# Patient Record
Sex: MALE | Race: WHITE | NOT HISPANIC OR LATINO | Employment: OTHER | URBAN - METROPOLITAN AREA
[De-identification: names, ages, dates, MRNs, and addresses within clinical notes are randomized per-mention and may not be internally consistent; named-entity substitution may affect disease eponyms.]

---

## 2017-01-13 ENCOUNTER — OFFICE VISIT (OUTPATIENT)
Dept: OPHTHALMOLOGY | Facility: CLINIC | Age: 77
End: 2017-01-13
Payer: MEDICARE

## 2017-01-13 DIAGNOSIS — H52.4 BILATERAL PRESBYOPIA: ICD-10-CM

## 2017-01-13 DIAGNOSIS — E11.9 DIABETES MELLITUS TYPE 2 WITHOUT RETINOPATHY: Primary | ICD-10-CM

## 2017-01-13 DIAGNOSIS — H52.13 MYOPIA, BILATERAL: ICD-10-CM

## 2017-01-13 PROCEDURE — 92015 DETERMINE REFRACTIVE STATE: CPT | Mod: S$GLB,,, | Performed by: OPTOMETRIST

## 2017-01-13 PROCEDURE — 99499 UNLISTED E&M SERVICE: CPT | Mod: S$GLB,,, | Performed by: OPTOMETRIST

## 2017-01-13 PROCEDURE — 92014 COMPRE OPH EXAM EST PT 1/>: CPT | Mod: S$GLB,,, | Performed by: OPTOMETRIST

## 2017-01-13 PROCEDURE — 99999 PR PBB SHADOW E&M-EST. PATIENT-LVL I: CPT | Mod: PBBFAC,,, | Performed by: OPTOMETRIST
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
## 2017-01-19 ENCOUNTER — OFFICE VISIT (OUTPATIENT)
Dept: OPHTHALMOLOGY | Facility: CLINIC | Age: 77
End: 2017-01-19
Payer: MEDICARE

## 2017-01-19 DIAGNOSIS — E11.9 DIABETES MELLITUS TYPE 2 WITHOUT RETINOPATHY: ICD-10-CM

## 2017-01-19 DIAGNOSIS — I20.9 AP (ANGINA PECTORIS): ICD-10-CM

## 2017-01-19 PROCEDURE — 92014 COMPRE OPH EXAM EST PT 1/>: CPT | Mod: S$GLB,,, | Performed by: OPHTHALMOLOGY

## 2017-01-19 PROCEDURE — 99999 PR PBB SHADOW E&M-EST. PATIENT-LVL II: CPT | Mod: PBBFAC,,, | Performed by: OPHTHALMOLOGY

## 2017-01-19 PROCEDURE — 99499 UNLISTED E&M SERVICE: CPT | Mod: S$GLB,,, | Performed by: OPHTHALMOLOGY

## 2017-01-19 RX ORDER — SIMVASTATIN 40 MG/1
TABLET, FILM COATED ORAL
Qty: 30 TABLET | Refills: 0 | Status: SHIPPED | OUTPATIENT
Start: 2017-01-19 | End: 2017-04-07 | Stop reason: SDUPTHER

## 2017-01-19 RX ORDER — ISOSORBIDE MONONITRATE 60 MG/1
TABLET, EXTENDED RELEASE ORAL
Qty: 30 TABLET | Refills: 0 | Status: SHIPPED | OUTPATIENT
Start: 2017-01-19 | End: 2017-08-21 | Stop reason: SDUPTHER

## 2017-01-19 RX ORDER — TAMSULOSIN HYDROCHLORIDE 0.4 MG/1
CAPSULE ORAL
Qty: 30 CAPSULE | Refills: 0 | Status: SHIPPED | OUTPATIENT
Start: 2017-01-19 | End: 2018-01-22

## 2017-01-19 RX ORDER — KETOROLAC TROMETHAMINE 5 MG/ML
1 SOLUTION OPHTHALMIC 4 TIMES DAILY
Qty: 1 BOTTLE | Refills: 2 | Status: SHIPPED | OUTPATIENT
Start: 2017-01-19 | End: 2017-03-22 | Stop reason: ALTCHOICE

## 2017-01-19 RX ORDER — PREDNISOLONE ACETATE 10 MG/ML
1 SUSPENSION/ DROPS OPHTHALMIC 4 TIMES DAILY
Qty: 1 BOTTLE | Refills: 2 | Status: SHIPPED | OUTPATIENT
Start: 2017-01-19 | End: 2017-03-22 | Stop reason: ALTCHOICE

## 2017-01-19 RX ORDER — METFORMIN HYDROCHLORIDE 850 MG/1
TABLET ORAL
Qty: 60 TABLET | Refills: 0 | Status: SHIPPED | OUTPATIENT
Start: 2017-01-19 | End: 2017-04-07 | Stop reason: SDUPTHER

## 2017-01-19 RX ORDER — LISINOPRIL 40 MG/1
TABLET ORAL
Qty: 30 TABLET | Refills: 0 | Status: SHIPPED | OUTPATIENT
Start: 2017-01-19 | End: 2017-04-07 | Stop reason: SDUPTHER

## 2017-01-19 RX ORDER — FERROUS SULFATE 325(65) MG
TABLET ORAL
Qty: 60 TABLET | Refills: 0 | Status: SHIPPED | OUTPATIENT
Start: 2017-01-19 | End: 2017-04-07 | Stop reason: SDUPTHER

## 2017-01-19 RX ORDER — GATIFLOXACIN 5 MG/ML
1 SOLUTION/ DROPS OPHTHALMIC 2 TIMES DAILY
Qty: 1 BOTTLE | Refills: 2 | Status: SHIPPED | OUTPATIENT
Start: 2017-01-19 | End: 2017-03-22 | Stop reason: ALTCHOICE

## 2017-01-19 RX ORDER — FINASTERIDE 5 MG/1
TABLET, FILM COATED ORAL
Qty: 30 TABLET | Refills: 0 | Status: SHIPPED | OUTPATIENT
Start: 2017-01-19 | End: 2017-04-07 | Stop reason: SDUPTHER
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
## 2017-01-19 NOTE — MR AVS SNAPSHOT
[facility] - Ophthalmology  [address] [facility] [address] [place] [address]  Phone: [phone]  Fax: [phone]                  Nithin Pino   2017 8:15 AM   Office Visit    Description:  Male : 1940   Provider:  José Coulter MD   Department:  [facility] - Ophthalmology           Reason for Visit     Blurred Vision     Diabetic Eye Exam           Diagnoses this Visit        Comments    Nuclear cataract of both eyes    -  Primary     PSC (posterior subcapsular cataract), bilateral         Diabetes mellitus type 2 without retinopathy                To Do List           Future Appointments        Provider Department Dept Phone    2017 8:10 AM LABORATORY, [facility] - Laboratory [phone]    2017 8:20 AM SPECIMEN, [facility] - Specimen Laboratory [phone]    2017 8:20 AM Celestine Petersen MD [facility] Internal Medicine [phone]      Goals (5 Years of Data)     None       These Medications        Disp Refills Start End    ketorolac 0.5% (ACULAR) 0.5 % Drop 1 Bottle 2 2017     Place 1 drop into the right eye 4 (four) times daily. Eyedrops to start one day before surgery - Right Eye    Pharmacy: [place] - [id number] Ph #: [phone]       gatifloxacin (ZYMAR) 0.5 % Drop drops 1 Bottle 2 2017     Apply 1 drop to eye 2 (two) times daily. Start one day before surgery - Ophthalmic    Pharmacy: [place] - [id number] Ph #: [phone]       prednisoLONE acetate (PRED FORTE) 1 % DrpS 1 Bottle 2 2017     Place 1 drop into the right eye 4 (four) times daily. Start one day before surgery - Right Eye    Pharmacy: [place], [place] - [id number] Ph #: [phone]         [facility] On Call     [facility] On Call Nurse Care Line -  Assistance  Registered nurses in the [facility] On Call Center provide clinical advisement, health education, appointment booking, and other advisory  services.  Call for this free service at [phone].             Medications           Message regarding Medications     Verify the changes and/or additions to your medication regime listed below are the same as discussed with your clinician today.  If any of these changes or additions are incorrect, please notify your healthcare provider.        START taking these NEW medications        Refills    ketorolac 0.5% (ACULAR) 0.5 % Drop 2    Sig: Place 1 drop into the right eye 4 (four) times daily. Eyedrops to start one day before surgery    Class: Normal    Route: Right Eye    gatifloxacin (ZYMAR) 0.5 % Drop drops 2    Sig: Apply 1 drop to eye 2 (two) times daily. Start one day before surgery    Class: Normal    Route: Ophthalmic    prednisoLONE acetate (PRED FORTE) 1 % DrpS 2    Sig: Place 1 drop into the right eye 4 (four) times daily. Start one day before surgery    Class: Normal    Route: Right Eye           Verify that the below list of medications is an accurate representation of the medications you are currently taking.  If none reported, the list may be blank. If incorrect, please contact your healthcare provider. Carry this list with you in case of emergency.           Current Medications     albuterol (VENTOLIN HFA) 90 mcg/actuation inhaler Inhale 2 puffs into the lungs every 6 (six) hours as needed for Wheezing.    aspirin 81 MG Chew 1 Tablet, Chewable Oral Every day    blood sugar diagnostic (ACCU-CHEK ACTIVE TEST) Strp Test 2 times daily as needed    BLOOD-GLUCOSE METER (TRUERESULT BLOOD GLUCOSE SYSTM MISC) by Misc.(Non-Drug; Combo Route) route.    clopidogrel (PLAVIX) 75 mg tablet Take 1 tablet (75 mg total) by mouth once daily.    ferrous sulfate 325 mg (65 mg iron) Tab tablet TAKE 1 TABLET BY MOUTH 2 TIMES DAILY    finasteride (PROSCAR) 5 mg tablet TAKE 1 TABLET BY MOUTH DAILY    glipiZIDE (GLUCOTROL) 5 MG tablet Take 1 tablet (5 mg total) by mouth 2 (two) times daily with meals.     hydrochlorothiazide (MICROZIDE) 12.5 mg capsule Take 1 capsule (12.5 mg total) by mouth once daily.    isosorbide mononitrate (IMDUR) 60 MG 24 hr tablet Take 1 tablet (60 mg total) by mouth once daily.    lisinopril (PRINIVIL,ZESTRIL) 40 MG tablet Take 1 tablet (40 mg total) by mouth once daily.    metformin (GLUCOPHAGE) 850 MG tablet TAKE 1 TABLET BY MOUTH 2 TIMES DAILY    metoprolol tartrate (LOPRESSOR) 50 MG tablet Take 1 tablet (50 mg total) by mouth 2 (two) times daily.    oxybutynin (DITROPAN XL) 15 MG TR24 Take 1 tablet (15 mg total) by mouth once daily.    simvastatin (ZOCOR) 40 MG tablet TAKE 1 TABLET NIGHTLY AT BEDTIME    sitagliptin (JANUVIA) 50 MG Tab 1 tablet Oral Every day    tamsulosin (FLOMAX) 0.4 mg Cp24 TAKE (1) CAPSULE BY MOUTH DAILY    TRUERESULT BLOOD GLUCOSE SYSTM kit CHECK BLOOD SUGAR TWICE DAILY.    VENTOLIN HFA 90 mcg/actuation inhaler 1 PUFF EVERY 6 HOURS AS NEEDED    gatifloxacin (ZYMAR) 0.5 % Drop drops Apply 1 drop to eye 2 (two) times daily. Start one day before surgery    ketorolac 0.5% (ACULAR) 0.5 % Drop Place 1 drop into the right eye 4 (four) times daily. Eyedrops to start one day before surgery    nitroGLYCERIN (NITROSTAT) 0.4 MG SL tablet Place 1 tablet (0.4 mg total) under the tongue every 5 (five) minutes as needed.    prednisoLONE acetate (PRED FORTE) 1 % DrpS Place 1 drop into the right eye 4 (four) times daily. Start one day before surgery           Clinical Reference Information           Allergies as of 1/19/2017     Panmist Dm  [Pseudoephedrine-dm-guaifenesin]      Immunizations Administered on Date of Encounter - 1/19/2017     None      Orders Placed During Today's Visit      Normal Orders This Visit    Ambulatory Referral to External Surgery       [facility] Sign-Up     Activating your [facility] account is as easy as 1-2-3!     1) Visit [email or web address], select Sign Up Now, enter this activation code and your date of birth, then select Next.  [email or web address]  Expires:  3/5/2017  8:57 AM      2) Create a username and password to use when you visit [facility] in the future and select a security question in case you lose your password and select Next.    3) Enter your e-mail address and click Sign Up!    Additional Information  If you have questions, please e-mail [email or web address] or call [phone] to talk to our [facility] staff. Remember, [facility] is NOT to be used for urgent needs. For medical emergencies, dial 911.

## 2017-02-02 ENCOUNTER — OFFICE VISIT (OUTPATIENT)
Dept: OPHTHALMOLOGY | Facility: CLINIC | Age: 77
End: 2017-02-02
Payer: MEDICARE

## 2017-02-02 DIAGNOSIS — Z98.890 POST-OPERATIVE STATE: Primary | ICD-10-CM

## 2017-02-02 PROCEDURE — 99999 PR PBB SHADOW E&M-EST. PATIENT-LVL II: CPT | Mod: PBBFAC,,, | Performed by: OPHTHALMOLOGY

## 2017-02-02 PROCEDURE — 99024 POSTOP FOLLOW-UP VISIT: CPT | Mod: S$GLB,,, | Performed by: OPHTHALMOLOGY

## 2017-02-02 NOTE — MR AVS SNAPSHOT
[facility] - Ophthalmology  [address] [place] [address]  Phone: [phone]  Fax: [phone]                  Nithin Pino   2017 7:45 AM   Office Visit    Description:  Male : 1940   Provider:  José Coulter MD   Department:  [facility] - Ophthalmology           Reason for Visit     Post-op Evaluation           Diagnoses this Visit        Comments    Post-operative state    -  Primary            To Do List           Future Appointments        Provider Department Dept Phone    2017 8:10 AM LABORATORY, [facility] - Laboratory [phone]    2017 8:20 AM SPECIMEN, [facility] Specimen Laboratory [phone]    2017 8:20 AM Celestine Petersen MD [facility] Internal Medicine [phone]      Goals (5 Years of Data)     None      [facility]     [facility] On Call Nurse [facility] Line -  Assistance  Registered nurses in the [facility] On Call Center provide clinical advisement, health education, appointment booking, and other advisory services.  Call for this free service at [phone].             Medications           Message regarding Medications     Verify the changes and/or additions to your medication regime listed below are the same as discussed with your clinician today.  If any of these changes or additions are incorrect, please notify your healthcare provider.             Verify that the below list of medications is an accurate representation of the medications you are currently taking.  If none reported, the list may be blank. If incorrect, please contact your healthcare provider. Carry this list with you in case of emergency.           Current Medications     aspirin 81 MG Chew 1 Tablet, Chewable Oral Every day    clopidogrel (PLAVIX) 75 mg tablet Take 1 tablet (75 mg total) by mouth once daily.    ferrous sulfate 325 mg (65 mg iron) Tab tablet TAKE 1 TABLET BY MOUTH 2 TIMES DAILY    finasteride (PROSCAR) 5 mg tablet TAKE 1 TABLET BY MOUTH DAILY     gatifloxacin (ZYMAR) 0.5 % Drop drops Apply 1 drop to eye 2 (two) times daily. Start one day before surgery    glipiZIDE (GLUCOTROL) 5 MG tablet Take 1 tablet (5 mg total) by mouth 2 (two) times daily with meals.    hydrochlorothiazide (MICROZIDE) 12.5 mg capsule Take 1 capsule (12.5 mg total) by mouth once daily.    isosorbide mononitrate (IMDUR) 60 MG 24 hr tablet TAKE 1 TABLET BY MOUTH DAILY    ketorolac 0.5% (ACULAR) 0.5 % Drop Place 1 drop into the right eye 4 (four) times daily. Eyedrops to start one day before surgery    lisinopril (PRINIVIL,ZESTRIL) 40 MG tablet TAKE 1 TABLET BY MOUTH DAILY    metformin (GLUCOPHAGE) 850 MG tablet TAKE 1 TABLET BY MOUTH 2 TIMES DAILY    metoprolol tartrate (LOPRESSOR) 50 MG tablet Take 1 tablet (50 mg total) by mouth 2 (two) times daily.    oxybutynin (DITROPAN XL) 15 MG TR24 Take 1 tablet (15 mg total) by mouth once daily.    prednisoLONE acetate (PRED FORTE) 1 % DrpS Place 1 drop into the right eye 4 (four) times daily. Start one day before surgery    simvastatin (ZOCOR) 40 MG tablet TAKE 1 TABLET NIGHTLY AT BEDTIME    sitagliptin (JANUVIA) 50 MG Tab 1 tablet Oral Every day    tamsulosin (FLOMAX) 0.4 mg Cp24 TAKE (1) CAPSULE BY MOUTH DAILY    albuterol (VENTOLIN HFA) 90 mcg/actuation inhaler Inhale 2 puffs into the lungs every 6 (six) hours as needed for Wheezing.    blood sugar diagnostic (ACCU-CHEK ACTIVE TEST) Strp Test 2 times daily as needed    BLOOD-GLUCOSE METER (TRUERESULT BLOOD GLUCOSE SYSTM MISC) by Misc.(Non-Drug; Combo Route) route.    nitroGLYCERIN (NITROSTAT) 0.4 MG SL tablet Place 1 tablet (0.4 mg total) under the tongue every 5 (five) minutes as needed.    TRUERESULT BLOOD GLUCOSE SYSTM kit CHECK BLOOD SUGAR TWICE DAILY.    VENTOLIN HFA 90 mcg/actuation inhaler 1 PUFF EVERY 6 HOURS AS NEEDED           Clinical Reference Information           Allergies as of 2/2/2017     Panmist Dm  [Pseudoephedrine-dm-guaifenesin]      Immunizations Administered on Date of  Encounter - 2/2/2017     None      [facility] Sign-Up     Activating your [facility] account is as easy as 1-2-3!     1) Visit [email or web address], select Sign Up Now, enter this activation code and your date of birth, then select Next.  [email or web address]  Expires: 3/5/2017  8:57 AM      2) Create a username and password to use when you visit [facility] in the future and select a security question in case you lose your password and select Next.    3) Enter your e-mail address and click Sign Up!    Additional Information  If you have questions, please e-mail [email or web address] or call [phone] to talk to our [facility] staff. Remember, [facility] is NOT to be used for urgent needs. For medical emergencies, dial 911.

## 2017-02-02 NOTE — PROGRESS NOTES
SUBJECTIVE:   Nithin Pino is a 76 y.o. male   Uncorrected distance visual acuity was 20/25 in the right eye and not recorded in the left eye.   Chief Complaint   Patient presents with    Post-op Evaluation     1 day PO PCIOL OD        HPI:  HPI     Post-op Evaluation    Additional comments: 1 day PO PCIOL OD           Comments    1 day PO PCIOL OD  No pain or discomfort    REFERRED BY DR. MORATAYA  CATS OS  PCIOL OD 2-1-17  +DM x 3 YEARS    OD: Gatifloxacin BID, Pred, Ketorolac QID       Last edited by Jenna Burkett on 2/2/2017  7:40 AM. (History)        Assessment /Plan :  1. Post-operative state Exam:  SLE:  S/C: normal  K : trace k fold  AC: trace cell and flare  Iris: normal  Lens: PCIOL    IMP:  PO Day 1 S/P Phaco/IOL OD : Doing well.    Plan:  Continue gtts to operative eye:  Pred 1% QID  Ketorolac QID  Zymaxid BID  Reinstructed in importance of absolute compliance with Post-OP instructions including medications, shield at bedtime, and limitation of activities. Follow up appointments in approximately one and six weeks or call immediately for increased pain, redness or vision loss.

## 2017-02-06 ENCOUNTER — LAB VISIT (OUTPATIENT)
Dept: LAB | Facility: HOSPITAL | Age: 77
End: 2017-02-06
Attending: FAMILY MEDICINE
Payer: MEDICARE

## 2017-02-06 DIAGNOSIS — I10 ESSENTIAL HYPERTENSION: Chronic | ICD-10-CM

## 2017-02-06 DIAGNOSIS — I25.83 CORONARY ARTERY DISEASE DUE TO LIPID RICH PLAQUE: Chronic | ICD-10-CM

## 2017-02-06 DIAGNOSIS — I25.10 CORONARY ARTERY DISEASE DUE TO LIPID RICH PLAQUE: Chronic | ICD-10-CM

## 2017-02-06 DIAGNOSIS — N18.30 CKD (CHRONIC KIDNEY DISEASE) STAGE 3, GFR 30-59 ML/MIN: ICD-10-CM

## 2017-02-06 LAB
ALBUMIN SERPL BCP-MCNC: 3.8 G/DL
ALP SERPL-CCNC: 53 U/L
ALT SERPL W/O P-5'-P-CCNC: 13 U/L
ANION GAP SERPL CALC-SCNC: 8 MMOL/L
AST SERPL-CCNC: 17 U/L
BILIRUB SERPL-MCNC: 0.7 MG/DL
BUN SERPL-MCNC: 25 MG/DL
CALCIUM SERPL-MCNC: 10.6 MG/DL
CHLORIDE SERPL-SCNC: 103 MMOL/L
CHOLEST/HDLC SERPL: 3.4 {RATIO}
CO2 SERPL-SCNC: 27 MMOL/L
CREAT SERPL-MCNC: 1.3 MG/DL
EST. GFR  (AFRICAN AMERICAN): >60 ML/MIN/1.73 M^2
EST. GFR  (NON AFRICAN AMERICAN): 52.6 ML/MIN/1.73 M^2
GLUCOSE SERPL-MCNC: 246 MG/DL
HDL/CHOLESTEROL RATIO: 29.7 %
HDLC SERPL-MCNC: 118 MG/DL
HDLC SERPL-MCNC: 35 MG/DL
LDLC SERPL CALC-MCNC: 50.4 MG/DL
NONHDLC SERPL-MCNC: 83 MG/DL
POTASSIUM SERPL-SCNC: 4.7 MMOL/L
PROT SERPL-MCNC: 6.8 G/DL
SODIUM SERPL-SCNC: 138 MMOL/L
TRIGL SERPL-MCNC: 163 MG/DL

## 2017-02-06 PROCEDURE — 83036 HEMOGLOBIN GLYCOSYLATED A1C: CPT

## 2017-02-06 PROCEDURE — 80061 LIPID PANEL: CPT

## 2017-02-06 PROCEDURE — 36415 COLL VENOUS BLD VENIPUNCTURE: CPT | Mod: PO

## 2017-02-06 PROCEDURE — 80053 COMPREHEN METABOLIC PANEL: CPT

## 2017-02-07 ENCOUNTER — OFFICE VISIT (OUTPATIENT)
Dept: OPHTHALMOLOGY | Facility: CLINIC | Age: 77
End: 2017-02-07
Payer: MEDICARE

## 2017-02-07 DIAGNOSIS — Z98.890 POST-OPERATIVE STATE: Primary | ICD-10-CM

## 2017-02-07 DIAGNOSIS — H25.12 NUCLEAR SCLEROSIS, LEFT: ICD-10-CM

## 2017-02-07 LAB
ESTIMATED AVG GLUCOSE: 194 MG/DL
HBA1C MFR BLD HPLC: 8.4 %

## 2017-02-07 PROCEDURE — 99999 PR PBB SHADOW E&M-EST. PATIENT-LVL II: CPT | Mod: PBBFAC,,, | Performed by: OPHTHALMOLOGY

## 2017-02-07 PROCEDURE — 99024 POSTOP FOLLOW-UP VISIT: CPT | Mod: S$GLB,,, | Performed by: OPHTHALMOLOGY

## 2017-02-07 PROCEDURE — 92136 OPHTHALMIC BIOMETRY: CPT | Mod: LT,S$GLB,, | Performed by: OPHTHALMOLOGY

## 2017-02-07 RX ORDER — KETOROLAC TROMETHAMINE 5 MG/ML
1 SOLUTION OPHTHALMIC 4 TIMES DAILY
Qty: 1 BOTTLE | Refills: 2 | Status: SHIPPED | OUTPATIENT
Start: 2017-02-07 | End: 2017-03-22 | Stop reason: ALTCHOICE

## 2017-02-07 RX ORDER — PREDNISOLONE ACETATE 10 MG/ML
1 SUSPENSION/ DROPS OPHTHALMIC 4 TIMES DAILY
Qty: 1 BOTTLE | Refills: 2 | Status: SHIPPED | OUTPATIENT
Start: 2017-02-07 | End: 2017-03-22 | Stop reason: ALTCHOICE

## 2017-02-07 RX ORDER — GATIFLOXACIN 5 MG/ML
1 SOLUTION/ DROPS OPHTHALMIC 2 TIMES DAILY
Qty: 1 BOTTLE | Refills: 2 | Status: SHIPPED | OUTPATIENT
Start: 2017-02-07 | End: 2017-03-22 | Stop reason: ALTCHOICE
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
## 2017-02-07 NOTE — MR AVS SNAPSHOT
[facility]  [address] [facility] [address]  Phone: [phone]  Fax: [phone]                  Nithin Pino   2017 10:00 AM   Office Visit    Description:  Male : 1940   Provider:  José Coulter MD   Department:  [facility]           Reason for Visit     Post-op Evaluation           Diagnoses this Visit        Comments    Post-operative state    -  Primary     Nuclear sclerosis, left                To Do List           Future Appointments        Provider Department Dept Phone    2017 8:20 AM Celestine Petersen MD [facility] Internal Medicine [phone]      Goals (5 Years of Data)     None       These Medications        Disp Refills Start End    prednisoLONE acetate (PRED FORTE) 1 % DrpS 1 Bottle 2 2017     Place 1 drop into the left eye 4 (four) times daily. Eyedrops to start one day before surgery - Left Eye    Pharmacy: [facility] [id number] Ph #: [phone]       ketorolac 0.5% (ACULAR) 0.5 % Drop 1 Bottle 2 2017     Place 1 drop into the left eye 4 (four) times daily. Eyedrops to start one day before surgery - Left Eye    Pharmacy: [place], [place] - [address] Ph #: [phone]       gatifloxacin (ZYMAR) 0.5 % Drop drops 1 Bottle 2 2017     Apply 1 drop to eye 2 (two) times daily. Eyedrops to start one day before surgery (left eye) - Ophthalmic    Pharmacy: [place], [place] - [address] Ph #: [phone]         [facility]     [facility] On Call Nurse Care Line -  Assistance  Registered nurses in the [facility] provide clinical advisement, health education, appointment booking, and other advisory services.  Call for this free service at [phone].             Medications           Message regarding Medications     Verify the changes and/or additions to your medication regime listed below are the same as  discussed with your clinician today.  If any of these changes or additions are incorrect, please notify your healthcare provider.        START taking these NEW medications        Refills    prednisoLONE acetate (PRED FORTE) 1 % DrpS 2    Sig: Place 1 drop into the left eye 4 (four) times daily. Eyedrops to start one day before surgery    Class: Normal    Route: Left Eye    ketorolac 0.5% (ACULAR) 0.5 % Drop 2    Sig: Place 1 drop into the left eye 4 (four) times daily. Eyedrops to start one day before surgery    Class: Normal    Route: Left Eye    gatifloxacin (ZYMAR) 0.5 % Drop drops 2    Sig: Apply 1 drop to eye 2 (two) times daily. Eyedrops to start one day before surgery (left eye)    Class: Normal    Route: Ophthalmic           Verify that the below list of medications is an accurate representation of the medications you are currently taking.  If none reported, the list may be blank. If incorrect, please contact your healthcare provider. Carry this list with you in case of emergency.           Current Medications     albuterol (VENTOLIN HFA) 90 mcg/actuation inhaler Inhale 2 puffs into the lungs every 6 (six) hours as needed for Wheezing.    aspirin 81 MG Chew 1 Tablet, Chewable Oral Every day    blood sugar diagnostic (ACCU-CHEK ACTIVE TEST) Strp Test 2 times daily as needed    BLOOD-GLUCOSE METER (TRUERESULT BLOOD GLUCOSE SYSTM MISC) by Misc.(Non-Drug; Combo Route) route.    clopidogrel (PLAVIX) 75 mg tablet Take 1 tablet (75 mg total) by mouth once daily.    ferrous sulfate 325 mg (65 mg iron) Tab tablet TAKE 1 TABLET BY MOUTH 2 TIMES DAILY    finasteride (PROSCAR) 5 mg tablet TAKE 1 TABLET BY MOUTH DAILY    gatifloxacin (ZYMAR) 0.5 % Drop drops Apply 1 drop to eye 2 (two) times daily. Start one day before surgery    glipiZIDE (GLUCOTROL) 5 MG tablet Take 1 tablet (5 mg total) by mouth 2 (two) times daily with meals.    hydrochlorothiazide (MICROZIDE) 12.5 mg capsule Take 1 capsule (12.5 mg total) by mouth  once daily.    isosorbide mononitrate (IMDUR) 60 MG 24 hr tablet TAKE 1 TABLET BY MOUTH DAILY    ketorolac 0.5% (ACULAR) 0.5 % Drop Place 1 drop into the right eye 4 (four) times daily. Eyedrops to start one day before surgery    lisinopril (PRINIVIL,ZESTRIL) 40 MG tablet TAKE 1 TABLET BY MOUTH DAILY    metformin (GLUCOPHAGE) 850 MG tablet TAKE 1 TABLET BY MOUTH 2 TIMES DAILY    metoprolol tartrate (LOPRESSOR) 50 MG tablet Take 1 tablet (50 mg total) by mouth 2 (two) times daily.    nitroGLYCERIN (NITROSTAT) 0.4 MG SL tablet Place 1 tablet (0.4 mg total) under the tongue every 5 (five) minutes as needed.    oxybutynin (DITROPAN XL) 15 MG TR24 Take 1 tablet (15 mg total) by mouth once daily.    prednisoLONE acetate (PRED FORTE) 1 % DrpS Place 1 drop into the right eye 4 (four) times daily. Start one day before surgery    simvastatin (ZOCOR) 40 MG tablet TAKE 1 TABLET NIGHTLY AT BEDTIME    sitagliptin (JANUVIA) 50 MG Tab 1 tablet Oral Every day    tamsulosin (FLOMAX) 0.4 mg Cp24 TAKE (1) CAPSULE BY MOUTH DAILY    TRUERESULT BLOOD GLUCOSE SYSTM kit CHECK BLOOD SUGAR TWICE DAILY.    VENTOLIN HFA 90 mcg/actuation inhaler 1 PUFF EVERY 6 HOURS AS NEEDED    gatifloxacin (ZYMAR) 0.5 % Drop drops Apply 1 drop to eye 2 (two) times daily. Eyedrops to start one day before surgery (left eye)    ketorolac 0.5% (ACULAR) 0.5 % Drop Place 1 drop into the left eye 4 (four) times daily. Eyedrops to start one day before surgery    prednisoLONE acetate (PRED FORTE) 1 % DrpS Place 1 drop into the left eye 4 (four) times daily. Eyedrops to start one day before surgery           Clinical Reference Information           Allergies as of 2/7/2017     Panmist Dm  [Pseudoephedrine-dm-guaifenesin]      Immunizations Administered on Date of Encounter - 2/7/2017     None      Orders Placed During Today's Visit      Normal Orders This Visit    Ambulatory Referral to External Surgery     [facility]- Second Eye       [facility] Sign-Up      Activating your [facility] account is as easy as 1-2-3!     1) Visit [email or web address], select Sign Up Now, enter this activation code and your date of birth, then select Next.  [email or web address]  Expires: 3/5/2017  8:57 AM      2) Create a username and password to use when you visit [facility] in the future and select a security question in case you lose your password and select Next.    3) Enter your e-mail address and click Sign Up!    Additional Information  If you have questions, please e-mail [email or web address] or call [phone] to talk to our [email or web address] staff. Remember, [facility] is NOT to be used for urgent needs. For medical emergencies, dial 911.         Language Assistance Services     ATTENTION: Language assistance services are available, free of charge. Please call [phone].      ATENCIÓN: Si habla ton, tiene a schumacher disposición servicios gratuitos de asistencia lingüística. Llame al [phone].     CHÚ Ý: N?u b?n nói Ti?ng Vi?t, có các d?ch v? h? tr? ngôn ng? mi?n phí dành cho b?n. G?i s? [phone].         [facility] complies with applicable Federal civil rights laws and does not discriminate on the basis of race, color, national origin, age, disability, or sex.

## 2017-02-13 ENCOUNTER — OFFICE VISIT (OUTPATIENT)
Dept: INTERNAL MEDICINE | Facility: CLINIC | Age: 77
End: 2017-02-13
Payer: MEDICARE

## 2017-02-13 VITALS
HEART RATE: 66 BPM | SYSTOLIC BLOOD PRESSURE: 136 MMHG | HEIGHT: 69 IN | TEMPERATURE: 98 F | DIASTOLIC BLOOD PRESSURE: 64 MMHG | OXYGEN SATURATION: 97 % | BODY MASS INDEX: 26.81 KG/M2 | WEIGHT: 181 LBS

## 2017-02-13 DIAGNOSIS — J44.9 CHRONIC OBSTRUCTIVE PULMONARY DISEASE, UNSPECIFIED COPD TYPE: ICD-10-CM

## 2017-02-13 DIAGNOSIS — N40.0 BENIGN NON-NODULAR PROSTATIC HYPERPLASIA WITHOUT LOWER URINARY TRACT SYMPTOMS: ICD-10-CM

## 2017-02-13 DIAGNOSIS — R10.30 LOWER ABDOMINAL PAIN: ICD-10-CM

## 2017-02-13 DIAGNOSIS — E78.5 HYPERLIPIDEMIA ASSOCIATED WITH TYPE 2 DIABETES MELLITUS: Primary | ICD-10-CM

## 2017-02-13 DIAGNOSIS — I10 ESSENTIAL HYPERTENSION: Chronic | ICD-10-CM

## 2017-02-13 DIAGNOSIS — K59.03 DRUG-INDUCED CONSTIPATION: ICD-10-CM

## 2017-02-13 DIAGNOSIS — I25.10 CORONARY ARTERY DISEASE DUE TO LIPID RICH PLAQUE: Chronic | ICD-10-CM

## 2017-02-13 DIAGNOSIS — R39.11 URINARY HESITANCY: ICD-10-CM

## 2017-02-13 DIAGNOSIS — I25.83 CORONARY ARTERY DISEASE DUE TO LIPID RICH PLAQUE: Chronic | ICD-10-CM

## 2017-02-13 DIAGNOSIS — N18.30 CKD (CHRONIC KIDNEY DISEASE) STAGE 3, GFR 30-59 ML/MIN: ICD-10-CM

## 2017-02-13 DIAGNOSIS — E11.69 HYPERLIPIDEMIA ASSOCIATED WITH TYPE 2 DIABETES MELLITUS: Primary | ICD-10-CM

## 2017-02-13 LAB
BILIRUB UR QL STRIP: NEGATIVE
CLARITY UR REFRACT.AUTO: CLEAR
COLOR UR AUTO: YELLOW
GLUCOSE UR QL STRIP: ABNORMAL
HGB UR QL STRIP: NEGATIVE
KETONES UR QL STRIP: NEGATIVE
LEUKOCYTE ESTERASE UR QL STRIP: NEGATIVE
NITRITE UR QL STRIP: NEGATIVE
PH UR STRIP: 6 [PH] (ref 5–8)
PROT UR QL STRIP: NEGATIVE
SP GR UR STRIP: 1.01 (ref 1–1.03)
URN SPEC COLLECT METH UR: ABNORMAL
UROBILINOGEN UR STRIP-ACNC: NEGATIVE EU/DL

## 2017-02-13 PROCEDURE — 3075F SYST BP GE 130 - 139MM HG: CPT | Mod: S$GLB,,, | Performed by: FAMILY MEDICINE

## 2017-02-13 PROCEDURE — 87086 URINE CULTURE/COLONY COUNT: CPT

## 2017-02-13 PROCEDURE — 1157F ADVNC CARE PLAN IN RCRD: CPT | Mod: S$GLB,,, | Performed by: FAMILY MEDICINE

## 2017-02-13 PROCEDURE — 81003 URINALYSIS AUTO W/O SCOPE: CPT

## 2017-02-13 PROCEDURE — 99499 UNLISTED E&M SERVICE: CPT | Mod: S$GLB,,, | Performed by: FAMILY MEDICINE

## 2017-02-13 PROCEDURE — 1125F AMNT PAIN NOTED PAIN PRSNT: CPT | Mod: S$GLB,,, | Performed by: FAMILY MEDICINE

## 2017-02-13 PROCEDURE — 1159F MED LIST DOCD IN RCRD: CPT | Mod: S$GLB,,, | Performed by: FAMILY MEDICINE

## 2017-02-13 PROCEDURE — 99214 OFFICE O/P EST MOD 30 MIN: CPT | Mod: S$GLB,,, | Performed by: FAMILY MEDICINE

## 2017-02-13 PROCEDURE — 1160F RVW MEDS BY RX/DR IN RCRD: CPT | Mod: S$GLB,,, | Performed by: FAMILY MEDICINE

## 2017-02-13 PROCEDURE — 3078F DIAST BP <80 MM HG: CPT | Mod: S$GLB,,, | Performed by: FAMILY MEDICINE

## 2017-02-13 PROCEDURE — 99999 PR PBB SHADOW E&M-EST. PATIENT-LVL III: CPT | Mod: PBBFAC,,, | Performed by: FAMILY MEDICINE

## 2017-02-13 RX ORDER — POLYETHYLENE GLYCOL 3350 17 G/17G
17 POWDER, FOR SOLUTION ORAL DAILY
Qty: 100 EACH | Refills: 6 | Status: SHIPPED | OUTPATIENT
Start: 2017-02-13 | End: 2017-08-28

## 2017-02-13 RX ORDER — METOPROLOL TARTRATE 25 MG/1
TABLET, FILM COATED ORAL
COMMUNITY
Start: 2016-12-27 | End: 2017-05-22 | Stop reason: SDUPTHER

## 2017-02-13 RX ORDER — SYRING-NEEDL,DISP,INSUL,0.3 ML 29 G X1/2"
148 SYRINGE, EMPTY DISPOSABLE MISCELLANEOUS ONCE
Qty: 1 BOTTLE | Refills: 0 | Status: SHIPPED | OUTPATIENT
Start: 2017-02-13 | End: 2017-02-13

## 2017-02-13 NOTE — MR AVS SNAPSHOT
[facility]  [address] [place] [address]  Phone: [phone]                  Nithin Pino   2017 8:20 AM   Office Visit    Description:  Male : 1940   Provider:  Celestine Petersen MD   Department:  [facility]           Reason for Visit     3 month follow up           Diagnoses this Visit        Comments    Hyperlipidemia associated with type 2 diabetes mellitus    -  Primary     Essential hypertension         Coronary artery disease due to lipid rich plaque         Benign non-nodular prostatic hyperplasia without lower urinary tract symptoms         CKD (chronic kidney disease) stage 3, GFR 30-59 ml/min         Chronic obstructive pulmonary disease, unspecified COPD type         Drug-induced constipation         Lower abdominal pain         Urinary hesitancy                To Do List           Future Appointments        Provider Department Dept Phone    2017 8:00 AM José Coulter MD [facility] [phone]    2017 9:30 AM [facility] CT1 LIMIT [address] [facility] - [phone]    5/15/2017 8:10 AM LABORATORY, [place] [facility] [phone]    5/15/2017 8:20 AM SPECIMEN, [place] [facility] [phone]    2017 8:00 AM Celestine Petersen MD [facility] [phone]      Goals (5 Years of Data)     None      Follow-Up and Disposition     Return in about 3 months (around 2017).       These Medications        Disp Refills Start End    dulaglutide 1.5 mg/0.5 mL PnIj 1 Syringe 12 2017     Inject 1.5 mg into the skin every 7 days. - Subcutaneous    Pharmacy: [place], [place] - [address] Ph #: [phone]       polyethylene glycol (GLYCOLAX) 17 gram PwPk 100 each 6 2017     Take 17 g by mouth once daily. - Oral    Pharmacy: [place], [place] - [address] Ph #: [phone]        magnesium citrate solution 1 Bottle 0 2/13/2017 2/13/2017    Take 148 mLs by mouth once. - Oral    Pharmacy: [address] #: [phone]         [facility]     [facility] On Call Nurse Care Line - 24/7 Assistance  Registered nurses in the [facility] provide clinical advisement, health education, appointment booking, and other advisory services.  Call for this free service at [phone].             Medications           Message regarding Medications     Verify the changes and/or additions to your medication regime listed below are the same as discussed with your clinician today.  If any of these changes or additions are incorrect, please notify your healthcare provider.        START taking these NEW medications        Refills    dulaglutide 1.5 mg/0.5 mL PnIj 12    Sig: Inject 1.5 mg into the skin every 7 days.    Class: Normal    Route: Subcutaneous    polyethylene glycol (GLYCOLAX) 17 gram PwPk 6    Sig: Take 17 g by mouth once daily.    Class: Normal    Route: Oral    magnesium citrate solution 0    Sig: Take 148 mLs by mouth once.    Class: Normal    Route: Oral           Verify that the below list of medications is an accurate representation of the medications you are currently taking.  If none reported, the list may be blank. If incorrect, please contact your healthcare provider. Carry this list with you in case of emergency.           Current Medications     albuterol (VENTOLIN HFA) 90 mcg/actuation inhaler Inhale 2 puffs into the lungs every 6 (six) hours as needed for Wheezing.    aspirin 81 MG Chew 1 Tablet, Chewable Oral Every day    blood sugar diagnostic (ACCU-CHEK ACTIVE TEST) Strp Test 2 times daily as needed    BLOOD-GLUCOSE METER (TRUERESULT BLOOD GLUCOSE SYSTM MISC) by Misc.(Non-Drug; Combo Route) route.    clopidogrel (PLAVIX) 75 mg tablet Take 1 tablet (75 mg total) by mouth once daily.    ferrous sulfate 325 mg (65 mg iron) Tab  tablet TAKE 1 TABLET BY MOUTH 2 TIMES DAILY    finasteride (PROSCAR) 5 mg tablet TAKE 1 TABLET BY MOUTH DAILY    gatifloxacin (ZYMAR) 0.5 % Drop drops Apply 1 drop to eye 2 (two) times daily. Start one day before surgery    gatifloxacin (ZYMAR) 0.5 % Drop drops Apply 1 drop to eye 2 (two) times daily. Eyedrops to start one day before surgery (left eye)    glipiZIDE (GLUCOTROL) 5 MG tablet Take 1 tablet (5 mg total) by mouth 2 (two) times daily with meals.    hydrochlorothiazide (MICROZIDE) 12.5 mg capsule Take 1 capsule (12.5 mg total) by mouth once daily.    isosorbide mononitrate (IMDUR) 60 MG 24 hr tablet TAKE 1 TABLET BY MOUTH DAILY    ketorolac 0.5% (ACULAR) 0.5 % Drop Place 1 drop into the right eye 4 (four) times daily. Eyedrops to start one day before surgery    ketorolac 0.5% (ACULAR) 0.5 % Drop Place 1 drop into the left eye 4 (four) times daily. Eyedrops to start one day before surgery    lisinopril (PRINIVIL,ZESTRIL) 40 MG tablet TAKE 1 TABLET BY MOUTH DAILY    metformin (GLUCOPHAGE) 850 MG tablet TAKE 1 TABLET BY MOUTH 2 TIMES DAILY    metoprolol tartrate (LOPRESSOR) 25 MG tablet     metoprolol tartrate (LOPRESSOR) 50 MG tablet Take 1 tablet (50 mg total) by mouth 2 (two) times daily.    nitroGLYCERIN (NITROSTAT) 0.4 MG SL tablet Place 1 tablet (0.4 mg total) under the tongue every 5 (five) minutes as needed.    oxybutynin (DITROPAN XL) 15 MG TR24 Take 1 tablet (15 mg total) by mouth once daily.    prednisoLONE acetate (PRED FORTE) 1 % DrpS Place 1 drop into the right eye 4 (four) times daily. Start one day before surgery    prednisoLONE acetate (PRED FORTE) 1 % DrpS Place 1 drop into the left eye 4 (four) times daily. Eyedrops to start one day before surgery    simvastatin (ZOCOR) 40 MG tablet TAKE 1 TABLET NIGHTLY AT BEDTIME    sitagliptin (JANUVIA) 50 MG Tab 1 tablet Oral Every day    tamsulosin (FLOMAX) 0.4 mg Cp24 TAKE (1) CAPSULE BY MOUTH DAILY    TRUERESULT BLOOD GLUCOSE SYSTM kit CHECK BLOOD  "SUGAR TWICE DAILY.    VENTOLIN HFA 90 mcg/actuation inhaler 1 PUFF EVERY 6 HOURS AS NEEDED    dulaglutide 1.5 mg/0.5 mL PnIj Inject 1.5 mg into the skin every 7 days.    magnesium citrate solution Take 148 mLs by mouth once.    polyethylene glycol (GLYCOLAX) 17 gram PwPk Take 17 g by mouth once daily.           Clinical Reference Information           Your Vitals Were     BP Pulse Temp Height Weight SpO2    136/64 66 97.9 °F (36.6 °C) (Tympanic) 5' 9" (1.753 m) 82.1 kg (181 lb) 97%    BMI                26.73 kg/m2          Blood Pressure          Most Recent Value    BP  136/64      Allergies as of 2/13/2017     Panmist Dm  [Pseudoephedrine-dm-guaifenesin]      Immunizations Administered on Date of Encounter - 2/13/2017     None      Orders Placed During Today's Visit      Normal Orders This Visit    URINALYSIS     Urine culture     Future Labs/Procedures Expected by Expires    CT Abdomen Pelvis W Wo Contrast  2/13/2017 2/13/2018    Microalbumin/creatinine urine ratio  5/14/2017 (Approximate) 4/14/2018    Comprehensive metabolic panel  5/16/2017 (Approximate) 5/14/2018    Hemoglobin A1c  5/16/2017 (Approximate) 4/14/2018    Lipid panel  5/16/2017 (Approximate) 4/14/2018      [facility] Sign-Up     Activating your [facility] account is as easy as 1-2-3!     1) Visit [email or web address], select Sign Up Now, enter this activation code and your date of birth, then select Next.  [email or web address]  Expires: 3/5/2017  8:57 AM      2) Create a username and password to use when you visit [facility] in the future and select a security question in case you lose your password and select Next.    3) Enter your e-mail address and click Sign Up!    Additional Information  If you have questions, please e-mail [email or web address] or call [phone] to talk to our [facility] staff. Remember, [facility] is NOT to be used for urgent needs. For medical emergencies, dial 911.         Language Assistance Services     ATTENTION: Language " assistance services are available, free of charge. Please call [phone].      ATENCIÓN: Si habla ton, tiene a schumacher disposición servicios gratuitos de asistencia lingüística. Llame al [phone].     CHÚ Ý: N?u b?n nói Ti?ng Vi?t, có các d?ch v? h? tr? ngôn ng? mi?n phí dành cho b?n. G?i s? [phone].         [facility] complies with applicable Federal civil rights laws and does not discriminate on the basis of race, color, national origin, age, disability, or sex.

## 2017-02-13 NOTE — PROGRESS NOTES
Chief Complaint:    Chief Complaint   Patient presents with    3 month follow up       History of Present Illness:  He presents today for three-month follow-up of chronic medical problems,  He has diabetes which is gone up this time A1c is 8.4 is taking 3 medications no hypoglycemic episode.  He is unable to watch his diet very much.  His blood pressure is stable lipids chemistries are at goal.  He has coronary artery disease and peripheral vascular disease which is stable following with cardiology.  He does complain of some lower abdominal pain for the last few days his stools are dark ever since he's been on iron tablets.  He says the iron tablets make him very constipated he has bowel movement every other day and that extremely hard and painful sometimes.  He denies any fever or nausea vomiting he does have some increased urinary symptoms he does suffer from BPH and has been on finasteride denies any burning sensation.      ROS:  Review of Systems   Constitutional: Negative for activity change, chills, fatigue, fever and unexpected weight change.   HENT: Negative for congestion, ear discharge, ear pain, hearing loss, postnasal drip and rhinorrhea.    Eyes: Negative for pain and visual disturbance.   Respiratory: Negative for cough, chest tightness and shortness of breath.    Cardiovascular: Negative for chest pain and palpitations.   Gastrointestinal: Positive for abdominal pain and constipation. Negative for diarrhea and vomiting.   Endocrine: Negative for heat intolerance.   Genitourinary: Positive for difficulty urinating. Negative for dysuria, flank pain, frequency and hematuria.   Musculoskeletal: Negative for back pain, gait problem and neck pain.   Skin: Negative for color change and rash.   Neurological: Negative for dizziness, tremors, seizures, numbness and headaches.   Psychiatric/Behavioral: Negative for agitation, hallucinations, self-injury, sleep disturbance and suicidal ideas. The patient is  not nervous/anxious.        Past Medical History   Diagnosis Date    Abnormal ECG 10/31/2013    AP (angina pectoris) 1/28/2016    Asthma     Cataract     Chronic ischemic heart disease 10/31/2013    CKD (chronic kidney disease) stage 3, GFR 30-59 ml/min 11/4/2015    History of PTCA 10/31/2013    Hyperlipidemia     Hypertension     Insomnia 6/17/2013    Ischemic cardiomyopathy 10/31/2013    Myocardial infarction     Old MI (myocardial infarction) 1994    RBBB 10/31/2013    S/P CABG (coronary artery bypass graft) 10/31/2013    Shortness of breath 10/31/2013    Tobacco dependence      resolved    Trouble in sleeping     Type 2 diabetes mellitus 2010    Wears glasses        Social History:  Social History     Social History    Marital status:      Spouse name: N/A    Number of children: 5    Years of education: N/A     Social History Main Topics    Smoking status: Former Smoker     Packs/day: 1.50     Years: 40.00     Quit date: 1/1/1994    Smokeless tobacco: Former User     Quit date: 1/1/2011      Comment: approx date    Alcohol use 0.0 oz/week     1 Standard drinks or equivalent per week      Comment: occasionally; 1-2 cans of beer a month    Drug use: No    Sexual activity: Not Currently     Partners: Female     Birth control/ protection: None     Other Topics Concern    Not on file     Social History Narrative       Family History:   family history includes Diabetes in his brother and sister. There is no history of Cancer, Heart disease, or Kidney disease. He was adopted.    Health Maintenance   Topic Date Due    Zoster Vaccine  05/11/1999    Influenza Vaccine  08/01/2016    Colonoscopy  11/20/2016    Hemoglobin A1c  08/06/2017    Foot Exam  11/07/2017    Eye Exam  01/19/2018    Lipid Panel  02/06/2018    TETANUS VACCINE  04/03/2024    Pneumococcal (65+)  Completed       Physical Exam:    Vital Signs  Temp: 97.9 °F (36.6 °C)  Temp src: Tympanic  Pulse: 66  SpO2: 97  "%  BP: 136/64  Pain Score:   4  Height and Weight  Height: 5' 9" (175.3 cm)  Weight: 82.1 kg (181 lb)  BSA (Calculated - sq m): 2 sq meters  BMI (Calculated): 26.8  Weight in (lb) to have BMI = 25: 168.9]    Body mass index is 26.73 kg/(m^2).    Physical Exam   Constitutional: He is oriented to person, place, and time. He appears well-developed.   HENT:   Mouth/Throat: Oropharynx is clear and moist.   Eyes: Conjunctivae are normal. Pupils are equal, round, and reactive to light.   Neck: Normal range of motion. Neck supple.   Cardiovascular: Normal rate, regular rhythm and normal heart sounds.    No murmur heard.  Pulmonary/Chest: Effort normal and breath sounds normal. No respiratory distress. He has no wheezes. He has no rales. He exhibits no tenderness.   Abdominal: Soft. He exhibits no distension and no mass. There is tenderness. There is no guarding.       Musculoskeletal: He exhibits no edema or tenderness.   Lymphadenopathy:     He has no cervical adenopathy.   Neurological: He is alert and oriented to person, place, and time. He has normal reflexes.   Skin: Skin is warm and dry.   Psychiatric: He has a normal mood and affect. His behavior is normal. Judgment and thought content normal.       Lab Results   Component Value Date    CHOL 118 (L) 02/06/2017    CHOL 118 (L) 11/07/2016    CHOL 112 (L) 07/08/2016    TRIG 163 (H) 02/06/2017    TRIG 70 11/07/2016    TRIG 110 07/08/2016    HDL 35 (L) 02/06/2017    HDL 44 11/07/2016    HDL 38 (L) 07/08/2016    TOTALCHOLEST 3.4 02/06/2017    TOTALCHOLEST 2.7 11/07/2016    TOTALCHOLEST 2.9 07/08/2016    NONHDLCHOL 83 02/06/2017    NONHDLCHOL 74 11/07/2016    NONHDLCHOL 74 07/08/2016       Lab Results   Component Value Date    HGBA1C 8.4 (H) 02/06/2017       Assessment:      ICD-10-CM ICD-9-CM   1. Hyperlipidemia associated with type 2 diabetes mellitus E11.69 250.80    E78.5 272.4   2. Essential hypertension I10 401.9   3. Coronary artery disease due to lipid rich plaque " I25.10 414.00    I25.83 414.3   4. Benign non-nodular prostatic hyperplasia without lower urinary tract symptoms N40.0 600.90   5. CKD (chronic kidney disease) stage 3, GFR 30-59 ml/min N18.3 585.3   6. Chronic obstructive pulmonary disease, unspecified COPD type J44.9 496   7. Drug-induced constipation K59.03 564.09     E980.5   8. Lower abdominal pain R10.30 789.09   9. Urinary hesitancy R39.11 788.64         Plan:  1.  Lower abdominal pain differential includes constipation versus diverticulitis, ask him to stop the iron tablets immediately will get a CT of this abdomen with and without contrast.  Recommend magnesiums citrate ×1 followed by MiraLAX on a regular basis to keep the bowels soft.  Check a urine analysis and urine culture.  If symptoms get worse please go to the ED.  2.  Diabetes type 2 add trulicity is very reluctant to taking any new medication because of finances.  Discussed portion control starch regulation.  3.  Chronic kidney disease stable  4.  BPH.  Control  5.  Hypertension stable  6.  Coronary artery disease and peripheral vascular disease is stable.  Orders Placed This Encounter   Procedures    Urine culture    CT Abdomen Pelvis W Wo Contrast    Hemoglobin A1c    Microalbumin/creatinine urine ratio    Comprehensive metabolic panel    Lipid panel    URINALYSIS       Current Outpatient Prescriptions   Medication Sig Dispense Refill    albuterol (VENTOLIN HFA) 90 mcg/actuation inhaler Inhale 2 puffs into the lungs every 6 (six) hours as needed for Wheezing.      aspirin 81 MG Chew 1 Tablet, Chewable Oral Every day      blood sugar diagnostic (ACCU-CHEK ACTIVE TEST) Strp Test 2 times daily as needed 300 each 4    BLOOD-GLUCOSE METER (TRUERESULT BLOOD GLUCOSE SYSTM MISC) by Misc.(Non-Drug; Combo Route) route.      clopidogrel (PLAVIX) 75 mg tablet Take 1 tablet (75 mg total) by mouth once daily. 30 tablet 6    ferrous sulfate 325 mg (65 mg iron) Tab tablet TAKE 1 TABLET BY MOUTH 2  TIMES DAILY 60 tablet 0    finasteride (PROSCAR) 5 mg tablet TAKE 1 TABLET BY MOUTH DAILY 30 tablet 0    gatifloxacin (ZYMAR) 0.5 % Drop drops Apply 1 drop to eye 2 (two) times daily. Start one day before surgery 1 Bottle 2    gatifloxacin (ZYMAR) 0.5 % Drop drops Apply 1 drop to eye 2 (two) times daily. Eyedrops to start one day before surgery (left eye) 1 Bottle 2    glipiZIDE (GLUCOTROL) 5 MG tablet Take 1 tablet (5 mg total) by mouth 2 (two) times daily with meals. 60 tablet 6    hydrochlorothiazide (MICROZIDE) 12.5 mg capsule Take 1 capsule (12.5 mg total) by mouth once daily. 30 capsule 11    isosorbide mononitrate (IMDUR) 60 MG 24 hr tablet TAKE 1 TABLET BY MOUTH DAILY 30 tablet 0    ketorolac 0.5% (ACULAR) 0.5 % Drop Place 1 drop into the right eye 4 (four) times daily. Eyedrops to start one day before surgery 1 Bottle 2    ketorolac 0.5% (ACULAR) 0.5 % Drop Place 1 drop into the left eye 4 (four) times daily. Eyedrops to start one day before surgery 1 Bottle 2    lisinopril (PRINIVIL,ZESTRIL) 40 MG tablet TAKE 1 TABLET BY MOUTH DAILY 30 tablet 0    metformin (GLUCOPHAGE) 850 MG tablet TAKE 1 TABLET BY MOUTH 2 TIMES DAILY 60 tablet 0    metoprolol tartrate (LOPRESSOR) 25 MG tablet       metoprolol tartrate (LOPRESSOR) 50 MG tablet Take 1 tablet (50 mg total) by mouth 2 (two) times daily. 60 tablet 6    nitroGLYCERIN (NITROSTAT) 0.4 MG SL tablet Place 1 tablet (0.4 mg total) under the tongue every 5 (five) minutes as needed. 25 tablet 6    oxybutynin (DITROPAN XL) 15 MG TR24 Take 1 tablet (15 mg total) by mouth once daily. 30 tablet 11    prednisoLONE acetate (PRED FORTE) 1 % DrpS Place 1 drop into the right eye 4 (four) times daily. Start one day before surgery 1 Bottle 2    prednisoLONE acetate (PRED FORTE) 1 % DrpS Place 1 drop into the left eye 4 (four) times daily. Eyedrops to start one day before surgery 1 Bottle 2    simvastatin (ZOCOR) 40 MG tablet TAKE 1 TABLET NIGHTLY AT BEDTIME 30  tablet 0    sitagliptin (JANUVIA) 50 MG Tab 1 tablet Oral Every day 30 tablet 6    tamsulosin (FLOMAX) 0.4 mg Cp24 TAKE (1) CAPSULE BY MOUTH DAILY 30 capsule 0    TRUERESULT BLOOD GLUCOSE SYSTM kit CHECK BLOOD SUGAR TWICE DAILY. 100 each 12    VENTOLIN HFA 90 mcg/actuation inhaler 1 PUFF EVERY 6 HOURS AS NEEDED 18 g 0    dulaglutide 1.5 mg/0.5 mL PnIj Inject 1.5 mg into the skin every 7 days. 1 Syringe 12    magnesium citrate solution Take 148 mLs by mouth once. 1 Bottle 0    polyethylene glycol (GLYCOLAX) 17 gram PwPk Take 17 g by mouth once daily. 100 each 6     No current facility-administered medications for this visit.        There are no discontinued medications.    Return in about 3 months (around 5/13/2017).      Dr Celestine Petersen MD    Disclaimer: This note is prepared using voice recognition system and as such is likely to have errors and is not proof read.

## 2017-02-14 LAB — BACTERIA UR CULT: NO GROWTH

## 2017-02-16 ENCOUNTER — OFFICE VISIT (OUTPATIENT)
Dept: OPHTHALMOLOGY | Facility: CLINIC | Age: 77
End: 2017-02-16
Payer: MEDICARE

## 2017-02-16 DIAGNOSIS — Z98.890 POST-OPERATIVE STATE: Primary | ICD-10-CM

## 2017-02-16 PROCEDURE — 99024 POSTOP FOLLOW-UP VISIT: CPT | Mod: S$GLB,,, | Performed by: OPHTHALMOLOGY

## 2017-02-16 PROCEDURE — 99999 PR PBB SHADOW E&M-EST. PATIENT-LVL II: CPT | Mod: PBBFAC,,, | Performed by: OPHTHALMOLOGY

## 2017-02-16 NOTE — MR AVS SNAPSHOT
[facility]  [address] [place] [address]  Phone: [phone]  Fax: [phone]                  Nithin Pino   2017 8:00 AM   Office Visit    Description:  Male : 1940   Provider:  José Coulter MD   Department:  [facility]           Reason for Visit     Post-op Evaluation           Diagnoses this Visit        Comments    Post-operative state    -  Primary            To Do List           Future Appointments        Provider Department Dept Phone    2017 8:00 AM José Coulter MD [facility] [phone]    2017 9:30 AM [facility] CT1 LIMIT [address] LBS [facility] -  [phone]    5/15/2017 8:10 AM LABORATORY, [place] [facility] [phone]    5/15/2017 8:20 AM SPECIMEN, [place] [facility] [phone] 8:00 AM Celestine Petersen MD [facility] [phone]      Goals (5 Years of Data)     None      Follow-Up and Disposition     Return in about 1 week (around 2017).      [facility]     [facility] Care [facility] - 24/7 Assistance  Registered nurses in the [facility] provide clinical advisement, health education, appointment booking, and other advisory services.  Call for this free service at [phone].             Medications           Message regarding Medications     Verify the changes and/or additions to your medication regime listed below are the same as discussed with your clinician today.  If any of these changes or additions are incorrect, please notify your healthcare provider.             Verify that the below list of medications is an accurate representation of the medications you are currently taking.  If none reported, the list may be blank. If incorrect, please contact your healthcare provider. Carry this list with you in case of emergency.           Current Medications     albuterol (VENTOLIN HFA)  90 mcg/actuation inhaler Inhale 2 puffs into the lungs every 6 (six) hours as needed for Wheezing.    aspirin 81 MG Chew 1 Tablet, Chewable Oral Every day    blood sugar diagnostic (ACCU-CHEK ACTIVE TEST) Strp Test 2 times daily as needed    BLOOD-GLUCOSE METER (TRUERESULT BLOOD GLUCOSE SYSTM MISC) by Misc.(Non-Drug; Combo Route) route.    clopidogrel (PLAVIX) 75 mg tablet Take 1 tablet (75 mg total) by mouth once daily.    finasteride (PROSCAR) 5 mg tablet TAKE 1 TABLET BY MOUTH DAILY    gatifloxacin (ZYMAR) 0.5 % Drop drops Apply 1 drop to eye 2 (two) times daily. Eyedrops to start one day before surgery (left eye)    glipiZIDE (GLUCOTROL) 5 MG tablet Take 1 tablet (5 mg total) by mouth 2 (two) times daily with meals.    hydrochlorothiazide (MICROZIDE) 12.5 mg capsule Take 1 capsule (12.5 mg total) by mouth once daily.    ketorolac 0.5% (ACULAR) 0.5 % Drop Place 1 drop into the left eye 4 (four) times daily. Eyedrops to start one day before surgery    lisinopril (PRINIVIL,ZESTRIL) 40 MG tablet TAKE 1 TABLET BY MOUTH DAILY    metformin (GLUCOPHAGE) 850 MG tablet TAKE 1 TABLET BY MOUTH 2 TIMES DAILY    metoprolol tartrate (LOPRESSOR) 25 MG tablet     nitroGLYCERIN (NITROSTAT) 0.4 MG SL tablet Place 1 tablet (0.4 mg total) under the tongue every 5 (five) minutes as needed.    oxybutynin (DITROPAN XL) 15 MG TR24 Take 1 tablet (15 mg total) by mouth once daily.    prednisoLONE acetate (PRED FORTE) 1 % DrpS Place 1 drop into the left eye 4 (four) times daily. Eyedrops to start one day before surgery    simvastatin (ZOCOR) 40 MG tablet TAKE 1 TABLET NIGHTLY AT BEDTIME    sitagliptin (JANUVIA) 50 MG Tab 1 tablet Oral Every day    tamsulosin (FLOMAX) 0.4 mg Cp24 TAKE (1) CAPSULE BY MOUTH DAILY    TRUERESULT BLOOD GLUCOSE SYSTM kit CHECK BLOOD SUGAR TWICE DAILY.    VENTOLIN HFA 90 mcg/actuation inhaler 1 PUFF EVERY 6 HOURS AS NEEDED    dulaglutide 1.5 mg/0.5 mL PnIj Inject 1.5 mg into the skin every 7 days.    ferrous  sulfate 325 mg (65 mg iron) Tab tablet TAKE 1 TABLET BY MOUTH 2 TIMES DAILY    gatifloxacin (ZYMAR) 0.5 % Drop drops Apply 1 drop to eye 2 (two) times daily. Start one day before surgery    isosorbide mononitrate (IMDUR) 60 MG 24 hr tablet TAKE 1 TABLET BY MOUTH DAILY    ketorolac 0.5% (ACULAR) 0.5 % Drop Place 1 drop into the right eye 4 (four) times daily. Eyedrops to start one day before surgery    metoprolol tartrate (LOPRESSOR) 50 MG tablet Take 1 tablet (50 mg total) by mouth 2 (two) times daily.    polyethylene glycol (GLYCOLAX) 17 gram PwPk Take 17 g by mouth once daily.    prednisoLONE acetate (PRED FORTE) 1 % DrpS Place 1 drop into the right eye 4 (four) times daily. Start one day before surgery           Clinical Reference Information           Allergies as of 2/16/2017     Panmist Dm  [[id number]]      Immunizations Administered on Date of Encounter - 2/16/2017     None      [facility] Sign-Up     Activating your [facility] account is as easy as 1-2-3!     1) Visit [email or web address], select Sign Up Now, enter this activation code and your date of birth, then select Next.  [email or web address]  Expires: 3/5/2017  8:57 AM      2) Create a username and password to use when you visit [facility] in the future and select a security question in case you lose your password and select Next.    3) Enter your e-mail address and click Sign Up!    Additional Information  If you have questions, please e-mail [email or web address] or call [phone] to talk to our [facility] staff. Remember, [facility] is NOT to be used for urgent needs. For medical emergencies, dial 911.         Language Assistance Services     ATTENTION: Language assistance services are available, free of charge. Please call [phone].      ATENCIÓN: Si habla español, tiene a schumacher disposición servicios gratuitos de asistencia lingüística. Llame al [phone].     [facility] Ý: N?u b?n nói Ti?ng Vi?t, có các d?ch v? h? tr? ngôn ng? mi?n phí  dành cho b?n. G?i s? [phone].         [facility] complies with applicable Federal civil rights laws and does not discriminate on the basis of race, color, national origin, age, disability, or sex.

## 2017-02-16 NOTE — PROGRESS NOTES
SUBJECTIVE:   Nithin Pino is a 76 y.o. male   Uncorrected distance visual acuity was 20/50 +2 in the right eye and 20/50 +2 in the left eye.   Chief Complaint   Patient presents with    Post-op Evaluation        HPI:  HPI     Patient states o pain on pain scale., patient states he was told to stop   all drops in OD  on his last visit.        REFERRED BY DR. MORATAYA  PCIOL OS 02/15/17  PCIOL OD +20.0 SN60WF 2-1-17  +DM x 3 YEARS    OD: Gatifloxacin BID, Pred, Ketorolac QID(has not used any since last   visit)  OS PRED QID, KET QID, GAT BID        REFERRED BY DR. MORATAYA  PCIOL OS 02/15/17  PCIOL OD +20.0 SN60WF 2-1-17  +DM x 3 YEARS    OD: Gatifloxacin BID, Pred, Ketorolac QID(has not used any since last   visit)  OS PRED QID, KET QID, GAT BID       Last edited by NAMITA Morgan on 2/16/2017  7:44 AM.     Assessment /Plan :  1. Post-operative state Exam:  SLE:  S/C: normal  K : trace k fold  AC: trace cell and flare  Iris: normal  Lens: PCIOL    IMP:  PO Day 1 S/P Phaco/IOL OS : Doing well.    Plan:  Continue gtts to operative eye:  Pred 1% QID  Ketorolac QID  Zymaxid BID  Reinstructed in importance of absolute compliance with Post-OP instructions including medications, shield at bedtime, and limitation of activities. Follow up appointments in approximately one and six weeks or call immediately for increased pain, redness or vision loss.       Post op OD restart the Pred Forte and Ketorolac BID OD for one week then start to taper the eyedrops as directed

## 2017-02-21 ENCOUNTER — OFFICE VISIT (OUTPATIENT)
Dept: OPHTHALMOLOGY | Facility: CLINIC | Age: 77
End: 2017-02-21
Payer: MEDICARE

## 2017-02-21 DIAGNOSIS — Z98.890 POST-OPERATIVE STATE: Primary | ICD-10-CM

## 2017-02-21 PROCEDURE — 99024 POSTOP FOLLOW-UP VISIT: CPT | Mod: S$GLB,,, | Performed by: OPHTHALMOLOGY

## 2017-02-21 PROCEDURE — 99999 PR PBB SHADOW E&M-EST. PATIENT-LVL II: CPT | Mod: PBBFAC,,, | Performed by: OPHTHALMOLOGY

## 2017-02-21 NOTE — MR AVS SNAPSHOT
[facility]  [address] [facility] [address]  Phone: [phone]  Fax: [phone]                  Nithin Pino   2017 9:00 AM   Office Visit    Description:  Male : 1940   Provider:  José Coulter MD   Department:  [facility]           Reason for Visit     Post-op Evaluation           Diagnoses this Visit        Comments    Post-operative state    -  Primary            To Do List           Future Appointments        Provider Department Dept Phone    2017 9:00 AM José Coulter MD [facility] [phone]    2017 9:30 AM [facility] CT1 LIMIT 500 LBS [facility] - [phone]    3/22/2017 9:00 AM Jensen Holloway OD [facility] [phone]    5/15/2017 8:10 AM LABORATORY, [place] [facility] [phone]    5/15/2017 8:20 AM SPECIMEN, [place] [facility] [phone]      Goals (5 Years of Data)     None      [facility]     [facility] -  Assistance  Registered nurses in the [facility] provide clinical advisement, health education, appointment booking, and other advisory services.  Call for this free service at [phone].             Medications           Message regarding Medications     Verify the changes and/or additions to your medication regime listed below are the same as discussed with your clinician today.  If any of these changes or additions are incorrect, please notify your healthcare provider.             Verify that the below list of medications is an accurate representation of the medications you are currently taking.  If none reported, the list may be blank. If incorrect, please contact your healthcare provider. Carry this list with you in case of emergency.           Current Medications     albuterol (VENTOLIN HFA) 90 mcg/actuation inhaler Inhale 2 puffs into the lungs every 6 (six)  hours as needed for Wheezing.    aspirin 81 MG Chew 1 Tablet, Chewable Oral Every day    blood sugar diagnostic (ACCU-CHEK ACTIVE TEST) Strp Test 2 times daily as needed    BLOOD-GLUCOSE METER (TRUERESULT BLOOD GLUCOSE SYSTM MISC) by Misc.(Non-Drug; Combo Route) route.    clopidogrel (PLAVIX) 75 mg tablet Take 1 tablet (75 mg total) by mouth once daily.    dulaglutide 1.5 mg/0.5 mL PnIj Inject 1.5 mg into the skin every 7 days.    ferrous sulfate 325 mg (65 mg iron) Tab tablet TAKE 1 TABLET BY MOUTH 2 TIMES DAILY    finasteride (PROSCAR) 5 mg tablet TAKE 1 TABLET BY MOUTH DAILY    gatifloxacin (ZYMAR) 0.5 % Drop drops Apply 1 drop to eye 2 (two) times daily. Start one day before surgery    gatifloxacin (ZYMAR) 0.5 % Drop drops Apply 1 drop to eye 2 (two) times daily. Eyedrops to start one day before surgery (left eye)    glipiZIDE (GLUCOTROL) 5 MG tablet Take 1 tablet (5 mg total) by mouth 2 (two) times daily with meals.    hydrochlorothiazide (MICROZIDE) 12.5 mg capsule Take 1 capsule (12.5 mg total) by mouth once daily.    isosorbide mononitrate (IMDUR) 60 MG 24 hr tablet TAKE 1 TABLET BY MOUTH DAILY    ketorolac 0.5% (ACULAR) 0.5 % Drop Place 1 drop into the right eye 4 (four) times daily. Eyedrops to start one day before surgery    ketorolac 0.5% (ACULAR) 0.5 % Drop Place 1 drop into the left eye 4 (four) times daily. Eyedrops to start one day before surgery    lisinopril (PRINIVIL,ZESTRIL) 40 MG tablet TAKE 1 TABLET BY MOUTH DAILY    metformin (GLUCOPHAGE) 850 MG tablet TAKE 1 TABLET BY MOUTH 2 TIMES DAILY    metoprolol tartrate (LOPRESSOR) 25 MG tablet     metoprolol tartrate (LOPRESSOR) 50 MG tablet Take 1 tablet (50 mg total) by mouth 2 (two) times daily.    nitroGLYCERIN (NITROSTAT) 0.4 MG SL tablet Place 1 tablet (0.4 mg total) under the tongue every 5 (five) minutes as needed.    oxybutynin (DITROPAN XL) 15 MG TR24 Take 1 tablet (15 mg total) by mouth once daily.    polyethylene glycol (GLYCOLAX) 17 gram  PwPk Take 17 g by mouth once daily.    prednisoLONE acetate (PRED FORTE) 1 % DrpS Place 1 drop into the right eye 4 (four) times daily. Start one day before surgery    prednisoLONE acetate (PRED FORTE) 1 % DrpS Place 1 drop into the left eye 4 (four) times daily. Eyedrops to start one day before surgery    simvastatin (ZOCOR) 40 MG tablet TAKE 1 TABLET NIGHTLY AT BEDTIME    sitagliptin (JANUVIA) 50 MG Tab 1 tablet Oral Every day    tamsulosin (FLOMAX) 0.4 mg Cp24 TAKE (1) CAPSULE BY MOUTH DAILY    TRUERESULT BLOOD GLUCOSE SYSTM kit CHECK BLOOD SUGAR TWICE DAILY.    VENTOLIN HFA 90 mcg/actuation inhaler 1 PUFF EVERY 6 HOURS AS NEEDED           Clinical Reference Information           Allergies as of 2/21/2017     Panmist Dm  [Pseudoephedrine-[id number]]      Immunizations Administered on Date of Encounter - 2/21/2017     None      [facility] Sign-Up     Activating your [facility] account is as easy as 1-2-3!     1) Visit [email or web address], select Sign Up Now, enter this activation code and your date of birth, then select Next.  [email or web address]  Expires: 3/5/2017  8:57 AM      2) Create a username and password to use when you visit [facility] in the future and select a security question in case you lose your password and select Next.    3) Enter your e-mail address and click Sign Up!    Additional Information  If you have questions, please e-mail [email or web address] or call [phone] to talk to our [facility] staff. Remember, [facility] is NOT to be used for urgent needs. For medical emergencies, dial 911.         Language Assistance Services     ATTENTION: Language assistance services are available, free of charge. Please call [phone].      ATENCIÓN: Si habla ton, tiene a schumacher disposición servicios gratuitos de asistencia lingüística. Llame al [phone].     CHÚ Ý: N?u b?n nói Ti?ng Vi?t, có các d?ch v? h? tr? ngôn ng? mi?n phí dành cho b?n. G?i s? [phone].         [facility]  complies with applicable Federal civil rights laws and does not discriminate on the basis of race, color, national origin, age, disability, or sex.

## 2017-02-21 NOTE — PROGRESS NOTES
SUBJECTIVE:   Nithin Pino is a 76 y.o. male   Uncorrected distance visual acuity was 20/20 -1 in the right eye and 20/25 in the left eye.   Chief Complaint   Patient presents with    Post-op Evaluation     1 week phaco OS doing well no complaints        HPI:  HPI     Post-op Evaluation    Additional comments: 1 week phaco OS doing well no complaints           Comments   REFERRED BY DR. HOLLOWAY  PCIOL OS +[id number] (distance) 02/15/17  PCIOL OD +20.0 SN60WF 2-1-17  +DM x 3 YEARS    OD: Pred daily  OS PRED QID, KET QID, GAT BID       Last edited by Ciera Castro MA on 2/21/2017  7:25 AM. (History)        Assessment /Plan :  1. Post-operative state Slit lamp exam:  L/L: nl  K: clear, wound sealed  AC: 0 cell and flare  Iris/Lens: IOL centered and stable      IMP:  PO Week 1 S/P Phaco/ IOL OS : Doing well with no evidence of infection or abnormal inflammation.     Plan:  Pt given and instructed in one week PO instructions. D/C zymaxid and start to taper off Ketorlac and Pred Forte 1% weekly. Can resume normal activitites and d/c eye shield. OTC reading glasses can be used until evaluated for final MR. Follow up in one month with Dr. Holloway or PRN pain, redness, vision loss, or other concerns.

## 2017-03-07 RX ORDER — SITAGLIPTIN 50 MG/1
TABLET, FILM COATED ORAL
Qty: 30 TABLET | Refills: 0 | Status: SHIPPED | OUTPATIENT
Start: 2017-03-07 | End: 2017-05-01 | Stop reason: SDUPTHER

## 2017-03-07 RX ORDER — GLIPIZIDE 5 MG/1
TABLET ORAL
Qty: 60 TABLET | Refills: 0 | Status: SHIPPED | OUTPATIENT
Start: 2017-03-07 | End: 2017-05-01 | Stop reason: SDUPTHER

## 2017-03-07 RX ORDER — CLOPIDOGREL BISULFATE 75 MG/1
TABLET ORAL
Qty: 30 TABLET | Refills: 0 | Status: SHIPPED | OUTPATIENT
Start: 2017-03-07 | End: 2017-05-01 | Stop reason: SDUPTHER

## 2017-03-09 RX ORDER — BLOOD-GLUCOSE METER
EACH MISCELLANEOUS
Qty: 50 STRIP | Refills: 0 | Status: SHIPPED | OUTPATIENT
Start: 2017-03-09

## 2017-03-09 RX ORDER — BLOOD-GLUCOSE METER
KIT MISCELLANEOUS
Qty: 1 EACH | Refills: 0 | Status: SHIPPED | OUTPATIENT
Start: 2017-03-09

## 2017-03-22 ENCOUNTER — OFFICE VISIT (OUTPATIENT)
Dept: OPHTHALMOLOGY | Facility: CLINIC | Age: 77
End: 2017-03-22
Payer: MEDICARE

## 2017-03-22 DIAGNOSIS — Z98.890 POST-OPERATIVE STATE: Primary | ICD-10-CM

## 2017-03-22 DIAGNOSIS — Z96.1 PSEUDOPHAKIA OF BOTH EYES: ICD-10-CM

## 2017-03-22 PROCEDURE — 99999 PR PBB SHADOW E&M-EST. PATIENT-LVL III: CPT | Mod: PBBFAC,,, | Performed by: OPTOMETRIST

## 2017-03-22 PROCEDURE — 92014 COMPRE OPH EXAM EST PT 1/>: CPT | Mod: S$GLB,,, | Performed by: OPTOMETRIST

## 2017-03-22 NOTE — PROGRESS NOTES
HPI     5 weeks S/P cataract SX right eye per [facility]. Decrease near visual acuity.       Last edited by Sanjana Muñiz on 3/22/2017  8:11 AM.         Assessment /Plan     For exam results, see Encounter Report.    Post-operative state    Pseudophakia of both eyes      Pt has stopped all drops.  No cell or flare present OU    Stable IOL ou    Dispense Final Rx for glasses.  RTC 6 months DFE

## 2017-04-07 RX ORDER — METFORMIN HYDROCHLORIDE 850 MG/1
TABLET ORAL
Qty: 60 TABLET | Refills: 0 | Status: SHIPPED | OUTPATIENT
Start: 2017-04-07 | End: 2017-06-05 | Stop reason: SDUPTHER

## 2017-04-07 RX ORDER — FINASTERIDE 5 MG/1
TABLET, FILM COATED ORAL
Qty: 30 TABLET | Refills: 0 | Status: SHIPPED | OUTPATIENT
Start: 2017-04-07 | End: 2017-06-05 | Stop reason: SDUPTHER

## 2017-04-07 RX ORDER — FERROUS SULFATE 325(65) MG
TABLET ORAL
Qty: 60 TABLET | Refills: 0 | Status: SHIPPED | OUTPATIENT
Start: 2017-04-07 | End: 2017-08-28

## 2017-04-07 RX ORDER — LISINOPRIL 40 MG/1
TABLET ORAL
Qty: 30 TABLET | Refills: 0 | Status: SHIPPED | OUTPATIENT
Start: 2017-04-07 | End: 2017-06-05 | Stop reason: SDUPTHER

## 2017-04-07 RX ORDER — SIMVASTATIN 40 MG/1
TABLET, FILM COATED ORAL
Qty: 30 TABLET | Refills: 0 | Status: SHIPPED | OUTPATIENT
Start: 2017-04-07 | End: 2017-06-05 | Stop reason: SDUPTHER

## 2017-05-02 RX ORDER — CLOPIDOGREL BISULFATE 75 MG/1
TABLET ORAL
Qty: 30 TABLET | Refills: 6 | Status: SHIPPED | OUTPATIENT
Start: 2017-05-02 | End: 2018-01-10 | Stop reason: SDUPTHER

## 2017-05-02 RX ORDER — GLIPIZIDE 5 MG/1
TABLET ORAL
Qty: 60 TABLET | Refills: 6 | Status: SHIPPED | OUTPATIENT
Start: 2017-05-02 | End: 2018-01-10 | Stop reason: SDUPTHER

## 2017-05-02 RX ORDER — SITAGLIPTIN 50 MG/1
TABLET, FILM COATED ORAL
Qty: 30 TABLET | Refills: 6 | Status: SHIPPED | OUTPATIENT
Start: 2017-05-02 | End: 2017-08-28

## 2017-05-11 ENCOUNTER — LAB VISIT (OUTPATIENT)
Dept: LAB | Facility: HOSPITAL | Age: 77
End: 2017-05-11
Attending: FAMILY MEDICINE
Payer: MEDICARE

## 2017-05-11 DIAGNOSIS — I10 ESSENTIAL HYPERTENSION: Chronic | ICD-10-CM

## 2017-05-11 DIAGNOSIS — E78.5 HYPERLIPIDEMIA ASSOCIATED WITH TYPE 2 DIABETES MELLITUS: ICD-10-CM

## 2017-05-11 DIAGNOSIS — E11.69 HYPERLIPIDEMIA ASSOCIATED WITH TYPE 2 DIABETES MELLITUS: ICD-10-CM

## 2017-05-11 LAB
ALBUMIN SERPL BCP-MCNC: 3.6 G/DL
ALP SERPL-CCNC: 41 U/L
ALT SERPL W/O P-5'-P-CCNC: 9 U/L
ANION GAP SERPL CALC-SCNC: 10 MMOL/L
AST SERPL-CCNC: 13 U/L
BILIRUB SERPL-MCNC: 0.6 MG/DL
BUN SERPL-MCNC: 31 MG/DL
CALCIUM SERPL-MCNC: 10.1 MG/DL
CHLORIDE SERPL-SCNC: 105 MMOL/L
CHOLEST/HDLC SERPL: 2.6 {RATIO}
CO2 SERPL-SCNC: 26 MMOL/L
CREAT SERPL-MCNC: 1.1 MG/DL
EST. GFR  (AFRICAN AMERICAN): >60 ML/MIN/1.73 M^2
EST. GFR  (NON AFRICAN AMERICAN): >60 ML/MIN/1.73 M^2
GLUCOSE SERPL-MCNC: 103 MG/DL
HDL/CHOLESTEROL RATIO: 38.1 %
HDLC SERPL-MCNC: 105 MG/DL
HDLC SERPL-MCNC: 40 MG/DL
LDLC SERPL CALC-MCNC: 42.4 MG/DL
NONHDLC SERPL-MCNC: 65 MG/DL
POTASSIUM SERPL-SCNC: 5 MMOL/L
PROT SERPL-MCNC: 6.2 G/DL
SODIUM SERPL-SCNC: 141 MMOL/L
TRIGL SERPL-MCNC: 113 MG/DL

## 2017-05-11 PROCEDURE — 80053 COMPREHEN METABOLIC PANEL: CPT

## 2017-05-11 PROCEDURE — 80061 LIPID PANEL: CPT

## 2017-05-11 PROCEDURE — 36415 COLL VENOUS BLD VENIPUNCTURE: CPT | Mod: PO

## 2017-05-11 PROCEDURE — 83036 HEMOGLOBIN GLYCOSYLATED A1C: CPT

## 2017-05-12 LAB
ESTIMATED AVG GLUCOSE: 154 MG/DL
HBA1C MFR BLD HPLC: 7 %

## 2017-05-19 ENCOUNTER — TELEPHONE (OUTPATIENT)
Dept: CARDIOLOGY | Facility: CLINIC | Age: 77
End: 2017-05-19

## 2017-05-19 NOTE — TELEPHONE ENCOUNTER
Patient needs dental extractions and needs clearance to hold Plavix and ASA. If so, how long? Please advise?

## 2017-05-22 ENCOUNTER — OFFICE VISIT (OUTPATIENT)
Dept: INTERNAL MEDICINE | Facility: CLINIC | Age: 77
End: 2017-05-22
Payer: MEDICARE

## 2017-05-22 VITALS
HEART RATE: 58 BPM | HEIGHT: 69 IN | WEIGHT: 177.5 LBS | SYSTOLIC BLOOD PRESSURE: 138 MMHG | TEMPERATURE: 97 F | DIASTOLIC BLOOD PRESSURE: 60 MMHG | OXYGEN SATURATION: 98 % | BODY MASS INDEX: 26.29 KG/M2

## 2017-05-22 DIAGNOSIS — Z00.00 WELL ADULT EXAM: Primary | ICD-10-CM

## 2017-05-22 DIAGNOSIS — E78.5 HYPERLIPIDEMIA ASSOCIATED WITH TYPE 2 DIABETES MELLITUS: ICD-10-CM

## 2017-05-22 DIAGNOSIS — N18.30 CKD (CHRONIC KIDNEY DISEASE) STAGE 3, GFR 30-59 ML/MIN: ICD-10-CM

## 2017-05-22 DIAGNOSIS — Z53.20 COLONOSCOPY REFUSED: ICD-10-CM

## 2017-05-22 DIAGNOSIS — E83.52 FFH (FAMILIAL HYPOCALCIURIC HYPERCALCEMIA): ICD-10-CM

## 2017-05-22 DIAGNOSIS — Z12.11 COLON CANCER SCREENING: ICD-10-CM

## 2017-05-22 DIAGNOSIS — E11.69 HYPERLIPIDEMIA ASSOCIATED WITH TYPE 2 DIABETES MELLITUS: ICD-10-CM

## 2017-05-22 PROCEDURE — 99499 UNLISTED E&M SERVICE: CPT | Mod: S$GLB,,, | Performed by: FAMILY MEDICINE

## 2017-05-22 PROCEDURE — 3075F SYST BP GE 130 - 139MM HG: CPT | Mod: S$GLB,,, | Performed by: FAMILY MEDICINE

## 2017-05-22 PROCEDURE — 99397 PER PM REEVAL EST PAT 65+ YR: CPT | Mod: S$GLB,,, | Performed by: FAMILY MEDICINE

## 2017-05-22 PROCEDURE — 99999 PR PBB SHADOW E&M-EST. PATIENT-LVL III: CPT | Mod: PBBFAC,,, | Performed by: FAMILY MEDICINE

## 2017-05-22 PROCEDURE — 3078F DIAST BP <80 MM HG: CPT | Mod: S$GLB,,, | Performed by: FAMILY MEDICINE

## 2017-05-22 NOTE — PROGRESS NOTES
Chief Complaint:    Chief Complaint   Patient presents with    Follow-up       History of Present Illness:  Visit for three-month follow-up of chronic medical problems,  Patient's A1c is down to 7 after he was started on trulicity he has lost some weight and he feels just fine.  His blood pressure is normal his lipids are at goal.  He is urinating fine he has coronary artery disease denies any chest pain or shortness of breath.  Chronic kidney disease is stable as well.  Patient's stomach pain and constipation from last time has resolved.      ROS:  Review of Systems   Constitutional: Positive for unexpected weight change (weigth loss). Negative for activity change, chills, fatigue and fever.   HENT: Negative for congestion, ear discharge, ear pain, hearing loss, postnasal drip and rhinorrhea.    Eyes: Negative for pain and visual disturbance.   Respiratory: Negative for cough, chest tightness and shortness of breath.    Cardiovascular: Negative for chest pain and palpitations.   Gastrointestinal: Negative for abdominal pain, diarrhea and vomiting.   Endocrine: Negative for heat intolerance.   Genitourinary: Negative for dysuria, flank pain, frequency and hematuria.   Musculoskeletal: Negative for back pain, gait problem and neck pain.   Skin: Negative for color change and rash.   Neurological: Negative for dizziness, tremors, seizures, numbness and headaches.   Psychiatric/Behavioral: Negative for agitation, hallucinations, self-injury, sleep disturbance and suicidal ideas. The patient is not nervous/anxious.        Past Medical History:   Diagnosis Date    Abnormal ECG 10/31/2013    AP (angina pectoris) 1/28/2016    Asthma     Cataract     Chronic ischemic heart disease 10/31/2013    CKD (chronic kidney disease) stage 3, GFR 30-59 ml/min 11/4/2015    History of PTCA 10/31/2013    Hyperlipidemia     Hypertension     Insomnia 6/17/2013    Ischemic cardiomyopathy 10/31/2013    Myocardial infarction   "   Old MI (myocardial infarction) 1994    RBBB 10/31/2013    S/P CABG (coronary artery bypass graft) 10/31/2013    Shortness of breath 10/31/2013    Tobacco dependence     resolved    Trouble in sleeping     Type 2 diabetes mellitus 2010    Wears glasses        Social History:  Social History     Social History    Marital status:      Spouse name: N/A    Number of children: 5    Years of education: N/A     Social History Main Topics    Smoking status: Former Smoker     Packs/day: 1.50     Years: 40.00     Quit date: 1/1/1994    Smokeless tobacco: Former User     Quit date: 1/1/2011      Comment: approx date    Alcohol use 0.0 oz/week     1 Standard drinks or equivalent per week      Comment: occasionally; 1-2 cans of beer a month    Drug use: No    Sexual activity: Not Currently     Partners: Female     Birth control/ protection: None     Other Topics Concern    None     Social History Narrative    None       Family History:   family history includes Diabetes in his brother and sister. He was adopted.    Health Maintenance   Topic Date Due    Zoster Vaccine  05/11/1999    Influenza Vaccine  08/01/2017    Foot Exam  11/07/2017    Hemoglobin A1c  11/11/2017    Eye Exam  03/22/2018    Lipid Panel  05/11/2018    TETANUS VACCINE  04/03/2024    Colonoscopy  05/22/2027    Pneumococcal (65+)  Completed       Physical Exam:    Vital Signs  Temp: 96.6 °F (35.9 °C)  Temp src: Tympanic  Pulse: (!) 58  SpO2: 98 %  BP: 138/60  BP Location: Left arm  BP Method: Manual  Patient Position: Sitting  Pain Score: 0-No pain  Height and Weight  Height: 5' 9" (175.3 cm)  Weight: 80.5 kg (177 lb 7.5 oz)  BSA (Calculated - sq m): 1.98 sq meters  BMI (Calculated): 26.3  Weight in (lb) to have BMI = 25: 168.9]    Body mass index is 26.21 kg/m².    Physical Exam   Constitutional: He is oriented to person, place, and time. He appears well-developed.   HENT:   Mouth/Throat: Oropharynx is clear and moist.   Eyes: " Conjunctivae are normal. Pupils are equal, round, and reactive to light.   Neck: Normal range of motion. Neck supple.   Cardiovascular: Normal rate, regular rhythm and normal heart sounds.    No murmur heard.  Pulmonary/Chest: Effort normal and breath sounds normal. No respiratory distress. He has no wheezes. He has no rales. He exhibits no tenderness.   Abdominal: Soft. He exhibits no distension and no mass. There is no tenderness. There is no guarding.   Musculoskeletal: He exhibits no edema or tenderness.   Lymphadenopathy:     He has no cervical adenopathy.   Neurological: He is alert and oriented to person, place, and time. He has normal reflexes.   Skin: Skin is warm and dry.   Psychiatric: He has a normal mood and affect. His behavior is normal. Judgment and thought content normal.       Lab Results   Component Value Date    CHOL 105 (L) 05/11/2017    CHOL 118 (L) 02/06/2017    CHOL 118 (L) 11/07/2016    TRIG 113 05/11/2017    TRIG 163 (H) 02/06/2017    TRIG 70 11/07/2016    HDL 40 05/11/2017    HDL 35 (L) 02/06/2017    HDL 44 11/07/2016    TOTALCHOLEST 2.6 05/11/2017    TOTALCHOLEST 3.4 02/06/2017    TOTALCHOLEST 2.7 11/07/2016    NONHDLCHOL 65 05/11/2017    NONHDLCHOL 83 02/06/2017    NONHDLCHOL 74 11/07/2016       Lab Results   Component Value Date    HGBA1C 7.0 (H) 05/11/2017       Assessment:      ICD-10-CM ICD-9-CM   1. Well adult exam Z00.00 V70.0   2. CKD (chronic kidney disease) stage 3, GFR 30-59 ml/min N18.3 585.3   3. Hyperlipidemia associated with type 2 diabetes mellitus E11.69 250.80    E78.5 272.4   4. 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 Colonoscopy refused Z53.20 V64.2   6. Colon cancer screening Z12.11 V76.51         Plan:  Doing well continue current plan continue trulicity  Do recommend screening colonoscopy he was extremely reluctant but he says he may consider.  It may not be surprised if he refuses to do a colonoscopy.  Orders Placed This Encounter   Procedures     Hemoglobin A1c    Lipid panel    Comprehensive metabolic panel    Microalbumin/creatinine urine ratio    CBC auto differential       Current Outpatient Prescriptions   Medication Sig Dispense Refill    albuterol (VENTOLIN HFA) 90 mcg/actuation inhaler Inhale 2 puffs into the lungs every 6 (six) hours as needed for Wheezing.      aspirin 81 MG Chew 1 Tablet, Chewable Oral Every day      BLOOD-GLUCOSE METER (TRUERESULT BLOOD GLUCOSE SYSTM MISC) by Misc.(Non-Drug; Combo Route) route.      clopidogrel (PLAVIX) 75 mg tablet TAKE 1 TABLET BY MOUTH DAILY 30 tablet 6    dulaglutide 1.5 mg/0.5 mL PnIj Inject 1.5 mg into the skin every 7 days. 1 Syringe 12    EMBRACE PRO TEST STRIPS Strp CHECK BLOOD SUGAR TWICE DAILY. 50 strip 0    ferrous sulfate 325 mg (65 mg iron) Tab tablet TAKE 1 TABLET 2 TIMES DAILY 60 tablet 0    finasteride (PROSCAR) 5 mg tablet TAKE 1 TABLET BY MOUTH DAILY 30 tablet 0    glipiZIDE (GLUCOTROL) 5 MG tablet TAKE 1 TABLET 2 TIMES DAILY 60 tablet 6    hydrochlorothiazide (MICROZIDE) 12.5 mg capsule Take 1 capsule (12.5 mg total) by mouth once daily. 30 capsule 11    isosorbide mononitrate (IMDUR) 60 MG 24 hr tablet TAKE 1 TABLET BY MOUTH DAILY 30 tablet 0    JANUVIA 50 mg Tab TAKE 1 TABLET BY MOUTH DAILY 30 tablet 6    lisinopril (PRINIVIL,ZESTRIL) 40 MG tablet TAKE 1 TABLET BY MOUTH DAILY 30 tablet 0    metformin (GLUCOPHAGE) 850 MG tablet TAKE 1 TABLET 2 TIMES DAILY 60 tablet 0    metoprolol tartrate (LOPRESSOR) 50 MG tablet Take 1 tablet (50 mg total) by mouth 2 (two) times daily. 60 tablet 6    nitroGLYCERIN (NITROSTAT) 0.4 MG SL tablet Place 1 tablet (0.4 mg total) under the tongue every 5 (five) minutes as needed. 25 tablet 6    oxybutynin (DITROPAN XL) 15 MG TR24 Take 1 tablet (15 mg total) by mouth once daily. 30 tablet 11    polyethylene glycol (GLYCOLAX) 17 gram PwPk Take 17 g by mouth once daily. 100 each 6    simvastatin (ZOCOR) 40 MG tablet TAKE 1 TABLET NIGHTLY AT  BEDTIME 30 tablet 0    tamsulosin (FLOMAX) 0.4 mg Cp24 TAKE (1) CAPSULE BY MOUTH DAILY 30 capsule 0    TRUEPLUS LANCETS 26 gauge Misc CHECK BLOOD SUGAR TWICE DAILY. 100 each 0    TRUERESULT BLOOD GLUCOSE SYSTM kit CHECK BLOOD SUGAR TWICE DAILY. 1 each 0    VENTOLIN HFA 90 mcg/actuation inhaler 1 PUFF EVERY 6 HOURS AS NEEDED 18 g 0     No current facility-administered medications for this visit.        Medications Discontinued During This Encounter   Medication Reason    metoprolol tartrate (LOPRESSOR) 25 MG tablet Duplicate Order       Return in about 3 months (around 8/22/2017).      Dr Celestine Petersen MD    Disclaimer: This note is prepared using voice recognition system and as such is likely to have errors and is not proof read.

## 2017-05-23 ENCOUNTER — OFFICE VISIT (OUTPATIENT)
Dept: CARDIOLOGY | Facility: CLINIC | Age: 77
End: 2017-05-23
Payer: MEDICARE

## 2017-05-23 VITALS
DIASTOLIC BLOOD PRESSURE: 82 MMHG | WEIGHT: 175.94 LBS | HEART RATE: 72 BPM | BODY MASS INDEX: 26.06 KG/M2 | SYSTOLIC BLOOD PRESSURE: 154 MMHG | HEIGHT: 69 IN

## 2017-05-23 DIAGNOSIS — I20.9 AP (ANGINA PECTORIS): Primary | ICD-10-CM

## 2017-05-23 DIAGNOSIS — I25.2 OLD MI (MYOCARDIAL INFARCTION): Chronic | ICD-10-CM

## 2017-05-23 DIAGNOSIS — R94.31 ABNORMAL ECG: ICD-10-CM

## 2017-05-23 DIAGNOSIS — I25.5 ISCHEMIC CARDIOMYOPATHY: Chronic | ICD-10-CM

## 2017-05-23 DIAGNOSIS — I25.83 CORONARY ARTERY DISEASE DUE TO LIPID RICH PLAQUE: Chronic | ICD-10-CM

## 2017-05-23 DIAGNOSIS — I38 HEART VALVE REGURGITATION: ICD-10-CM

## 2017-05-23 DIAGNOSIS — I10 ESSENTIAL HYPERTENSION: Chronic | ICD-10-CM

## 2017-05-23 DIAGNOSIS — Z98.61 HISTORY OF PTCA: ICD-10-CM

## 2017-05-23 DIAGNOSIS — I25.10 CORONARY ARTERY DISEASE DUE TO LIPID RICH PLAQUE: Chronic | ICD-10-CM

## 2017-05-23 DIAGNOSIS — I25.9 CHRONIC ISCHEMIC HEART DISEASE: Chronic | ICD-10-CM

## 2017-05-23 DIAGNOSIS — I45.10 RBBB: Chronic | ICD-10-CM

## 2017-05-23 DIAGNOSIS — E78.5 HYPERLIPIDEMIA ASSOCIATED WITH TYPE 2 DIABETES MELLITUS: ICD-10-CM

## 2017-05-23 DIAGNOSIS — E11.69 HYPERLIPIDEMIA ASSOCIATED WITH TYPE 2 DIABETES MELLITUS: ICD-10-CM

## 2017-05-23 PROCEDURE — 1160F RVW MEDS BY RX/DR IN RCRD: CPT | Mod: S$GLB,,, | Performed by: INTERNAL MEDICINE

## 2017-05-23 PROCEDURE — 99499 UNLISTED E&M SERVICE: CPT | Mod: S$GLB,,, | Performed by: INTERNAL MEDICINE

## 2017-05-23 PROCEDURE — 1159F MED LIST DOCD IN RCRD: CPT | Mod: S$GLB,,, | Performed by: INTERNAL MEDICINE

## 2017-05-23 PROCEDURE — 99999 PR PBB SHADOW E&M-EST. PATIENT-LVL III: CPT | Mod: PBBFAC,,, | Performed by: INTERNAL MEDICINE

## 2017-05-23 PROCEDURE — 99214 OFFICE O/P EST MOD 30 MIN: CPT | Mod: S$GLB,,, | Performed by: INTERNAL MEDICINE

## 2017-05-23 PROCEDURE — 3077F SYST BP >= 140 MM HG: CPT | Mod: S$GLB,,, | Performed by: INTERNAL MEDICINE

## 2017-05-23 PROCEDURE — 3079F DIAST BP 80-89 MM HG: CPT | Mod: S$GLB,,, | Performed by: INTERNAL MEDICINE

## 2017-05-23 NOTE — PROGRESS NOTES
Patient, Nithin Pino (MRN #[id number]), presented with a recent Vascular Ankle Brachial Index (RENZO) less than 0.9 consistent with the definition of peripheral vascular disease (ICD10 - I73.9).    Vascular RENZO Right   Date Value Ref Range Status   07/06/2015 0.69 (A) 0.9 - 1.2      The patient's peripheral vascular disease was monitored, evaluated, addressed and/or treated. This addendum to the medical record is made on 05/23/2017.

## 2017-05-23 NOTE — PROGRESS NOTES
Subjective:    Patient ID:  Nithin Pino is a 77 y.o. male who presents for evaluation of Coronary Artery Disease; Hyperlipidemia; Hypertension; Cardiomyopathy; Peripheral Arterial Disease; and Chest Pain      HPI Mr. Pino returns for f/u.  His current medical conditions include CAD, RBBB, ICM, COPD, HTN, PAD, DM. Nonsmoker.   His past history is pertinent for following. Had PTCA 1994 for AMI. Stress MPI 5/11 showed evidence of multivessel CAD (had 2 years of angina) which was confirmed on LHC (significant left main/LAD/ramus, diag disease and 100%  RCA). EF 35% on LV gram. He underwent successful CABG 5/11 (LIMA-LAD, SVG-DIAG, SVG-RAMUS). He had post-op a flutter which resolved. He was having increased anginal sxs and therefore underwent repeat LHC 2/16 for abnl stress mpi. He underwent atherectomy and PCI of left main and ramus with TUCKER x 2 overall. His svg-ramus had occluded. LIMA-LAD was patent, patent SVG-D1,  RCA with left - right collaterals, EF 45%.  Pt has declined runoff numerous times for further evaluation of his PAD in past.   Pt here for f/u.  He needs dental work, extractions in near future.  He states his angina is stable, 2 episodes since I last saw him, with real strenuous activity.  He didn't think he needed to take sl ntg even though he had it available.  No associated sxs.   Denies dyspnea.  No pnd/orthopnea.    DM has improved, HGAIC almost at goal now.  Lipids are excellent.  He is not bothered by any lifestyle claudication sxs at present time.  HTN variable, overall controlled, no associated sxs.     Patient Active Problem List   Diagnosis    Hypertension    CAD (coronary artery disease), s/p CABG x4    BPH (benign prostatic hyperplasia)    COPD (chronic obstructive pulmonary disease)    Chronic ischemic heart disease    RBBB    Ischemic cardiomyopathy    Old MI (myocardial infarction)    Abnormal ECG    FFH (familial hypocalciuric hypercalcemia)    Heart valve  regurgitation    CKD (chronic kidney disease) stage 3, GFR 30-59 ml/min    Hyperlipidemia associated with type 2 diabetes mellitus    Abnormal stress test    History of PTCA    Colonoscopy refused    AP (angina pectoris)     Past Medical History:   Diagnosis Date    Abnormal ECG 10/31/2013    AP (angina pectoris) 1/28/2016    Asthma     Cataract     Chronic ischemic heart disease 10/31/2013    CKD (chronic kidney disease) stage 3, GFR 30-59 ml/min 11/4/2015    History of PTCA 10/31/2013    Hyperlipidemia     Hypertension     Insomnia 6/17/2013    Ischemic cardiomyopathy 10/31/2013    Myocardial infarction     Old MI (myocardial infarction) 1994    RBBB 10/31/2013    S/P CABG (coronary artery bypass graft) 10/31/2013    Shortness of breath 10/31/2013    Tobacco dependence     resolved    Trouble in sleeping     Type 2 diabetes mellitus 2010    Wears glasses        Current Outpatient Prescriptions:     albuterol (VENTOLIN HFA) 90 mcg/actuation inhaler, Inhale 2 puffs into the lungs every 6 (six) hours as needed for Wheezing., Disp: , Rfl:     aspirin 81 MG Chew, 1 Tablet, Chewable Oral Every day, Disp: , Rfl:     BLOOD-GLUCOSE METER (TRUERESULT BLOOD GLUCOSE SYSTM MISC), by Misc.(Non-Drug; Combo Route) route., Disp: , Rfl:     clopidogrel (PLAVIX) 75 mg tablet, TAKE 1 TABLET BY MOUTH DAILY, Disp: 30 tablet, Rfl: 6    dulaglutide 1.5 mg/0.5 mL PnIj, Inject 1.5 mg into the skin every 7 days., Disp: 1 Syringe, Rfl: 12    EMBRACE PRO TEST STRIPS Strp, CHECK BLOOD SUGAR TWICE DAILY., Disp: 50 strip, Rfl: 0    ferrous sulfate 325 mg (65 mg iron) Tab tablet, TAKE 1 TABLET 2 TIMES DAILY, Disp: 60 tablet, Rfl: 0    finasteride (PROSCAR) 5 mg tablet, TAKE 1 TABLET BY MOUTH DAILY, Disp: 30 tablet, Rfl: 0    glipiZIDE (GLUCOTROL) 5 MG tablet, TAKE 1 TABLET 2 TIMES DAILY, Disp: 60 tablet, Rfl: 6    hydrochlorothiazide (MICROZIDE) 12.5 mg capsule, Take 1 capsule (12.5 mg total) by mouth once  "daily., Disp: 30 capsule, Rfl: 11    isosorbide mononitrate (IMDUR) 60 MG 24 hr tablet, TAKE 1 TABLET BY MOUTH DAILY, Disp: 30 tablet, Rfl: 0    JANUVIA 50 mg Tab, TAKE 1 TABLET BY MOUTH DAILY, Disp: 30 tablet, Rfl: 6    lisinopril (PRINIVIL,ZESTRIL) 40 MG tablet, TAKE 1 TABLET BY MOUTH DAILY, Disp: 30 tablet, Rfl: 0    metformin (GLUCOPHAGE) 850 MG tablet, TAKE 1 TABLET 2 TIMES DAILY, Disp: 60 tablet, Rfl: 0    metoprolol tartrate (LOPRESSOR) 50 MG tablet, Take 1 tablet (50 mg total) by mouth 2 (two) times daily., Disp: 60 tablet, Rfl: 6    nitroGLYCERIN (NITROSTAT) 0.4 MG SL tablet, Place 1 tablet (0.4 mg total) under the tongue every 5 (five) minutes as needed., Disp: 25 tablet, Rfl: 6    oxybutynin (DITROPAN XL) 15 MG TR24, Take 1 tablet (15 mg total) by mouth once daily., Disp: 30 tablet, Rfl: 11    polyethylene glycol (GLYCOLAX) 17 gram PwPk, Take 17 g by mouth once daily., Disp: 100 each, Rfl: 6    simvastatin (ZOCOR) 40 MG tablet, TAKE 1 TABLET NIGHTLY AT BEDTIME, Disp: 30 tablet, Rfl: 0    tamsulosin (FLOMAX) 0.4 mg Cp24, TAKE (1) CAPSULE BY MOUTH DAILY, Disp: 30 capsule, Rfl: 0    TRUEPLUS LANCETS 26 gauge Misc, CHECK BLOOD SUGAR TWICE DAILY., Disp: 100 each, Rfl: 0    TRUERESULT BLOOD GLUCOSE SYSTM kit, CHECK BLOOD SUGAR TWICE DAILY., Disp: 1 each, Rfl: 0    VENTOLIN HFA 90 mcg/actuation inhaler, 1 PUFF EVERY 6 HOURS AS NEEDED, Disp: 18 g, Rfl: 0      Review of Systems   Constitution: Negative.   HENT: Negative.    Eyes: Negative.    Cardiovascular: Positive for chest pain and claudication.   Respiratory: Negative.    Endocrine: Negative.    Hematologic/Lymphatic: Negative.    Skin: Negative.    Musculoskeletal: Positive for arthritis.   Gastrointestinal: Negative.    Genitourinary: Negative.    Neurological: Negative.    Psychiatric/Behavioral: Negative.    Allergic/Immunologic: Negative.        BP (!) 154/82 (BP Location: Left arm, Patient Position: Sitting)   Pulse 72   Ht 5' 9" (1.753 " m)   Wt 79.8 kg (175 lb 14.8 oz)   BMI 25.98 kg/m²      Wt Readings from Last 3 Encounters:   05/23/17 79.8 kg (175 lb 14.8 oz)   05/22/17 80.5 kg (177 lb 7.5 oz)   02/13/17 82.1 kg (181 lb)     Temp Readings from Last 3 Encounters:   05/22/17 96.6 °F (35.9 °C) (Tympanic)   02/13/17 97.9 °F (36.6 °C) (Tympanic)   11/11/16 98.6 °F (37 °C)     BP Readings from Last 3 Encounters:   05/23/17 (!) 154/82   05/22/17 138/60   02/13/17 136/64     Pulse Readings from Last 3 Encounters:   05/23/17 72   05/22/17 (!) 58   02/13/17 66            Objective:    Physical Exam   Constitutional: He is oriented to person, place, and time. He appears well-developed and well-nourished.   HENT:   Head: Normocephalic.   Neck: Normal range of motion. Neck supple. Normal carotid pulses, no hepatojugular reflux and no JVD present. Carotid bruit is not present. No thyromegaly present.   Cardiovascular: Normal rate, regular rhythm, S1 normal and S2 normal.  PMI is not displaced.  Exam reveals no S3, no S4, no distant heart sounds, no friction rub, no midsystolic click and no opening snap.    No murmur heard.  Pulses:       Radial pulses are 2+ on the right side, and 2+ on the left side.   Pulmonary/Chest: Effort normal and breath sounds normal. He has no wheezes. He has no rales.   Abdominal: Soft. Bowel sounds are normal. He exhibits no distension, no abdominal bruit, no ascites and no mass. There is no tenderness.   Musculoskeletal: He exhibits no edema.   Neurological: He is alert and oriented to person, place, and time.   Skin: Skin is warm.   Psychiatric: He has a normal mood and affect. His behavior is normal.   Nursing note and vitals reviewed.      I have reviewed all pertinent labs and cardiac studies.      Chemistry        Component Value Date/Time     05/11/2017 0801    K 5.0 05/11/2017 0801     05/11/2017 0801    CO2 26 05/11/2017 0801    BUN 31 (H) 05/11/2017 0801    CREATININE 1.1 05/11/2017 0801      05/11/2017 0801        Component Value Date/Time    CALCIUM 10.1 05/11/2017 0801    ALKPHOS 41 (L) 05/11/2017 0801    AST 13 05/11/2017 0801    ALT 9 (L) 05/11/2017 0801    BILITOT 0.6 05/11/2017 0801        Lab Results   Component Value Date    WBC 8.63 11/07/2016    HGB 14.0 11/07/2016    HCT 42.3 11/07/2016    MCV 93 11/07/2016     11/07/2016     Lab Results   Component Value Date    HGBA1C 7.0 (H) 05/11/2017     Lab Results   Component Value Date    CHOL 105 (L) 05/11/2017    CHOL 118 (L) 02/06/2017    CHOL 118 (L) 11/07/2016     Lab Results   Component Value Date    HDL 40 05/11/2017    HDL 35 (L) 02/06/2017    HDL 44 11/07/2016     Lab Results   Component Value Date    LDLCALC 42.4 (L) 05/11/2017    LDLCALC 50.4 (L) 02/06/2017    LDLCALC 60.0 (L) 11/07/2016     Lab Results   Component Value Date    TRIG 113 05/11/2017    TRIG 163 (H) 02/06/2017    TRIG 70 11/07/2016     Lab Results   Component Value Date    CHOLHDL 38.1 05/11/2017    CHOLHDL 29.7 02/06/2017    CHOLHDL 37.3 11/07/2016           Assessment:       1. AP (angina pectoris)    2. RBBB    3. Old MI (myocardial infarction)    4. Ischemic cardiomyopathy    5. Essential hypertension    6. Hyperlipidemia associated with type 2 diabetes mellitus    7. History of PTCA    8. Heart valve regurgitation    9. Chronic ischemic heart disease    10. Coronary artery disease due to lipid rich plaque    11. Abnormal ECG         Plan:             CHRONIC STABLE ANGINA/CAD.  CHRONIC STABLE PAD/CLAUDICATION SXS.   CONTINUE OPTIMAL MEDICAL TX FOR CAD.  SL NTG PRN FOR ANGINA, AS PRESCRIBED AND DISCUSSED ON PAST VISITS.  HE WILL CALL ME OR SEE ME SHOULD ANGINAL SXS WORSEN.  MAY PROCEED WITH DENTAL EXTRACTION (IN FACT HAS ALREADY HAD ONE EXTRACTION RECENTLY) AT LOW CV RISK AND MAY HOLD HIS ASA, PLAVIX FOR A COUPLE OF DAYS IF NEEDED.  NO CHANGES TO MEDS TODAY.  CARDIAC DIET  EXERCISE  F/U 6 MONTHS.

## 2017-06-06 RX ORDER — SIMVASTATIN 40 MG/1
TABLET, FILM COATED ORAL
Qty: 30 TABLET | Refills: 2 | Status: SHIPPED | OUTPATIENT
Start: 2017-06-06 | End: 2017-10-18 | Stop reason: SDUPTHER

## 2017-06-06 RX ORDER — FINASTERIDE 5 MG/1
TABLET, FILM COATED ORAL
Qty: 30 TABLET | Refills: 2 | Status: SHIPPED | OUTPATIENT
Start: 2017-06-06 | End: 2017-10-18 | Stop reason: SDUPTHER

## 2017-06-06 RX ORDER — METFORMIN HYDROCHLORIDE 850 MG/1
TABLET ORAL
Qty: 60 TABLET | Refills: 2 | Status: SHIPPED | OUTPATIENT
Start: 2017-06-06 | End: 2017-10-18 | Stop reason: SDUPTHER

## 2017-06-06 RX ORDER — LISINOPRIL 40 MG/1
TABLET ORAL
Qty: 30 TABLET | Refills: 2 | Status: SHIPPED | OUTPATIENT
Start: 2017-06-06 | End: 2017-09-21 | Stop reason: SDUPTHER

## 2017-06-23 ENCOUNTER — TELEPHONE (OUTPATIENT)
Dept: PHARMACY | Facility: CLINIC | Age: 77
End: 2017-06-23

## 2017-06-23 NOTE — TELEPHONE ENCOUNTER
Jayden ([employer], Antelmo ([employer]) - Assisting patient in program. Spoke to patient, unable to afford insulin. Has [employer] Medicare Part D prescription coverage. Mailed application packet to pt.s home address.

## 2017-07-21 RX ORDER — METOPROLOL TARTRATE 50 MG/1
TABLET ORAL
Qty: 60 TABLET | Refills: 0 | Status: SHIPPED | OUTPATIENT
Start: 2017-07-21 | End: 2017-08-21 | Stop reason: SDUPTHER

## 2017-08-02 ENCOUNTER — TELEPHONE (OUTPATIENT)
Dept: PHARMACY | Facility: HOSPITAL | Age: 77
End: 2017-08-02

## 2017-08-02 ENCOUNTER — TELEPHONE (OUTPATIENT)
Dept: PHARMACY | Facility: CLINIC | Age: 77
End: 2017-08-02

## 2017-08-02 NOTE — TELEPHONE ENCOUNTER
Spoke with patient about referral for medications. Patient stated he never received the applications that was sent out to him in June 2017.  I mailed out applications again to patient.

## 2017-08-02 NOTE — TELEPHONE ENCOUNTER
Called patient to monitor for medication adherence of chronic conditions and discuss converting from 30-day to 90-day supply on maintenance medications. Patient expressed concerns with affording medications. Believes he is in the donut hole. Offered to refer patient to Pharmacy Patient Assistance Program for help again. Patient was referred in June, he tatum not believe application was ever completed. He travels al lot and has not been able to provide the information to us. Explained to patient the process and that he would have to meet financial qualifications for assistance via the program. Will refer him to patient assistance again requesting call between 1130a to 12 noon tomorrow due to patient traveling. He drives trucks daily.

## 2017-08-14 ENCOUNTER — TELEPHONE (OUTPATIENT)
Dept: CARDIOLOGY | Facility: CLINIC | Age: 77
End: 2017-08-14

## 2017-08-14 NOTE — TELEPHONE ENCOUNTER
Returned phone call to Dr. Sandifer and discussed previous approval to hold Aspirin and Plavix for 4 days prior to extraction, stated patient was a little confused but will advise him accordingly and schedule dental extractions

## 2017-08-14 NOTE — TELEPHONE ENCOUNTER
----- Message from Sara Haro sent at 8/14/2017 10:12 AM CDT -----  Contact: leola parker  needs callback to confirm that pt is to stop taking plavix, also aspirin?...[phone]

## 2017-08-21 ENCOUNTER — LAB VISIT (OUTPATIENT)
Dept: LAB | Facility: HOSPITAL | Age: 77
End: 2017-08-21
Attending: FAMILY MEDICINE
Payer: MEDICARE

## 2017-08-21 DIAGNOSIS — E11.69 HYPERLIPIDEMIA ASSOCIATED WITH TYPE 2 DIABETES MELLITUS: ICD-10-CM

## 2017-08-21 DIAGNOSIS — E78.5 HYPERLIPIDEMIA ASSOCIATED WITH TYPE 2 DIABETES MELLITUS: ICD-10-CM

## 2017-08-21 DIAGNOSIS — N18.30 CKD (CHRONIC KIDNEY DISEASE) STAGE 3, GFR 30-59 ML/MIN: ICD-10-CM

## 2017-08-21 DIAGNOSIS — I20.9 AP (ANGINA PECTORIS): ICD-10-CM

## 2017-08-21 LAB
ALBUMIN SERPL BCP-MCNC: 3.7 G/DL
ALP SERPL-CCNC: 41 U/L
ALT SERPL W/O P-5'-P-CCNC: 12 U/L
ANION GAP SERPL CALC-SCNC: 5 MMOL/L
AST SERPL-CCNC: 17 U/L
BASOPHILS # BLD AUTO: 0.01 K/UL
BASOPHILS NFR BLD: 0.1 %
BILIRUB SERPL-MCNC: 0.7 MG/DL
BUN SERPL-MCNC: 20 MG/DL
CALCIUM SERPL-MCNC: 9.9 MG/DL
CHLORIDE SERPL-SCNC: 109 MMOL/L
CHOLEST/HDLC SERPL: 2.6 {RATIO}
CO2 SERPL-SCNC: 28 MMOL/L
CREAT SERPL-MCNC: 1.4 MG/DL
DIFFERENTIAL METHOD: ABNORMAL
EOSINOPHIL # BLD AUTO: 0.4 K/UL
EOSINOPHIL NFR BLD: 4.6 %
ERYTHROCYTE [DISTWIDTH] IN BLOOD BY AUTOMATED COUNT: 12.8 %
EST. GFR  (AFRICAN AMERICAN): 55.2 ML/MIN/1.73 M^2
EST. GFR  (NON AFRICAN AMERICAN): 47.8 ML/MIN/1.73 M^2
GLUCOSE SERPL-MCNC: 130 MG/DL
HCT VFR BLD AUTO: 36.9 %
HDL/CHOLESTEROL RATIO: 38.3 %
HDLC SERPL-MCNC: 36 MG/DL
HDLC SERPL-MCNC: 94 MG/DL
HGB BLD-MCNC: 12.7 G/DL
LDLC SERPL CALC-MCNC: 39.2 MG/DL
LYMPHOCYTES # BLD AUTO: 2.4 K/UL
LYMPHOCYTES NFR BLD: 30.9 %
MCH RBC QN AUTO: 30.7 PG
MCHC RBC AUTO-ENTMCNC: 34.4 G/DL
MCV RBC AUTO: 89 FL
MONOCYTES # BLD AUTO: 0.6 K/UL
MONOCYTES NFR BLD: 8.3 %
NEUTROPHILS # BLD AUTO: 4.3 K/UL
NEUTROPHILS NFR BLD: 56 %
NONHDLC SERPL-MCNC: 58 MG/DL
PLATELET # BLD AUTO: 246 K/UL
PMV BLD AUTO: 10.6 FL
POTASSIUM SERPL-SCNC: 5.7 MMOL/L
PROT SERPL-MCNC: 6.4 G/DL
RBC # BLD AUTO: 4.14 M/UL
SODIUM SERPL-SCNC: 142 MMOL/L
TRIGL SERPL-MCNC: 94 MG/DL
WBC # BLD AUTO: 7.6 K/UL

## 2017-08-21 PROCEDURE — 36415 COLL VENOUS BLD VENIPUNCTURE: CPT | Mod: PO

## 2017-08-21 PROCEDURE — 80061 LIPID PANEL: CPT

## 2017-08-21 PROCEDURE — 83036 HEMOGLOBIN GLYCOSYLATED A1C: CPT

## 2017-08-21 PROCEDURE — 85025 COMPLETE CBC W/AUTO DIFF WBC: CPT

## 2017-08-21 PROCEDURE — 80053 COMPREHEN METABOLIC PANEL: CPT

## 2017-08-21 RX ORDER — ISOSORBIDE MONONITRATE 60 MG/1
TABLET, EXTENDED RELEASE ORAL
Qty: 30 TABLET | Refills: 12 | Status: SHIPPED | OUTPATIENT
Start: 2017-08-21 | End: 2020-06-15 | Stop reason: SDUPTHER

## 2017-08-22 LAB
ESTIMATED AVG GLUCOSE: 140 MG/DL
HBA1C MFR BLD HPLC: 6.5 %

## 2017-08-22 RX ORDER — METOPROLOL TARTRATE 50 MG/1
TABLET ORAL
Qty: 60 TABLET | Refills: 2 | Status: SHIPPED | OUTPATIENT
Start: 2017-08-22 | End: 2017-12-13 | Stop reason: SDUPTHER

## 2017-08-28 ENCOUNTER — LAB VISIT (OUTPATIENT)
Dept: LAB | Facility: HOSPITAL | Age: 77
End: 2017-08-28
Attending: FAMILY MEDICINE
Payer: MEDICARE

## 2017-08-28 ENCOUNTER — OFFICE VISIT (OUTPATIENT)
Dept: FAMILY MEDICINE | Facility: CLINIC | Age: 77
End: 2017-08-28
Payer: MEDICARE

## 2017-08-28 VITALS
DIASTOLIC BLOOD PRESSURE: 60 MMHG | WEIGHT: 171.5 LBS | SYSTOLIC BLOOD PRESSURE: 120 MMHG | OXYGEN SATURATION: 98 % | HEART RATE: 78 BPM | HEIGHT: 69 IN | TEMPERATURE: 97 F | BODY MASS INDEX: 25.4 KG/M2

## 2017-08-28 DIAGNOSIS — E83.52 FFH (FAMILIAL HYPOCALCIURIC HYPERCALCEMIA): ICD-10-CM

## 2017-08-28 DIAGNOSIS — R39.12 BENIGN PROSTATIC HYPERPLASIA WITH WEAK URINARY STREAM: ICD-10-CM

## 2017-08-28 DIAGNOSIS — E87.5 HYPERKALEMIA: ICD-10-CM

## 2017-08-28 DIAGNOSIS — N40.1 BENIGN PROSTATIC HYPERPLASIA WITH WEAK URINARY STREAM: ICD-10-CM

## 2017-08-28 DIAGNOSIS — E78.5 HYPERLIPIDEMIA ASSOCIATED WITH TYPE 2 DIABETES MELLITUS: Primary | ICD-10-CM

## 2017-08-28 DIAGNOSIS — I25.5 ISCHEMIC CARDIOMYOPATHY: Chronic | ICD-10-CM

## 2017-08-28 DIAGNOSIS — E11.69 HYPERLIPIDEMIA ASSOCIATED WITH TYPE 2 DIABETES MELLITUS: Primary | ICD-10-CM

## 2017-08-28 DIAGNOSIS — I10 ESSENTIAL HYPERTENSION: Chronic | ICD-10-CM

## 2017-08-28 DIAGNOSIS — N18.30 CKD (CHRONIC KIDNEY DISEASE) STAGE 3, GFR 30-59 ML/MIN: ICD-10-CM

## 2017-08-28 PROBLEM — I20.9 AP (ANGINA PECTORIS): Status: RESOLVED | Noted: 2017-05-23 | Resolved: 2017-08-28

## 2017-08-28 LAB — POTASSIUM SERPL-SCNC: 5.2 MMOL/L

## 2017-08-28 PROCEDURE — 99214 OFFICE O/P EST MOD 30 MIN: CPT | Mod: S$GLB,,, | Performed by: FAMILY MEDICINE

## 2017-08-28 PROCEDURE — 84132 ASSAY OF SERUM POTASSIUM: CPT

## 2017-08-28 PROCEDURE — 3008F BODY MASS INDEX DOCD: CPT | Mod: S$GLB,,, | Performed by: FAMILY MEDICINE

## 2017-08-28 PROCEDURE — 3078F DIAST BP <80 MM HG: CPT | Mod: S$GLB,,, | Performed by: FAMILY MEDICINE

## 2017-08-28 PROCEDURE — 1126F AMNT PAIN NOTED NONE PRSNT: CPT | Mod: S$GLB,,, | Performed by: FAMILY MEDICINE

## 2017-08-28 PROCEDURE — 36415 COLL VENOUS BLD VENIPUNCTURE: CPT | Mod: PO

## 2017-08-28 PROCEDURE — 1159F MED LIST DOCD IN RCRD: CPT | Mod: S$GLB,,, | Performed by: FAMILY MEDICINE

## 2017-08-28 PROCEDURE — 99999 PR PBB SHADOW E&M-EST. PATIENT-LVL III: CPT | Mod: PBBFAC,,, | Performed by: FAMILY MEDICINE

## 2017-08-28 PROCEDURE — 3074F SYST BP LT 130 MM HG: CPT | Mod: S$GLB,,, | Performed by: FAMILY MEDICINE

## 2017-08-28 PROCEDURE — 99499 UNLISTED E&M SERVICE: CPT | Mod: S$GLB,,, | Performed by: FAMILY MEDICINE

## 2017-08-28 NOTE — PROGRESS NOTES
"  Chief Complaint:    Chief Complaint   Patient presents with    Follow-up       History of Present Illness:    He presents today for three-month follow-up of chronic medical problems,  Patient doing well A1c come down to 6.5.  He is using trulicity and Januvia a surgeon review as to expensive.  He has hypertension which is controlled, chronic kidney disease which is stable but his potassium is elevated.  He says he is not taking potassium supplements but he is on ACE inhibitor.  He has BPH and he complains that his stream of urine is a lot weaker" a lot longer to empty his bladder.  He has to wake up at least 3 times.  He is on Flomax and finasteride.    ROS:  Review of Systems   Constitutional: Negative for activity change, chills, fatigue, fever and unexpected weight change.   HENT: Negative for congestion, ear discharge, ear pain, hearing loss, postnasal drip and rhinorrhea.    Eyes: Negative for pain and visual disturbance.   Respiratory: Negative for cough, chest tightness and shortness of breath.    Cardiovascular: Negative for chest pain and palpitations.   Gastrointestinal: Negative for abdominal pain, diarrhea and vomiting.   Endocrine: Negative for heat intolerance.   Genitourinary: Positive for difficulty urinating. Negative for dysuria, flank pain, frequency and hematuria.   Musculoskeletal: Negative for back pain, gait problem and neck pain.   Skin: Negative for color change and rash.   Neurological: Negative for dizziness, tremors, seizures, numbness and headaches.   Psychiatric/Behavioral: Negative for agitation, hallucinations, self-injury, sleep disturbance and suicidal ideas. The patient is not nervous/anxious.        Past Medical History:   Diagnosis Date    Abnormal ECG 10/31/2013    AP (angina pectoris) 1/28/2016    Asthma     Cataract     Chronic ischemic heart disease 10/31/2013    CKD (chronic kidney disease) stage 3, GFR 30-59 ml/min 11/4/2015    History of PTCA 10/31/2013    " "Hyperlipidemia     Hypertension     Insomnia 6/17/2013    Ischemic cardiomyopathy 10/31/2013    Myocardial infarction     Old MI (myocardial infarction) 1994    RBBB 10/31/2013    S/P CABG (coronary artery bypass graft) 10/31/2013    Shortness of breath 10/31/2013    Tobacco dependence     resolved    Trouble in sleeping     Type 2 diabetes mellitus 2010    Wears glasses        Social History:  Social History     Social History    Marital status:      Spouse name: N/A    Number of children: 5    Years of education: N/A     Social History Main Topics    Smoking status: Former Smoker     Packs/day: 1.50     Years: 40.00     Quit date: 1/1/1994    Smokeless tobacco: Former User     Quit date: 1/1/2011      Comment: approx date    Alcohol use 0.0 oz/week     1 Standard drinks or equivalent per week      Comment: occasionally; 1-2 cans of beer a month    Drug use: No    Sexual activity: Not Currently     Partners: Female     Birth control/ protection: None     Other Topics Concern    None     Social History Narrative    None       Family History:   family history includes Diabetes in his brother and sister. He was adopted.    Health Maintenance   Topic Date Due    Zoster Vaccine  05/11/1999    Influenza Vaccine  08/01/2017    Foot Exam  11/07/2017    Hemoglobin A1c  02/21/2018    Eye Exam  03/22/2018    Lipid Panel  08/21/2018    TETANUS VACCINE  04/03/2024    Colonoscopy  05/22/2027    Pneumococcal (65+)  Completed       Physical Exam:    Vital Signs  Temp: 97.1 °F (36.2 °C)  Temp src: Tympanic  Pulse: 78  SpO2: 98 %  BP: 120/60  BP Location: Left arm  Patient Position: Sitting  Pain Score: 0-No pain  Height and Weight  Height: 5' 9" (175.3 cm)  Weight: 77.8 kg (171 lb 8.3 oz)  BSA (Calculated - sq m): 1.95 sq meters  BMI (Calculated): 25.4  Weight in (lb) to have BMI = 25: 168.9]    Body mass index is 25.33 kg/m².    Physical Exam   Constitutional: He is oriented to person, place, " and time. He appears well-developed.   HENT:   Mouth/Throat: Oropharynx is clear and moist.   Eyes: Conjunctivae are normal. Pupils are equal, round, and reactive to light.   Neck: Normal range of motion. Neck supple.   Cardiovascular: Normal rate, regular rhythm and normal heart sounds.    No murmur heard.  Pulmonary/Chest: Effort normal and breath sounds normal. No respiratory distress. He has no wheezes. He has no rales. He exhibits no tenderness.   Abdominal: Soft. He exhibits no distension and no mass. There is no tenderness. There is no guarding.   Musculoskeletal: He exhibits no edema or tenderness.   Lymphadenopathy:     He has no cervical adenopathy.   Neurological: He is alert and oriented to person, place, and time. He has normal reflexes.   Skin: Skin is warm and dry.   Psychiatric: He has a normal mood and affect. His behavior is normal. Judgment and thought content normal.       Lab Results   Component Value Date    CHOL 94 (L) 08/21/2017    CHOL 105 (L) 05/11/2017    CHOL 118 (L) 02/06/2017    TRIG 94 08/21/2017    TRIG 113 05/11/2017    TRIG 163 (H) 02/06/2017    HDL 36 (L) 08/21/2017    HDL 40 05/11/2017    HDL 35 (L) 02/06/2017    TOTALCHOLEST 2.6 08/21/2017    TOTALCHOLEST 2.6 05/11/2017    TOTALCHOLEST 3.4 02/06/2017    NONHDLCHOL 58 08/21/2017    NONHDLCHOL 65 05/11/2017    NONHDLCHOL 83 02/06/2017       Lab Results   Component Value Date    HGBA1C 6.5 (H) 08/21/2017       Assessment:      ICD-10-CM ICD-9-CM   1. Hyperlipidemia associated with type 2 diabetes mellitus E11.69 250.80    E78.5 272.4   2. Essential hypertension I10 401.9   3. Ischemic cardiomyopathy I25.5 414.8   4. CKD (chronic kidney disease) stage 3, GFR 30-59 ml/min N18.3 585.3   5. FFH (familial hypocalciuric hypercalcemia) E83.52 275.42   6. Benign prostatic hyperplasia with weak urinary stream N40.1 600.01    R39.12 788.62   7. Hyperkalemia E87.5 276.7         Plan:  BPH with obstructive symptoms, medical management maximized  on the finasteride and Flomax, he will be referred to urology for possible TURP procedure.  Please discontinue Januvia, only continue trulicity.  Recheck potassium today.  Other medical problems as above are stable continue current plan.  Follow-up 3 months  Orders Placed This Encounter   Procedures    Hemoglobin A1c    Lipid panel    Comprehensive metabolic panel    Microalbumin/creatinine urine ratio    Potassium    Ambulatory referral to Urology       Current Outpatient Prescriptions   Medication Sig Dispense Refill    albuterol (VENTOLIN HFA) 90 mcg/actuation inhaler Inhale 2 puffs into the lungs every 6 (six) hours as needed for Wheezing.      aspirin 81 MG Chew 1 Tablet, Chewable Oral Every day      BLOOD-GLUCOSE METER (TRUERESULT BLOOD GLUCOSE SYSTM MISC) by Misc.(Non-Drug; Combo Route) route.      clopidogrel (PLAVIX) 75 mg tablet TAKE 1 TABLET BY MOUTH DAILY 30 tablet 6    dulaglutide 1.5 mg/0.5 mL PnIj Inject 1.5 mg into the skin every 7 days. 1 Syringe 12    EMBRACE PRO TEST STRIPS Strp CHECK BLOOD SUGAR TWICE DAILY. 50 strip 0    finasteride (PROSCAR) 5 mg tablet TAKE 1 TABLET BY MOUTH DAILY 30 tablet 2    glipiZIDE (GLUCOTROL) 5 MG tablet TAKE 1 TABLET 2 TIMES DAILY 60 tablet 6    hydrochlorothiazide (MICROZIDE) 12.5 mg capsule Take 1 capsule (12.5 mg total) by mouth once daily. 30 capsule 11    isosorbide mononitrate (IMDUR) 60 MG 24 hr tablet TAKE 1 TABLET BY MOUTH DAILY 30 tablet 12    lisinopril (PRINIVIL,ZESTRIL) 40 MG tablet TAKE 1 TABLET BY MOUTH DAILY 30 tablet 2    metformin (GLUCOPHAGE) 850 MG tablet TAKE 1 TABLET 2 TIMES DAILY 60 tablet 2    metoprolol tartrate (LOPRESSOR) 50 MG tablet TAKE 1 TABLET 2 TIMES DAILY 60 tablet 2    nitroGLYCERIN (NITROSTAT) 0.4 MG SL tablet Place 1 tablet (0.4 mg total) under the tongue every 5 (five) minutes as needed. 25 tablet 6    simvastatin (ZOCOR) 40 MG tablet TAKE 1 TABLET NIGHTLY AT BEDTIME 30 tablet 2    tamsulosin (FLOMAX) 0.4 mg  Cp24 TAKE (1) CAPSULE BY MOUTH DAILY 30 capsule 0    TRUEPLUS LANCETS 26 gauge Misc CHECK BLOOD SUGAR TWICE DAILY. 100 each 0    TRUERESULT BLOOD GLUCOSE SYSTM kit CHECK BLOOD SUGAR TWICE DAILY. 1 each 0    oxybutynin (DITROPAN XL) 15 MG TR24 Take 1 tablet (15 mg total) by mouth once daily. 30 tablet 11     No current facility-administered medications for this visit.        Medications Discontinued During This Encounter   Medication Reason    ferrous sulfate 325 mg (65 mg iron) Tab tablet Patient no longer taking    polyethylene glycol (GLYCOLAX) 17 gram PwPk Patient no longer taking    VENTOLIN HFA 90 mcg/actuation inhaler Duplicate Order    JANUVIA 50 mg Tab        Return in about 3 months (around 11/28/2017).      Dr Celestine Petersen MD    Disclaimer: This note is prepared using voice recognition system and as such is likely to have errors and is not proof read.

## 2017-09-06 ENCOUNTER — TELEPHONE (OUTPATIENT)
Dept: FAMILY MEDICINE | Facility: CLINIC | Age: 77
End: 2017-09-06

## 2017-09-06 DIAGNOSIS — N28.9 RENAL INSUFFICIENCY: Primary | ICD-10-CM

## 2017-09-06 DIAGNOSIS — N18.30 CKD (CHRONIC KIDNEY DISEASE), STAGE III: ICD-10-CM

## 2017-09-21 ENCOUNTER — OFFICE VISIT (OUTPATIENT)
Dept: NEPHROLOGY | Facility: CLINIC | Age: 77
End: 2017-09-21
Payer: MEDICARE

## 2017-09-21 ENCOUNTER — LAB VISIT (OUTPATIENT)
Dept: LAB | Facility: HOSPITAL | Age: 77
End: 2017-09-21
Attending: INTERNAL MEDICINE
Payer: MEDICARE

## 2017-09-21 VITALS
SYSTOLIC BLOOD PRESSURE: 139 MMHG | WEIGHT: 171.31 LBS | BODY MASS INDEX: 24.52 KG/M2 | HEART RATE: 73 BPM | DIASTOLIC BLOOD PRESSURE: 82 MMHG | HEIGHT: 70 IN

## 2017-09-21 DIAGNOSIS — E83.52 HYPERCALCEMIA: Primary | ICD-10-CM

## 2017-09-21 DIAGNOSIS — E83.52 HYPERCALCEMIA: ICD-10-CM

## 2017-09-21 DIAGNOSIS — N18.30 CHRONIC KIDNEY DISEASE, STAGE III (MODERATE): ICD-10-CM

## 2017-09-21 DIAGNOSIS — I10 ESSENTIAL HYPERTENSION: ICD-10-CM

## 2017-09-21 DIAGNOSIS — E87.5 HYPERKALEMIA: ICD-10-CM

## 2017-09-21 LAB
CALCIUM UR-MCNC: 2.6 MG/DL
CREAT UR-MCNC: 48 MG/DL
PROT UR-MCNC: <7 MG/DL
PROT/CREAT RATIO, UR: NORMAL

## 2017-09-21 PROCEDURE — 1126F AMNT PAIN NOTED NONE PRSNT: CPT | Mod: S$GLB,,, | Performed by: INTERNAL MEDICINE

## 2017-09-21 PROCEDURE — 99999 PR PBB SHADOW E&M-EST. PATIENT-LVL III: CPT | Mod: PBBFAC,,, | Performed by: INTERNAL MEDICINE

## 2017-09-21 PROCEDURE — 1159F MED LIST DOCD IN RCRD: CPT | Mod: S$GLB,,, | Performed by: INTERNAL MEDICINE

## 2017-09-21 PROCEDURE — 3079F DIAST BP 80-89 MM HG: CPT | Mod: S$GLB,,, | Performed by: INTERNAL MEDICINE

## 2017-09-21 PROCEDURE — 99205 OFFICE O/P NEW HI 60 MIN: CPT | Mod: S$GLB,,, | Performed by: INTERNAL MEDICINE

## 2017-09-21 PROCEDURE — 3008F BODY MASS INDEX DOCD: CPT | Mod: S$GLB,,, | Performed by: INTERNAL MEDICINE

## 2017-09-21 PROCEDURE — 3075F SYST BP GE 130 - 139MM HG: CPT | Mod: S$GLB,,, | Performed by: INTERNAL MEDICINE

## 2017-09-21 PROCEDURE — 99499 UNLISTED E&M SERVICE: CPT | Mod: S$GLB,,, | Performed by: INTERNAL MEDICINE

## 2017-09-21 PROCEDURE — 84156 ASSAY OF PROTEIN URINE: CPT

## 2017-09-21 PROCEDURE — 82340 ASSAY OF CALCIUM IN URINE: CPT

## 2017-09-21 RX ORDER — LISINOPRIL 20 MG/1
20 TABLET ORAL DAILY
Qty: 30 TABLET | Refills: 10 | Status: SHIPPED | OUTPATIENT
Start: 2017-09-21 | End: 2018-01-22 | Stop reason: SDUPTHER

## 2017-09-21 NOTE — PROGRESS NOTES
NEPHROLOGY CONSULTATION NOTE    REFERRING PHYSICIAN:  Dr. Celestine Petersen.    REASON FOR CONSULTATION:  Chronic kidney disease and hyperkalemia.    HISTORY OF PRESENT ILLNESS:  Mr. Pino is a 77-year-old  male who was   referred to us by Dr. Petersen, and thank you very much for referring the patient   to us.  As you know, he has a pertinent history of diabetes mellitus for about   10 years and hypertension.  Serum creatinine is currently at baseline close to   1.4.  The patient has had slightly elevated serum potassium of 5.7.  He is   taking an ACE inhibitor, lisinopril at 40 mg p.o. daily.  He is not on any other   medications that would cause hyperkalemia.  He has no active complaints, no   palpitation, no chest discomfort, no other pertinent issues.    On further review of the history, I have noted that the patient has had mild   hypercalcemia and familial hypocalciuric hypercalcemia were previously   suspected.  The patient has not had any pertinent complications of   hypercalcemia.  No history of kidney stones.  He has no nausea, no constipation,   no symptoms of hypercalcemia.    PAST MEDICAL HISTORY:  1.  CKD stage III.  2.  Hypertension.  3.  Diabetes mellitus for about 10 years.  4.  Ischemic cardiomyopathy and diastolic CHF.  5.  Hyperlipidemia.  6.  History of coronary artery disease with past history of CABG in 2011.  7.  COPD.  8.  Right bundle-branch block.  9.  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    PAST SURGICAL HISTORY:  Reviewed.  CABG, appendectomy and left hand surgery.    FAMILY HISTORY:  Reviewed.  Diabetes mellitus.    ALLERGIES:  Reviewed.  No known drug allergies.    SOCIAL HISTORY:  He quit smoking in 1994.  No alcohol use.    MEDICATIONS:  Reviewed.  Lisinopril 40 mg p.o. daily, hydrochlorothiazide 12.5   mg p.o. daily, albuterol as needed, Plavix, Proscar, glipizide, Imdur 60 mg   daily, metformin, metoprolol 50 mg b.i.d., Zocor,  Flomax.    REVIEW OF SYSTEMS:  No recent hospitalizations.  GENERAL:  Negative.  HEAD, EYES, EARS, NOSE, AND THROAT:  Negative.  CARDIAC:  Negative.  PULMONARY:  Negative.  GASTROINTESTINAL:  Negative.  GENITOURINARY:  Negative.  PSYCHOLOGICAL:  Negative.  NEUROLOGICAL:  Negative.  ENDOCRINE:  Negative.  HEMATOLOGIC AND ONCOLOGIC:  Negative.  INFECTIOUS DISEASE:  Negative.  The rest of the review of systems negative.    PHYSICAL EXAMINATION:  VITAL SIGNS:  Blood pressure is 139/82, pulse 73, weight is 77.7 kilograms.  GENERAL:  He is cooperative, pleasant, in no acute distress.  HEENT:  Mucous membranes moist.  NECK:  No JVD.  HEART:  Regular rate and rhythm.  CHEST:  Clear to auscultation.  No pericardial friction rubs.  No rales.  No   wheezes.  Breathing symmetric, unlabored.  ABDOMEN:  Soft, nontender.  EXTREMITIES:  Showed no edema.    LABORATORY:  Reviewed.  Creatinine is 1.4, sodium 142, potassium 5.7, chloride   109, bicarbonate 28, calcium 9.9, albumin is 3.7.  White count is 7.6,   hemoglobin is 12.7, platelets 246.  Repeat potassium was 5.2.  Urine protein to   creatinine ratio corresponds to 0 proteinuria, urine calcium is 2.6 and urine   creatinine is 48 and therefore urine calcium to creatinine ratio is 0.05.    ASSESSMENT AND PLAN:  This is a 77-year-old man who was referred to us for CKD   and hyperkalemia.  Incidentally, the patient has had mild borderline   hypercalcemia in the past.  The impression is as follows:  1.  Renal.  The patient has CKD stage III.  Creatinine currently at baseline.    The patient is doing well, stable.  Likely cause of CKD is hypertension.  He had   no proteinuria to suspect diabetic nephropathy.  2.  Hyperkalemia.  This is mild related to the ACE inhibitor the patient is   taking on the background of CKD.  Lowering the large dose of ACE inhibitor to 20   mg p.o. daily would likely normalize serum potassium.  The patient was advised   both in writing and verbally.  3.   Hypercalcemia was noted.  The diagnosis of familial hypocalciuric   hypercalcemia was previously mentioned.  Strictly speaking, this diagnosis   requires a urine calcium to creatinine ratio of less than 0.01, in the case of   this patient this ratio is 0.05.  I suggest that patient's mild hypercalcemia   and reduced urinary calcium excretion may in fact be due to the patient taking   hydrochlorothiazide rather than having FHH.  At any rate, serum calcium is very   barely borderline high.    PLANS AND RECOMMENDATIONS:  As reviewed above for each individual problem.    Lisinopril dose was reduced to 20 mg p.o. daily.  Opportunity for questions and   discussion provided.    Total time spent 60 minutes time needed to review the records, the patient   evaluation, documentation, face-to-face discussion with the patient.  Multiple   issues were addressed.  More than 50% of the time was spent on counseling and   coordination of care.  Level V visit.      SERGIO  dd: 09/21/2017 17:40:54 (CDT)  td: 09/22/2017 09:47:27 (CDT)  Doc ID   #[id number]  Job ID #[id number]    CC:     [id number]

## 2017-09-21 NOTE — LETTER
September 21, 2017      Celestine Petersen MD  [id number] [address] [address]           [facility]  [id number] [place] [id number]  Phone: [phone]  Fax: [phone]          Patient: Nithin Pino   MR Number: [id number]   YOB: 1940   Date of Visit: 9/21/2017       Dear Dr. Celestine Petersen:    Thank you for referring Nithin Pino to me for evaluation. Attached you will find relevant portions of my assessment and plan of care.    If you have questions, please do not hesitate to call me. I look forward to following Nithin Pino along with you.    Sincerely,    Marsha Grant MD    Enclosure  CC:  No Recipients    If you would like to receive this communication electronically, please contact [email or web address] or [phone] to request more information on [employer] access.    For providers and/or their staff who would like to refer a patient to [facility], please contact us through our one-stop-shop provider referral line, [facility] , at [phone].    If you feel you have received this communication in error or would no longer like to receive these types of communications, please e-mail [email or web address]

## 2017-09-22 RX ORDER — OXYBUTYNIN CHLORIDE 15 MG/1
TABLET, EXTENDED RELEASE ORAL
Qty: 30 TABLET | Refills: 2 | Status: SHIPPED | OUTPATIENT
Start: 2017-09-22 | End: 2017-12-13 | Stop reason: SDUPTHER

## 2017-10-19 RX ORDER — FINASTERIDE 5 MG/1
TABLET, FILM COATED ORAL
Qty: 30 TABLET | Refills: 0 | Status: SHIPPED | OUTPATIENT
Start: 2017-10-19 | End: 2017-12-13 | Stop reason: SDUPTHER

## 2017-10-19 RX ORDER — LISINOPRIL 40 MG/1
TABLET ORAL
Qty: 30 TABLET | Refills: 0 | Status: SHIPPED | OUTPATIENT
Start: 2017-10-19 | End: 2017-11-02 | Stop reason: DRUGHIGH

## 2017-10-19 RX ORDER — METFORMIN HYDROCHLORIDE 850 MG/1
TABLET ORAL
Qty: 60 TABLET | Refills: 0 | Status: SHIPPED | OUTPATIENT
Start: 2017-10-19 | End: 2017-12-13 | Stop reason: SDUPTHER

## 2017-10-19 RX ORDER — SIMVASTATIN 40 MG/1
TABLET, FILM COATED ORAL
Qty: 30 TABLET | Refills: 0 | Status: SHIPPED | OUTPATIENT
Start: 2017-10-19 | End: 2017-12-13 | Stop reason: SDUPTHER

## 2017-10-30 ENCOUNTER — LAB VISIT (OUTPATIENT)
Dept: LAB | Facility: HOSPITAL | Age: 77
End: 2017-10-30
Attending: INTERNAL MEDICINE
Payer: MEDICARE

## 2017-10-30 DIAGNOSIS — E83.52 HYPERCALCEMIA: ICD-10-CM

## 2017-10-30 LAB
ALBUMIN SERPL BCP-MCNC: 3.8 G/DL
ANION GAP SERPL CALC-SCNC: 12 MMOL/L
BUN SERPL-MCNC: 21 MG/DL
CALCIUM SERPL-MCNC: 10.5 MG/DL
CHLORIDE SERPL-SCNC: 103 MMOL/L
CO2 SERPL-SCNC: 24 MMOL/L
CREAT SERPL-MCNC: 1.2 MG/DL
EST. GFR  (AFRICAN AMERICAN): >60 ML/MIN/1.73 M^2
EST. GFR  (NON AFRICAN AMERICAN): 57.6 ML/MIN/1.73 M^2
GLUCOSE SERPL-MCNC: 113 MG/DL
PHOSPHATE SERPL-MCNC: 2.3 MG/DL
POTASSIUM SERPL-SCNC: 4.6 MMOL/L
SODIUM SERPL-SCNC: 139 MMOL/L

## 2017-10-30 PROCEDURE — 80069 RENAL FUNCTION PANEL: CPT

## 2017-10-30 PROCEDURE — 83970 ASSAY OF PARATHORMONE: CPT

## 2017-10-30 PROCEDURE — 36415 COLL VENOUS BLD VENIPUNCTURE: CPT | Mod: PO

## 2017-10-31 LAB — PTH-INTACT SERPL-MCNC: 101 PG/ML

## 2017-11-01 ENCOUNTER — TELEPHONE (OUTPATIENT)
Dept: NEPHROLOGY | Facility: CLINIC | Age: 77
End: 2017-11-01

## 2017-11-01 NOTE — TELEPHONE ENCOUNTER
----- Message from Sydnee Muñiz sent at 11/1/2017  8:27 AM CDT -----  Contact: pt  The pt states he is returning a missed call, pt can be reached at [phone]///thxMW

## 2017-11-02 ENCOUNTER — OFFICE VISIT (OUTPATIENT)
Dept: NEPHROLOGY | Facility: CLINIC | Age: 77
End: 2017-11-02
Payer: MEDICARE

## 2017-11-02 VITALS
HEIGHT: 70 IN | SYSTOLIC BLOOD PRESSURE: 110 MMHG | WEIGHT: 172.19 LBS | BODY MASS INDEX: 24.65 KG/M2 | DIASTOLIC BLOOD PRESSURE: 56 MMHG | HEART RATE: 80 BPM

## 2017-11-02 DIAGNOSIS — E21.3 HYPERPARATHYROIDISM: ICD-10-CM

## 2017-11-02 DIAGNOSIS — E87.5 HYPERKALEMIA: ICD-10-CM

## 2017-11-02 DIAGNOSIS — E83.52 FHH (FAMILIAL HYPOCALCIURIC HYPERCALCEMIA): ICD-10-CM

## 2017-11-02 DIAGNOSIS — N18.30 CHRONIC KIDNEY DISEASE, STAGE III (MODERATE): ICD-10-CM

## 2017-11-02 DIAGNOSIS — E83.39 HYPOPHOSPHATEMIA: ICD-10-CM

## 2017-11-02 DIAGNOSIS — E83.52 HYPERCALCEMIA: Primary | ICD-10-CM

## 2017-11-02 DIAGNOSIS — I10 ESSENTIAL HYPERTENSION: ICD-10-CM

## 2017-11-02 PROCEDURE — 99999 PR PBB SHADOW E&M-EST. PATIENT-LVL III: CPT | Mod: PBBFAC,,, | Performed by: INTERNAL MEDICINE

## 2017-11-02 PROCEDURE — 99499 UNLISTED E&M SERVICE: CPT | Mod: S$GLB,,, | Performed by: INTERNAL MEDICINE

## 2017-11-02 PROCEDURE — 99215 OFFICE O/P EST HI 40 MIN: CPT | Mod: S$GLB,,, | Performed by: INTERNAL MEDICINE

## 2017-11-02 NOTE — PROGRESS NOTES
NEPHROLOGY CLINIC FOLLOWUP NOTE    REASON FOR FOLLOWUP AND CHIEF COMPLAINT:  Follow up for chronic kidney disease,   hypertension, hyperkalemia, and hypercalcemia.    HISTORY OF PRESENT ILLNESS:  Mr. Pino is a 77-year-old  male who was   initially sent to us by Dr. Petersen for CKD stage III.  The patient has stable   renal function.  During his last visit with me about a month ago, he had mild   hyperkalemia.  ACE inhibitor dose was reduced from 40 to 20 mg per day.  It was   also noted that he had mild hypercalcemia.  He previously had on his chart, a   diagnosis of familial hypocalciuric hypercalcemia.  However, his urinary calcium   to creatinine ratio is 0.05.  For [facility], this ratio is suggested to be less than   0.01.  In addition, the patient is taking hydrochlorothiazide, which raises   serum calcium.  The patient does not have any symptoms of hypercalcemia.  No   palpitation.  No nausea, no constipation.  The hypercalcemia is mild.  No   history of kidney stones.    He is here for followup.  He has no acute issues, no new problems, no   discomfort.    PAST MEDICAL HISTORY:  1.  CKD stage III.  Baseline creatinine 1.5 or lower.  2.  Hypertension.  3.  Diabetes mellitus for 10 years.  4.  Ischemic cardiomyopathy and diastolic CHF.  5.  Hyperlipidemia.  6.  Coronary artery disease with past history of CABG in 2011.  7.  COPD.  8.  Right bundle-branch block.  9.  Familial hypocalciuric hypercalcemia, though the diagnosis was questionable.    PAST SURGICAL HISTORY:  Reviewed.  CABG, appendectomy, and left hand surgery.    FAMILY HISTORY:  Reviewed.  Diabetes mellitus.    ALLERGIES:  Reviewed.  No known drug allergies.    SOCIAL HISTORY:  Quit smoking in 1994.  No alcohol use.    MEDICATIONS:  Reviewed.  Lisinopril 20 mg p.o. daily, HCTZ 12.5 mg daily, and   metoprolol 50 mg b.i.d.  Other meds reviewed and noted including Imdur 60 mg   daily.    REVIEW OF SYSTEMS:  No recent hospitalizations.  GENERAL:   Negative.  HEAD, EYES, EARS, NOSE, AND THROAT:  Negative.  CARDIAC:  Negative.  PULMONARY:  Negative.  GASTROINTESTINAL:  Negative.  GENITOURINARY:  Negative.  PSYCHOLOGICAL:  Negative.  NEUROLOGICAL:  Negative.  ENDOCRINE:  Negative.  HEMATOLOGIC AND ONCOLOGIC:  Negative.  INFECTIOUS DISEASE:  Negative.  The rest of the review of systems negative.    PHYSICAL EXAMINATION:  VITAL SIGNS:  Blood pressure is 110/56, pulse 80, and weight is 172 pounds,   which is unchanged from last visit.  GENERAL:  He is cooperative, pleasant.  Ambulating by himself.  Speech and   thought process appropriate, normal.  HEENT:  Mucous membranes are moist.  NECK:  No JVD.  HEART:  Regular rate and rhythm, S1 and S2 audible.  No rubs.  CHEST:  Clear to auscultation.  No rales or wheezes.  Breathing symmetric,   unlabored.  ABDOMEN:  Soft, nontender.  EXTREMITIES:  No edema.    LABORATORY:  Reviewed.  Creatinine is 1.2, improved from 1.4.  Sodium is 139,   potassium 4.6, improved from 5.7, chloride 103, bicarbonate 24, BUN is 21, and   calcium is 10.5.  Albumin is 3.8, corrected calcium is the same.  Intact PTH is   noted to be 101.  Urine calcium is 2.6 and urine creatinine is 48.  Therefore,   the ratio of the urine calcium to urine creatinine is 0.05.    ASSESSMENT AND PLAN:  This is a 77-year-old man who presents for followup of   multiple issues as described above.  The impression is as follows:  1.  Renal.  The patient has chronic kidney disease stage III.  Creatinine is   stable and at baseline or lower than from the previous baseline.  He is doing   well.  He had no proteinuria, therefore diabetic nephropathy is not suspected.    The very mild chronic kidney disease is probably related to history of   hypertension.  2.  Hypertension.  Blood pressure is controlled, actually slightly low.    Medications will be adjusted.  Discussed with the patient.  Medications reviewed   with him.  The goal for systolic blood pressure in this  elderly man is 135 to   close to 145.  3.  Hyperkalemia.  This is resolved.  Potassium is normal after adjustment in   lisinopril dose.  The patient was informed.  4.  Hypercalcemia.  He has a previous diagnosis of familial hypocalciuric   hypercalcemia.  However, his urine calcium to creatinine ratio is 0.05 and is   the same when repeated.  For familial hypocalciuric hypercalcemia, this ratio is   required to be 0.01 or lower.  In addition, he is taking hydrochlorothiazide,   which raises serum calcium and therefore, familial hypocalciuric hypercalcemia   diagnosis is in doubt, I will stop HCTZ and follow serum calcium.  He   practically does not need HCTZ as his blood pressure as mentioned above is   normal to actually low.  The differential diagnosis for hypercalcemia is primary   hyperparathyroidism because he also has elevation in intact PTH and mild   decrease in serum phosphorus of 2.3/hypophosphatemia.    PLANS AND RECOMMENDATIONS:  As discussed above.  He will return to see us in two   months.  I advised him to stop HCTZ.  We will repeat BMP, intact PTH, urine   calcium, and urine creatinine.  Multiple issues addressed.  Complex visit.    Total time spent 40 minutes.  No more than 50 of this time was spent in   counseling and coordination of care.  Level V visit.      AK/RUKHSANA  dd: 11/02/2017 09:24:04 (CDT)  td: 11/02/2017 14:09:20 (CDT)  Doc ID   #[id number]  Job ID #[id number]    CC:     [id number]

## 2017-11-13 RX ORDER — HYDROCHLOROTHIAZIDE 12.5 MG/1
CAPSULE ORAL
Qty: 30 CAPSULE | Refills: 0 | Status: SHIPPED | OUTPATIENT
Start: 2017-11-13 | End: 2017-12-18

## 2017-11-28 ENCOUNTER — LAB VISIT (OUTPATIENT)
Dept: LAB | Facility: HOSPITAL | Age: 77
End: 2017-11-28
Attending: FAMILY MEDICINE
Payer: MEDICARE

## 2017-11-28 DIAGNOSIS — E78.5 HYPERLIPIDEMIA ASSOCIATED WITH TYPE 2 DIABETES MELLITUS: ICD-10-CM

## 2017-11-28 DIAGNOSIS — E11.69 HYPERLIPIDEMIA ASSOCIATED WITH TYPE 2 DIABETES MELLITUS: ICD-10-CM

## 2017-11-28 LAB
ALBUMIN SERPL BCP-MCNC: 3.7 G/DL
ALP SERPL-CCNC: 49 U/L
ALT SERPL W/O P-5'-P-CCNC: 10 U/L
ANION GAP SERPL CALC-SCNC: 4 MMOL/L
AST SERPL-CCNC: 14 U/L
BILIRUB SERPL-MCNC: 0.6 MG/DL
BUN SERPL-MCNC: 20 MG/DL
CALCIUM SERPL-MCNC: 10 MG/DL
CHLORIDE SERPL-SCNC: 107 MMOL/L
CHOLEST SERPL-MCNC: 112 MG/DL
CHOLEST/HDLC SERPL: 2.7 {RATIO}
CO2 SERPL-SCNC: 28 MMOL/L
CREAT SERPL-MCNC: 1.1 MG/DL
EST. GFR  (AFRICAN AMERICAN): >60 ML/MIN/1.73 M^2
EST. GFR  (NON AFRICAN AMERICAN): >60 ML/MIN/1.73 M^2
ESTIMATED AVG GLUCOSE: 128 MG/DL
GLUCOSE SERPL-MCNC: 130 MG/DL
HBA1C MFR BLD HPLC: 6.1 %
HDLC SERPL-MCNC: 41 MG/DL
HDLC SERPL: 36.6 %
LDLC SERPL CALC-MCNC: 52.6 MG/DL
NONHDLC SERPL-MCNC: 71 MG/DL
POTASSIUM SERPL-SCNC: 4.6 MMOL/L
PROT SERPL-MCNC: 6.5 G/DL
SODIUM SERPL-SCNC: 139 MMOL/L
TRIGL SERPL-MCNC: 92 MG/DL

## 2017-11-28 PROCEDURE — 80061 LIPID PANEL: CPT

## 2017-11-28 PROCEDURE — 83036 HEMOGLOBIN GLYCOSYLATED A1C: CPT

## 2017-11-28 PROCEDURE — 80053 COMPREHEN METABOLIC PANEL: CPT

## 2017-11-28 PROCEDURE — 36415 COLL VENOUS BLD VENIPUNCTURE: CPT | Mod: PO

## 2017-12-08 PROBLEM — E21.3 HYPERPARATHYROIDISM: Status: ACTIVE | Noted: 2017-12-08

## 2017-12-11 ENCOUNTER — TELEPHONE (OUTPATIENT)
Dept: UROLOGY | Facility: CLINIC | Age: 77
End: 2017-12-11

## 2017-12-11 NOTE — TELEPHONE ENCOUNTER
----- Message from Pamela Gonzalez MA sent at 12/11/2017 10:56 AM CST -----  Pt. Called would like nurse to contact him to reschedule appt for tomorrow 12/12/17 states he has no money possibly something at beginning of month is better. Thank you

## 2017-12-11 NOTE — TELEPHONE ENCOUNTER
----- Message from Pamela Gonzalez MA sent at 12/11/2017 11:00 AM CST -----  Pt. Called to speak to nurse to reschedule tomorrow 12/12/17, pt states he has no money best to schedule something at beginning of month with another appt. Thank you

## 2017-12-13 RX ORDER — OXYBUTYNIN CHLORIDE 15 MG/1
TABLET, EXTENDED RELEASE ORAL
Qty: 30 TABLET | Refills: 0 | Status: SHIPPED | OUTPATIENT
Start: 2017-12-13 | End: 2018-02-07 | Stop reason: SDUPTHER

## 2017-12-13 RX ORDER — METOPROLOL TARTRATE 50 MG/1
TABLET ORAL
Qty: 60 TABLET | Refills: 0 | Status: SHIPPED | OUTPATIENT
Start: 2017-12-13 | End: 2018-02-07 | Stop reason: SDUPTHER

## 2017-12-13 RX ORDER — METFORMIN HYDROCHLORIDE 850 MG/1
TABLET ORAL
Qty: 60 TABLET | Refills: 0 | Status: SHIPPED | OUTPATIENT
Start: 2017-12-13 | End: 2018-02-07 | Stop reason: SDUPTHER

## 2017-12-13 RX ORDER — FINASTERIDE 5 MG/1
TABLET, FILM COATED ORAL
Qty: 30 TABLET | Refills: 0 | Status: SHIPPED | OUTPATIENT
Start: 2017-12-13 | End: 2018-02-07 | Stop reason: SDUPTHER

## 2017-12-13 RX ORDER — SIMVASTATIN 40 MG/1
TABLET, FILM COATED ORAL
Qty: 30 TABLET | Refills: 0 | Status: SHIPPED | OUTPATIENT
Start: 2017-12-13 | End: 2018-02-07 | Stop reason: SDUPTHER

## 2017-12-18 ENCOUNTER — OFFICE VISIT (OUTPATIENT)
Dept: CARDIOLOGY | Facility: CLINIC | Age: 77
End: 2017-12-18
Payer: MEDICARE

## 2017-12-18 VITALS
HEIGHT: 70 IN | OXYGEN SATURATION: 97 % | BODY MASS INDEX: 25.5 KG/M2 | SYSTOLIC BLOOD PRESSURE: 150 MMHG | DIASTOLIC BLOOD PRESSURE: 80 MMHG | WEIGHT: 178.13 LBS | HEART RATE: 67 BPM

## 2017-12-18 DIAGNOSIS — Z98.61 HISTORY OF PTCA: Primary | ICD-10-CM

## 2017-12-18 DIAGNOSIS — I25.5 ISCHEMIC CARDIOMYOPATHY: Chronic | ICD-10-CM

## 2017-12-18 DIAGNOSIS — I38 HEART VALVE REGURGITATION: ICD-10-CM

## 2017-12-18 DIAGNOSIS — I25.2 OLD MI (MYOCARDIAL INFARCTION): Chronic | ICD-10-CM

## 2017-12-18 DIAGNOSIS — I73.9 PERIPHERAL VASCULAR DISEASE: ICD-10-CM

## 2017-12-18 DIAGNOSIS — E78.5 HYPERLIPIDEMIA ASSOCIATED WITH TYPE 2 DIABETES MELLITUS: ICD-10-CM

## 2017-12-18 DIAGNOSIS — E11.69 HYPERLIPIDEMIA ASSOCIATED WITH TYPE 2 DIABETES MELLITUS: ICD-10-CM

## 2017-12-18 DIAGNOSIS — I25.10 CORONARY ARTERY DISEASE DUE TO LIPID RICH PLAQUE: Chronic | ICD-10-CM

## 2017-12-18 DIAGNOSIS — R94.31 ABNORMAL ECG: ICD-10-CM

## 2017-12-18 DIAGNOSIS — I45.10 RBBB: Chronic | ICD-10-CM

## 2017-12-18 DIAGNOSIS — R94.39 ABNORMAL STRESS TEST: ICD-10-CM

## 2017-12-18 DIAGNOSIS — N18.30 CKD (CHRONIC KIDNEY DISEASE) STAGE 3, GFR 30-59 ML/MIN: ICD-10-CM

## 2017-12-18 DIAGNOSIS — I10 ESSENTIAL HYPERTENSION: Chronic | ICD-10-CM

## 2017-12-18 DIAGNOSIS — I25.83 CORONARY ARTERY DISEASE DUE TO LIPID RICH PLAQUE: Chronic | ICD-10-CM

## 2017-12-18 DIAGNOSIS — I25.9 CHRONIC ISCHEMIC HEART DISEASE: Chronic | ICD-10-CM

## 2017-12-18 PROCEDURE — 99499 UNLISTED E&M SERVICE: CPT | Mod: S$GLB,,, | Performed by: INTERNAL MEDICINE

## 2017-12-18 PROCEDURE — 99214 OFFICE O/P EST MOD 30 MIN: CPT | Mod: S$GLB,,, | Performed by: INTERNAL MEDICINE

## 2017-12-18 PROCEDURE — 99999 PR PBB SHADOW E&M-EST. PATIENT-LVL III: CPT | Mod: PBBFAC,,, | Performed by: INTERNAL MEDICINE

## 2017-12-18 NOTE — PROGRESS NOTES
Subjective:    Patient ID:  Nithin Pino is a 77 y.o. male who presents for evaluation of Chest Pain; Carotid Artery Disease; Coronary Artery Disease; Peripheral Arterial Disease; Hyperlipidemia; and Hypertension      HPI Mr. Pino returns for f/u.  His current medical conditions include CAD, RBBB, ICM, COPD, HTN, PAD, DM. Nonsmoker.   His past history is pertinent for following. Had PTCA 1994 for AMI. Stress MPI 5/11 showed evidence of multivessel CAD (had 2 years of angina) which was confirmed on LHC (significant left main/LAD/ramus, diag disease and 100%  RCA). EF 35% on LV gram. He underwent successful CABG 5/11 (LIMA-LAD, SVG-DIAG, SVG-RAMUS). He had post-op a flutter which resolved. He was having increased anginal sxs and therefore underwent repeat C 2/16 for abnl stress mpi. He underwent atherectomy and PCI of left main and ramus with TUCKER x 2 overall. His svg-ramus had occluded. LIMA-LAD was patent, patent SVG-D1,  RCA with left - right collaterals, EF 45%.  Pt has declined runoff numerous times for further evaluation of his PAD in past.   H/o abnl ECG, RBBB.   Pt here for f/u.  CAD is stable.  He has chronic angina, exertional.  No rest pain.  Angina has not changed in severity and/or frequency since last visit.  Has not had to take sl ntg since last visit.  No CHF sxs. No dyspnea/pnd/orthopnea.  HTN variable, overall controlled on current meds.  He was taken off HCTZ by Nephrology last month, partially due to BP being on low side.  DM is well controlled with HGAIC 6.1%  Lipids well controlled, on statin tx.  PAD is stable.  He reports no bothersome claudication sxs.   Pt is compliant with meds.  Not mindful of diet.  No dizziness/syncope.       Patient Active Problem List   Diagnosis    Hypertension    CAD (coronary artery disease), s/p CABG x4    BPH (benign prostatic hyperplasia)    COPD (chronic obstructive pulmonary disease)    Chronic ischemic heart disease    RBBB    Ischemic  cardiomyopathy    Old MI (myocardial infarction)    Abnormal ECG    FFH (familial hypocalciuric hypercalcemia)    Heart valve regurgitation    CKD (chronic kidney disease) stage 3, GFR 30-59 ml/min    Hyperlipidemia associated with type 2 diabetes mellitus    Abnormal stress test    History of PTCA    Colonoscopy refused    Hyperparathyroidism     Past Medical History:   Diagnosis Date    Abnormal ECG 10/31/2013    AP (angina pectoris) 1/28/2016    Asthma     Cataract     Chronic ischemic heart disease 10/31/2013    CKD (chronic kidney disease) stage 3, GFR 30-59 ml/min 11/4/2015    History of PTCA 10/31/2013    Hyperlipidemia     Hypertension     Insomnia 6/17/2013    Ischemic cardiomyopathy 10/31/2013    Myocardial infarction     Old MI (myocardial infarction) 1994    RBBB 10/31/2013    S/P CABG (coronary artery bypass graft) 10/31/2013    Shortness of breath 10/31/2013    Tobacco dependence     resolved    Trouble in sleeping     Type 2 diabetes mellitus 2010    Wears glasses        Current Outpatient Prescriptions:     albuterol (VENTOLIN HFA) 90 mcg/actuation inhaler, Inhale 2 puffs into the lungs every 6 (six) hours as needed for Wheezing., Disp: , Rfl:     aspirin 81 MG Chew, 1 Tablet, Chewable Oral Every day, Disp: , Rfl:     BLOOD-GLUCOSE METER (TRUERESULT BLOOD GLUCOSE SYSTM MISC), by Misc.(Non-Drug; Combo Route) route., Disp: , Rfl:     clopidogrel (PLAVIX) 75 mg tablet, TAKE 1 TABLET BY MOUTH DAILY, Disp: 30 tablet, Rfl: 6    dulaglutide 1.5 mg/0.5 mL PnIj, Inject 1.5 mg into the skin every 7 days., Disp: 1 Syringe, Rfl: 12    EMBRACE PRO TEST STRIPS Strp, CHECK BLOOD SUGAR TWICE DAILY., Disp: 50 strip, Rfl: 0    finasteride (PROSCAR) 5 mg tablet, TAKE 1 TABLET BY MOUTH DAILY, Disp: 30 tablet, Rfl: 0    glipiZIDE (GLUCOTROL) 5 MG tablet, TAKE 1 TABLET 2 TIMES DAILY, Disp: 60 tablet, Rfl: 6    hydroCHLOROthiazide (MICROZIDE) 12.5 mg capsule, TAKE (1) CAPSULE BY MOUTH  "DAILY, Disp: 30 capsule, Rfl: 0    isosorbide mononitrate (IMDUR) 60 MG 24 hr tablet, TAKE 1 TABLET BY MOUTH DAILY, Disp: 30 tablet, Rfl: 12    lisinopril (PRINIVIL,ZESTRIL) 20 MG tablet, Take 1 tablet (20 mg total) by mouth once daily., Disp: 30 tablet, Rfl: 10    metFORMIN (GLUCOPHAGE) 850 MG tablet, TAKE 1 TABLET 2 TIMES DAILY, Disp: 60 tablet, Rfl: 0    metoprolol tartrate (LOPRESSOR) 50 MG tablet, TAKE 1 TABLET 2 TIMES DAILY, Disp: 60 tablet, Rfl: 0    nitroGLYCERIN (NITROSTAT) 0.4 MG SL tablet, Place 1 tablet (0.4 mg total) under the tongue every 5 (five) minutes as needed., Disp: 25 tablet, Rfl: 6    oxybutynin (DITROPAN XL) 15 MG TR24, TAKE 1 TABLET BY MOUTH DAILY, Disp: 30 tablet, Rfl: 0    simvastatin (ZOCOR) 40 MG tablet, TAKE 1 TABLET NIGHTLY AT BEDTIME, Disp: 30 tablet, Rfl: 0    tamsulosin (FLOMAX) 0.4 mg Cp24, TAKE (1) CAPSULE BY MOUTH DAILY, Disp: 30 capsule, Rfl: 0    TRUEPLUS LANCETS 26 gauge Misc, CHECK BLOOD SUGAR TWICE DAILY., Disp: 100 each, Rfl: 0    TRUERESULT BLOOD GLUCOSE SYSTM kit, CHECK BLOOD SUGAR TWICE DAILY., Disp: 1 each, Rfl: 0      Review of Systems   Constitution: Negative.   HENT: Negative.    Eyes: Negative.    Cardiovascular: Positive for chest pain.   Respiratory: Negative.    Endocrine: Negative.    Hematologic/Lymphatic: Negative.    Skin: Negative.    Musculoskeletal: Positive for arthritis.   Gastrointestinal: Negative.    Genitourinary: Negative.    Neurological: Negative.    Psychiatric/Behavioral: Negative.    Allergic/Immunologic: Negative.        BP (!) 150/80 (BP Location: Left arm, Patient Position: Sitting, BP Method: Large (Manual))   Pulse 67   Ht 5' 10" (1.778 m)   Wt 80.8 kg (178 lb 2.1 oz)   SpO2 97%   BMI 25.56 kg/m²     Wt Readings from Last 3 Encounters:   12/18/17 80.8 kg (178 lb 2.1 oz)   11/02/17 78.1 kg (172 lb 2.9 oz)   09/21/17 77.7 kg (171 lb 4.8 oz)     Temp Readings from Last 3 Encounters:   08/28/17 97.1 °F (36.2 °C) (Tympanic) "   05/22/17 96.6 °F (35.9 °C) (Tympanic)   02/13/17 97.9 °F (36.6 °C) (Tympanic)     BP Readings from Last 3 Encounters:   12/18/17 (!) 150/80   11/02/17 (!) 110/56   09/21/17 139/82     Pulse Readings from Last 3 Encounters:   12/18/17 67   11/02/17 80   09/21/17 73          Objective:    Physical Exam   Constitutional: He is oriented to person, place, and time. He appears well-developed and well-nourished.   HENT:   Head: Normocephalic.   Neck: Normal range of motion. Neck supple. Normal carotid pulses, no hepatojugular reflux and no JVD present. Carotid bruit is not present. No thyromegaly present.   Cardiovascular: Normal rate, regular rhythm, S1 normal and S2 normal.  PMI is not displaced.  Exam reveals no S3, no S4, no distant heart sounds, no friction rub, no midsystolic click and no opening snap.    No murmur heard.  Pulses:       Radial pulses are 2+ on the right side, and 2+ on the left side.   Pulmonary/Chest: Effort normal and breath sounds normal. He has no wheezes. He has no rales.   Abdominal: Soft. Bowel sounds are normal. He exhibits no distension, no abdominal bruit, no ascites and no mass. There is no tenderness.   Musculoskeletal: He exhibits no edema.   Neurological: He is alert and oriented to person, place, and time.   Skin: Skin is warm.   Psychiatric: He has a normal mood and affect. His behavior is normal.   Nursing note and vitals reviewed.      I have reviewed all pertinent labs and cardiac studies.    Lab Results   Component Value Date    HGBA1C 6.1 (H) 11/28/2017     Lab Results   Component Value Date    CHOL 112 (L) 11/28/2017    CHOL 94 (L) 08/21/2017    CHOL 105 (L) 05/11/2017     Lab Results   Component Value Date    HDL 41 11/28/2017    HDL 36 (L) 08/21/2017    HDL 40 05/11/2017     Lab Results   Component Value Date    LDLCALC 52.6 (L) 11/28/2017    LDLCALC 39.2 (L) 08/21/2017    LDLCALC 42.4 (L) 05/11/2017     Lab Results   Component Value Date    TRIG 92 11/28/2017    TRIG 94  08/21/2017    TRIG 113 05/11/2017     Lab Results   Component Value Date    CHOLHDL 36.6 11/28/2017    CHOLHDL 38.3 08/21/2017    CHOLHDL 38.1 05/11/2017     Lab Results   Component Value Date    WBC 7.60 08/21/2017    HGB 12.7 (L) 08/21/2017    HCT 36.9 (L) 08/21/2017    MCV 89 08/21/2017     08/21/2017       Chemistry        Component Value Date/Time     11/28/2017 0801    K 4.6 11/28/2017 0801     11/28/2017 0801    CO2 28 11/28/2017 0801    BUN 20 11/28/2017 0801    CREATININE 1.1 11/28/2017 0801     (H) 11/28/2017 0801        Component Value Date/Time    CALCIUM 10.0 11/28/2017 0801    ALKPHOS 49 (L) 11/28/2017 0801    AST 14 11/28/2017 0801    ALT 10 11/28/2017 0801    BILITOT 0.6 11/28/2017 0801    ESTGFRAFRICA >60.0 11/28/2017 0801    EGFRNONAA >60.0 11/28/2017 0801              Assessment:       1. History of PTCA    2. Abnormal stress test    3. Hyperlipidemia associated with type 2 diabetes mellitus    4. CKD (chronic kidney disease) stage 3, GFR 30-59 ml/min    5. Heart valve regurgitation    6. Abnormal ECG    7. Essential hypertension    8. Coronary artery disease due to lipid rich plaque    9. RBBB    10. Chronic ischemic heart disease    11. Ischemic cardiomyopathy    12. Old MI (myocardial infarction)         Plan:             - Stable CAD/chronic angina.  - Stable other CV conditions.  - Will stay off HCTZ until f/u appt with Nephrology next month.  - Continue optimal medical tx for CAD/PAD.  - Test results reviewed in detail with pt.  - Cardiac diet counseling done in clinic today.  - Daily exercise.    F/u 6 months with echo/rest RENZO.

## 2018-01-10 RX ORDER — GLIPIZIDE 5 MG/1
TABLET ORAL
Qty: 60 TABLET | Refills: 0 | Status: SHIPPED | OUTPATIENT
Start: 2018-01-10 | End: 2018-03-12 | Stop reason: SDUPTHER

## 2018-01-10 RX ORDER — CLOPIDOGREL BISULFATE 75 MG/1
TABLET ORAL
Qty: 30 TABLET | Refills: 0 | Status: SHIPPED | OUTPATIENT
Start: 2018-01-10 | End: 2018-03-12 | Stop reason: SDUPTHER

## 2018-01-16 ENCOUNTER — LAB VISIT (OUTPATIENT)
Dept: LAB | Facility: HOSPITAL | Age: 78
End: 2018-01-16
Attending: INTERNAL MEDICINE
Payer: MEDICARE

## 2018-01-16 DIAGNOSIS — E83.52 HYPERCALCEMIA: ICD-10-CM

## 2018-01-16 LAB
ANION GAP SERPL CALC-SCNC: 8 MMOL/L
BUN SERPL-MCNC: 22 MG/DL
CALCIUM SERPL-MCNC: 10.9 MG/DL
CHLORIDE SERPL-SCNC: 106 MMOL/L
CO2 SERPL-SCNC: 28 MMOL/L
CREAT SERPL-MCNC: 1.1 MG/DL
EST. GFR  (AFRICAN AMERICAN): >60 ML/MIN/1.73 M^2
EST. GFR  (NON AFRICAN AMERICAN): >60 ML/MIN/1.73 M^2
GLUCOSE SERPL-MCNC: 181 MG/DL
PHOSPHATE SERPL-MCNC: 2.7 MG/DL
POTASSIUM SERPL-SCNC: 5.4 MMOL/L
PTH-INTACT SERPL-MCNC: 93 PG/ML
SODIUM SERPL-SCNC: 142 MMOL/L

## 2018-01-16 PROCEDURE — 80048 BASIC METABOLIC PNL TOTAL CA: CPT

## 2018-01-16 PROCEDURE — 84100 ASSAY OF PHOSPHORUS: CPT

## 2018-01-16 PROCEDURE — 83970 ASSAY OF PARATHORMONE: CPT

## 2018-01-16 PROCEDURE — 36415 COLL VENOUS BLD VENIPUNCTURE: CPT | Mod: PO

## 2018-01-21 DIAGNOSIS — R05.3 CHRONIC COUGH: Primary | ICD-10-CM

## 2018-01-21 PROBLEM — E11.51 DIABETES MELLITUS TYPE 2 WITH PERIPHERAL ARTERY DISEASE: Status: ACTIVE | Noted: 2017-12-18

## 2018-01-21 PROBLEM — I77.9 BILATERAL CAROTID ARTERY DISEASE: Status: ACTIVE | Noted: 2018-01-21

## 2018-01-21 PROBLEM — Z79.02 LONG TERM (CURRENT) USE OF ANTITHROMBOTICS/ANTIPLATELETS: Status: ACTIVE | Noted: 2018-01-21

## 2018-01-21 PROBLEM — R90.89 ABNORMAL BRAIN MRI: Status: ACTIVE | Noted: 2018-01-21

## 2018-01-21 PROBLEM — Z98.890 HISTORY OF ATHERECTOMY: Status: ACTIVE | Noted: 2018-01-21

## 2018-01-21 PROBLEM — R82.994 HYPERCALCIURIA: Status: ACTIVE | Noted: 2018-01-21

## 2018-01-21 RX ORDER — HYDROCHLOROTHIAZIDE 12.5 MG/1
CAPSULE ORAL
COMMUNITY
Start: 2017-10-18 | End: 2018-01-22

## 2018-01-22 ENCOUNTER — HOSPITAL ENCOUNTER (EMERGENCY)
Facility: HOSPITAL | Age: 78
Discharge: HOME | End: 2018-01-22
Attending: SPECIALIST
Payer: MEDICARE

## 2018-01-22 ENCOUNTER — OFFICE VISIT (OUTPATIENT)
Dept: INTERNAL MEDICINE | Facility: CLINIC | Age: 78
End: 2018-01-22
Payer: MEDICARE

## 2018-01-22 ENCOUNTER — OFFICE VISIT (OUTPATIENT)
Dept: NEPHROLOGY | Facility: CLINIC | Age: 78
End: 2018-01-22
Payer: MEDICARE

## 2018-01-22 VITALS
BODY MASS INDEX: 25.85 KG/M2 | RESPIRATION RATE: 16 BRPM | TEMPERATURE: 98 F | OXYGEN SATURATION: 98 % | HEART RATE: 70 BPM | HEIGHT: 70 IN | DIASTOLIC BLOOD PRESSURE: 92 MMHG | SYSTOLIC BLOOD PRESSURE: 180 MMHG | WEIGHT: 180.56 LBS

## 2018-01-22 VITALS
RESPIRATION RATE: 18 BRPM | DIASTOLIC BLOOD PRESSURE: 66 MMHG | WEIGHT: 180.56 LBS | OXYGEN SATURATION: 98 % | SYSTOLIC BLOOD PRESSURE: 138 MMHG | HEIGHT: 70 IN | BODY MASS INDEX: 25.85 KG/M2 | HEART RATE: 73 BPM

## 2018-01-22 VITALS
SYSTOLIC BLOOD PRESSURE: 138 MMHG | HEART RATE: 80 BPM | HEIGHT: 72 IN | BODY MASS INDEX: 24.52 KG/M2 | WEIGHT: 181 LBS | DIASTOLIC BLOOD PRESSURE: 66 MMHG

## 2018-01-22 DIAGNOSIS — Z71.89 ENCOUNTER FOR MEDICATION REVIEW AND COUNSELING: ICD-10-CM

## 2018-01-22 DIAGNOSIS — E87.5 HYPERKALEMIA: ICD-10-CM

## 2018-01-22 DIAGNOSIS — I10 ESSENTIAL HYPERTENSION: ICD-10-CM

## 2018-01-22 DIAGNOSIS — M79.89 PAIN AND SWELLING OF RIGHT LOWER LEG: Primary | ICD-10-CM

## 2018-01-22 DIAGNOSIS — I77.9 BILATERAL CAROTID ARTERY DISEASE: ICD-10-CM

## 2018-01-22 DIAGNOSIS — Z91.89 POTENTIAL FOR COGNITIVE IMPAIRMENT: ICD-10-CM

## 2018-01-22 DIAGNOSIS — I10 ESSENTIAL HYPERTENSION: Chronic | ICD-10-CM

## 2018-01-22 DIAGNOSIS — E78.5 HYPERLIPIDEMIA ASSOCIATED WITH TYPE 2 DIABETES MELLITUS: ICD-10-CM

## 2018-01-22 DIAGNOSIS — E21.3 HYPERPARATHYROIDISM: ICD-10-CM

## 2018-01-22 DIAGNOSIS — F10.11 HISTORY OF ALCOHOL ABUSE: ICD-10-CM

## 2018-01-22 DIAGNOSIS — E11.51 DIABETES MELLITUS TYPE 2 WITH PERIPHERAL ARTERY DISEASE: ICD-10-CM

## 2018-01-22 DIAGNOSIS — N40.1 BENIGN PROSTATIC HYPERPLASIA WITH WEAK URINARY STREAM: ICD-10-CM

## 2018-01-22 DIAGNOSIS — E83.52 HYPERCALCEMIA: ICD-10-CM

## 2018-01-22 DIAGNOSIS — Z87.891 HISTORY OF TOBACCO ABUSE: ICD-10-CM

## 2018-01-22 DIAGNOSIS — Z00.00 ENCOUNTER FOR PREVENTIVE HEALTH EXAMINATION: Primary | ICD-10-CM

## 2018-01-22 DIAGNOSIS — R60.0 EDEMA OF BOTH LEGS: ICD-10-CM

## 2018-01-22 DIAGNOSIS — E11.69 HYPERLIPIDEMIA ASSOCIATED WITH TYPE 2 DIABETES MELLITUS: ICD-10-CM

## 2018-01-22 DIAGNOSIS — R39.12 BENIGN PROSTATIC HYPERPLASIA WITH WEAK URINARY STREAM: ICD-10-CM

## 2018-01-22 DIAGNOSIS — I73.9 CLAUDICATION OF RIGHT LOWER EXTREMITY: ICD-10-CM

## 2018-01-22 DIAGNOSIS — Z13.31 POSITIVE DEPRESSION SCREENING: ICD-10-CM

## 2018-01-22 DIAGNOSIS — E21.0 PRIMARY HYPERPARATHYROIDISM: ICD-10-CM

## 2018-01-22 DIAGNOSIS — I25.9 CHRONIC ISCHEMIC HEART DISEASE: Chronic | ICD-10-CM

## 2018-01-22 DIAGNOSIS — H91.93 DIFFICULTY HEARING, BILATERAL: ICD-10-CM

## 2018-01-22 DIAGNOSIS — E21.3 HYPERPARATHYROIDISM: Primary | ICD-10-CM

## 2018-01-22 DIAGNOSIS — N18.30 CHRONIC KIDNEY DISEASE, STAGE III (MODERATE): ICD-10-CM

## 2018-01-22 DIAGNOSIS — Z12.11 SPECIAL SCREENING FOR MALIGNANT NEOPLASMS, COLON: ICD-10-CM

## 2018-01-22 DIAGNOSIS — I25.708 CORONARY ARTERY DISEASE OF BYPASS GRAFT OF NATIVE HEART WITH STABLE ANGINA PECTORIS: ICD-10-CM

## 2018-01-22 DIAGNOSIS — I70.0 THORACIC AORTA ATHEROSCLEROSIS: ICD-10-CM

## 2018-01-22 DIAGNOSIS — R20.0 LOSS OF SENSATION OF SKIN: ICD-10-CM

## 2018-01-22 DIAGNOSIS — M79.661 PAIN AND SWELLING OF RIGHT LOWER LEG: Primary | ICD-10-CM

## 2018-01-22 PROBLEM — Z98.890 HISTORY OF ATHERECTOMY: Status: RESOLVED | Noted: 2018-01-21 | Resolved: 2018-01-22

## 2018-01-22 PROBLEM — Z87.448 HISTORY OF CHRONIC KIDNEY DISEASE: Status: ACTIVE | Noted: 2018-01-22

## 2018-01-22 PROBLEM — Z53.20 COLONOSCOPY REFUSED: Status: RESOLVED | Noted: 2017-05-22 | Resolved: 2018-01-22

## 2018-01-22 PROCEDURE — 99283 EMERGENCY DEPT VISIT LOW MDM: CPT

## 2018-01-22 PROCEDURE — 99999 PR PBB SHADOW E&M-EST. PATIENT-LVL III: CPT | Mod: PBBFAC,,, | Performed by: NURSE PRACTITIONER

## 2018-01-22 PROCEDURE — 99499 UNLISTED E&M SERVICE: CPT | Mod: S$GLB,,, | Performed by: INTERNAL MEDICINE

## 2018-01-22 PROCEDURE — G0439 PPPS, SUBSEQ VISIT: HCPCS | Mod: S$GLB,,, | Performed by: NURSE PRACTITIONER

## 2018-01-22 PROCEDURE — 99215 OFFICE O/P EST HI 40 MIN: CPT | Mod: S$GLB,,, | Performed by: INTERNAL MEDICINE

## 2018-01-22 PROCEDURE — 99999 PR PBB SHADOW E&M-EST. PATIENT-LVL IV: CPT | Mod: PBBFAC,,, | Performed by: INTERNAL MEDICINE

## 2018-01-22 PROCEDURE — 99499 UNLISTED E&M SERVICE: CPT | Mod: S$GLB,,, | Performed by: NURSE PRACTITIONER

## 2018-01-22 NOTE — ED PROVIDER NOTES
"SCRIBE #1 NOTE: I, Ilene Leo, am scribing for, and in the presence of, ALFONSO Davila. I have scribed the entire note.      History      Chief Complaint   Patient presents with    Leg Pain     Pt statesm, "Aarti Mcqueen NP sent me here because my right leg is swollen and she thinks I have a blood clot in it and I need an ultrasound."       Review of patient's allergies indicates:   Allergen Reactions    Panmist dm  [[id number]]      Other reaction(s): Unknown        HPI   HPI    1/22/2018, 4:18 PM   History obtained from the patient      History of Present Illness: Nithin Pino is a 77 y.o. male patient with PMHx of HTN, chronic ischemic heart disease, and CKD presents to the Emergency Department for right lower leg swelling. Symptoms are constant and moderate in severity. Increased swelling is located to RLE. Pt was referred by PCP to rule out DVT. Pt states that swelling has improved since yesterday. He notes that he has chronic leg swelling and pain, however, the swelling has worsened over the past two days. Pt denies hx of DVT or PE. He states that he is currently taking a baby aspirin and plavix daily. 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 No mitigating or exacerbating factors reported. No associated sxs reported. Patient denies any dyspnea, CP, palpitations, cough, wheezing, n/v, calf pain, HA, gait problem, knee pain, and all other sxs at this time. No further complaints or concerns at this time.     Arrival mode: Personal vehicle    PCP: Celestine Petersen MD       Past Medical History:  Past Medical History:   Diagnosis Date    Abnormal brain MRI 1/21/2018    Chronic microvascular ischemia changes per MRI July 9, 2007 in [facility]    Abnormal ECG 10/31/2013    Abnormal stress test 1/28/2016    Alcohol dependence     States alcohol abuse ended in 1994    AP (angina pectoris) 1/28/2016    Asthma     Cataract     Chronic ischemic heart disease 10/31/2013    " CKD (chronic kidney disease) stage 3, GFR 30-59 ml/min 11/4/2015    Coronary artery disease     Heart valve regurgitation 11/4/2015    Echocardiogram 2/15/16   1 - Concentric hypertrophy.    2 - Normal left ventricular systolic function (EF 60-65%).    3 - Normal left ventricular diastolic function.    4 - Mild left atrial enlargement.    5 - Normal right ventricular systolic function .    6 - Trivial to mild aortic regurgitation.    7 - Trivial to mild mitral regurgitation.  10 - Trivial to mild pulmonic regurgitation.     History of alcohol abuse 1/22/2018    Patient denies drinking to excess since 1994.    History of atherectomy 1/21/2018 2/2016 [facility]    History of chronic kidney disease 1/22/2018    History of CKD 3    History of PTCA 10/31/2013    History of PTCA 3/9/2016    Hyperlipidemia     Hypertension     Insomnia 6/17/2013    Ischemic cardiomyopathy 10/31/2013    Long term (current) use of antithrombotics/antiplatelets 1/21/2018    Myocardial infarction     Old MI (myocardial infarction) 1994    Peripheral vascular disease     Polyneuropathy     RBBB 10/31/2013    S/P CABG (coronary artery bypass graft) 10/31/2013    Shortness of breath 10/31/2013    Tobacco dependence     resolved    Trouble in sleeping     Type 2 diabetes with peripheral circulatory disorder, controlled     Wears glasses        Past Surgical History:  Past Surgical History:   Procedure Laterality Date    APPENDECTOMY      CARDIAC CATHETERIZATION      2016    CARDIAC SURGERY  1994    balloon angioplasty    CATARACT EXTRACTION W/  INTRAOCULAR LENS IMPLANT Right 02/01/2017    CORONARY ARTERY BYPASS GRAFT  5/2011    per patient x4    HERNIA REPAIR      PCIOL Bilateral OD 02/01/17/OS 02/15/17    DR. ALONZO         Family History:  Family History   Problem Relation Age of Onset    Adopted: Yes    Diabetes Brother     Diabetes Sister     Cancer Neg Hx     Heart disease Neg Hx     Kidney disease Neg Hx         Social History:  Social History     Social History Main Topics    Smoking status: Former Smoker     Packs/day: 1.50     Years: 40.00     Types: Cigarettes     Quit date: 1/1/1994    Smokeless tobacco: Former User     Quit date: 1/1/2011      Comment: approx date    Alcohol use Yes      Comment: occasionally; 1-2 cans of beer a month    Drug use: No    Sexual activity: No       ROS   Review of Systems   Constitutional: Negative for fever.   HENT: Negative for sore throat.    Respiratory: Negative for cough, shortness of breath and wheezing.    Cardiovascular: Positive for leg swelling. Negative for chest pain and palpitations.   Gastrointestinal: Negative for nausea and vomiting.   Genitourinary: Negative for dysuria.   Musculoskeletal: Negative for back pain and gait problem.   Skin: Negative for rash.   Neurological: Negative for dizziness, weakness and headaches.   Hematological: Does not bruise/bleed easily.       Physical Exam      Initial Vitals [01/22/18 1258]   BP Pulse Resp Temp SpO2   (!) 168/73 71 20 98.1 °F (36.7 °C) 98 %      MAP       104.67          Physical Exam  Nursing Notes and Vital Signs Reviewed.  Constitutional: Patient is in no apparent distress. Well-developed and well-nourished. Ambulatory.   Head: Normocephalic.  Eyes: PERRL. EOM intact. Conjunctivae are not pale. No scleral icterus.  ENT: Mucous membranes are moist. Oropharynx is clear and symmetric.    Cardiovascular: Regular rate. Regular rhythm. Distal pulses are 2+ and symmetric.  Pulmonary/Chest: No respiratory distress. Clear to auscultation bilaterally. No conversational dyspnea. No cough.   Abdominal: Soft and non-distended. There is no tenderness. No rebound, guarding, or rigidity.   Musculoskeletal: Moves all extremities. No obvious deformities. 1-2+ pretibial and pedal edema to RLL. Posterior Tibial and Pedis pulses are intact. No calf tenderness. Negative Stan's sign. No calf swelling, erythema, or tenderness.  "  Skin: Warm and dry.  Neurological:  Alert, awake, and appropriate. Normal gait. No facial droop. AAO x 3. Normal speech.  No acute focal neurological deficits are appreciated.  Psychiatric: Normal affect. Good eye contact. Appropriate in content.    ED Course    Procedures  ED Vital Signs:  Vitals:    01/22/18 1258 01/22/18 1620   BP: (!) 168/73 (!) 170/83   Pulse: 71 68   Resp: 20 18   Temp: 98.1 °F (36.7 °C)    TempSrc: Oral    SpO2: 98% 98%   Weight: 81.9 kg (180 lb 8.9 oz)    Height: 5' 10" (1.778 m)        Abnormal Lab Results:  Labs Reviewed - No data to display     All Lab Results:  None     Imaging Results:  Imaging Results          US Lower Extremity Veins Right (In process)  Result time 01/22/18 14:40:56   Procedure changed from US Lower Extremity Veins Bilateral              US NEGATIVE FOR DVT PER US TECH           The Emergency Provider reviewed the vital signs and test results, which are outlined above.    ED Discussion       18:15: Reassessed pt at this time. Pt is in no distress. Discussed with pt all pertinent ED information and results. Discussed pt diagnosis and plan of treatment. Gave pt all f/u and return to the ED instructions. All questions and concerns were addressed at this time. Pt expresses understanding of information and instructions, and is comfortable with plan to discharge. Pt is stable for discharge.          ED Medication(s):  Medications - No data to display    New Prescriptions    No medications on file       Follow-up Information     Celestine Petersen MD. Schedule an appointment as soon as possible for a visit in 2 days.    Specialty:  Family Medicine  Why:  Follow up with your primary care physician in the next 1-2 days for re-evaluation and further management.  Contact information:  [address] [address] [address] [phone]             [facility] - .    Specialty:  Emergency Medicine  Why:  If symptoms worsen in any way.  Contact information:  [id number] " "[facility] [id number] [phone]                   Medical Decision Making    Medical Decision Making:   History:   Old Medical Records: I decided to obtain old medical records.  Initial Assessment:   Pt reports having mild pain and swelling to right lower leg "for a long time". Patient sent from primary care today for an US to r/o DVT.   Clinical Tests:   Radiological Study: Ordered and Reviewed  ED Management:  Reviewed records   US negative   I advised the patient to follow up with his PCP in the next 1-2 days for re-evaluation and further management.            Scribe Attestation:   Scribe #1: I performed the above scribed service and the documentation accurately describes the services I performed. I attest to the accuracy of the note.    Attending:   Physician Attestation Statement for Scribe #1: I, ALFONSO Davila, personally performed the services described in this documentation, as scribed by Ilene Leo, in my presence, and it is both accurate and complete.          Clinical Impression       ICD-10-CM ICD-9-CM   1. Pain and swelling of right lower leg M79.661 729.5    M79.89 729.81       Disposition:   Disposition: Discharged  Condition: Stable           Austen Churchill PA-C  01/22/18 1833    "

## 2018-01-22 NOTE — PROGRESS NOTES
NEPHROLOGY CLINIC FOLLOWUP NOTE    REASON FOR FOLLOWUP AND CHIEF COMPLAINT:  History of mild renal insufficiency,   CKD stage III, hypertension, hyperkalemia, and hypercalcemia.    HISTORY OF PRESENT ILLNESS:  This patient was seen by me this morning.  He was   following for minor issues as stated above.  Apparently, after he left the   clinic, he had leg pain and is now currently being seen actively in the ER for   leg pain.  He had not complained to me this morning of any leg pain.  He was   initially sent to us by Dr. Petersen for CKD stage III.  He had mild   hyperkalemia, which improved after ACE inhibitor dose was reduced.  He also was   noted to have mild hypercalcemia.  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  However, this diagnosis was in doubt as   his urine calcium to creatinine ratio was not less than 0.01.  Rather patient   was thought to have mild hypercalcemia due to taking hydrochlorothiazide.    Indeed when HCTZ was stopped, serum calcium improved.  He had no acute   complaints this morning when he saw me in the clinic.  No chest pain, no   shortness of breath again, no leg swelling.    PAST MEDICAL HISTORY:  1.  CKD stage III.  Baseline creatinine 1.5 or lower.  2.  Hypertension.  3.  Diabetes mellitus for 10 years.  4.  Ischemic cardiomyopathy and diastolic CHF.  5.  Hyperlipidemia.  6.  Coronary artery disease with past history of CABG in 2011.  7.  COPD.  8.  Right bundle branch block.  9.  Questionable diagnosis of familial hypocalciuric hypercalcemia, likely not   present because urine calcium to creatinine ratio is greater than 0.01 and   measured twice.    PAST SURGICAL HISTORY:  Reviewed and unchanged.  CABG, appendectomy, and left   hand surgery.    FAMILY HISTORY:  Reviewed.  Diabetes mellitus.    ALLERGIES:  Reviewed.  No known drug allergies.    SOCIAL HISTORY:  Negative for smoking.  No alcohol use.    MEDICATIONS:  Reviewed.  Lisinopril 20  mg daily and isosorbide 60 mg daily.    Other meds include albuterol, aspirin, Plavix, Proscar, glipizide, metformin,   metoprolol 50 mg p.o. b.i.d., and simvastatin.  Other medications reviewed and   noted.    REVIEW OF SYSTEMS:  No recent hospitalizations.  GENERAL:  Negative.  HEAD, EYES, EARS, NOSE, AND THROAT:  Negative.  CARDIAC:  Negative.  PULMONARY:  Negative.  GASTROINTESTINAL:  Negative.  GENITOURINARY:  Negative.  PSYCHOLOGICAL:  Negative.  NEUROLOGICAL:  Negative.  ENDOCRINE:  Negative.  HEMATOLOGIC AND ONCOLOGIC:  Negative.  INFECTIOUS DISEASE:  Negative.  The rest of the review of systems negative.    PHYSICAL EXAMINATION:  VITAL SIGNS:  Blood pressure is 138/66, pulse 80, and weight is 181 pounds.  He   was cooperative, pleasant, in no acute distress.  He was ambulating to the   clinic by himself, in no acute distress.  Speech and thought process   appropriate, normal.  HEENT:  Mucous membranes moist.  NECK:  No JVD.  HEART:  Regular rate and rhythm.  CHEST:  Clear to auscultation.  ABDOMEN:  Soft, nontender.  EXTREMITIES:  Trace edema bilaterally.    LABORATORY:  Reviewed.  Labs show that his intact PTH is 93, phosphorus is 2.7,   previously 2.3.  Creatinine is 1.1, sodium 142, potassium 5.4, chloride 106,   bicarbonate 28, calcium is 10.9, urine calcium is 19, urine creatinine is 140,   therefore, ratio of urine calcium to creatinine 0.1, which is greater than 0.01.    ASSESSMENT AND PLAN:  A 77-year-old male with history of mild renal   insufficiency, presents for followup.  The impression is as follows:  1.  Renal.  Renal function is stable.  Creatinine actually looks better than   before from 1.5 to 1.1.  The patient was reassured.  He has no proteinuria,   therefore, diabetic nephropathy is not suspected.  2.  Hypertension.  Blood pressure is controlled, doing well, stable.  3.  Hyperkalemia.  This had improved after ACE inhibitor dose was reduced from   40 to 20, hyperkalemia is still slightly  present, is mild.  Lisinopril dose will   further be reduced to 10 mg p.o. daily.  4.  Hypercalcemia is present.  Previously, it was thought the patient has   familial hypocalciuric hypercalcemia or  FHH.  This is most likely not present   because the ratio of urine calcium to creatinine is greater than 0.01.  Rather   he probably has primary hyperparathyroidism as he also has elevated intact PTH   and normal to low serum phosphate level.  A sestamibi nuclear scan of the neck   was ordered.  5.  Noted that the patient is seen in the Emergency Room at this point for leg   swelling, which has occurred since his visit with me this morning.  We will   defer management to Emergency Room at this point.    PLANS AND RECOMMENDATIONS:  As reviewed above.  He will return to see me in   about a month for followup.    Total time spent 45 minutes, more than 50% of the time was spent on counseling   and coordination of care.  Level V visit.      SERGIO  dd: 01/22/2018 17:35:09 (CST)  td: 01/23/2018 11:26:56 (CST)  Doc ID   #[id number]  Job ID #[id number]    CC:     [id number]

## 2018-01-22 NOTE — Clinical Note
Primary Care Providers: Celestine Petersen MD,  Your patient was seen today for a HRA visit. Abnormalities and gap(s) in care (HEDIS gaps) have been identified during this visit that required additional testing and possible follow up. Please see descriptions after diagnosis list in bold. 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 Symptoms have persisted for approximately 2 weeks.   No orders of the defined types were placed in this encounter.   These orders were placed using [facility] approved protocol and any results will be forwarded to your office for appropriate follow up. I have included a copy my visit note; please review the note and feel free to contact me with any questions.   Thank you for allowing me to participate in the care of your patients.  Aarti Mcqueen NP

## 2018-01-22 NOTE — PROGRESS NOTES
I offered to discuss end of life issues, including information on how to make advance directives that the patient could use to name someone who would make medical decisions on their behalf if they became too ill to make themselves.    ___Patient declined  _X_Patient is interested, I provided paper work and offered to discuss.

## 2018-01-22 NOTE — PROGRESS NOTES
"Nithin Pino presented for a  Medicare AWV and comprehensive Health Risk Assessment today. The following components were reviewed and updated:    · Medical history  · Family History  · Social history  · Allergies and Current Medications  · Health Risk Assessment  · Health Maintenance  · Care Team     ** See Completed Assessments for Annual Wellness Visit within the encounter summary.**       The following assessments were completed:  · Living Situation  · CAGE  · Depression Screening  · Timed Get Up and Go  · Whisper Test  · Cognitive Function Screening  · Nutrition Screening  · ADL Screening  · PAQ Screening    Vitals:    01/22/18 1029   BP: 138/66   BP Location: Left arm   Patient Position: Sitting   Pulse: 73   Resp: 18   SpO2: 98%   Weight: 81.9 kg (180 lb 8.9 oz)   Height: 5' 10" (1.778 m)     Body mass index is 25.91 kg/m².  Physical Exam   Constitutional: He appears well-developed and well-nourished. No distress.   HENT:   Head: Normocephalic and atraumatic.   Right Ear: Decreased hearing is noted.   Left Ear: Decreased hearing is noted.   Eyes: Lids are normal. Pupils are equal, round, and reactive to light.   Neck: Normal range of motion. Decreased carotid pulses and JVD present. Carotid bruit is present.   Left carotid bruit  Right carotid decreased pulse   Cardiovascular: Normal rate, regular rhythm and intact distal pulses.  Exam reveals distant heart sounds.    No murmur heard.  Pulses:       Carotid pulses are 1+ on the right side, and 2+ on the left side.       Dorsalis pedis pulses are 0 on the right side, and 0 on the left side.        Posterior tibial pulses are 0 on the right side, and 0 on the left side.   Pulmonary/Chest: Effort normal. He has rhonchi in the left lower field.   Abdominal: Soft. Normal appearance and bowel sounds are normal. He exhibits no distension. There is no tenderness.   Musculoskeletal: Normal range of motion.        Right lower leg: He exhibits tenderness, swelling and " edema.        Left lower leg: He exhibits edema.   4+ RLE edema, tender on palpation, non-palpable pulse  1-2+ LLE edema, non-palpable pulse   Feet:   Right Foot:   Protective Sensation: 6 sites tested. 3 sites sensed.   Skin Integrity: Negative for warmth.   Left Foot:   Protective Sensation: 6 sites tested. 4 sites sensed.   Skin Integrity: Negative for warmth.   Neurological: He is alert. He displays atrophy. He exhibits normal muscle tone. Gait normal.   Skin: Skin is warm, dry and intact. Capillary refill takes 2 to 3 seconds. There is cyanosis.   Toes cool, pale, mildly cyanotic   Psychiatric: His speech is normal and behavior is normal. Judgment and thought content normal. His affect is blunt. Cognition and memory are impaired.   Nursing note and vitals reviewed.        Diagnoses and health risks identified today and associated recommendations/orders:    1. Encounter for preventive health examination  Patient states lives alone without caregiver assistance. Currently works driving long distances.     2. Thoracic aorta atherosclerosis  CXR 2///11- TOP NORMAL HEART SIZE WITH MILD TORTUOSITY AND   ATHEROSCLEROTIC CALCIFICATION OF THE THORACIC AORTA.  This is a new problem that has been identified during today's visit. Please follow up with your PCP/cardiology to discuss the next steps.      3. Hyperparathyroidism- with hypercalcemia, and hypercalcuria  Component      Latest Ref Rng & Units 1/16/2018   PTH      9.0 - 77.0 pg/mL 93.0 (H)     Component      Latest Ref Rng & Units 1/16/2018   Calcium      8.7 - 10.5 mg/dL 10.9 (H)     Component      Latest Ref Rng & Units 1/16/2018 9/21/2017   Calcium, Ur      0.0 - 15.0 mg/dL 18.9 (H) 2.6   This problem is currently not controlled. Patient was seen by nephrology today.Parathyroid scan, PTH, phosphorus and renal function panel ordered. Continue current treatment plan as previously prescribed with your nephrologist, Dr. Grant.        4. Claudication of right lower  extremity  This is a new problem that has been identified during today's visit. Patient states has been having swelling in the right leg (4+ pitting edema), claudication while ambulating, relieved by rest, non-palpable pedal pulse, and tenderness on palpation. 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 Patient instructed to go to ED for workup to rule out DVT. Report called to Dr. Ross at [facility] emergency department. Patient refused to be taken by ambulance. He stated he would drive himself there. Follow up with cardiology and PCP as instructed post hospital discharge.     5. Bilateral carotid artery disease  Carotid US 5/13/2011  There is mild calcified and noncalcified plaque involving the right common carotid bifurcation. Right common carotid artery velocity is 60 cm/sec. And the internal carotid artery velocity is 67 cm/sec. The internal to common ratio is 1.1.On the left there is similar mild noncalcified plaque. The left common carotid artery velocity is 74 and the internal carotid artery velocity is 100 cm/sec. The ratio is 1.4. Normal antegrade vertebral artery flow is seen.Stable. Right carotid sounds decreased. Left carotid bruit.  No recent carotid ultrasound. Advised to follow up with Dr. Sadler, cardiology, for further evaluation of this problem.     6. Hyperlipidemia associated with type 2 diabetes mellitus  Stable and controlled. Currently on statin therapy. LFTs WNL. Continue current treatment plan as previously prescribed with your cardiologist.     7. Diabetes mellitus type 2 with peripheral artery disease  Component      Latest Ref Rng & Units 11/28/2017 8/21/2017   Hemoglobin A1C      4.0 - 5.6 % 6.1 (H) 6.5 (H)   Estimated Avg Glucose      68 - 131 mg/dL 128 140 (H)     Diabetes- Stable and controlled. On Trulicity, metformin, and glipizide,  and Continue current treatment plan as previously prescribed with your PCP.     Bilateral lower extremity arterial  "ultrasound and RENZO 7/6/15 R LE:  SFA is occluded in proximal segment with reconstitution in distal segment.  Monophasic waveforms distal to proximal SFA.  PTA appears occluded. L LE:  Mixture of triphasic/biphasic waveforms.  Hemodynamically significant (50 - 99%) stenosis distal SFA. RENZO: Moderately abnl R LE, normal L Le. Patient refused runoffs per Dr. Sadler note on 12-18-17  This problem is currently not controlled. Encouraged patient to follow cardiology orders. Discussed risks of untreated occlusion and peripheral lower extremity. Patient verbalized understanding.  Please follow up with your cardiologist as planned to discuss adjustments to your treatment plan.    8. Edema of both legs  See #4, edema to RLE 4+, 1-2+ to LLE    9. Potential for cognitive impairment  This is a new problem that has been identified during today's visit. Patient states concerned with increasing forgetfulness. Cognitive function screening score: 4.  Please follow up with your PCP to discuss the next steps.    10. Positive depression screening  This is a new problem that has been identified during today's visit. Patient not currently being treated for depression. PHQ9 score: 12. Denies SI. Please follow up with your PCP to discuss the next steps.    11. Loss of sensation of skin  This problem is currently not controlled. Feet cool and pale to touch, loss of sensation with monofilament testing to bilateral toes.  Please follow up with your cardiologist/PCP as planned to discuss adjustments to your treatment plan. See #4    12. Difficulty hearing, bilateral  This is a new problem that has been identified during today's visit. Patient states hearing loss is worse on right than left, but states significant bilaterally. States he hears "a tune" when laying in bed at night in his right ear. Denies having seen ENT. Offered referral for ENT eval of hearing. Declined at this time. Encouraged to please follow up with your PCP to discuss the " next steps.      13. Special screening for malignant neoplasms, colon  Patient had previously declined test this year. He agrees to have colonoscopy performed for colorectal cancer screening. Case request sent to Gi.   - Case request GI: COLONOSCOPY    14. Essential hypertension  This problem is currently not controlled. Blood pressure elevated. Currently taking medication lisinopril decreased today.  Please follow up with your PCP as planned to discuss adjustments to your treatment plan.    15. Chronic ischemic heart disease  ECHO 2/15/16 -CONCLUSIONS     1 - Concentric hypertrophy.     2 - Normal left ventricular systolic function (EF 60-65%).     3 - Normal left ventricular diastolic function.     4 - Mild left atrial enlargement.     5 - Normal right ventricular systolic function .     6 - Trivial to mild aortic regurgitation.     7 - Trivial to mild mitral regurgitation.   Ongoing. On ASA, Plavix, statin, BB therapy. Continue current treatment plan as previously prescribed with your cardiologist.         16. Benign prostatic hyperplasia with weak urinary stream  This problem is currently not controlled. On ditropan and proscar. Denies problems, but admits urinary urge incontinence. Patient has appt to see Dr. Tompkins on 2/5/18 for further evaluation and management.      17. History of alcohol abuse  Stable. Patient states drank whiskey for many years and quit in 1994. He states he approximately drinks a beer or two monthly. LFT WNL Encouraged ETOH cessation.     18. Coronary artery disease involving coronary bypass graft of native heart with stable angina pectoris  Hx of CABG x4 5/2011, C 2/2016 with atherectomy and PCI with TUCKER x 2  This problem is currently not controlled. Denies chest pain at this time. Patient denied knowing what medications were for. Medication education given. Chest pain worse with exertion as also noted at most recent cardiology visit.. Please follow up and continue current treatment  plan per Dr. Sadler.   19. History of tobacco abuse  Patient has 60 ppd year history of smoking. History of of multiple bouts of acute bronchitis. COPD diagnosis shown in provider notes. Unable to find diagnostic PFT. Follow up with PCP for further evaluation of COPD diagnosis.     Provided Nithin with a 5-10 year written screening schedule and personal prevention plan. Recommendations were developed using the USPSTF age appropriate recommendations. Education, counseling, and referrals were provided as needed. After Visit Summary printed and given to patient which includes a list of additional screenings\tests needed.    Follow-up in about 1 year (around 1/22/2019).    Aarti Mcqueen NP

## 2018-01-22 NOTE — PATIENT INSTRUCTIONS
Counseling and Referral of Other Preventative  (Italic type indicates deductible and co-insurance are waived)    Patient Name: Nithin Pino  Today's Date: 1/22/2018    Health Maintenance       Date Due Completion Date    Zoster Vaccine 05/11/1999 ---    Influenza Vaccine 08/01/2017 11/4/2015    Foot Exam 11/07/2017 11/7/2016 (Done)    Override on 11/7/2016: Done    Override on 11/4/2015: Done    Eye Exam 03/22/2018 3/22/2017    Override on 1/19/2017: Done    Override on 1/13/2017: Done    Override on 6/24/2015: Done (Negative Retinopathy)    Hemoglobin A1c 05/28/2018 11/28/2017    Lipid Panel 11/28/2018 11/28/2017    TETANUS VACCINE 04/03/2024 4/3/2014    Colonoscopy 05/22/2027 5/22/2017 (Declined)    Override on 5/22/2017: Declined    Override on 11/20/2006: Done (per [facility] records recommendations every 10yrs)        No orders of the defined types were placed in this encounter.    The following information is provided to all patients.  This information is to help you find resources for any of the problems found today that may be affecting your health:                Living healthy guide: www.[facility].[place].gov      Understanding Diabetes: www.diabetes.org      Eating healthy: www.cdc.gov/healthyweight      CDC home safety checklist: www.cdc.gov/steadi/patient.html      Agency on Aging: www.goea.[place].[facility]      Alcoholics anonymous (AA): www.aa.org      Physical Activity: www.luba.nih.gov/pa7bvxx      Tobacco use: www.quitwithusla.org

## 2018-01-31 ENCOUNTER — HOSPITAL ENCOUNTER (OUTPATIENT)
Dept: RADIOLOGY | Facility: HOSPITAL | Age: 78
Discharge: HOME | End: 2018-01-31
Attending: INTERNAL MEDICINE
Payer: MEDICARE

## 2018-01-31 DIAGNOSIS — E21.3 HYPERPARATHYROIDISM: ICD-10-CM

## 2018-01-31 PROCEDURE — A9500 TC99M SESTAMIBI: HCPCS

## 2018-01-31 PROCEDURE — 78070 PARATHYROID PLANAR IMAGING: CPT | Mod: TC

## 2018-02-07 RX ORDER — OXYBUTYNIN CHLORIDE 15 MG/1
TABLET, EXTENDED RELEASE ORAL
Qty: 30 TABLET | Refills: 0 | Status: SHIPPED | OUTPATIENT
Start: 2018-02-07 | End: 2018-03-28 | Stop reason: SDUPTHER

## 2018-02-07 RX ORDER — SIMVASTATIN 40 MG/1
TABLET, FILM COATED ORAL
Qty: 30 TABLET | Refills: 0 | Status: SHIPPED | OUTPATIENT
Start: 2018-02-07 | End: 2018-04-10 | Stop reason: SDUPTHER

## 2018-02-07 RX ORDER — FINASTERIDE 5 MG/1
TABLET, FILM COATED ORAL
Qty: 30 TABLET | Refills: 0 | Status: SHIPPED | OUTPATIENT
Start: 2018-02-07 | End: 2018-03-28 | Stop reason: SDUPTHER

## 2018-02-07 RX ORDER — METFORMIN HYDROCHLORIDE 850 MG/1
TABLET ORAL
Qty: 60 TABLET | Refills: 0 | Status: SHIPPED | OUTPATIENT
Start: 2018-02-07 | End: 2018-04-10 | Stop reason: SDUPTHER

## 2018-02-07 RX ORDER — METOPROLOL TARTRATE 50 MG/1
TABLET ORAL
Qty: 60 TABLET | Refills: 0 | Status: SHIPPED | OUTPATIENT
Start: 2018-02-07 | End: 2018-04-10 | Stop reason: SDUPTHER

## 2018-03-12 RX ORDER — CLOPIDOGREL BISULFATE 75 MG/1
TABLET ORAL
Qty: 30 TABLET | Refills: 0 | Status: SHIPPED | OUTPATIENT
Start: 2018-03-12 | End: 2018-05-07 | Stop reason: SDUPTHER

## 2018-03-12 RX ORDER — GLIPIZIDE 5 MG/1
TABLET ORAL
Qty: 60 TABLET | Refills: 0 | Status: SHIPPED | OUTPATIENT
Start: 2018-03-12 | End: 2018-06-01 | Stop reason: SDUPTHER

## 2018-03-12 RX ORDER — DULAGLUTIDE 1.5 MG/.5ML
INJECTION, SOLUTION SUBCUTANEOUS
Qty: 2 ML | Refills: 0 | Status: SHIPPED | OUTPATIENT
Start: 2018-03-12 | End: 2018-05-07 | Stop reason: SDUPTHER

## 2018-03-15 ENCOUNTER — LAB VISIT (OUTPATIENT)
Dept: LAB | Facility: HOSPITAL | Age: 78
End: 2018-03-15
Attending: INTERNAL MEDICINE
Payer: MEDICARE

## 2018-03-15 DIAGNOSIS — E21.3 HYPERPARATHYROIDISM: ICD-10-CM

## 2018-03-15 LAB
ALBUMIN SERPL BCP-MCNC: 3.7 G/DL
ANION GAP SERPL CALC-SCNC: 9 MMOL/L
BUN SERPL-MCNC: 23 MG/DL
CALCIUM SERPL-MCNC: 10.1 MG/DL
CHLORIDE SERPL-SCNC: 105 MMOL/L
CO2 SERPL-SCNC: 26 MMOL/L
CREAT SERPL-MCNC: 1 MG/DL
EST. GFR  (AFRICAN AMERICAN): >60 ML/MIN/1.73 M^2
EST. GFR  (NON AFRICAN AMERICAN): >60 ML/MIN/1.73 M^2
GLUCOSE SERPL-MCNC: 135 MG/DL
PHOSPHATE SERPL-MCNC: 2.7 MG/DL
PHOSPHATE SERPL-MCNC: 2.7 MG/DL
POTASSIUM SERPL-SCNC: 4.8 MMOL/L
PTH-INTACT SERPL-MCNC: 105 PG/ML
SODIUM SERPL-SCNC: 140 MMOL/L

## 2018-03-15 PROCEDURE — 36415 COLL VENOUS BLD VENIPUNCTURE: CPT

## 2018-03-15 PROCEDURE — 83970 ASSAY OF PARATHORMONE: CPT

## 2018-03-15 PROCEDURE — 80069 RENAL FUNCTION PANEL: CPT

## 2018-03-20 ENCOUNTER — OFFICE VISIT (OUTPATIENT)
Dept: NEPHROLOGY | Facility: CLINIC | Age: 78
End: 2018-03-20
Payer: MEDICARE

## 2018-03-20 VITALS
BODY MASS INDEX: 25.66 KG/M2 | HEIGHT: 70 IN | DIASTOLIC BLOOD PRESSURE: 80 MMHG | SYSTOLIC BLOOD PRESSURE: 146 MMHG | WEIGHT: 179.25 LBS | HEART RATE: 68 BPM

## 2018-03-20 DIAGNOSIS — E21.0 PRIMARY HYPERPARATHYROIDISM: Primary | ICD-10-CM

## 2018-03-20 DIAGNOSIS — E83.52 HYPERCALCEMIA: ICD-10-CM

## 2018-03-20 DIAGNOSIS — E87.5 HYPERKALEMIA: ICD-10-CM

## 2018-03-20 DIAGNOSIS — Z71.89 ENCOUNTER FOR MEDICATION REVIEW AND COUNSELING: ICD-10-CM

## 2018-03-20 DIAGNOSIS — I10 ESSENTIAL HYPERTENSION: ICD-10-CM

## 2018-03-20 DIAGNOSIS — N18.2 CHRONIC KIDNEY DISEASE, STAGE II (MILD): ICD-10-CM

## 2018-03-20 PROCEDURE — 3079F DIAST BP 80-89 MM HG: CPT | Mod: CPTII,S$GLB,, | Performed by: INTERNAL MEDICINE

## 2018-03-20 PROCEDURE — 99215 OFFICE O/P EST HI 40 MIN: CPT | Mod: S$GLB,,, | Performed by: INTERNAL MEDICINE

## 2018-03-20 PROCEDURE — 3077F SYST BP >= 140 MM HG: CPT | Mod: CPTII,S$GLB,, | Performed by: INTERNAL MEDICINE

## 2018-03-20 PROCEDURE — 99999 PR PBB SHADOW E&M-EST. PATIENT-LVL IV: CPT | Mod: PBBFAC,,, | Performed by: INTERNAL MEDICINE

## 2018-03-20 PROCEDURE — 99499 UNLISTED E&M SERVICE: CPT | Mod: S$GLB,,, | Performed by: INTERNAL MEDICINE

## 2018-03-20 NOTE — PROGRESS NOTES
NEPHROLOGY CLINIC FOLLOWUP NOTE:  Date of clinic visit: 3/20/18     REASON FOR FOLLOWUP AND CHIEF COMPLAINT:  History of mild renal insufficiency, CKD stage III, hypertension, hyperkalemia, and hypercalcemia.  Pt returns to discuss w/u of hypercalcemia     HISTORY OF PRESENT ILLNESS:  Pt is a 76 y/o male who returns for above reasons. Pt has no new c/o's, no active sx's, no palpitation, no weakness.     He was initially sent to us by Dr. Petersen for CKD stage III.  He had mild hyperkalemia, which improved after ACE inhibitor dose was reduced. He also was noted to have mild hypercalcemia.  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 However, this diagnosis was in doubt because urine calcium to creatinine ratio was not less than 0.01. Hypercalcemia was initially thought to be due to taking hydrochlorothiazide. s Ca improved when this medicine was stopped, but still remained borderline. Also, iPTH was found to be slightly elevated, and pt has normal to low PO4. A sestamibi nuclear scan of the neck was done. Pt presents for f/u.     PAST MEDICAL HISTORY:  1.  CKD stage III.  Baseline creatinine 1.5 or lower.  2.  Hypertension.  3.  Diabetes mellitus for 10 years.  4.  Ischemic cardiomyopathy and diastolic CHF.  5.  Hyperlipidemia.  6.  Coronary artery disease with past history of CABG in 2011.  7.  COPD.  8.  Right bundle branch block.  9.  Questionable diagnosis of familial hypocalciuric hypercalcemia, likely not   present because urine calcium to creatinine ratio is greater than 0.01 and   measured twice.     PAST SURGICAL HISTORY:  Reviewed and unchanged.  CABG, appendectomy, and left hand surgery.     FAMILY HISTORY:  Reviewed.  Diabetes mellitus.     ALLERGIES:  Reviewed.  No known drug allergies.     SOCIAL HISTORY:  Negative for smoking.  No alcohol use.     MEDICATIONS:  Reviewed.  Lisinopril 20 mg daily and isosorbide 60 mg daily. Other meds include albuterol, aspirin, Plavix,  Proscar, glipizide, metformin, metoprolol 50 mg p.o. b.i.d., and simvastatin.  Other medications reviewed and noted.     REVIEW OF SYSTEMS:  No recent hospitalizations.  GENERAL:  Negative.  HEAD, EYES, EARS, NOSE, AND THROAT:  Negative.  CARDIAC:  Negative.  PULMONARY:  Negative.  GASTROINTESTINAL:  Negative.  GENITOURINARY:  Negative.  PSYCHOLOGICAL:  Negative.  NEUROLOGICAL:  Negative.  ENDOCRINE:  Negative.  HEMATOLOGIC AND ONCOLOGIC:  Negative.  INFECTIOUS DISEASE:  Negative.  The rest of the review of systems negative.     PHYSICAL EXAMINATION:  VITAL SIGNS:  Blood pressure is 138/66, pulse 80, and weight is 181 pounds.  He   was cooperative, pleasant, in no acute distress.  He was ambulating to the   clinic by himself, in no acute distress.  Speech and thought process   appropriate, normal.  HEENT:  Mucous membranes moist.  NECK:  No JVD.  HEART:  Regular rate and rhythm.  CHEST:  Clear to auscultation.  ABDOMEN:  Soft, nontender.  EXTREMITIES:  Trace edema bilaterally.     LABORATORY:  Reviewed.    BMP  Lab Results   Component Value Date     03/15/2018    K 4.8 03/15/2018     03/15/2018    CO2 26 03/15/2018    BUN 23 03/15/2018    CREATININE 1.0 03/15/2018    CALCIUM 10.1 03/15/2018    ANIONGAP 9 03/15/2018    ESTGFRAFRICA >60.0 03/15/2018    EGFRNONAA >60.0 03/15/2018     Lab Results   Component Value Date    WBC 7.60 08/21/2017    HGB 12.7 (L) 08/21/2017    HCT 36.9 (L) 08/21/2017    MCV 89 08/21/2017     08/21/2017     Lab Results   Component Value Date    .0 (H) 03/15/2018    CALCIUM 10.1 03/15/2018    PHOS 2.7 03/15/2018    PHOS 2.7 03/15/2018     Urine calcium is 19, urine creatinine is 140,   therefore, ratio of urine calcium to creatinine 0.1, which is greater than 0.01.     Sestamibi nuclear scan of the neck:  No abnormal activity on delayed images to suggest parathyroid adenoma. Normal salivary activity.      ASSESSMENT AND PLAN:  A 77-year-old male with history of mild  renal insufficiency presents for followup of mild hypercalcemia  The impression is as follows:    1.  Renal.  Mild renal insufficiency. CKD stage 2-3  Renal function is stable.  s Cr improved after hypercalcemia got better.  No proteinuria,   therefore, diabetic nephropathy is not suspected.    2.  Hypertension.  Blood pressure is controlled, doing well, stable.    3.  Hyperkalemia.  Improved after ACE inhibitor dose was reduced from 40 to 20, then to 10 mg/day  s K normal    4. Hypercalcemia: improved after d/c HCTZ  Still mild borderline highs Ca  Familial hypocalciuric hypercalcemia or  FHH not likely, because the ratio of U Ca to U Cr is greater than 0.01.    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    Discussed with pt  Opportunity for questions and discussion provided  RTc 6 months   Total time spent 40 minutes, more than 50% of the time was spent on counseling   and coordination of care.  Level V visit.    Marsha Grant MD

## 2018-03-28 ENCOUNTER — OFFICE VISIT (OUTPATIENT)
Dept: OPHTHALMOLOGY | Facility: CLINIC | Age: 78
End: 2018-03-28
Payer: MEDICARE

## 2018-03-28 ENCOUNTER — OFFICE VISIT (OUTPATIENT)
Dept: UROLOGY | Facility: CLINIC | Age: 78
End: 2018-03-28
Payer: MEDICARE

## 2018-03-28 VITALS
BODY MASS INDEX: 25.66 KG/M2 | HEART RATE: 66 BPM | WEIGHT: 179.25 LBS | DIASTOLIC BLOOD PRESSURE: 82 MMHG | HEIGHT: 70 IN | SYSTOLIC BLOOD PRESSURE: 130 MMHG

## 2018-03-28 DIAGNOSIS — H52.4 BILATERAL PRESBYOPIA: ICD-10-CM

## 2018-03-28 DIAGNOSIS — E11.9 DIABETES MELLITUS TYPE 2 WITHOUT RETINOPATHY: Primary | ICD-10-CM

## 2018-03-28 DIAGNOSIS — N32.81 OAB (OVERACTIVE BLADDER): Primary | ICD-10-CM

## 2018-03-28 DIAGNOSIS — H43.391 VITREOUS FLOATERS OF RIGHT EYE: ICD-10-CM

## 2018-03-28 DIAGNOSIS — Z96.1 PSEUDOPHAKIA OF BOTH EYES: ICD-10-CM

## 2018-03-28 LAB
BILIRUB SERPL-MCNC: NORMAL MG/DL
BLOOD URINE, POC: NORMAL
COLOR, POC UA: YELLOW
GLUCOSE UR QL STRIP: NORMAL
KETONES UR QL STRIP: NORMAL
LEUKOCYTE ESTERASE URINE, POC: NORMAL
NITRITE, POC UA: NORMAL
PH, POC UA: 6
POC RESIDUAL URINE VOLUME: 0 ML (ref 0–100)
PROTEIN, POC: NORMAL
SPECIFIC GRAVITY, POC UA: 1.02
UROBILINOGEN, POC UA: NORMAL

## 2018-03-28 PROCEDURE — 99999 PR PBB SHADOW E&M-EST. PATIENT-LVL II: CPT | Mod: PBBFAC,,, | Performed by: OPTOMETRIST

## 2018-03-28 PROCEDURE — 3079F DIAST BP 80-89 MM HG: CPT | Mod: CPTII,S$GLB,, | Performed by: UROLOGY

## 2018-03-28 PROCEDURE — 99999 PR PBB SHADOW E&M-EST. PATIENT-LVL II: CPT | Mod: PBBFAC,,, | Performed by: UROLOGY

## 2018-03-28 PROCEDURE — 92015 DETERMINE REFRACTIVE STATE: CPT | Mod: S$GLB,,, | Performed by: OPTOMETRIST

## 2018-03-28 PROCEDURE — 99499 UNLISTED E&M SERVICE: CPT | Mod: S$GLB,,, | Performed by: OPTOMETRIST

## 2018-03-28 PROCEDURE — 51798 US URINE CAPACITY MEASURE: CPT | Mod: S$GLB,,, | Performed by: UROLOGY

## 2018-03-28 PROCEDURE — 3075F SYST BP GE 130 - 139MM HG: CPT | Mod: CPTII,S$GLB,, | Performed by: UROLOGY

## 2018-03-28 PROCEDURE — 99499 UNLISTED E&M SERVICE: CPT | Mod: S$GLB,,, | Performed by: UROLOGY

## 2018-03-28 PROCEDURE — 92014 COMPRE OPH EXAM EST PT 1/>: CPT | Mod: S$GLB,,, | Performed by: OPTOMETRIST

## 2018-03-28 PROCEDURE — 81002 URINALYSIS NONAUTO W/O SCOPE: CPT | Mod: S$GLB,,, | Performed by: UROLOGY

## 2018-03-28 PROCEDURE — 99204 OFFICE O/P NEW MOD 45 MIN: CPT | Mod: 25,S$GLB,, | Performed by: UROLOGY

## 2018-03-28 RX ORDER — OXYBUTYNIN CHLORIDE 15 MG/1
15 TABLET, EXTENDED RELEASE ORAL NIGHTLY
Qty: 30 TABLET | Refills: 11 | Status: SHIPPED | OUTPATIENT
Start: 2018-03-28 | End: 2019-04-12 | Stop reason: SDUPTHER

## 2018-03-28 RX ORDER — FINASTERIDE 5 MG/1
5 TABLET, FILM COATED ORAL DAILY
Qty: 30 TABLET | Refills: 11 | Status: SHIPPED | OUTPATIENT
Start: 2018-03-28 | End: 2019-04-12 | Stop reason: SDUPTHER

## 2018-03-28 NOTE — PATIENT INSTRUCTIONS
Diabetes mellitus type 2 without retinopathy     Vitreous floaters of right eye     Pseudophakia of both eyes     Bilateral presbyopia        No Background Diabetic Retinopathy     Discussed signs and symptoms of retinal detachment.  Informed patient to return to clinic if any worsening of symptoms or decreased vision.     Stable IOL OU.     Dispense Final Rx for glasses.  RTC 1 year  Discussed above and answered questions.

## 2018-03-28 NOTE — PROGRESS NOTES
HPI     NIDDM exam. Patient states something is in right eye x 2-3 weeks. Sees   something moving in right eye.  No pain right eye, bothering patient. Last   eye exam 01/13/2017 TRF. Last eye visit 03/22/2017 TRF. Update glasses RX.       Last edited by Jensen Holloway, OD on 3/28/2018  8:59 AM. (History)            Assessment /Plan     For exam results, see Encounter Report.    Diabetes mellitus type 2 without retinopathy    Vitreous floaters of right eye    Pseudophakia of both eyes    Bilateral presbyopia      No Background Diabetic Retinopathy    Discussed signs and symptoms of retinal detachment.  Informed patient to return to clinic if any worsening of symptoms or decreased vision.    Stable IOL OU.    Dispense Final Rx for glasses.  RTC 1 year  Discussed above and answered questions.

## 2018-03-28 NOTE — LETTER
March 28, 2018      Celestine Petersen MD  [id number] [address] [address]           [facility]  [id number] [place] [id number]  Phone: [phone]  Fax: [phone]          Patient: Nithin Pino   MR Number: [id number]   YOB: 1940   Date of Visit: 3/28/2018       Dear Dr. Celestine Petersen:    Thank you for referring Nithin Pino to me for evaluation. Attached you will find relevant portions of my assessment and plan of care.    If you have questions, please do not hesitate to call me. I look forward to following Nithin Pino along with you.    Sincerely,    Tavo Tompkins IV, MD    Enclosure  CC:  No Recipients    If you would like to receive this communication electronically, please contact [email or web address] or [phone] to request more information on [employer] Link access.    For providers and/or their staff who would like to refer a patient to [facility], please contact us through our one-stop-shop provider referral line, [facility], at [phone].    If you feel you have received this communication in error or would no longer like to receive these types of communications, please e-mail [email or web address]

## 2018-03-28 NOTE — PROGRESS NOTES
Chief Complaint: Frequency    HPI:   3/28/18: 78 yo man referred for LUTS of frequency, says stream is good.  Nocturia x4.  Some mild abd pain lately that comes/goes but no abd/pelvic pain today and no exac/rel factors.  No hematuria.  No urolithiasis.   Normal sexual function.  Has been taking finasteride/oxybutinin x5yrs after his first heart attack. PVR 0.    Allergies:  Panmist dm  [pseudoephedrine-dm-guaifenesin]    Medications:  has a current medication list which includes the following prescription(s): albuterol, aspirin, blood-glucose meter, clopidogrel, embrace pro test strips, finasteride, glipizide, isosorbide mononitrate, inv lisinopril, metformin, metoprolol tartrate, nitroglycerin, oxybutynin, simvastatin, trueplus lancets, trueresult blood glucose systm, and trulicity.    Review of Systems:  General: No fever, chills, fatigability, or weight loss.  Skin: No rashes, itching, or changes in color or texture of skin.  Chest: Denies EWING, cyanosis, wheezing, cough, and sputum production.  Abdomen: Appetite fine. No weight loss. Denies diarrhea, abdominal pain, hematemesis, or blood in stool.  Musculoskeletal: No joint stiffness or swelling. Denies back pain.  : As above.  All other review of systems negative.    PMH:   has a past medical history of Abnormal brain MRI (1/21/2018); Abnormal ECG (10/31/2013); Abnormal stress test (1/28/2016); Alcohol dependence; AP (angina pectoris) (1/28/2016); Asthma; Cataract; Chronic ischemic heart disease (10/31/2013); CKD (chronic kidney disease) stage 3, GFR 30-59 ml/min (11/4/2015); Coronary artery disease; Heart valve regurgitation (11/4/2015); History of alcohol abuse (1/22/2018); History of atherectomy (1/21/2018); History of chronic kidney disease (1/22/2018); History of PTCA (10/31/2013); History of PTCA (3/9/2016); Hyperlipidemia; Hypertension; Insomnia (6/17/2013); Ischemic cardiomyopathy (10/31/2013); Long term (current) use of antithrombotics/antiplatelets  (1/21/2018); Myocardial infarction; Old MI (myocardial infarction) (1994); Peripheral vascular disease; Polyneuropathy; RBBB (10/31/2013); S/P CABG (coronary artery bypass graft) (10/31/2013); Shortness of breath (10/31/2013); Tobacco dependence; Trouble in sleeping; Type 2 diabetes with peripheral circulatory disorder, controlled; and Wears glasses.    PSH:   has a past surgical history that includes Cardiac surgery (1994); Appendectomy; Coronary artery bypass graft (5/2011); Hernia repair; Cardiac catheterization; PCIOL (Bilateral, OD 02/01/17/OS 02/15/17); and Cataract extraction w/  intraocular lens implant (Right, 02/01/2017).    FamHx: family history includes Diabetes in his brother and sister. He was adopted.    SocHx:  reports that he quit smoking about 24 years ago. His smoking use included Cigarettes. He has a 60.00 pack-year smoking history. He quit smokeless tobacco use about 7 years ago. He reports that he drinks alcohol. He reports that he does not use drugs.      Physical Exam:  Vitals:    03/28/18 0710   BP: 130/82   Pulse: 66     General: A&Ox3, no apparent distress, no deformities  Neck: No masses, normal thyroid  Lungs: normal inspiration, no use of accessory muscles  Heart: normal pulse, no arrhythmias  Abdomen: Soft, NT, ND, no masses, no hernias, no hepatosplenomegaly  Lymphatic: Neck and groin nodes negative  Skin: The skin is warm and dry. No jaundice.  Ext: No c/c/e.  : Test desc betito, no abnormalities of epididymus, left small sec to mumps orchitis age 13. Penis normal, with normal penile and scrotal skin. Meatus normal. Normal rectal tone, no hemorrhoids. Prost 30 gm no nodules or masses appreciated. SV not palpable. Perineum and anus normal.    Labs/Studies:   Urinalysis performed in clinic, summary: UA normal  PSA    6/15: 0.82    Impression/Plan:   1. OAB.  Caffeine cessation, already doing fluid restriction in evening.  Take oxybutinin in PM to help nocturia.  2. BPH controlled with  finasteride, refilled.

## 2018-04-10 RX ORDER — METFORMIN HYDROCHLORIDE 850 MG/1
TABLET ORAL
Qty: 60 TABLET | Refills: 0 | Status: SHIPPED | OUTPATIENT
Start: 2018-04-10 | End: 2018-06-29 | Stop reason: SDUPTHER

## 2018-04-10 RX ORDER — SIMVASTATIN 40 MG/1
TABLET, FILM COATED ORAL
Qty: 30 TABLET | Refills: 0 | Status: SHIPPED | OUTPATIENT
Start: 2018-04-10 | End: 2018-06-01 | Stop reason: SDUPTHER

## 2018-04-10 RX ORDER — METOPROLOL TARTRATE 50 MG/1
TABLET ORAL
Qty: 60 TABLET | Refills: 0 | Status: SHIPPED | OUTPATIENT
Start: 2018-04-10 | End: 2018-06-01 | Stop reason: SDUPTHER

## 2018-05-07 RX ORDER — DULAGLUTIDE 1.5 MG/.5ML
INJECTION, SOLUTION SUBCUTANEOUS
Qty: 2 ML | Refills: 0 | Status: SHIPPED | OUTPATIENT
Start: 2018-05-07 | End: 2018-06-29 | Stop reason: SDUPTHER

## 2018-05-07 RX ORDER — CLOPIDOGREL BISULFATE 75 MG/1
TABLET ORAL
Qty: 30 TABLET | Refills: 0 | Status: SHIPPED | OUTPATIENT
Start: 2018-05-07 | End: 2018-06-29 | Stop reason: SDUPTHER

## 2018-05-21 ENCOUNTER — TELEPHONE (OUTPATIENT)
Dept: CARDIOLOGY | Facility: CLINIC | Age: 78
End: 2018-05-21

## 2018-05-21 NOTE — TELEPHONE ENCOUNTER
Colleen with Dr Reyes's dentist office called([phone])  States needs to know if patient can be cleared to stop taking aspirin and plavix to have tooth extractions--if so how many days prior----please advise- clearance can be faxed to [phone]

## 2018-06-02 RX ORDER — GLIPIZIDE 5 MG/1
TABLET ORAL
Qty: 60 TABLET | Refills: 0 | Status: SHIPPED | OUTPATIENT
Start: 2018-06-02 | End: 2018-07-26 | Stop reason: SDUPTHER

## 2018-06-02 RX ORDER — SIMVASTATIN 40 MG/1
TABLET, FILM COATED ORAL
Qty: 30 TABLET | Refills: 0 | Status: SHIPPED | OUTPATIENT
Start: 2018-06-02 | End: 2018-07-26 | Stop reason: SDUPTHER

## 2018-06-02 RX ORDER — METOPROLOL TARTRATE 50 MG/1
TABLET ORAL
Qty: 60 TABLET | Refills: 0 | Status: SHIPPED | OUTPATIENT
Start: 2018-06-02 | End: 2018-07-26 | Stop reason: SDUPTHER

## 2018-06-18 ENCOUNTER — CLINICAL SUPPORT (OUTPATIENT)
Dept: CARDIOLOGY | Facility: CLINIC | Age: 78
End: 2018-06-18
Attending: INTERNAL MEDICINE
Payer: MEDICARE

## 2018-06-18 DIAGNOSIS — I25.5 ISCHEMIC CARDIOMYOPATHY: Chronic | ICD-10-CM

## 2018-06-18 DIAGNOSIS — R94.31 ABNORMAL ECG: ICD-10-CM

## 2018-06-18 DIAGNOSIS — I38 HEART VALVE REGURGITATION: ICD-10-CM

## 2018-06-18 DIAGNOSIS — Z98.61 HISTORY OF PTCA: ICD-10-CM

## 2018-06-18 DIAGNOSIS — I25.2 OLD MI (MYOCARDIAL INFARCTION): Chronic | ICD-10-CM

## 2018-06-18 DIAGNOSIS — I73.9 PERIPHERAL VASCULAR DISEASE: ICD-10-CM

## 2018-06-18 DIAGNOSIS — I45.10 RBBB: Chronic | ICD-10-CM

## 2018-06-18 DIAGNOSIS — I25.83 CORONARY ARTERY DISEASE DUE TO LIPID RICH PLAQUE: Chronic | ICD-10-CM

## 2018-06-18 DIAGNOSIS — I25.10 CORONARY ARTERY DISEASE DUE TO LIPID RICH PLAQUE: Chronic | ICD-10-CM

## 2018-06-18 DIAGNOSIS — I25.9 CHRONIC ISCHEMIC HEART DISEASE: Chronic | ICD-10-CM

## 2018-06-18 DIAGNOSIS — I10 ESSENTIAL HYPERTENSION: Chronic | ICD-10-CM

## 2018-06-18 LAB
ESTIMATED PA SYSTOLIC PRESSURE: 15.37
MITRAL VALVE MOBILITY: NORMAL
RETIRED EF AND QEF - SEE NOTES: 55 (ref 55–65)
VASCULAR ANKLE BRACHIAL INDEX (ABI) RIGHT: 0.86 (ref 0.9–1.2)

## 2018-06-18 PROCEDURE — 93306 TTE W/DOPPLER COMPLETE: CPT | Mod: S$GLB,,, | Performed by: NUCLEAR MEDICINE

## 2018-06-18 PROCEDURE — 93922 UPR/L XTREMITY ART 2 LEVELS: CPT | Mod: S$GLB,,, | Performed by: INTERNAL MEDICINE

## 2018-07-01 RX ORDER — CLOPIDOGREL BISULFATE 75 MG/1
TABLET ORAL
Qty: 30 TABLET | Refills: 0 | Status: SHIPPED | OUTPATIENT
Start: 2018-07-01 | End: 2018-08-21 | Stop reason: SDUPTHER

## 2018-07-01 RX ORDER — DULAGLUTIDE 1.5 MG/.5ML
INJECTION, SOLUTION SUBCUTANEOUS
Qty: 2 ML | Refills: 0 | Status: SHIPPED | OUTPATIENT
Start: 2018-07-01 | End: 2018-08-21 | Stop reason: SDUPTHER

## 2018-07-01 RX ORDER — METFORMIN HYDROCHLORIDE 850 MG/1
TABLET ORAL
Qty: 60 TABLET | Refills: 0 | Status: SHIPPED | OUTPATIENT
Start: 2018-07-01 | End: 2018-08-21 | Stop reason: SDUPTHER

## 2018-07-10 ENCOUNTER — OFFICE VISIT (OUTPATIENT)
Dept: CARDIOLOGY | Facility: CLINIC | Age: 78
End: 2018-07-10
Payer: MEDICARE

## 2018-07-10 VITALS
HEIGHT: 70 IN | SYSTOLIC BLOOD PRESSURE: 136 MMHG | WEIGHT: 167 LBS | HEART RATE: 58 BPM | DIASTOLIC BLOOD PRESSURE: 76 MMHG | BODY MASS INDEX: 23.91 KG/M2

## 2018-07-10 DIAGNOSIS — I73.9 PERIPHERAL VASCULAR DISEASE: ICD-10-CM

## 2018-07-10 DIAGNOSIS — E11.51 DIABETES MELLITUS TYPE 2 WITH PERIPHERAL ARTERY DISEASE: ICD-10-CM

## 2018-07-10 DIAGNOSIS — E11.69 HYPERLIPIDEMIA ASSOCIATED WITH TYPE 2 DIABETES MELLITUS: ICD-10-CM

## 2018-07-10 DIAGNOSIS — I20.9 AP (ANGINA PECTORIS): ICD-10-CM

## 2018-07-10 DIAGNOSIS — I25.9 CHRONIC ISCHEMIC HEART DISEASE: Primary | Chronic | ICD-10-CM

## 2018-07-10 DIAGNOSIS — I10 ESSENTIAL HYPERTENSION: Chronic | ICD-10-CM

## 2018-07-10 DIAGNOSIS — I45.10 RBBB: Chronic | ICD-10-CM

## 2018-07-10 DIAGNOSIS — I73.9 CLAUDICATION IN PERIPHERAL VASCULAR DISEASE: ICD-10-CM

## 2018-07-10 DIAGNOSIS — I25.708 CORONARY ARTERY DISEASE OF BYPASS GRAFT OF NATIVE HEART WITH STABLE ANGINA PECTORIS: Chronic | ICD-10-CM

## 2018-07-10 DIAGNOSIS — I65.23 ASYMPTOMATIC STENOSIS OF BOTH CAROTID ARTERIES WITHOUT INFARCTION: ICD-10-CM

## 2018-07-10 DIAGNOSIS — I70.0 THORACIC AORTA ATHEROSCLEROSIS: ICD-10-CM

## 2018-07-10 DIAGNOSIS — I77.9 BILATERAL CAROTID ARTERY DISEASE: ICD-10-CM

## 2018-07-10 DIAGNOSIS — E78.5 HYPERLIPIDEMIA ASSOCIATED WITH TYPE 2 DIABETES MELLITUS: ICD-10-CM

## 2018-07-10 PROCEDURE — 3078F DIAST BP <80 MM HG: CPT | Mod: CPTII,S$GLB,, | Performed by: INTERNAL MEDICINE

## 2018-07-10 PROCEDURE — 99999 PR PBB SHADOW E&M-EST. PATIENT-LVL III: CPT | Mod: PBBFAC,,, | Performed by: INTERNAL MEDICINE

## 2018-07-10 PROCEDURE — 99499 UNLISTED E&M SERVICE: CPT | Mod: HCNC,S$GLB,, | Performed by: INTERNAL MEDICINE

## 2018-07-10 PROCEDURE — 3075F SYST BP GE 130 - 139MM HG: CPT | Mod: CPTII,S$GLB,, | Performed by: INTERNAL MEDICINE

## 2018-07-10 PROCEDURE — 93000 ELECTROCARDIOGRAM COMPLETE: CPT | Mod: S$GLB,,, | Performed by: INTERNAL MEDICINE

## 2018-07-10 PROCEDURE — 99214 OFFICE O/P EST MOD 30 MIN: CPT | Mod: S$GLB,,, | Performed by: INTERNAL MEDICINE

## 2018-07-10 RX ORDER — NITROGLYCERIN 0.4 MG/1
0.4 TABLET SUBLINGUAL EVERY 5 MIN PRN
Qty: 25 TABLET | Refills: 6 | Status: SHIPPED | OUTPATIENT
Start: 2018-07-10 | End: 2021-07-27

## 2018-07-10 NOTE — PROGRESS NOTES
"Subjective:    Patient ID:  Nithin Pino is a 78 y.o. male who presents for evaluation of Hyperlipidemia; Hypertension; Coronary Artery Disease; Risk Factor Management For Atherosclerosis; and Peripheral Arterial Disease      HPI Mr. Pino returns for f/u.  His current medical conditions include CAD, RBBB, ICM, carotid artery disease, COPD, HTN, PAD, DM. Nonsmoker.   His past history is pertinent for following:  S/p PTCA 1994 for AMI.   Stress MPI 5/11 showed evidence of multivessel CAD (had 2 years of angina) which was confirmed on LHC (significant left main/LAD/ramus, diag disease and 100%  RCA). EF 35% on LV gram.   S/p successful CABG 5/11 (LIMA-LAD, SVG-DIAG, SVG-RAMUS). He had post-op a flutter which resolved.   S/p repeat C 2/16 for abnl stress mpi. He underwent atherectomy and PCI of left main and ramus with TUCKER x 2 overall. His svg-ramus had occluded. LIMA-LAD was patent, patent SVG-D1,  RCA with left - right collaterals, EF 45%.    Pt has declined runoff numerous times for further evaluation of his PAD in past.   H/o abnl ECG, RBBB.   Pt here for f/u.  "every once great in a while" if he exerts himself he will get angina, like walking 1/2 mile.  This is stable.  Has not had to take sl ntg.  No chf sxs.  Denies dyspnea.  No tia/cva sxs.  Legs get tired walking, but does not have to stop walking.  sxs unchanged.  DM well controlled, HGAIC at goal.  Lipids well controlled, on statin.  Renal function good on 3/18 labs.  Compliant with meds.   Echo 6/18 read as normal EF.  RENZO 6/18 R LE 0.86, L LE 0.96    Past Medical History:   Diagnosis Date    Abnormal brain MRI 1/21/2018    Chronic microvascular ischemia changes per MRI July 9, 2007 in [facility]    Abnormal ECG 10/31/2013    Abnormal stress test 1/28/2016    Alcohol dependence     States alcohol abuse ended in 1994    AP (angina pectoris) 1/28/2016    Asthma     Cataract     Chronic ischemic heart disease 10/31/2013    CKD (chronic " kidney disease) stage 3, GFR 30-59 ml/min 11/4/2015    Coronary artery disease     DM (diabetes mellitus) 2013    BS doesn't check 03/28/2017     Heart valve regurgitation 11/4/2015    Echocardiogram 2/15/16   1 - Concentric hypertrophy.    2 - Normal left ventricular systolic function (EF 60-65%).    3 - Normal left ventricular diastolic function.    4 - Mild left atrial enlargement.    5 - Normal right ventricular systolic function .    6 - Trivial to mild aortic regurgitation.    7 - Trivial to mild mitral regurgitation.  10 - Trivial to mild pulmonic regurgitation.     History of alcohol abuse 1/22/2018    Patient denies drinking to excess since 1994.    History of atherectomy 1/21/2018 2/2016 [facility]    History of chronic kidney disease 1/22/2018    History of CKD 3    History of PTCA 10/31/2013    History of PTCA 3/9/2016    Hyperlipidemia     Hypertension     Insomnia 6/17/2013    Ischemic cardiomyopathy 10/31/2013    Long term (current) use of antithrombotics/antiplatelets 1/21/2018    Myocardial infarction     Old MI (myocardial infarction) 1994    Peripheral vascular disease     Polyneuropathy     RBBB 10/31/2013    S/P CABG (coronary artery bypass graft) 10/31/2013    Shortness of breath 10/31/2013    Tobacco dependence     resolved    Trouble in sleeping     Type 2 diabetes with peripheral circulatory disorder, controlled     Wears glasses        Current Outpatient Prescriptions:     aspirin 81 MG Chew, 1 Tablet, Chewable Oral Every day, Disp: , Rfl:     BLOOD-GLUCOSE METER (TRUERESULT BLOOD GLUCOSE SYSTM MISC), by Misc.(Non-Drug; Combo Route) route., Disp: , Rfl:     clopidogrel (PLAVIX) 75 mg tablet, TAKE 1 TABLET BY MOUTH DAILY, Disp: 30 tablet, Rfl: 0    EMBRACE PRO TEST STRIPS Strp, CHECK BLOOD SUGAR TWICE DAILY., Disp: 50 strip, Rfl: 0    finasteride (PROSCAR) 5 mg tablet, Take 1 tablet (5 mg total) by mouth once daily., Disp: 30 tablet, Rfl: 11    glipiZIDE (GLUCOTROL)  "5 MG tablet, TAKE 1 TABLET 2 TIMES DAILY, Disp: 60 tablet, Rfl: 0    isosorbide mononitrate (IMDUR) 60 MG 24 hr tablet, TAKE 1 TABLET BY MOUTH DAILY, Disp: 30 tablet, Rfl: 12    lisinopril 10 MG Tab, Take 1 tablet (10 mg total) by mouth once daily., Disp: 30 tablet, Rfl: 10    metFORMIN (GLUCOPHAGE) 850 MG tablet, TAKE 1 TABLET BY MOUTH TWICE A DAY, Disp: 60 tablet, Rfl: 0    metoprolol tartrate (LOPRESSOR) 50 MG tablet, TAKE 1 TABLET 2 TIMES DAILY, Disp: 60 tablet, Rfl: 0    nitroGLYCERIN (NITROSTAT) 0.4 MG SL tablet, Place 1 tablet (0.4 mg total) under the tongue every 5 (five) minutes as needed., Disp: 25 tablet, Rfl: 6    oxybutynin (DITROPAN XL) 15 MG TR24, Take 1 tablet (15 mg total) by mouth every evening., Disp: 30 tablet, Rfl: 11    simvastatin (ZOCOR) 40 MG tablet, TAKE 1 TABLET NIGHTLY AT BEDTIME, Disp: 30 tablet, Rfl: 0    TRUEPLUS LANCETS 26 gauge Misc, CHECK BLOOD SUGAR TWICE DAILY., Disp: 100 each, Rfl: 0    TRUERESULT BLOOD GLUCOSE SYSTM kit, CHECK BLOOD SUGAR TWICE DAILY., Disp: 1 each, Rfl: 0    TRULICITY 1.5 mg/0.5 mL PnIj, INJECT 1.5MG INTO THE SKIN EVERY 7 DAYS, Disp: 2 mL, Rfl: 0    albuterol (VENTOLIN HFA) 90 mcg/actuation inhaler, Inhale 2 puffs into the lungs every 6 (six) hours as needed for Wheezing., Disp: , Rfl:         Review of Systems   Constitution: Negative.   HENT: Negative.    Eyes: Negative.    Cardiovascular: Positive for chest pain and claudication.   Respiratory: Negative.    Endocrine: Negative.    Hematologic/Lymphatic: Negative.    Skin: Negative.    Musculoskeletal: Positive for arthritis.   Gastrointestinal: Negative.    Genitourinary: Negative.    Neurological: Negative.    Psychiatric/Behavioral: Negative.    Allergic/Immunologic: Negative.      /76 (BP Location: Right arm, Patient Position: Sitting)   Pulse (!) 58   Ht 5' 10" (1.778 m)   Wt 75.8 kg (167 lb)   BMI 23.96 kg/m²     Wt Readings from Last 3 Encounters:   07/10/18 75.8 kg (167 lb) "   03/28/18 81.3 kg (179 lb 3.7 oz)   03/20/18 81.3 kg (179 lb 3.7 oz)     Temp Readings from Last 3 Encounters:   01/22/18 97.9 °F (36.6 °C) (Oral)   08/28/17 97.1 °F (36.2 °C) (Tympanic)   05/22/17 96.6 °F (35.9 °C) (Tympanic)     BP Readings from Last 3 Encounters:   07/10/18 136/76   03/28/18 130/82   03/20/18 (!) 146/80     Pulse Readings from Last 3 Encounters:   07/10/18 (!) 58   03/28/18 66   03/20/18 68          Objective:    Physical Exam   Constitutional: He is oriented to person, place, and time. He appears well-developed and well-nourished.   HENT:   Head: Normocephalic.   Neck: Normal range of motion. Neck supple. Normal carotid pulses, no hepatojugular reflux and no JVD present. Carotid bruit is not present. No thyromegaly present.   Cardiovascular: Normal rate, regular rhythm, S1 normal and S2 normal.  PMI is not displaced.  Exam reveals no S3, no S4, no distant heart sounds, no friction rub, no midsystolic click and no opening snap.    No murmur heard.  Pulses:       Radial pulses are 2+ on the right side, and 2+ on the left side.   Pulmonary/Chest: Effort normal and breath sounds normal. He has no wheezes. He has no rales.   Abdominal: Soft. Bowel sounds are normal. He exhibits no distension, no abdominal bruit, no ascites and no mass. There is no tenderness.   Musculoskeletal: He exhibits no edema.   Neurological: He is alert and oriented to person, place, and time.   Skin: Skin is warm.   Psychiatric: He has a normal mood and affect. His behavior is normal.   Nursing note and vitals reviewed.      I have reviewed all pertinent labs and cardiac studies.      Chemistry        Component Value Date/Time     03/15/2018 0738    K 4.8 03/15/2018 0738     03/15/2018 0738    CO2 26 03/15/2018 0738    BUN 23 03/15/2018 0738    CREATININE 1.0 03/15/2018 0738     (H) 03/15/2018 0738        Component Value Date/Time    CALCIUM 10.1 03/15/2018 0738    ALKPHOS 49 (L) 11/28/2017 0801    AST 14  11/28/2017 0801    ALT 10 11/28/2017 0801    BILITOT 0.6 11/28/2017 0801    ESTGFRAFRICA >60.0 03/15/2018 0738    EGFRNONAA >60.0 03/15/2018 0738        Lab Results   Component Value Date    WBC 7.60 08/21/2017    HGB 12.7 (L) 08/21/2017    HCT 36.9 (L) 08/21/2017    MCV 89 08/21/2017     08/21/2017     Lab Results   Component Value Date    HGBA1C 6.1 (H) 11/28/2017     Lab Results   Component Value Date    CHOL 112 (L) 11/28/2017    CHOL 94 (L) 08/21/2017    CHOL 105 (L) 05/11/2017     Lab Results   Component Value Date    HDL 41 11/28/2017    HDL 36 (L) 08/21/2017    HDL 40 05/11/2017     Lab Results   Component Value Date    LDLCALC 52.6 (L) 11/28/2017    LDLCALC 39.2 (L) 08/21/2017    LDLCALC 42.4 (L) 05/11/2017     Lab Results   Component Value Date    TRIG 92 11/28/2017    TRIG 94 08/21/2017    TRIG 113 05/11/2017     Lab Results   Component Value Date    CHOLHDL 36.6 11/28/2017    CHOLHDL 38.3 08/21/2017    CHOLHDL 38.1 05/11/2017     Date of Procedure: 06/18/2018    PRE-TEST DATA   Resting RENZO:    The right ankle brachial index was 0.86 which suggests mild right lower extremity arterial disease.   The left ankle brachial index was 0.96 which suggests minimal left lower extremity arterial disease.   The right TBI is 0.71.   The left TBI is 0.69.   Waveform analysis are compatible with the above findings.             This document has been electronically    SIGNED BY: Deniz Sadler MD On: 06/18/2018 17:32    Narrative     Date of Procedure: 06/18/2018        TEST DESCRIPTION       Aorta: The aortic root is normal in size, measuring 4.0 cm at sinotubular junction and 3.4 cm at Sinuses of Valsalva.     Left Atrium: The left atrial volume index is severely enlarged, measuring 55.19 cc/m2.     Left Ventricle: The left ventricle is normal in size, with an end-diastolic diameter of 4.3 cm, and an end-systolic diameter of 3.6 cm. Septal wall thickness is mildly increased, and posterior wall thickness is  increased, with the septum measuring 1.4 cm   and the posterior wall measuring 1.3 cm across. Relative wall thickness was increased at 0.60, and the LV mass index was increased at 130.9 g/m2 consistent with mild concentric left ventricular hypertrophy. There are no regional wall motion   abnormalities. Left ventricular systolic function appears normal. Visually estimated ejection fraction is 55-60%. The LV Doppler derived stroke volume equals 81.0 ccs.     Diastolic indices: E wave velocity 0.5 m/s, E/A ratio 0.5,  msec., E/e' ratio(avg) 7. Diastolic function is indeterminate.     Right Atrium: The right atrium is normal in size, measuring 4.5 cm in length and 4.4 cm in width in the apical view.     Right Ventricle: The right ventricle is normal in size measuring 3.4 cm at the base in the apical right ventricle-focused view. Global right ventricular systolic function appears normal. The estimated PA systolic pressure is greater than 15 mmHg.     Aortic Valve:  The aortic valve is normal in structure with normal leaflet mobility. The aortic valve is tri-leaflet in structure.     Mitral Valve:  The mitral valve is normal in structure with normal leaflet mobility.     Tricuspid Valve:  The tricuspid valve is normal in structure.     Pulmonary Valve:  The pulmonic valve is normal in structure.     IVC: The IVC is not visualized.     Intracavitary: There is no evidence of pericardial effusion, intracavity mass, thrombi, or vegetation.         CONCLUSIONS     1 - Severe left atrial enlargement.     2 - Concentric hypertrophy.     3 - No wall motion abnormalities.     4 - Normal left ventricular systolic function (EF 55-60%).     5 - Indeterminate LV diastolic function.     6 - Normal right ventricular systolic function .             This document has been electronically    SIGNED BY: Valentin Castano MD On: 06/18/2018 13:55           Assessment:       1. Chronic ischemic heart disease    2. Coronary artery  disease of bypass graft of native heart with stable angina pectoris    3. Essential hypertension    4. RBBB    5. Bilateral carotid artery disease    6. Diabetes mellitus type 2 with peripheral artery disease    7. Hyperlipidemia associated with type 2 diabetes mellitus    8. Thoracic aorta atherosclerosis    9. Claudication in peripheral vascular disease    10. Peripheral vascular disease    11. AP (angina pectoris)    12. Asymptomatic stenosis of both carotid arteries without infarction         Plan:             Stable chronic angina/CAD.    Stable chronic PAD/claudication sxs with preserved RENZO B LE.  Stable other CV conditions.  Test results reviewed with pt in detail.  Continue optimal medical tx for CV conditions.  Cardiac diet  Exercise as tolerated.    F/u 6 months with labs.

## 2018-07-26 RX ORDER — GLIPIZIDE 5 MG/1
TABLET ORAL
Qty: 60 TABLET | Refills: 0 | Status: SHIPPED | OUTPATIENT
Start: 2018-07-26 | End: 2018-10-18 | Stop reason: SDUPTHER

## 2018-07-26 RX ORDER — SIMVASTATIN 40 MG/1
TABLET, FILM COATED ORAL
Qty: 30 TABLET | Refills: 0 | Status: SHIPPED | OUTPATIENT
Start: 2018-07-26 | End: 2018-10-18 | Stop reason: SDUPTHER

## 2018-07-26 RX ORDER — METOPROLOL TARTRATE 50 MG/1
TABLET ORAL
Qty: 60 TABLET | Refills: 0 | Status: SHIPPED | OUTPATIENT
Start: 2018-07-26 | End: 2018-11-07 | Stop reason: SDUPTHER

## 2018-08-20 RX ORDER — METFORMIN HYDROCHLORIDE 850 MG/1
TABLET ORAL
Qty: 60 TABLET | Refills: 0 | OUTPATIENT
Start: 2018-08-20

## 2018-08-20 RX ORDER — CLOPIDOGREL BISULFATE 75 MG/1
TABLET ORAL
Qty: 30 TABLET | Refills: 0 | OUTPATIENT
Start: 2018-08-20

## 2018-08-20 RX ORDER — DULAGLUTIDE 1.5 MG/.5ML
INJECTION, SOLUTION SUBCUTANEOUS
Qty: 2 ML | Refills: 0 | OUTPATIENT
Start: 2018-08-20

## 2018-08-21 RX ORDER — CLOPIDOGREL BISULFATE 75 MG/1
TABLET ORAL
Qty: 21 TABLET | Refills: 0 | Status: SHIPPED | OUTPATIENT
Start: 2018-08-21 | End: 2018-09-10 | Stop reason: SDUPTHER

## 2018-08-21 RX ORDER — CLOPIDOGREL BISULFATE 75 MG/1
75 TABLET ORAL DAILY
Qty: 10 TABLET | Refills: 0 | Status: SHIPPED | OUTPATIENT
Start: 2018-08-21 | End: 2018-09-26 | Stop reason: SDUPTHER

## 2018-08-21 RX ORDER — METFORMIN HYDROCHLORIDE 850 MG/1
850 TABLET ORAL 2 TIMES DAILY
Qty: 20 TABLET | Refills: 0 | Status: SHIPPED | OUTPATIENT
Start: 2018-08-21 | End: 2018-09-26 | Stop reason: SDUPTHER

## 2018-09-06 ENCOUNTER — TELEPHONE (OUTPATIENT)
Dept: FAMILY MEDICINE | Facility: CLINIC | Age: 78
End: 2018-09-06

## 2018-09-06 ENCOUNTER — LAB VISIT (OUTPATIENT)
Dept: LAB | Facility: HOSPITAL | Age: 78
End: 2018-09-06
Attending: FAMILY MEDICINE
Payer: MEDICARE

## 2018-09-06 DIAGNOSIS — E11.9 DIABETES MELLITUS WITHOUT COMPLICATION: ICD-10-CM

## 2018-09-06 DIAGNOSIS — E11.9 DIABETES MELLITUS WITHOUT COMPLICATION: Primary | ICD-10-CM

## 2018-09-06 DIAGNOSIS — E78.5 HYPERLIPIDEMIA, UNSPECIFIED HYPERLIPIDEMIA TYPE: ICD-10-CM

## 2018-09-06 DIAGNOSIS — E07.9 THYROID DISEASE: ICD-10-CM

## 2018-09-06 LAB
ALBUMIN SERPL BCP-MCNC: 3.9 G/DL
ALP SERPL-CCNC: 84 U/L
ALT SERPL W/O P-5'-P-CCNC: 12 U/L
ANION GAP SERPL CALC-SCNC: 9 MMOL/L
AST SERPL-CCNC: 12 U/L
BASOPHILS # BLD AUTO: 0.03 K/UL
BASOPHILS NFR BLD: 0.3 %
BILIRUB SERPL-MCNC: 0.6 MG/DL
BUN SERPL-MCNC: 20 MG/DL
CALCIUM SERPL-MCNC: 10 MG/DL
CHLORIDE SERPL-SCNC: 99 MMOL/L
CHOLEST SERPL-MCNC: 98 MG/DL
CHOLEST/HDLC SERPL: 2.7 {RATIO}
CO2 SERPL-SCNC: 25 MMOL/L
CREAT SERPL-MCNC: 1.4 MG/DL
DIFFERENTIAL METHOD: ABNORMAL
EOSINOPHIL # BLD AUTO: 0.3 K/UL
EOSINOPHIL NFR BLD: 2.7 %
ERYTHROCYTE [DISTWIDTH] IN BLOOD BY AUTOMATED COUNT: 12.4 %
EST. GFR  (AFRICAN AMERICAN): 54.9 ML/MIN/1.73 M^2
EST. GFR  (NON AFRICAN AMERICAN): 47.4 ML/MIN/1.73 M^2
ESTIMATED AVG GLUCOSE: 266 MG/DL
GLUCOSE SERPL-MCNC: 398 MG/DL
HBA1C MFR BLD HPLC: 10.9 %
HCT VFR BLD AUTO: 41 %
HDLC SERPL-MCNC: 36 MG/DL
HDLC SERPL: 36.7 %
HGB BLD-MCNC: 13.4 G/DL
IMM GRANULOCYTES # BLD AUTO: 0.03 K/UL
IMM GRANULOCYTES NFR BLD AUTO: 0.3 %
LDLC SERPL CALC-MCNC: 17.6 MG/DL
LYMPHOCYTES # BLD AUTO: 2.9 K/UL
LYMPHOCYTES NFR BLD: 30.5 %
MCH RBC QN AUTO: 29.7 PG
MCHC RBC AUTO-ENTMCNC: 32.7 G/DL
MCV RBC AUTO: 91 FL
MONOCYTES # BLD AUTO: 0.6 K/UL
MONOCYTES NFR BLD: 6.4 %
NEUTROPHILS # BLD AUTO: 5.7 K/UL
NEUTROPHILS NFR BLD: 59.8 %
NONHDLC SERPL-MCNC: 62 MG/DL
NRBC BLD-RTO: 0 /100 WBC
PLATELET # BLD AUTO: 263 K/UL
PMV BLD AUTO: 11.4 FL
POTASSIUM SERPL-SCNC: 4.7 MMOL/L
PROT SERPL-MCNC: 6.7 G/DL
RBC # BLD AUTO: 4.51 M/UL
SODIUM SERPL-SCNC: 133 MMOL/L
TRIGL SERPL-MCNC: 222 MG/DL
TSH SERPL DL<=0.005 MIU/L-ACNC: 2.27 UIU/ML
WBC # BLD AUTO: 9.48 K/UL

## 2018-09-06 PROCEDURE — 36415 COLL VENOUS BLD VENIPUNCTURE: CPT | Mod: PO

## 2018-09-06 PROCEDURE — 85025 COMPLETE CBC W/AUTO DIFF WBC: CPT

## 2018-09-06 PROCEDURE — 80061 LIPID PANEL: CPT

## 2018-09-06 PROCEDURE — 83036 HEMOGLOBIN GLYCOSYLATED A1C: CPT

## 2018-09-06 PROCEDURE — 84443 ASSAY THYROID STIM HORMONE: CPT

## 2018-09-06 PROCEDURE — 80053 COMPREHEN METABOLIC PANEL: CPT

## 2018-09-10 ENCOUNTER — OFFICE VISIT (OUTPATIENT)
Dept: FAMILY MEDICINE | Facility: CLINIC | Age: 78
End: 2018-09-10
Payer: MEDICARE

## 2018-09-10 VITALS
BODY MASS INDEX: 24.48 KG/M2 | OXYGEN SATURATION: 99 % | TEMPERATURE: 98 F | SYSTOLIC BLOOD PRESSURE: 160 MMHG | HEART RATE: 70 BPM | DIASTOLIC BLOOD PRESSURE: 86 MMHG | WEIGHT: 170.63 LBS

## 2018-09-10 DIAGNOSIS — N18.30 STAGE 3 CHRONIC KIDNEY DISEASE: ICD-10-CM

## 2018-09-10 DIAGNOSIS — J44.9 CHRONIC OBSTRUCTIVE PULMONARY DISEASE, UNSPECIFIED COPD TYPE: ICD-10-CM

## 2018-09-10 DIAGNOSIS — Z00.00 WELL ADULT EXAM: Primary | ICD-10-CM

## 2018-09-10 DIAGNOSIS — I50.22 CHRONIC SYSTOLIC HEART FAILURE: ICD-10-CM

## 2018-09-10 DIAGNOSIS — I10 ESSENTIAL HYPERTENSION: Chronic | ICD-10-CM

## 2018-09-10 PROCEDURE — 3079F DIAST BP 80-89 MM HG: CPT | Mod: CPTII,,, | Performed by: FAMILY MEDICINE

## 2018-09-10 PROCEDURE — 99999 PR PBB SHADOW E&M-EST. PATIENT-LVL IV: CPT | Mod: PBBFAC,,, | Performed by: FAMILY MEDICINE

## 2018-09-10 PROCEDURE — 99397 PER PM REEVAL EST PAT 65+ YR: CPT | Mod: S$PBB,,, | Performed by: FAMILY MEDICINE

## 2018-09-10 PROCEDURE — 99214 OFFICE O/P EST MOD 30 MIN: CPT | Mod: PBBFAC,PO | Performed by: FAMILY MEDICINE

## 2018-09-10 PROCEDURE — 3077F SYST BP >= 140 MM HG: CPT | Mod: CPTII,,, | Performed by: FAMILY MEDICINE

## 2018-09-10 PROCEDURE — 99499 UNLISTED E&M SERVICE: CPT | Mod: HCNC,S$GLB,, | Performed by: FAMILY MEDICINE

## 2018-09-10 RX ORDER — LOSARTAN POTASSIUM 50 MG/1
50 TABLET ORAL DAILY
Qty: 90 TABLET | Refills: 3 | Status: SHIPPED | OUTPATIENT
Start: 2018-09-10 | End: 2018-09-18 | Stop reason: SDUPTHER

## 2018-09-10 RX ORDER — INSULIN GLARGINE 100 [IU]/ML
15 INJECTION, SOLUTION SUBCUTANEOUS NIGHTLY
Qty: 15 ML | Refills: 12 | Status: SHIPPED | OUTPATIENT
Start: 2018-09-10 | End: 2018-09-17

## 2018-09-10 RX ORDER — LISINOPRIL 40 MG/1
40 TABLET ORAL DAILY
Qty: 30 TABLET | Refills: 6 | Status: SHIPPED | OUTPATIENT
Start: 2018-09-10 | End: 2018-09-10

## 2018-09-10 NOTE — PROGRESS NOTES
Chief Complaint:    Chief Complaint   Patient presents with    Medication Refill       History of Present Illness:    He is here lost to follow-up after long time.  His diabetes is gone through the roof A1c is 10.9.  He is taking Trulicity.  He has not been checking his blood sugars.    Patient has heart failure which is compensated following with Cardiology and has coronary artery disease.    His blood pressure is elevated today he says it is has run high at home also.  His lisinopril was decreased possibly secondary to hyperkalemia.    ROS:  Review of Systems   Constitutional: Negative for activity change, chills, fatigue, fever and unexpected weight change.   HENT: Negative for congestion, ear discharge, ear pain, hearing loss, postnasal drip and rhinorrhea.    Eyes: Negative for pain and visual disturbance.   Respiratory: Negative for cough, chest tightness and shortness of breath.    Cardiovascular: Negative for chest pain and palpitations.   Gastrointestinal: Negative for abdominal pain, diarrhea and vomiting.   Endocrine: Negative for heat intolerance.   Genitourinary: Negative for dysuria, flank pain, frequency and hematuria.   Musculoskeletal: Negative for back pain, gait problem and neck pain.   Skin: Negative for color change and rash.   Neurological: Negative for dizziness, tremors, seizures, numbness and headaches.   Psychiatric/Behavioral: Negative for agitation, hallucinations, self-injury, sleep disturbance and suicidal ideas. The patient is not nervous/anxious.        Past Medical History:   Diagnosis Date    Abnormal brain MRI 1/21/2018    Chronic microvascular ischemia changes per MRI July 9, 2007 in [facility]    Abnormal ECG 10/31/2013    Abnormal stress test 1/28/2016    Alcohol dependence     States alcohol abuse ended in 1994    AP (angina pectoris) 1/28/2016    Asthma     Cataract     Chronic ischemic heart disease 10/31/2013    CKD (chronic kidney disease) stage 3, GFR 30-59  ml/min 11/4/2015    Coronary artery disease     DM (diabetes mellitus) 2013    BS doesn't check 03/28/2017     Heart valve regurgitation 11/4/2015    Echocardiogram 2/15/16   1 - Concentric hypertrophy.    2 - Normal left ventricular systolic function (EF 60-65%).    3 - Normal left ventricular diastolic function.    4 - Mild left atrial enlargement.    5 - Normal right ventricular systolic function .    6 - Trivial to mild aortic regurgitation.    7 - Trivial to mild mitral regurgitation.  10 - Trivial to mild pulmonic regurgitation.     History of alcohol abuse 1/22/2018    Patient denies drinking to excess since 1994.    History of atherectomy 1/21/2018 2/2016 [facility]    History of chronic kidney disease 1/22/2018    History of CKD 3    History of PTCA 10/31/2013    History of PTCA 3/9/2016    Hyperlipidemia     Hypertension     Insomnia 6/17/2013    Ischemic cardiomyopathy 10/31/2013    Long term (current) use of antithrombotics/antiplatelets 1/21/2018    Myocardial infarction     Old MI (myocardial infarction) 1994    Peripheral vascular disease     Polyneuropathy     RBBB 10/31/2013    S/P CABG (coronary artery bypass graft) 10/31/2013    Shortness of breath 10/31/2013    Tobacco dependence     resolved    Trouble in sleeping     Type 2 diabetes with peripheral circulatory disorder, controlled     Wears glasses        Social History:  Social History     Socioeconomic History    Marital status:      Spouse name: None    Number of children: 5    Years of education: None    Highest education level: None   Social Needs    Financial resource strain: None    Food insecurity - worry: None    Food insecurity - inability: None    Transportation needs - medical: None    Transportation needs - non-medical: None   Occupational History    None   Tobacco Use    Smoking status: Former Smoker     Packs/day: 1.50     Years: 40.00     Pack years: 60.00     Types: Cigarettes     Last  attempt to quit: 1994     Years since quittin.7    Smokeless tobacco: Former User     Quit date: 2011    Tobacco comment: approx date   Substance and Sexual Activity    Alcohol use: Yes     Comment: occasionally; 1-2 cans of beer a month    Drug use: No    Sexual activity: No     Birth control/protection: None   Other Topics Concern    None   Social History Narrative    None       Family History:   family history includes Diabetes in his brother and sister. He was adopted.    Health Maintenance   Topic Date Due    Influenza Vaccine  2018    Foot Exam  2019    Hemoglobin A1c  2019    Eye Exam  2019    Lipid Panel  2019    TETANUS VACCINE  2024    Colonoscopy  2027    Zoster Vaccine  Completed    Pneumococcal (65+)  Completed       Physical Exam:    Vital Signs  Temp: 98.3 °F (36.8 °C)  Temp src: Tympanic  Pulse: 70  SpO2: 99 %  BP: (!) 160/86  BP Location: Left arm  Patient Position: Sitting  Pain Score: 0-No pain  Height and Weight  Weight: 77.4 kg (170 lb 10.2 oz)]    Body mass index is 24.48 kg/m².    Physical Exam   Constitutional: He is oriented to person, place, and time. He appears well-developed.   HENT:   Mouth/Throat: Oropharynx is clear and moist.   Eyes: Conjunctivae are normal. Pupils are equal, round, and reactive to light.   Neck: Normal range of motion. Neck supple.   Cardiovascular: Normal rate, regular rhythm and normal heart sounds.   No murmur heard.  Pulmonary/Chest: Effort normal and breath sounds normal. No respiratory distress. He has no wheezes. He has no rales. He exhibits no tenderness.   Abdominal: Soft. He exhibits no distension and no mass. There is no tenderness. There is no guarding.   Musculoskeletal: He exhibits no edema or tenderness.   Lymphadenopathy:     He has no cervical adenopathy.   Neurological: He is alert and oriented to person, place, and time. He has normal reflexes.   Skin: Skin is warm and dry.    Psychiatric: He has a normal mood and affect. His behavior is normal. Judgment and thought content normal.         Diabetes Management Status    Statin: Taking  ACE/ARB: Taking    Screening or Prevention Patient's value Goal Complete/Controlled?   HgA1C Testing and Control   Lab Results   Component Value Date    HGBA1C 10.9 (H) 09/06/2018      Annually/Less than 8% No   Lipid profile : 09/06/2018 Annually Yes   LDL control Lab Results   Component Value Date    LDLCALC 17.6 (L) 09/06/2018    Annually/Less than 100 mg/dl  Yes   Nephropathy screening Lab Results   Component Value Date    LABMICR 13.0 11/28/2017     Lab Results   Component Value Date    PROTEINUA Negative 02/13/2017    Annually Yes   Blood pressure BP Readings from Last 1 Encounters:   09/10/18 (!) 160/86    Less than 140/90 No   Dilated retinal exam : 03/28/2018 Annually Yes   Foot exam   : 01/22/2018 Annually Yes       Assessment:      ICD-10-CM ICD-9-CM   1. Well adult exam Z00.00 V70.0   2. Essential hypertension I10 401.9   3. Uncontrolled type 2 diabetes mellitus without complication, without long-term current use of insulin E11.65 250.02   4. Chronic obstructive pulmonary disease, unspecified COPD type J44.9 496   5. Chronic systolic heart failure I50.22 428.22   6. Stage 3 chronic kidney disease N18.3 585.3         Plan:  Patient has resisted going on insulin for awhile but this time of the hazards of uncontrolled diabetes he agreed on starting insulin, will start him on basaglar insulin at 15 units at bedtime, start checking fasting, post prandials blood sugars after lunch and dinner.  We also discussed the signs symptoms and treatment of hypoglycemia the patient.  Will DC lisinopril and changed to losartan 50 mg please start monitoring blood pressure carefully and bring the numbers back  COPD stable  Chronic kidney disease stage 3 possibly related to uncontrolled blood pressure and diabetes.  CHF stable  Follow-up with make  No orders of the  defined types were placed in this encounter.      Current Outpatient Medications   Medication Sig Dispense Refill    albuterol (VENTOLIN HFA) 90 mcg/actuation inhaler Inhale 2 puffs into the lungs every 6 (six) hours as needed for Wheezing.      aspirin 81 MG Chew 1 Tablet, Chewable Oral Every day      BLOOD-GLUCOSE METER (TRUERESULT BLOOD GLUCOSE SYSTM MISC) by Misc.(Non-Drug; Combo Route) route.      clopidogrel (PLAVIX) 75 mg tablet Take 1 tablet (75 mg total) by mouth once daily. 10 tablet 0    dulaglutide (TRULICITY) 1.5 mg/0.5 mL PnIj Inject 1.5 mg into the skin every 7 days. 2 mL 0    EMBRACE PRO TEST STRIPS Strp CHECK BLOOD SUGAR TWICE DAILY. 50 strip 0    finasteride (PROSCAR) 5 mg tablet Take 1 tablet (5 mg total) by mouth once daily. 30 tablet 11    glipiZIDE (GLUCOTROL) 5 MG tablet TAKE 1 TABLET BY MOUTH TWICE A DAY 60 tablet 0    isosorbide mononitrate (IMDUR) 60 MG 24 hr tablet TAKE 1 TABLET BY MOUTH DAILY 30 tablet 12    metFORMIN (GLUCOPHAGE) 850 MG tablet Take 1 tablet (850 mg total) by mouth 2 (two) times daily. 20 tablet 0    metoprolol tartrate (LOPRESSOR) 50 MG tablet TAKE 1 TABLET BY MOUTH TWICE A DAY 60 tablet 0    nitroGLYCERIN (NITROSTAT) 0.4 MG SL tablet Place 1 tablet (0.4 mg total) under the tongue every 5 (five) minutes as needed. 25 tablet 6    oxybutynin (DITROPAN XL) 15 MG TR24 Take 1 tablet (15 mg total) by mouth every evening. 30 tablet 11    simvastatin (ZOCOR) 40 MG tablet TAKE 1 TABLET NIGHTLY AT BEDTIME 30 tablet 0    TRUEPLUS LANCETS 26 gauge Misc CHECK BLOOD SUGAR TWICE DAILY. 100 each 0    TRUERESULT BLOOD GLUCOSE SYSTM kit CHECK BLOOD SUGAR TWICE DAILY. 1 each 0    insulin glargine (LANTUS SOLOSTAR) 100 unit/mL (3 mL) InPn pen Inject 15 Units into the skin every evening. 15 mL 12    losartan (COZAAR) 50 MG tablet Take 1 tablet (50 mg total) by mouth once daily. 90 tablet 3     No current facility-administered medications for this visit.        Medications  Discontinued During This Encounter   Medication Reason    clopidogrel (PLAVIX) 75 mg tablet Duplicate Order    lisinopril 10 MG Tab     INV lisinopril (PRINIVIL,ZESTRIL) 40 MG tablet        Follow-up in about 2 weeks (around 9/24/2018).      Celestine Petersen MD

## 2018-09-10 NOTE — TELEPHONE ENCOUNTER
Spoke with [facility] pharmcist and she says that pt is in coverage gap with his [employer] insurance. Insulin would cost $150. Patient refuses to buy, he doesn't have the money.  I discussed refill request from last week pt only got 10 tablets and we need to refill those meds.   She said that the pt didn't  those rx's of 10 tablets , she will fill the RX . Pt has not been getting his rx's , not on Metformin, Plavix,Trulicity since July.  She will instruct pt to get back on his medications , his sugar may come down when he gets back on his meds without the insulin   Pt doesn't want to take insulin , he is still working and drives a truck.   He will have to retire if he goes on the insulin.   I did speak with Dr Petersen regarding this

## 2018-09-17 ENCOUNTER — CLINICAL SUPPORT (OUTPATIENT)
Dept: FAMILY MEDICINE | Facility: CLINIC | Age: 78
End: 2018-09-17
Payer: MEDICARE

## 2018-09-17 VITALS — SYSTOLIC BLOOD PRESSURE: 142 MMHG | DIASTOLIC BLOOD PRESSURE: 82 MMHG

## 2018-09-17 PROCEDURE — 99999 PR PBB SHADOW E&M-EST. PATIENT-LVL II: CPT | Mod: PBBFAC,,,

## 2018-09-17 PROCEDURE — 99212 OFFICE O/P EST SF 10 MIN: CPT | Mod: PBBFAC,PO

## 2018-09-17 NOTE — TELEPHONE ENCOUNTER
Pt came in for me to check his blood pressure and calibrate his machine.  His machine read 198/98 and 191/90.    I took it manually and it was 142/82.     He is currently taking losartan 50 mg and the lisinopril 20 mg, he didn't stop it after the last visit .  If he stops the lisinopril do you want to go up on the losartan? Or add something else?   His blood pressure at home with that wrist cuff was 145-198/    His sugars are 184-234 fasting in the morning  Taking Metformin 850 BID , glipizide 5 mg BID and Trulicity weekly

## 2018-09-17 NOTE — TELEPHONE ENCOUNTER
He is to stop the lisinopril and increase the losartan to 100 mg.  He was started on insulin he will need insulin to control his sugars at this time.  He needs to take it.  We can try to find a cheaper insulin like 70 /30

## 2018-09-17 NOTE — PROGRESS NOTES
Pt came into the clinic today for blood pressure check, he brought his machine in with  Him. His machine checked his blood pressure at 198/98 and 191/91. I manually took his blood pressure and it was 142/82.  He was relived that his blood pressure has not really been running as high as he thought from the numbers he has been getting with the wrist machine .  He will purchase a new machine.   I also went over his medications with him , his blood sugars that he brought in that he has been checking at home are running very high. He was not able to purchase the insulin , he is in the whole with his insurance and they will not pay for anymore of his medications until the 1st of the year. He was not able to pay out of pocket for the insulin.   Also he has been taking lisinopril 20 mg that was to be stopped at the last visit. I will inform dr Petersen of this and call pt back with his new orders

## 2018-09-18 RX ORDER — LOSARTAN POTASSIUM 100 MG/1
100 TABLET ORAL DAILY
Qty: 90 TABLET | Refills: 3 | Status: SHIPPED | OUTPATIENT
Start: 2018-09-18 | End: 2020-06-15 | Stop reason: SDUPTHER

## 2018-09-18 NOTE — TELEPHONE ENCOUNTER
Spoke with pt and gave instructions to stop lisinopril and increase the losartan.  He voiced understanding

## 2018-09-18 NOTE — TELEPHONE ENCOUNTER
----- Message from Tabitha Muñiz sent at 9/18/2018  8:18 AM CDT -----  Type: Needs Medical Advice    Who Called:  Self Symptoms (please be specific):  NAHow long has patient had these symptoms:  LIZ  Pharmacy name and phone #:  [facility]  Best Call Back Number: [phone]  Additional Information: Patient asking to speak with the office.

## 2018-09-18 NOTE — TELEPHONE ENCOUNTER
----- Message from Kenia Lawson sent at 9/18/2018  1:09 PM CDT -----  Contact: pt  Please call pt @ [phone] pt have some questions.

## 2018-09-24 ENCOUNTER — OFFICE VISIT (OUTPATIENT)
Dept: FAMILY MEDICINE | Facility: CLINIC | Age: 78
End: 2018-09-24
Payer: MEDICARE

## 2018-09-24 VITALS
TEMPERATURE: 99 F | HEIGHT: 69 IN | OXYGEN SATURATION: 99 % | BODY MASS INDEX: 25.14 KG/M2 | DIASTOLIC BLOOD PRESSURE: 70 MMHG | WEIGHT: 169.75 LBS | SYSTOLIC BLOOD PRESSURE: 160 MMHG

## 2018-09-24 DIAGNOSIS — E11.51 DIABETES MELLITUS TYPE 2 WITH PERIPHERAL ARTERY DISEASE: ICD-10-CM

## 2018-09-24 DIAGNOSIS — I10 ESSENTIAL HYPERTENSION: Primary | Chronic | ICD-10-CM

## 2018-09-24 PROCEDURE — 99214 OFFICE O/P EST MOD 30 MIN: CPT | Mod: S$PBB,,, | Performed by: FAMILY MEDICINE

## 2018-09-24 PROCEDURE — 99999 PR PBB SHADOW E&M-EST. PATIENT-LVL III: CPT | Mod: PBBFAC,,, | Performed by: FAMILY MEDICINE

## 2018-09-24 PROCEDURE — 1101F PT FALLS ASSESS-DOCD LE1/YR: CPT | Mod: CPTII,,, | Performed by: FAMILY MEDICINE

## 2018-09-24 PROCEDURE — 99213 OFFICE O/P EST LOW 20 MIN: CPT | Mod: PBBFAC,PO | Performed by: FAMILY MEDICINE

## 2018-09-24 PROCEDURE — 3078F DIAST BP <80 MM HG: CPT | Mod: CPTII,,, | Performed by: FAMILY MEDICINE

## 2018-09-24 PROCEDURE — 3077F SYST BP >= 140 MM HG: CPT | Mod: CPTII,,, | Performed by: FAMILY MEDICINE

## 2018-09-24 RX ORDER — AMLODIPINE BESYLATE 10 MG/1
10 TABLET ORAL DAILY
Qty: 30 TABLET | Refills: 11 | Status: SHIPPED | OUTPATIENT
Start: 2018-09-24 | End: 2020-06-15 | Stop reason: SDUPTHER

## 2018-09-24 NOTE — PROGRESS NOTES
Chief Complaint:    Chief Complaint   Patient presents with    Follow-up       History of Present Illness:    He is here lost to follow-up after long time.  He is here for follow-up he brings his blood sugar readings there 200+ fasting he was asked to take insulin but he says he cannot take insulin because he has to work any supporting his son.  He says he does not even have any money left.  His blood pressure readings are also running high most of them are higher than 140s.  ROS:  Review of Systems   Constitutional: Negative for activity change, chills, fatigue, fever and unexpected weight change.   HENT: Negative for congestion, ear discharge, ear pain, hearing loss, postnasal drip and rhinorrhea.    Eyes: Negative for pain and visual disturbance.   Respiratory: Negative for cough, chest tightness and shortness of breath.    Cardiovascular: Negative for chest pain and palpitations.   Gastrointestinal: Negative for abdominal pain, diarrhea and vomiting.   Endocrine: Negative for heat intolerance.   Genitourinary: Negative for dysuria, flank pain, frequency and hematuria.   Musculoskeletal: Negative for back pain, gait problem and neck pain.   Skin: Negative for color change and rash.   Neurological: Negative for dizziness, tremors, seizures, numbness and headaches.   Psychiatric/Behavioral: Negative for agitation, hallucinations, self-injury, sleep disturbance and suicidal ideas. The patient is not nervous/anxious.        Past Medical History:   Diagnosis Date    Abnormal brain MRI 1/21/2018    Chronic microvascular ischemia changes per MRI July 9, 2007 in [facility]    Abnormal ECG 10/31/2013    Abnormal stress test 1/28/2016    Alcohol dependence     States alcohol abuse ended in 1994    AP (angina pectoris) 1/28/2016    Asthma     Cataract     Chronic ischemic heart disease 10/31/2013    CKD (chronic kidney disease) stage 3, GFR 30-59 ml/min 11/4/2015    Coronary artery disease     DM (diabetes  mellitus)     BS doesn't check 2017     Heart valve regurgitation 2015    Echocardiogram 2/15/16   1 - Concentric hypertrophy.    2 - Normal left ventricular systolic function (EF 60-65%).    3 - Normal left ventricular diastolic function.    4 - Mild left atrial enlargement.    5 - Normal right ventricular systolic function .    6 - Trivial to mild aortic regurgitation.    7 - Trivial to mild mitral regurgitation.  10 - Trivial to mild pulmonic regurgitation.     History of alcohol abuse 2018    Patient denies drinking to excess since .    [facility] of [facility]herectomy 2018 [facility]    History of chronic kidney disease 2018    History of CKD 3    History of PTCA 10/31/2013    History of PTCA 3/9/2016    Hyperlipidemia     Hypertension     Insomnia 2013    Ischemic cardiomyopathy 10/31/2013    Long term (current) use of antithrombotics/antiplatelets 2018    Myocardial infarction     Old MI (myocardial infarction)     Peripheral vascular disease     Polyneuropathy     RBBB 10/31/2013    S/P CABG (coronary artery bypass graft) 10/31/2013    Shortness of breath 10/31/2013    Tobacco dependence     resolved    Trouble in sleeping     Type 2 diabetes with peripheral circulatory disorder, controlled     Wears glasses        Social History:  Social History     Socioeconomic History    Marital status:      Spouse name: None    Number of children: 5    Years of education: None    Highest education level: None   Social Needs    Financial resource strain: None    Food insecurity - worry: None    Food insecurity - inability: None    Transportation needs - medical: None    Transportation needs - non-medical: None   Occupational History    None   Tobacco Use    Smoking status: Former Smoker     Packs/day: 1.50     Years: 40.00     Pack years: 60.00     Types: Cigarettes     Last attempt to quit: 1994     Years since quittin.7     "Smokeless tobacco: Former User     Quit date: 1/1/2011    Tobacco comment: approx date   Substance and Sexual Activity    Alcohol use: Yes     Comment: occasionally; 1-2 cans of beer a month    Drug use: No    Sexual activity: No     Birth control/protection: None   Other Topics Concern    None   Social History Narrative    None       Family History:   family history includes Diabetes in his brother and sister. He was adopted.    Health Maintenance   Topic Date Due    Influenza Vaccine  08/01/2018    Foot Exam  01/22/2019    Hemoglobin A1c  03/06/2019    Eye Exam  03/28/2019    Lipid Panel  09/06/2019    TETANUS VACCINE  04/03/2024    Colonoscopy  05/22/2027    Zoster Vaccine  Completed    Pneumococcal (65+)  Completed       Physical Exam:    Vital Signs  Temp: 98.5 °F (36.9 °C)  Temp src: Tympanic  SpO2: 99 %  BP: (!) 160/70  BP Location: Left arm  Patient Position: Sitting  Pain Score: 0-No pain  Height and Weight  Height: 5' 9" (175.3 cm)  Weight: 77 kg (169 lb 12.1 oz)  BSA (Calculated - sq m): 1.94 sq meters  BMI (Calculated): 25.1  Weight in (lb) to have BMI = 25: 168.9]    Body mass index is 25.07 kg/m².    Physical Exam   Constitutional: He is oriented to person, place, and time. He appears well-developed.   HENT:   Mouth/Throat: Oropharynx is clear and moist.   Eyes: Conjunctivae are normal. Pupils are equal, round, and reactive to light.   Neck: Normal range of motion. Neck supple.   Cardiovascular: Normal rate, regular rhythm and normal heart sounds.   No murmur heard.  Pulmonary/Chest: Effort normal and breath sounds normal. No respiratory distress. He has no wheezes. He has no rales. He exhibits no tenderness.   Abdominal: Soft. He exhibits no distension and no mass. There is no tenderness. There is no guarding.   Musculoskeletal: He exhibits no edema or tenderness.   Lymphadenopathy:     He has no cervical adenopathy.   Neurological: He is alert and oriented to person, place, and time. " He has normal reflexes.   Skin: Skin is warm and dry.   Psychiatric: He has a normal mood and affect. His behavior is normal. Judgment and thought content normal.         Diabetes Management Status    Statin: Taking  ACE/ARB: Taking    Screening or Prevention Patient's value Goal Complete/Controlled?   HgA1C Testing and Control   Lab Results   Component Value Date    HGBA1C 10.9 (H) 09/06/2018      Annually/Less than 8% No   Lipid profile : 09/06/2018 Annually Yes   LDL control Lab Results   Component Value Date    LDLCALC 17.6 (L) 09/06/2018    Annually/Less than 100 mg/dl  Yes   Nephropathy screening Lab Results   Component Value Date    LABMICR 13.0 11/28/2017     Lab Results   Component Value Date    PROTEINUA Negative 02/13/2017    Annually Yes   Blood pressure BP Readings from Last 1 Encounters:   09/24/18 (!) 160/70    Less than 140/90 No   Dilated retinal exam : 03/28/2018 Annually Yes   Foot exam   : 01/22/2018 Annually Yes       Assessment:      ICD-10-CM ICD-9-CM   1. Essential hypertension I10 401.9   2. Diabetes mellitus type 2 with peripheral artery disease E11.51 250.70     443.81         Plan:  I change into insulin 70/30 but patient is absolutely adamant that he is not getting start insulin because if he does that he will have to give up driving and he needs him any now.  Explained to him that without insulin it will be very difficult to control his blood sugars and continued high blood sugars will pose seizures threat to his well being.  This can cause seizures complications and even premature death.  He understands that but he is not willing to change his lifestyle  Will add amlodipine to lower his blood pressure keep monitoring the blood pressure and blood sugars bring the numbers back in 2-3 weeks    Follow-up with make  No orders of the defined types were placed in this encounter.      Current Outpatient Medications   Medication Sig Dispense Refill    albuterol (VENTOLIN HFA) 90 mcg/actuation  inhaler Inhale 2 puffs into the lungs every 6 (six) hours as needed for Wheezing.      aspirin 81 MG Chew 1 Tablet, Chewable Oral Every day      BLOOD-GLUCOSE METER (TRUERESULT BLOOD GLUCOSE SYSTM MISC) by Misc.(Non-Drug; Combo Route) route.      clopidogrel (PLAVIX) 75 mg tablet Take 1 tablet (75 mg total) by mouth once daily. 10 tablet 0    dulaglutide (TRULICITY) 1.5 mg/0.5 mL PnIj Inject 1.5 mg into the skin every 7 days. 2 mL 0    EMBRACE PRO TEST STRIPS Strp CHECK BLOOD SUGAR TWICE DAILY. 50 strip 0    finasteride (PROSCAR) 5 mg tablet Take 1 tablet (5 mg total) by mouth once daily. 30 tablet 11    glipiZIDE (GLUCOTROL) 5 MG tablet TAKE 1 TABLET BY MOUTH TWICE A DAY 60 tablet 0    isosorbide mononitrate (IMDUR) 60 MG 24 hr tablet TAKE 1 TABLET BY MOUTH DAILY 30 tablet 12    losartan (COZAAR) 100 MG tablet Take 1 tablet (100 mg total) by mouth once daily. 90 tablet 3    metFORMIN (GLUCOPHAGE) 850 MG tablet Take 1 tablet (850 mg total) by mouth 2 (two) times daily. 20 tablet 0    metoprolol tartrate (LOPRESSOR) 50 MG tablet TAKE 1 TABLET BY MOUTH TWICE A DAY 60 tablet 0    nitroGLYCERIN (NITROSTAT) 0.4 MG SL tablet Place 1 tablet (0.4 mg total) under the tongue every 5 (five) minutes as needed. 25 tablet 6    oxybutynin (DITROPAN XL) 15 MG TR24 Take 1 tablet (15 mg total) by mouth every evening. 30 tablet 11    simvastatin (ZOCOR) 40 MG tablet TAKE 1 TABLET NIGHTLY AT BEDTIME 30 tablet 0    TRUEPLUS LANCETS 26 gauge Misc CHECK BLOOD SUGAR TWICE DAILY. 100 each 0    TRUERESULT BLOOD GLUCOSE SYSTM kit CHECK BLOOD SUGAR TWICE DAILY. 1 each 0    amLODIPine (NORVASC) 10 MG tablet Take 1 tablet (10 mg total) by mouth once daily. 30 tablet 11    insulin NPH-insulin regular, 70/30, (NOVOLIN 70/30) 100 unit/mL (70-30) injection Inject 10 Units into the skin 2 (two) times daily. 18 mL 3     No current facility-administered medications for this visit.        There are no discontinued  medications.    Follow-up in about 3 weeks (around 10/15/2018).      Celestine Petersen MD

## 2018-09-26 RX ORDER — CLOPIDOGREL BISULFATE 75 MG/1
TABLET ORAL
Qty: 30 TABLET | Refills: 0 | Status: SHIPPED | OUTPATIENT
Start: 2018-09-26 | End: 2018-09-27 | Stop reason: SDUPTHER

## 2018-09-26 RX ORDER — ISOSORBIDE MONONITRATE 60 MG/1
TABLET, EXTENDED RELEASE ORAL
Qty: 30 TABLET | Refills: 12 | Status: SHIPPED | OUTPATIENT
Start: 2018-09-26 | End: 2019-01-21 | Stop reason: SDUPTHER

## 2018-09-26 RX ORDER — METFORMIN HYDROCHLORIDE 850 MG/1
TABLET ORAL
Qty: 60 TABLET | Refills: 0 | Status: SHIPPED | OUTPATIENT
Start: 2018-09-26 | End: 2018-09-27 | Stop reason: SDUPTHER

## 2018-09-28 RX ORDER — CLOPIDOGREL BISULFATE 75 MG/1
75 TABLET ORAL DAILY
Qty: 30 TABLET | Refills: 2 | Status: SHIPPED | OUTPATIENT
Start: 2018-09-28 | End: 2019-04-12 | Stop reason: SDUPTHER

## 2018-09-28 RX ORDER — METFORMIN HYDROCHLORIDE 850 MG/1
850 TABLET ORAL 2 TIMES DAILY
Qty: 60 TABLET | Refills: 2 | Status: SHIPPED | OUTPATIENT
Start: 2018-09-28 | End: 2019-04-12 | Stop reason: SDUPTHER

## 2018-10-05 RX ORDER — ALBUTEROL SULFATE 90 UG/1
AEROSOL, METERED RESPIRATORY (INHALATION)
Qty: 18 G | Refills: 0 | Status: SHIPPED | OUTPATIENT
Start: 2018-10-05 | End: 2021-07-27

## 2018-10-15 ENCOUNTER — OFFICE VISIT (OUTPATIENT)
Dept: FAMILY MEDICINE | Facility: CLINIC | Age: 78
End: 2018-10-15
Payer: MEDICARE

## 2018-10-15 VITALS
DIASTOLIC BLOOD PRESSURE: 65 MMHG | HEART RATE: 72 BPM | WEIGHT: 165.25 LBS | SYSTOLIC BLOOD PRESSURE: 144 MMHG | OXYGEN SATURATION: 98 % | HEIGHT: 69 IN | BODY MASS INDEX: 24.48 KG/M2 | TEMPERATURE: 97 F

## 2018-10-15 DIAGNOSIS — R46.89 NON-COMPLIANT BEHAVIOR: ICD-10-CM

## 2018-10-15 DIAGNOSIS — I10 HYPERTENSION NOT AT GOAL: Primary | ICD-10-CM

## 2018-10-15 DIAGNOSIS — E11.51 DIABETES MELLITUS TYPE 2 WITH PERIPHERAL ARTERY DISEASE: ICD-10-CM

## 2018-10-15 PROBLEM — I20.9 AP (ANGINA PECTORIS): Status: RESOLVED | Noted: 2018-07-10 | Resolved: 2018-10-15

## 2018-10-15 PROCEDURE — 99213 OFFICE O/P EST LOW 20 MIN: CPT | Mod: PBBFAC,PO | Performed by: FAMILY MEDICINE

## 2018-10-15 PROCEDURE — 99214 OFFICE O/P EST MOD 30 MIN: CPT | Mod: S$PBB,,, | Performed by: FAMILY MEDICINE

## 2018-10-15 PROCEDURE — 99999 PR PBB SHADOW E&M-EST. PATIENT-LVL III: CPT | Mod: PBBFAC,,, | Performed by: FAMILY MEDICINE

## 2018-10-15 PROCEDURE — 3077F SYST BP >= 140 MM HG: CPT | Mod: CPTII,,, | Performed by: FAMILY MEDICINE

## 2018-10-15 PROCEDURE — 1101F PT FALLS ASSESS-DOCD LE1/YR: CPT | Mod: CPTII,,, | Performed by: FAMILY MEDICINE

## 2018-10-15 PROCEDURE — 3078F DIAST BP <80 MM HG: CPT | Mod: CPTII,,, | Performed by: FAMILY MEDICINE

## 2018-10-15 NOTE — PROGRESS NOTES
Chief Complaint:    Chief Complaint   Patient presents with    Follow-up       History of Present Illness:      He is here for follow-up he is being his blood pressure readings most of his systolic blood pressure is 1 40+ diastolic is 70 LS he denies any side effects to the medication.  His blood sugars still running high fasting is 200+ he is not ready to take insulin.      ROS:  Review of Systems   Constitutional: Negative for activity change, chills, fatigue, fever and unexpected weight change.   HENT: Negative for congestion, ear discharge, ear pain, hearing loss, postnasal drip and rhinorrhea.    Eyes: Negative for pain and visual disturbance.   Respiratory: Negative for cough, chest tightness and shortness of breath.    Cardiovascular: Negative for chest pain and palpitations.   Gastrointestinal: Negative for abdominal pain, diarrhea and vomiting.   Endocrine: Negative for heat intolerance.   Genitourinary: Negative for dysuria, flank pain, frequency and hematuria.   Musculoskeletal: Negative for back pain, gait problem and neck pain.   Skin: Negative for color change and rash.   Neurological: Negative for dizziness, tremors, seizures, numbness and headaches.   Psychiatric/Behavioral: Negative for agitation, hallucinations, self-injury, sleep disturbance and suicidal ideas. The patient is not nervous/anxious.        Past Medical History:   Diagnosis Date    Abnormal brain MRI 1/21/2018    Chronic microvascular ischemia changes per MRI July 9, 2007 in [facility]    Abnormal ECG 10/31/2013    Abnormal stress test 1/28/2016    Alcohol dependence     States alcohol abuse ended in 1994    AP (angina pectoris) 1/28/2016    Asthma     Cataract     Chronic ischemic heart disease 10/31/2013    CKD (chronic kidney disease) stage 3, GFR 30-59 ml/min 11/4/2015    Coronary artery disease     DM (diabetes mellitus) 2013    BS doesn't check 03/28/2017     Heart valve regurgitation 11/4/2015    Echocardiogram  2/15/16   1 - Concentric hypertrophy.    2 - Normal left ventricular systolic function (EF 60-65%).    3 - Normal left ventricular diastolic function.    4 - Mild left atrial enlargement.    5 - Normal right ventricular systolic function .    6 - Trivial to mild aortic regurgitation.    7 - Trivial to mild mitral regurgitation.  10 - Trivial to mild pulmonic regurgitation.     History of alcohol abuse 2018    Patient denies drinking to excess since .    [facility] of [facility]here[facility] 2018 [facility]    History of chronic kidney disease 2018    History of CKD 3    History of PTCA 10/31/2013    History of PTCA 3/9/2016    Hyperlipidemia     Hypertension     Insomnia 2013    Ischemic cardiomyopathy 10/31/2013    Long term (current) use of antithrombotics/antiplatelets 2018    Myocardial infarction     Old MI (myocardial infarction)     Peripheral vascular disease     Polyneuropathy     RBBB 10/31/2013    S/P CABG (coronary artery bypass graft) 10/31/2013    Shortness of breath 10/31/2013    Tobacco dependence     resolved    Trouble in sleeping     Type 2 diabetes with peripheral circulatory disorder, controlled     Wears glasses        Social History:  Social History     Socioeconomic History    Marital status:      Spouse name: None    Number of children: 5    Years of education: None    Highest education level: None   Social Needs    Financial resource strain: None    Food insecurity - worry: None    Food insecurity - inability: None    Transportation needs - medical: None    Transportation needs - non-medical: None   Occupational History    None   Tobacco Use    Smoking status: Former Smoker     Packs/day: 1.50     Years: 40.00     Pack years: 60.00     Types: Cigarettes     Last attempt to quit: 1994     Years since quittin.8    Smokeless tobacco: Former User     Quit date: 2011    Tobacco comment: approx date   Substance and Sexual  "Activity    Alcohol use: Yes     Comment: occasionally; 1-2 cans of beer a month    Drug use: No    Sexual activity: No     Birth control/protection: None   Other Topics Concern    None   Social History Narrative    None       Family History:   family history includes Diabetes in his brother and sister. He was adopted.    Health Maintenance   Topic Date Due    Influenza Vaccine  08/01/2018    Foot Exam  01/22/2019    Hemoglobin A1c  03/06/2019    Eye Exam  03/28/2019    Lipid Panel  09/06/2019    TETANUS VACCINE  04/03/2024    Colonoscopy  05/22/2027    Zoster Vaccine  Completed    Pneumococcal (65+)  Completed       Physical Exam:    Vital Signs  Temp: 96.5 °F (35.8 °C)  Temp src: Tympanic  Pulse: 72  SpO2: 98 %  BP: (!) 144/65  BP Location: Left arm  Patient Position: Sitting  Pain Score: 0-No pain  Height and Weight  Height: 5' 9" (175.3 cm)  Weight: 75 kg (165 lb 3.8 oz)  BSA (Calculated - sq m): 1.91 sq meters  BMI (Calculated): 24.5  Weight in (lb) to have BMI = 25: 168.9]    Body mass index is 24.4 kg/m².    Physical Exam   Constitutional: He is oriented to person, place, and time. He appears well-developed.   HENT:   Mouth/Throat: Oropharynx is clear and moist.   Eyes: Conjunctivae are normal. Pupils are equal, round, and reactive to light.   Neck: Normal range of motion. Neck supple.   Cardiovascular: Normal rate, regular rhythm and normal heart sounds.   No murmur heard.  Pulmonary/Chest: Effort normal and breath sounds normal. No respiratory distress. He has no wheezes. He has no rales. He exhibits no tenderness.   Abdominal: Soft. He exhibits no distension and no mass. There is no tenderness. There is no guarding.   Musculoskeletal: He exhibits no edema or tenderness.   Lymphadenopathy:     He has no cervical adenopathy.   Neurological: He is alert and oriented to person, place, and time. He has normal reflexes.   Skin: Skin is warm and dry.   Psychiatric: He has a normal mood and affect. " His behavior is normal. Judgment and thought content normal.         Diabetes Management Status    Statin: Taking  ACE/ARB: Taking    Screening or Prevention Patient's value Goal Complete/Controlled?   HgA1C Testing and Control   Lab Results   Component Value Date    HGBA1C 10.9 (H) 09/06/2018      Annually/Less than 8% No   Lipid profile : 09/06/2018 Annually Yes   LDL control Lab Results   Component Value Date    LDLCALC 17.6 (L) 09/06/2018    Annually/Less than 100 mg/dl  Yes   Nephropathy screening Lab Results   Component Value Date    LABMICR 13.0 11/28/2017     Lab Results   Component Value Date    PROTEINUA Negative 02/13/2017    Annually Yes   Blood pressure BP Readings from Last 1 Encounters:   10/15/18 (!) 144/65    Less than 140/90 No   Dilated retinal exam : 03/28/2018 Annually Yes   Foot exam   : 01/22/2018 Annually Yes       Assessment:      ICD-10-CM ICD-9-CM   1. Hypertension not at goal I10 401.9   2. Non-compliant behavior R46.89 V40.39   3. Diabetes mellitus type 2 with peripheral artery disease E11.51 250.70     443.81         Plan:  For right now he will continue current meds he has been instructed to bring his blood pressure machine next time  He is maxed out on oral diabetic medication and also taking Trulicity but not ready to take insulin discussed the hazards of uncontrolled diabetes.  He is a  he drives about 11 hr every day and stays in his truck and is not ready to quit.        Orders Placed This Encounter   Procedures    Comprehensive metabolic panel    Lipid panel    Hemoglobin A1c       Current Outpatient Medications   Medication Sig Dispense Refill    albuterol (VENTOLIN HFA) 90 mcg/actuation inhaler Inhale 2 puffs into the lungs every 6 (six) hours as needed for Wheezing.      amLODIPine (NORVASC) 10 MG tablet Take 1 tablet (10 mg total) by mouth once daily. 30 tablet 11    aspirin 81 MG Chew 1 Tablet, Chewable Oral Every day      BLOOD-GLUCOSE METER (TRUERESULT  BLOOD GLUCOSE SYSTM MISC) by Misc.(Non-Drug; Combo Route) route.      clopidogrel (PLAVIX) 75 mg tablet Take 1 tablet (75 mg total) by mouth once daily. 30 tablet 2    dulaglutide (TRULICITY) 1.5 mg/0.5 mL PnIj Inject 1.5 mg into the skin every 7 days. 2 mL 0    EMBRACE PRO TEST STRIPS Strp CHECK BLOOD SUGAR TWICE DAILY. 50 strip 0    finasteride (PROSCAR) 5 mg tablet Take 1 tablet (5 mg total) by mouth once daily. 30 tablet 11    glipiZIDE (GLUCOTROL) 5 MG tablet TAKE 1 TABLET BY MOUTH TWICE A DAY 60 tablet 0    insulin NPH-insulin regular, 70/30, (NOVOLIN 70/30) 100 unit/mL (70-30) injection Inject 10 Units into the skin 2 (two) times daily. 18 mL 3    isosorbide mononitrate (IMDUR) 60 MG 24 hr tablet TAKE 1 TABLET BY MOUTH DAILY 30 tablet 12    isosorbide mononitrate (IMDUR) 60 MG 24 hr tablet TAKE 1 TABLET BY MOUTH DAILY 30 tablet 12    losartan (COZAAR) 100 MG tablet Take 1 tablet (100 mg total) by mouth once daily. 90 tablet 3    metFORMIN (GLUCOPHAGE) 850 MG tablet Take 1 tablet (850 mg total) by mouth 2 (two) times daily. 60 tablet 2    metoprolol tartrate (LOPRESSOR) 50 MG tablet TAKE 1 TABLET BY MOUTH TWICE A DAY 60 tablet 0    nitroGLYCERIN (NITROSTAT) 0.4 MG SL tablet Place 1 tablet (0.4 mg total) under the tongue every 5 (five) minutes as needed. 25 tablet 6    oxybutynin (DITROPAN XL) 15 MG TR24 Take 1 tablet (15 mg total) by mouth every evening. 30 tablet 11    simvastatin (ZOCOR) 40 MG tablet TAKE 1 TABLET NIGHTLY AT BEDTIME 30 tablet 0    TRUEPLUS LANCETS 26 gauge Misc CHECK BLOOD SUGAR TWICE DAILY. 100 each 0    TRUERESULT BLOOD GLUCOSE SYSTM kit CHECK BLOOD SUGAR TWICE DAILY. 1 each 0    VENTOLIN HFA 90 mcg/actuation inhaler 1 PUFF EVERY 6 HOURS AS NEEDED 18 g 0     No current facility-administered medications for this visit.        There are no discontinued medications.    Follow-up in about 3 months (around 1/15/2019).      Celestine Petersen MD

## 2018-10-18 RX ORDER — GLIPIZIDE 5 MG/1
TABLET ORAL
Qty: 60 TABLET | Refills: 0 | Status: SHIPPED | OUTPATIENT
Start: 2018-10-18 | End: 2019-02-05 | Stop reason: SDUPTHER

## 2018-10-18 RX ORDER — SIMVASTATIN 40 MG/1
TABLET, FILM COATED ORAL
Qty: 30 TABLET | Refills: 0 | Status: SHIPPED | OUTPATIENT
Start: 2018-10-18 | End: 2019-02-05 | Stop reason: SDUPTHER

## 2018-10-22 RX ORDER — DULAGLUTIDE 1.5 MG/.5ML
INJECTION, SOLUTION SUBCUTANEOUS
Qty: 2 ML | Refills: 2 | Status: SHIPPED | OUTPATIENT
Start: 2018-10-22 | End: 2019-03-06 | Stop reason: SDUPTHER

## 2018-11-07 RX ORDER — METOPROLOL TARTRATE 50 MG/1
TABLET ORAL
Qty: 60 TABLET | Refills: 0 | Status: SHIPPED | OUTPATIENT
Start: 2018-11-07 | End: 2019-02-05 | Stop reason: SDUPTHER

## 2018-11-13 ENCOUNTER — OFFICE VISIT (OUTPATIENT)
Dept: OPHTHALMOLOGY | Facility: CLINIC | Age: 78
End: 2018-11-13
Payer: MEDICARE

## 2018-11-13 DIAGNOSIS — H01.009 BLEPHARITIS, UNSPECIFIED LATERALITY, UNSPECIFIED TYPE: Primary | ICD-10-CM

## 2018-11-13 PROCEDURE — 92012 INTRM OPH EXAM EST PATIENT: CPT | Mod: HCNC,S$GLB,, | Performed by: OPTOMETRIST

## 2018-11-13 PROCEDURE — 99999 PR PBB SHADOW E&M-EST. PATIENT-LVL III: CPT | Mod: PBBFAC,HCNC,, | Performed by: OPTOMETRIST

## 2018-11-13 RX ORDER — NEOMYCIN SULFATE, POLYMYXIN B SULFATE AND DEXAMETHASONE 3.5; 10000; 1 MG/ML; [USP'U]/ML; MG/ML
1 SUSPENSION/ DROPS OPHTHALMIC 4 TIMES DAILY
Qty: 10 ML | Refills: 0 | Status: SHIPPED | OUTPATIENT
Start: 2018-11-13 | End: 2018-11-23

## 2018-11-13 NOTE — PROGRESS NOTES
HPI     Eye Pain OU    Pt states having trouble with pain ou more so at night for weeks now, not   sure what's going on pt deny using any eye drops     Last edited by Jensen Holloway, OD on 11/13/2018  1:20 PM. (History)            Assessment /Plan     For exam results, see Encounter Report.    Blepharitis, unspecified laterality, unspecified type  -     neomycin-polymyxin-dexamethasone (MAXITROL) 3.5mg/mL-10,000 unit/mL-0.1 % DrpS; Place 1 drop into both eyes 4 (four) times daily. for 10 days  Dispense: 10 mL; Refill: 0      Maxitrol 4 times per day in both eyes for 10 days    After 10 days, start Refresh Gel drops 4 times per day    RTC if no improvement in 2 weeks.

## 2018-11-13 NOTE — PATIENT INSTRUCTIONS
Blepharitis, unspecified laterality, unspecified type  -     neomycin-polymyxin-dexamethasone (MAXITROL) 3.5mg/mL-10,000 unit/mL-0.1 % DrpS; Place 1 drop into both eyes 4 (four) times daily. for 10 days  Dispense: 10 mL; Refill: 0      Maxitrol 4 times per day in both eyes for 10 days    After 10 days, start Refresh Gel drops 4 times per day    RTC if no improvement in 2 weeks.

## 2018-12-20 ENCOUNTER — TELEPHONE (OUTPATIENT)
Dept: FAMILY MEDICINE | Facility: CLINIC | Age: 78
End: 2018-12-20

## 2019-01-14 ENCOUNTER — LAB VISIT (OUTPATIENT)
Dept: LAB | Facility: HOSPITAL | Age: 79
End: 2019-01-14
Attending: FAMILY MEDICINE
Payer: MEDICARE

## 2019-01-14 DIAGNOSIS — I25.708 CORONARY ARTERY DISEASE OF BYPASS GRAFT OF NATIVE HEART WITH STABLE ANGINA PECTORIS: Chronic | ICD-10-CM

## 2019-01-14 DIAGNOSIS — E11.69 HYPERLIPIDEMIA ASSOCIATED WITH TYPE 2 DIABETES MELLITUS: ICD-10-CM

## 2019-01-14 DIAGNOSIS — E78.5 HYPERLIPIDEMIA ASSOCIATED WITH TYPE 2 DIABETES MELLITUS: ICD-10-CM

## 2019-01-14 DIAGNOSIS — I10 HYPERTENSION NOT AT GOAL: ICD-10-CM

## 2019-01-14 DIAGNOSIS — E11.51 DIABETES MELLITUS TYPE 2 WITH PERIPHERAL ARTERY DISEASE: ICD-10-CM

## 2019-01-14 LAB
ALBUMIN SERPL BCP-MCNC: 3.8 G/DL
ALP SERPL-CCNC: 59 U/L
ALT SERPL W/O P-5'-P-CCNC: 11 U/L
ANION GAP SERPL CALC-SCNC: 7 MMOL/L
AST SERPL-CCNC: 12 U/L
BASOPHILS # BLD AUTO: 0.02 K/UL
BASOPHILS NFR BLD: 0.3 %
BILIRUB SERPL-MCNC: 0.6 MG/DL
BUN SERPL-MCNC: 16 MG/DL
CALCIUM SERPL-MCNC: 10.2 MG/DL
CHLORIDE SERPL-SCNC: 105 MMOL/L
CHOLEST SERPL-MCNC: 104 MG/DL
CHOLEST SERPL-MCNC: 104 MG/DL
CHOLEST/HDLC SERPL: 2.2 {RATIO}
CHOLEST/HDLC SERPL: 2.2 {RATIO}
CO2 SERPL-SCNC: 27 MMOL/L
CREAT SERPL-MCNC: 1 MG/DL
DIFFERENTIAL METHOD: ABNORMAL
EOSINOPHIL # BLD AUTO: 0.1 K/UL
EOSINOPHIL NFR BLD: 1.5 %
ERYTHROCYTE [DISTWIDTH] IN BLOOD BY AUTOMATED COUNT: 13.5 %
EST. GFR  (AFRICAN AMERICAN): >60 ML/MIN/1.73 M^2
EST. GFR  (NON AFRICAN AMERICAN): >60 ML/MIN/1.73 M^2
GLUCOSE SERPL-MCNC: 164 MG/DL
HCT VFR BLD AUTO: 40.5 %
HDLC SERPL-MCNC: 47 MG/DL
HDLC SERPL-MCNC: 47 MG/DL
HDLC SERPL: 45.2 %
HDLC SERPL: 45.2 %
HGB BLD-MCNC: 12.9 G/DL
IMM GRANULOCYTES # BLD AUTO: 0.02 K/UL
IMM GRANULOCYTES NFR BLD AUTO: 0.3 %
LDLC SERPL CALC-MCNC: 37.2 MG/DL
LDLC SERPL CALC-MCNC: 37.2 MG/DL
LYMPHOCYTES # BLD AUTO: 2.6 K/UL
LYMPHOCYTES NFR BLD: 32.9 %
MCH RBC QN AUTO: 29.8 PG
MCHC RBC AUTO-ENTMCNC: 31.9 G/DL
MCV RBC AUTO: 94 FL
MONOCYTES # BLD AUTO: 0.6 K/UL
MONOCYTES NFR BLD: 7.3 %
NEUTROPHILS # BLD AUTO: 4.5 K/UL
NEUTROPHILS NFR BLD: 57.7 %
NONHDLC SERPL-MCNC: 57 MG/DL
NONHDLC SERPL-MCNC: 57 MG/DL
NRBC BLD-RTO: 0 /100 WBC
PLATELET # BLD AUTO: 221 K/UL
PMV BLD AUTO: 10.6 FL
POTASSIUM SERPL-SCNC: 4.3 MMOL/L
PROT SERPL-MCNC: 6.5 G/DL
RBC # BLD AUTO: 4.33 M/UL
SODIUM SERPL-SCNC: 139 MMOL/L
TRIGL SERPL-MCNC: 99 MG/DL
TRIGL SERPL-MCNC: 99 MG/DL
WBC # BLD AUTO: 7.82 K/UL

## 2019-01-14 PROCEDURE — 83036 HEMOGLOBIN GLYCOSYLATED A1C: CPT | Mod: HCNC

## 2019-01-14 PROCEDURE — 85025 COMPLETE CBC W/AUTO DIFF WBC: CPT | Mod: HCNC

## 2019-01-14 PROCEDURE — 80053 COMPREHEN METABOLIC PANEL: CPT | Mod: HCNC

## 2019-01-14 PROCEDURE — 80061 LIPID PANEL: CPT | Mod: HCNC

## 2019-01-14 PROCEDURE — 36415 COLL VENOUS BLD VENIPUNCTURE: CPT | Mod: HCNC,PO

## 2019-01-15 LAB
ESTIMATED AVG GLUCOSE: 212 MG/DL
ESTIMATED AVG GLUCOSE: 212 MG/DL
HBA1C MFR BLD HPLC: 9 %
HBA1C MFR BLD HPLC: 9 %

## 2019-01-21 ENCOUNTER — OFFICE VISIT (OUTPATIENT)
Dept: CARDIOLOGY | Facility: CLINIC | Age: 79
End: 2019-01-21
Payer: MEDICARE

## 2019-01-21 ENCOUNTER — OFFICE VISIT (OUTPATIENT)
Dept: FAMILY MEDICINE | Facility: CLINIC | Age: 79
End: 2019-01-21
Payer: MEDICARE

## 2019-01-21 VITALS
WEIGHT: 169.56 LBS | BODY MASS INDEX: 24.27 KG/M2 | HEART RATE: 64 BPM | DIASTOLIC BLOOD PRESSURE: 66 MMHG | HEIGHT: 70 IN | SYSTOLIC BLOOD PRESSURE: 130 MMHG

## 2019-01-21 VITALS
HEIGHT: 70 IN | SYSTOLIC BLOOD PRESSURE: 140 MMHG | HEART RATE: 66 BPM | BODY MASS INDEX: 24.47 KG/M2 | WEIGHT: 170.94 LBS | DIASTOLIC BLOOD PRESSURE: 62 MMHG | TEMPERATURE: 97 F | OXYGEN SATURATION: 98 %

## 2019-01-21 DIAGNOSIS — E78.5 HYPERLIPIDEMIA ASSOCIATED WITH TYPE 2 DIABETES MELLITUS: ICD-10-CM

## 2019-01-21 DIAGNOSIS — I65.23 BILATERAL CAROTID ARTERY STENOSIS: ICD-10-CM

## 2019-01-21 DIAGNOSIS — I70.0 THORACIC AORTA ATHEROSCLEROSIS: ICD-10-CM

## 2019-01-21 DIAGNOSIS — E78.5 HYPERLIPIDEMIA ASSOCIATED WITH TYPE 2 DIABETES MELLITUS: Primary | ICD-10-CM

## 2019-01-21 DIAGNOSIS — I73.9 CLAUDICATION IN PERIPHERAL VASCULAR DISEASE: ICD-10-CM

## 2019-01-21 DIAGNOSIS — E11.51 DIABETES MELLITUS TYPE 2 WITH PERIPHERAL ARTERY DISEASE: ICD-10-CM

## 2019-01-21 DIAGNOSIS — I45.10 RBBB: Chronic | ICD-10-CM

## 2019-01-21 DIAGNOSIS — I25.9 CHRONIC ISCHEMIC HEART DISEASE: Chronic | ICD-10-CM

## 2019-01-21 DIAGNOSIS — I20.89 CHRONIC STABLE ANGINA: Primary | ICD-10-CM

## 2019-01-21 DIAGNOSIS — N40.1 BENIGN PROSTATIC HYPERPLASIA WITH WEAK URINARY STREAM: ICD-10-CM

## 2019-01-21 DIAGNOSIS — I73.9 PERIPHERAL VASCULAR DISEASE: ICD-10-CM

## 2019-01-21 DIAGNOSIS — I65.23 ASYMPTOMATIC STENOSIS OF BOTH CAROTID ARTERIES WITHOUT INFARCTION: ICD-10-CM

## 2019-01-21 DIAGNOSIS — I25.708 CORONARY ARTERY DISEASE OF BYPASS GRAFT OF NATIVE HEART WITH STABLE ANGINA PECTORIS: Chronic | ICD-10-CM

## 2019-01-21 DIAGNOSIS — E11.69 HYPERLIPIDEMIA ASSOCIATED WITH TYPE 2 DIABETES MELLITUS: Primary | ICD-10-CM

## 2019-01-21 DIAGNOSIS — L60.8 ACQUIRED DEFORMITY OF NAIL: ICD-10-CM

## 2019-01-21 DIAGNOSIS — I10 ESSENTIAL HYPERTENSION: Chronic | ICD-10-CM

## 2019-01-21 DIAGNOSIS — J44.9 CHRONIC OBSTRUCTIVE PULMONARY DISEASE, UNSPECIFIED COPD TYPE: ICD-10-CM

## 2019-01-21 DIAGNOSIS — E11.69 HYPERLIPIDEMIA ASSOCIATED WITH TYPE 2 DIABETES MELLITUS: ICD-10-CM

## 2019-01-21 DIAGNOSIS — R39.12 BENIGN PROSTATIC HYPERPLASIA WITH WEAK URINARY STREAM: ICD-10-CM

## 2019-01-21 DIAGNOSIS — E21.3 HYPERPARATHYROIDISM: ICD-10-CM

## 2019-01-21 PROCEDURE — 99999 PR PBB SHADOW E&M-EST. PATIENT-LVL III: ICD-10-PCS | Mod: PBBFAC,HCNC,, | Performed by: FAMILY MEDICINE

## 2019-01-21 PROCEDURE — 1101F PR PT FALLS ASSESS DOC 0-1 FALLS W/OUT INJ PAST YR: ICD-10-PCS | Mod: CPTII,HCNC,S$GLB, | Performed by: FAMILY MEDICINE

## 2019-01-21 PROCEDURE — 1101F PT FALLS ASSESS-DOCD LE1/YR: CPT | Mod: CPTII,HCNC,S$GLB, | Performed by: INTERNAL MEDICINE

## 2019-01-21 PROCEDURE — 99999 PR PBB SHADOW E&M-EST. PATIENT-LVL III: CPT | Mod: PBBFAC,HCNC,, | Performed by: FAMILY MEDICINE

## 2019-01-21 PROCEDURE — 99499 RISK ADDL DX/OHS AUDIT: ICD-10-PCS | Mod: S$GLB,,, | Performed by: INTERNAL MEDICINE

## 2019-01-21 PROCEDURE — 3075F PR MOST RECENT SYSTOLIC BLOOD PRESS GE 130-139MM HG: ICD-10-PCS | Mod: CPTII,HCNC,S$GLB, | Performed by: INTERNAL MEDICINE

## 2019-01-21 PROCEDURE — 3078F PR MOST RECENT DIASTOLIC BLOOD PRESSURE < 80 MM HG: ICD-10-PCS | Mod: CPTII,HCNC,S$GLB, | Performed by: FAMILY MEDICINE

## 2019-01-21 PROCEDURE — 99214 OFFICE O/P EST MOD 30 MIN: CPT | Mod: HCNC,S$GLB,, | Performed by: INTERNAL MEDICINE

## 2019-01-21 PROCEDURE — 90662 IIV NO PRSV INCREASED AG IM: CPT | Mod: HCNC,S$GLB,, | Performed by: FAMILY MEDICINE

## 2019-01-21 PROCEDURE — 99999 PR PBB SHADOW E&M-EST. PATIENT-LVL III: CPT | Mod: PBBFAC,HCNC,, | Performed by: INTERNAL MEDICINE

## 2019-01-21 PROCEDURE — 1101F PT FALLS ASSESS-DOCD LE1/YR: CPT | Mod: CPTII,HCNC,S$GLB, | Performed by: FAMILY MEDICINE

## 2019-01-21 PROCEDURE — 3075F SYST BP GE 130 - 139MM HG: CPT | Mod: CPTII,HCNC,S$GLB, | Performed by: INTERNAL MEDICINE

## 2019-01-21 PROCEDURE — 99499 UNLISTED E&M SERVICE: CPT | Mod: S$GLB,,, | Performed by: FAMILY MEDICINE

## 2019-01-21 PROCEDURE — 1101F PR PT FALLS ASSESS DOC 0-1 FALLS W/OUT INJ PAST YR: ICD-10-PCS | Mod: CPTII,HCNC,S$GLB, | Performed by: INTERNAL MEDICINE

## 2019-01-21 PROCEDURE — 3077F SYST BP >= 140 MM HG: CPT | Mod: CPTII,HCNC,S$GLB, | Performed by: FAMILY MEDICINE

## 2019-01-21 PROCEDURE — 3078F DIAST BP <80 MM HG: CPT | Mod: CPTII,HCNC,S$GLB, | Performed by: INTERNAL MEDICINE

## 2019-01-21 PROCEDURE — 3078F PR MOST RECENT DIASTOLIC BLOOD PRESSURE < 80 MM HG: ICD-10-PCS | Mod: CPTII,HCNC,S$GLB, | Performed by: INTERNAL MEDICINE

## 2019-01-21 PROCEDURE — 99499 RISK ADDL DX/OHS AUDIT: ICD-10-PCS | Mod: S$GLB,,, | Performed by: FAMILY MEDICINE

## 2019-01-21 PROCEDURE — 90662 FLU VACCINE - HIGH DOSE (65+) PRESERVATIVE FREE IM: ICD-10-PCS | Mod: HCNC,S$GLB,, | Performed by: FAMILY MEDICINE

## 2019-01-21 PROCEDURE — 99499 UNLISTED E&M SERVICE: CPT | Mod: S$GLB,,, | Performed by: INTERNAL MEDICINE

## 2019-01-21 PROCEDURE — G0008 ADMIN INFLUENZA VIRUS VAC: HCPCS | Mod: HCNC,S$GLB,, | Performed by: FAMILY MEDICINE

## 2019-01-21 PROCEDURE — 3078F DIAST BP <80 MM HG: CPT | Mod: CPTII,HCNC,S$GLB, | Performed by: FAMILY MEDICINE

## 2019-01-21 PROCEDURE — 3077F PR MOST RECENT SYSTOLIC BLOOD PRESSURE >= 140 MM HG: ICD-10-PCS | Mod: CPTII,HCNC,S$GLB, | Performed by: FAMILY MEDICINE

## 2019-01-21 PROCEDURE — 99214 OFFICE O/P EST MOD 30 MIN: CPT | Mod: HCNC,25,S$GLB, | Performed by: FAMILY MEDICINE

## 2019-01-21 PROCEDURE — G0008 FLU VACCINE - HIGH DOSE (65+) PRESERVATIVE FREE IM: ICD-10-PCS | Mod: HCNC,S$GLB,, | Performed by: FAMILY MEDICINE

## 2019-01-21 PROCEDURE — 99214 PR OFFICE/OUTPT VISIT, EST, LEVL IV, 30-39 MIN: ICD-10-PCS | Mod: HCNC,25,S$GLB, | Performed by: FAMILY MEDICINE

## 2019-01-21 PROCEDURE — 99214 PR OFFICE/OUTPT VISIT, EST, LEVL IV, 30-39 MIN: ICD-10-PCS | Mod: HCNC,S$GLB,, | Performed by: INTERNAL MEDICINE

## 2019-01-21 PROCEDURE — 99999 PR PBB SHADOW E&M-EST. PATIENT-LVL III: ICD-10-PCS | Mod: PBBFAC,HCNC,, | Performed by: INTERNAL MEDICINE

## 2019-01-21 NOTE — PROGRESS NOTES
Chief Complaint:    Chief Complaint   Patient presents with    Follow-up       History of Present Illness:  Visit for three-month follow-up of chronic medical problems,  Is A1c is at 9,.  His blood pressure is normal his lipids are at goal.  He is urinating fine he has coronary artery disease denies any chest pain or shortness of breath.  Chronic kidney disease is stable as well.  He is not on insulin he continues to drive and says that he will not take insulin.    ROS:  Review of Systems   Constitutional: Negative for activity change, chills, fatigue and fever. Unexpected weight change: weigth loss.   HENT: Negative for congestion, ear discharge, ear pain, hearing loss, postnasal drip and rhinorrhea.    Eyes: Negative for pain and visual disturbance.   Respiratory: Negative for cough, chest tightness and shortness of breath.    Cardiovascular: Negative for chest pain and palpitations.   Gastrointestinal: Negative for abdominal pain, diarrhea and vomiting.   Endocrine: Negative for heat intolerance.   Genitourinary: Negative for dysuria, flank pain, frequency and hematuria.   Musculoskeletal: Negative for back pain, gait problem and neck pain.   Skin: Negative for color change and rash.   Neurological: Negative for dizziness, tremors, seizures, numbness and headaches.   Psychiatric/Behavioral: Negative for agitation, hallucinations, self-injury, sleep disturbance and suicidal ideas. The patient is not nervous/anxious.        Past Medical History:   Diagnosis Date    Abnormal brain MRI 1/21/2018    Chronic microvascular ischemia changes per MRI July 9, 2007 in [facility]    Abnormal ECG 10/31/2013    Abnormal stress test 1/28/2016    Alcohol dependence     States alcohol abuse ended in 1994    AP (angina pectoris) 1/28/2016    Asthma     Cataract     Chronic ischemic heart disease 10/31/2013    CKD (chronic kidney disease) stage 3, GFR 30-59 ml/min 11/4/2015    Coronary artery disease     DM  (diabetes mellitus)     BS doesn't check 2017     Heart valve regurgitation 2015    Echocardiogram 2/15/16   1 - Concentric hypertrophy.    2 - Normal left ventricular systolic function (EF 60-65%).    3 - Normal left ventricular diastolic function.    4 - Mild left atrial enlargement.    5 - Normal right ventricular systolic function .    6 - Trivial to mild aortic regurgitation.    7 - Trivial to mild mitral regurgitation.  10 - Trivial to mild pulmonic regurgitation.     History of alcohol abuse 2018    Patient denies drinking to excess since .    [facility] of [facility]herectomy 2018 [facility]    History of chronic kidney disease 2018    History of CKD 3    History of PTCA 10/31/2013    History of PTCA 3/9/2016    Hyperlipidemia     Hypertension     Insomnia 2013    Ischemic cardiomyopathy 10/31/2013    Long term (current) use of antithrombotics/antiplatelets 2018    Myocardial infarction     Old MI (myocardial infarction)     Peripheral vascular disease     Polyneuropathy     RBBB 10/31/2013    S/P CABG (coronary artery bypass graft) 10/31/2013    Shortness of breath 10/31/2013    Tobacco dependence     resolved    Trouble in sleeping     Type 2 diabetes with peripheral circulatory disorder, controlled     Wears glasses        Social History:  Social History     Socioeconomic History    Marital status:      Spouse name: None    Number of children: 5    Years of education: None    Highest education level: None   Social Needs    Financial resource strain: None    Food insecurity - worry: None    Food insecurity - inability: None    Transportation needs - medical: None    Transportation needs - non-medical: None   Occupational History    None   Tobacco Use    Smoking status: Former Smoker     Packs/day: 1.50     Years: 40.00     Pack years: 60.00     Types: Cigarettes     Last attempt to quit: 1994     Years since quittin.0  "   Smokeless tobacco: Former User     Quit date: 1/1/2011    Tobacco comment: approx date   Substance and Sexual Activity    Alcohol use: Yes     Comment: occasionally; 1-2 cans of beer a month    Drug use: No    Sexual activity: No     Birth control/protection: None   Other Topics Concern    None   Social History Narrative    None       Family History:   family history includes Diabetes in his brother and sister. He was adopted.    Health Maintenance   Topic Date Due    Influenza Vaccine  08/01/2018    Hemoglobin A1c  07/14/2019    Eye Exam  11/13/2019    Lipid Panel  01/14/2020    Foot Exam  01/21/2020    TETANUS VACCINE  04/03/2024    Colonoscopy  05/22/2027    Zoster Vaccine  Completed    Pneumococcal Vaccine (65+ Low/Medium Risk)  Completed       Physical Exam:    Vital Signs  Temp: 96.9 °F (36.1 °C)  Temp src: Tympanic  Pulse: 66  SpO2: 98 %  BP: (!) 140/62  BP Location: Left arm  Patient Position: Sitting  Pain Score: 0-No pain  Height and Weight  Height: 5' 10" (177.8 cm)  Weight: 77.6 kg (170 lb 15.5 oz)  BSA (Calculated - sq m): 1.96 sq meters  BMI (Calculated): 24.6  Weight in (lb) to have BMI = 25: 173.9]    Body mass index is 24.53 kg/m².    Physical Exam   Constitutional: He is oriented to person, place, and time. He appears well-developed.   HENT:   Mouth/Throat: Oropharynx is clear and moist.   Eyes: Conjunctivae are normal. Pupils are equal, round, and reactive to light.   Neck: Normal range of motion. Neck supple.   Cardiovascular: Normal rate, regular rhythm and normal heart sounds.   No murmur heard.  Pulses:       Dorsalis pedis pulses are 0 on the right side, and 0 on the left side.        Posterior tibial pulses are 0 on the right side, and 0 on the left side.   Pulmonary/Chest: Effort normal and breath sounds normal. No respiratory distress. He has no wheezes. He has no rales. He exhibits no tenderness.   Abdominal: Soft. He exhibits no distension and no mass. There is no " tenderness. There is no guarding.   Musculoskeletal: He exhibits no edema or tenderness.   Feet:   Right Foot:   Protective Sensation: 10 sites tested. 10 sites sensed.   Left Foot:   Protective Sensation: 10 sites tested. 10 sites sensed.   Lymphadenopathy:     He has no cervical adenopathy.   Neurological: He is alert and oriented to person, place, and time. He has normal reflexes.   Skin: Skin is warm and dry.   Psychiatric: He has a normal mood and affect. His behavior is normal. Judgment and thought content normal.       Lab Results   Component Value Date    CHOL 104 (L) 01/14/2019    CHOL 104 (L) 01/14/2019    CHOL 98 (L) 09/06/2018    TRIG 99 01/14/2019    TRIG 99 01/14/2019    TRIG 222 (H) 09/06/2018    HDL 47 01/14/2019    HDL 47 01/14/2019    HDL 36 (L) 09/06/2018    TOTALCHOLEST 2.2 01/14/2019    TOTALCHOLEST 2.2 01/14/2019    TOTALCHOLEST 2.7 09/06/2018    NONHDLCHOL 57 01/14/2019    NONHDLCHOL 57 01/14/2019    NONHDLCHOL 62 09/06/2018       Lab Results   Component Value Date    HGBA1C 9.0 (H) 01/14/2019    HGBA1C 9.0 (H) 01/14/2019       Assessment:      ICD-10-CM ICD-9-CM   1. Hyperlipidemia associated with type 2 diabetes mellitus E11.69 250.80    E78.5 272.4   2. Chronic obstructive pulmonary disease, unspecified COPD type J44.9 496   3. Coronary artery disease of bypass graft of native heart with stable angina pectoris I25.708 414.05     413.9   4. Essential hypertension I10 401.9   5. 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  Referred to Podiatry for trimming his nails  Follow-up 3 months    Orders Placed This Encounter   Procedures    Comprehensive metabolic panel    Microalbumin/creatinine urine ratio    Lipid panel    Hemoglobin A1c    Ambulatory referral to Podiatry       Current Outpatient Medications   Medication Sig Dispense Refill    albuterol (VENTOLIN HFA)  90 mcg/actuation inhaler Inhale 2 puffs into the lungs every 6 (six) hours as needed for Wheezing.      amLODIPine (NORVASC) 10 MG tablet Take 1 tablet (10 mg total) by mouth once daily. 30 tablet 11    aspirin 81 MG Chew 1 Tablet, Chewable Oral Every day      BLOOD-GLUCOSE METER (TRUERESULT BLOOD GLUCOSE SYSTM MISC) by Misc.(Non-Drug; Combo Route) route.      clopidogrel (PLAVIX) 75 mg tablet Take 1 tablet (75 mg total) by mouth once daily. 30 tablet 2    EMBRACE PRO TEST STRIPS Strp CHECK BLOOD SUGAR TWICE DAILY. 50 strip 0    finasteride (PROSCAR) 5 mg tablet Take 1 tablet (5 mg total) by mouth once daily. 30 tablet 11    glipiZIDE (GLUCOTROL) 5 MG tablet TAKE 1 TABLET BY MOUTH TWICE A DAY 60 tablet 0    isosorbide mononitrate (IMDUR) 60 MG 24 hr tablet TAKE 1 TABLET BY MOUTH DAILY 30 tablet 12    isosorbide mononitrate (IMDUR) 60 MG 24 hr tablet TAKE 1 TABLET BY MOUTH DAILY 30 tablet 12    losartan (COZAAR) 100 MG tablet Take 1 tablet (100 mg total) by mouth once daily. 90 tablet 3    metFORMIN (GLUCOPHAGE) 850 MG tablet Take 1 tablet (850 mg total) by mouth 2 (two) times daily. 60 tablet 2    metoprolol tartrate (LOPRESSOR) 50 MG tablet TAKE 1 TABLET BY MOUTH TWICE A DAY 60 tablet 0    nitroGLYCERIN (NITROSTAT) 0.4 MG SL tablet Place 1 tablet (0.4 mg total) under the tongue every 5 (five) minutes as needed. 25 tablet 6    oxybutynin (DITROPAN XL) 15 MG TR24 Take 1 tablet (15 mg total) by mouth every evening. 30 tablet 11    simvastatin (ZOCOR) 40 MG tablet TAKE 1 TABLET NIGHTLY AT BEDTIME 30 tablet 0    TRUEPLUS LANCETS 26 gauge Misc CHECK BLOOD SUGAR TWICE DAILY. 100 each 0    TRUERESULT BLOOD GLUCOSE SYSTM kit CHECK BLOOD SUGAR TWICE DAILY. 1 each 0    TRULICITY 1.5 mg/0.5 mL PnIj INJECT 1.5MG INTO THE SKIN EVERY 7 DAYS 2 mL 2    VENTOLIN HFA 90 mcg/actuation inhaler 1 PUFF EVERY 6 HOURS AS NEEDED 18 g 0     No current facility-administered medications for this visit.        Medications  Discontinued During This Encounter   Medication Reason    insulin NPH-insulin regular, 70/30, (NOVOLIN 70/30) 100 unit/mL (70-30) injection        Follow-up in about 3 months (around 4/21/2019).      Dr Celestine Petersen MD    Disclaimer: This note is prepared using voice recognition system and as such is likely to have errors and is not proof read.

## 2019-01-21 NOTE — PROGRESS NOTES
Subjective:    Patient ID:  Nithin Pino is a 78 y.o. male who presents for evaluation of Coronary Artery Disease; Hypertension; Carotid Artery Disease; Peripheral Arterial Disease; Risk Factor Management For Atherosclerosis; Chest Pain; Hyperlipidemia; and Claudication      HPI Mr. Pino returns for f/u.  His current medical conditions include CAD, RBBB, ICM, carotid artery disease, COPD, HTN, PAD, DM. Nonsmoker.   His past history is pertinent for following:  S/p PTCA 1994 for AMI.   Stress MPI 5/11 showed evidence of multivessel CAD (had 2 years of angina) which was confirmed on LHC (significant left main/LAD/ramus, diag disease and 100%  RCA). EF 35% on LV gram.   S/p successful CABG 5/11 (LIMA-LAD, SVG-DIAG, SVG-RAMUS). He had post-op a flutter which resolved.   S/p repeat LHC 2/16 for abnl stress mpi. He underwent atherectomy and PCI of left main and ramus with TUCKER x 2 overall. His svg-ramus had occluded. LIMA-LAD was patent, patent SVG-D1,  RCA with left - right collaterals, EF 45%.    Pt has declined runoff numerous times for further evaluation of his PAD in past.   H/o abnl ECG, RBBB.   Echo 6/18 read as normal EF.  RENZO 6/18 R LE 0.86, L LE 0.96  Here for f/u.  CAD is stable.  He has chronic stable angina with physical activity.  This has not changed in severity and/or frequency since last visit.  He has not had to take sl ntg since last visit.  PAD is stable.  Has chronic B LE claudication with walking.  He states this has not worsened in severity since last visit.  No CHF sxs.  Denies dyspnea.  No pnd/orthopnea.  DM HGAIC above goal, 9.0%  Lipids well controlled, LDL in 30's.  HTN controlled but above goal.  Has dental work planned near future and states he will need clearance.  Has held asa, plavix in past and done well.  Compliant with meds.  Still working as .   Carotid disease stable, no TIA/CVA sxs.  On asa, plavix.  No abnl bleeding.         Current Outpatient Medications:      amLODIPine (NORVASC) 10 MG tablet, Take 1 tablet (10 mg total) by mouth once daily., Disp: 30 tablet, Rfl: 11    aspirin 81 MG Chew, 1 Tablet, Chewable Oral Every day, Disp: , Rfl:     clopidogrel (PLAVIX) 75 mg tablet, Take 1 tablet (75 mg total) by mouth once daily., Disp: 30 tablet, Rfl: 2    finasteride (PROSCAR) 5 mg tablet, Take 1 tablet (5 mg total) by mouth once daily., Disp: 30 tablet, Rfl: 11    glipiZIDE (GLUCOTROL) 5 MG tablet, TAKE 1 TABLET BY MOUTH TWICE A DAY, Disp: 60 tablet, Rfl: 0    isosorbide mononitrate (IMDUR) 60 MG 24 hr tablet, TAKE 1 TABLET BY MOUTH DAILY, Disp: 30 tablet, Rfl: 12    losartan (COZAAR) 100 MG tablet, Take 1 tablet (100 mg total) by mouth once daily., Disp: 90 tablet, Rfl: 3    metFORMIN (GLUCOPHAGE) 850 MG tablet, Take 1 tablet (850 mg total) by mouth 2 (two) times daily., Disp: 60 tablet, Rfl: 2    metoprolol tartrate (LOPRESSOR) 50 MG tablet, TAKE 1 TABLET BY MOUTH TWICE A DAY, Disp: 60 tablet, Rfl: 0    nitroGLYCERIN (NITROSTAT) 0.4 MG SL tablet, Place 1 tablet (0.4 mg total) under the tongue every 5 (five) minutes as needed., Disp: 25 tablet, Rfl: 6    oxybutynin (DITROPAN XL) 15 MG TR24, Take 1 tablet (15 mg total) by mouth every evening., Disp: 30 tablet, Rfl: 11    simvastatin (ZOCOR) 40 MG tablet, TAKE 1 TABLET NIGHTLY AT BEDTIME, Disp: 30 tablet, Rfl: 0    TRULICITY 1.5 mg/0.5 mL PnIj, INJECT 1.5MG INTO THE SKIN EVERY 7 DAYS, Disp: 2 mL, Rfl: 2    albuterol (VENTOLIN HFA) 90 mcg/actuation inhaler, Inhale 2 puffs into the lungs every 6 (six) hours as needed for Wheezing., Disp: , Rfl:     BLOOD-GLUCOSE METER (TRUERESULT BLOOD GLUCOSE SYSTM MISC), by Misc.(Non-Drug; Combo Route) route., Disp: , Rfl:     EMBRACE PRO TEST STRIPS Strp, CHECK BLOOD SUGAR TWICE DAILY., Disp: 50 strip, Rfl: 0    TRUEPLUS LANCETS 26 gauge Misc, CHECK BLOOD SUGAR TWICE DAILY., Disp: 100 each, Rfl: 0    TRUERESULT BLOOD GLUCOSE SYSTM kit, CHECK BLOOD SUGAR TWICE DAILY., Disp: 1  "each, Rfl: 0    VENTOLIN HFA 90 mcg/actuation inhaler, 1 PUFF EVERY 6 HOURS AS NEEDED, Disp: 18 g, Rfl: 0      Review of Systems   Constitution: Negative.   HENT: Negative.    Eyes: Negative.    Cardiovascular: Positive for chest pain and claudication.   Respiratory: Negative.    Endocrine: Negative.    Hematologic/Lymphatic: Negative.    Skin: Negative.    Musculoskeletal: Positive for arthritis and joint pain.   Gastrointestinal: Negative.    Genitourinary: Negative.    Neurological: Negative.    Psychiatric/Behavioral: Negative.    Allergic/Immunologic: Negative.        /66 (BP Location: Right arm, Patient Position: Sitting, BP Method: Medium (Manual))   Pulse 64   Ht 5' 10" (1.778 m)   Wt 76.9 kg (169 lb 8.5 oz)   BMI 24.33 kg/m²     Wt Readings from Last 3 Encounters:   01/21/19 76.9 kg (169 lb 8.5 oz)   01/21/19 77.6 kg (170 lb 15.5 oz)   10/15/18 75 kg (165 lb 3.8 oz)     Temp Readings from Last 3 Encounters:   01/21/19 96.9 °F (36.1 °C) (Tympanic)   10/15/18 96.5 °F (35.8 °C) (Tympanic)   09/24/18 98.5 °F (36.9 °C) (Tympanic)     BP Readings from Last 3 Encounters:   01/21/19 130/66   01/21/19 (!) 140/62   10/15/18 (!) 144/65     Pulse Readings from Last 3 Encounters:   01/21/19 64   01/21/19 66   10/15/18 72          Objective:    Physical Exam   Constitutional: He is oriented to person, place, and time. He appears well-developed and well-nourished.   HENT:   Head: Normocephalic.   Neck: Normal range of motion. Neck supple. Normal carotid pulses, no hepatojugular reflux and no JVD present. Carotid bruit is not present. No thyromegaly present.   Cardiovascular: Normal rate, regular rhythm, S1 normal and S2 normal. PMI is not displaced. Exam reveals no S3, no S4, no distant heart sounds, no friction rub, no midsystolic click and no opening snap.   No murmur heard.  Pulses:       Radial pulses are 2+ on the right side, and 2+ on the left side.   Pulmonary/Chest: Effort normal and breath sounds " normal. He has no wheezes. He has no rales.   Abdominal: Soft. Bowel sounds are normal. He exhibits no distension, no abdominal bruit, no ascites and no mass. There is no tenderness.   Musculoskeletal: He exhibits no edema.   Neurological: He is alert and oriented to person, place, and time.   Skin: Skin is warm.   Psychiatric: He has a normal mood and affect. His behavior is normal.   Nursing note and vitals reviewed.      I have reviewed all pertinent labs and cardiac studies.      Chemistry        Component Value Date/Time     01/14/2019 0813    K 4.3 01/14/2019 0813     01/14/2019 0813    CO2 27 01/14/2019 0813    BUN 16 01/14/2019 0813    CREATININE 1.0 01/14/2019 0813     (H) 01/14/2019 0813        Component Value Date/Time    CALCIUM 10.2 01/14/2019 0813    ALKPHOS 59 01/14/2019 0813    AST 12 01/14/2019 0813    ALT 11 01/14/2019 0813    BILITOT 0.6 01/14/2019 0813    ESTGFRAFRICA >60.0 01/14/2019 0813    EGFRNONAA >60.0 01/14/2019 0813        Lab Results   Component Value Date    WBC 7.82 01/14/2019    HGB 12.9 (L) 01/14/2019    HCT 40.5 01/14/2019    MCV 94 01/14/2019     01/14/2019     Lab Results   Component Value Date    HGBA1C 9.0 (H) 01/14/2019    HGBA1C 9.0 (H) 01/14/2019     Lab Results   Component Value Date    CHOL 104 (L) 01/14/2019    CHOL 104 (L) 01/14/2019    CHOL 98 (L) 09/06/2018     Lab Results   Component Value Date    HDL 47 01/14/2019    HDL 47 01/14/2019    HDL 36 (L) 09/06/2018     Lab Results   Component Value Date    LDLCALC 37.2 (L) 01/14/2019    LDLCALC 37.2 (L) 01/14/2019    LDLCALC 17.6 (L) 09/06/2018     Lab Results   Component Value Date    TRIG 99 01/14/2019    TRIG 99 01/14/2019    TRIG 222 (H) 09/06/2018     Lab Results   Component Value Date    CHOLHDL 45.2 01/14/2019    CHOLHDL 45.2 01/14/2019    CHOLHDL 36.7 09/06/2018           Assessment:       1. Chronic stable angina    2. Asymptomatic stenosis of both carotid arteries without infarction    3.  Bilateral carotid artery stenosis    4. Coronary artery disease of bypass graft of native heart with stable angina pectoris    5. Claudication in peripheral vascular disease    6. Chronic ischemic heart disease    7. Hyperlipidemia associated with type 2 diabetes mellitus    8. Essential hypertension    9. RBBB    10. Peripheral vascular disease    11. Diabetes mellitus type 2 with peripheral artery disease    12. Thoracic aorta atherosclerosis         Plan:             Stable CAD/CV conditions on current medical tx.  Chronic stable CAD/angina.  Continue OMT.  Sl ntg prn.  May have dental work without restrictions and may hold asa, plavix for 4 days, restart afterwards.  Needs to get his DM HGAIC to goal.  Pt counseled on this matter.  Continue current DM meds and f/u with PCP for mgt.  Cardiac diet  Exercise as much as possible for PAD/CAD.  No changes in meds today.  Carotid u/s   F/u 6 months.

## 2019-02-05 RX ORDER — LISINOPRIL 10 MG/1
10 TABLET ORAL DAILY
Qty: 30 TABLET | Refills: 2 | Status: SHIPPED | OUTPATIENT
Start: 2019-02-05 | End: 2019-05-10 | Stop reason: SDUPTHER

## 2019-02-05 RX ORDER — SIMVASTATIN 40 MG/1
40 TABLET, FILM COATED ORAL NIGHTLY
Qty: 30 TABLET | Refills: 2 | Status: SHIPPED | OUTPATIENT
Start: 2019-02-05 | End: 2019-05-10 | Stop reason: SDUPTHER

## 2019-02-05 RX ORDER — GLIPIZIDE 5 MG/1
5 TABLET ORAL 2 TIMES DAILY
Qty: 60 TABLET | Refills: 2 | Status: SHIPPED | OUTPATIENT
Start: 2019-02-05 | End: 2019-05-10 | Stop reason: SDUPTHER

## 2019-02-05 RX ORDER — METOPROLOL TARTRATE 50 MG/1
50 TABLET ORAL 2 TIMES DAILY
Qty: 60 TABLET | Refills: 2 | Status: SHIPPED | OUTPATIENT
Start: 2019-02-05 | End: 2019-05-10 | Stop reason: SDUPTHER

## 2019-04-03 ENCOUNTER — OFFICE VISIT (OUTPATIENT)
Dept: OPHTHALMOLOGY | Facility: CLINIC | Age: 79
End: 2019-04-03
Payer: MEDICARE

## 2019-04-03 ENCOUNTER — PES CALL (OUTPATIENT)
Dept: ADMINISTRATIVE | Facility: CLINIC | Age: 79
End: 2019-04-03

## 2019-04-03 DIAGNOSIS — H52.4 BILATERAL PRESBYOPIA: ICD-10-CM

## 2019-04-03 DIAGNOSIS — Z96.1 PSEUDOPHAKIA OF BOTH EYES: ICD-10-CM

## 2019-04-03 DIAGNOSIS — E11.9 DIABETES MELLITUS TYPE 2 WITHOUT RETINOPATHY: Primary | ICD-10-CM

## 2019-04-03 PROCEDURE — 99499 UNLISTED E&M SERVICE: CPT | Mod: S$GLB,,, | Performed by: OPTOMETRIST

## 2019-04-03 PROCEDURE — 99999 PR PBB SHADOW E&M-EST. PATIENT-LVL I: CPT | Mod: PBBFAC,HCNC,, | Performed by: OPTOMETRIST

## 2019-04-03 PROCEDURE — 92015 PR REFRACTION: ICD-10-PCS | Mod: HCNC,S$GLB,, | Performed by: OPTOMETRIST

## 2019-04-03 PROCEDURE — 92014 PR EYE EXAM, EST PATIENT,COMPREHESV: ICD-10-PCS | Mod: HCNC,S$GLB,, | Performed by: OPTOMETRIST

## 2019-04-03 PROCEDURE — 92015 DETERMINE REFRACTIVE STATE: CPT | Mod: HCNC,S$GLB,, | Performed by: OPTOMETRIST

## 2019-04-03 PROCEDURE — 99499 RISK ADDL DX/OHS AUDIT: ICD-10-PCS | Mod: S$GLB,,, | Performed by: OPTOMETRIST

## 2019-04-03 PROCEDURE — 99999 PR PBB SHADOW E&M-EST. PATIENT-LVL I: ICD-10-PCS | Mod: PBBFAC,HCNC,, | Performed by: OPTOMETRIST

## 2019-04-03 PROCEDURE — 92014 COMPRE OPH EXAM EST PT 1/>: CPT | Mod: HCNC,S$GLB,, | Performed by: OPTOMETRIST

## 2019-04-03 NOTE — PROGRESS NOTES
HPI     NIDDM exam.  No visual complaints.  Last eye exam 03/28/2018 [facility].  Last eye visit 11/13/2018 [facility].  Hx of Blepharitis     Last edited by Sanjana Muñiz on 4/3/2019  8:39 AM. (History)            Assessment /Plan     For exam results, see Encounter Report.    Diabetes mellitus type 2 without retinopathy    Pseudophakia of both eyes    Bilateral presbyopia      No Background Diabetic Retinopathy    Stable IOL OU.    Dispense Final Rx for glasses.  RTC 1 year  Discussed above and answered questions.

## 2019-04-13 RX ORDER — OXYBUTYNIN CHLORIDE 15 MG/1
15 TABLET, EXTENDED RELEASE ORAL DAILY
Qty: 30 TABLET | Refills: 5 | Status: SHIPPED | OUTPATIENT
Start: 2019-04-13 | End: 2019-04-29 | Stop reason: SDUPTHER

## 2019-04-13 RX ORDER — FINASTERIDE 5 MG/1
5 TABLET, FILM COATED ORAL DAILY
Qty: 30 TABLET | Refills: 5 | Status: SHIPPED | OUTPATIENT
Start: 2019-04-13 | End: 2019-04-29 | Stop reason: SDUPTHER

## 2019-04-13 RX ORDER — METFORMIN HYDROCHLORIDE 850 MG/1
850 TABLET ORAL 2 TIMES DAILY
Qty: 60 TABLET | Refills: 5 | Status: SHIPPED | OUTPATIENT
Start: 2019-04-13 | End: 2020-06-15 | Stop reason: ALTCHOICE

## 2019-04-13 RX ORDER — CLOPIDOGREL BISULFATE 75 MG/1
75 TABLET ORAL DAILY
Qty: 30 TABLET | Refills: 5 | Status: SHIPPED | OUTPATIENT
Start: 2019-04-13 | End: 2020-01-30 | Stop reason: CLARIF

## 2019-04-15 RX ORDER — OXYBUTYNIN CHLORIDE 15 MG/1
TABLET, EXTENDED RELEASE ORAL
Qty: 30 TABLET | Refills: 0 | Status: SHIPPED | OUTPATIENT
Start: 2019-04-15 | End: 2019-04-23 | Stop reason: SDUPTHER

## 2019-04-22 ENCOUNTER — LAB VISIT (OUTPATIENT)
Dept: LAB | Facility: HOSPITAL | Age: 79
End: 2019-04-22
Attending: FAMILY MEDICINE
Payer: MEDICARE

## 2019-04-22 DIAGNOSIS — E11.69 HYPERLIPIDEMIA ASSOCIATED WITH TYPE 2 DIABETES MELLITUS: ICD-10-CM

## 2019-04-22 DIAGNOSIS — E11.51 DIABETES MELLITUS TYPE 2 WITH PERIPHERAL ARTERY DISEASE: ICD-10-CM

## 2019-04-22 DIAGNOSIS — I25.708 CORONARY ARTERY DISEASE OF BYPASS GRAFT OF NATIVE HEART WITH STABLE ANGINA PECTORIS: Chronic | ICD-10-CM

## 2019-04-22 DIAGNOSIS — E78.5 HYPERLIPIDEMIA ASSOCIATED WITH TYPE 2 DIABETES MELLITUS: ICD-10-CM

## 2019-04-22 LAB
ALBUMIN SERPL BCP-MCNC: 3.4 G/DL (ref 3.5–5.2)
ALBUMIN SERPL BCP-MCNC: 3.4 G/DL (ref 3.5–5.2)
ALP SERPL-CCNC: 80 U/L (ref 55–135)
ALP SERPL-CCNC: 80 U/L (ref 55–135)
ALT SERPL W/O P-5'-P-CCNC: 6 U/L (ref 10–44)
ALT SERPL W/O P-5'-P-CCNC: 6 U/L (ref 10–44)
ANION GAP SERPL CALC-SCNC: 14 MMOL/L (ref 8–16)
ANION GAP SERPL CALC-SCNC: 14 MMOL/L (ref 8–16)
AST SERPL-CCNC: 10 U/L (ref 10–40)
AST SERPL-CCNC: 10 U/L (ref 10–40)
BILIRUB SERPL-MCNC: 0.8 MG/DL (ref 0.1–1)
BILIRUB SERPL-MCNC: 0.8 MG/DL (ref 0.1–1)
BUN SERPL-MCNC: 30 MG/DL (ref 8–23)
BUN SERPL-MCNC: 30 MG/DL (ref 8–23)
CALCIUM SERPL-MCNC: 10.7 MG/DL (ref 8.7–10.5)
CALCIUM SERPL-MCNC: 10.7 MG/DL (ref 8.7–10.5)
CHLORIDE SERPL-SCNC: 99 MMOL/L (ref 95–110)
CHLORIDE SERPL-SCNC: 99 MMOL/L (ref 95–110)
CHOLEST SERPL-MCNC: 114 MG/DL (ref 120–199)
CHOLEST/HDLC SERPL: 2.7 {RATIO} (ref 2–5)
CO2 SERPL-SCNC: 23 MMOL/L (ref 23–29)
CO2 SERPL-SCNC: 23 MMOL/L (ref 23–29)
CREAT SERPL-MCNC: 1.6 MG/DL (ref 0.5–1.4)
CREAT SERPL-MCNC: 1.6 MG/DL (ref 0.5–1.4)
EST. GFR  (AFRICAN AMERICAN): 46.7 ML/MIN/1.73 M^2
EST. GFR  (AFRICAN AMERICAN): 46.7 ML/MIN/1.73 M^2
EST. GFR  (NON AFRICAN AMERICAN): 40.4 ML/MIN/1.73 M^2
EST. GFR  (NON AFRICAN AMERICAN): 40.4 ML/MIN/1.73 M^2
GLUCOSE SERPL-MCNC: 340 MG/DL (ref 70–110)
GLUCOSE SERPL-MCNC: 340 MG/DL (ref 70–110)
HDLC SERPL-MCNC: 42 MG/DL (ref 40–75)
HDLC SERPL: 36.8 % (ref 20–50)
LDLC SERPL CALC-MCNC: 45.8 MG/DL (ref 63–159)
NONHDLC SERPL-MCNC: 72 MG/DL
POTASSIUM SERPL-SCNC: 4.9 MMOL/L (ref 3.5–5.1)
POTASSIUM SERPL-SCNC: 4.9 MMOL/L (ref 3.5–5.1)
PROT SERPL-MCNC: 7.1 G/DL (ref 6–8.4)
PROT SERPL-MCNC: 7.1 G/DL (ref 6–8.4)
SODIUM SERPL-SCNC: 136 MMOL/L (ref 136–145)
SODIUM SERPL-SCNC: 136 MMOL/L (ref 136–145)
TRIGL SERPL-MCNC: 131 MG/DL (ref 30–150)

## 2019-04-22 PROCEDURE — 80061 LIPID PANEL: CPT | Mod: HCNC

## 2019-04-22 PROCEDURE — 83036 HEMOGLOBIN GLYCOSYLATED A1C: CPT | Mod: HCNC

## 2019-04-22 PROCEDURE — 80053 COMPREHEN METABOLIC PANEL: CPT | Mod: HCNC

## 2019-04-22 PROCEDURE — 36415 COLL VENOUS BLD VENIPUNCTURE: CPT | Mod: HCNC,PO

## 2019-04-23 ENCOUNTER — OFFICE VISIT (OUTPATIENT)
Dept: FAMILY MEDICINE | Facility: CLINIC | Age: 79
End: 2019-04-23
Payer: MEDICARE

## 2019-04-23 VITALS
BODY MASS INDEX: 22.06 KG/M2 | HEIGHT: 70 IN | TEMPERATURE: 97 F | OXYGEN SATURATION: 96 % | DIASTOLIC BLOOD PRESSURE: 62 MMHG | HEART RATE: 79 BPM | WEIGHT: 154.13 LBS | SYSTOLIC BLOOD PRESSURE: 130 MMHG

## 2019-04-23 DIAGNOSIS — I10 ESSENTIAL HYPERTENSION: Chronic | ICD-10-CM

## 2019-04-23 DIAGNOSIS — I25.708 CORONARY ARTERY DISEASE OF BYPASS GRAFT OF NATIVE HEART WITH STABLE ANGINA PECTORIS: Chronic | ICD-10-CM

## 2019-04-23 DIAGNOSIS — J01.90 ACUTE BACTERIAL SINUSITIS: Primary | ICD-10-CM

## 2019-04-23 DIAGNOSIS — E11.69 HYPERLIPIDEMIA ASSOCIATED WITH TYPE 2 DIABETES MELLITUS: ICD-10-CM

## 2019-04-23 DIAGNOSIS — B96.89 ACUTE BACTERIAL SINUSITIS: Primary | ICD-10-CM

## 2019-04-23 DIAGNOSIS — E78.5 HYPERLIPIDEMIA ASSOCIATED WITH TYPE 2 DIABETES MELLITUS: ICD-10-CM

## 2019-04-23 DIAGNOSIS — E11.51 DIABETES MELLITUS TYPE 2 WITH PERIPHERAL ARTERY DISEASE: ICD-10-CM

## 2019-04-23 LAB
ESTIMATED AVG GLUCOSE: 272 MG/DL (ref 68–131)
HBA1C MFR BLD HPLC: 11.1 % (ref 4–5.6)

## 2019-04-23 PROCEDURE — 99214 OFFICE O/P EST MOD 30 MIN: CPT | Mod: HCNC,S$GLB,, | Performed by: FAMILY MEDICINE

## 2019-04-23 PROCEDURE — 3075F PR MOST RECENT SYSTOLIC BLOOD PRESS GE 130-139MM HG: ICD-10-PCS | Mod: HCNC,CPTII,S$GLB, | Performed by: FAMILY MEDICINE

## 2019-04-23 PROCEDURE — 99999 PR PBB SHADOW E&M-EST. PATIENT-LVL III: ICD-10-PCS | Mod: PBBFAC,HCNC,, | Performed by: FAMILY MEDICINE

## 2019-04-23 PROCEDURE — 3078F DIAST BP <80 MM HG: CPT | Mod: HCNC,CPTII,S$GLB, | Performed by: FAMILY MEDICINE

## 2019-04-23 PROCEDURE — 3075F SYST BP GE 130 - 139MM HG: CPT | Mod: HCNC,CPTII,S$GLB, | Performed by: FAMILY MEDICINE

## 2019-04-23 PROCEDURE — 99214 PR OFFICE/OUTPT VISIT, EST, LEVL IV, 30-39 MIN: ICD-10-PCS | Mod: HCNC,S$GLB,, | Performed by: FAMILY MEDICINE

## 2019-04-23 PROCEDURE — 1101F PR PT FALLS ASSESS DOC 0-1 FALLS W/OUT INJ PAST YR: ICD-10-PCS | Mod: HCNC,CPTII,S$GLB, | Performed by: FAMILY MEDICINE

## 2019-04-23 PROCEDURE — 1101F PT FALLS ASSESS-DOCD LE1/YR: CPT | Mod: HCNC,CPTII,S$GLB, | Performed by: FAMILY MEDICINE

## 2019-04-23 PROCEDURE — 3078F PR MOST RECENT DIASTOLIC BLOOD PRESSURE < 80 MM HG: ICD-10-PCS | Mod: HCNC,CPTII,S$GLB, | Performed by: FAMILY MEDICINE

## 2019-04-23 PROCEDURE — 99999 PR PBB SHADOW E&M-EST. PATIENT-LVL III: CPT | Mod: PBBFAC,HCNC,, | Performed by: FAMILY MEDICINE

## 2019-04-23 RX ORDER — AMOXICILLIN AND CLAVULANATE POTASSIUM 875; 125 MG/1; MG/1
1 TABLET, FILM COATED ORAL EVERY 12 HOURS
Qty: 14 TABLET | Refills: 0 | Status: SHIPPED | OUTPATIENT
Start: 2019-04-23 | End: 2019-04-30

## 2019-04-23 NOTE — PROGRESS NOTES
Chief Complaint:    Chief Complaint   Patient presents with    Follow-up     3 mo f/u     Sinus Problem     green mucous for 2 weeks        History of Present Illness:  He is here for three-month follow-up:  His A1c is 11 this time.  He is complaining of sinus congestion cough postnasal drip  Blood pressure is okay  Lipids chemistries stable chronic kidney disease stable    ROS:  Review of Systems   Constitutional: Negative for activity change, chills, fatigue and fever. Unexpected weight change: weigth loss.   HENT: Positive for congestion. Negative for ear discharge, ear pain, hearing loss, postnasal drip and rhinorrhea.    Eyes: Negative for pain and visual disturbance.   Respiratory: Positive for cough. Negative for chest tightness and shortness of breath.    Cardiovascular: Negative for chest pain and palpitations.   Gastrointestinal: Negative for abdominal pain, diarrhea and vomiting.   Endocrine: Negative for heat intolerance.   Genitourinary: Negative for dysuria, flank pain, frequency and hematuria.   Musculoskeletal: Negative for back pain, gait problem and neck pain.   Skin: Negative for color change and rash.   Neurological: Negative for dizziness, tremors, seizures, numbness and headaches.   Psychiatric/Behavioral: Negative for agitation, hallucinations, self-injury, sleep disturbance and suicidal ideas. The patient is not nervous/anxious.        Past Medical History:   Diagnosis Date    Abnormal brain MRI 1/21/2018    Chronic microvascular ischemia changes per MRI July 9, 2007 in [facility]    Abnormal ECG 10/31/2013    Abnormal stress test 1/28/2016    Alcohol dependence     States alcohol abuse ended in 1994    AP (angina pectoris) 1/28/2016    Asthma     Carotid artery occlusion     Cataract     Chronic ischemic heart disease 10/31/2013    CKD (chronic kidney disease) stage 3, GFR 30-59 ml/min 11/4/2015    Coronary artery disease     DM (diabetes mellitus) 2013    BS doesn't check  03/28/2017     DM (diabetes mellitus) 2011    BS doesn' check  04/03/2019    Heart valve regurgitation 11/4/2015    Echocardiogram 2/15/16   1 - Concentric hypertrophy.    2 - Normal left ventricular systolic function (EF 60-65%).    3 - Normal left ventricular diastolic function.    4 - Mild left atrial enlargement.    5 - Normal right ventricular systolic function .    6 - Trivial to mild aortic regurgitation.    7 - Trivial to mild mitral regurgitation.  10 - Trivial to mild pulmonic regurgitation.     History of alcohol abuse 1/22/2018    Patient denies drinking to excess since 1994.    History of atherectomy 1/21/2018 2/2016 [facility]    History of chronic kidney disease 1/22/2018    History of CKD 3    History of PTCA 10/31/2013    History of PTCA 3/9/2016    Hyperlipidemia     Hypertension     Insomnia 6/17/2013    Ischemic cardiomyopathy 10/31/2013    Long term (current) use of antithrombotics/antiplatelets 1/21/2018    Myocardial infarction     Old MI (myocardial infarction) 1994    Peripheral vascular disease     Polyneuropathy     RBBB 10/31/2013    S/P CABG (coronary artery bypass graft) 10/31/2013    Shortness of breath 10/31/2013    Tobacco dependence     resolved    Trouble in sleeping     Type 2 diabetes with peripheral circulatory disorder, controlled     Wears glasses        Social History:  Social History     Socioeconomic History    Marital status:      Spouse name: Not on file    Number of children: 5    Years of education: Not on file    Highest education level: Not on file   Occupational History    Not on file   Social Needs    Financial resource strain: Not on file    Food insecurity:     Worry: Not on file     Inability: Not on file    Transportation needs:     Medical: Not on file     Non-medical: Not on file   Tobacco Use    Smoking status: Former Smoker     Packs/day: 1.50     Years: 40.00     Pack years: 60.00     Types: Cigarettes     Last attempt to  "quit: 1994     Years since quittin.3    Smokeless tobacco: Former User     Quit date: 2011    Tobacco comment: approx date   Substance and Sexual Activity    Alcohol use: Yes     Comment: occasionally; 1-2 cans of beer a month    Drug use: No    Sexual activity: Never     Birth control/protection: None   Lifestyle    Physical activity:     Days per week: Not on file     Minutes per session: Not on file    Stress: To some extent   Relationships    Social connections:     Talks on phone: Not on file     Gets together: Not on file     Attends Hoahaoism service: Not on file     Active member of club or organization: Not on file     Attends meetings of clubs or organizations: Not on file     Relationship status: Not on file   Other Topics Concern    Not on file   Social History Narrative    Not on file       Family History:   family history includes Diabetes in his brother and sister. He was adopted.    Health Maintenance   Topic Date Due    Hemoglobin A1c  10/22/2019    Foot Exam  2020    Eye Exam  2020    Lipid Panel  2020    Aspirin/Antiplatelet Therapy  2020    TETANUS VACCINE  2024    Colonoscopy  2027    Zoster Vaccine  Completed    Pneumococcal Vaccine (65+ Low/Medium Risk)  Completed    Influenza Vaccine  Completed       Physical Exam:    Vital Signs  Temp: 97.4 °F (36.3 °C)  Temp src: Tympanic  Pulse: 79  SpO2: 96 %  BP: 130/62  BP Location: Left arm  Patient Position: Sitting  Pain Score: 0-No pain  Height and Weight  Height: 5' 10" (177.8 cm)  Weight: 69.9 kg (154 lb 1.6 oz)  BSA (Calculated - sq m): 1.86 sq meters  BMI (Calculated): 22.2  Weight in (lb) to have BMI = 25: 173.9]    Body mass index is 22.11 kg/m².    Physical Exam   Constitutional: He is oriented to person, place, and time. He appears well-developed.   HENT:   Nose: Right sinus exhibits maxillary sinus tenderness and frontal sinus tenderness.   Mouth/Throat: Oropharynx is " clear and moist.   Eyes: Pupils are equal, round, and reactive to light. Conjunctivae are normal.   Neck: Normal range of motion. Neck supple.   Cardiovascular: Normal rate, regular rhythm and normal heart sounds.   No murmur heard.  Pulses:       Dorsalis pedis pulses are 0 on the right side, and 0 on the left side.        Posterior tibial pulses are 0 on the right side, and 0 on the left side.   Pulmonary/Chest: Effort normal and breath sounds normal. No respiratory distress. He has no wheezes. He has no rales. He exhibits no tenderness.   Abdominal: Soft. He exhibits no distension and no mass. There is no tenderness. There is no guarding.   Musculoskeletal: He exhibits no edema or tenderness.   Feet:   Right Foot:   Protective Sensation: 10 sites tested. 10 sites sensed.   Left Foot:   Protective Sensation: 10 sites tested. 10 sites sensed.   Lymphadenopathy:     He has no cervical adenopathy.   Neurological: He is alert and oriented to person, place, and time. He has normal reflexes.   Skin: Skin is warm and dry.   Psychiatric: He has a normal mood and affect. His behavior is normal. Judgment and thought content normal.       Lab Results   Component Value Date    CHOL 114 (L) 04/22/2019    CHOL 104 (L) 01/14/2019    CHOL 104 (L) 01/14/2019    TRIG 131 04/22/2019    TRIG 99 01/14/2019    TRIG 99 01/14/2019    HDL 42 04/22/2019    HDL 47 01/14/2019    HDL 47 01/14/2019    TOTALCHOLEST 2.7 04/22/2019    TOTALCHOLEST 2.2 01/14/2019    TOTALCHOLEST 2.2 01/14/2019    NONHDLCHOL 72 04/22/2019    NONHDLCHOL 57 01/14/2019    NONHDLCHOL 57 01/14/2019       Lab Results   Component Value Date    HGBA1C 11.1 (H) 04/22/2019       Assessment:      ICD-10-CM ICD-9-CM   1. Acute bacterial sinusitis J01.90 461.9    B96.89    2. Diabetes mellitus type 2 with peripheral artery disease E11.51 250.70     443.81   3. Hyperlipidemia associated with type 2 diabetes mellitus E11.69 250.80    E78.5 272.4   4. Essential hypertension I10  401.9   5. Coronary artery disease of bypass graft of native heart with stable angina pectoris I25.708 414.05     413.9         Plan:  Treat with Augmentin  Discussed the need for starting insulin he agreed start on Humalog 50/50 start with 10 units before each meal check postprandial sugars  Other medical problems stable  Patient has been instructed to bring his insulin pain here so we can demonstrate the use   Follow-up 3 months    Orders Placed This Encounter   Procedures    Comprehensive metabolic panel    Microalbumin/creatinine urine ratio    Lipid panel    Hemoglobin A1c       Current Outpatient Medications   Medication Sig Dispense Refill    amLODIPine (NORVASC) 10 MG tablet Take 1 tablet (10 mg total) by mouth once daily. 30 tablet 11    aspirin 81 MG Chew 1 Tablet, Chewable Oral Every day      BLOOD-GLUCOSE METER (TRUERESULT BLOOD GLUCOSE SYSTM MISC) by Misc.(Non-Drug; Combo Route) route.      clopidogrel (PLAVIX) 75 mg tablet Take 1 tablet (75 mg total) by mouth once daily. 30 tablet 5    dulaglutide (TRULICITY) 1.5 mg/0.5 mL PnIj Inject 1.5 mg into the skin every 7 days. 2 mL 2    EMBRACE PRO TEST STRIPS Strp CHECK BLOOD SUGAR TWICE DAILY. 50 strip 0    finasteride (PROSCAR) 5 mg tablet Take 1 tablet (5 mg total) by mouth once daily. 30 tablet 5    glipiZIDE (GLUCOTROL) 5 MG tablet Take 1 tablet (5 mg total) by mouth 2 (two) times daily. 60 tablet 2    isosorbide mononitrate (IMDUR) 60 MG 24 hr tablet TAKE 1 TABLET BY MOUTH DAILY 30 tablet 12    lisinopril 10 MG tablet Take 1 tablet (10 mg total) by mouth once daily. 30 tablet 2    losartan (COZAAR) 100 MG tablet Take 1 tablet (100 mg total) by mouth once daily. 90 tablet 3    metFORMIN (GLUCOPHAGE) 850 MG tablet Take 1 tablet (850 mg total) by mouth 2 (two) times daily. 60 tablet 5    metoprolol tartrate (LOPRESSOR) 50 MG tablet Take 1 tablet (50 mg total) by mouth 2 (two) times daily. 60 tablet 2    nitroGLYCERIN (NITROSTAT) 0.4 MG SL  tablet Place 1 tablet (0.4 mg total) under the tongue every 5 (five) minutes as needed. 25 tablet 6    oxybutynin (DITROPAN XL) 15 MG TR24 Take 1 tablet (15 mg total) by mouth once daily. 30 tablet 5    simvastatin (ZOCOR) 40 MG tablet Take 1 tablet (40 mg total) by mouth nightly. 30 tablet 2    TRUEPLUS LANCETS 26 gauge Misc CHECK BLOOD SUGAR TWICE DAILY. 100 each 0    TRUERESULT BLOOD GLUCOSE SYSTM kit CHECK BLOOD SUGAR TWICE DAILY. 1 each 0    VENTOLIN HFA 90 mcg/actuation inhaler 1 PUFF EVERY 6 HOURS AS NEEDED 18 g 0    amoxicillin-clavulanate 875-125mg (AUGMENTIN) 875-125 mg per tablet Take 1 tablet by mouth every 12 (twelve) hours. for 7 days 14 tablet 0    insulin lispro protamin-lispro (HUMALOG MIX 50-50 KWIKPEN) 100 unit/mL (50-50) InPn Inject 10 Units into the skin 2 (two) times daily before meals. 1 Box 3     No current facility-administered medications for this visit.        Medications Discontinued During This Encounter   Medication Reason    oxybutynin (DITROPAN XL) 15 MG TR24 Duplicate Order    albuterol (VENTOLIN HFA) 90 mcg/actuation inhaler Duplicate Order       Follow up in about 3 months (around 7/23/2019).      Dr Celestine Petersen MD    Disclaimer: This note is prepared using voice recognition system and as such is likely to have errors and is not proof read.

## 2019-04-24 ENCOUNTER — CLINICAL SUPPORT (OUTPATIENT)
Dept: CARDIOLOGY | Facility: CLINIC | Age: 79
End: 2019-04-24
Attending: INTERNAL MEDICINE
Payer: MEDICARE

## 2019-04-24 DIAGNOSIS — I65.23 ASYMPTOMATIC STENOSIS OF BOTH CAROTID ARTERIES WITHOUT INFARCTION: ICD-10-CM

## 2019-04-24 LAB — INTERNAL CAROTID STENOSIS: NORMAL

## 2019-04-24 PROCEDURE — 93880 EXTRACRANIAL BILAT STUDY: CPT | Mod: HCNC,S$GLB,, | Performed by: INTERNAL MEDICINE

## 2019-04-24 PROCEDURE — 93880 CAR US DOPPLER CAROTID BILATERAL: ICD-10-PCS | Mod: HCNC,S$GLB,, | Performed by: INTERNAL MEDICINE

## 2019-04-29 ENCOUNTER — OFFICE VISIT (OUTPATIENT)
Dept: UROLOGY | Facility: CLINIC | Age: 79
End: 2019-04-29
Payer: MEDICARE

## 2019-04-29 VITALS — WEIGHT: 155.13 LBS | HEIGHT: 71 IN | BODY MASS INDEX: 21.72 KG/M2

## 2019-04-29 DIAGNOSIS — N32.81 OAB (OVERACTIVE BLADDER): ICD-10-CM

## 2019-04-29 DIAGNOSIS — N40.0 BENIGN PROSTATIC HYPERPLASIA, UNSPECIFIED WHETHER LOWER URINARY TRACT SYMPTOMS PRESENT: Primary | ICD-10-CM

## 2019-04-29 LAB
BILIRUB SERPL-MCNC: NORMAL MG/DL
BLOOD URINE, POC: NORMAL
COLOR, POC UA: NORMAL
GLUCOSE UR QL STRIP: 100
KETONES UR QL STRIP: NORMAL
LEUKOCYTE ESTERASE URINE, POC: NORMAL
NITRITE, POC UA: NORMAL
PH, POC UA: 5
PROTEIN, POC: NORMAL
SPECIFIC GRAVITY, POC UA: 1.02
UROBILINOGEN, POC UA: NORMAL

## 2019-04-29 PROCEDURE — 99999 PR PBB SHADOW E&M-EST. PATIENT-LVL III: CPT | Mod: PBBFAC,HCNC,, | Performed by: UROLOGY

## 2019-04-29 PROCEDURE — 99214 PR OFFICE/OUTPT VISIT, EST, LEVL IV, 30-39 MIN: ICD-10-PCS | Mod: 25,HCNC,S$GLB, | Performed by: UROLOGY

## 2019-04-29 PROCEDURE — 99499 UNLISTED E&M SERVICE: CPT | Mod: HCNC,S$GLB,, | Performed by: UROLOGY

## 2019-04-29 PROCEDURE — 99214 OFFICE O/P EST MOD 30 MIN: CPT | Mod: 25,HCNC,S$GLB, | Performed by: UROLOGY

## 2019-04-29 PROCEDURE — 99499 RISK ADDL DX/OHS AUDIT: ICD-10-PCS | Mod: HCNC,S$GLB,, | Performed by: UROLOGY

## 2019-04-29 PROCEDURE — 81002 URINALYSIS NONAUTO W/O SCOPE: CPT | Mod: HCNC,S$GLB,, | Performed by: UROLOGY

## 2019-04-29 PROCEDURE — 1101F PT FALLS ASSESS-DOCD LE1/YR: CPT | Mod: HCNC,CPTII,S$GLB, | Performed by: UROLOGY

## 2019-04-29 PROCEDURE — 1101F PR PT FALLS ASSESS DOC 0-1 FALLS W/OUT INJ PAST YR: ICD-10-PCS | Mod: HCNC,CPTII,S$GLB, | Performed by: UROLOGY

## 2019-04-29 PROCEDURE — 99999 PR PBB SHADOW E&M-EST. PATIENT-LVL III: ICD-10-PCS | Mod: PBBFAC,HCNC,, | Performed by: UROLOGY

## 2019-04-29 PROCEDURE — 81002 POCT URINE DIPSTICK WITHOUT MICROSCOPE: ICD-10-PCS | Mod: HCNC,S$GLB,, | Performed by: UROLOGY

## 2019-04-29 RX ORDER — FINASTERIDE 5 MG/1
5 TABLET, FILM COATED ORAL DAILY
Qty: 90 TABLET | Refills: 3 | Status: SHIPPED | OUTPATIENT
Start: 2019-04-29 | End: 2020-06-15 | Stop reason: SDUPTHER

## 2019-04-29 RX ORDER — OXYBUTYNIN CHLORIDE 15 MG/1
15 TABLET, EXTENDED RELEASE ORAL DAILY
Qty: 90 TABLET | Refills: 3 | Status: SHIPPED | OUTPATIENT
Start: 2019-04-29 | End: 2020-06-15 | Stop reason: SDUPTHER

## 2019-04-29 NOTE — PROGRESS NOTES
Chief Complaint: Frequency    HPI:   4/29/19: Backed off on the caffeine.  Oxybutinin helping with nocturia.  No sig changes all about the same.  3/28/18: 76 yo man referred for LUTS of frequency, says stream is good.  Nocturia x4.  Some mild abd pain lately that comes/goes but no abd/pelvic pain today and no exac/rel factors.  No hematuria.  No urolithiasis.   Normal sexual function.  Has been taking finasteride/oxybutinin x5yrs after his first heart attack. PVR 0.    Allergies:  Panmist dm  [[id number]]    Medications:  has a current medication list which includes the following prescription(s): amlodipine, amoxicillin-clavulanate 875-125mg, aspirin, blood-glucose meter, clopidogrel, dulaglutide, embrace pro test strips, finasteride, glipizide, insulin lispro protamin-lispro, isosorbide mononitrate, lisinopril, losartan, metformin, metoprolol tartrate, nitroglycerin, oxybutynin, simvastatin, trueplus lancets, trueresult blood glucose systm, and ventolin hfa.    Review of Systems:  General: No fever, chills, fatigability, or weight loss.  Skin: No rashes, itching, or changes in color or texture of skin.  Chest: Denies EWING, cyanosis, wheezing, cough, and sputum production.  Abdomen: Appetite fine. No weight loss. Denies diarrhea, abdominal pain, hematemesis, or blood in stool.  Musculoskeletal: No joint stiffness or swelling. Denies back pain.  : As above.  All other review of systems negative.    PMH:   has a past medical history of Abnormal brain MRI (1/21/2018), Abnormal ECG (10/31/2013), Abnormal stress test (1/28/2016), Alcohol dependence, AP (angina pectoris) (1/28/2016), Asthma, Carotid artery occlusion, Cataract, Chronic ischemic heart disease (10/31/2013), CKD (chronic kidney disease) stage 3, GFR 30-59 ml/min (11/4/2015), Coronary artery disease, DM (diabetes mellitus) (2013), DM (diabetes mellitus) (2011), Heart valve regurgitation (11/4/2015), History of alcohol abuse (1/22/2018),  History of atherectomy (1/21/2018), History of chronic kidney disease (1/22/2018), History of PTCA (10/31/2013), History of PTCA (3/9/2016), Hyperlipidemia, Hypertension, Insomnia (6/17/2013), Ischemic cardiomyopathy (10/31/2013), Long term (current) use of antithrombotics/antiplatelets (1/21/2018), Myocardial infarction, Old MI (myocardial infarction) (1994), Peripheral vascular disease, Polyneuropathy, RBBB (10/31/2013), S/P CABG (coronary artery bypass graft) (10/31/2013), Shortness of breath (10/31/2013), Tobacco dependence, Trouble in sleeping, Type 2 diabetes with peripheral circulatory disorder, controlled, and Wears glasses.    PSH:   has a past surgical history that includes Cardiac surgery (1994); Appendectomy; Coronary artery bypass graft (5/2011); Hernia repair; Cardiac catheterization; PCIOL (Bilateral, OD 02/01/17/OS 02/15/17); and Cataract extraction w/  intraocular lens implant (Right, 02/01/2017).    FamHx: family history includes Diabetes in his brother and sister. He was adopted.    SocHx:  reports that he quit smoking about 25 years ago. His smoking use included cigarettes. He has a 60.00 pack-year smoking history. He quit smokeless tobacco use about 8 years ago. He reports that he drinks alcohol. He reports that he does not use drugs.      Physical Exam:  There were no vitals filed for this visit.  General: A&Ox3, no apparent distress, no deformities  Neck: No masses, normal thyroid  Lungs: normal inspiration, no use of accessory muscles  Heart: normal pulse, no arrhythmias  Abdomen: Soft, NT, ND  Skin: The skin is warm and dry. No jaundice.  Ext: No c/c/e.  :   3/18: Test desc betito, no abnormalities of epididymus, left small sec to mumps orchitis age 13. Penis normal, with normal penile and scrotal skin. Meatus normal. Normal rectal tone, no hemorrhoids. Prost 30 gm no nodules or masses appreciated. SV not palpable. Perineum and anus normal.    Labs/Studies:   Urinalysis performed in clinic,  summary: UA normal  PSA    6/15: 0.82    Impression/Plan:   1. OAB.  Caffeine cessation, already doing fluid restriction in evening.  Take oxybutinin in PM to help nocturia. Plan is working.  2. BPH controlled with finasteride, refilled.

## 2019-05-12 RX ORDER — SIMVASTATIN 40 MG/1
TABLET, FILM COATED ORAL
Qty: 30 TABLET | Refills: 0 | Status: SHIPPED | OUTPATIENT
Start: 2019-05-12 | End: 2019-07-03 | Stop reason: SDUPTHER

## 2019-05-12 RX ORDER — METOPROLOL TARTRATE 50 MG/1
TABLET ORAL
Qty: 60 TABLET | Refills: 0 | Status: SHIPPED | OUTPATIENT
Start: 2019-05-12 | End: 2019-07-03 | Stop reason: SDUPTHER

## 2019-05-12 RX ORDER — LISINOPRIL 10 MG/1
TABLET ORAL
Qty: 30 TABLET | Refills: 0 | Status: SHIPPED | OUTPATIENT
Start: 2019-05-12 | End: 2019-07-03 | Stop reason: SDUPTHER

## 2019-05-12 RX ORDER — GLIPIZIDE 5 MG/1
TABLET ORAL
Qty: 60 TABLET | Refills: 0 | Status: SHIPPED | OUTPATIENT
Start: 2019-05-12 | End: 2019-07-03 | Stop reason: SDUPTHER

## 2019-06-10 RX ORDER — DULAGLUTIDE 1.5 MG/.5ML
INJECTION, SOLUTION SUBCUTANEOUS
Qty: 2 ML | Refills: 0 | Status: SHIPPED | OUTPATIENT
Start: 2019-06-10 | End: 2020-06-15 | Stop reason: SDUPTHER

## 2019-07-03 RX ORDER — LISINOPRIL 10 MG/1
TABLET ORAL
Qty: 30 TABLET | Refills: 0 | Status: SHIPPED | OUTPATIENT
Start: 2019-07-03 | End: 2020-01-30 | Stop reason: CLARIF

## 2019-07-03 RX ORDER — SIMVASTATIN 40 MG/1
TABLET, FILM COATED ORAL
Qty: 30 TABLET | Refills: 0 | Status: SHIPPED | OUTPATIENT
Start: 2019-07-03 | End: 2020-01-30 | Stop reason: CLARIF

## 2019-07-03 RX ORDER — METOPROLOL TARTRATE 50 MG/1
TABLET ORAL
Qty: 60 TABLET | Refills: 0 | Status: SHIPPED | OUTPATIENT
Start: 2019-07-03 | End: 2020-01-30 | Stop reason: CLARIF

## 2019-07-03 RX ORDER — GLIPIZIDE 5 MG/1
TABLET ORAL
Qty: 60 TABLET | Refills: 0 | Status: SHIPPED | OUTPATIENT
Start: 2019-07-03 | End: 2020-01-30 | Stop reason: ALTCHOICE

## 2019-07-15 DIAGNOSIS — I10 ESSENTIAL HYPERTENSION: Primary | ICD-10-CM

## 2019-08-07 ENCOUNTER — TELEPHONE (OUTPATIENT)
Dept: CARDIOLOGY | Facility: CLINIC | Age: 79
End: 2019-08-07

## 2019-12-11 ENCOUNTER — TELEPHONE (OUTPATIENT)
Dept: CARDIOLOGY | Facility: CLINIC | Age: 79
End: 2019-12-11

## 2020-01-30 ENCOUNTER — OFFICE VISIT (OUTPATIENT)
Dept: CARDIOLOGY | Facility: CLINIC | Age: 80
End: 2020-01-30
Payer: MEDICARE

## 2020-01-30 ENCOUNTER — CLINICAL SUPPORT (OUTPATIENT)
Dept: CARDIOLOGY | Facility: CLINIC | Age: 80
End: 2020-01-30
Payer: COMMERCIAL

## 2020-01-30 VITALS
HEIGHT: 71 IN | HEART RATE: 80 BPM | DIASTOLIC BLOOD PRESSURE: 68 MMHG | WEIGHT: 163 LBS | SYSTOLIC BLOOD PRESSURE: 140 MMHG | BODY MASS INDEX: 22.82 KG/M2

## 2020-01-30 DIAGNOSIS — E11.69 HYPERLIPIDEMIA ASSOCIATED WITH TYPE 2 DIABETES MELLITUS: ICD-10-CM

## 2020-01-30 DIAGNOSIS — I25.9 CHRONIC ISCHEMIC HEART DISEASE: Primary | Chronic | ICD-10-CM

## 2020-01-30 DIAGNOSIS — R94.31 ABNORMAL ECG: ICD-10-CM

## 2020-01-30 DIAGNOSIS — I20.89 CHRONIC STABLE ANGINA: ICD-10-CM

## 2020-01-30 DIAGNOSIS — I45.10 RBBB: Chronic | ICD-10-CM

## 2020-01-30 DIAGNOSIS — I10 ESSENTIAL HYPERTENSION: Chronic | ICD-10-CM

## 2020-01-30 DIAGNOSIS — I10 ESSENTIAL HYPERTENSION: ICD-10-CM

## 2020-01-30 DIAGNOSIS — E78.5 HYPERLIPIDEMIA ASSOCIATED WITH TYPE 2 DIABETES MELLITUS: ICD-10-CM

## 2020-01-30 DIAGNOSIS — N18.9 CHRONIC RENAL IMPAIRMENT, UNSPECIFIED CKD STAGE: ICD-10-CM

## 2020-01-30 DIAGNOSIS — I70.0 THORACIC AORTA ATHEROSCLEROSIS: ICD-10-CM

## 2020-01-30 DIAGNOSIS — I65.23 ASYMPTOMATIC STENOSIS OF BOTH CAROTID ARTERIES WITHOUT INFARCTION: ICD-10-CM

## 2020-01-30 DIAGNOSIS — E11.51 DIABETES MELLITUS TYPE 2 WITH PERIPHERAL ARTERY DISEASE: ICD-10-CM

## 2020-01-30 DIAGNOSIS — I73.9 PERIPHERAL VASCULAR DISEASE: ICD-10-CM

## 2020-01-30 DIAGNOSIS — I25.708 CORONARY ARTERY DISEASE OF BYPASS GRAFT OF NATIVE HEART WITH STABLE ANGINA PECTORIS: Chronic | ICD-10-CM

## 2020-01-30 PROCEDURE — 99214 PR OFFICE/OUTPT VISIT, EST, LEVL IV, 30-39 MIN: ICD-10-PCS | Mod: HCNC,S$GLB,, | Performed by: INTERNAL MEDICINE

## 2020-01-30 PROCEDURE — 3077F PR MOST RECENT SYSTOLIC BLOOD PRESSURE >= 140 MM HG: ICD-10-PCS | Mod: HCNC,CPTII,S$GLB, | Performed by: INTERNAL MEDICINE

## 2020-01-30 PROCEDURE — 1101F PT FALLS ASSESS-DOCD LE1/YR: CPT | Mod: HCNC,CPTII,S$GLB, | Performed by: INTERNAL MEDICINE

## 2020-01-30 PROCEDURE — 93010 ELECTROCARDIOGRAM REPORT: CPT | Mod: S$PBB,,, | Performed by: INTERNAL MEDICINE

## 2020-01-30 PROCEDURE — 99214 OFFICE O/P EST MOD 30 MIN: CPT | Mod: HCNC,S$GLB,, | Performed by: INTERNAL MEDICINE

## 2020-01-30 PROCEDURE — 1101F PR PT FALLS ASSESS DOC 0-1 FALLS W/OUT INJ PAST YR: ICD-10-PCS | Mod: HCNC,CPTII,S$GLB, | Performed by: INTERNAL MEDICINE

## 2020-01-30 PROCEDURE — 1159F PR MEDICATION LIST DOCUMENTED IN MEDICAL RECORD: ICD-10-PCS | Mod: HCNC,S$GLB,, | Performed by: INTERNAL MEDICINE

## 2020-01-30 PROCEDURE — 99499 RISK ADDL DX/OHS AUDIT: ICD-10-PCS | Mod: S$GLB,,, | Performed by: INTERNAL MEDICINE

## 2020-01-30 PROCEDURE — 1159F MED LIST DOCD IN RCRD: CPT | Mod: HCNC,S$GLB,, | Performed by: INTERNAL MEDICINE

## 2020-01-30 PROCEDURE — 93010 EKG 12-LEAD: ICD-10-PCS | Mod: S$PBB,,, | Performed by: INTERNAL MEDICINE

## 2020-01-30 PROCEDURE — 3078F DIAST BP <80 MM HG: CPT | Mod: HCNC,CPTII,S$GLB, | Performed by: INTERNAL MEDICINE

## 2020-01-30 PROCEDURE — 99499 UNLISTED E&M SERVICE: CPT | Mod: S$GLB,,, | Performed by: INTERNAL MEDICINE

## 2020-01-30 PROCEDURE — 99999 PR PBB SHADOW E&M-EST. PATIENT-LVL II: CPT | Mod: PBBFAC,HCNC,, | Performed by: INTERNAL MEDICINE

## 2020-01-30 PROCEDURE — 99999 PR PBB SHADOW E&M-EST. PATIENT-LVL II: ICD-10-PCS | Mod: PBBFAC,HCNC,, | Performed by: INTERNAL MEDICINE

## 2020-01-30 PROCEDURE — 3077F SYST BP >= 140 MM HG: CPT | Mod: HCNC,CPTII,S$GLB, | Performed by: INTERNAL MEDICINE

## 2020-01-30 PROCEDURE — 93005 ELECTROCARDIOGRAM TRACING: CPT | Mod: PBBFAC | Performed by: INTERNAL MEDICINE

## 2020-01-30 PROCEDURE — 3078F PR MOST RECENT DIASTOLIC BLOOD PRESSURE < 80 MM HG: ICD-10-PCS | Mod: HCNC,CPTII,S$GLB, | Performed by: INTERNAL MEDICINE

## 2020-01-30 RX ORDER — ROSUVASTATIN CALCIUM 40 MG/1
40 TABLET, COATED ORAL NIGHTLY
COMMUNITY
End: 2020-06-15 | Stop reason: SDUPTHER

## 2020-01-30 RX ORDER — METOPROLOL SUCCINATE 25 MG/1
25 TABLET, EXTENDED RELEASE ORAL NIGHTLY
COMMUNITY
End: 2020-06-15 | Stop reason: SDUPTHER

## 2020-01-30 NOTE — PROGRESS NOTES
Subjective:    Patient ID:  Nithin Pino is a 79 y.o. male who presents for evaluation of Hypertension; Hyperlipidemia; Coronary Artery Disease; Carotid Artery Disease; Risk Factor Management For Atherosclerosis; and Peripheral Arterial Disease      HPI Mr. Pino returns for f/u.  His current medical conditions include CAD, RBBB, ICM, CRI, carotid artery disease, COPD, HTN, PAD, DM. Nonsmoker.   His past history is pertinent for following:  S/p PTCA 1994 for AMI.   Stress MPI 5/11 showed evidence of multivessel CAD (had 2 years of angina) which was confirmed on LHC (significant left main/LAD/ramus, diag disease and 100%  RCA). EF 35% on LV gram.   S/p successful CABG 5/11 (LIMA-LAD, SVG-DIAG, SVG-RAMUS). He had post-op a flutter which resolved.   S/p repeat LHC 2/16 for abnl stress mpi. He underwent atherectomy and PCI of left main and ramus with TUCKER x 2 overall. His svg-ramus had occluded. LIMA-LAD was patent, patent SVG-D1,  RCA with left - right collaterals, EF 45%.    Pt has declined runoff numerous times for further evaluation of his PAD in past.   H/o abnl ECG, RBBB.   Echo 6/18 read as normal EF.  RENZO 6/18 R LE 0.86, L LE 0.96  Now here.  CAD seems stable.  He states he is not having any active anginal sxs on current med tx.  No CHF sxs.  Denies any unusual dyspnea.  PAD is stable.  No obvious claudication sxs.  BS controlled but on low side and [facility].  No TIA/CVA sxs.  ecg today shows NSR, RBBB, old septal and old inferior infarct.  No acute changes noted.  BP is stable.   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  Creatinine last check at [facility] 1.6  DM HGAIC last check at [facility] above goal.  Lipids controlled.  [facility].   Carotid u/s 4/19 no blockages noted.      Current Outpatient Medications:     amLODIPine (NORVASC) 10 MG tablet, Take 1 tablet (10 mg total) by mouth once daily., Disp: 30 tablet, Rfl: 11    aspirin 81 MG Chew, 1 Tablet, Chewable Oral Every  day, Disp: , Rfl:     BLOOD-GLUCOSE METER (TRUERESULT BLOOD GLUCOSE SYSTM MISC), by Misc.(Non-Drug; Combo Route) route., Disp: , Rfl:     EMBRACE PRO TEST STRIPS Strp, CHECK BLOOD SUGAR TWICE DAILY., Disp: 50 strip, Rfl: 0    finasteride (PROSCAR) 5 mg tablet, Take 1 tablet (5 mg total) by mouth once daily., Disp: 90 tablet, Rfl: 3    isosorbide mononitrate (IMDUR) 60 MG 24 hr tablet, TAKE 1 TABLET BY MOUTH DAILY, Disp: 30 tablet, Rfl: 12    metoprolol succinate (TOPROL-XL) 25 MG 24 hr tablet, Take 25 mg by mouth every evening., Disp: , Rfl:     rosuvastatin (CRESTOR) 40 MG Tab, Take 10 mg by mouth every evening., Disp: , Rfl:     clopidogrel (PLAVIX) 75 mg tablet, Take 1 tablet (75 mg total) by mouth once daily., Disp: 30 tablet, Rfl: 5    insulin lispro protamin-lispro (HUMALOG MIX 50-50 KWIKPEN) 100 unit/mL (50-50) InPn, Inject 10 Units into the skin 2 (two) times daily before meals., Disp: 1 Box, Rfl: 3    lisinopril 10 MG tablet, TAKE 1 TABLET BY MOUTH DAILY, Disp: 30 tablet, Rfl: 0    losartan (COZAAR) 100 MG tablet, Take 1 tablet (100 mg total) by mouth once daily., Disp: 90 tablet, Rfl: 3    metFORMIN (GLUCOPHAGE) 850 MG tablet, Take 1 tablet (850 mg total) by mouth 2 (two) times daily., Disp: 60 tablet, Rfl: 5    metoprolol tartrate (LOPRESSOR) 50 MG tablet, TAKE 1 TABLET BY MOUTH TWICE A DAY, Disp: 60 tablet, Rfl: 0    nitroGLYCERIN (NITROSTAT) 0.4 MG SL tablet, Place 1 tablet (0.4 mg total) under the tongue every 5 (five) minutes as needed., Disp: 25 tablet, Rfl: 6    oxybutynin (DITROPAN XL) 15 MG TR24, Take 1 tablet (15 mg total) by mouth once daily., Disp: 90 tablet, Rfl: 3    TRUEPLUS LANCETS 26 gauge Misc, CHECK BLOOD SUGAR TWICE DAILY., Disp: 100 each, Rfl: 0    TRUERESULT BLOOD GLUCOSE SYSTM kit, CHECK BLOOD SUGAR TWICE DAILY., Disp: 1 each, Rfl: 0    TRULICITY 1.5 mg/0.5 mL PnIj, INJECT 1.5MG INTO THE SKIN EVERY 7 DAYS, Disp: 2 mL, Rfl: 0    VENTOLIN HFA 90 mcg/actuation inhaler,  "1 PUFF EVERY 6 HOURS AS NEEDED, Disp: 18 g, Rfl: 0      Review of Systems   Constitution: Negative.   HENT: Negative.    Eyes: Negative.    Cardiovascular: Negative.    Respiratory: Negative.    Endocrine: Negative.    Hematologic/Lymphatic: Negative.    Skin: Negative.    Musculoskeletal: Negative.    Gastrointestinal: Negative.    Genitourinary: Negative.    Neurological: Negative.    Psychiatric/Behavioral: Negative.    Allergic/Immunologic: Negative.        BP (!) 140/68 (BP Location: Right arm, Patient Position: Sitting, BP Method: Medium (Manual))   Pulse 80   Ht 5' 10.5" (1.791 m)   Wt 73.9 kg (163 lb)   BMI 23.06 kg/m²     Wt Readings from Last 3 Encounters:   01/30/20 73.9 kg (163 lb)   04/29/19 70.4 kg (155 lb 1.5 oz)   04/23/19 69.9 kg (154 lb 1.6 oz)     Temp Readings from Last 3 Encounters:   04/23/19 97.4 °F (36.3 °C) (Tympanic)   01/21/19 96.9 °F (36.1 °C) (Tympanic)   10/15/18 96.5 °F (35.8 °C) (Tympanic)     BP Readings from Last 3 Encounters:   01/30/20 (!) 140/68   04/23/19 130/62   01/21/19 130/66     Pulse Readings from Last 3 Encounters:   01/30/20 80   04/23/19 79   01/21/19 64          Objective:    Physical Exam   Constitutional: He is oriented to person, place, and time. He appears well-developed and well-nourished.   HENT:   Head: Normocephalic.   Neck: Normal range of motion. Neck supple. Normal carotid pulses, no hepatojugular reflux and no JVD present. Carotid bruit is not present. No thyromegaly present.   Cardiovascular: Normal rate, regular rhythm, S1 normal and S2 normal. PMI is not displaced. Exam reveals no S3, no S4, no distant heart sounds, no friction rub, no midsystolic click and no opening snap.   No murmur heard.  Pulses:       Radial pulses are 2+ on the right side, and 2+ on the left side.   Pulmonary/Chest: Effort normal and breath sounds normal. He has no wheezes. He has no rales.   Abdominal: Soft. Bowel sounds are normal. He exhibits no distension, no abdominal " bruit, no ascites and no mass. There is no tenderness.   Musculoskeletal: He exhibits no edema.   Neurological: He is alert and oriented to person, place, and time.   Skin: Skin is warm.   Psychiatric: He has a normal mood and affect. His behavior is normal.   Nursing note and vitals reviewed.      I have reviewed all pertinent labs and cardiac studies.    Lab Results   Component Value Date    HGBA1C 11.1 (H) 04/22/2019       Chemistry        Component Value Date/Time     04/22/2019 0805     04/22/2019 0805    K 4.9 04/22/2019 0805    K 4.9 04/22/2019 0805    CL 99 04/22/2019 0805    CL 99 04/22/2019 0805    CO2 23 04/22/2019 0805    CO2 23 04/22/2019 0805    BUN 30 (H) 04/22/2019 0805    BUN 30 (H) 04/22/2019 0805    CREATININE 1.6 (H) 04/22/2019 0805    CREATININE 1.6 (H) 04/22/2019 0805     (H) 04/22/2019 0805     (H) 04/22/2019 0805        Component Value Date/Time    CALCIUM 10.7 (H) 04/22/2019 0805    CALCIUM 10.7 (H) 04/22/2019 0805    ALKPHOS 80 04/22/2019 0805    ALKPHOS 80 04/22/2019 0805    AST 10 04/22/2019 0805    AST 10 04/22/2019 0805    ALT 6 (L) 04/22/2019 0805    ALT 6 (L) 04/22/2019 0805    BILITOT 0.8 04/22/2019 0805    BILITOT 0.8 04/22/2019 0805    ESTGFRAFRICA 46.7 (A) 04/22/2019 0805    ESTGFRAFRICA 46.7 (A) 04/22/2019 0805    EGFRNONAA 40.4 (A) 04/22/2019 0805    EGFRNONAA 40.4 (A) 04/22/2019 0805        Lab Results   Component Value Date    WBC 7.82 01/14/2019    HGB 12.9 (L) 01/14/2019    HCT 40.5 01/14/2019    MCV 94 01/14/2019     01/14/2019       Lab Results   Component Value Date    CHOL 114 (L) 04/22/2019    CHOL 104 (L) 01/14/2019    CHOL 104 (L) 01/14/2019     Lab Results   Component Value Date    HDL 42 04/22/2019    HDL 47 01/14/2019    HDL 47 01/14/2019     Lab Results   Component Value Date    LDLCALC 45.8 (L) 04/22/2019    LDLCALC 37.2 (L) 01/14/2019    LDLCALC 37.2 (L) 01/14/2019     Lab Results   Component Value Date    TRIG 131 04/22/2019     TRIG 99 01/14/2019    TRIG 99 01/14/2019     Lab Results   Component Value Date    CHOLHDL 36.8 04/22/2019    CHOLHDL 45.2 01/14/2019    CHOLHDL 45.2 01/14/2019           Assessment:       1. Chronic ischemic heart disease    2. Asymptomatic stenosis of both carotid arteries without infarction    3. Coronary artery disease of bypass graft of native heart with stable angina pectoris    4. Chronic stable angina    5. Diabetes mellitus type 2 with peripheral artery disease    6. Hyperlipidemia associated with type 2 diabetes mellitus    7. Essential hypertension    8. Peripheral vascular disease    9. RBBB    10. Thoracic aorta atherosclerosis    11. Chronic renal impairment, unspecified CKD stage    12. Abnormal ECG         Plan:             Stable cardiovascular conditions at present time on current medical treatment.  Reviewed all tests and above medical conditions with patient in detail and formulated treatment plan.  Continue optimal medical treatment for cardiovascular conditions.  Cardiac low salt diet discussed.  Daily exercise encouraged, goal 30 +  minutes aerobic exercise as tolerated.  Maintaining healthy weight and weight loss goals (if needed) were discussed in clinic.  [facility]t and needs optimal control of DM.  Continue OMT for CAD.  No changes in meds today.  F/u in 6 months.

## 2020-04-22 ENCOUNTER — TELEPHONE (OUTPATIENT)
Dept: CARDIOLOGY | Facility: CLINIC | Age: 80
End: 2020-04-22

## 2020-04-22 ENCOUNTER — TELEPHONE (OUTPATIENT)
Dept: CARDIOLOGY | Facility: HOSPITAL | Age: 80
End: 2020-04-22

## 2020-04-22 NOTE — TELEPHONE ENCOUNTER
----- Message from Enrico Payne LPN sent at 4/22/2020  9:23 AM CDT -----  Dr. Ciara Locke MD is requesting that ASA be held so that an ultrasound biopsy may be performed.  Please advise.        Fax: [phone]  Attn. ANAHI Daigle, BSN

## 2020-06-15 ENCOUNTER — OFFICE VISIT (OUTPATIENT)
Dept: INTERNAL MEDICINE | Facility: CLINIC | Age: 80
End: 2020-06-15
Payer: MEDICARE

## 2020-06-15 VITALS
TEMPERATURE: 99 F | SYSTOLIC BLOOD PRESSURE: 124 MMHG | HEART RATE: 76 BPM | BODY MASS INDEX: 24.61 KG/M2 | DIASTOLIC BLOOD PRESSURE: 70 MMHG | HEIGHT: 70 IN | WEIGHT: 171.94 LBS

## 2020-06-15 DIAGNOSIS — I15.2 HYPERTENSION ASSOCIATED WITH DIABETES: ICD-10-CM

## 2020-06-15 DIAGNOSIS — I20.9 AP (ANGINA PECTORIS): ICD-10-CM

## 2020-06-15 DIAGNOSIS — E21.3 HYPERPARATHYROIDISM: ICD-10-CM

## 2020-06-15 DIAGNOSIS — E11.69 HYPERLIPIDEMIA ASSOCIATED WITH TYPE 2 DIABETES MELLITUS: ICD-10-CM

## 2020-06-15 DIAGNOSIS — I25.708 CORONARY ARTERY DISEASE OF BYPASS GRAFT OF NATIVE HEART WITH STABLE ANGINA PECTORIS: Chronic | ICD-10-CM

## 2020-06-15 DIAGNOSIS — E11.51 DIABETES MELLITUS TYPE 2 WITH PERIPHERAL ARTERY DISEASE: Primary | ICD-10-CM

## 2020-06-15 DIAGNOSIS — E11.59 HYPERTENSION ASSOCIATED WITH DIABETES: ICD-10-CM

## 2020-06-15 DIAGNOSIS — R39.12 BENIGN PROSTATIC HYPERPLASIA WITH WEAK URINARY STREAM: ICD-10-CM

## 2020-06-15 DIAGNOSIS — I10 ESSENTIAL HYPERTENSION: Chronic | ICD-10-CM

## 2020-06-15 DIAGNOSIS — E78.5 HYPERLIPIDEMIA ASSOCIATED WITH TYPE 2 DIABETES MELLITUS: ICD-10-CM

## 2020-06-15 DIAGNOSIS — N40.1 BENIGN PROSTATIC HYPERPLASIA WITH WEAK URINARY STREAM: ICD-10-CM

## 2020-06-15 PROCEDURE — 3074F SYST BP LT 130 MM HG: CPT | Mod: HCNC,CPTII,S$GLB, | Performed by: INTERNAL MEDICINE

## 2020-06-15 PROCEDURE — 99499 UNLISTED E&M SERVICE: CPT | Mod: HCNC,S$GLB,, | Performed by: INTERNAL MEDICINE

## 2020-06-15 PROCEDURE — 99215 OFFICE O/P EST HI 40 MIN: CPT | Mod: HCNC,S$GLB,, | Performed by: INTERNAL MEDICINE

## 2020-06-15 PROCEDURE — 3078F PR MOST RECENT DIASTOLIC BLOOD PRESSURE < 80 MM HG: ICD-10-PCS | Mod: HCNC,CPTII,S$GLB, | Performed by: INTERNAL MEDICINE

## 2020-06-15 PROCEDURE — 1159F MED LIST DOCD IN RCRD: CPT | Mod: HCNC,S$GLB,, | Performed by: INTERNAL MEDICINE

## 2020-06-15 PROCEDURE — 99999 PR PBB SHADOW E&M-EST. PATIENT-LVL III: CPT | Mod: PBBFAC,HCNC,, | Performed by: INTERNAL MEDICINE

## 2020-06-15 PROCEDURE — 99215 PR OFFICE/OUTPT VISIT, EST, LEVL V, 40-54 MIN: ICD-10-PCS | Mod: HCNC,S$GLB,, | Performed by: INTERNAL MEDICINE

## 2020-06-15 PROCEDURE — 3074F PR MOST RECENT SYSTOLIC BLOOD PRESSURE < 130 MM HG: ICD-10-PCS | Mod: HCNC,CPTII,S$GLB, | Performed by: INTERNAL MEDICINE

## 2020-06-15 PROCEDURE — 1101F PT FALLS ASSESS-DOCD LE1/YR: CPT | Mod: HCNC,CPTII,S$GLB, | Performed by: INTERNAL MEDICINE

## 2020-06-15 PROCEDURE — 1126F AMNT PAIN NOTED NONE PRSNT: CPT | Mod: HCNC,S$GLB,, | Performed by: INTERNAL MEDICINE

## 2020-06-15 PROCEDURE — 1159F PR MEDICATION LIST DOCUMENTED IN MEDICAL RECORD: ICD-10-PCS | Mod: HCNC,S$GLB,, | Performed by: INTERNAL MEDICINE

## 2020-06-15 PROCEDURE — 99499 RISK ADDL DX/OHS AUDIT: ICD-10-PCS | Mod: HCNC,S$GLB,, | Performed by: INTERNAL MEDICINE

## 2020-06-15 PROCEDURE — 1101F PR PT FALLS ASSESS DOC 0-1 FALLS W/OUT INJ PAST YR: ICD-10-PCS | Mod: HCNC,CPTII,S$GLB, | Performed by: INTERNAL MEDICINE

## 2020-06-15 PROCEDURE — 3078F DIAST BP <80 MM HG: CPT | Mod: HCNC,CPTII,S$GLB, | Performed by: INTERNAL MEDICINE

## 2020-06-15 PROCEDURE — 99999 PR PBB SHADOW E&M-EST. PATIENT-LVL III: ICD-10-PCS | Mod: PBBFAC,HCNC,, | Performed by: INTERNAL MEDICINE

## 2020-06-15 PROCEDURE — 1126F PR PAIN SEVERITY QUANTIFIED, NO PAIN PRESENT: ICD-10-PCS | Mod: HCNC,S$GLB,, | Performed by: INTERNAL MEDICINE

## 2020-06-15 RX ORDER — INSULIN GLARGINE 100 [IU]/ML
28 INJECTION, SOLUTION SUBCUTANEOUS DAILY
Qty: 27 ML | Refills: 3
Start: 2020-06-15 | End: 2024-02-13 | Stop reason: ALTCHOICE

## 2020-06-15 RX ORDER — METFORMIN HYDROCHLORIDE 1000 MG/1
1000 TABLET ORAL 2 TIMES DAILY WITH MEALS
COMMUNITY
End: 2023-02-17 | Stop reason: ALTCHOICE

## 2020-06-15 RX ORDER — NAPROXEN SODIUM 220 MG/1
81 TABLET, FILM COATED ORAL DAILY
Qty: 90 TABLET | Refills: 3 | Status: SHIPPED | OUTPATIENT
Start: 2020-06-15 | End: 2024-02-13

## 2020-06-15 RX ORDER — AMLODIPINE BESYLATE 10 MG/1
10 TABLET ORAL DAILY
Qty: 30 TABLET | Refills: 11 | Status: SHIPPED | OUTPATIENT
Start: 2020-06-15 | End: 2021-08-09

## 2020-06-15 RX ORDER — TAMSULOSIN HYDROCHLORIDE 0.4 MG/1
0.4 CAPSULE ORAL DAILY
Qty: 90 CAPSULE | Refills: 3 | Status: SHIPPED | OUTPATIENT
Start: 2020-06-15 | End: 2020-07-29 | Stop reason: SDUPTHER

## 2020-06-15 RX ORDER — TAMSULOSIN HYDROCHLORIDE 0.4 MG/1
0.4 CAPSULE ORAL DAILY
COMMUNITY
End: 2020-06-15 | Stop reason: SDUPTHER

## 2020-06-15 RX ORDER — METOPROLOL SUCCINATE 25 MG/1
25 TABLET, EXTENDED RELEASE ORAL NIGHTLY
Qty: 90 TABLET | Refills: 3 | Status: SHIPPED | OUTPATIENT
Start: 2020-06-15 | End: 2021-08-09

## 2020-06-15 RX ORDER — OXYBUTYNIN CHLORIDE 15 MG/1
15 TABLET, EXTENDED RELEASE ORAL DAILY
Qty: 90 TABLET | Refills: 3 | Status: SHIPPED | OUTPATIENT
Start: 2020-06-15 | End: 2020-07-29 | Stop reason: SDUPTHER

## 2020-06-15 RX ORDER — LOSARTAN POTASSIUM 100 MG/1
100 TABLET ORAL DAILY
Qty: 90 TABLET | Refills: 3 | Status: SHIPPED | OUTPATIENT
Start: 2020-06-15 | End: 2021-08-18

## 2020-06-15 RX ORDER — ROSUVASTATIN CALCIUM 40 MG/1
40 TABLET, COATED ORAL NIGHTLY
Qty: 90 TABLET | Refills: 3 | Status: SHIPPED | OUTPATIENT
Start: 2020-06-15 | End: 2021-08-09

## 2020-06-15 RX ORDER — DULAGLUTIDE 1.5 MG/.5ML
1.5 INJECTION, SOLUTION SUBCUTANEOUS WEEKLY
Qty: 6 ML | Refills: 3 | Status: SHIPPED | OUTPATIENT
Start: 2020-06-15 | End: 2022-12-07

## 2020-06-15 RX ORDER — ISOSORBIDE MONONITRATE 60 MG/1
60 TABLET, EXTENDED RELEASE ORAL DAILY
Qty: 30 TABLET | Refills: 12 | Status: SHIPPED | OUTPATIENT
Start: 2020-06-15 | End: 2021-08-09

## 2020-06-15 RX ORDER — FINASTERIDE 5 MG/1
5 TABLET, FILM COATED ORAL DAILY
Qty: 90 TABLET | Refills: 3 | Status: SHIPPED | OUTPATIENT
Start: 2020-06-15 | End: 2020-07-29 | Stop reason: SDUPTHER

## 2020-06-15 NOTE — PROGRESS NOTES
Subjective:       Patient ID: Nithin Pino is a 80 y.o. male.    Chief Complaint: Establish Care    HPI Patient is an 80-year-old male presenting days new patient establishing care.  Patient has history of type 2 diabetes, peripheral arterial disease, hyperlipidemia, hypertension, coronary artery disease, chronic stable angina, BPH, hyperparathyroidism.    Patient relates he has been a diabetic for some time.  He is on multiple medications both injectable an oral.  He states [facility]  He states the [facility] manages his diabetes.  He is not sure what level of control he has at this time.  He states it is better than it used to be.    He has history of hypertension hyperlipidemia.  He is on medications for those issues.  He would like to get refills through me for those.  He states that he takes his medications as prescribed and is done pretty well with them.  He has not had any adverse effects associated with them.    He has history of coronary artery disease.  He is followed by Dr. Sadler.  He states that his cardiac status has been stable.  He has not had any significant problems.  He will be following up with Dr. Sadler sometime the next few months he states.  He is listed having chronic stable angina but the patient indicates he has not had any anginal issues since he had his bypass.    He has a history of BPH and he is on tamsulosin and oxybutynin for urinary issues.  He states that seems to work pretty well for him.    He has a history of hyperparathyroidism.  I do not have recent labs to review on that.  His most recent lab work did show elevated calcium levels but no vitamin-D or PTH is been noted and recent lab work.  We will update labs.    Review of Systems   Constitutional: Negative for fever and unexpected weight change.   HENT: Negative for hearing loss, postnasal drip and rhinorrhea.    Eyes: Negative for pain and visual disturbance.  "  Respiratory: Negative for cough, shortness of breath and wheezing.    Cardiovascular: Negative for chest pain and palpitations.   Gastrointestinal: Negative for constipation, diarrhea, nausea and vomiting.   Genitourinary: Negative for dysuria and hematuria.   Musculoskeletal: Positive for arthralgias. Negative for back pain, myalgias and neck stiffness.   Skin: Negative for pallor and rash.   Neurological: Negative for seizures, syncope and headaches.   Hematological: Negative for adenopathy.   Psychiatric/Behavioral: Negative for dysphoric mood. The patient is not nervous/anxious.        Objective:   /70   Pulse 76   Temp 99 °F (37.2 °C)   Ht 5' 10" (1.778 m)   Wt 78 kg (171 lb 15.3 oz)   BMI 24.67 kg/m²      Physical Exam  Vitals signs reviewed.   Constitutional:       General: He is not in acute distress.     Appearance: He is well-developed.   HENT:      Head: Normocephalic and atraumatic.      Right Ear: External ear normal.      Left Ear: External ear normal.      Nose: Nose normal.   Eyes:      Pupils: Pupils are equal, round, and reactive to light.   Neck:      Musculoskeletal: Normal range of motion and neck supple.      Thyroid: No thyromegaly.      Vascular: No JVD.   Cardiovascular:      Rate and Rhythm: Normal rate and regular rhythm.      Pulses:           Dorsalis pedis pulses are 2+ on the right side and 2+ on the left side.        Posterior tibial pulses are 2+ on the right side and 2+ on the left side.      Heart sounds: Normal heart sounds. No murmur. No friction rub. No gallop.    Pulmonary:      Effort: Pulmonary effort is normal.      Breath sounds: Normal breath sounds. No wheezing or rales.   Abdominal:      General: Bowel sounds are normal. There is no distension.      Palpations: Abdomen is soft.      Tenderness: There is no abdominal tenderness. There is no guarding or rebound.   Musculoskeletal: Normal range of motion.      Right foot: Normal range of motion. No deformity. "      Left foot: Normal range of motion. No deformity.   Feet:      Right foot:      Protective Sensation: 10 sites tested. 10 sites sensed.      Skin integrity: Dry skin present. No ulcer, blister, skin breakdown, erythema or callus.      Toenail Condition: Right toenails are abnormally thick.      Left foot:      Protective Sensation: 10 sites tested. 10 sites sensed.      Skin integrity: Dry skin present. No ulcer, blister, skin breakdown, erythema or callus.      Toenail Condition: Left toenails are abnormally thick.   Lymphadenopathy:      Cervical: No cervical adenopathy.   Skin:     General: Skin is warm and dry.      Findings: No rash.   Neurological:      General: No focal deficit present.      Mental Status: He is alert and oriented to person, place, and time.      Cranial Nerves: No cranial nerve deficit.      Deep Tendon Reflexes: Reflexes are normal and symmetric.   Psychiatric:         Mood and Affect: Mood normal.         Judgment: Judgment normal.             Assessment:       1. Diabetes mellitus type 2 with peripheral artery disease    2. Hyperlipidemia associated with type 2 diabetes mellitus    3. Essential hypertension    4. Hypertension associated with diabetes    5. 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 AP (angina pectoris)    7. Benign prostatic hyperplasia with weak urinary stream    8. Hyperparathyroidism        Plan:   No problem-specific Assessment & Plan notes found for this encounter.    Diabetes mellitus type 2 with peripheral artery disease  -     dulaglutide (TRULICITY) 1.5 mg/0.5 mL PnIj; Inject 1.5 mg into the skin once a week.  Dispense: 6 mL; Refill: 3  -     Microalbumin/creatinine urine ratio; Future; Expected date: 06/17/2020  -     Hemoglobin A1C; Future; Expected date: 06/17/2020    Hyperlipidemia associated with type 2 diabetes mellitus  -     rosuvastatin (CRESTOR) 40 MG Tab; Take 1 tablet (40 mg total) by mouth every  evening.  Dispense: 90 tablet; Refill: 3  -     Lipid Panel; Future; Expected date: 06/17/2020    Essential hypertension  -     CBC auto differential; Future; Expected date: 06/17/2020  -     Comprehensive metabolic panel; Future; Expected date: 06/17/2020    Hypertension associated with diabetes  -     losartan (COZAAR) 100 MG tablet; Take 1 tablet (100 mg total) by mouth once daily.  Dispense: 90 tablet; Refill: 3  -     metoprolol succinate (TOPROL-XL) 25 MG 24 hr tablet; Take 1 tablet (25 mg total) by mouth every evening.  Dispense: 90 tablet; Refill: 3  -     amLODIPine (NORVASC) 10 MG tablet; Take 1 tablet (10 mg total) by mouth once daily.  Dispense: 30 tablet; Refill: 11    Coronary artery disease of bypass graft of native heart with stable angina pectoris  -     aspirin 81 MG Chew; Take 1 tablet (81 mg total) by mouth once daily. 1 Tablet, Chewable Oral Every day  Dispense: 90 tablet; Refill: 3    AP (angina pectoris)  -     isosorbide mononitrate (IMDUR) 60 MG 24 hr tablet; Take 1 tablet (60 mg total) by mouth once daily.  Dispense: 30 tablet; Refill: 12    Benign prostatic hyperplasia with weak urinary stream  -     tamsulosin (FLOMAX) 0.4 mg Cap; Take 1 capsule (0.4 mg total) by mouth once daily.  Dispense: 90 capsule; Refill: 3  -     finasteride (PROSCAR) 5 mg tablet; Take 1 tablet (5 mg total) by mouth once daily.  Dispense: 90 tablet; Refill: 3  -     oxybutynin (DITROPAN XL) 15 MG TR24; Take 1 tablet (15 mg total) by mouth once daily.  Dispense: 90 tablet; Refill: 3    Hyperparathyroidism  -     Vitamin D; Future; Expected date: 06/17/2020  -     PTH, intact; Future; Expected date: 06/17/2020    Other orders  -     insulin (LANTUS SOLOSTAR U-100 INSULIN) glargine 100 units/mL (3mL) SubQ pen; Inject 28 Units into the skin once daily.  Dispense: 27 mL; Refill: 3      Patient will continue on current medications at this time.  We will update his lab work and see him back in a few weeks to review.  We  will need to establish a rhythm around what the [facility] is going [facility] will we will handle.    Follow up in about 6 weeks (around 7/27/2020).

## 2020-06-16 RX ORDER — METFORMIN HYDROCHLORIDE 500 MG/1
1250 TABLET ORAL 2 TIMES DAILY WITH MEALS
Qty: 450 TABLET | Refills: 3 | Status: CANCELLED | OUTPATIENT
Start: 2020-06-16 | End: 2020-09-14

## 2020-06-16 NOTE — TELEPHONE ENCOUNTER
----- Message from Karen Sin sent at 6/16/2020 11:20 AM CDT -----  Regarding: medication  Verna request call back regarding 1 medication (metformin)that wasn't changed pt was seen yesterday will like to discuss ...  call back: [phone]

## 2020-06-16 NOTE — TELEPHONE ENCOUNTER
Patient told me he gets his metformin and his insulin [facility].  He did not request refills on those medications from me.    Also, the stated dosing of 1250 mg twice a day is not an appropriate dose so I will not approve it.

## 2020-06-17 ENCOUNTER — LAB VISIT (OUTPATIENT)
Dept: LAB | Facility: HOSPITAL | Age: 80
End: 2020-06-17
Attending: FAMILY MEDICINE
Payer: MEDICARE

## 2020-06-17 DIAGNOSIS — E11.51 DIABETES MELLITUS TYPE 2 WITH PERIPHERAL ARTERY DISEASE: ICD-10-CM

## 2020-06-17 DIAGNOSIS — E21.3 HYPERPARATHYROIDISM: ICD-10-CM

## 2020-06-17 DIAGNOSIS — E78.5 HYPERLIPIDEMIA ASSOCIATED WITH TYPE 2 DIABETES MELLITUS: ICD-10-CM

## 2020-06-17 DIAGNOSIS — I10 ESSENTIAL HYPERTENSION: Chronic | ICD-10-CM

## 2020-06-17 DIAGNOSIS — E11.69 HYPERLIPIDEMIA ASSOCIATED WITH TYPE 2 DIABETES MELLITUS: ICD-10-CM

## 2020-06-17 LAB
ALBUMIN SERPL BCP-MCNC: 4 G/DL (ref 3.5–5.2)
ALP SERPL-CCNC: 46 U/L (ref 55–135)
ALT SERPL W/O P-5'-P-CCNC: 14 U/L (ref 10–44)
ANION GAP SERPL CALC-SCNC: 11 MMOL/L (ref 8–16)
AST SERPL-CCNC: 16 U/L (ref 10–40)
BASOPHILS # BLD AUTO: 0.03 K/UL (ref 0–0.2)
BASOPHILS NFR BLD: 0.3 % (ref 0–1.9)
BILIRUB SERPL-MCNC: 0.7 MG/DL (ref 0.1–1)
BUN SERPL-MCNC: 27 MG/DL (ref 8–23)
CALCIUM SERPL-MCNC: 9.9 MG/DL (ref 8.7–10.5)
CHLORIDE SERPL-SCNC: 107 MMOL/L (ref 95–110)
CHOLEST SERPL-MCNC: 77 MG/DL (ref 120–199)
CHOLEST/HDLC SERPL: 1.7 {RATIO} (ref 2–5)
CO2 SERPL-SCNC: 23 MMOL/L (ref 23–29)
CREAT SERPL-MCNC: 1 MG/DL (ref 0.5–1.4)
DIFFERENTIAL METHOD: ABNORMAL
EOSINOPHIL # BLD AUTO: 0.3 K/UL (ref 0–0.5)
EOSINOPHIL NFR BLD: 3.4 % (ref 0–8)
ERYTHROCYTE [DISTWIDTH] IN BLOOD BY AUTOMATED COUNT: 12.9 % (ref 11.5–14.5)
EST. GFR  (AFRICAN AMERICAN): >60 ML/MIN/1.73 M^2
EST. GFR  (NON AFRICAN AMERICAN): >60 ML/MIN/1.73 M^2
GLUCOSE SERPL-MCNC: 73 MG/DL (ref 70–110)
HCT VFR BLD AUTO: 42.9 % (ref 40–54)
HDLC SERPL-MCNC: 45 MG/DL (ref 40–75)
HDLC SERPL: 58.4 % (ref 20–50)
HGB BLD-MCNC: 12.7 G/DL (ref 14–18)
IMM GRANULOCYTES # BLD AUTO: 0.01 K/UL (ref 0–0.04)
IMM GRANULOCYTES NFR BLD AUTO: 0.1 % (ref 0–0.5)
LDLC SERPL CALC-MCNC: 17.4 MG/DL (ref 63–159)
LYMPHOCYTES # BLD AUTO: 2.3 K/UL (ref 1–4.8)
LYMPHOCYTES NFR BLD: 24.8 % (ref 18–48)
MCH RBC QN AUTO: 29 PG (ref 27–31)
MCHC RBC AUTO-ENTMCNC: 29.6 G/DL (ref 32–36)
MCV RBC AUTO: 98 FL (ref 82–98)
MONOCYTES # BLD AUTO: 0.5 K/UL (ref 0.3–1)
MONOCYTES NFR BLD: 5.8 % (ref 4–15)
NEUTROPHILS # BLD AUTO: 6.1 K/UL (ref 1.8–7.7)
NEUTROPHILS NFR BLD: 65.6 % (ref 38–73)
NONHDLC SERPL-MCNC: 32 MG/DL
NRBC BLD-RTO: 0 /100 WBC
PLATELET # BLD AUTO: 215 K/UL (ref 150–350)
PMV BLD AUTO: 11.5 FL (ref 9.2–12.9)
POTASSIUM SERPL-SCNC: 4.7 MMOL/L (ref 3.5–5.1)
PROT SERPL-MCNC: 6.5 G/DL (ref 6–8.4)
PTH-INTACT SERPL-MCNC: 95 PG/ML (ref 9–77)
RBC # BLD AUTO: 4.38 M/UL (ref 4.6–6.2)
SODIUM SERPL-SCNC: 141 MMOL/L (ref 136–145)
TRIGL SERPL-MCNC: 73 MG/DL (ref 30–150)
WBC # BLD AUTO: 9.25 K/UL (ref 3.9–12.7)

## 2020-06-17 PROCEDURE — 80061 LIPID PANEL: CPT | Mod: HCNC

## 2020-06-17 PROCEDURE — 83036 HEMOGLOBIN GLYCOSYLATED A1C: CPT | Mod: HCNC

## 2020-06-17 PROCEDURE — 80053 COMPREHEN METABOLIC PANEL: CPT | Mod: HCNC

## 2020-06-17 PROCEDURE — 36415 COLL VENOUS BLD VENIPUNCTURE: CPT | Mod: HCNC,PO

## 2020-06-17 PROCEDURE — 85025 COMPLETE CBC W/AUTO DIFF WBC: CPT | Mod: HCNC

## 2020-06-17 PROCEDURE — 82306 VITAMIN D 25 HYDROXY: CPT | Mod: HCNC

## 2020-06-17 PROCEDURE — 83970 ASSAY OF PARATHORMONE: CPT | Mod: HCNC

## 2020-06-18 ENCOUNTER — TELEPHONE (OUTPATIENT)
Dept: INTERNAL MEDICINE | Facility: CLINIC | Age: 80
End: 2020-06-18

## 2020-06-18 DIAGNOSIS — E55.9 VITAMIN D DEFICIENCY: Primary | ICD-10-CM

## 2020-06-18 LAB
25(OH)D3+25(OH)D2 SERPL-MCNC: 20 NG/ML (ref 30–96)
ESTIMATED AVG GLUCOSE: 146 MG/DL (ref 68–131)
HBA1C MFR BLD HPLC: 6.7 % (ref 4–5.6)

## 2020-06-18 RX ORDER — VIT C/E/ZN/COPPR/LUTEIN/ZEAXAN 250MG-90MG
1000 CAPSULE ORAL DAILY
Qty: 30 CAPSULE | Refills: 5 | Status: SHIPPED | OUTPATIENT
Start: 2020-06-18 | End: 2020-09-17 | Stop reason: ALTCHOICE

## 2020-06-18 NOTE — TELEPHONE ENCOUNTER
Patient was informed of his results via phone.  Patient verbalized understanding.  Appointment mailed for 3 months.

## 2020-06-18 NOTE — TELEPHONE ENCOUNTER
Labs are okay except vitamin d is low and parathyroid hormone is high.      We need to replace the vitamin d and repeat the labs in a couple of months.       25-Sep-2017

## 2020-06-23 ENCOUNTER — PATIENT OUTREACH (OUTPATIENT)
Dept: ADMINISTRATIVE | Facility: OTHER | Age: 80
End: 2020-06-23

## 2020-06-23 NOTE — PROGRESS NOTES
Patient's chart was reviewed.   Requested updates within Care Everywhere.  Immunizations reconciled.    Health Maintenance was updated.  Patent has eye exam scheduled 08/05/20.

## 2020-06-25 ENCOUNTER — OFFICE VISIT (OUTPATIENT)
Dept: UROLOGY | Facility: CLINIC | Age: 80
End: 2020-06-25
Payer: MEDICARE

## 2020-06-25 VITALS
DIASTOLIC BLOOD PRESSURE: 66 MMHG | BODY MASS INDEX: 24.77 KG/M2 | TEMPERATURE: 98 F | WEIGHT: 172.63 LBS | SYSTOLIC BLOOD PRESSURE: 132 MMHG

## 2020-06-25 DIAGNOSIS — N40.0 BENIGN PROSTATIC HYPERPLASIA, UNSPECIFIED WHETHER LOWER URINARY TRACT SYMPTOMS PRESENT: ICD-10-CM

## 2020-06-25 DIAGNOSIS — R31.9 HEMATURIA, UNSPECIFIED TYPE: Primary | ICD-10-CM

## 2020-06-25 DIAGNOSIS — N32.81 OAB (OVERACTIVE BLADDER): ICD-10-CM

## 2020-06-25 LAB
BILIRUB SERPL-MCNC: ABNORMAL MG/DL
BLOOD URINE, POC: ABNORMAL
CLARITY, POC UA: CLEAR
COLOR, POC UA: ABNORMAL
GLUCOSE UR QL STRIP: ABNORMAL
KETONES UR QL STRIP: ABNORMAL
LEUKOCYTE ESTERASE URINE, POC: ABNORMAL
NITRITE, POC UA: ABNORMAL
PH, POC UA: 5
PROTEIN, POC: ABNORMAL
SPECIFIC GRAVITY, POC UA: 1.02
UROBILINOGEN, POC UA: ABNORMAL

## 2020-06-25 PROCEDURE — 3075F PR MOST RECENT SYSTOLIC BLOOD PRESS GE 130-139MM HG: ICD-10-PCS | Mod: HCNC,CPTII,S$GLB, | Performed by: UROLOGY

## 2020-06-25 PROCEDURE — 99214 PR OFFICE/OUTPT VISIT, EST, LEVL IV, 30-39 MIN: ICD-10-PCS | Mod: HCNC,25,S$GLB, | Performed by: UROLOGY

## 2020-06-25 PROCEDURE — 99999 PR PBB SHADOW E&M-EST. PATIENT-LVL III: ICD-10-PCS | Mod: PBBFAC,HCNC,, | Performed by: UROLOGY

## 2020-06-25 PROCEDURE — 99499 RISK ADDL DX/OHS AUDIT: ICD-10-PCS | Mod: HCNC,S$GLB,, | Performed by: UROLOGY

## 2020-06-25 PROCEDURE — 1159F PR MEDICATION LIST DOCUMENTED IN MEDICAL RECORD: ICD-10-PCS | Mod: HCNC,S$GLB,, | Performed by: UROLOGY

## 2020-06-25 PROCEDURE — 3078F DIAST BP <80 MM HG: CPT | Mod: HCNC,CPTII,S$GLB, | Performed by: UROLOGY

## 2020-06-25 PROCEDURE — 99214 OFFICE O/P EST MOD 30 MIN: CPT | Mod: HCNC,25,S$GLB, | Performed by: UROLOGY

## 2020-06-25 PROCEDURE — 1101F PR PT FALLS ASSESS DOC 0-1 FALLS W/OUT INJ PAST YR: ICD-10-PCS | Mod: HCNC,CPTII,S$GLB, | Performed by: UROLOGY

## 2020-06-25 PROCEDURE — 81002 POCT URINE DIPSTICK WITHOUT MICROSCOPE: ICD-10-PCS | Mod: HCNC,S$GLB,, | Performed by: UROLOGY

## 2020-06-25 PROCEDURE — 1125F AMNT PAIN NOTED PAIN PRSNT: CPT | Mod: HCNC,S$GLB,, | Performed by: UROLOGY

## 2020-06-25 PROCEDURE — 1159F MED LIST DOCD IN RCRD: CPT | Mod: HCNC,S$GLB,, | Performed by: UROLOGY

## 2020-06-25 PROCEDURE — 3078F PR MOST RECENT DIASTOLIC BLOOD PRESSURE < 80 MM HG: ICD-10-PCS | Mod: HCNC,CPTII,S$GLB, | Performed by: UROLOGY

## 2020-06-25 PROCEDURE — 99999 PR PBB SHADOW E&M-EST. PATIENT-LVL III: CPT | Mod: PBBFAC,HCNC,, | Performed by: UROLOGY

## 2020-06-25 PROCEDURE — 1101F PT FALLS ASSESS-DOCD LE1/YR: CPT | Mod: HCNC,CPTII,S$GLB, | Performed by: UROLOGY

## 2020-06-25 PROCEDURE — 3075F SYST BP GE 130 - 139MM HG: CPT | Mod: HCNC,CPTII,S$GLB, | Performed by: UROLOGY

## 2020-06-25 PROCEDURE — 99499 UNLISTED E&M SERVICE: CPT | Mod: HCNC,S$GLB,, | Performed by: UROLOGY

## 2020-06-25 PROCEDURE — 81002 URINALYSIS NONAUTO W/O SCOPE: CPT | Mod: HCNC,S$GLB,, | Performed by: UROLOGY

## 2020-06-25 PROCEDURE — 1125F PR PAIN SEVERITY QUANTIFIED, PAIN PRESENT: ICD-10-PCS | Mod: HCNC,S$GLB,, | Performed by: UROLOGY

## 2020-06-25 RX ORDER — LANOLIN ALCOHOL/MO/W.PET/CERES
1000 CREAM (GRAM) TOPICAL DAILY
COMMUNITY
End: 2021-10-29

## 2020-06-25 NOTE — PROGRESS NOTES
Chief Complaint: Frequency    HPI:   6/25/20:  Patient states his biggest complaint now is not the urgency or flow but nocturia.  In addition he happened to describe an episode 2 months ago we had severe gross hematuria.  He was having some dysuria at the same time, which under urgent care facility and gave him antibiotics.  He has had no recurrence, no evidence of microscopic hematuria in the office today.  4/29/19: Backed off on the caffeine.  Oxybutinin helping with nocturia.  No sig changes all about the same.  3/28/18: 78 yo man referred for LUTS of frequency, says stream is good.  Nocturia x4.  Some mild abd pain lately that comes/goes but no abd/pelvic pain today and no exac/rel factors.  No hematuria.  No urolithiasis.   Normal sexual function.  Has been taking finasteride/oxybutinin x5yrs after his first heart attack. PVR 0.    Allergies:  Panmist dm  [pseudoephedrine-dm-guaifenesin]    Medications:  has a current medication list which includes the following prescription(s): amlodipine, aspirin, blood-glucose meter, cholecalciferol (vitamin d3), cyanocobalamin, trulicity, embrace pro test strips, finasteride, lantus solostar u-100 insulin, isosorbide mononitrate, losartan, metformin, metoprolol succinate, oxybutynin, psyllium seed, rosuvastatin, tamsulosin, trueplus lancets, trueresult blood glucose systm, ventolin hfa, and nitroglycerin.    Review of Systems:  General: No fever, chills, fatigability, or weight loss.  Skin: No rashes, itching, or changes in color or texture of skin.  Chest: Denies EWING, cyanosis, wheezing, cough, and sputum production.  Abdomen: Appetite fine. No weight loss. Denies diarrhea, abdominal pain, hematemesis, or blood in stool.  Musculoskeletal: No joint stiffness or swelling. Denies back pain.  : As above.  All other review of systems negative.    PMH:   has a past medical history of Abnormal brain MRI (1/21/2018), Abnormal ECG (10/31/2013), Abnormal stress test (1/28/2016),  Alcohol dependence, AP (angina pectoris) (1/28/2016), Asthma, Carotid artery occlusion, Cataract, Chronic ischemic heart disease (10/31/2013), CKD (chronic kidney disease) stage 3, GFR 30-59 ml/min (11/4/2015), Coronary artery disease, DM (diabetes mellitus) (2013), DM (diabetes mellitus) (2011), Heart valve regurgitation (11/4/2015), History of alcohol abuse (1/22/2018), History of atherectomy (1/21/2018), History of chronic kidney disease (1/22/2018), History of PTCA (10/31/2013), History of PTCA (3/9/2016), Hyperlipidemia, Hypertension, Insomnia (6/17/2013), Ischemic cardiomyopathy (10/31/2013), Long term (current) use of antithrombotics/antiplatelets (1/21/2018), Myocardial infarction, Old MI (myocardial infarction) (1994), Peripheral vascular disease, Polyneuropathy, RBBB (10/31/2013), S/P CABG (coronary artery bypass graft) (10/31/2013), Shortness of breath (10/31/2013), Tobacco dependence, Trouble in sleeping, Type 2 diabetes with peripheral circulatory disorder, controlled, and Wears glasses.    PSH:   has a past surgical history that includes Cardiac surgery (1994); Appendectomy; Coronary artery bypass graft (5/2011); Hernia repair; Cardiac catheterization; PCIOL (Bilateral, OD 02/01/17/OS 02/15/17); and Cataract extraction w/  intraocular lens implant (Right, 02/01/2017).    FamHx: family history includes Diabetes in his brother and sister. He was adopted.    SocHx:  reports that he quit smoking about 26 years ago. His smoking use included cigarettes. He has a 60.00 pack-year smoking history. He quit smokeless tobacco use about 9 years ago. He reports current alcohol use. He reports that he does not use drugs.      Physical Exam:  Vitals:    06/25/20 0952   BP: 132/66   Temp: 97.7 °F (36.5 °C)     General: A&Ox3, no apparent distress, no deformities  Neck: No masses, normal thyroid  Lungs: normal inspiration, no use of accessory muscles  Heart: normal pulse, no arrhythmias  Abdomen: Soft, NT, ND  Skin: The  skin is warm and dry. No jaundice.  Ext: No c/c/e.  :   3/18: Test desc betito, no abnormalities of epididymus, left small sec to mumps orchitis age 13. Penis normal, with normal penile and scrotal skin. Meatus normal. Normal rectal tone, no hemorrhoids. Prost 30 gm no nodules or masses appreciated. SV not palpable. Perineum and anus normal.    Labs/Studies:   Urinalysis performed in clinic, summary: UA normal  PSA    6/15: 0.82    Impression/Plan:   1. OAB.  Continue oxybutynin, I recommended possibly adding Myrbetriq, but the patient does not want to take any other medications.  Monitor for now, in addition his biggest complaint is nocturia therefore the etiology may not completely be urological.  2. BPH controlled with finasteride.  3. Gross hematuria- new finding, he does have a significant smoking hx, but no recurrence and no microhematuria today.  I recommended a w/u, but the patient has refused, he would rather monitor.  He recently fell and injured his right shoulder, which is being w/u by Ortho.  Therefore I will have him return in a month to see Dr. Tompkins to reevaluate.

## 2020-07-27 ENCOUNTER — OFFICE VISIT (OUTPATIENT)
Dept: INTERNAL MEDICINE | Facility: CLINIC | Age: 80
End: 2020-07-27
Payer: MEDICARE

## 2020-07-27 VITALS
HEART RATE: 76 BPM | DIASTOLIC BLOOD PRESSURE: 76 MMHG | HEIGHT: 70 IN | BODY MASS INDEX: 24.18 KG/M2 | SYSTOLIC BLOOD PRESSURE: 120 MMHG | WEIGHT: 168.88 LBS | TEMPERATURE: 97 F

## 2020-07-27 DIAGNOSIS — E55.9 VITAMIN D INSUFFICIENCY: ICD-10-CM

## 2020-07-27 DIAGNOSIS — J43.1 PANLOBULAR EMPHYSEMA: ICD-10-CM

## 2020-07-27 DIAGNOSIS — E11.69 HYPERLIPIDEMIA ASSOCIATED WITH TYPE 2 DIABETES MELLITUS: ICD-10-CM

## 2020-07-27 DIAGNOSIS — E11.51 DIABETES MELLITUS TYPE 2 WITH PERIPHERAL ARTERY DISEASE: Primary | ICD-10-CM

## 2020-07-27 DIAGNOSIS — E11.59 HYPERTENSION ASSOCIATED WITH DIABETES: ICD-10-CM

## 2020-07-27 DIAGNOSIS — E78.5 HYPERLIPIDEMIA ASSOCIATED WITH TYPE 2 DIABETES MELLITUS: ICD-10-CM

## 2020-07-27 DIAGNOSIS — I15.2 HYPERTENSION ASSOCIATED WITH DIABETES: ICD-10-CM

## 2020-07-27 DIAGNOSIS — M25.511 ACUTE PAIN OF RIGHT SHOULDER: ICD-10-CM

## 2020-07-27 PROCEDURE — 1126F AMNT PAIN NOTED NONE PRSNT: CPT | Mod: HCNC,S$GLB,, | Performed by: INTERNAL MEDICINE

## 2020-07-27 PROCEDURE — 99499 RISK ADDL DX/OHS AUDIT: ICD-10-PCS | Mod: HCNC,S$GLB,, | Performed by: INTERNAL MEDICINE

## 2020-07-27 PROCEDURE — 3078F DIAST BP <80 MM HG: CPT | Mod: HCNC,CPTII,S$GLB, | Performed by: INTERNAL MEDICINE

## 2020-07-27 PROCEDURE — 99214 OFFICE O/P EST MOD 30 MIN: CPT | Mod: HCNC,S$GLB,, | Performed by: INTERNAL MEDICINE

## 2020-07-27 PROCEDURE — 99214 PR OFFICE/OUTPT VISIT, EST, LEVL IV, 30-39 MIN: ICD-10-PCS | Mod: HCNC,S$GLB,, | Performed by: INTERNAL MEDICINE

## 2020-07-27 PROCEDURE — 1159F PR MEDICATION LIST DOCUMENTED IN MEDICAL RECORD: ICD-10-PCS | Mod: HCNC,S$GLB,, | Performed by: INTERNAL MEDICINE

## 2020-07-27 PROCEDURE — 99499 UNLISTED E&M SERVICE: CPT | Mod: HCNC,S$GLB,, | Performed by: INTERNAL MEDICINE

## 2020-07-27 PROCEDURE — 3078F PR MOST RECENT DIASTOLIC BLOOD PRESSURE < 80 MM HG: ICD-10-PCS | Mod: HCNC,CPTII,S$GLB, | Performed by: INTERNAL MEDICINE

## 2020-07-27 PROCEDURE — 3074F PR MOST RECENT SYSTOLIC BLOOD PRESSURE < 130 MM HG: ICD-10-PCS | Mod: HCNC,CPTII,S$GLB, | Performed by: INTERNAL MEDICINE

## 2020-07-27 PROCEDURE — 1101F PR PT FALLS ASSESS DOC 0-1 FALLS W/OUT INJ PAST YR: ICD-10-PCS | Mod: HCNC,CPTII,S$GLB, | Performed by: INTERNAL MEDICINE

## 2020-07-27 PROCEDURE — 1159F MED LIST DOCD IN RCRD: CPT | Mod: HCNC,S$GLB,, | Performed by: INTERNAL MEDICINE

## 2020-07-27 PROCEDURE — 99999 PR PBB SHADOW E&M-EST. PATIENT-LVL V: CPT | Mod: PBBFAC,HCNC,, | Performed by: INTERNAL MEDICINE

## 2020-07-27 PROCEDURE — 3074F SYST BP LT 130 MM HG: CPT | Mod: HCNC,CPTII,S$GLB, | Performed by: INTERNAL MEDICINE

## 2020-07-27 PROCEDURE — 1101F PT FALLS ASSESS-DOCD LE1/YR: CPT | Mod: HCNC,CPTII,S$GLB, | Performed by: INTERNAL MEDICINE

## 2020-07-27 PROCEDURE — 1126F PR PAIN SEVERITY QUANTIFIED, NO PAIN PRESENT: ICD-10-PCS | Mod: HCNC,S$GLB,, | Performed by: INTERNAL MEDICINE

## 2020-07-27 PROCEDURE — 99999 PR PBB SHADOW E&M-EST. PATIENT-LVL V: ICD-10-PCS | Mod: PBBFAC,HCNC,, | Performed by: INTERNAL MEDICINE

## 2020-07-27 NOTE — PATIENT INSTRUCTIONS
Diet: Diabetes  Food is an important tool that you can use to control diabetes and stay healthy. Eating well-balanced meals in the correct amounts will help you control your blood glucose levels and prevent low blood sugar reactions. It will also help you reduce the health risks of diabetes. There is no one specific diet that is right for everyone with diabetes. But there are general guidelines to follow. A registered dietitian (RD) will create a tailored diet approach thats just right for you. He or she will also help you plan healthy meals and snacks. If you have any questions, call your dietitian for advice.     Guidelines for success  Talk with your healthcare provider before starting a diabetes diet or weight loss program. If you haven't talked with a dietitian yet, ask your provider for a referral. The following guidelines can help you succeed:  · Select foods from the 6 food groups below. Your dietitian will help you find food choices within each group. He or she will also show you serving sizes and how many servings you can have at each meal.  ¨ Grains, beans, and starchy vegetables  ¨ Vegetables  ¨ Fruit  ¨ Milk or yogurt  ¨ Meat, poultry, fish, or tofu  ¨ Healthy fats  · Check your blood sugar levels as directed by your provider. Take any medicine as prescribed by your provider.  · Learn to read food labels and pick the right portion sizes.  · Eat only the amount of food in your meal plan. Eat about the same amount of food at regular times each day. Dont skip meals. Eat meals 4 to 5 hours apart, with snacks in between.  · Limit alcohol. It raises blood sugar levels. Drink water or calorie-free diet drinks that use safe sweeteners.  · Eat less fat to help lower your risk of heart disease. Use nonfat or low-fat dairy products and lean meats. Avoid fried foods. Use cooking oils that are unsaturated, such as olive, canola, or peanut oil.  · Talk with your dietitian about safe sugar substitutes.  · Avoid  added salt. It can contribute to high blood pressure, which can cause heart disease. People with diabetes already have a risk of high blood pressure and heart disease.  · Stay at a healthy weight. If you need to lose weight, cut down on your portion sizes. But dont skip meals. Exercise is an important part of any weight management program. Talk with your provider about an exercise program thats right for you.  · For more information about the best diet plan for you, talk with a registered dietitian (RD). To find an RD in your area, contact:  ¨ Academy of Nutrition and Dietetics www.eatright.org  ¨ The American Diabetes Association [phone] www.diabetes.org  Date Last Reviewed: 8/1/2016 © 2000-2017 [employer]. [address] [place] [address], [place] [address]. All rights reserved. This information is not intended as a substitute for professional medical care. Always follow your healthcare professional's instructions.

## 2020-07-27 NOTE — PROGRESS NOTES
"Subjective:       Patient ID: Nithin Pino is a 80 y.o. male.    Chief Complaint: Follow-up (6w)    HPI Patient is an 80-year-old male presenting today following up on his diabetes, hypertension, hyperlipidemia and vitamin-D deficiency.  He indicates he has been doing pretty well since his last visit.  In regards to his diabetes his A1c is excellent at 6.7.  He is tolerating the medication well without any adverse effects.    His blood pressure showing good control he has no signs or symptoms of cardiac decompensation.    Lipid panel has been stable over time he is not experiencing any adverse effects on the medications.  No myalgias or arthralgias associated with medications.    Lab work it shown a low vitamin-D and elevated parathyroid level.  We have initiated him on vitamin-D replacement.  He has follow-up labs scheduled for September.  We will reassess his vitamin-D level and PTH at that time.  If he remains out of range will do further workup as appropriate at that point.    Breathing has been stable he is noting no problems with his COPD.    He states he tripped and fell of few weeks ago and hurt his right shoulder.  He went to the [facility] and had x-rays done which showed no evidence of fractures.  He has a pain patch replies at bedtime and that works pretty well formed.  He states occasionally still having little bit of discomfort during the day but is not significant.    Review of Systems   Constitutional: Negative for fever and unexpected weight change.   Respiratory: Negative for cough, shortness of breath and wheezing.    Cardiovascular: Negative for chest pain and palpitations.   Gastrointestinal: Negative for constipation, diarrhea, nausea and vomiting.   Genitourinary: Negative for dysuria and hematuria.   Musculoskeletal: Positive for arthralgias.       Objective:   /76   Pulse 76   Temp 97 °F (36.1 °C)   Ht 5' 10" (1.778 m)   Wt 76.6 kg (168 lb 14 oz)   BMI 24.23 " kg/m²      Physical Exam  Vitals signs reviewed.   Constitutional:       Appearance: He is well-developed.   HENT:      Head: Normocephalic and atraumatic.      Right Ear: Tympanic membrane, ear canal and external ear normal.      Left Ear: Tympanic membrane, ear canal and external ear normal.   Eyes:      Pupils: Pupils are equal, round, and reactive to light.   Neck:      Musculoskeletal: Neck supple.      Thyroid: No thyromegaly.   Cardiovascular:      Rate and Rhythm: Normal rate and regular rhythm.      Heart sounds: Normal heart sounds. No murmur. No friction rub. No gallop.    Pulmonary:      Effort: Pulmonary effort is normal.      Breath sounds: Normal breath sounds. No wheezing or rales.   Abdominal:      General: Bowel sounds are normal. There is no distension.      Palpations: Abdomen is soft.      Tenderness: There is no abdominal tenderness.   Musculoskeletal:      Comments: Examination the right shoulder shows no bony abnormalities.  No tenderness.  Full range of motion is noted.  [facility]   Psychiatric:         Mood and Affect: Mood normal.             Assessment:       1. Diabetes mellitus type 2 with peripheral artery disease    2. Hypertension associated with diabetes    3. Hyperlipidemia associated with type 2 diabetes mellitus    4. Vitamin D insufficiency    5. Panlobular emphysema    6. Acute pain of right shoulder        Plan:   Diabetes mellitus type 2 with peripheral artery disease  Diabetes continues to do well at this time.  Most recent a1c is     Lab Results   Component Value Date    HGBA1C 6.7 (H) 06/17/2020    .      We will continue the current regimen.  Focus on a low carbohydrate diet and consistent exercise.        Hypertension associated with diabetes  Blood pressure is under good control.  We will continue the current regimen.  Will work on regular aerobic exercise and a low salt diet.        Hyperlipidemia associated with type 2 diabetes  mellitus  Cholesterol numbers look good.  We will continue the current regimen at this time.  Remain focused on low fat diet and high dietary fiber intake.      COPD (chronic obstructive pulmonary disease)  Stable.  No significant sob.  Activity levels stable.    Diabetes mellitus type 2 with peripheral artery disease  -     Hemoglobin A1C; Future; Expected date: 11/24/2020    Hypertension associated with diabetes    Hyperlipidemia associated with type 2 diabetes mellitus    Vitamin D insufficiency  Comments:  Continue vitamin d replacement. Repeat labs scheduled for September.    Panlobular emphysema    Acute pain of right shoulder  Comments:  Continue pain patch at night.  Ibuprofen 400 mg twice daily as needed.          Follow up in about 4 months (around 11/27/2020) for DM, HTN, HLP, with Brittani George PA-C.

## 2020-07-27 NOTE — ASSESSMENT & PLAN NOTE
Diabetes continues to do well at this time.  Most recent a1c is     Lab Results   Component Value Date    HGBA1C 6.7 (H) 06/17/2020    .      We will continue the current regimen.  Focus on a low carbohydrate diet and consistent exercise.

## 2020-07-28 ENCOUNTER — PATIENT OUTREACH (OUTPATIENT)
Dept: ADMINISTRATIVE | Facility: OTHER | Age: 80
End: 2020-07-28

## 2020-07-28 NOTE — PROGRESS NOTES
Chart reviewed.   Immunizations: Triggered Imm Registry     Orders placed: n/a  Upcoming appts to satisfy STEVEN topics: n/a

## 2020-07-29 ENCOUNTER — TELEPHONE (OUTPATIENT)
Dept: INTERNAL MEDICINE | Facility: CLINIC | Age: 80
End: 2020-07-29

## 2020-07-29 ENCOUNTER — OFFICE VISIT (OUTPATIENT)
Dept: UROLOGY | Facility: CLINIC | Age: 80
End: 2020-07-29
Payer: MEDICARE

## 2020-07-29 VITALS
WEIGHT: 169.75 LBS | TEMPERATURE: 98 F | DIASTOLIC BLOOD PRESSURE: 60 MMHG | SYSTOLIC BLOOD PRESSURE: 118 MMHG | BODY MASS INDEX: 24.36 KG/M2

## 2020-07-29 DIAGNOSIS — Z12.5 PROSTATE CANCER SCREENING: ICD-10-CM

## 2020-07-29 DIAGNOSIS — N40.0 BENIGN PROSTATIC HYPERPLASIA, UNSPECIFIED WHETHER LOWER URINARY TRACT SYMPTOMS PRESENT: Primary | ICD-10-CM

## 2020-07-29 DIAGNOSIS — I10 HYPERTENSION, UNSPECIFIED TYPE: ICD-10-CM

## 2020-07-29 DIAGNOSIS — N40.1 BENIGN PROSTATIC HYPERPLASIA WITH WEAK URINARY STREAM: ICD-10-CM

## 2020-07-29 DIAGNOSIS — R39.12 BENIGN PROSTATIC HYPERPLASIA WITH WEAK URINARY STREAM: ICD-10-CM

## 2020-07-29 DIAGNOSIS — N32.81 OAB (OVERACTIVE BLADDER): ICD-10-CM

## 2020-07-29 LAB
BILIRUB SERPL-MCNC: NORMAL MG/DL
BLOOD URINE, POC: NORMAL
CLARITY, POC UA: CLEAR
COLOR, POC UA: NORMAL
GLUCOSE UR QL STRIP: NORMAL
KETONES UR QL STRIP: NORMAL
LEUKOCYTE ESTERASE URINE, POC: NORMAL
NITRITE, POC UA: NORMAL
PH, POC UA: 5
PROTEIN, POC: NORMAL
SPECIFIC GRAVITY, POC UA: 1.02
UROBILINOGEN, POC UA: NORMAL

## 2020-07-29 PROCEDURE — 99499 UNLISTED E&M SERVICE: CPT | Mod: S$GLB,,, | Performed by: UROLOGY

## 2020-07-29 PROCEDURE — 1159F PR MEDICATION LIST DOCUMENTED IN MEDICAL RECORD: ICD-10-PCS | Mod: HCNC,S$GLB,, | Performed by: UROLOGY

## 2020-07-29 PROCEDURE — 3074F SYST BP LT 130 MM HG: CPT | Mod: HCNC,CPTII,S$GLB, | Performed by: UROLOGY

## 2020-07-29 PROCEDURE — 1126F AMNT PAIN NOTED NONE PRSNT: CPT | Mod: HCNC,S$GLB,, | Performed by: UROLOGY

## 2020-07-29 PROCEDURE — 1101F PR PT FALLS ASSESS DOC 0-1 FALLS W/OUT INJ PAST YR: ICD-10-PCS | Mod: HCNC,CPTII,S$GLB, | Performed by: UROLOGY

## 2020-07-29 PROCEDURE — 99999 PR PBB SHADOW E&M-EST. PATIENT-LVL III: CPT | Mod: PBBFAC,HCNC,, | Performed by: UROLOGY

## 2020-07-29 PROCEDURE — 81002 POCT URINE DIPSTICK WITHOUT MICROSCOPE: ICD-10-PCS | Mod: HCNC,S$GLB,, | Performed by: UROLOGY

## 2020-07-29 PROCEDURE — 1126F PR PAIN SEVERITY QUANTIFIED, NO PAIN PRESENT: ICD-10-PCS | Mod: HCNC,S$GLB,, | Performed by: UROLOGY

## 2020-07-29 PROCEDURE — 81002 URINALYSIS NONAUTO W/O SCOPE: CPT | Mod: HCNC,S$GLB,, | Performed by: UROLOGY

## 2020-07-29 PROCEDURE — 3078F PR MOST RECENT DIASTOLIC BLOOD PRESSURE < 80 MM HG: ICD-10-PCS | Mod: HCNC,CPTII,S$GLB, | Performed by: UROLOGY

## 2020-07-29 PROCEDURE — 99999 PR PBB SHADOW E&M-EST. PATIENT-LVL III: ICD-10-PCS | Mod: PBBFAC,HCNC,, | Performed by: UROLOGY

## 2020-07-29 PROCEDURE — 1101F PT FALLS ASSESS-DOCD LE1/YR: CPT | Mod: HCNC,CPTII,S$GLB, | Performed by: UROLOGY

## 2020-07-29 PROCEDURE — 99214 OFFICE O/P EST MOD 30 MIN: CPT | Mod: HCNC,25,S$GLB, | Performed by: UROLOGY

## 2020-07-29 PROCEDURE — 99214 PR OFFICE/OUTPT VISIT, EST, LEVL IV, 30-39 MIN: ICD-10-PCS | Mod: HCNC,25,S$GLB, | Performed by: UROLOGY

## 2020-07-29 PROCEDURE — 3078F DIAST BP <80 MM HG: CPT | Mod: HCNC,CPTII,S$GLB, | Performed by: UROLOGY

## 2020-07-29 PROCEDURE — 99499 RISK ADDL DX/OHS AUDIT: ICD-10-PCS | Mod: S$GLB,,, | Performed by: UROLOGY

## 2020-07-29 PROCEDURE — 3074F PR MOST RECENT SYSTOLIC BLOOD PRESSURE < 130 MM HG: ICD-10-PCS | Mod: HCNC,CPTII,S$GLB, | Performed by: UROLOGY

## 2020-07-29 PROCEDURE — 1159F MED LIST DOCD IN RCRD: CPT | Mod: HCNC,S$GLB,, | Performed by: UROLOGY

## 2020-07-29 RX ORDER — OXYBUTYNIN CHLORIDE 15 MG/1
15 TABLET, EXTENDED RELEASE ORAL DAILY
Qty: 90 TABLET | Refills: 3 | Status: SHIPPED | OUTPATIENT
Start: 2020-07-29 | End: 2021-07-27

## 2020-07-29 RX ORDER — FINASTERIDE 5 MG/1
5 TABLET, FILM COATED ORAL DAILY
Qty: 90 TABLET | Refills: 3 | Status: SHIPPED | OUTPATIENT
Start: 2020-07-29 | End: 2021-08-24 | Stop reason: SDUPTHER

## 2020-07-29 RX ORDER — TAMSULOSIN HYDROCHLORIDE 0.4 MG/1
0.4 CAPSULE ORAL DAILY
Qty: 90 CAPSULE | Refills: 3 | Status: SHIPPED | OUTPATIENT
Start: 2020-07-29 | End: 2021-07-27 | Stop reason: SDUPTHER

## 2020-07-29 NOTE — TELEPHONE ENCOUNTER
I returned call and daughter and patient request diabetes meds not be disbursed through [employer] [facility] I did make a note of this.aa

## 2020-07-29 NOTE — TELEPHONE ENCOUNTER
----- Message from Ghazal Kelley sent at 7/29/2020 12:05 PM CDT -----  Contact: Ai   Ai is calling to make sure the Rx for the dulaglutide (TRULICITY) 1.5 mg/0.5 mL PnIj and insulin (LANTUS SOLOSTAR U-100 INSULIN) glargine 100 units/mL (3mL) SubQ pen do not [facility]. Ai wants to make [facility] these [facility]. Please call and advise.

## 2020-07-29 NOTE — PROGRESS NOTES
Chief Complaint: Frequency    HPI:   7/20/20: No interval changes, doing well.  Fell and hurt his right shoulder.  Reviewed history in detail. Oxybutinin in the evening helps.  4/29/19: Backed off on the caffeine.  Oxybutinin helping with nocturia.  No sig changes all about the same.  3/28/18: 76 yo man referred for LUTS of frequency, says stream is good.  Nocturia x4.  Some mild abd pain lately that comes/goes but no abd/pelvic pain today and no exac/rel factors.  No hematuria.  No urolithiasis.   Normal sexual function.  Has been taking finasteride/oxybutinin x5yrs after his first heart attack. PVR 0.    Allergies:  Panmist dm  [pseudoephedrine-dm-guaifenesin]    Medications:  has a current medication list which includes the following prescription(s): amlodipine, aspirin, blood-glucose meter, cholecalciferol (vitamin d3), cyanocobalamin, trulicity, embrace pro test strips, finasteride, lantus solostar u-100 insulin, isosorbide mononitrate, losartan, metformin, metoprolol succinate, nitroglycerin, oxybutynin, psyllium seed, rosuvastatin, tamsulosin, trueplus lancets, trueresult blood glucose systm, and ventolin hfa.    Review of Systems:  General: No fever, chills, fatigability, or weight loss.  Skin: No rashes, itching, or changes in color or texture of skin.  Chest: Denies EWING, cyanosis, wheezing, cough, and sputum production.  Abdomen: Appetite fine. No weight loss. Denies diarrhea, abdominal pain, hematemesis, or blood in stool.  Musculoskeletal: No joint stiffness or swelling. Denies back pain.  : As above.  All other review of systems negative.    PMH:   has a past medical history of Abnormal brain MRI (1/21/2018), Abnormal ECG (10/31/2013), Abnormal stress test (1/28/2016), Alcohol dependence, AP (angina pectoris) (1/28/2016), Asthma, Carotid artery occlusion, Cataract, Chronic ischemic heart disease (10/31/2013), CKD (chronic kidney disease) stage 3, GFR 30-59 ml/min (11/4/2015), Coronary artery disease,  DM (diabetes mellitus) (2013), DM (diabetes mellitus) (2011), Heart valve regurgitation (11/4/2015), History of alcohol abuse (1/22/2018), History of atherectomy (1/21/2018), History of chronic kidney disease (1/22/2018), History of PTCA (10/31/2013), History of PTCA (3/9/2016), Hyperlipidemia, Hypertension, Insomnia (6/17/2013), Ischemic cardiomyopathy (10/31/2013), Long term (current) use of antithrombotics/antiplatelets (1/21/2018), Myocardial infarction, Old MI (myocardial infarction) (1994), Peripheral vascular disease, Polyneuropathy, RBBB (10/31/2013), S/P CABG (coronary artery bypass graft) (10/31/2013), Shortness of breath (10/31/2013), Tobacco dependence, Trouble in sleeping, Type 2 diabetes with peripheral circulatory disorder, controlled, and Wears glasses.    PSH:   has a past surgical history that includes Cardiac surgery (1994); Appendectomy; Coronary artery bypass graft (5/2011); Hernia repair; Cardiac catheterization; PCIOL (Bilateral, OD 02/01/17/OS 02/15/17); and Cataract extraction w/  intraocular lens implant (Right, 02/01/2017).    FamHx: family history includes Diabetes in his brother and sister. He was adopted.    SocHx:  reports that he quit smoking about 26 years ago. His smoking use included cigarettes. He has a 60.00 pack-year smoking history. He quit smokeless tobacco use about 9 years ago. He reports current alcohol use. He reports that he does not use drugs.      Physical Exam:  Vitals:    07/29/20 1009   BP: 118/60   Temp: 98.2 °F (36.8 °C)     General: A&Ox3, no apparent distress, no deformities  Neck: No masses, normal thyroid  Lungs: normal inspiration, no use of accessory muscles  Heart: normal pulse, no arrhythmias  Abdomen: Soft, NT, ND  Skin: The skin is warm and dry. No jaundice.  Ext: No c/c/e.  :   3/18: Test desc betito, no abnormalities of epididymus, left small sec to mumps orchitis age 13. Penis normal, with normal penile and scrotal skin. Meatus normal. Normal rectal  tone, no hemorrhoids. Prost 30 gm no nodules or masses appreciated. SV not palpable. Perineum and anus normal.    Labs/Studies:   Urinalysis performed in clinic, summary: UA normal  PSA    6/15: 0.82    Impression/Plan:   1. OAB.  Caffeine cessation, already doing fluid restriction in evening.  Take oxybutinin in PM to help nocturia. Plan is working.  2. BPH controlled with finasteride, refilled.  3. HTN: well controlled  4. PSA added to next labs.

## 2020-07-31 ENCOUNTER — PES CALL (OUTPATIENT)
Dept: ADMINISTRATIVE | Facility: CLINIC | Age: 80
End: 2020-07-31

## 2020-08-05 ENCOUNTER — OFFICE VISIT (OUTPATIENT)
Dept: OPHTHALMOLOGY | Facility: CLINIC | Age: 80
End: 2020-08-05
Payer: MEDICARE

## 2020-08-05 DIAGNOSIS — H52.4 BILATERAL PRESBYOPIA: ICD-10-CM

## 2020-08-05 DIAGNOSIS — Z96.1 PSEUDOPHAKIA OF BOTH EYES: ICD-10-CM

## 2020-08-05 DIAGNOSIS — E11.9 DIABETES MELLITUS TYPE 2 WITHOUT RETINOPATHY: Primary | ICD-10-CM

## 2020-08-05 PROCEDURE — 92015 PR REFRACTION: ICD-10-PCS | Mod: HCNC,S$GLB,, | Performed by: OPTOMETRIST

## 2020-08-05 PROCEDURE — 99999 PR PBB SHADOW E&M-EST. PATIENT-LVL III: CPT | Mod: PBBFAC,HCNC,, | Performed by: OPTOMETRIST

## 2020-08-05 PROCEDURE — 92015 DETERMINE REFRACTIVE STATE: CPT | Mod: HCNC,S$GLB,, | Performed by: OPTOMETRIST

## 2020-08-05 PROCEDURE — 92014 COMPRE OPH EXAM EST PT 1/>: CPT | Mod: HCNC,S$GLB,, | Performed by: OPTOMETRIST

## 2020-08-05 PROCEDURE — 99999 PR PBB SHADOW E&M-EST. PATIENT-LVL III: ICD-10-PCS | Mod: PBBFAC,HCNC,, | Performed by: OPTOMETRIST

## 2020-08-05 PROCEDURE — 92014 PR EYE EXAM, EST PATIENT,COMPREHESV: ICD-10-PCS | Mod: HCNC,S$GLB,, | Performed by: OPTOMETRIST

## 2020-08-05 NOTE — PROGRESS NOTES
HPI     Diabetic Eye Exam      Additional comments: yearly              Comments     IDDM  Last Exam w/ TRF 4/03/2019  No Visual Complaints   Itching Eyes  No Pain  Last A1C 6.7 6/17/2020          Last edited by Victorina Manzano on 8/5/2020  8:05 AM. (History)            Assessment /Plan     For exam results, see Encounter Report.    Diabetes mellitus type 2 without retinopathy    Pseudophakia of both eyes    Bilateral presbyopia      No Background Diabetic Retinopathy    Stable IOL OU.    Dispense Final Rx for glasses.  RTC 1 year  Discussed above and answered questions.

## 2020-09-16 ENCOUNTER — LAB VISIT (OUTPATIENT)
Dept: LAB | Facility: HOSPITAL | Age: 80
End: 2020-09-16
Attending: INTERNAL MEDICINE
Payer: MEDICARE

## 2020-09-16 DIAGNOSIS — Z12.5 PROSTATE CANCER SCREENING: ICD-10-CM

## 2020-09-16 DIAGNOSIS — N40.0 BENIGN PROSTATIC HYPERPLASIA, UNSPECIFIED WHETHER LOWER URINARY TRACT SYMPTOMS PRESENT: ICD-10-CM

## 2020-09-16 DIAGNOSIS — N32.81 OAB (OVERACTIVE BLADDER): ICD-10-CM

## 2020-09-16 DIAGNOSIS — R39.12 BENIGN PROSTATIC HYPERPLASIA WITH WEAK URINARY STREAM: ICD-10-CM

## 2020-09-16 DIAGNOSIS — E55.9 VITAMIN D DEFICIENCY: ICD-10-CM

## 2020-09-16 DIAGNOSIS — N40.1 BENIGN PROSTATIC HYPERPLASIA WITH WEAK URINARY STREAM: ICD-10-CM

## 2020-09-16 DIAGNOSIS — I10 HYPERTENSION, UNSPECIFIED TYPE: ICD-10-CM

## 2020-09-16 LAB — PTH-INTACT SERPL-MCNC: 93 PG/ML (ref 9–77)

## 2020-09-16 PROCEDURE — 83970 ASSAY OF PARATHORMONE: CPT | Mod: HCNC

## 2020-09-16 PROCEDURE — 82306 VITAMIN D 25 HYDROXY: CPT | Mod: HCNC

## 2020-09-16 PROCEDURE — 84153 ASSAY OF PSA TOTAL: CPT | Mod: HCNC

## 2020-09-16 PROCEDURE — 36415 COLL VENOUS BLD VENIPUNCTURE: CPT | Mod: HCNC,PO

## 2020-09-17 ENCOUNTER — TELEPHONE (OUTPATIENT)
Dept: INTERNAL MEDICINE | Facility: CLINIC | Age: 80
End: 2020-09-17

## 2020-09-17 DIAGNOSIS — E55.9 VITAMIN D DEFICIENCY: ICD-10-CM

## 2020-09-17 LAB
25(OH)D3+25(OH)D2 SERPL-MCNC: 18 NG/ML (ref 30–96)
COMPLEXED PSA SERPL-MCNC: 0.29 NG/ML (ref 0–4)

## 2020-09-17 RX ORDER — ERGOCALCIFEROL 1.25 MG/1
50000 CAPSULE ORAL
Qty: 8 CAPSULE | Refills: 0 | Status: SHIPPED | OUTPATIENT
Start: 2020-09-17 | End: 2020-11-06

## 2020-09-17 NOTE — TELEPHONE ENCOUNTER
Vitamin d remains low.  Increase vitamin d replacement to 50,000 units weekly for 8 weeks.  Repeat labs in 8 weeks

## 2020-09-30 ENCOUNTER — OFFICE VISIT (OUTPATIENT)
Dept: INTERNAL MEDICINE | Facility: CLINIC | Age: 80
End: 2020-09-30
Payer: MEDICARE

## 2020-09-30 VITALS
HEART RATE: 80 BPM | WEIGHT: 175.94 LBS | BODY MASS INDEX: 25.19 KG/M2 | TEMPERATURE: 98 F | HEIGHT: 70 IN | SYSTOLIC BLOOD PRESSURE: 144 MMHG | DIASTOLIC BLOOD PRESSURE: 70 MMHG

## 2020-09-30 DIAGNOSIS — I25.708 CORONARY ARTERY DISEASE OF BYPASS GRAFT OF NATIVE HEART WITH STABLE ANGINA PECTORIS: Chronic | ICD-10-CM

## 2020-09-30 DIAGNOSIS — I70.0 THORACIC AORTA ATHEROSCLEROSIS: ICD-10-CM

## 2020-09-30 DIAGNOSIS — R39.12 BENIGN PROSTATIC HYPERPLASIA WITH WEAK URINARY STREAM: ICD-10-CM

## 2020-09-30 DIAGNOSIS — J43.1 PANLOBULAR EMPHYSEMA: ICD-10-CM

## 2020-09-30 DIAGNOSIS — N32.81 OAB (OVERACTIVE BLADDER): ICD-10-CM

## 2020-09-30 DIAGNOSIS — E11.69 HYPERLIPIDEMIA ASSOCIATED WITH TYPE 2 DIABETES MELLITUS: ICD-10-CM

## 2020-09-30 DIAGNOSIS — R94.31 ABNORMAL ECG: ICD-10-CM

## 2020-09-30 DIAGNOSIS — N40.1 BENIGN PROSTATIC HYPERPLASIA WITH WEAK URINARY STREAM: ICD-10-CM

## 2020-09-30 DIAGNOSIS — E11.59 HYPERTENSION ASSOCIATED WITH DIABETES: ICD-10-CM

## 2020-09-30 DIAGNOSIS — I15.2 HYPERTENSION ASSOCIATED WITH DIABETES: ICD-10-CM

## 2020-09-30 DIAGNOSIS — I65.23 ASYMPTOMATIC STENOSIS OF BOTH CAROTID ARTERIES WITHOUT INFARCTION: ICD-10-CM

## 2020-09-30 DIAGNOSIS — E78.5 HYPERLIPIDEMIA ASSOCIATED WITH TYPE 2 DIABETES MELLITUS: ICD-10-CM

## 2020-09-30 DIAGNOSIS — I25.9 CHRONIC ISCHEMIC HEART DISEASE: Chronic | ICD-10-CM

## 2020-09-30 DIAGNOSIS — E11.51 DIABETES MELLITUS TYPE 2 WITH PERIPHERAL ARTERY DISEASE: ICD-10-CM

## 2020-09-30 DIAGNOSIS — I73.9 PERIPHERAL VASCULAR DISEASE: ICD-10-CM

## 2020-09-30 DIAGNOSIS — Z00.00 ENCOUNTER FOR MEDICARE ANNUAL WELLNESS EXAM: Primary | ICD-10-CM

## 2020-09-30 DIAGNOSIS — I45.10 RBBB: Chronic | ICD-10-CM

## 2020-09-30 DIAGNOSIS — I20.89 CHRONIC STABLE ANGINA: ICD-10-CM

## 2020-09-30 DIAGNOSIS — E21.3 HYPERPARATHYROIDISM: ICD-10-CM

## 2020-09-30 PROCEDURE — G0008 ADMIN INFLUENZA VIRUS VAC: HCPCS | Mod: HCNC,S$GLB,, | Performed by: PHYSICIAN ASSISTANT

## 2020-09-30 PROCEDURE — G0439 PR MEDICARE ANNUAL WELLNESS SUBSEQUENT VISIT: ICD-10-PCS | Mod: HCNC,S$GLB,, | Performed by: PHYSICIAN ASSISTANT

## 2020-09-30 PROCEDURE — 90694 FLU VACCINE - QUADRIVALENT - ADJUVANTED: ICD-10-PCS | Mod: HCNC,S$GLB,, | Performed by: PHYSICIAN ASSISTANT

## 2020-09-30 PROCEDURE — G0008 FLU VACCINE - QUADRIVALENT - ADJUVANTED: ICD-10-PCS | Mod: HCNC,S$GLB,, | Performed by: PHYSICIAN ASSISTANT

## 2020-09-30 PROCEDURE — 99999 PR PBB SHADOW E&M-EST. PATIENT-LVL IV: CPT | Mod: PBBFAC,HCNC,, | Performed by: PHYSICIAN ASSISTANT

## 2020-09-30 PROCEDURE — G0439 PPPS, SUBSEQ VISIT: HCPCS | Mod: HCNC,S$GLB,, | Performed by: PHYSICIAN ASSISTANT

## 2020-09-30 PROCEDURE — 90694 VACC AIIV4 NO PRSRV 0.5ML IM: CPT | Mod: HCNC,S$GLB,, | Performed by: PHYSICIAN ASSISTANT

## 2020-09-30 PROCEDURE — 99999 PR PBB SHADOW E&M-EST. PATIENT-LVL IV: ICD-10-PCS | Mod: PBBFAC,HCNC,, | Performed by: PHYSICIAN ASSISTANT

## 2020-09-30 NOTE — Clinical Note
"[facility], he states he has medication on hold - "oxybutinin" at [facility] and [facility] "

## 2020-10-02 ENCOUNTER — OFFICE VISIT (OUTPATIENT)
Dept: URGENT CARE | Facility: CLINIC | Age: 80
End: 2020-10-02
Payer: MEDICARE

## 2020-10-02 VITALS
OXYGEN SATURATION: 96 % | DIASTOLIC BLOOD PRESSURE: 70 MMHG | BODY MASS INDEX: 25.11 KG/M2 | WEIGHT: 175 LBS | TEMPERATURE: 98 F | SYSTOLIC BLOOD PRESSURE: 134 MMHG | HEART RATE: 85 BPM

## 2020-10-02 DIAGNOSIS — J06.9 VIRAL UPPER RESPIRATORY TRACT INFECTION: Primary | ICD-10-CM

## 2020-10-02 LAB
CTP QC/QA: YES
SARS-COV-2 RDRP RESP QL NAA+PROBE: NEGATIVE

## 2020-10-02 PROCEDURE — U0002 COVID-19 LAB TEST NON-CDC: HCPCS | Mod: QW,S$GLB,, | Performed by: PHYSICIAN ASSISTANT

## 2020-10-02 PROCEDURE — 99213 PR OFFICE/OUTPT VISIT, EST, LEVL III, 20-29 MIN: ICD-10-PCS | Mod: S$GLB,,, | Performed by: PHYSICIAN ASSISTANT

## 2020-10-02 PROCEDURE — U0002: ICD-10-PCS | Mod: QW,S$GLB,, | Performed by: PHYSICIAN ASSISTANT

## 2020-10-02 PROCEDURE — 99213 OFFICE O/P EST LOW 20 MIN: CPT | Mod: S$GLB,,, | Performed by: PHYSICIAN ASSISTANT

## 2020-10-02 NOTE — PROGRESS NOTES
Subjective:       Patient ID: Nithin Pino is a 80 y.o. male.    Vitals:  weight is 79.4 kg (175 lb). His tympanic temperature is 98.1 °F (36.7 °C). His blood pressure is 134/70 and his pulse is 85. His oxygen saturation is 96%.     Chief Complaint: COVID-19 Concerns    80 y.o. male with CAD, HTN, and hyperlipidemia with URT symptoms. Patient symptoms are sneezing and post nasal drip only and says someone tests positive [place] and would like to be tested. Was not directly around that person, however. Denies any fever, cough, HA, CP, SOB, NVD, or loss of taste or smell   URI   This is a new problem. The current episode started yesterday. The problem has been unchanged. There has been no fever. Associated symptoms include congestion, rhinorrhea, sinus pain and sneezing. Pertinent negatives include no abdominal pain, chest pain, coughing, diarrhea, dysuria, ear pain, nausea, neck pain, sore throat, vomiting or wheezing. He has tried nothing for the symptoms.       Constitution: Negative for sweating and fever.   HENT: Positive for congestion and sinus pain. Negative for ear pain and sore throat.    Neck: Negative for neck pain and painful lymph nodes.   Cardiovascular: Negative for chest pain and sob on exertion.   Eyes: Negative for eye redness.   Respiratory: Negative for cough, shortness of breath and wheezing.    Gastrointestinal: Negative for abdominal pain, nausea, vomiting and diarrhea.   Genitourinary: Negative for dysuria.   Musculoskeletal: Negative for pain.   Allergic/Immunologic: Positive for sneezing.   Hematologic/Lymphatic: Negative for swollen lymph nodes.       Objective:      Physical Exam   Constitutional: He is oriented to person, place, and time. He appears well-developed. He is cooperative.  Non-toxic appearance. He does not appear ill. No distress.   HENT:   Head: Normocephalic and atraumatic.   Ears:   Right Ear: Hearing, tympanic membrane, external ear and ear canal normal.    Left Ear: Hearing, tympanic membrane, external ear and ear canal normal.   Nose: Nose normal. No mucosal edema, rhinorrhea or nasal deformity. No epistaxis. Right sinus exhibits no maxillary sinus tenderness and no frontal sinus tenderness. Left sinus exhibits no maxillary sinus tenderness and no frontal sinus tenderness.   Mouth/Throat: Uvula is midline, oropharynx is clear and moist and mucous membranes are normal. No trismus in the jaw. Normal dentition. No uvula swelling. No oropharyngeal exudate or posterior oropharyngeal edema.      Comments: Mild pharynx hyperemia no exudate, uvula deviation or tonsillar swelling   Eyes: Conjunctivae and lids are normal. No scleral icterus.   Neck: Trachea normal, full passive range of motion without pain and phonation normal. Neck supple. No neck rigidity. No edema and no erythema present.   Cardiovascular: Normal rate, regular rhythm, normal heart sounds and normal pulses.   Pulmonary/Chest: Effort normal and breath sounds normal. No respiratory distress. He has no decreased breath sounds. He has no rhonchi.   Abdominal: Normal appearance.   Musculoskeletal: Normal range of motion.         General: No deformity.   Neurological: He is alert and oriented to person, place, and time. He exhibits normal muscle tone. Coordination normal.   Skin: Skin is warm, dry, intact, not diaphoretic and not pale. Psychiatric: His speech is normal and behavior is normal. Judgment and thought content normal.   Nursing note and vitals reviewed.        Results for orders placed or performed in visit on 10/02/20   POCT COVID-19 Rapid Screening   Result Value Ref Range    POC Rapid COVID Negative Negative     Acceptable Yes        Assessment:       1. Viral upper respiratory tract infection        Plan:         Viral upper respiratory tract infection  -     POCT COVID-19 Rapid Screening        Michaelle Singh PA-C  [facility]         Patient Instructions          Patient Instructions   General Instructions for URI:     Symptomatic treatment:     Alternate Tylenol and Ibuprofen every 3 hrs for fever, pain and inflammation. Avoid Nsaids if you are pregnant or have advanced kidney disease.      Salt water gargles/Chloroseptic spray to soothe throat from post nasal drip  Honey/lemon water or warm tea to soothe throat from post nasal drip  Cepachol helps to soothe the discomfort in throat from post nasal drip     Cold-eeze helps to reduce the duration of URI symptoms if taken early  Elderberry to reduce duration of viral URI symptoms     Nasal saline spray reduces inflammation and dryness  Warm face compresses/hot showers as often as you can to open sinuses and allow to drain.   Flonase OTC or Nasacort OTC to help decrease inflammation in nasal turbinates and allow sinuses to drain     Vicks vapor rub at night  Simple foods like chicken noodle soup help provide hydration and nutrition     Delsym helps with coughing at night     Zantac will help if there is reflux from the post nasal drip and helpful to take at night     Zyrtec/Claritin during the day and Benadryl at night may help if allergy component concurrently with URI     If you DO NOT have Hypertension or any history of palpitations, it is ok to take over the counter Sudafed or Mucinex D  or Allegra-D or Claritin-D or Zyrtec-D.  If you do take one of the above, it is ok to combine that with plain over the counter Mucinex or Allegra or Claritin or  Zyrtec. If, for example, you are taking Zyrtec -D, you can combine that with Mucinex, but not Mucinex-D. If you are  taking Mucinex-D, you can combine that with plain Allegra or Claritin or Zyrtec.  If you DO have Hypertension or palpitations, it is safe to take Coricidin HBP for relief of sinus symptoms.  Please follow up with your primary care provider within 2-5 days if your signs and symptoms have not resolved or  worsen.  If your condition worsens or fails to improve  we recommend that you receive another evaluation at the emergency  room immediately or contact your primary medical clinic to discuss your concerns.  You must understand that you have received an Urgent Care treatment only and that you may be released before all of  your medical problems are known or treated. You, the patient, will arrange for follow up care as instructed.     Rest as much as you can     Your symptoms will likely last 5-7 days, maybe longer depending on how it affects your body.  You are contagious 5-7, so minimize contact with others to reduce the spread to others and stay home from work or school as we discussed. Dehydration is preventable but is one of the main reasons why you will feel so badly. Drink pedialyte, gatorade or propel. Stay hydrated.  Antibiotics are not needed unless a complication(such as Otitis Media, Bacterial sinus infection or pneumonia) develops. Taking antibiotics for Flu/Cold is not supported by evidence-based medicine and can expose you to unnecessary side effects of the medication, such as anaphylaxis, yeast infection and leads to antibiotic resistance.   If you experience any:  Chest pain, shortness of breath, wheezing or difficulty breathing,  Severe headache, face, neck or ear pain,  New rash,  Fever over 101.5º F (38.6 C) for more than three days,  Confusion, behavior change or seizure,  Severe weakness or dizziness, please go to the ER immediately for further testing.         Viral Upper Respiratory Illness (Adult)  You have a viral upper respiratory illness (URI), which is another term for the common cold. This illness is contagious during the first few days. It is spread through the air by coughing and sneezing. It may also be spread by direct contact (touching the sick person and then touching your own eyes, nose, or mouth). Frequent handwashing will decrease risk of spread. Most viral illnesses go away within 7 to 10 days with rest and simple home remedies.  Sometimes the illness may last for several weeks. Antibiotics will not kill a virus, and they are generally not prescribed for this condition.    Home care  · If symptoms are severe, rest at home for the first 2 to 3 days. When you resume activity, don't let yourself get too tired.  · Avoid being exposed to cigarette smoke (yours or others).  · You may use acetaminophen or ibuprofen to control pain and fever, unless another medicine was prescribed. (Note: If you have chronic liver or kidney disease, have ever had a stomach ulcer or gastrointestinal bleeding, or are taking blood-thinning medicines, talk with your healthcare provider before using these medicines.) Aspirin should never be given to anyone under 18 years of age who is ill with a viral infection or fever. It may cause severe liver or brain damage.  · Your appetite may be poor, so a light diet is fine. Avoid dehydration by drinking 6 to 8 glasses of fluids per day (water, soft drinks, juices, tea, or soup). Extra fluids will help loosen secretions in the nose and lungs.  · Over-the-counter cold medicines will not shorten the length of time youre sick, but they may be helpful for the following symptoms: cough, sore throat, and nasal and sinus congestion. (Note: Do not use decongestants if you have high blood pressure.)  Follow-up care  Follow up with your healthcare provider, or as advised.  When to seek medical advice  Call your healthcare provider right away if any of these occur:  · Cough with lots of colored sputum (mucus)  · Severe headache; face, neck, or ear pain  · Difficulty swallowing due to throat pain  · Fever of 100.4°F (38°C)  Call 911, or get immediate medical care  Call emergency services right away if any of these occur:  · Chest pain, shortness of breath, wheezing, or difficulty breathing  · Coughing up blood  · Inability to swallow due to throat pain  Date Last Reviewed: 9/13/2015  © 6517-8728 [employer]. [address] [place] [address]  [place] [address]. All rights reserved. This information is not intended as a substitute for professional medical care. Always follow your healthcare professional's instructions.

## 2020-10-02 NOTE — PATIENT INSTRUCTIONS
Patient Instructions   General Instructions for URI:     Symptomatic treatment:     Alternate Tylenol and Ibuprofen every 3 hrs for fever, pain and inflammation. Avoid Nsaids if you are pregnant or have advanced kidney disease.      Salt water gargles/Chloroseptic spray to soothe throat from post nasal drip  Honey/lemon water or warm tea to soothe throat from post nasal drip  Cepachol helps to soothe the discomfort in throat from post nasal drip     Cold-eeze helps to reduce the duration of URI symptoms if taken early  Elderberry to reduce duration of viral URI symptoms     Nasal saline spray reduces inflammation and dryness  Warm face compresses/hot showers as often as you can to open sinuses and allow to drain.   Flonase OTC or Nasacort OTC to help decrease inflammation in nasal turbinates and allow sinuses to drain     Vicks vapor rub at night  Simple foods like chicken noodle soup help provide hydration and nutrition     Delsym helps with coughing at night     Zantac will help if there is reflux from the post nasal drip and helpful to take at night     Zyrtec/Claritin during the day and Benadryl at night may help if allergy component concurrently with URI     If you DO NOT have Hypertension or any history of palpitations, it is ok to take over the counter Sudafed or Mucinex D  or Allegra-D or Claritin-D or Zyrtec-D.  If you do take one of the above, it is ok to combine that with plain over the counter Mucinex or Allegra or Claritin or  Zyrtec. If, for example, you are taking Zyrtec -D, you can combine that with Mucinex, but not Mucinex-D. If you are  taking Mucinex-D, you can combine that with plain Allegra or Claritin or Zyrtec.  If you DO have Hypertension or palpitations, it is safe to take Coricidin HBP for relief of sinus symptoms.  Please follow up with your primary care provider within 2-5 days if your signs and symptoms have not resolved or  worsen.  If your condition worsens or fails to improve we  recommend that you receive another evaluation at the emergency  room immediately or contact your primary medical clinic to discuss your concerns.  You must understand that you have received an Urgent Care treatment only and that you may be released before all of  your medical problems are known or treated. You, the patient, will arrange for follow up care as instructed.     Rest as much as you can     Your symptoms will likely last 5-7 days, maybe longer depending on how it affects your body.  You are contagious 5-7, so minimize contact with others to reduce the spread to others and stay home from work or school as we discussed. Dehydration is preventable but is one of the main reasons why you will feel so badly. Drink pedialyte, gatorade or propel. Stay hydrated.  Antibiotics are not needed unless a complication(such as Otitis Media, Bacterial sinus infection or pneumonia) develops. Taking antibiotics for Flu/Cold is not supported by evidence-based medicine and can expose you to unnecessary side effects of the medication, such as anaphylaxis, yeast infection and leads to antibiotic resistance.   If you experience any:  Chest pain, shortness of breath, wheezing or difficulty breathing,  Severe headache, face, neck or ear pain,  New rash,  Fever over 101.5º F (38.6 C) for more than three days,  Confusion, behavior change or seizure,  Severe weakness or dizziness, please go to the ER immediately for further testing.         Viral Upper Respiratory Illness (Adult)  You have a viral upper respiratory illness (URI), which is another term for the common cold. This illness is contagious during the first few days. It is spread through the air by coughing and sneezing. It may also be spread by direct contact (touching the sick person and then touching your own eyes, nose, or mouth). Frequent handwashing will decrease risk of spread. Most viral illnesses go away within 7 to 10 days with rest and simple home remedies.  Sometimes the illness may last for several weeks. Antibiotics will not kill a virus, and they are generally not prescribed for this condition.    Home care  · If symptoms are severe, rest at home for the first 2 to 3 days. When you resume activity, don't let yourself get too tired.  · Avoid being exposed to cigarette smoke (yours or others).  · You may use acetaminophen or ibuprofen to control pain and fever, unless another medicine was prescribed. (Note: If you have chronic liver or kidney disease, have ever had a stomach ulcer or gastrointestinal bleeding, or are taking blood-thinning medicines, talk with your healthcare provider before using these medicines.) Aspirin should never be given to anyone under 18 years of age who is ill with a viral infection or fever. It may cause severe liver or brain damage.  · Your appetite may be poor, so a light diet is fine. Avoid dehydration by drinking 6 to 8 glasses of fluids per day (water, soft drinks, juices, tea, or soup). Extra fluids will help loosen secretions in the nose and lungs.  · Over-the-counter cold medicines will not shorten the length of time youre sick, but they may be helpful for the following symptoms: cough, sore throat, and nasal and sinus congestion. (Note: Do not use decongestants if you have high blood pressure.)  Follow-up care  Follow up with your healthcare provider, or as advised.  When to seek medical advice  Call your healthcare provider right away if any of these occur:  · Cough with lots of colored sputum (mucus)  · Severe headache; face, neck, or ear pain  · Difficulty swallowing due to throat pain  · Fever of 100.4°F (38°C)  Call 911, or get immediate medical care  Call emergency services right away if any of these occur:  · Chest pain, shortness of breath, wheezing, or difficulty breathing  · Coughing up blood  · Inability to swallow due to throat pain  Date Last Reviewed: 9/13/2015  © 8880-1854 [employer]. [address] [place] [address]  [place] [address]. All rights reserved. This information is not intended as a substitute for professional medical care. Always follow your healthcare professional's instructions.

## 2020-10-04 ENCOUNTER — TELEPHONE (OUTPATIENT)
Dept: URGENT CARE | Facility: CLINIC | Age: 80
End: 2020-10-04

## 2020-10-05 PROBLEM — R31.9 HEMATURIA: Status: RESOLVED | Noted: 2020-06-25 | Resolved: 2020-10-05

## 2020-10-05 PROBLEM — N18.9 CRI (CHRONIC RENAL INSUFFICIENCY): Status: RESOLVED | Noted: 2020-01-30 | Resolved: 2020-10-05

## 2020-10-05 NOTE — PROGRESS NOTES
"  Nithin Pino presented for an initial Medicare AWV today. The following components were reviewed and updated:    · Medical history  · Family History  · Social history  · Allergies and Current Medications  · Health Risk Assessment  · Health Maintenance  · Care Team    **See Completed Assessments for Annual Wellness visit with in the encounter summary    The following assessments were completed:  · Depression Screening  · Cognitive function Screening  · Timed Get Up Test  · Whisper Test    Vitals:    09/30/20 1445   BP: (!) 144/70   Pulse: 80   Temp: 97.7 °F (36.5 °C)   TempSrc: Temporal   Weight: 79.8 kg (175 lb 14.8 oz)   Height: 5' 10" (1.778 m)     Body mass index is 25.24 kg/m².   ]  Physical Exam   Constitutional: He is oriented to person, place, and time and well-developed, well-nourished, and in no distress. No distress.   HENT:   Head: Normocephalic and atraumatic.   Right Ear: External ear normal.   Left Ear: External ear normal.   Mouth/Throat: Oropharynx is clear and moist.   Eyes: Pupils are equal, round, and reactive to light. Conjunctivae are normal.   Cardiovascular: Normal rate, regular rhythm, normal heart sounds and intact distal pulses.   Pulmonary/Chest: Effort normal and breath sounds normal.   Neurological: He is alert and oriented to person, place, and time. Gait normal.   Skin: He is not diaphoretic.   Psychiatric: Affect and judgment normal.         Diagnoses and health risks identified today and associated recommendations/orders:  Encounter for Medicare annual wellness exam    Coronary artery disease of bypass graft of native heart with stable angina pectoris  Comments:  followed by cards, currently no symptoms     Chronic ischemic heart disease  Comments:  followed by cards, currently no symptoms     Hyperparathyroidism  Comments:  Followed by PCP, monitored via labs/US, cont to monitor     Hypertension associated with diabetes  Comments:  followed by PCP, doing well, had recent check up "     OAB (overactive bladder)  Comments:  . BPH controlled with finasteride. followed by urology     Hyperlipidemia associated with type 2 diabetes mellitus  Comments:  followed by PCP, doing well, had recent check up     Diabetes mellitus type 2 with peripheral artery disease  Comments:  has some  neuropathy, [facility]     Panlobular emphysema  Comments:  per cxr, think [facility].  cxr 2016The lungs are hyperexpanded. has no symptoms    Asymptomatic stenosis of both carotid arteries without infarction  Comments:  followed by PCP, doing well, had recent check up     Abnormal ECG  Comments:  followed by cards, currently no symptoms     Benign prostatic hyperplasia with weak urinary stream  Comments:  . BPH controlled with finasteride    Peripheral vascular disease  Comments:  followed by Dr. Sadler.  He states that his cardiac status has been stable    RBBB  Comments:  followed by cards, currently no symptoms     Thoracic aorta atherosclerosis  Comments:  followed by cards, currently no symptoms     Chronic stable angina  Comments:  followed by cards, currently no symptoms     Other orders  -     Influenza - Quadrivalent (Adjuvanted)        Provided Nithin with a 5-10 year written screening schedule and personal prevention plan. Recommendations were developed using the USPSTF age appropriate recommendations. Education, counseling, and referrals were provided as needed.  After Visit Summary printed and given to patient which includes a list of additional screenings\tests needed.   updated   Keep follow ups   ALFONSO Sandoval

## 2020-11-12 ENCOUNTER — PATIENT OUTREACH (OUTPATIENT)
Dept: ADMINISTRATIVE | Facility: HOSPITAL | Age: 80
End: 2020-11-12

## 2020-11-12 NOTE — PROGRESS NOTES
HTN Report. Patient was seen in urgent care on 10/02/2020 and his BP was 134/70. Patient has appointment scheduled with primary care on 11/30/20 for 4 month f/u.

## 2020-11-23 ENCOUNTER — LAB VISIT (OUTPATIENT)
Dept: LAB | Facility: HOSPITAL | Age: 80
End: 2020-11-23
Attending: INTERNAL MEDICINE
Payer: MEDICARE

## 2020-11-23 DIAGNOSIS — E11.51 DIABETES MELLITUS TYPE 2 WITH PERIPHERAL ARTERY DISEASE: ICD-10-CM

## 2020-11-23 PROCEDURE — 36415 COLL VENOUS BLD VENIPUNCTURE: CPT | Mod: HCNC,PO

## 2020-11-23 PROCEDURE — 83036 HEMOGLOBIN GLYCOSYLATED A1C: CPT | Mod: HCNC

## 2020-11-24 LAB
ESTIMATED AVG GLUCOSE: 134 MG/DL (ref 68–131)
HBA1C MFR BLD HPLC: 6.3 % (ref 4–5.6)

## 2020-11-30 ENCOUNTER — OFFICE VISIT (OUTPATIENT)
Dept: INTERNAL MEDICINE | Facility: CLINIC | Age: 80
End: 2020-11-30
Payer: MEDICARE

## 2020-11-30 ENCOUNTER — LAB VISIT (OUTPATIENT)
Dept: LAB | Facility: HOSPITAL | Age: 80
End: 2020-11-30
Attending: INTERNAL MEDICINE
Payer: MEDICARE

## 2020-11-30 VITALS
SYSTOLIC BLOOD PRESSURE: 134 MMHG | DIASTOLIC BLOOD PRESSURE: 60 MMHG | HEIGHT: 70 IN | HEART RATE: 76 BPM | WEIGHT: 179.25 LBS | TEMPERATURE: 98 F | BODY MASS INDEX: 25.66 KG/M2

## 2020-11-30 DIAGNOSIS — E55.9 VITAMIN D DEFICIENCY: ICD-10-CM

## 2020-11-30 DIAGNOSIS — E11.51 DIABETES MELLITUS TYPE 2 WITH PERIPHERAL ARTERY DISEASE: ICD-10-CM

## 2020-11-30 DIAGNOSIS — E11.69 HYPERLIPIDEMIA ASSOCIATED WITH TYPE 2 DIABETES MELLITUS: ICD-10-CM

## 2020-11-30 DIAGNOSIS — I25.708 CORONARY ARTERY DISEASE OF BYPASS GRAFT OF NATIVE HEART WITH STABLE ANGINA PECTORIS: Primary | Chronic | ICD-10-CM

## 2020-11-30 DIAGNOSIS — I15.2 HYPERTENSION ASSOCIATED WITH DIABETES: ICD-10-CM

## 2020-11-30 DIAGNOSIS — E11.59 HYPERTENSION ASSOCIATED WITH DIABETES: ICD-10-CM

## 2020-11-30 DIAGNOSIS — E78.5 HYPERLIPIDEMIA ASSOCIATED WITH TYPE 2 DIABETES MELLITUS: ICD-10-CM

## 2020-11-30 PROCEDURE — 82306 VITAMIN D 25 HYDROXY: CPT | Mod: HCNC

## 2020-11-30 PROCEDURE — 99999 PR PBB SHADOW E&M-EST. PATIENT-LVL IV: ICD-10-PCS | Mod: PBBFAC,HCNC,, | Performed by: PHYSICIAN ASSISTANT

## 2020-11-30 PROCEDURE — 3075F PR MOST RECENT SYSTOLIC BLOOD PRESS GE 130-139MM HG: ICD-10-PCS | Mod: HCNC,CPTII,S$GLB, | Performed by: PHYSICIAN ASSISTANT

## 2020-11-30 PROCEDURE — 1101F PR PT FALLS ASSESS DOC 0-1 FALLS W/OUT INJ PAST YR: ICD-10-PCS | Mod: HCNC,CPTII,S$GLB, | Performed by: PHYSICIAN ASSISTANT

## 2020-11-30 PROCEDURE — 3075F SYST BP GE 130 - 139MM HG: CPT | Mod: HCNC,CPTII,S$GLB, | Performed by: PHYSICIAN ASSISTANT

## 2020-11-30 PROCEDURE — 99214 OFFICE O/P EST MOD 30 MIN: CPT | Mod: HCNC,S$GLB,, | Performed by: PHYSICIAN ASSISTANT

## 2020-11-30 PROCEDURE — 3288F FALL RISK ASSESSMENT DOCD: CPT | Mod: HCNC,CPTII,S$GLB, | Performed by: PHYSICIAN ASSISTANT

## 2020-11-30 PROCEDURE — 1101F PT FALLS ASSESS-DOCD LE1/YR: CPT | Mod: HCNC,CPTII,S$GLB, | Performed by: PHYSICIAN ASSISTANT

## 2020-11-30 PROCEDURE — 3288F PR FALLS RISK ASSESSMENT DOCUMENTED: ICD-10-PCS | Mod: HCNC,CPTII,S$GLB, | Performed by: PHYSICIAN ASSISTANT

## 2020-11-30 PROCEDURE — 99999 PR PBB SHADOW E&M-EST. PATIENT-LVL IV: CPT | Mod: PBBFAC,HCNC,, | Performed by: PHYSICIAN ASSISTANT

## 2020-11-30 PROCEDURE — 3078F DIAST BP <80 MM HG: CPT | Mod: HCNC,CPTII,S$GLB, | Performed by: PHYSICIAN ASSISTANT

## 2020-11-30 PROCEDURE — 36415 COLL VENOUS BLD VENIPUNCTURE: CPT | Mod: HCNC,PO

## 2020-11-30 PROCEDURE — 1126F PR PAIN SEVERITY QUANTIFIED, NO PAIN PRESENT: ICD-10-PCS | Mod: HCNC,S$GLB,, | Performed by: PHYSICIAN ASSISTANT

## 2020-11-30 PROCEDURE — 99214 PR OFFICE/OUTPT VISIT, EST, LEVL IV, 30-39 MIN: ICD-10-PCS | Mod: HCNC,S$GLB,, | Performed by: PHYSICIAN ASSISTANT

## 2020-11-30 PROCEDURE — 1159F PR MEDICATION LIST DOCUMENTED IN MEDICAL RECORD: ICD-10-PCS | Mod: HCNC,S$GLB,, | Performed by: PHYSICIAN ASSISTANT

## 2020-11-30 PROCEDURE — 1159F MED LIST DOCD IN RCRD: CPT | Mod: HCNC,S$GLB,, | Performed by: PHYSICIAN ASSISTANT

## 2020-11-30 PROCEDURE — 3078F PR MOST RECENT DIASTOLIC BLOOD PRESSURE < 80 MM HG: ICD-10-PCS | Mod: HCNC,CPTII,S$GLB, | Performed by: PHYSICIAN ASSISTANT

## 2020-11-30 PROCEDURE — 1126F AMNT PAIN NOTED NONE PRSNT: CPT | Mod: HCNC,S$GLB,, | Performed by: PHYSICIAN ASSISTANT

## 2020-11-30 NOTE — PROGRESS NOTES
Subjective:       Patient ID: Nithin Pino is a 80 y.o. male.    Chief Complaint: Follow-up    HPI   Patient comes in today for 4 month follow up   He has history of coronary artery disease.  He is followed by Dr. Sadler.  He states that his cardiac status has been stable.  He has not had any significant problems    No issues with lungs - has history of COPD     Sugars have remained in very good controll, cont medications/diet   Health Maintenance Due   Topic Date Due    Colonoscopy  05/11/1958    Shingles Vaccine (2 of 3) 03/28/2018       Past Medical History:   Diagnosis Date    Abnormal brain MRI 1/21/2018    Chronic microvascular ischemia changes per MRI July 9, 2007 in [facility]    Abnormal ECG 10/31/2013    Abnormal stress test 1/28/2016    Alcohol dependence     States alcohol abuse ended in 1994    AP (angina pectoris) 1/28/2016    Asthma     Carotid artery occlusion     Cataract     Chronic ischemic heart disease 10/31/2013    CKD (chronic kidney disease) stage 3, GFR 30-59 ml/min 11/4/2015    Coronary artery disease     DM (diabetes mellitus) 2013    BS doesn't check 03/28/2017     DM (diabetes mellitus) 2011    BS doesn' check  04/03/2019    Heart valve regurgitation 11/4/2015    Echocardiogram 2/15/16   1 - Concentric hypertrophy.    2 - Normal left ventricular systolic function (EF 60-65%).    3 - Normal left ventricular diastolic function.    4 - Mild left atrial enlargement.    5 - Normal right ventricular systolic function .    6 - Trivial to mild aortic regurgitation.    7 - Trivial to mild mitral regurgitation.  10 - Trivial to mild pulmonic regurgitation.     Hematuria 6/25/2020    History of alcohol abuse 1/22/2018    Patient denies drinking to excess since 1994.    History of atherectomy 1/21/2018 2/2016 [facility]    History of chronic kidney disease 1/22/2018    History of CKD 3    History of PTCA 10/31/2013    History of PTCA 3/9/2016    Hyperlipidemia      Hypertension     Insomnia 6/17/2013    Ischemic cardiomyopathy 10/31/2013    Long term (current) use of antithrombotics/antiplatelets 1/21/2018    Myocardial infarction     Old MI (myocardial infarction) 1994    Peripheral vascular disease     Polyneuropathy     RBBB 10/31/2013    S/P CABG (coronary artery bypass graft) 10/31/2013    Shortness of breath 10/31/2013    Tobacco dependence     resolved    Trouble in sleeping     Type 2 diabetes with peripheral circulatory disorder, controlled     Wears glasses        Current Outpatient Medications   Medication Sig Dispense Refill    amLODIPine (NORVASC) 10 MG tablet Take 1 tablet (10 mg total) by mouth once daily. 30 tablet 11    aspirin 81 MG Chew Take 1 tablet (81 mg total) by mouth once daily. 1 Tablet, Chewable Oral Every day 90 tablet 3    BLOOD-GLUCOSE METER (TRUERESULT BLOOD GLUCOSE SYSTM MISC) by Misc.(Non-Drug; Combo Route) route.      cyanocobalamin (VITAMIN B-12) 1000 MCG tablet Take 1,000 mcg by mouth once daily.      dulaglutide (TRULICITY) 1.5 mg/0.5 mL PnIj Inject 1.5 mg into the skin once a week. 6 mL 3    EMBRACE PRO TEST STRIPS Strp CHECK BLOOD SUGAR TWICE DAILY. 50 strip 0    finasteride (PROSCAR) 5 mg tablet Take 1 tablet (5 mg total) by mouth once daily. 90 tablet 3    insulin (LANTUS SOLOSTAR U-100 INSULIN) glargine 100 units/mL (3mL) SubQ pen Inject 28 Units into the skin once daily. 27 mL 3    isosorbide mononitrate (IMDUR) 60 MG 24 hr tablet Take 1 tablet (60 mg total) by mouth once daily. 30 tablet 12    losartan (COZAAR) 100 MG tablet Take 1 tablet (100 mg total) by mouth once daily. 90 tablet 3    metFORMIN (GLUCOPHAGE) 500 MG tablet Take 1,250 mg by mouth 2 (two) times daily with meals.      metoprolol succinate (TOPROL-XL) 25 MG 24 hr tablet Take 1 tablet (25 mg total) by mouth every evening. 90 tablet 3    nitroGLYCERIN (NITROSTAT) 0.4 MG SL tablet Place 1 tablet (0.4 mg total) under the tongue every 5 (five)  "minutes as needed. 25 tablet 6    oxybutynin (DITROPAN XL) 15 MG TR24 Take 1 tablet (15 mg total) by mouth once daily. 90 tablet 3    psyllium seed (PSYLLIUM ORAL) Take by mouth once daily. Take 1 tablet daily      rosuvastatin (CRESTOR) 40 MG Tab Take 1 tablet (40 mg total) by mouth every evening. 90 tablet 3    tamsulosin (FLOMAX) 0.4 mg Cap Take 1 capsule (0.4 mg total) by mouth once daily. 90 capsule 3    TRUEPLUS LANCETS 26 gauge Misc CHECK BLOOD SUGAR TWICE DAILY. 100 each 0    TRUERESULT BLOOD GLUCOSE SYSTM kit CHECK BLOOD SUGAR TWICE DAILY. 1 each 0    VENTOLIN HFA 90 mcg/actuation inhaler 1 PUFF EVERY 6 HOURS AS NEEDED 18 g 0     No current facility-administered medications for this visit.        Review of Systems   Constitutional: Negative for fatigue, fever and unexpected weight change.   HENT: Negative for sore throat and trouble swallowing.    Respiratory: Negative for cough and shortness of breath.    Cardiovascular: Negative for chest pain.   Gastrointestinal: Negative for abdominal pain.   Musculoskeletal: Positive for arthralgias.   Hematological: Negative for adenopathy. Does not bruise/bleed easily.   All other systems reviewed and are negative.      Objective:   /60   Pulse 76   Temp 97.9 °F (36.6 °C) (Temporal)   Ht 5' 10" (1.778 m)   Wt 81.3 kg (179 lb 3.7 oz)   BMI 25.72 kg/m²      Physical Exam  Constitutional:       Appearance: Normal appearance. He is normal weight.   HENT:      Head: Normocephalic and atraumatic.      Mouth/Throat:      Mouth: Mucous membranes are moist.   Cardiovascular:      Rate and Rhythm: Normal rate and regular rhythm.      Pulses: Normal pulses.      Heart sounds: Normal heart sounds.   Pulmonary:      Effort: Pulmonary effort is normal.      Breath sounds: Normal breath sounds.   Skin:     Capillary Refill: Capillary refill takes less than 2 seconds.   Neurological:      General: No focal deficit present.      Mental Status: He is alert and " oriented to person, place, and time.           Lab Results   Component Value Date    WBC 9.25 06/17/2020    HGB 12.7 (L) 06/17/2020    HCT 42.9 06/17/2020     06/17/2020    CHOL 77 (L) 06/17/2020    TRIG 73 06/17/2020    HDL 45 06/17/2020    ALT 14 06/17/2020    AST 16 06/17/2020     06/17/2020    K 4.7 06/17/2020     06/17/2020    CREATININE 1.0 06/17/2020    BUN 27 (H) 06/17/2020    CO2 23 06/17/2020    TSH 2.269 09/06/2018    PSA 0.29 09/16/2020    INR 1.0 01/29/2016    HGBA1C 6.3 (H) 11/23/2020       Assessment:       1. Coronary artery disease of bypass graft of native heart with stable angina pectoris    2. Diabetes mellitus type 2 with peripheral artery disease    3. Hypertension associated with diabetes    4. Hyperlipidemia associated with type 2 diabetes mellitus        Plan:   Coronary artery disease of bypass graft of native heart with stable angina pectoris    Diabetes mellitus type 2 with peripheral artery disease  Comments:  sugars well controlled, a1c 3 months- right leg swells at times but no issues  Orders:  -     Hemoglobin A1C; Future; Expected date: 02/28/2021    Hypertension associated with diabetes  Comments:  BP well controlled     Hyperlipidemia associated with type 2 diabetes mellitus    a1c 3 months then 6 mon pcp    Follow up for 6months A1c with PCP.

## 2020-12-01 LAB — 25(OH)D3+25(OH)D2 SERPL-MCNC: 29 NG/ML (ref 30–96)

## 2020-12-15 ENCOUNTER — TELEPHONE (OUTPATIENT)
Dept: INTERNAL MEDICINE | Facility: CLINIC | Age: 80
End: 2020-12-15

## 2020-12-15 NOTE — TELEPHONE ENCOUNTER
Thank you for your message. We have been getting a lot of questions like yours.     If you know you have been exposed to someone who is suspected to be positive , you should follow CDC guidelines and quarantine for 14 days after the close contact (less than 6 feet apart for more than 15 minutes). These guidelines can be found here: https://www.cdc.gov/coronavirus/2019-ncov/if-you-are-sick/quarantine.html    For additional information, please reference our [facility].    Patient was given this information via his daughter Ai.  Needs to let us know asap if he start to develop symtoms.  Ai verbalized understanding.

## 2020-12-15 NOTE — TELEPHONE ENCOUNTER
----- Message from Ryan Manning sent at 12/15/2020 12:47 PM CST -----  Contact: Ai  Would like to consult with nurse regarding possible covid exposure.  Ai  (daughter) would like to know if there is a Rx that could help combat the covid.  Please contact Ai @ [phone].  Thanks/As

## 2020-12-28 ENCOUNTER — HOSPITAL ENCOUNTER (INPATIENT)
Facility: HOSPITAL | Age: 80
LOS: 2 days | Discharge: HOME HEALTH | DRG: 177 | End: 2020-12-30
Attending: EMERGENCY MEDICINE | Admitting: INTERNAL MEDICINE
Payer: MEDICARE

## 2020-12-28 DIAGNOSIS — R79.89 ELEVATED TROPONIN: ICD-10-CM

## 2020-12-28 DIAGNOSIS — R41.82 AMS (ALTERED MENTAL STATUS): ICD-10-CM

## 2020-12-28 DIAGNOSIS — R41.82 ALTERED MENTAL STATUS, UNSPECIFIED ALTERED MENTAL STATUS TYPE: ICD-10-CM

## 2020-12-28 DIAGNOSIS — U07.1 COVID-19 VIRUS INFECTION: Primary | ICD-10-CM

## 2020-12-28 LAB
ALBUMIN SERPL BCP-MCNC: 3.6 G/DL (ref 3.5–5.2)
ALP SERPL-CCNC: 61 U/L (ref 55–135)
ALT SERPL W/O P-5'-P-CCNC: 24 U/L (ref 10–44)
ANION GAP SERPL CALC-SCNC: 13 MMOL/L (ref 8–16)
AST SERPL-CCNC: 29 U/L (ref 10–40)
BACTERIA #/AREA URNS HPF: NORMAL /HPF
BASOPHILS # BLD AUTO: 0 K/UL (ref 0–0.2)
BASOPHILS NFR BLD: 0 % (ref 0–1.9)
BILIRUB SERPL-MCNC: 0.9 MG/DL (ref 0.1–1)
BILIRUB UR QL STRIP: ABNORMAL
BNP SERPL-MCNC: 98 PG/ML (ref 0–99)
BUN SERPL-MCNC: 31 MG/DL (ref 8–23)
CALCIUM SERPL-MCNC: 9.1 MG/DL (ref 8.7–10.5)
CHLORIDE SERPL-SCNC: 103 MMOL/L (ref 95–110)
CK SERPL-CCNC: 28 U/L (ref 20–200)
CLARITY UR: CLEAR
CO2 SERPL-SCNC: 19 MMOL/L (ref 23–29)
COLOR UR: YELLOW
CREAT SERPL-MCNC: 1.5 MG/DL (ref 0.5–1.4)
CRP SERPL-MCNC: 41.7 MG/L (ref 0–8.2)
D DIMER PPP IA.FEU-MCNC: 0.42 MG/L FEU
DIFFERENTIAL METHOD: ABNORMAL
EOSINOPHIL # BLD AUTO: 0 K/UL (ref 0–0.5)
EOSINOPHIL NFR BLD: 0 % (ref 0–8)
ERYTHROCYTE [DISTWIDTH] IN BLOOD BY AUTOMATED COUNT: 13 % (ref 11.5–14.5)
ERYTHROCYTE [SEDIMENTATION RATE] IN BLOOD BY WESTERGREN METHOD: 34 MM/HR (ref 0–10)
EST. GFR  (AFRICAN AMERICAN): 50 ML/MIN/1.73 M^2
EST. GFR  (NON AFRICAN AMERICAN): 43 ML/MIN/1.73 M^2
GLUCOSE SERPL-MCNC: 210 MG/DL (ref 70–110)
GLUCOSE UR QL STRIP: ABNORMAL
HCT VFR BLD AUTO: 42.8 % (ref 40–54)
HGB BLD-MCNC: 13.9 G/DL (ref 14–18)
HGB UR QL STRIP: NEGATIVE
HYALINE CASTS #/AREA URNS LPF: 0 /LPF
IMM GRANULOCYTES # BLD AUTO: 0.01 K/UL (ref 0–0.04)
IMM GRANULOCYTES NFR BLD AUTO: 0.2 % (ref 0–0.5)
KETONES UR QL STRIP: ABNORMAL
LACTATE SERPL-SCNC: 1.9 MMOL/L (ref 0.5–2.2)
LDH SERPL L TO P-CCNC: 217 U/L (ref 110–260)
LDH SERPL L TO P-CCNC: 217 U/L (ref 110–260)
LEUKOCYTE ESTERASE UR QL STRIP: NEGATIVE
LYMPHOCYTES # BLD AUTO: 1 K/UL (ref 1–4.8)
LYMPHOCYTES NFR BLD: 19.2 % (ref 18–48)
MCH RBC QN AUTO: 28.7 PG (ref 27–31)
MCHC RBC AUTO-ENTMCNC: 32.5 G/DL (ref 32–36)
MCV RBC AUTO: 88 FL (ref 82–98)
MICROSCOPIC COMMENT: NORMAL
MONOCYTES # BLD AUTO: 0.5 K/UL (ref 0.3–1)
MONOCYTES NFR BLD: 10.4 % (ref 4–15)
NEUTROPHILS # BLD AUTO: 3.5 K/UL (ref 1.8–7.7)
NEUTROPHILS NFR BLD: 70.2 % (ref 38–73)
NITRITE UR QL STRIP: NEGATIVE
NRBC BLD-RTO: 0 /100 WBC
PH UR STRIP: 6 [PH] (ref 5–8)
PLATELET # BLD AUTO: 217 K/UL (ref 150–350)
PMV BLD AUTO: 10.6 FL (ref 9.2–12.9)
POCT GLUCOSE: 164 MG/DL (ref 70–110)
POCT GLUCOSE: 226 MG/DL (ref 70–110)
POCT GLUCOSE: 317 MG/DL (ref 70–110)
POTASSIUM SERPL-SCNC: 4.1 MMOL/L (ref 3.5–5.1)
PROCALCITONIN SERPL IA-MCNC: 0.13 NG/ML
PROT SERPL-MCNC: 6.9 G/DL (ref 6–8.4)
PROT UR QL STRIP: ABNORMAL
RBC # BLD AUTO: 4.84 M/UL (ref 4.6–6.2)
RBC #/AREA URNS HPF: 0 /HPF (ref 0–4)
SARS-COV-2 RDRP RESP QL NAA+PROBE: POSITIVE
SODIUM SERPL-SCNC: 135 MMOL/L (ref 136–145)
SP GR UR STRIP: >=1.03 (ref 1–1.03)
TROPONIN I SERPL DL<=0.01 NG/ML-MCNC: 0.11 NG/ML (ref 0–0.03)
TROPONIN I SERPL DL<=0.01 NG/ML-MCNC: 0.13 NG/ML (ref 0–0.03)
TROPONIN I SERPL DL<=0.01 NG/ML-MCNC: 0.14 NG/ML (ref 0–0.03)
URN SPEC COLLECT METH UR: ABNORMAL
UROBILINOGEN UR STRIP-ACNC: NEGATIVE EU/DL
WBC # BLD AUTO: 4.99 K/UL (ref 3.9–12.7)
WBC #/AREA URNS HPF: 4 /HPF (ref 0–5)

## 2020-12-28 PROCEDURE — 85379 FIBRIN DEGRADATION QUANT: CPT

## 2020-12-28 PROCEDURE — 36415 COLL VENOUS BLD VENIPUNCTURE: CPT

## 2020-12-28 PROCEDURE — 94640 AIRWAY INHALATION TREATMENT: CPT

## 2020-12-28 PROCEDURE — 82550 ASSAY OF CK (CPK): CPT

## 2020-12-28 PROCEDURE — 25000003 PHARM REV CODE 250: Performed by: NURSE PRACTITIONER

## 2020-12-28 PROCEDURE — 85651 RBC SED RATE NONAUTOMATED: CPT

## 2020-12-28 PROCEDURE — 81000 URINALYSIS NONAUTO W/SCOPE: CPT

## 2020-12-28 PROCEDURE — 83880 ASSAY OF NATRIURETIC PEPTIDE: CPT

## 2020-12-28 PROCEDURE — 82306 VITAMIN D 25 HYDROXY: CPT

## 2020-12-28 PROCEDURE — 84484 ASSAY OF TROPONIN QUANT: CPT

## 2020-12-28 PROCEDURE — 82728 ASSAY OF FERRITIN: CPT

## 2020-12-28 PROCEDURE — 83605 ASSAY OF LACTIC ACID: CPT

## 2020-12-28 PROCEDURE — U0002 COVID-19 LAB TEST NON-CDC: HCPCS

## 2020-12-28 PROCEDURE — 99291 CRITICAL CARE FIRST HOUR: CPT | Mod: 25

## 2020-12-28 PROCEDURE — 63600175 PHARM REV CODE 636 W HCPCS: Performed by: NURSE PRACTITIONER

## 2020-12-28 PROCEDURE — 25000242 PHARM REV CODE 250 ALT 637 W/ HCPCS: Performed by: INTERNAL MEDICINE

## 2020-12-28 PROCEDURE — 93005 ELECTROCARDIOGRAM TRACING: CPT

## 2020-12-28 PROCEDURE — 93010 EKG 12-LEAD: ICD-10-PCS | Mod: ,,, | Performed by: INTERNAL MEDICINE

## 2020-12-28 PROCEDURE — 93010 ELECTROCARDIOGRAM REPORT: CPT | Mod: ,,, | Performed by: INTERNAL MEDICINE

## 2020-12-28 PROCEDURE — 80053 COMPREHEN METABOLIC PANEL: CPT

## 2020-12-28 PROCEDURE — 86140 C-REACTIVE PROTEIN: CPT

## 2020-12-28 PROCEDURE — 84484 ASSAY OF TROPONIN QUANT: CPT | Mod: 91

## 2020-12-28 PROCEDURE — 84145 PROCALCITONIN (PCT): CPT

## 2020-12-28 PROCEDURE — 21400001 HC TELEMETRY ROOM

## 2020-12-28 PROCEDURE — C9399 UNCLASSIFIED DRUGS OR BIOLOG: HCPCS | Performed by: NURSE PRACTITIONER

## 2020-12-28 PROCEDURE — 85025 COMPLETE CBC W/AUTO DIFF WBC: CPT

## 2020-12-28 PROCEDURE — 94761 N-INVAS EAR/PLS OXIMETRY MLT: CPT

## 2020-12-28 PROCEDURE — 83615 LACTATE (LD) (LDH) ENZYME: CPT

## 2020-12-28 RX ORDER — LANOLIN ALCOHOL/MO/W.PET/CERES
800 CREAM (GRAM) TOPICAL
Status: DISCONTINUED | OUTPATIENT
Start: 2020-12-28 | End: 2020-12-30 | Stop reason: HOSPADM

## 2020-12-28 RX ORDER — SODIUM,POTASSIUM PHOSPHATES 280-250MG
2 POWDER IN PACKET (EA) ORAL
Status: DISCONTINUED | OUTPATIENT
Start: 2020-12-28 | End: 2020-12-30 | Stop reason: HOSPADM

## 2020-12-28 RX ORDER — METOPROLOL SUCCINATE 25 MG/1
25 TABLET, EXTENDED RELEASE ORAL NIGHTLY
Status: DISCONTINUED | OUTPATIENT
Start: 2020-12-28 | End: 2020-12-30 | Stop reason: HOSPADM

## 2020-12-28 RX ORDER — ASCORBIC ACID 500 MG
500 TABLET ORAL 2 TIMES DAILY
Status: DISCONTINUED | OUTPATIENT
Start: 2020-12-28 | End: 2020-12-30 | Stop reason: HOSPADM

## 2020-12-28 RX ORDER — LOSARTAN POTASSIUM 50 MG/1
100 TABLET ORAL DAILY
Status: DISCONTINUED | OUTPATIENT
Start: 2020-12-28 | End: 2020-12-30 | Stop reason: HOSPADM

## 2020-12-28 RX ORDER — SODIUM CHLORIDE 0.9 % (FLUSH) 0.9 %
10 SYRINGE (ML) INJECTION
Status: DISCONTINUED | OUTPATIENT
Start: 2020-12-28 | End: 2020-12-30 | Stop reason: HOSPADM

## 2020-12-28 RX ORDER — CHOLECALCIFEROL (VITAMIN D3) 25 MCG
1000 TABLET ORAL DAILY
Status: DISCONTINUED | OUTPATIENT
Start: 2020-12-28 | End: 2020-12-29

## 2020-12-28 RX ORDER — AMLODIPINE BESYLATE 10 MG/1
10 TABLET ORAL DAILY
Status: DISCONTINUED | OUTPATIENT
Start: 2020-12-28 | End: 2020-12-30 | Stop reason: HOSPADM

## 2020-12-28 RX ORDER — ISOSORBIDE MONONITRATE 60 MG/1
60 TABLET, EXTENDED RELEASE ORAL DAILY
Status: DISCONTINUED | OUTPATIENT
Start: 2020-12-28 | End: 2020-12-30 | Stop reason: HOSPADM

## 2020-12-28 RX ORDER — NAPROXEN SODIUM 220 MG/1
81 TABLET, FILM COATED ORAL DAILY
Status: DISCONTINUED | OUTPATIENT
Start: 2020-12-28 | End: 2020-12-30 | Stop reason: HOSPADM

## 2020-12-28 RX ORDER — OXYBUTYNIN CHLORIDE 5 MG/1
15 TABLET, EXTENDED RELEASE ORAL DAILY
Status: DISCONTINUED | OUTPATIENT
Start: 2020-12-28 | End: 2020-12-30 | Stop reason: HOSPADM

## 2020-12-28 RX ORDER — BENZONATATE 100 MG/1
100 CAPSULE ORAL 3 TIMES DAILY PRN
Status: DISCONTINUED | OUTPATIENT
Start: 2020-12-28 | End: 2020-12-30 | Stop reason: HOSPADM

## 2020-12-28 RX ORDER — ONDANSETRON 8 MG/1
8 TABLET, ORALLY DISINTEGRATING ORAL EVERY 8 HOURS PRN
Status: DISCONTINUED | OUTPATIENT
Start: 2020-12-28 | End: 2020-12-30 | Stop reason: HOSPADM

## 2020-12-28 RX ORDER — DIPHENOXYLATE HYDROCHLORIDE AND ATROPINE SULFATE 2.5; .025 MG/1; MG/1
1 TABLET ORAL 4 TIMES DAILY PRN
Status: DISCONTINUED | OUTPATIENT
Start: 2020-12-28 | End: 2020-12-30 | Stop reason: HOSPADM

## 2020-12-28 RX ORDER — TAMSULOSIN HYDROCHLORIDE 0.4 MG/1
0.4 CAPSULE ORAL DAILY
Status: DISCONTINUED | OUTPATIENT
Start: 2020-12-28 | End: 2020-12-30 | Stop reason: HOSPADM

## 2020-12-28 RX ORDER — GLUCAGON 1 MG
1 KIT INJECTION
Status: DISCONTINUED | OUTPATIENT
Start: 2020-12-28 | End: 2020-12-30 | Stop reason: HOSPADM

## 2020-12-28 RX ORDER — ACETAMINOPHEN 325 MG/1
650 TABLET ORAL EVERY 4 HOURS PRN
Status: DISCONTINUED | OUTPATIENT
Start: 2020-12-28 | End: 2020-12-30 | Stop reason: HOSPADM

## 2020-12-28 RX ORDER — ALBUTEROL SULFATE 90 UG/1
2 AEROSOL, METERED RESPIRATORY (INHALATION) EVERY 6 HOURS
Status: DISCONTINUED | OUTPATIENT
Start: 2020-12-28 | End: 2020-12-28

## 2020-12-28 RX ORDER — INSULIN ASPART 100 [IU]/ML
0-5 INJECTION, SOLUTION INTRAVENOUS; SUBCUTANEOUS
Status: DISCONTINUED | OUTPATIENT
Start: 2020-12-28 | End: 2020-12-30 | Stop reason: HOSPADM

## 2020-12-28 RX ORDER — IBUPROFEN 200 MG
24 TABLET ORAL
Status: DISCONTINUED | OUTPATIENT
Start: 2020-12-28 | End: 2020-12-30 | Stop reason: HOSPADM

## 2020-12-28 RX ORDER — TALC
6 POWDER (GRAM) TOPICAL NIGHTLY
Status: DISCONTINUED | OUTPATIENT
Start: 2020-12-28 | End: 2020-12-30 | Stop reason: HOSPADM

## 2020-12-28 RX ORDER — IBUPROFEN 200 MG
16 TABLET ORAL
Status: DISCONTINUED | OUTPATIENT
Start: 2020-12-28 | End: 2020-12-30 | Stop reason: HOSPADM

## 2020-12-28 RX ORDER — ROSUVASTATIN CALCIUM 10 MG/1
40 TABLET, COATED ORAL NIGHTLY
Status: DISCONTINUED | OUTPATIENT
Start: 2020-12-28 | End: 2020-12-30 | Stop reason: HOSPADM

## 2020-12-28 RX ORDER — ACETAMINOPHEN 325 MG/1
650 TABLET ORAL EVERY 8 HOURS PRN
Status: DISCONTINUED | OUTPATIENT
Start: 2020-12-28 | End: 2020-12-30 | Stop reason: HOSPADM

## 2020-12-28 RX ORDER — FINASTERIDE 5 MG/1
5 TABLET, FILM COATED ORAL DAILY
Status: DISCONTINUED | OUTPATIENT
Start: 2020-12-28 | End: 2020-12-30 | Stop reason: HOSPADM

## 2020-12-28 RX ADMIN — ROSUVASTATIN 40 MG: 10 TABLET, FILM COATED ORAL at 09:12

## 2020-12-28 RX ADMIN — IPRATROPIUM BROMIDE AND ALBUTEROL 2 PUFF: 20; 100 SPRAY, METERED RESPIRATORY (INHALATION) at 07:12

## 2020-12-28 RX ADMIN — Medication 1000 UNITS: at 03:12

## 2020-12-28 RX ADMIN — DEXAMETHASONE 6 MG: 4 TABLET ORAL at 03:12

## 2020-12-28 RX ADMIN — Medication 6 MG: at 09:12

## 2020-12-28 RX ADMIN — THERA TABS 1 TABLET: TAB at 03:12

## 2020-12-28 RX ADMIN — INSULIN ASPART 3 UNITS: 100 INJECTION, SOLUTION INTRAVENOUS; SUBCUTANEOUS at 08:12

## 2020-12-28 RX ADMIN — OXYCODONE HYDROCHLORIDE AND ACETAMINOPHEN 500 MG: 500 TABLET ORAL at 09:12

## 2020-12-28 RX ADMIN — INSULIN DETEMIR 14 UNITS: 100 INJECTION, SOLUTION SUBCUTANEOUS at 09:12

## 2020-12-28 RX ADMIN — IPRATROPIUM BROMIDE AND ALBUTEROL 2 PUFF: 20; 100 SPRAY, METERED RESPIRATORY (INHALATION) at 04:12

## 2020-12-29 LAB
25(OH)D3+25(OH)D2 SERPL-MCNC: 28 NG/ML (ref 30–96)
ALBUMIN SERPL BCP-MCNC: 3.5 G/DL (ref 3.5–5.2)
ALP SERPL-CCNC: 61 U/L (ref 55–135)
ALT SERPL W/O P-5'-P-CCNC: 22 U/L (ref 10–44)
ANION GAP SERPL CALC-SCNC: 12 MMOL/L (ref 8–16)
ANION GAP SERPL CALC-SCNC: 15 MMOL/L (ref 8–16)
AORTIC ROOT ANNULUS: 3.7 CM
ASCENDING AORTA: 4.22 CM
AST SERPL-CCNC: 25 U/L (ref 10–40)
AV INDEX (PROSTH): 0.51
AV MEAN GRADIENT: 4 MMHG
AV PEAK GRADIENT: 6 MMHG
AV VALVE AREA: 2.13 CM2
AV VELOCITY RATIO: 0.53
BASOPHILS # BLD AUTO: 0 K/UL (ref 0–0.2)
BASOPHILS NFR BLD: 0 % (ref 0–1.9)
BILIRUB SERPL-MCNC: 0.6 MG/DL (ref 0.1–1)
BSA FOR ECHO PROCEDURE: 1.9 M2
BUN SERPL-MCNC: 39 MG/DL (ref 8–23)
BUN SERPL-MCNC: 41 MG/DL (ref 8–23)
CALCIUM SERPL-MCNC: 8.1 MG/DL (ref 8.7–10.5)
CALCIUM SERPL-MCNC: 9.5 MG/DL (ref 8.7–10.5)
CHLORIDE SERPL-SCNC: 101 MMOL/L (ref 95–110)
CHLORIDE SERPL-SCNC: 101 MMOL/L (ref 95–110)
CO2 SERPL-SCNC: 18 MMOL/L (ref 23–29)
CO2 SERPL-SCNC: 20 MMOL/L (ref 23–29)
CREAT SERPL-MCNC: 1.5 MG/DL (ref 0.5–1.4)
CREAT SERPL-MCNC: 1.5 MG/DL (ref 0.5–1.4)
CV ECHO LV RWT: 0.57 CM
DIFFERENTIAL METHOD: ABNORMAL
DOP CALC AO PEAK VEL: 1.25 M/S
DOP CALC AO VTI: 23.73 CM
DOP CALC LVOT AREA: 4.2 CM2
DOP CALC LVOT DIAMETER: 2.31 CM
DOP CALC LVOT PEAK VEL: 0.66 M/S
DOP CALC LVOT STROKE VOLUME: 50.52 CM3
DOP CALC RVOT PEAK VEL: 0.87 M/S
DOP CALC RVOT VTI: 16.42 CM
DOP CALCLVOT PEAK VEL VTI: 12.06 CM
E WAVE DECELERATION TIME: 280.36 MSEC
E/A RATIO: 0.51
E/E' RATIO: 4.59 M/S
ECHO LV POSTERIOR WALL: 1.1 CM (ref 0.6–1.1)
EOSINOPHIL # BLD AUTO: 0 K/UL (ref 0–0.5)
EOSINOPHIL NFR BLD: 0 % (ref 0–8)
ERYTHROCYTE [DISTWIDTH] IN BLOOD BY AUTOMATED COUNT: 13.1 % (ref 11.5–14.5)
EST. GFR  (AFRICAN AMERICAN): 50 ML/MIN/1.73 M^2
EST. GFR  (AFRICAN AMERICAN): 50 ML/MIN/1.73 M^2
EST. GFR  (NON AFRICAN AMERICAN): 43 ML/MIN/1.73 M^2
EST. GFR  (NON AFRICAN AMERICAN): 43 ML/MIN/1.73 M^2
FERRITIN SERPL-MCNC: 248 NG/ML (ref 20–300)
FERRITIN SERPL-MCNC: 248 NG/ML (ref 20–300)
FRACTIONAL SHORTENING: 21 % (ref 28–44)
GLUCOSE SERPL-MCNC: 313 MG/DL (ref 70–110)
GLUCOSE SERPL-MCNC: 509 MG/DL (ref 70–110)
HCT VFR BLD AUTO: 40.9 % (ref 40–54)
HGB BLD-MCNC: 13.2 G/DL (ref 14–18)
IMM GRANULOCYTES # BLD AUTO: 0.01 K/UL (ref 0–0.04)
IMM GRANULOCYTES NFR BLD AUTO: 0.4 % (ref 0–0.5)
INTERVENTRICULAR SEPTUM: 1.7 CM (ref 0.6–1.1)
IVRT: 97.05 MSEC
LA MAJOR: 3.84 CM
LA MINOR: 3.45 CM
LA WIDTH: 2.7 CM
LEFT ATRIUM SIZE: 3.02 CM
LEFT ATRIUM VOLUME INDEX: 13.2 ML/M2
LEFT ATRIUM VOLUME: 25.19 CM3
LEFT INTERNAL DIMENSION IN SYSTOLE: 3.05 CM (ref 2.1–4)
LEFT VENTRICLE DIASTOLIC VOLUME INDEX: 33.74 ML/M2
LEFT VENTRICLE DIASTOLIC VOLUME: 64.4 ML
LEFT VENTRICLE MASS INDEX: 104 G/M2
LEFT VENTRICLE SYSTOLIC VOLUME INDEX: 19.1 ML/M2
LEFT VENTRICLE SYSTOLIC VOLUME: 36.44 ML
LEFT VENTRICULAR INTERNAL DIMENSION IN DIASTOLE: 3.86 CM (ref 3.5–6)
LEFT VENTRICULAR MASS: 198.53 G
LV LATERAL E/E' RATIO: 3.55 M/S
LV SEPTAL E/E' RATIO: 6.5 M/S
LYMPHOCYTES # BLD AUTO: 0.6 K/UL (ref 1–4.8)
LYMPHOCYTES NFR BLD: 23.2 % (ref 18–48)
MAGNESIUM SERPL-MCNC: 1.5 MG/DL (ref 1.6–2.6)
MCH RBC QN AUTO: 28.6 PG (ref 27–31)
MCHC RBC AUTO-ENTMCNC: 32.3 G/DL (ref 32–36)
MCV RBC AUTO: 89 FL (ref 82–98)
MONOCYTES # BLD AUTO: 0.2 K/UL (ref 0.3–1)
MONOCYTES NFR BLD: 8.7 % (ref 4–15)
MV PEAK A VEL: 0.77 M/S
MV PEAK E VEL: 0.39 M/S
NEUTROPHILS # BLD AUTO: 1.7 K/UL (ref 1.8–7.7)
NEUTROPHILS NFR BLD: 67.7 % (ref 38–73)
NRBC BLD-RTO: 0 /100 WBC
PHOSPHATE SERPL-MCNC: 3.6 MG/DL (ref 2.7–4.5)
PLATELET # BLD AUTO: 212 K/UL (ref 150–350)
PMV BLD AUTO: 11 FL (ref 9.2–12.9)
POCT GLUCOSE: 265 MG/DL (ref 70–110)
POCT GLUCOSE: 319 MG/DL (ref 70–110)
POCT GLUCOSE: 395 MG/DL (ref 70–110)
POCT GLUCOSE: 402 MG/DL (ref 70–110)
POCT GLUCOSE: 456 MG/DL (ref 70–110)
POTASSIUM SERPL-SCNC: 4.4 MMOL/L (ref 3.5–5.1)
POTASSIUM SERPL-SCNC: 5.7 MMOL/L (ref 3.5–5.1)
PROT SERPL-MCNC: 6.8 G/DL (ref 6–8.4)
PV MEAN GRADIENT: 2 MMHG
RA MAJOR: 4.01 CM
RBC # BLD AUTO: 4.62 M/UL (ref 4.6–6.2)
SINUS: 4.01 CM
SODIUM SERPL-SCNC: 131 MMOL/L (ref 136–145)
SODIUM SERPL-SCNC: 136 MMOL/L (ref 136–145)
STJ: 3.73 CM
TDI LATERAL: 0.11 M/S
TDI SEPTAL: 0.06 M/S
TDI: 0.09 M/S
TRICUSPID ANNULAR PLANE SYSTOLIC EXCURSION: 1.04 CM
TROPONIN I SERPL DL<=0.01 NG/ML-MCNC: 0.11 NG/ML (ref 0–0.03)
WBC # BLD AUTO: 2.54 K/UL (ref 3.9–12.7)

## 2020-12-29 PROCEDURE — 80048 BASIC METABOLIC PNL TOTAL CA: CPT

## 2020-12-29 PROCEDURE — 63600175 PHARM REV CODE 636 W HCPCS: Performed by: NURSE PRACTITIONER

## 2020-12-29 PROCEDURE — 80053 COMPREHEN METABOLIC PANEL: CPT

## 2020-12-29 PROCEDURE — 85025 COMPLETE CBC W/AUTO DIFF WBC: CPT

## 2020-12-29 PROCEDURE — 84484 ASSAY OF TROPONIN QUANT: CPT

## 2020-12-29 PROCEDURE — 92523 SPEECH SOUND LANG COMPREHEN: CPT

## 2020-12-29 PROCEDURE — 94761 N-INVAS EAR/PLS OXIMETRY MLT: CPT

## 2020-12-29 PROCEDURE — 25000003 PHARM REV CODE 250: Performed by: NURSE PRACTITIONER

## 2020-12-29 PROCEDURE — 99222 1ST HOSP IP/OBS MODERATE 55: CPT | Mod: 25,,, | Performed by: NURSE PRACTITIONER

## 2020-12-29 PROCEDURE — 36415 COLL VENOUS BLD VENIPUNCTURE: CPT

## 2020-12-29 PROCEDURE — 94640 AIRWAY INHALATION TREATMENT: CPT

## 2020-12-29 PROCEDURE — 99222 PR INITIAL HOSPITAL CARE,LEVL II: ICD-10-PCS | Mod: 25,,, | Performed by: NURSE PRACTITIONER

## 2020-12-29 PROCEDURE — 84100 ASSAY OF PHOSPHORUS: CPT

## 2020-12-29 PROCEDURE — 83735 ASSAY OF MAGNESIUM: CPT

## 2020-12-29 PROCEDURE — 21400001 HC TELEMETRY ROOM

## 2020-12-29 PROCEDURE — 92610 EVALUATE SWALLOWING FUNCTION: CPT

## 2020-12-29 RX ORDER — ERGOCALCIFEROL 1.25 MG/1
50000 CAPSULE ORAL DAILY
Status: DISCONTINUED | OUTPATIENT
Start: 2020-12-29 | End: 2020-12-30 | Stop reason: HOSPADM

## 2020-12-29 RX ORDER — INSULIN ASPART 100 [IU]/ML
10 INJECTION, SOLUTION INTRAVENOUS; SUBCUTANEOUS ONCE
Status: COMPLETED | OUTPATIENT
Start: 2020-12-29 | End: 2020-12-29

## 2020-12-29 RX ORDER — SODIUM CHLORIDE 9 MG/ML
INJECTION, SOLUTION INTRAVENOUS CONTINUOUS
Status: DISCONTINUED | OUTPATIENT
Start: 2020-12-29 | End: 2020-12-30 | Stop reason: HOSPADM

## 2020-12-29 RX ADMIN — INSULIN ASPART 5 UNITS: 100 INJECTION, SOLUTION INTRAVENOUS; SUBCUTANEOUS at 11:12

## 2020-12-29 RX ADMIN — IPRATROPIUM BROMIDE AND ALBUTEROL 2 PUFF: 20; 100 SPRAY, METERED RESPIRATORY (INHALATION) at 07:12

## 2020-12-29 RX ADMIN — Medication 800 MG: at 05:12

## 2020-12-29 RX ADMIN — THERA TABS 1 TABLET: TAB at 09:12

## 2020-12-29 RX ADMIN — ISOSORBIDE MONONITRATE 60 MG: 60 TABLET, EXTENDED RELEASE ORAL at 09:12

## 2020-12-29 RX ADMIN — Medication 800 MG: at 07:12

## 2020-12-29 RX ADMIN — IPRATROPIUM BROMIDE AND ALBUTEROL 2 PUFF: 20; 100 SPRAY, METERED RESPIRATORY (INHALATION) at 01:12

## 2020-12-29 RX ADMIN — SODIUM CHLORIDE 500 ML: 0.9 INJECTION, SOLUTION INTRAVENOUS at 09:12

## 2020-12-29 RX ADMIN — LOSARTAN POTASSIUM 100 MG: 50 TABLET, FILM COATED ORAL at 09:12

## 2020-12-29 RX ADMIN — INSULIN ASPART 5 UNITS: 100 INJECTION, SOLUTION INTRAVENOUS; SUBCUTANEOUS at 05:12

## 2020-12-29 RX ADMIN — Medication 800 MG: at 09:12

## 2020-12-29 RX ADMIN — TAMSULOSIN HYDROCHLORIDE 0.4 MG: 0.4 CAPSULE ORAL at 09:12

## 2020-12-29 RX ADMIN — OXYBUTYNIN CHLORIDE 15 MG: 5 TABLET, EXTENDED RELEASE ORAL at 09:12

## 2020-12-29 RX ADMIN — DEXAMETHASONE 6 MG: 4 TABLET ORAL at 09:12

## 2020-12-29 RX ADMIN — INSULIN ASPART 10 UNITS: 100 INJECTION, SOLUTION INTRAVENOUS; SUBCUTANEOUS at 03:12

## 2020-12-29 RX ADMIN — OXYCODONE HYDROCHLORIDE AND ACETAMINOPHEN 500 MG: 500 TABLET ORAL at 09:12

## 2020-12-29 RX ADMIN — SODIUM CHLORIDE 75 ML/HR: 0.9 INJECTION, SOLUTION INTRAVENOUS at 03:12

## 2020-12-29 RX ADMIN — Medication 6 MG: at 08:12

## 2020-12-29 RX ADMIN — FINASTERIDE 5 MG: 5 TABLET, FILM COATED ORAL at 09:12

## 2020-12-29 RX ADMIN — ROSUVASTATIN 40 MG: 10 TABLET, FILM COATED ORAL at 08:12

## 2020-12-29 RX ADMIN — AMLODIPINE BESYLATE 10 MG: 10 TABLET ORAL at 09:12

## 2020-12-29 RX ADMIN — INSULIN ASPART 4 UNITS: 100 INJECTION, SOLUTION INTRAVENOUS; SUBCUTANEOUS at 08:12

## 2020-12-29 RX ADMIN — METOPROLOL SUCCINATE 25 MG: 25 TABLET, FILM COATED, EXTENDED RELEASE ORAL at 08:12

## 2020-12-29 RX ADMIN — INSULIN ASPART 3 UNITS: 100 INJECTION, SOLUTION INTRAVENOUS; SUBCUTANEOUS at 06:12

## 2020-12-29 RX ADMIN — OXYCODONE HYDROCHLORIDE AND ACETAMINOPHEN 500 MG: 500 TABLET ORAL at 08:12

## 2020-12-29 RX ADMIN — ERGOCALCIFEROL 50000 UNITS: 1.25 CAPSULE ORAL at 11:12

## 2020-12-29 RX ADMIN — INSULIN ASPART 10 UNITS: 100 INJECTION, SOLUTION INTRAVENOUS; SUBCUTANEOUS at 05:12

## 2020-12-29 RX ADMIN — Medication 1000 UNITS: at 09:12

## 2020-12-29 RX ADMIN — IPRATROPIUM BROMIDE AND ALBUTEROL 2 PUFF: 20; 100 SPRAY, METERED RESPIRATORY (INHALATION) at 08:12

## 2020-12-29 RX ADMIN — ASPIRIN 81 MG CHEWABLE TABLET 81 MG: 81 TABLET CHEWABLE at 09:12

## 2020-12-30 VITALS
DIASTOLIC BLOOD PRESSURE: 62 MMHG | TEMPERATURE: 97 F | RESPIRATION RATE: 17 BRPM | HEART RATE: 79 BPM | SYSTOLIC BLOOD PRESSURE: 138 MMHG | HEIGHT: 70 IN | OXYGEN SATURATION: 91 % | BODY MASS INDEX: 23.99 KG/M2 | WEIGHT: 167.56 LBS

## 2020-12-30 DIAGNOSIS — U07.1 COVID-19 VIRUS DETECTED: ICD-10-CM

## 2020-12-30 PROBLEM — R41.82 AMS (ALTERED MENTAL STATUS): Status: RESOLVED | Noted: 2020-12-28 | Resolved: 2020-12-30

## 2020-12-30 LAB
ALBUMIN SERPL BCP-MCNC: 3.1 G/DL (ref 3.5–5.2)
ALP SERPL-CCNC: 60 U/L (ref 55–135)
ALT SERPL W/O P-5'-P-CCNC: 19 U/L (ref 10–44)
ANION GAP SERPL CALC-SCNC: 11 MMOL/L (ref 8–16)
AST SERPL-CCNC: 21 U/L (ref 10–40)
BASOPHILS # BLD AUTO: 0.01 K/UL (ref 0–0.2)
BASOPHILS NFR BLD: 0.1 % (ref 0–1.9)
BILIRUB SERPL-MCNC: 0.4 MG/DL (ref 0.1–1)
BUN SERPL-MCNC: 40 MG/DL (ref 8–23)
CALCIUM SERPL-MCNC: 8.5 MG/DL (ref 8.7–10.5)
CHLORIDE SERPL-SCNC: 107 MMOL/L (ref 95–110)
CO2 SERPL-SCNC: 20 MMOL/L (ref 23–29)
CREAT SERPL-MCNC: 1.3 MG/DL (ref 0.5–1.4)
CRP SERPL-MCNC: 14.3 MG/L (ref 0–8.2)
D DIMER PPP IA.FEU-MCNC: 0.72 MG/L FEU
DIFFERENTIAL METHOD: ABNORMAL
EOSINOPHIL # BLD AUTO: 0 K/UL (ref 0–0.5)
EOSINOPHIL NFR BLD: 0 % (ref 0–8)
ERYTHROCYTE [DISTWIDTH] IN BLOOD BY AUTOMATED COUNT: 12.8 % (ref 11.5–14.5)
EST. GFR  (AFRICAN AMERICAN): 59 ML/MIN/1.73 M^2
EST. GFR  (NON AFRICAN AMERICAN): 51 ML/MIN/1.73 M^2
GLUCOSE SERPL-MCNC: 191 MG/DL (ref 70–110)
HCT VFR BLD AUTO: 38.8 % (ref 40–54)
HGB BLD-MCNC: 12.6 G/DL (ref 14–18)
IMM GRANULOCYTES # BLD AUTO: 0.05 K/UL (ref 0–0.04)
IMM GRANULOCYTES NFR BLD AUTO: 0.5 % (ref 0–0.5)
LDH SERPL L TO P-CCNC: 198 U/L (ref 110–260)
LYMPHOCYTES # BLD AUTO: 0.7 K/UL (ref 1–4.8)
LYMPHOCYTES NFR BLD: 7.6 % (ref 18–48)
MAGNESIUM SERPL-MCNC: 1.8 MG/DL (ref 1.6–2.6)
MCH RBC QN AUTO: 28.7 PG (ref 27–31)
MCHC RBC AUTO-ENTMCNC: 32.5 G/DL (ref 32–36)
MCV RBC AUTO: 88 FL (ref 82–98)
MONOCYTES # BLD AUTO: 0.7 K/UL (ref 0.3–1)
MONOCYTES NFR BLD: 7.3 % (ref 4–15)
NEUTROPHILS # BLD AUTO: 8.2 K/UL (ref 1.8–7.7)
NEUTROPHILS NFR BLD: 84.5 % (ref 38–73)
NRBC BLD-RTO: 0 /100 WBC
PHOSPHATE SERPL-MCNC: 3 MG/DL (ref 2.7–4.5)
PLATELET # BLD AUTO: 234 K/UL (ref 150–350)
PMV BLD AUTO: 11 FL (ref 9.2–12.9)
POCT GLUCOSE: 199 MG/DL (ref 70–110)
POTASSIUM SERPL-SCNC: 4.1 MMOL/L (ref 3.5–5.1)
PROT SERPL-MCNC: 6 G/DL (ref 6–8.4)
RBC # BLD AUTO: 4.39 M/UL (ref 4.6–6.2)
SODIUM SERPL-SCNC: 138 MMOL/L (ref 136–145)
WBC # BLD AUTO: 9.68 K/UL (ref 3.9–12.7)

## 2020-12-30 PROCEDURE — 36415 COLL VENOUS BLD VENIPUNCTURE: CPT

## 2020-12-30 PROCEDURE — 63600175 PHARM REV CODE 636 W HCPCS: Performed by: NURSE PRACTITIONER

## 2020-12-30 PROCEDURE — 80053 COMPREHEN METABOLIC PANEL: CPT

## 2020-12-30 PROCEDURE — 99900035 HC TECH TIME PER 15 MIN (STAT)

## 2020-12-30 PROCEDURE — 85379 FIBRIN DEGRADATION QUANT: CPT

## 2020-12-30 PROCEDURE — 85025 COMPLETE CBC W/AUTO DIFF WBC: CPT

## 2020-12-30 PROCEDURE — 94761 N-INVAS EAR/PLS OXIMETRY MLT: CPT

## 2020-12-30 PROCEDURE — 25000003 PHARM REV CODE 250: Performed by: NURSE PRACTITIONER

## 2020-12-30 PROCEDURE — 94640 AIRWAY INHALATION TREATMENT: CPT

## 2020-12-30 PROCEDURE — 27000221 HC OXYGEN, UP TO 24 HOURS

## 2020-12-30 PROCEDURE — 82728 ASSAY OF FERRITIN: CPT

## 2020-12-30 PROCEDURE — 83735 ASSAY OF MAGNESIUM: CPT

## 2020-12-30 PROCEDURE — 83615 LACTATE (LD) (LDH) ENZYME: CPT

## 2020-12-30 PROCEDURE — 86140 C-REACTIVE PROTEIN: CPT

## 2020-12-30 PROCEDURE — 84100 ASSAY OF PHOSPHORUS: CPT

## 2020-12-30 RX ORDER — ERGOCALCIFEROL 1.25 MG/1
50000 CAPSULE ORAL DAILY
Qty: 1 CAPSULE | Refills: 0 | Status: SHIPPED | OUTPATIENT
Start: 2020-12-30 | End: 2021-01-06

## 2020-12-30 RX ORDER — ASCORBIC ACID 500 MG
500 TABLET ORAL 2 TIMES DAILY
COMMUNITY
Start: 2020-12-30 | End: 2022-12-07

## 2020-12-30 RX ORDER — DEXAMETHASONE 6 MG/1
6 TABLET ORAL DAILY
Qty: 7 TABLET | Refills: 0 | Status: SHIPPED | OUTPATIENT
Start: 2020-12-30 | End: 2021-01-06

## 2020-12-30 RX ORDER — TALC
6 POWDER (GRAM) TOPICAL NIGHTLY
Refills: 0 | COMMUNITY
Start: 2020-12-30 | End: 2021-07-27

## 2020-12-30 RX ORDER — BENZONATATE 100 MG/1
100 CAPSULE ORAL 3 TIMES DAILY PRN
Qty: 30 CAPSULE | Refills: 0 | Status: SHIPPED | OUTPATIENT
Start: 2020-12-30 | End: 2021-01-09

## 2020-12-30 RX ADMIN — LOSARTAN POTASSIUM 100 MG: 50 TABLET, FILM COATED ORAL at 10:12

## 2020-12-30 RX ADMIN — OXYCODONE HYDROCHLORIDE AND ACETAMINOPHEN 500 MG: 500 TABLET ORAL at 10:12

## 2020-12-30 RX ADMIN — FINASTERIDE 5 MG: 5 TABLET, FILM COATED ORAL at 10:12

## 2020-12-30 RX ADMIN — IPRATROPIUM BROMIDE AND ALBUTEROL 2 PUFF: 20; 100 SPRAY, METERED RESPIRATORY (INHALATION) at 07:12

## 2020-12-30 RX ADMIN — AMLODIPINE BESYLATE 10 MG: 10 TABLET ORAL at 10:12

## 2020-12-30 RX ADMIN — OXYBUTYNIN CHLORIDE 15 MG: 5 TABLET, EXTENDED RELEASE ORAL at 10:12

## 2020-12-30 RX ADMIN — ERGOCALCIFEROL 50000 UNITS: 1.25 CAPSULE ORAL at 10:12

## 2020-12-30 RX ADMIN — SODIUM CHLORIDE 75 ML/HR: 0.9 INJECTION, SOLUTION INTRAVENOUS at 06:12

## 2020-12-30 RX ADMIN — IPRATROPIUM BROMIDE AND ALBUTEROL 2 PUFF: 20; 100 SPRAY, METERED RESPIRATORY (INHALATION) at 01:12

## 2020-12-30 RX ADMIN — THERA TABS 1 TABLET: TAB at 10:12

## 2020-12-30 RX ADMIN — IPRATROPIUM BROMIDE AND ALBUTEROL 2 PUFF: 20; 100 SPRAY, METERED RESPIRATORY (INHALATION) at 12:12

## 2020-12-30 RX ADMIN — ASPIRIN 81 MG CHEWABLE TABLET 81 MG: 81 TABLET CHEWABLE at 10:12

## 2020-12-30 RX ADMIN — ISOSORBIDE MONONITRATE 60 MG: 60 TABLET, EXTENDED RELEASE ORAL at 10:12

## 2020-12-30 RX ADMIN — DEXAMETHASONE 6 MG: 4 TABLET ORAL at 10:12

## 2020-12-30 RX ADMIN — TAMSULOSIN HYDROCHLORIDE 0.4 MG: 0.4 CAPSULE ORAL at 10:12

## 2020-12-31 LAB — FERRITIN SERPL-MCNC: 282 NG/ML (ref 20–300)

## 2021-01-04 ENCOUNTER — HOSPITAL ENCOUNTER (OUTPATIENT)
Facility: HOSPITAL | Age: 81
Discharge: HOME HEALTH | End: 2021-01-05
Attending: EMERGENCY MEDICINE | Admitting: FAMILY MEDICINE
Payer: MEDICARE

## 2021-01-04 DIAGNOSIS — R00.0 TACHYCARDIA: ICD-10-CM

## 2021-01-04 DIAGNOSIS — M79.606 LEG PAIN: ICD-10-CM

## 2021-01-04 DIAGNOSIS — N17.9 AKI (ACUTE KIDNEY INJURY): ICD-10-CM

## 2021-01-04 DIAGNOSIS — E11.59 HYPERTENSION ASSOCIATED WITH DIABETES: ICD-10-CM

## 2021-01-04 DIAGNOSIS — I25.810 CORONARY ARTERY DISEASE INVOLVING CORONARY BYPASS GRAFT OF NATIVE HEART, ANGINA PRESENCE UNSPECIFIED: Chronic | ICD-10-CM

## 2021-01-04 DIAGNOSIS — E11.69 HYPERLIPIDEMIA ASSOCIATED WITH TYPE 2 DIABETES MELLITUS: ICD-10-CM

## 2021-01-04 DIAGNOSIS — R79.89 ELEVATED TROPONIN: ICD-10-CM

## 2021-01-04 DIAGNOSIS — E11.51 DIABETES MELLITUS TYPE 2 WITH PERIPHERAL ARTERY DISEASE: ICD-10-CM

## 2021-01-04 DIAGNOSIS — E78.5 HYPERLIPIDEMIA ASSOCIATED WITH TYPE 2 DIABETES MELLITUS: ICD-10-CM

## 2021-01-04 DIAGNOSIS — R07.9 CHEST PAIN, UNSPECIFIED TYPE: Primary | ICD-10-CM

## 2021-01-04 DIAGNOSIS — U07.1 COVID-19 VIRUS INFECTION: ICD-10-CM

## 2021-01-04 DIAGNOSIS — I15.2 HYPERTENSION ASSOCIATED WITH DIABETES: ICD-10-CM

## 2021-01-04 LAB
ALBUMIN SERPL BCP-MCNC: 2.9 G/DL (ref 3.5–5.2)
ALP SERPL-CCNC: 66 U/L (ref 55–135)
ALT SERPL W/O P-5'-P-CCNC: 16 U/L (ref 10–44)
ANION GAP SERPL CALC-SCNC: 14 MMOL/L (ref 8–16)
AST SERPL-CCNC: 15 U/L (ref 10–40)
BASOPHILS # BLD AUTO: 0.01 K/UL (ref 0–0.2)
BASOPHILS NFR BLD: 0.1 % (ref 0–1.9)
BILIRUB SERPL-MCNC: 1.2 MG/DL (ref 0.1–1)
BILIRUB UR QL STRIP: NEGATIVE
BNP SERPL-MCNC: 34 PG/ML (ref 0–99)
BUN SERPL-MCNC: 45 MG/DL (ref 8–23)
CALCIUM SERPL-MCNC: 9.8 MG/DL (ref 8.7–10.5)
CHLORIDE SERPL-SCNC: 109 MMOL/L (ref 95–110)
CLARITY UR: CLEAR
CO2 SERPL-SCNC: 18 MMOL/L (ref 23–29)
COLOR UR: YELLOW
CREAT SERPL-MCNC: 1.9 MG/DL (ref 0.5–1.4)
D DIMER PPP IA.FEU-MCNC: 1.29 MG/L FEU
DIFFERENTIAL METHOD: ABNORMAL
EOSINOPHIL # BLD AUTO: 0 K/UL (ref 0–0.5)
EOSINOPHIL NFR BLD: 0.2 % (ref 0–8)
ERYTHROCYTE [DISTWIDTH] IN BLOOD BY AUTOMATED COUNT: 12.7 % (ref 11.5–14.5)
EST. GFR  (AFRICAN AMERICAN): 37 ML/MIN/1.73 M^2
EST. GFR  (NON AFRICAN AMERICAN): 32 ML/MIN/1.73 M^2
GLUCOSE SERPL-MCNC: 280 MG/DL (ref 70–110)
GLUCOSE UR QL STRIP: NEGATIVE
HCT VFR BLD AUTO: 39.7 % (ref 40–54)
HGB BLD-MCNC: 13 G/DL (ref 14–18)
HGB UR QL STRIP: NEGATIVE
IMM GRANULOCYTES # BLD AUTO: 0.05 K/UL (ref 0–0.04)
IMM GRANULOCYTES NFR BLD AUTO: 0.5 % (ref 0–0.5)
KETONES UR QL STRIP: NEGATIVE
LACTATE SERPL-SCNC: 2.7 MMOL/L (ref 0.5–2.2)
LACTATE SERPL-SCNC: 3.5 MMOL/L (ref 0.5–2.2)
LEUKOCYTE ESTERASE UR QL STRIP: NEGATIVE
LYMPHOCYTES # BLD AUTO: 0.7 K/UL (ref 1–4.8)
LYMPHOCYTES NFR BLD: 7.4 % (ref 18–48)
MCH RBC QN AUTO: 28.7 PG (ref 27–31)
MCHC RBC AUTO-ENTMCNC: 32.7 G/DL (ref 32–36)
MCV RBC AUTO: 88 FL (ref 82–98)
MONOCYTES # BLD AUTO: 0.6 K/UL (ref 0.3–1)
MONOCYTES NFR BLD: 6.7 % (ref 4–15)
NEUTROPHILS # BLD AUTO: 8.1 K/UL (ref 1.8–7.7)
NEUTROPHILS NFR BLD: 85.1 % (ref 38–73)
NITRITE UR QL STRIP: NEGATIVE
NRBC BLD-RTO: 0 /100 WBC
PH UR STRIP: 5 [PH] (ref 5–8)
PLATELET # BLD AUTO: 345 K/UL (ref 150–350)
PLATELET BLD QL SMEAR: ABNORMAL
PMV BLD AUTO: 10.6 FL (ref 9.2–12.9)
POCT GLUCOSE: 178 MG/DL (ref 70–110)
POTASSIUM SERPL-SCNC: 4.7 MMOL/L (ref 3.5–5.1)
PROCALCITONIN SERPL IA-MCNC: 0.06 NG/ML
PROT SERPL-MCNC: 6.3 G/DL (ref 6–8.4)
PROT UR QL STRIP: ABNORMAL
RBC # BLD AUTO: 4.53 M/UL (ref 4.6–6.2)
SODIUM SERPL-SCNC: 141 MMOL/L (ref 136–145)
SP GR UR STRIP: >=1.03 (ref 1–1.03)
TROPONIN I SERPL DL<=0.01 NG/ML-MCNC: 0.06 NG/ML (ref 0–0.03)
TROPONIN I SERPL DL<=0.01 NG/ML-MCNC: 0.1 NG/ML (ref 0–0.03)
TROPONIN I SERPL DL<=0.01 NG/ML-MCNC: 0.55 NG/ML (ref 0–0.03)
URN SPEC COLLECT METH UR: ABNORMAL
UROBILINOGEN UR STRIP-ACNC: NEGATIVE EU/DL
WBC # BLD AUTO: 9.51 K/UL (ref 3.9–12.7)

## 2021-01-04 PROCEDURE — 80053 COMPREHEN METABOLIC PANEL: CPT | Mod: HCNC

## 2021-01-04 PROCEDURE — 84484 ASSAY OF TROPONIN QUANT: CPT | Mod: HCNC

## 2021-01-04 PROCEDURE — G0378 HOSPITAL OBSERVATION PER HR: HCPCS | Mod: HCNC

## 2021-01-04 PROCEDURE — 83605 ASSAY OF LACTIC ACID: CPT | Mod: HCNC

## 2021-01-04 PROCEDURE — 96360 HYDRATION IV INFUSION INIT: CPT | Mod: 59,HCNC

## 2021-01-04 PROCEDURE — 36415 COLL VENOUS BLD VENIPUNCTURE: CPT | Mod: HCNC

## 2021-01-04 PROCEDURE — 96372 THER/PROPH/DIAG INJ SC/IM: CPT | Mod: 59 | Performed by: EMERGENCY MEDICINE

## 2021-01-04 PROCEDURE — 83880 ASSAY OF NATRIURETIC PEPTIDE: CPT | Mod: HCNC

## 2021-01-04 PROCEDURE — 25000003 PHARM REV CODE 250: Mod: HCNC | Performed by: NURSE PRACTITIONER

## 2021-01-04 PROCEDURE — 99219 PR INITIAL OBSERVATION CARE,LEVL II: ICD-10-PCS | Mod: HCNC,,, | Performed by: INTERNAL MEDICINE

## 2021-01-04 PROCEDURE — 99291 CRITICAL CARE FIRST HOUR: CPT | Mod: 25,HCNC

## 2021-01-04 PROCEDURE — 96361 HYDRATE IV INFUSION ADD-ON: CPT | Mod: HCNC

## 2021-01-04 PROCEDURE — 85025 COMPLETE CBC W/AUTO DIFF WBC: CPT | Mod: HCNC

## 2021-01-04 PROCEDURE — 87040 BLOOD CULTURE FOR BACTERIA: CPT | Mod: 59,HCNC

## 2021-01-04 PROCEDURE — 93005 ELECTROCARDIOGRAM TRACING: CPT | Mod: HCNC

## 2021-01-04 PROCEDURE — 99219 PR INITIAL OBSERVATION CARE,LEVL II: CPT | Mod: HCNC,,, | Performed by: INTERNAL MEDICINE

## 2021-01-04 PROCEDURE — 83605 ASSAY OF LACTIC ACID: CPT | Mod: 91,HCNC

## 2021-01-04 PROCEDURE — 63600175 PHARM REV CODE 636 W HCPCS: Mod: HCNC | Performed by: NURSE PRACTITIONER

## 2021-01-04 PROCEDURE — 93010 ELECTROCARDIOGRAM REPORT: CPT | Mod: HCNC,,, | Performed by: INTERNAL MEDICINE

## 2021-01-04 PROCEDURE — 63600175 PHARM REV CODE 636 W HCPCS: Mod: HCNC | Performed by: EMERGENCY MEDICINE

## 2021-01-04 PROCEDURE — 81003 URINALYSIS AUTO W/O SCOPE: CPT | Mod: HCNC

## 2021-01-04 PROCEDURE — 84145 PROCALCITONIN (PCT): CPT | Mod: HCNC

## 2021-01-04 PROCEDURE — 93010 EKG 12-LEAD: ICD-10-PCS | Mod: HCNC,,, | Performed by: INTERNAL MEDICINE

## 2021-01-04 PROCEDURE — 85379 FIBRIN DEGRADATION QUANT: CPT | Mod: HCNC

## 2021-01-04 PROCEDURE — C9399 UNCLASSIFIED DRUGS OR BIOLOG: HCPCS | Mod: HCNC | Performed by: NURSE PRACTITIONER

## 2021-01-04 PROCEDURE — 84484 ASSAY OF TROPONIN QUANT: CPT | Mod: 91,HCNC

## 2021-01-04 RX ORDER — IBUPROFEN 200 MG
24 TABLET ORAL
Status: DISCONTINUED | OUTPATIENT
Start: 2021-01-04 | End: 2021-01-05 | Stop reason: HOSPADM

## 2021-01-04 RX ORDER — HEPARIN SODIUM 5000 [USP'U]/ML
5000 INJECTION, SOLUTION INTRAVENOUS; SUBCUTANEOUS EVERY 8 HOURS
Status: DISCONTINUED | OUTPATIENT
Start: 2021-01-04 | End: 2021-01-05 | Stop reason: HOSPADM

## 2021-01-04 RX ORDER — FINASTERIDE 5 MG/1
5 TABLET, FILM COATED ORAL DAILY
Status: DISCONTINUED | OUTPATIENT
Start: 2021-01-04 | End: 2021-01-05 | Stop reason: HOSPADM

## 2021-01-04 RX ORDER — AMLODIPINE BESYLATE 10 MG/1
10 TABLET ORAL DAILY
Status: DISCONTINUED | OUTPATIENT
Start: 2021-01-04 | End: 2021-01-05 | Stop reason: HOSPADM

## 2021-01-04 RX ORDER — ROSUVASTATIN CALCIUM 10 MG/1
40 TABLET, COATED ORAL NIGHTLY
Status: DISCONTINUED | OUTPATIENT
Start: 2021-01-04 | End: 2021-01-05 | Stop reason: HOSPADM

## 2021-01-04 RX ORDER — TAMSULOSIN HYDROCHLORIDE 0.4 MG/1
0.4 CAPSULE ORAL DAILY
Status: DISCONTINUED | OUTPATIENT
Start: 2021-01-04 | End: 2021-01-05 | Stop reason: HOSPADM

## 2021-01-04 RX ORDER — INSULIN ASPART 100 [IU]/ML
0-5 INJECTION, SOLUTION INTRAVENOUS; SUBCUTANEOUS
Status: DISCONTINUED | OUTPATIENT
Start: 2021-01-04 | End: 2021-01-05 | Stop reason: HOSPADM

## 2021-01-04 RX ORDER — ASCORBIC ACID 500 MG
500 TABLET ORAL 2 TIMES DAILY
Status: DISCONTINUED | OUTPATIENT
Start: 2021-01-04 | End: 2021-01-05 | Stop reason: HOSPADM

## 2021-01-04 RX ORDER — METOPROLOL SUCCINATE 25 MG/1
25 TABLET, EXTENDED RELEASE ORAL NIGHTLY
Status: DISCONTINUED | OUTPATIENT
Start: 2021-01-04 | End: 2021-01-05 | Stop reason: HOSPADM

## 2021-01-04 RX ORDER — NAPROXEN SODIUM 220 MG/1
81 TABLET, FILM COATED ORAL DAILY
Status: DISCONTINUED | OUTPATIENT
Start: 2021-01-04 | End: 2021-01-05 | Stop reason: HOSPADM

## 2021-01-04 RX ORDER — ONDANSETRON 2 MG/ML
4 INJECTION INTRAMUSCULAR; INTRAVENOUS EVERY 6 HOURS PRN
Status: DISCONTINUED | OUTPATIENT
Start: 2021-01-04 | End: 2021-01-05 | Stop reason: HOSPADM

## 2021-01-04 RX ORDER — GLUCAGON 1 MG
1 KIT INJECTION
Status: DISCONTINUED | OUTPATIENT
Start: 2021-01-04 | End: 2021-01-05 | Stop reason: HOSPADM

## 2021-01-04 RX ORDER — ZINC SULFATE 50(220)MG
220 CAPSULE ORAL DAILY
Status: DISCONTINUED | OUTPATIENT
Start: 2021-01-04 | End: 2021-01-05 | Stop reason: HOSPADM

## 2021-01-04 RX ORDER — IBUPROFEN 200 MG
16 TABLET ORAL
Status: DISCONTINUED | OUTPATIENT
Start: 2021-01-04 | End: 2021-01-05 | Stop reason: HOSPADM

## 2021-01-04 RX ORDER — CHOLECALCIFEROL (VITAMIN D3) 25 MCG
1000 TABLET ORAL DAILY
Status: DISCONTINUED | OUTPATIENT
Start: 2021-01-04 | End: 2021-01-05 | Stop reason: HOSPADM

## 2021-01-04 RX ORDER — SODIUM CHLORIDE 9 MG/ML
INJECTION, SOLUTION INTRAVENOUS CONTINUOUS
Status: DISCONTINUED | OUTPATIENT
Start: 2021-01-04 | End: 2021-01-05 | Stop reason: HOSPADM

## 2021-01-04 RX ORDER — ISOSORBIDE MONONITRATE 60 MG/1
60 TABLET, EXTENDED RELEASE ORAL DAILY
Status: DISCONTINUED | OUTPATIENT
Start: 2021-01-04 | End: 2021-01-05 | Stop reason: HOSPADM

## 2021-01-04 RX ORDER — ACETAMINOPHEN 325 MG/1
650 TABLET ORAL EVERY 6 HOURS PRN
Status: DISCONTINUED | OUTPATIENT
Start: 2021-01-04 | End: 2021-01-05 | Stop reason: HOSPADM

## 2021-01-04 RX ADMIN — Medication 1000 UNITS: at 02:01

## 2021-01-04 RX ADMIN — METOPROLOL SUCCINATE 25 MG: 25 TABLET, EXTENDED RELEASE ORAL at 09:01

## 2021-01-04 RX ADMIN — ISOSORBIDE MONONITRATE 60 MG: 60 TABLET, EXTENDED RELEASE ORAL at 02:01

## 2021-01-04 RX ADMIN — ZINC SULFATE 220 MG (50 MG) CAPSULE 220 MG: CAPSULE at 02:01

## 2021-01-04 RX ADMIN — DEXAMETHASONE 6 MG: 4 TABLET ORAL at 02:01

## 2021-01-04 RX ADMIN — INSULIN DETEMIR 14 UNITS: 100 INJECTION, SOLUTION SUBCUTANEOUS at 09:01

## 2021-01-04 RX ADMIN — ASPIRIN 81 MG: 81 TABLET, CHEWABLE ORAL at 02:01

## 2021-01-04 RX ADMIN — ROSUVASTATIN 40 MG: 10 TABLET, FILM COATED ORAL at 09:01

## 2021-01-04 RX ADMIN — HEPARIN SODIUM 5000 UNITS: 5000 INJECTION INTRAVENOUS; SUBCUTANEOUS at 09:01

## 2021-01-04 RX ADMIN — SODIUM CHLORIDE, SODIUM LACTATE, POTASSIUM CHLORIDE, AND CALCIUM CHLORIDE 2280 ML: .6; .31; .03; .02 INJECTION, SOLUTION INTRAVENOUS at 09:01

## 2021-01-04 RX ADMIN — TAMSULOSIN HYDROCHLORIDE 0.4 MG: 0.4 CAPSULE ORAL at 02:01

## 2021-01-04 RX ADMIN — OXYCODONE HYDROCHLORIDE AND ACETAMINOPHEN 500 MG: 500 TABLET ORAL at 09:01

## 2021-01-04 RX ADMIN — SODIUM CHLORIDE 75 ML/HR: 0.9 INJECTION, SOLUTION INTRAVENOUS at 02:01

## 2021-01-04 RX ADMIN — FINASTERIDE 5 MG: 5 TABLET, FILM COATED ORAL at 02:01

## 2021-01-05 VITALS
TEMPERATURE: 98 F | WEIGHT: 163.56 LBS | HEIGHT: 70 IN | SYSTOLIC BLOOD PRESSURE: 115 MMHG | BODY MASS INDEX: 23.42 KG/M2 | OXYGEN SATURATION: 97 % | HEART RATE: 69 BPM | RESPIRATION RATE: 20 BRPM | DIASTOLIC BLOOD PRESSURE: 56 MMHG

## 2021-01-05 LAB
ALBUMIN SERPL BCP-MCNC: 2.6 G/DL (ref 3.5–5.2)
ALP SERPL-CCNC: 59 U/L (ref 55–135)
ALT SERPL W/O P-5'-P-CCNC: 15 U/L (ref 10–44)
ANION GAP SERPL CALC-SCNC: 11 MMOL/L (ref 8–16)
AST SERPL-CCNC: 17 U/L (ref 10–40)
BASOPHILS # BLD AUTO: 0.01 K/UL (ref 0–0.2)
BASOPHILS NFR BLD: 0.1 % (ref 0–1.9)
BILIRUB SERPL-MCNC: 0.7 MG/DL (ref 0.1–1)
BUN SERPL-MCNC: 36 MG/DL (ref 8–23)
CALCIUM SERPL-MCNC: 9 MG/DL (ref 8.7–10.5)
CHLORIDE SERPL-SCNC: 110 MMOL/L (ref 95–110)
CO2 SERPL-SCNC: 20 MMOL/L (ref 23–29)
CREAT SERPL-MCNC: 1.3 MG/DL (ref 0.5–1.4)
DIFFERENTIAL METHOD: ABNORMAL
EOSINOPHIL # BLD AUTO: 0 K/UL (ref 0–0.5)
EOSINOPHIL NFR BLD: 0 % (ref 0–8)
ERYTHROCYTE [DISTWIDTH] IN BLOOD BY AUTOMATED COUNT: 12.4 % (ref 11.5–14.5)
EST. GFR  (AFRICAN AMERICAN): 59 ML/MIN/1.73 M^2
EST. GFR  (NON AFRICAN AMERICAN): 51 ML/MIN/1.73 M^2
GLUCOSE SERPL-MCNC: 150 MG/DL (ref 70–110)
HCT VFR BLD AUTO: 33.3 % (ref 40–54)
HGB BLD-MCNC: 11 G/DL (ref 14–18)
IMM GRANULOCYTES # BLD AUTO: 0.04 K/UL (ref 0–0.04)
IMM GRANULOCYTES NFR BLD AUTO: 0.6 % (ref 0–0.5)
LYMPHOCYTES # BLD AUTO: 0.7 K/UL (ref 1–4.8)
LYMPHOCYTES NFR BLD: 10.7 % (ref 18–48)
MCH RBC QN AUTO: 28.9 PG (ref 27–31)
MCHC RBC AUTO-ENTMCNC: 33 G/DL (ref 32–36)
MCV RBC AUTO: 87 FL (ref 82–98)
MONOCYTES # BLD AUTO: 0.3 K/UL (ref 0.3–1)
MONOCYTES NFR BLD: 4.8 % (ref 4–15)
NEUTROPHILS # BLD AUTO: 5.8 K/UL (ref 1.8–7.7)
NEUTROPHILS NFR BLD: 83.8 % (ref 38–73)
NRBC BLD-RTO: 0 /100 WBC
PLATELET # BLD AUTO: 333 K/UL (ref 150–350)
PMV BLD AUTO: 10.5 FL (ref 9.2–12.9)
POCT GLUCOSE: 143 MG/DL (ref 70–110)
POCT GLUCOSE: 193 MG/DL (ref 70–110)
POTASSIUM SERPL-SCNC: 4.3 MMOL/L (ref 3.5–5.1)
PROT SERPL-MCNC: 5.7 G/DL (ref 6–8.4)
RBC # BLD AUTO: 3.81 M/UL (ref 4.6–6.2)
SODIUM SERPL-SCNC: 141 MMOL/L (ref 136–145)
WBC # BLD AUTO: 6.94 K/UL (ref 3.9–12.7)

## 2021-01-05 PROCEDURE — 93010 ELECTROCARDIOGRAM REPORT: CPT | Mod: HCNC,,, | Performed by: INTERNAL MEDICINE

## 2021-01-05 PROCEDURE — 99226 PR SUBSEQUENT OBSERVATION CARE,LEVEL III: ICD-10-PCS | Mod: 25,HCNC,, | Performed by: INTERNAL MEDICINE

## 2021-01-05 PROCEDURE — 93010 EKG 12-LEAD: ICD-10-PCS | Mod: HCNC,,, | Performed by: INTERNAL MEDICINE

## 2021-01-05 PROCEDURE — 80053 COMPREHEN METABOLIC PANEL: CPT | Mod: HCNC

## 2021-01-05 PROCEDURE — 36415 COLL VENOUS BLD VENIPUNCTURE: CPT | Mod: HCNC

## 2021-01-05 PROCEDURE — 93005 ELECTROCARDIOGRAM TRACING: CPT | Mod: HCNC

## 2021-01-05 PROCEDURE — 85025 COMPLETE CBC W/AUTO DIFF WBC: CPT | Mod: HCNC

## 2021-01-05 PROCEDURE — 63600175 PHARM REV CODE 636 W HCPCS: Mod: HCNC | Performed by: NURSE PRACTITIONER

## 2021-01-05 PROCEDURE — G0378 HOSPITAL OBSERVATION PER HR: HCPCS | Mod: HCNC

## 2021-01-05 PROCEDURE — 99226 PR SUBSEQUENT OBSERVATION CARE,LEVEL III: CPT | Mod: 25,HCNC,, | Performed by: INTERNAL MEDICINE

## 2021-01-05 PROCEDURE — 99217 PR OBSERVATION CARE DISCHARGE: CPT | Mod: HCNC,,, | Performed by: FAMILY MEDICINE

## 2021-01-05 PROCEDURE — 99217 PR OBSERVATION CARE DISCHARGE: ICD-10-PCS | Mod: HCNC,,, | Performed by: FAMILY MEDICINE

## 2021-01-05 PROCEDURE — 96372 THER/PROPH/DIAG INJ SC/IM: CPT | Mod: 59 | Performed by: EMERGENCY MEDICINE

## 2021-01-05 PROCEDURE — 25000003 PHARM REV CODE 250: Mod: HCNC | Performed by: NURSE PRACTITIONER

## 2021-01-05 RX ORDER — CHOLECALCIFEROL (VITAMIN D3) 25 MCG
1000 TABLET ORAL DAILY
COMMUNITY
Start: 2021-01-06 | End: 2021-02-05

## 2021-01-05 RX ORDER — ZINC SULFATE 50(220)MG
220 CAPSULE ORAL DAILY
COMMUNITY
Start: 2021-01-06 | End: 2021-02-05

## 2021-01-05 RX ADMIN — AMLODIPINE BESYLATE 10 MG: 10 TABLET ORAL at 08:01

## 2021-01-05 RX ADMIN — FINASTERIDE 5 MG: 5 TABLET, FILM COATED ORAL at 08:01

## 2021-01-05 RX ADMIN — HEPARIN SODIUM 5000 UNITS: 5000 INJECTION INTRAVENOUS; SUBCUTANEOUS at 06:01

## 2021-01-05 RX ADMIN — Medication 1000 UNITS: at 08:01

## 2021-01-05 RX ADMIN — OXYCODONE HYDROCHLORIDE AND ACETAMINOPHEN 500 MG: 500 TABLET ORAL at 08:01

## 2021-01-05 RX ADMIN — DEXAMETHASONE 6 MG: 4 TABLET ORAL at 08:01

## 2021-01-05 RX ADMIN — ISOSORBIDE MONONITRATE 60 MG: 60 TABLET, EXTENDED RELEASE ORAL at 08:01

## 2021-01-05 RX ADMIN — TAMSULOSIN HYDROCHLORIDE 0.4 MG: 0.4 CAPSULE ORAL at 08:01

## 2021-01-05 RX ADMIN — ASPIRIN 81 MG: 81 TABLET, CHEWABLE ORAL at 08:01

## 2021-01-05 RX ADMIN — ZINC SULFATE 220 MG (50 MG) CAPSULE 220 MG: CAPSULE at 08:01

## 2021-01-09 LAB
BACTERIA BLD CULT: NORMAL
BACTERIA BLD CULT: NORMAL

## 2021-01-14 ENCOUNTER — DOCUMENT SCAN (OUTPATIENT)
Dept: HOME HEALTH SERVICES | Facility: HOSPITAL | Age: 81
End: 2021-01-14
Payer: MEDICARE

## 2021-01-15 ENCOUNTER — DOCUMENT SCAN (OUTPATIENT)
Dept: HOME HEALTH SERVICES | Facility: HOSPITAL | Age: 81
End: 2021-01-15
Payer: MEDICARE

## 2021-02-02 ENCOUNTER — TELEPHONE (OUTPATIENT)
Dept: CARDIOLOGY | Facility: CLINIC | Age: 81
End: 2021-02-02

## 2021-02-02 ENCOUNTER — EXTERNAL HOME HEALTH (OUTPATIENT)
Dept: HOME HEALTH SERVICES | Facility: HOSPITAL | Age: 81
End: 2021-02-02
Payer: MEDICARE

## 2021-02-02 DIAGNOSIS — I25.10 CORONARY ARTERY DISEASE, ANGINA PRESENCE UNSPECIFIED, UNSPECIFIED VESSEL OR LESION TYPE, UNSPECIFIED WHETHER NATIVE OR TRANSPLANTED HEART: Primary | ICD-10-CM

## 2021-03-07 PROBLEM — N17.9 AKI (ACUTE KIDNEY INJURY): Status: RESOLVED | Noted: 2021-01-04 | Resolved: 2021-03-07

## 2021-03-08 ENCOUNTER — OFFICE VISIT (OUTPATIENT)
Dept: CARDIOLOGY | Facility: CLINIC | Age: 81
End: 2021-03-08
Payer: MEDICARE

## 2021-03-08 VITALS
DIASTOLIC BLOOD PRESSURE: 62 MMHG | BODY MASS INDEX: 24.58 KG/M2 | RESPIRATION RATE: 16 BRPM | WEIGHT: 171.31 LBS | HEART RATE: 88 BPM | SYSTOLIC BLOOD PRESSURE: 118 MMHG

## 2021-03-08 DIAGNOSIS — I20.89 CHRONIC STABLE ANGINA: ICD-10-CM

## 2021-03-08 DIAGNOSIS — I45.10 RBBB: Chronic | ICD-10-CM

## 2021-03-08 DIAGNOSIS — R94.31 ABNORMAL ECG: ICD-10-CM

## 2021-03-08 DIAGNOSIS — I73.9 PERIPHERAL VASCULAR DISEASE: ICD-10-CM

## 2021-03-08 DIAGNOSIS — E78.5 HYPERLIPIDEMIA ASSOCIATED WITH TYPE 2 DIABETES MELLITUS: ICD-10-CM

## 2021-03-08 DIAGNOSIS — I25.708 CORONARY ARTERY DISEASE OF BYPASS GRAFT OF NATIVE HEART WITH STABLE ANGINA PECTORIS: Chronic | ICD-10-CM

## 2021-03-08 DIAGNOSIS — E11.51 DIABETES MELLITUS TYPE 2 WITH PERIPHERAL ARTERY DISEASE: ICD-10-CM

## 2021-03-08 DIAGNOSIS — U07.1 COVID-19 VIRUS INFECTION: ICD-10-CM

## 2021-03-08 DIAGNOSIS — E11.59 HYPERTENSION ASSOCIATED WITH DIABETES: ICD-10-CM

## 2021-03-08 DIAGNOSIS — E11.69 HYPERLIPIDEMIA ASSOCIATED WITH TYPE 2 DIABETES MELLITUS: ICD-10-CM

## 2021-03-08 DIAGNOSIS — I25.9 CHRONIC ISCHEMIC HEART DISEASE: Chronic | ICD-10-CM

## 2021-03-08 DIAGNOSIS — R79.89 ELEVATED TROPONIN: ICD-10-CM

## 2021-03-08 DIAGNOSIS — I15.2 HYPERTENSION ASSOCIATED WITH DIABETES: ICD-10-CM

## 2021-03-08 DIAGNOSIS — Z98.61 HISTORY OF PTCA: ICD-10-CM

## 2021-03-08 DIAGNOSIS — I20.9 AP (ANGINA PECTORIS): Primary | ICD-10-CM

## 2021-03-08 DIAGNOSIS — I25.10 CAD, MULTIPLE VESSEL: ICD-10-CM

## 2021-03-08 DIAGNOSIS — I65.23 ASYMPTOMATIC STENOSIS OF BOTH CAROTID ARTERIES WITHOUT INFARCTION: ICD-10-CM

## 2021-03-08 PROCEDURE — 99215 PR OFFICE/OUTPT VISIT, EST, LEVL V, 40-54 MIN: ICD-10-PCS | Mod: S$GLB,,, | Performed by: INTERNAL MEDICINE

## 2021-03-08 PROCEDURE — 3072F LOW RISK FOR RETINOPATHY: CPT | Mod: S$GLB,,, | Performed by: INTERNAL MEDICINE

## 2021-03-08 PROCEDURE — 1159F PR MEDICATION LIST DOCUMENTED IN MEDICAL RECORD: ICD-10-PCS | Mod: S$GLB,,, | Performed by: INTERNAL MEDICINE

## 2021-03-08 PROCEDURE — 1101F PR PT FALLS ASSESS DOC 0-1 FALLS W/OUT INJ PAST YR: ICD-10-PCS | Mod: CPTII,S$GLB,, | Performed by: INTERNAL MEDICINE

## 2021-03-08 PROCEDURE — 99499 UNLISTED E&M SERVICE: CPT | Mod: S$GLB,,, | Performed by: INTERNAL MEDICINE

## 2021-03-08 PROCEDURE — 3288F PR FALLS RISK ASSESSMENT DOCUMENTED: ICD-10-PCS | Mod: CPTII,S$GLB,, | Performed by: INTERNAL MEDICINE

## 2021-03-08 PROCEDURE — 99499 RISK ADDL DX/OHS AUDIT: ICD-10-PCS | Mod: S$GLB,,, | Performed by: INTERNAL MEDICINE

## 2021-03-08 PROCEDURE — 3288F FALL RISK ASSESSMENT DOCD: CPT | Mod: CPTII,S$GLB,, | Performed by: INTERNAL MEDICINE

## 2021-03-08 PROCEDURE — 1101F PT FALLS ASSESS-DOCD LE1/YR: CPT | Mod: CPTII,S$GLB,, | Performed by: INTERNAL MEDICINE

## 2021-03-08 PROCEDURE — 1159F MED LIST DOCD IN RCRD: CPT | Mod: S$GLB,,, | Performed by: INTERNAL MEDICINE

## 2021-03-08 PROCEDURE — 99999 PR PBB SHADOW E&M-EST. PATIENT-LVL IV: ICD-10-PCS | Mod: PBBFAC,,, | Performed by: INTERNAL MEDICINE

## 2021-03-08 PROCEDURE — 3072F PR LOW RISK FOR RETINOPATHY: ICD-10-PCS | Mod: S$GLB,,, | Performed by: INTERNAL MEDICINE

## 2021-03-08 PROCEDURE — 3078F DIAST BP <80 MM HG: CPT | Mod: CPTII,S$GLB,, | Performed by: INTERNAL MEDICINE

## 2021-03-08 PROCEDURE — 99215 OFFICE O/P EST HI 40 MIN: CPT | Mod: S$GLB,,, | Performed by: INTERNAL MEDICINE

## 2021-03-08 PROCEDURE — 3074F SYST BP LT 130 MM HG: CPT | Mod: CPTII,S$GLB,, | Performed by: INTERNAL MEDICINE

## 2021-03-08 PROCEDURE — 3074F PR MOST RECENT SYSTOLIC BLOOD PRESSURE < 130 MM HG: ICD-10-PCS | Mod: CPTII,S$GLB,, | Performed by: INTERNAL MEDICINE

## 2021-03-08 PROCEDURE — 99999 PR PBB SHADOW E&M-EST. PATIENT-LVL IV: CPT | Mod: PBBFAC,,, | Performed by: INTERNAL MEDICINE

## 2021-03-08 PROCEDURE — 3078F PR MOST RECENT DIASTOLIC BLOOD PRESSURE < 80 MM HG: ICD-10-PCS | Mod: CPTII,S$GLB,, | Performed by: INTERNAL MEDICINE

## 2021-03-24 ENCOUNTER — OFFICE VISIT (OUTPATIENT)
Dept: INTERNAL MEDICINE | Facility: CLINIC | Age: 81
End: 2021-03-24
Payer: MEDICARE

## 2021-03-24 ENCOUNTER — HOSPITAL ENCOUNTER (OUTPATIENT)
Dept: RADIOLOGY | Facility: HOSPITAL | Age: 81
Discharge: HOME | End: 2021-03-24
Attending: INTERNAL MEDICINE
Payer: MEDICARE

## 2021-03-24 ENCOUNTER — HOSPITAL ENCOUNTER (OUTPATIENT)
Dept: CARDIOLOGY | Facility: HOSPITAL | Age: 81
Discharge: HOME | End: 2021-03-24
Attending: INTERNAL MEDICINE
Payer: MEDICARE

## 2021-03-24 VITALS
WEIGHT: 174.19 LBS | HEART RATE: 77 BPM | OXYGEN SATURATION: 95 % | DIASTOLIC BLOOD PRESSURE: 70 MMHG | TEMPERATURE: 98 F | SYSTOLIC BLOOD PRESSURE: 130 MMHG | BODY MASS INDEX: 24.99 KG/M2

## 2021-03-24 DIAGNOSIS — I20.9 AP (ANGINA PECTORIS): ICD-10-CM

## 2021-03-24 DIAGNOSIS — Z98.61 HISTORY OF PTCA: ICD-10-CM

## 2021-03-24 DIAGNOSIS — I20.89 CHRONIC STABLE ANGINA: ICD-10-CM

## 2021-03-24 DIAGNOSIS — R79.89 ELEVATED TROPONIN: ICD-10-CM

## 2021-03-24 DIAGNOSIS — I73.9 PERIPHERAL VASCULAR DISEASE: ICD-10-CM

## 2021-03-24 DIAGNOSIS — E78.5 HYPERLIPIDEMIA ASSOCIATED WITH TYPE 2 DIABETES MELLITUS: ICD-10-CM

## 2021-03-24 DIAGNOSIS — I25.9 CHRONIC ISCHEMIC HEART DISEASE: Chronic | ICD-10-CM
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
## 2021-03-24 DIAGNOSIS — I25.708 CORONARY ARTERY DISEASE OF BYPASS GRAFT OF NATIVE HEART WITH STABLE ANGINA PECTORIS: Chronic | ICD-10-CM

## 2021-03-24 DIAGNOSIS — E11.59 HYPERTENSION ASSOCIATED WITH DIABETES: ICD-10-CM

## 2021-03-24 DIAGNOSIS — I45.10 RBBB: Chronic | ICD-10-CM

## 2021-03-24 DIAGNOSIS — I15.2 HYPERTENSION ASSOCIATED WITH DIABETES: ICD-10-CM

## 2021-03-24 DIAGNOSIS — E11.51 DIABETES MELLITUS TYPE 2 WITH PERIPHERAL ARTERY DISEASE: ICD-10-CM

## 2021-03-24 DIAGNOSIS — E11.69 HYPERLIPIDEMIA ASSOCIATED WITH TYPE 2 DIABETES MELLITUS: ICD-10-CM

## 2021-03-24 DIAGNOSIS — U07.1 COVID-19 VIRUS INFECTION: ICD-10-CM

## 2021-03-24 DIAGNOSIS — R94.31 ABNORMAL ECG: ICD-10-CM

## 2021-03-24 DIAGNOSIS — S00.81XA ABRASION OF FACE, INITIAL ENCOUNTER: ICD-10-CM

## 2021-03-24 PROCEDURE — 93016 STRESS TEST WITH MYOCARDIAL PERFUSION (CUPID ONLY): ICD-10-PCS | Mod: ,,, | Performed by: INTERNAL MEDICINE

## 2021-03-24 PROCEDURE — 99213 PR OFFICE/OUTPT VISIT, EST, LEVL III, 20-29 MIN: ICD-10-PCS | Mod: S$GLB,,, | Performed by: PHYSICIAN ASSISTANT

## 2021-03-24 PROCEDURE — 1101F PR PT FALLS ASSESS DOC 0-1 FALLS W/OUT INJ PAST YR: ICD-10-PCS | Mod: CPTII,S$GLB,, | Performed by: PHYSICIAN ASSISTANT

## 2021-03-24 PROCEDURE — 1126F PR PAIN SEVERITY QUANTIFIED, NO PAIN PRESENT: ICD-10-PCS | Mod: S$GLB,,, | Performed by: PHYSICIAN ASSISTANT

## 2021-03-24 PROCEDURE — 1159F MED LIST DOCD IN RCRD: CPT | Mod: S$GLB,,, | Performed by: PHYSICIAN ASSISTANT

## 2021-03-24 PROCEDURE — 93018 CV STRESS TEST I&R ONLY: CPT | Mod: ,,, | Performed by: INTERNAL MEDICINE

## 2021-03-24 PROCEDURE — 3078F PR MOST RECENT DIASTOLIC BLOOD PRESSURE < 80 MM HG: ICD-10-PCS | Mod: CPTII,S$GLB,, | Performed by: PHYSICIAN ASSISTANT

## 2021-03-24 PROCEDURE — 93018 STRESS TEST WITH MYOCARDIAL PERFUSION (CUPID ONLY): ICD-10-PCS | Mod: ,,, | Performed by: INTERNAL MEDICINE

## 2021-03-24 PROCEDURE — 3288F PR FALLS RISK ASSESSMENT DOCUMENTED: ICD-10-PCS | Mod: CPTII,S$GLB,, | Performed by: PHYSICIAN ASSISTANT

## 2021-03-24 PROCEDURE — 3078F DIAST BP <80 MM HG: CPT | Mod: CPTII,S$GLB,, | Performed by: PHYSICIAN ASSISTANT

## 2021-03-24 PROCEDURE — 78452 HT MUSCLE IMAGE SPECT MULT: CPT

## 2021-03-24 PROCEDURE — 99213 OFFICE O/P EST LOW 20 MIN: CPT | Mod: S$GLB,,, | Performed by: PHYSICIAN ASSISTANT

## 2021-03-24 PROCEDURE — 3072F PR LOW RISK FOR RETINOPATHY: ICD-10-PCS | Mod: S$GLB,,, | Performed by: PHYSICIAN ASSISTANT

## 2021-03-24 PROCEDURE — A9502 TC99M TETROFOSMIN: HCPCS

## 2021-03-24 PROCEDURE — 78452 STRESS TEST WITH MYOCARDIAL PERFUSION (CUPID ONLY): ICD-10-PCS | Mod: 26,,, | Performed by: INTERNAL MEDICINE

## 2021-03-24 PROCEDURE — 1101F PT FALLS ASSESS-DOCD LE1/YR: CPT | Mod: CPTII,S$GLB,, | Performed by: PHYSICIAN ASSISTANT

## 2021-03-24 PROCEDURE — 93017 CV STRESS TEST TRACING ONLY: CPT

## 2021-03-24 PROCEDURE — 3072F LOW RISK FOR RETINOPATHY: CPT | Mod: S$GLB,,, | Performed by: PHYSICIAN ASSISTANT

## 2021-03-24 PROCEDURE — 3075F SYST BP GE 130 - 139MM HG: CPT | Mod: CPTII,S$GLB,, | Performed by: PHYSICIAN ASSISTANT

## 2021-03-24 PROCEDURE — 3288F FALL RISK ASSESSMENT DOCD: CPT | Mod: CPTII,S$GLB,, | Performed by: PHYSICIAN ASSISTANT

## 2021-03-24 PROCEDURE — 1126F AMNT PAIN NOTED NONE PRSNT: CPT | Mod: S$GLB,,, | Performed by: PHYSICIAN ASSISTANT

## 2021-03-24 PROCEDURE — 93016 CV STRESS TEST SUPVJ ONLY: CPT | Mod: ,,, | Performed by: INTERNAL MEDICINE

## 2021-03-24 PROCEDURE — 63600175 PHARM REV CODE 636 W HCPCS: Performed by: INTERNAL MEDICINE

## 2021-03-24 PROCEDURE — 3075F PR MOST RECENT SYSTOLIC BLOOD PRESS GE 130-139MM HG: ICD-10-PCS | Mod: CPTII,S$GLB,, | Performed by: PHYSICIAN ASSISTANT

## 2021-03-24 PROCEDURE — 99999 PR PBB SHADOW E&M-EST. PATIENT-LVL V: CPT | Mod: PBBFAC,,, | Performed by: PHYSICIAN ASSISTANT

## 2021-03-24 PROCEDURE — 99999 PR PBB SHADOW E&M-EST. PATIENT-LVL V: ICD-10-PCS | Mod: PBBFAC,,, | Performed by: PHYSICIAN ASSISTANT

## 2021-03-24 PROCEDURE — 1159F PR MEDICATION LIST DOCUMENTED IN MEDICAL RECORD: ICD-10-PCS | Mod: S$GLB,,, | Performed by: PHYSICIAN ASSISTANT

## 2021-03-24 PROCEDURE — 78452 HT MUSCLE IMAGE SPECT MULT: CPT | Mod: 26,,, | Performed by: INTERNAL MEDICINE

## 2021-03-24 RX ORDER — REGADENOSON 0.08 MG/ML
0.4 INJECTION, SOLUTION INTRAVENOUS ONCE
Status: COMPLETED | OUTPATIENT
Start: 2021-03-24 | End: 2021-03-24

## 2021-03-24 RX ADMIN — REGADENOSON 0.4 MG: 0.08 INJECTION, SOLUTION INTRAVENOUS at 10:03

## 2021-03-25 LAB
CV STRESS BASE HR: 69 BPM
DIASTOLIC BLOOD PRESSURE: 74 MMHG
NUC REST EJECTION FRACTION: 44
NUC STRESS EJECTION FRACTION: 51 %
OHS CV CPX 85 PERCENT MAX PREDICTED HEART RATE MALE: 118
OHS CV CPX ESTIMATED METS: 1
OHS CV CPX MAX PREDICTED HEART RATE: 139
OHS CV CPX PATIENT IS FEMALE: 0
OHS CV CPX PATIENT IS MALE: 1
OHS CV CPX PEAK DIASTOLIC BLOOD PRESSURE: 75 MMHG
OHS CV CPX PEAK HEAR RATE: 92 BPM
OHS CV CPX PEAK RATE PRESSURE PRODUCT: NORMAL
OHS CV CPX PEAK SYSTOLIC BLOOD PRESSURE: 153 MMHG
OHS CV CPX PERCENT MAX PREDICTED HEART RATE ACHIEVED: 66
OHS CV CPX RATE PRESSURE PRODUCT PRESENTING: NORMAL
STRESS ECHO POST EXERCISE DUR SEC: 58 SECONDS
SYSTOLIC BLOOD PRESSURE: 147 MMHG

## 2021-04-04 ENCOUNTER — TELEPHONE (OUTPATIENT)
Dept: CARDIOLOGY | Facility: CLINIC | Age: 81
End: 2021-04-04

## 2021-05-04 ENCOUNTER — PATIENT MESSAGE (OUTPATIENT)
Dept: UROLOGY | Facility: CLINIC | Age: 81
End: 2021-05-04

## 2021-05-24 ENCOUNTER — OFFICE VISIT (OUTPATIENT)
Dept: CARDIOLOGY | Facility: CLINIC | Age: 81
End: 2021-05-24
Payer: MEDICARE

## 2021-05-24 VITALS
OXYGEN SATURATION: 97 % | HEIGHT: 70 IN | HEART RATE: 80 BPM | SYSTOLIC BLOOD PRESSURE: 116 MMHG | BODY MASS INDEX: 23.93 KG/M2 | WEIGHT: 167.13 LBS | DIASTOLIC BLOOD PRESSURE: 66 MMHG

## 2021-05-24 DIAGNOSIS — I70.0 THORACIC AORTA ATHEROSCLEROSIS: ICD-10-CM

## 2021-05-24 DIAGNOSIS — E78.5 HYPERLIPIDEMIA ASSOCIATED WITH TYPE 2 DIABETES MELLITUS: ICD-10-CM

## 2021-05-24 DIAGNOSIS — R94.31 ABNORMAL ECG: ICD-10-CM

## 2021-05-24 DIAGNOSIS — I65.23 ASYMPTOMATIC STENOSIS OF BOTH CAROTID ARTERIES WITHOUT INFARCTION: ICD-10-CM

## 2021-05-24 DIAGNOSIS — R94.39 ABNORMAL NUCLEAR STRESS TEST: ICD-10-CM

## 2021-05-24 DIAGNOSIS — E11.69 HYPERLIPIDEMIA ASSOCIATED WITH TYPE 2 DIABETES MELLITUS: ICD-10-CM

## 2021-05-24 DIAGNOSIS — I20.89 CHRONIC STABLE ANGINA: ICD-10-CM

## 2021-05-24 DIAGNOSIS — I25.9 CHRONIC ISCHEMIC HEART DISEASE: Chronic | ICD-10-CM

## 2021-05-24 DIAGNOSIS — I45.10 RBBB: Chronic | ICD-10-CM

## 2021-05-24 DIAGNOSIS — I15.2 HYPERTENSION ASSOCIATED WITH DIABETES: ICD-10-CM

## 2021-05-24 DIAGNOSIS — I25.10 CAD, MULTIPLE VESSEL: Primary | ICD-10-CM

## 2021-05-24 DIAGNOSIS — R79.89 ELEVATED TROPONIN: ICD-10-CM

## 2021-05-24 DIAGNOSIS — E11.51 DIABETES MELLITUS TYPE 2 WITH PERIPHERAL ARTERY DISEASE: ICD-10-CM

## 2021-05-24 DIAGNOSIS — I50.42 CHRONIC COMBINED SYSTOLIC AND DIASTOLIC CONGESTIVE HEART FAILURE: ICD-10-CM

## 2021-05-24 DIAGNOSIS — I20.9 AP (ANGINA PECTORIS): ICD-10-CM

## 2021-05-24 DIAGNOSIS — E11.59 HYPERTENSION ASSOCIATED WITH DIABETES: ICD-10-CM

## 2021-05-24 DIAGNOSIS — U07.1 COVID-19 VIRUS INFECTION: ICD-10-CM

## 2021-05-24 DIAGNOSIS — I25.708 CORONARY ARTERY DISEASE OF BYPASS GRAFT OF NATIVE HEART WITH STABLE ANGINA PECTORIS: Chronic | ICD-10-CM

## 2021-05-24 DIAGNOSIS — I73.9 PERIPHERAL VASCULAR DISEASE: ICD-10-CM

## 2021-05-24 PROCEDURE — 99999 PR PBB SHADOW E&M-EST. PATIENT-LVL III: ICD-10-PCS | Mod: PBBFAC,,, | Performed by: INTERNAL MEDICINE

## 2021-05-24 PROCEDURE — 99999 PR PBB SHADOW E&M-EST. PATIENT-LVL III: CPT | Mod: PBBFAC,,, | Performed by: INTERNAL MEDICINE

## 2021-05-24 PROCEDURE — 1126F AMNT PAIN NOTED NONE PRSNT: CPT | Mod: S$GLB,,, | Performed by: INTERNAL MEDICINE

## 2021-05-24 PROCEDURE — 3072F LOW RISK FOR RETINOPATHY: CPT | Mod: S$GLB,,, | Performed by: INTERNAL MEDICINE

## 2021-05-24 PROCEDURE — 3072F PR LOW RISK FOR RETINOPATHY: ICD-10-PCS | Mod: S$GLB,,, | Performed by: INTERNAL MEDICINE

## 2021-05-24 PROCEDURE — 99214 PR OFFICE/OUTPT VISIT, EST, LEVL IV, 30-39 MIN: ICD-10-PCS | Mod: S$GLB,,, | Performed by: INTERNAL MEDICINE

## 2021-05-24 PROCEDURE — 1159F PR MEDICATION LIST DOCUMENTED IN MEDICAL RECORD: ICD-10-PCS | Mod: S$GLB,,, | Performed by: INTERNAL MEDICINE

## 2021-05-24 PROCEDURE — 1126F PR PAIN SEVERITY QUANTIFIED, NO PAIN PRESENT: ICD-10-PCS | Mod: S$GLB,,, | Performed by: INTERNAL MEDICINE

## 2021-05-24 PROCEDURE — 1159F MED LIST DOCD IN RCRD: CPT | Mod: S$GLB,,, | Performed by: INTERNAL MEDICINE

## 2021-05-24 PROCEDURE — 99499 UNLISTED E&M SERVICE: CPT | Mod: S$GLB,,, | Performed by: INTERNAL MEDICINE

## 2021-05-24 PROCEDURE — 99499 RISK ADDL DX/OHS AUDIT: ICD-10-PCS | Mod: S$GLB,,, | Performed by: INTERNAL MEDICINE

## 2021-05-24 PROCEDURE — 99214 OFFICE O/P EST MOD 30 MIN: CPT | Mod: S$GLB,,, | Performed by: INTERNAL MEDICINE

## 2021-05-25 ENCOUNTER — TELEPHONE (OUTPATIENT)
Dept: INTERNAL MEDICINE | Facility: CLINIC | Age: 81
End: 2021-05-25

## 2021-05-25 ENCOUNTER — OFFICE VISIT (OUTPATIENT)
Dept: UROLOGY | Facility: CLINIC | Age: 81
End: 2021-05-25
Payer: MEDICARE

## 2021-05-25 DIAGNOSIS — N40.1 BPH WITH OBSTRUCTION/LOWER URINARY TRACT SYMPTOMS: Primary | ICD-10-CM

## 2021-05-25 DIAGNOSIS — N13.8 BPH WITH OBSTRUCTION/LOWER URINARY TRACT SYMPTOMS: Primary | ICD-10-CM

## 2021-05-25 DIAGNOSIS — R39.198 INFREQUENT URINATION: ICD-10-CM

## 2021-05-25 PROCEDURE — 3072F PR LOW RISK FOR RETINOPATHY: ICD-10-PCS | Mod: S$GLB,,, | Performed by: UROLOGY

## 2021-05-25 PROCEDURE — 51798 US URINE CAPACITY MEASURE: CPT | Mod: S$GLB,,, | Performed by: UROLOGY

## 2021-05-25 PROCEDURE — 1159F MED LIST DOCD IN RCRD: CPT | Mod: S$GLB,,, | Performed by: UROLOGY

## 2021-05-25 PROCEDURE — 51798 PR MEAS,POST-VOID RES,US,NON-IMAGING: ICD-10-PCS | Mod: S$GLB,,, | Performed by: UROLOGY

## 2021-05-25 PROCEDURE — 99213 PR OFFICE/OUTPT VISIT, EST, LEVL III, 20-29 MIN: ICD-10-PCS | Mod: S$GLB,,, | Performed by: UROLOGY

## 2021-05-25 PROCEDURE — 3072F LOW RISK FOR RETINOPATHY: CPT | Mod: S$GLB,,, | Performed by: UROLOGY

## 2021-05-25 PROCEDURE — 99213 OFFICE O/P EST LOW 20 MIN: CPT | Mod: S$GLB,,, | Performed by: UROLOGY

## 2021-05-25 PROCEDURE — 1159F PR MEDICATION LIST DOCUMENTED IN MEDICAL RECORD: ICD-10-PCS | Mod: S$GLB,,, | Performed by: UROLOGY

## 2021-05-25 PROCEDURE — 99499 UNLISTED E&M SERVICE: CPT | Mod: S$GLB,,, | Performed by: UROLOGY

## 2021-05-25 PROCEDURE — 99499 RISK ADDL DX/OHS AUDIT: ICD-10-PCS | Mod: S$GLB,,, | Performed by: UROLOGY

## 2021-06-22 ENCOUNTER — PES CALL (OUTPATIENT)
Dept: ADMINISTRATIVE | Facility: CLINIC | Age: 81
End: 2021-06-22

## 2021-07-27 ENCOUNTER — OFFICE VISIT (OUTPATIENT)
Dept: UROLOGY | Facility: CLINIC | Age: 81
End: 2021-07-27
Payer: MEDICARE

## 2021-07-27 ENCOUNTER — LAB VISIT (OUTPATIENT)
Dept: LAB | Facility: HOSPITAL | Age: 81
End: 2021-07-27
Attending: NURSE PRACTITIONER
Payer: MEDICARE

## 2021-07-27 VITALS
WEIGHT: 168.56 LBS | BODY MASS INDEX: 24.18 KG/M2 | DIASTOLIC BLOOD PRESSURE: 58 MMHG | SYSTOLIC BLOOD PRESSURE: 114 MMHG

## 2021-07-27 DIAGNOSIS — R31.0 HEMATURIA, GROSS: ICD-10-CM

## 2021-07-27 DIAGNOSIS — R39.12 BENIGN PROSTATIC HYPERPLASIA WITH WEAK URINARY STREAM: ICD-10-CM

## 2021-07-27 DIAGNOSIS — R31.9 HEMATURIA OF UNKNOWN CAUSE: ICD-10-CM

## 2021-07-27 DIAGNOSIS — R35.1 NOCTURIA: Primary | ICD-10-CM

## 2021-07-27 DIAGNOSIS — N40.1 BENIGN PROSTATIC HYPERPLASIA WITH WEAK URINARY STREAM: ICD-10-CM

## 2021-07-27 LAB
BILIRUB SERPL-MCNC: NORMAL MG/DL
BLOOD URINE, POC: 250
CLARITY, POC UA: CLEAR
COLOR, POC UA: YELLOW
GLUCOSE UR QL STRIP: NORMAL
KETONES UR QL STRIP: NORMAL
LEUKOCYTE ESTERASE URINE, POC: NORMAL
NITRITE, POC UA: NORMAL
PH, POC UA: 7
PROTEIN, POC: NORMAL
SPECIFIC GRAVITY, POC UA: NORMAL
UROBILINOGEN, POC UA: NORMAL

## 2021-07-27 PROCEDURE — 1159F MED LIST DOCD IN RCRD: CPT | Mod: CPTII,S$GLB,, | Performed by: NURSE PRACTITIONER

## 2021-07-27 PROCEDURE — 3072F PR LOW RISK FOR RETINOPATHY: ICD-10-PCS | Mod: CPTII,S$GLB,, | Performed by: NURSE PRACTITIONER

## 2021-07-27 PROCEDURE — 3074F SYST BP LT 130 MM HG: CPT | Mod: CPTII,S$GLB,, | Performed by: NURSE PRACTITIONER

## 2021-07-27 PROCEDURE — 3288F FALL RISK ASSESSMENT DOCD: CPT | Mod: CPTII,S$GLB,, | Performed by: NURSE PRACTITIONER

## 2021-07-27 PROCEDURE — 99999 PR PBB SHADOW E&M-EST. PATIENT-LVL IV: CPT | Mod: PBBFAC,,, | Performed by: NURSE PRACTITIONER

## 2021-07-27 PROCEDURE — 3074F PR MOST RECENT SYSTOLIC BLOOD PRESSURE < 130 MM HG: ICD-10-PCS | Mod: CPTII,S$GLB,, | Performed by: NURSE PRACTITIONER

## 2021-07-27 PROCEDURE — 87086 URINE CULTURE/COLONY COUNT: CPT | Performed by: NURSE PRACTITIONER

## 2021-07-27 PROCEDURE — 3288F PR FALLS RISK ASSESSMENT DOCUMENTED: ICD-10-PCS | Mod: CPTII,S$GLB,, | Performed by: NURSE PRACTITIONER

## 2021-07-27 PROCEDURE — 99215 OFFICE O/P EST HI 40 MIN: CPT | Mod: 25,S$GLB,, | Performed by: NURSE PRACTITIONER

## 2021-07-27 PROCEDURE — 1126F PR PAIN SEVERITY QUANTIFIED, NO PAIN PRESENT: ICD-10-PCS | Mod: CPTII,S$GLB,, | Performed by: NURSE PRACTITIONER

## 2021-07-27 PROCEDURE — 81002 URINALYSIS NONAUTO W/O SCOPE: CPT | Mod: S$GLB,,, | Performed by: NURSE PRACTITIONER

## 2021-07-27 PROCEDURE — 36415 COLL VENOUS BLD VENIPUNCTURE: CPT | Performed by: NURSE PRACTITIONER

## 2021-07-27 PROCEDURE — 3078F PR MOST RECENT DIASTOLIC BLOOD PRESSURE < 80 MM HG: ICD-10-PCS | Mod: CPTII,S$GLB,, | Performed by: NURSE PRACTITIONER

## 2021-07-27 PROCEDURE — 3072F LOW RISK FOR RETINOPATHY: CPT | Mod: CPTII,S$GLB,, | Performed by: NURSE PRACTITIONER

## 2021-07-27 PROCEDURE — 3078F DIAST BP <80 MM HG: CPT | Mod: CPTII,S$GLB,, | Performed by: NURSE PRACTITIONER

## 2021-07-27 PROCEDURE — 1159F PR MEDICATION LIST DOCUMENTED IN MEDICAL RECORD: ICD-10-PCS | Mod: CPTII,S$GLB,, | Performed by: NURSE PRACTITIONER

## 2021-07-27 PROCEDURE — 81001 URINALYSIS AUTO W/SCOPE: CPT | Performed by: NURSE PRACTITIONER

## 2021-07-27 PROCEDURE — 1126F AMNT PAIN NOTED NONE PRSNT: CPT | Mod: CPTII,S$GLB,, | Performed by: NURSE PRACTITIONER

## 2021-07-27 PROCEDURE — 99999 PR PBB SHADOW E&M-EST. PATIENT-LVL IV: ICD-10-PCS | Mod: PBBFAC,,, | Performed by: NURSE PRACTITIONER

## 2021-07-27 PROCEDURE — 82565 ASSAY OF CREATININE: CPT | Performed by: NURSE PRACTITIONER

## 2021-07-27 PROCEDURE — 99215 PR OFFICE/OUTPT VISIT, EST, LEVL V, 40-54 MIN: ICD-10-PCS | Mod: 25,S$GLB,, | Performed by: NURSE PRACTITIONER

## 2021-07-27 PROCEDURE — 1100F PR PT FALLS ASSESS DOC 2+ FALLS/FALL W/INJURY/YR: ICD-10-PCS | Mod: CPTII,S$GLB,, | Performed by: NURSE PRACTITIONER

## 2021-07-27 PROCEDURE — 81002 POCT URINE DIPSTICK WITHOUT MICROSCOPE: ICD-10-PCS | Mod: S$GLB,,, | Performed by: NURSE PRACTITIONER

## 2021-07-27 PROCEDURE — 1100F PTFALLS ASSESS-DOCD GE2>/YR: CPT | Mod: CPTII,S$GLB,, | Performed by: NURSE PRACTITIONER

## 2021-07-27 RX ORDER — SOLIFENACIN SUCCINATE 10 MG/1
10 TABLET, FILM COATED ORAL DAILY
Qty: 30 TABLET | Refills: 11 | Status: SHIPPED | OUTPATIENT
Start: 2021-07-27 | End: 2022-08-04

## 2021-07-27 RX ORDER — TAMSULOSIN HYDROCHLORIDE 0.4 MG/1
0.4 CAPSULE ORAL DAILY
Qty: 90 CAPSULE | Refills: 3 | Status: SHIPPED | OUTPATIENT
Start: 2021-07-27 | End: 2022-08-04

## 2021-07-28 ENCOUNTER — TELEPHONE (OUTPATIENT)
Dept: UROLOGY | Facility: CLINIC | Age: 81
End: 2021-07-28

## 2021-07-28 LAB
BILIRUB UR QL STRIP: NEGATIVE
CLARITY UR REFRACT.AUTO: CLEAR
COLOR UR AUTO: YELLOW
CREAT SERPL-MCNC: 1.2 MG/DL (ref 0.5–1.4)
EST. GFR  (AFRICAN AMERICAN): >60 ML/MIN/1.73 M^2
EST. GFR  (NON AFRICAN AMERICAN): 56 ML/MIN/1.73 M^2
GLUCOSE UR QL STRIP: NEGATIVE
HGB UR QL STRIP: ABNORMAL
KETONES UR QL STRIP: NEGATIVE
LEUKOCYTE ESTERASE UR QL STRIP: NEGATIVE
MICROSCOPIC COMMENT: ABNORMAL
NITRITE UR QL STRIP: NEGATIVE
PH UR STRIP: 7 [PH] (ref 5–8)
PROT UR QL STRIP: NEGATIVE
RBC #/AREA URNS AUTO: 49 /HPF (ref 0–4)
SP GR UR STRIP: 1.01 (ref 1–1.03)
SQUAMOUS #/AREA URNS AUTO: 0 /HPF
URN SPEC COLLECT METH UR: ABNORMAL

## 2021-07-29 LAB — BACTERIA UR CULT: NORMAL

## 2021-08-04 ENCOUNTER — TELEPHONE (OUTPATIENT)
Dept: UROLOGY | Facility: CLINIC | Age: 81
End: 2021-08-04

## 2021-08-04 ENCOUNTER — HOSPITAL ENCOUNTER (OUTPATIENT)
Dept: RADIOLOGY | Facility: HOSPITAL | Age: 81
Discharge: HOME | End: 2021-08-04
Attending: NURSE PRACTITIONER
Payer: MEDICARE

## 2021-08-04 DIAGNOSIS — R31.9 HEMATURIA OF UNKNOWN CAUSE: ICD-10-CM

## 2021-08-04 PROCEDURE — 74178 CT ABD&PLV WO CNTR FLWD CNTR: CPT | Mod: TC

## 2021-08-04 PROCEDURE — 25500020 PHARM REV CODE 255: Performed by: NURSE PRACTITIONER

## 2021-08-04 RX ADMIN — IOHEXOL 125 ML: 350 INJECTION, SOLUTION INTRAVENOUS at 09:08

## 2021-08-09 ENCOUNTER — HOSPITAL ENCOUNTER (OUTPATIENT)
Dept: CARDIOLOGY | Facility: HOSPITAL | Age: 81
Discharge: HOME | End: 2021-08-09
Attending: INTERNAL MEDICINE
Payer: MEDICARE

## 2021-08-09 VITALS
SYSTOLIC BLOOD PRESSURE: 125 MMHG | DIASTOLIC BLOOD PRESSURE: 69 MMHG | HEIGHT: 70 IN | WEIGHT: 168 LBS | BODY MASS INDEX: 24.05 KG/M2 | BODY MASS INDEX: 24.05 KG/M2 | HEIGHT: 70 IN | DIASTOLIC BLOOD PRESSURE: 69 MMHG | SYSTOLIC BLOOD PRESSURE: 125 MMHG | WEIGHT: 168 LBS

## 2021-08-09 DIAGNOSIS — I25.10 CAD, MULTIPLE VESSEL: ICD-10-CM

## 2021-08-09 DIAGNOSIS — I45.10 RBBB: ICD-10-CM

## 2021-08-09 DIAGNOSIS — I25.708 CORONARY ARTERY DISEASE OF BYPASS GRAFT OF NATIVE HEART WITH STABLE ANGINA PECTORIS: ICD-10-CM

## 2021-08-09 DIAGNOSIS — I15.2 HYPERTENSION ASSOCIATED WITH DIABETES: ICD-10-CM

## 2021-08-09 DIAGNOSIS — U07.1 COVID-19 VIRUS INFECTION: ICD-10-CM

## 2021-08-09 DIAGNOSIS — I20.9 AP (ANGINA PECTORIS): ICD-10-CM

## 2021-08-09 DIAGNOSIS — E11.59 HYPERTENSION ASSOCIATED WITH DIABETES: ICD-10-CM

## 2021-08-09 DIAGNOSIS — R94.39 ABNORMAL NUCLEAR STRESS TEST: ICD-10-CM

## 2021-08-09 DIAGNOSIS — I25.9 CHRONIC ISCHEMIC HEART DISEASE: ICD-10-CM

## 2021-08-09 DIAGNOSIS — E11.51 DIABETES MELLITUS TYPE 2 WITH PERIPHERAL ARTERY DISEASE: ICD-10-CM

## 2021-08-09 DIAGNOSIS — I70.0 THORACIC AORTA ATHEROSCLEROSIS: ICD-10-CM

## 2021-08-09 DIAGNOSIS — I65.23 ASYMPTOMATIC STENOSIS OF BOTH CAROTID ARTERIES WITHOUT INFARCTION: ICD-10-CM

## 2021-08-09 DIAGNOSIS — R79.89 ELEVATED TROPONIN: ICD-10-CM

## 2021-08-09 DIAGNOSIS — E78.5 HYPERLIPIDEMIA ASSOCIATED WITH TYPE 2 DIABETES MELLITUS: ICD-10-CM

## 2021-08-09 DIAGNOSIS — I20.89 CHRONIC STABLE ANGINA: ICD-10-CM

## 2021-08-09 DIAGNOSIS — R94.31 ABNORMAL ECG: ICD-10-CM

## 2021-08-09 DIAGNOSIS — E11.69 HYPERLIPIDEMIA ASSOCIATED WITH TYPE 2 DIABETES MELLITUS: ICD-10-CM

## 2021-08-09 DIAGNOSIS — I73.9 PERIPHERAL VASCULAR DISEASE: ICD-10-CM

## 2021-08-09 LAB
AORTIC ROOT ANNULUS: 3.89 CM
ASCENDING AORTA: 3.58 CM
AV INDEX (PROSTH): 0.78
AV MEAN GRADIENT: 2 MMHG
AV PEAK GRADIENT: 4 MMHG
AV VALVE AREA: 3.07 CM2
AV VELOCITY RATIO: 0.8
BSA FOR ECHO PROCEDURE: 1.94 M2
CV ECHO LV RWT: 0.49 CM
DOP CALC AO PEAK VEL: 1.01 M/S
DOP CALC AO VTI: 23.5 CM
DOP CALC LVOT AREA: 3.9 CM2
DOP CALC LVOT DIAMETER: 2.24 CM
DOP CALC LVOT PEAK VEL: 0.81 M/S
DOP CALC LVOT STROKE VOLUME: 72.04 CM3
DOP CALC RVOT PEAK VEL: 0.52 M/S
DOP CALC RVOT VTI: 11.5 CM
DOP CALCLVOT PEAK VEL VTI: 18.29 CM
E WAVE DECELERATION TIME: 278.13 MSEC
E/A RATIO: 0.54
E/E' RATIO: 8.91 M/S
ECHO LV POSTERIOR WALL: 1.14 CM (ref 0.6–1.1)
EJECTION FRACTION: 55 %
FRACTIONAL SHORTENING: 25 % (ref 28–44)
INTERVENTRICULAR SEPTUM: 1.29 CM (ref 0.6–1.1)
IVRT: 148.43 MSEC
LA MAJOR: 4.63 CM
LA WIDTH: 3.28 CM
LEFT ABI: 0.71
LEFT ARM BP: 125 MMHG
LEFT ATRIUM SIZE: 4.85 CM
LEFT DORSALIS PEDIS: 83 MMHG
LEFT INTERNAL DIMENSION IN SYSTOLE: 3.48 CM (ref 2.1–4)
LEFT POSTERIOR TIBIAL: 89 MMHG
LEFT TBI: 0.55
LEFT TOE PRESSURE: 69 MMHG
LEFT VENTRICLE DIASTOLIC VOLUME INDEX: 52.03 ML/M2
LEFT VENTRICLE DIASTOLIC VOLUME: 100.94 ML
LEFT VENTRICLE MASS INDEX: 110 G/M2
LEFT VENTRICLE SYSTOLIC VOLUME INDEX: 25.8 ML/M2
LEFT VENTRICLE SYSTOLIC VOLUME: 50.09 ML
LEFT VENTRICULAR INTERNAL DIMENSION IN DIASTOLE: 4.67 CM (ref 3.5–6)
LEFT VENTRICULAR MASS: 213.64 G
LV LATERAL E/E' RATIO: 9.8 M/S
LV SEPTAL E/E' RATIO: 8.17 M/S
MV PEAK A VEL: 0.9 M/S
MV PEAK E VEL: 0.49 M/S
MV STENOSIS PRESSURE HALF TIME: 80.66 MS
MV VALVE AREA P 1/2 METHOD: 2.73 CM2
PISA TR MAX VEL: 1.96 M/S
PULM VEIN S/D RATIO: 2.24
PV MEAN GRADIENT: 1 MMHG
PV PEAK D VEL: 0.21 M/S
PV PEAK S VEL: 0.47 M/S
PV PEAK VELOCITY: 0.71 CM/S
RA MAJOR: 4.05 CM
RA PRESSURE: 3 MMHG
RA WIDTH: 3.63 CM
RIGHT ABI: 0.73
RIGHT ARM BP: 119 MMHG
RIGHT DORSALIS PEDIS: 91 MMHG
RIGHT POSTERIOR TIBIAL: 82 MMHG
RIGHT TBI: 0.48
RIGHT TOE PRESSURE: 60 MMHG
RIGHT VENTRICULAR END-DIASTOLIC DIMENSION: 2.95 CM
SINUS: 3.76 CM
STJ: 3.86 CM
TDI LATERAL: 0.05 M/S
TDI SEPTAL: 0.06 M/S
TDI: 0.06 M/S
TR MAX PG: 15 MMHG
TRICUSPID ANNULAR PLANE SYSTOLIC EXCURSION: 1.72 CM
TV REST PULMONARY ARTERY PRESSURE: 18 MMHG

## 2021-08-09 PROCEDURE — 93922 UPR/L XTREMITY ART 2 LEVELS: CPT | Mod: PO

## 2021-08-09 PROCEDURE — 93922 UPR/L XTREMITY ART 2 LEVELS: CPT | Mod: 26,,, | Performed by: INTERNAL MEDICINE

## 2021-08-09 PROCEDURE — 93306 ECHO (CUPID ONLY): ICD-10-PCS | Mod: 26,,, | Performed by: INTERNAL MEDICINE

## 2021-08-09 PROCEDURE — 93922 ANKLE BRACHIAL INDICES (ABI): ICD-10-PCS | Mod: 26,,, | Performed by: INTERNAL MEDICINE

## 2021-08-09 PROCEDURE — 93306 TTE W/DOPPLER COMPLETE: CPT | Mod: PO

## 2021-08-09 PROCEDURE — 93306 TTE W/DOPPLER COMPLETE: CPT | Mod: 26,,, | Performed by: INTERNAL MEDICINE

## 2021-08-09 RX ORDER — ROSUVASTATIN CALCIUM 40 MG/1
40 TABLET, COATED ORAL NIGHTLY
Qty: 90 TABLET | Refills: 0 | Status: SHIPPED | OUTPATIENT
Start: 2021-08-09 | End: 2021-12-01

## 2021-08-09 RX ORDER — ISOSORBIDE MONONITRATE 60 MG/1
TABLET, EXTENDED RELEASE ORAL
Qty: 90 TABLET | Refills: 0 | Status: SHIPPED | OUTPATIENT
Start: 2021-08-09 | End: 2021-12-01

## 2021-08-09 RX ORDER — AMLODIPINE BESYLATE 10 MG/1
TABLET ORAL
Qty: 90 TABLET | Refills: 0 | Status: SHIPPED | OUTPATIENT
Start: 2021-08-09 | End: 2021-12-01

## 2021-08-09 RX ORDER — METOPROLOL SUCCINATE 25 MG/1
25 TABLET, EXTENDED RELEASE ORAL NIGHTLY
Qty: 90 TABLET | Refills: 0 | Status: SHIPPED | OUTPATIENT
Start: 2021-08-09 | End: 2021-12-01

## 2021-08-24 DIAGNOSIS — N40.1 BENIGN PROSTATIC HYPERPLASIA WITH WEAK URINARY STREAM: ICD-10-CM

## 2021-08-24 DIAGNOSIS — R39.12 BENIGN PROSTATIC HYPERPLASIA WITH WEAK URINARY STREAM: ICD-10-CM

## 2021-08-24 RX ORDER — FINASTERIDE 5 MG/1
5 TABLET, FILM COATED ORAL DAILY
Qty: 90 TABLET | Refills: 3 | Status: SHIPPED | OUTPATIENT
Start: 2021-08-24 | End: 2022-10-19 | Stop reason: SDUPTHER

## 2021-08-31 ENCOUNTER — TELEPHONE (OUTPATIENT)
Dept: UROLOGY | Facility: CLINIC | Age: 81
End: 2021-08-31

## 2021-09-01 ENCOUNTER — PROCEDURE VISIT (OUTPATIENT)
Dept: UROLOGY | Facility: CLINIC | Age: 81
End: 2021-09-01
Payer: MEDICARE

## 2021-09-01 VITALS
SYSTOLIC BLOOD PRESSURE: 118 MMHG | WEIGHT: 164.25 LBS | HEIGHT: 70 IN | DIASTOLIC BLOOD PRESSURE: 55 MMHG | TEMPERATURE: 98 F | HEART RATE: 88 BPM | BODY MASS INDEX: 23.51 KG/M2

## 2021-09-01 DIAGNOSIS — R31.0 HEMATURIA, GROSS: ICD-10-CM

## 2021-09-01 DIAGNOSIS — R39.12 BENIGN PROSTATIC HYPERPLASIA WITH WEAK URINARY STREAM: Primary | ICD-10-CM

## 2021-09-01 DIAGNOSIS — N32.81 OAB (OVERACTIVE BLADDER): ICD-10-CM

## 2021-09-01 DIAGNOSIS — N40.1 BENIGN PROSTATIC HYPERPLASIA WITH WEAK URINARY STREAM: Primary | ICD-10-CM

## 2021-09-01 PROCEDURE — 99499 RISK ADDL DX/OHS AUDIT: ICD-10-PCS | Mod: HCNC,S$GLB,, | Performed by: UROLOGY

## 2021-09-01 PROCEDURE — 99499 UNLISTED E&M SERVICE: CPT | Mod: HCNC,S$GLB,, | Performed by: UROLOGY

## 2021-10-29 ENCOUNTER — HOSPITAL ENCOUNTER (EMERGENCY)
Facility: HOSPITAL | Age: 81
Discharge: HOME | End: 2021-10-29
Attending: EMERGENCY MEDICINE
Payer: MEDICARE

## 2021-10-29 VITALS
DIASTOLIC BLOOD PRESSURE: 69 MMHG | RESPIRATION RATE: 21 BRPM | TEMPERATURE: 99 F | HEART RATE: 84 BPM | SYSTOLIC BLOOD PRESSURE: 159 MMHG | OXYGEN SATURATION: 97 %

## 2021-10-29 DIAGNOSIS — D64.9 ANEMIA, UNSPECIFIED TYPE: ICD-10-CM

## 2021-10-29 DIAGNOSIS — J18.9 PNEUMONIA OF LEFT LOWER LOBE DUE TO INFECTIOUS ORGANISM: Primary | ICD-10-CM

## 2021-10-29 DIAGNOSIS — R41.82 AMS (ALTERED MENTAL STATUS): ICD-10-CM

## 2021-10-29 DIAGNOSIS — R41.82 ALTERED MENTAL STATUS: ICD-10-CM

## 2021-10-29 LAB
ALBUMIN SERPL BCP-MCNC: 3.5 G/DL (ref 3.5–5.2)
ALP SERPL-CCNC: 51 U/L (ref 55–135)
ALT SERPL W/O P-5'-P-CCNC: 25 U/L (ref 10–44)
ANION GAP SERPL CALC-SCNC: 11 MMOL/L (ref 8–16)
AST SERPL-CCNC: 23 U/L (ref 10–40)
BASOPHILS # BLD AUTO: 0.01 K/UL (ref 0–0.2)
BASOPHILS NFR BLD: 0.2 % (ref 0–1.9)
BILIRUB SERPL-MCNC: 1 MG/DL (ref 0.1–1)
BILIRUB UR QL STRIP: NEGATIVE
BUN SERPL-MCNC: 27 MG/DL (ref 8–23)
CALCIUM SERPL-MCNC: 10 MG/DL (ref 8.7–10.5)
CHLORIDE SERPL-SCNC: 107 MMOL/L (ref 95–110)
CLARITY UR: CLEAR
CO2 SERPL-SCNC: 18 MMOL/L (ref 23–29)
COLOR UR: YELLOW
CREAT SERPL-MCNC: 1.5 MG/DL (ref 0.5–1.4)
DIFFERENTIAL METHOD: ABNORMAL
EOSINOPHIL # BLD AUTO: 0 K/UL (ref 0–0.5)
EOSINOPHIL NFR BLD: 0.3 % (ref 0–8)
ERYTHROCYTE [DISTWIDTH] IN BLOOD BY AUTOMATED COUNT: 12.8 % (ref 11.5–14.5)
EST. GFR  (AFRICAN AMERICAN): 49 ML/MIN/1.73 M^2
EST. GFR  (NON AFRICAN AMERICAN): 43 ML/MIN/1.73 M^2
GLUCOSE SERPL-MCNC: 263 MG/DL (ref 70–110)
GLUCOSE UR QL STRIP: ABNORMAL
HCT VFR BLD AUTO: 34.1 % (ref 40–54)
HGB BLD-MCNC: 10.9 G/DL (ref 14–18)
HGB UR QL STRIP: NEGATIVE
IMM GRANULOCYTES # BLD AUTO: 0.02 K/UL (ref 0–0.04)
IMM GRANULOCYTES NFR BLD AUTO: 0.3 % (ref 0–0.5)
KETONES UR QL STRIP: ABNORMAL
LACTATE SERPL-SCNC: 1.7 MMOL/L (ref 0.5–2.2)
LEUKOCYTE ESTERASE UR QL STRIP: NEGATIVE
LYMPHOCYTES # BLD AUTO: 0.6 K/UL (ref 1–4.8)
LYMPHOCYTES NFR BLD: 9.4 % (ref 18–48)
MCH RBC QN AUTO: 29 PG (ref 27–31)
MCHC RBC AUTO-ENTMCNC: 32 G/DL (ref 32–36)
MCV RBC AUTO: 91 FL (ref 82–98)
MONOCYTES # BLD AUTO: 0.5 K/UL (ref 0.3–1)
MONOCYTES NFR BLD: 8.6 % (ref 4–15)
NEUTROPHILS # BLD AUTO: 5.1 K/UL (ref 1.8–7.7)
NEUTROPHILS NFR BLD: 81.2 % (ref 38–73)
NITRITE UR QL STRIP: NEGATIVE
NRBC BLD-RTO: 0 /100 WBC
PH UR STRIP: 8 [PH] (ref 5–8)
PLATELET # BLD AUTO: 184 K/UL (ref 150–450)
PMV BLD AUTO: 10.8 FL (ref 9.2–12.9)
POCT GLUCOSE: 151 MG/DL (ref 70–110)
POCT GLUCOSE: 252 MG/DL (ref 70–110)
POTASSIUM SERPL-SCNC: 4.8 MMOL/L (ref 3.5–5.1)
PROCALCITONIN SERPL IA-MCNC: 0.03 NG/ML
PROT SERPL-MCNC: 6.1 G/DL (ref 6–8.4)
PROT UR QL STRIP: NEGATIVE
RBC # BLD AUTO: 3.76 M/UL (ref 4.6–6.2)
SODIUM SERPL-SCNC: 136 MMOL/L (ref 136–145)
SP GR UR STRIP: 1.02 (ref 1–1.03)
URN SPEC COLLECT METH UR: ABNORMAL
UROBILINOGEN UR STRIP-ACNC: NEGATIVE EU/DL
WBC # BLD AUTO: 6.26 K/UL (ref 3.9–12.7)

## 2021-10-29 PROCEDURE — 96361 HYDRATE IV INFUSION ADD-ON: CPT | Mod: HCNC

## 2021-10-29 PROCEDURE — 99285 EMERGENCY DEPT VISIT HI MDM: CPT | Mod: 25,HCNC

## 2021-10-29 PROCEDURE — 84145 PROCALCITONIN (PCT): CPT | Mod: HCNC | Performed by: EMERGENCY MEDICINE

## 2021-10-29 PROCEDURE — 85025 COMPLETE CBC W/AUTO DIFF WBC: CPT | Mod: HCNC | Performed by: EMERGENCY MEDICINE

## 2021-10-29 PROCEDURE — 80053 COMPREHEN METABOLIC PANEL: CPT | Mod: HCNC | Performed by: EMERGENCY MEDICINE

## 2021-10-29 PROCEDURE — 93010 EKG 12-LEAD: ICD-10-PCS | Mod: HCNC,,, | Performed by: INTERNAL MEDICINE

## 2021-10-29 PROCEDURE — 25000003 PHARM REV CODE 250: Mod: HCNC | Performed by: EMERGENCY MEDICINE

## 2021-10-29 PROCEDURE — 63700000 PHARM REV CODE 250 ALT 637 W/O HCPCS: Mod: HCNC | Performed by: EMERGENCY MEDICINE

## 2021-10-29 PROCEDURE — 81003 URINALYSIS AUTO W/O SCOPE: CPT | Mod: HCNC | Performed by: EMERGENCY MEDICINE

## 2021-10-29 PROCEDURE — 93005 ELECTROCARDIOGRAM TRACING: CPT | Mod: HCNC

## 2021-10-29 PROCEDURE — 96360 HYDRATION IV INFUSION INIT: CPT | Mod: HCNC

## 2021-10-29 PROCEDURE — 82962 GLUCOSE BLOOD TEST: CPT | Mod: HCNC

## 2021-10-29 PROCEDURE — 83605 ASSAY OF LACTIC ACID: CPT | Mod: HCNC | Performed by: EMERGENCY MEDICINE

## 2021-10-29 PROCEDURE — 93010 ELECTROCARDIOGRAM REPORT: CPT | Mod: HCNC,,, | Performed by: INTERNAL MEDICINE

## 2021-10-29 PROCEDURE — 87040 BLOOD CULTURE FOR BACTERIA: CPT | Mod: 59,HCNC | Performed by: EMERGENCY MEDICINE

## 2021-10-29 RX ORDER — AMOXICILLIN AND CLAVULANATE POTASSIUM 875; 125 MG/1; MG/1
1 TABLET, FILM COATED ORAL 2 TIMES DAILY
Qty: 20 TABLET | Refills: 0 | Status: SHIPPED | OUTPATIENT
Start: 2021-10-29 | End: 2021-11-08

## 2021-10-29 RX ORDER — ALBUTEROL SULFATE 90 UG/1
AEROSOL, METERED RESPIRATORY (INHALATION)
COMMUNITY
End: 2024-01-09

## 2021-10-29 RX ORDER — ACETAMINOPHEN 500 MG
1000 TABLET ORAL DAILY
COMMUNITY
End: 2022-12-07

## 2021-10-29 RX ORDER — AMOXICILLIN AND CLAVULANATE POTASSIUM 875; 125 MG/1; MG/1
1 TABLET, FILM COATED ORAL
Status: COMPLETED | OUTPATIENT
Start: 2021-10-29 | End: 2021-10-29

## 2021-10-29 RX ORDER — AZITHROMYCIN 250 MG/1
250 TABLET, FILM COATED ORAL DAILY
Qty: 4 TABLET | Refills: 0 | Status: SHIPPED | OUTPATIENT
Start: 2021-10-29 | End: 2021-11-02

## 2021-10-29 RX ORDER — AZITHROMYCIN 250 MG/1
500 TABLET, FILM COATED ORAL
Status: COMPLETED | OUTPATIENT
Start: 2021-10-29 | End: 2021-10-29

## 2021-10-29 RX ADMIN — SODIUM CHLORIDE 1000 ML: 0.9 INJECTION, SOLUTION INTRAVENOUS at 11:10

## 2021-10-29 RX ADMIN — AZITHROMYCIN MONOHYDRATE 500 MG: 250 TABLET ORAL at 02:10

## 2021-10-29 RX ADMIN — AMOXICILLIN AND CLAVULANATE POTASSIUM 1 TABLET: 875; 125 TABLET, FILM COATED ORAL at 02:10

## 2021-11-03 LAB
BACTERIA BLD CULT: NORMAL
BACTERIA BLD CULT: NORMAL

## 2021-11-30 DIAGNOSIS — I15.2 HYPERTENSION ASSOCIATED WITH DIABETES: ICD-10-CM

## 2021-11-30 DIAGNOSIS — E11.59 HYPERTENSION ASSOCIATED WITH DIABETES: ICD-10-CM

## 2021-11-30 DIAGNOSIS — E78.5 HYPERLIPIDEMIA ASSOCIATED WITH TYPE 2 DIABETES MELLITUS: ICD-10-CM

## 2021-11-30 DIAGNOSIS — I20.9 AP (ANGINA PECTORIS): ICD-10-CM

## 2021-11-30 DIAGNOSIS — E11.69 HYPERLIPIDEMIA ASSOCIATED WITH TYPE 2 DIABETES MELLITUS: ICD-10-CM

## 2021-12-01 RX ORDER — AMLODIPINE BESYLATE 10 MG/1
TABLET ORAL
Qty: 90 TABLET | Refills: 0 | Status: SHIPPED | OUTPATIENT
Start: 2021-12-01 | End: 2022-05-11

## 2021-12-01 RX ORDER — METOPROLOL SUCCINATE 25 MG/1
TABLET, EXTENDED RELEASE ORAL
Qty: 90 TABLET | Refills: 0 | Status: SHIPPED | OUTPATIENT
Start: 2021-12-01 | End: 2022-05-11

## 2021-12-01 RX ORDER — ISOSORBIDE MONONITRATE 60 MG/1
TABLET, EXTENDED RELEASE ORAL
Qty: 90 TABLET | Refills: 0 | Status: SHIPPED | OUTPATIENT
Start: 2021-12-01 | End: 2022-05-11

## 2021-12-01 RX ORDER — ROSUVASTATIN CALCIUM 40 MG/1
TABLET, COATED ORAL
Qty: 90 TABLET | Refills: 0 | Status: SHIPPED | OUTPATIENT
Start: 2021-12-01 | End: 2022-05-11

## 2021-12-03 ENCOUNTER — PATIENT OUTREACH (OUTPATIENT)
Dept: ADMINISTRATIVE | Facility: HOSPITAL | Age: 81
End: 2021-12-03
Payer: MEDICARE

## 2021-12-06 ENCOUNTER — OFFICE VISIT (OUTPATIENT)
Dept: CARDIOLOGY | Facility: CLINIC | Age: 81
End: 2021-12-06
Payer: MEDICARE

## 2021-12-06 VITALS
DIASTOLIC BLOOD PRESSURE: 68 MMHG | BODY MASS INDEX: 25.25 KG/M2 | HEART RATE: 75 BPM | SYSTOLIC BLOOD PRESSURE: 142 MMHG | HEIGHT: 70 IN | WEIGHT: 176.38 LBS | OXYGEN SATURATION: 97 %

## 2021-12-06 DIAGNOSIS — E78.5 HYPERLIPIDEMIA ASSOCIATED WITH TYPE 2 DIABETES MELLITUS: ICD-10-CM

## 2021-12-06 DIAGNOSIS — R94.31 ABNORMAL ECG: ICD-10-CM

## 2021-12-06 DIAGNOSIS — R94.39 ABNORMAL NUCLEAR STRESS TEST: ICD-10-CM

## 2021-12-06 DIAGNOSIS — E11.69 HYPERLIPIDEMIA ASSOCIATED WITH TYPE 2 DIABETES MELLITUS: ICD-10-CM

## 2021-12-06 DIAGNOSIS — I25.9 CHRONIC ISCHEMIC HEART DISEASE: Chronic | ICD-10-CM

## 2021-12-06 DIAGNOSIS — I20.9 AP (ANGINA PECTORIS): ICD-10-CM

## 2021-12-06 DIAGNOSIS — I25.708 CORONARY ARTERY DISEASE OF BYPASS GRAFT OF NATIVE HEART WITH STABLE ANGINA PECTORIS: Chronic | ICD-10-CM

## 2021-12-06 DIAGNOSIS — E11.51 DIABETES MELLITUS TYPE 2 WITH PERIPHERAL ARTERY DISEASE: ICD-10-CM

## 2021-12-06 DIAGNOSIS — I15.2 HYPERTENSION ASSOCIATED WITH DIABETES: ICD-10-CM

## 2021-12-06 DIAGNOSIS — I73.9 CLAUDICATION IN PERIPHERAL VASCULAR DISEASE: ICD-10-CM

## 2021-12-06 DIAGNOSIS — I70.0 THORACIC AORTA ATHEROSCLEROSIS: ICD-10-CM

## 2021-12-06 DIAGNOSIS — I25.5 ISCHEMIC CARDIOMYOPATHY: Chronic | ICD-10-CM

## 2021-12-06 DIAGNOSIS — I35.1 NONRHEUMATIC AORTIC VALVE INSUFFICIENCY: ICD-10-CM

## 2021-12-06 DIAGNOSIS — I45.10 RBBB: Chronic | ICD-10-CM

## 2021-12-06 DIAGNOSIS — I50.42 CHRONIC COMBINED SYSTOLIC AND DIASTOLIC CONGESTIVE HEART FAILURE: ICD-10-CM

## 2021-12-06 DIAGNOSIS — I20.89 CHRONIC STABLE ANGINA: ICD-10-CM

## 2021-12-06 DIAGNOSIS — I25.10 CAD, MULTIPLE VESSEL: Primary | ICD-10-CM

## 2021-12-06 DIAGNOSIS — E11.59 HYPERTENSION ASSOCIATED WITH DIABETES: ICD-10-CM

## 2021-12-06 DIAGNOSIS — I65.23 ASYMPTOMATIC STENOSIS OF BOTH CAROTID ARTERIES WITHOUT INFARCTION: ICD-10-CM

## 2021-12-06 PROCEDURE — 99499 RISK ADDL DX/OHS AUDIT: ICD-10-PCS | Mod: S$GLB,,, | Performed by: INTERNAL MEDICINE

## 2021-12-06 PROCEDURE — 99499 UNLISTED E&M SERVICE: CPT | Mod: S$GLB,,, | Performed by: INTERNAL MEDICINE

## 2021-12-06 PROCEDURE — 99999 PR PBB SHADOW E&M-EST. PATIENT-LVL III: CPT | Mod: PBBFAC,HCNC,, | Performed by: INTERNAL MEDICINE

## 2021-12-06 PROCEDURE — 99214 OFFICE O/P EST MOD 30 MIN: CPT | Mod: HCNC,S$GLB,, | Performed by: INTERNAL MEDICINE

## 2021-12-06 PROCEDURE — 99214 PR OFFICE/OUTPT VISIT, EST, LEVL IV, 30-39 MIN: ICD-10-PCS | Mod: HCNC,S$GLB,, | Performed by: INTERNAL MEDICINE

## 2021-12-06 PROCEDURE — 3072F PR LOW RISK FOR RETINOPATHY: ICD-10-PCS | Mod: HCNC,CPTII,S$GLB, | Performed by: INTERNAL MEDICINE

## 2021-12-06 PROCEDURE — 3072F LOW RISK FOR RETINOPATHY: CPT | Mod: HCNC,CPTII,S$GLB, | Performed by: INTERNAL MEDICINE

## 2021-12-06 PROCEDURE — 99999 PR PBB SHADOW E&M-EST. PATIENT-LVL III: ICD-10-PCS | Mod: PBBFAC,HCNC,, | Performed by: INTERNAL MEDICINE

## 2021-12-08 ENCOUNTER — LAB VISIT (OUTPATIENT)
Dept: LAB | Facility: HOSPITAL | Age: 81
End: 2021-12-08
Attending: INTERNAL MEDICINE
Payer: MEDICARE

## 2021-12-08 DIAGNOSIS — E11.69 HYPERLIPIDEMIA ASSOCIATED WITH TYPE 2 DIABETES MELLITUS: ICD-10-CM

## 2021-12-08 DIAGNOSIS — I50.42 CHRONIC COMBINED SYSTOLIC AND DIASTOLIC CONGESTIVE HEART FAILURE: ICD-10-CM

## 2021-12-08 DIAGNOSIS — E11.51 DIABETES MELLITUS TYPE 2 WITH PERIPHERAL ARTERY DISEASE: ICD-10-CM

## 2021-12-08 DIAGNOSIS — E78.5 HYPERLIPIDEMIA ASSOCIATED WITH TYPE 2 DIABETES MELLITUS: ICD-10-CM

## 2021-12-08 DIAGNOSIS — I25.10 CAD, MULTIPLE VESSEL: ICD-10-CM

## 2021-12-08 LAB
ALBUMIN SERPL BCP-MCNC: 4.3 G/DL (ref 3.5–5.2)
ALP SERPL-CCNC: 66 U/L (ref 55–135)
ALT SERPL W/O P-5'-P-CCNC: 21 U/L (ref 10–44)
ANION GAP SERPL CALC-SCNC: 18 MMOL/L (ref 8–16)
AST SERPL-CCNC: 29 U/L (ref 10–40)
BASOPHILS # BLD AUTO: 0.02 K/UL (ref 0–0.2)
BASOPHILS NFR BLD: 0.2 % (ref 0–1.9)
BILIRUB SERPL-MCNC: 0.6 MG/DL (ref 0.1–1)
BNP SERPL-MCNC: 126 PG/ML (ref 0–99)
BUN SERPL-MCNC: 16 MG/DL (ref 8–23)
CALCIUM SERPL-MCNC: 10.3 MG/DL (ref 8.7–10.5)
CHLORIDE SERPL-SCNC: 105 MMOL/L (ref 95–110)
CHOLEST SERPL-MCNC: 82 MG/DL (ref 120–199)
CHOLEST/HDLC SERPL: 1.7 {RATIO} (ref 2–5)
CO2 SERPL-SCNC: 19 MMOL/L (ref 23–29)
CREAT SERPL-MCNC: 1.4 MG/DL (ref 0.5–1.4)
DIFFERENTIAL METHOD: ABNORMAL
EOSINOPHIL # BLD AUTO: 0.5 K/UL (ref 0–0.5)
EOSINOPHIL NFR BLD: 5.5 % (ref 0–8)
ERYTHROCYTE [DISTWIDTH] IN BLOOD BY AUTOMATED COUNT: 13.1 % (ref 11.5–14.5)
EST. GFR  (AFRICAN AMERICAN): 53.7 ML/MIN/1.73 M^2
EST. GFR  (NON AFRICAN AMERICAN): 46.5 ML/MIN/1.73 M^2
ESTIMATED AVG GLUCOSE: 169 MG/DL (ref 68–131)
GLUCOSE SERPL-MCNC: 125 MG/DL (ref 70–110)
HBA1C MFR BLD: 7.5 % (ref 4–5.6)
HCT VFR BLD AUTO: 39.5 % (ref 40–54)
HDLC SERPL-MCNC: 48 MG/DL (ref 40–75)
HDLC SERPL: 58.5 % (ref 20–50)
HGB BLD-MCNC: 12.3 G/DL (ref 14–18)
IMM GRANULOCYTES # BLD AUTO: 0.02 K/UL (ref 0–0.04)
IMM GRANULOCYTES NFR BLD AUTO: 0.2 % (ref 0–0.5)
LDLC SERPL CALC-MCNC: 18.8 MG/DL (ref 63–159)
LYMPHOCYTES # BLD AUTO: 2.7 K/UL (ref 1–4.8)
LYMPHOCYTES NFR BLD: 31.3 % (ref 18–48)
MCH RBC QN AUTO: 28 PG (ref 27–31)
MCHC RBC AUTO-ENTMCNC: 31.1 G/DL (ref 32–36)
MCV RBC AUTO: 90 FL (ref 82–98)
MONOCYTES # BLD AUTO: 0.6 K/UL (ref 0.3–1)
MONOCYTES NFR BLD: 7.5 % (ref 4–15)
NEUTROPHILS # BLD AUTO: 4.7 K/UL (ref 1.8–7.7)
NEUTROPHILS NFR BLD: 55.3 % (ref 38–73)
NONHDLC SERPL-MCNC: 34 MG/DL
NRBC BLD-RTO: 0 /100 WBC
PLATELET # BLD AUTO: 240 K/UL (ref 150–450)
PMV BLD AUTO: 11.7 FL (ref 9.2–12.9)
POTASSIUM SERPL-SCNC: 5.5 MMOL/L (ref 3.5–5.1)
PROT SERPL-MCNC: 7 G/DL (ref 6–8.4)
RBC # BLD AUTO: 4.39 M/UL (ref 4.6–6.2)
SODIUM SERPL-SCNC: 142 MMOL/L (ref 136–145)
TRIGL SERPL-MCNC: 76 MG/DL (ref 30–150)
WBC # BLD AUTO: 8.49 K/UL (ref 3.9–12.7)

## 2021-12-08 PROCEDURE — 83880 ASSAY OF NATRIURETIC PEPTIDE: CPT | Mod: HCNC | Performed by: INTERNAL MEDICINE

## 2021-12-08 PROCEDURE — 80053 COMPREHEN METABOLIC PANEL: CPT | Mod: HCNC | Performed by: INTERNAL MEDICINE

## 2021-12-08 PROCEDURE — 36415 COLL VENOUS BLD VENIPUNCTURE: CPT | Mod: HCNC,PO | Performed by: INTERNAL MEDICINE

## 2021-12-08 PROCEDURE — 83036 HEMOGLOBIN GLYCOSYLATED A1C: CPT | Mod: HCNC | Performed by: INTERNAL MEDICINE

## 2021-12-08 PROCEDURE — 85025 COMPLETE CBC W/AUTO DIFF WBC: CPT | Mod: HCNC | Performed by: INTERNAL MEDICINE

## 2021-12-08 PROCEDURE — 80061 LIPID PANEL: CPT | Mod: HCNC | Performed by: INTERNAL MEDICINE

## 2021-12-09 ENCOUNTER — TELEPHONE (OUTPATIENT)
Dept: CARDIOLOGY | Facility: CLINIC | Age: 81
End: 2021-12-09
Payer: MEDICARE

## 2021-12-09 DIAGNOSIS — E87.5 HYPERKALEMIA: ICD-10-CM

## 2021-12-09 DIAGNOSIS — E87.6 HYPOKALEMIA: Primary | ICD-10-CM

## 2021-12-14 ENCOUNTER — TELEPHONE (OUTPATIENT)
Dept: CARDIOLOGY | Facility: HOSPITAL | Age: 81
End: 2021-12-14
Payer: MEDICARE

## 2021-12-29 ENCOUNTER — LAB VISIT (OUTPATIENT)
Dept: LAB | Facility: HOSPITAL | Age: 81
End: 2021-12-29
Attending: INTERNAL MEDICINE
Payer: MEDICARE

## 2021-12-29 DIAGNOSIS — E87.5 HYPERKALEMIA: ICD-10-CM

## 2021-12-29 LAB
ALBUMIN SERPL BCP-MCNC: 4.1 G/DL (ref 3.5–5.2)
ALP SERPL-CCNC: 54 U/L (ref 55–135)
ALT SERPL W/O P-5'-P-CCNC: 20 U/L (ref 10–44)
ANION GAP SERPL CALC-SCNC: 12 MMOL/L (ref 8–16)
AST SERPL-CCNC: 20 U/L (ref 10–40)
BILIRUB SERPL-MCNC: 0.6 MG/DL (ref 0.1–1)
BUN SERPL-MCNC: 24 MG/DL (ref 8–23)
CALCIUM SERPL-MCNC: 10.5 MG/DL (ref 8.7–10.5)
CHLORIDE SERPL-SCNC: 107 MMOL/L (ref 95–110)
CO2 SERPL-SCNC: 22 MMOL/L (ref 23–29)
CREAT SERPL-MCNC: 1.3 MG/DL (ref 0.5–1.4)
EST. GFR  (AFRICAN AMERICAN): 58.7 ML/MIN/1.73 M^2
EST. GFR  (NON AFRICAN AMERICAN): 50.8 ML/MIN/1.73 M^2
GLUCOSE SERPL-MCNC: 109 MG/DL (ref 70–110)
POTASSIUM SERPL-SCNC: 4.4 MMOL/L (ref 3.5–5.1)
PROT SERPL-MCNC: 6.5 G/DL (ref 6–8.4)
SODIUM SERPL-SCNC: 141 MMOL/L (ref 136–145)

## 2021-12-29 PROCEDURE — 36415 COLL VENOUS BLD VENIPUNCTURE: CPT | Mod: HCNC | Performed by: INTERNAL MEDICINE

## 2021-12-29 PROCEDURE — 80053 COMPREHEN METABOLIC PANEL: CPT | Mod: HCNC | Performed by: INTERNAL MEDICINE

## 2022-01-03 ENCOUNTER — TELEPHONE (OUTPATIENT)
Dept: CARDIOLOGY | Facility: CLINIC | Age: 82
End: 2022-01-03
Payer: MEDICARE

## 2022-01-03 NOTE — TELEPHONE ENCOUNTER
Called patient to advise per Dr. Sadler      Please call pt.  Labs stable.  Potassium back to normal limits.  Continue current meds and f/u next scheduled appt in April.     Dr Sadler     No answer, lvm to return call

## 2022-01-03 NOTE — TELEPHONE ENCOUNTER
Please call pt.  Labs stable.  Potassium back to normal limits.  Continue current meds and f/u next scheduled appt in April.    Dr Sadler

## 2022-02-28 ENCOUNTER — TELEPHONE (OUTPATIENT)
Dept: UROLOGY | Facility: CLINIC | Age: 82
End: 2022-02-28
Payer: MEDICARE

## 2022-02-28 NOTE — TELEPHONE ENCOUNTER
Attempted to contact patient regarding rescheduling Dr. Contreras appointment as he will be out of the office. LVM with callback number for patient to return call.

## 2022-03-31 ENCOUNTER — PATIENT OUTREACH (OUTPATIENT)
Dept: ADMINISTRATIVE | Facility: OTHER | Age: 82
End: 2022-03-31
Payer: MEDICARE

## 2022-04-12 ENCOUNTER — OFFICE VISIT (OUTPATIENT)
Dept: CARDIOLOGY | Facility: CLINIC | Age: 82
End: 2022-04-12
Payer: MEDICARE

## 2022-04-12 VITALS
HEIGHT: 70 IN | RESPIRATION RATE: 16 BRPM | SYSTOLIC BLOOD PRESSURE: 120 MMHG | OXYGEN SATURATION: 99 % | WEIGHT: 169.56 LBS | BODY MASS INDEX: 24.27 KG/M2 | HEART RATE: 76 BPM | DIASTOLIC BLOOD PRESSURE: 64 MMHG

## 2022-04-12 DIAGNOSIS — R94.31 ABNORMAL ECG: ICD-10-CM

## 2022-04-12 DIAGNOSIS — I25.708 CORONARY ARTERY DISEASE OF BYPASS GRAFT OF NATIVE HEART WITH STABLE ANGINA PECTORIS: Primary | Chronic | ICD-10-CM

## 2022-04-12 DIAGNOSIS — I20.9 AP (ANGINA PECTORIS): ICD-10-CM

## 2022-04-12 DIAGNOSIS — N18.31 CHRONIC RENAL IMPAIRMENT, STAGE 3A: ICD-10-CM

## 2022-04-12 DIAGNOSIS — E11.51 DIABETES MELLITUS TYPE 2 WITH PERIPHERAL ARTERY DISEASE: ICD-10-CM

## 2022-04-12 DIAGNOSIS — I45.10 RBBB: Chronic | ICD-10-CM

## 2022-04-12 DIAGNOSIS — I25.9 CHRONIC ISCHEMIC HEART DISEASE: Chronic | ICD-10-CM

## 2022-04-12 DIAGNOSIS — I35.1 NONRHEUMATIC AORTIC VALVE INSUFFICIENCY: ICD-10-CM

## 2022-04-12 DIAGNOSIS — Z95.1 S/P CABG (CORONARY ARTERY BYPASS GRAFT): ICD-10-CM

## 2022-04-12 DIAGNOSIS — I73.9 PERIPHERAL VASCULAR DISEASE: ICD-10-CM

## 2022-04-12 DIAGNOSIS — E11.59 HYPERTENSION ASSOCIATED WITH DIABETES: ICD-10-CM

## 2022-04-12 DIAGNOSIS — E78.5 HYPERLIPIDEMIA ASSOCIATED WITH TYPE 2 DIABETES MELLITUS: ICD-10-CM

## 2022-04-12 DIAGNOSIS — I50.32 CHRONIC DIASTOLIC HEART FAILURE: ICD-10-CM

## 2022-04-12 DIAGNOSIS — E11.69 HYPERLIPIDEMIA ASSOCIATED WITH TYPE 2 DIABETES MELLITUS: ICD-10-CM

## 2022-04-12 DIAGNOSIS — I65.23 ASYMPTOMATIC STENOSIS OF BOTH CAROTID ARTERIES WITHOUT INFARCTION: ICD-10-CM

## 2022-04-12 DIAGNOSIS — I25.5 ISCHEMIC CARDIOMYOPATHY: Chronic | ICD-10-CM

## 2022-04-12 DIAGNOSIS — I15.2 HYPERTENSION ASSOCIATED WITH DIABETES: ICD-10-CM

## 2022-04-12 PROCEDURE — 99499 UNLISTED E&M SERVICE: CPT | Mod: S$GLB,,, | Performed by: INTERNAL MEDICINE

## 2022-04-12 PROCEDURE — 3288F FALL RISK ASSESSMENT DOCD: CPT | Mod: CPTII,S$GLB,, | Performed by: INTERNAL MEDICINE

## 2022-04-12 PROCEDURE — 1160F PR REVIEW ALL MEDS BY PRESCRIBER/CLIN PHARMACIST DOCUMENTED: ICD-10-PCS | Mod: CPTII,S$GLB,, | Performed by: INTERNAL MEDICINE

## 2022-04-12 PROCEDURE — 1101F PR PT FALLS ASSESS DOC 0-1 FALLS W/OUT INJ PAST YR: ICD-10-PCS | Mod: CPTII,S$GLB,, | Performed by: INTERNAL MEDICINE

## 2022-04-12 PROCEDURE — 1126F AMNT PAIN NOTED NONE PRSNT: CPT | Mod: CPTII,S$GLB,, | Performed by: INTERNAL MEDICINE

## 2022-04-12 PROCEDURE — 99214 OFFICE O/P EST MOD 30 MIN: CPT | Mod: S$GLB,,, | Performed by: INTERNAL MEDICINE

## 2022-04-12 PROCEDURE — 99999 PR PBB SHADOW E&M-EST. PATIENT-LVL III: CPT | Mod: PBBFAC,,, | Performed by: INTERNAL MEDICINE

## 2022-04-12 PROCEDURE — 1126F PR PAIN SEVERITY QUANTIFIED, NO PAIN PRESENT: ICD-10-PCS | Mod: CPTII,S$GLB,, | Performed by: INTERNAL MEDICINE

## 2022-04-12 PROCEDURE — 99214 PR OFFICE/OUTPT VISIT, EST, LEVL IV, 30-39 MIN: ICD-10-PCS | Mod: S$GLB,,, | Performed by: INTERNAL MEDICINE

## 2022-04-12 PROCEDURE — 3078F DIAST BP <80 MM HG: CPT | Mod: CPTII,S$GLB,, | Performed by: INTERNAL MEDICINE

## 2022-04-12 PROCEDURE — 3074F SYST BP LT 130 MM HG: CPT | Mod: CPTII,S$GLB,, | Performed by: INTERNAL MEDICINE

## 2022-04-12 PROCEDURE — 3051F PR MOST RECENT HEMOGLOBIN A1C LEVEL 7.0 - < 8.0%: ICD-10-PCS | Mod: CPTII,S$GLB,, | Performed by: INTERNAL MEDICINE

## 2022-04-12 PROCEDURE — 3051F HG A1C>EQUAL 7.0%<8.0%: CPT | Mod: CPTII,S$GLB,, | Performed by: INTERNAL MEDICINE

## 2022-04-12 PROCEDURE — 99999 PR PBB SHADOW E&M-EST. PATIENT-LVL III: ICD-10-PCS | Mod: PBBFAC,,, | Performed by: INTERNAL MEDICINE

## 2022-04-12 PROCEDURE — 99499 RISK ADDL DX/OHS AUDIT: ICD-10-PCS | Mod: S$GLB,,, | Performed by: INTERNAL MEDICINE

## 2022-04-12 PROCEDURE — 1159F PR MEDICATION LIST DOCUMENTED IN MEDICAL RECORD: ICD-10-PCS | Mod: CPTII,S$GLB,, | Performed by: INTERNAL MEDICINE

## 2022-04-12 PROCEDURE — 3074F PR MOST RECENT SYSTOLIC BLOOD PRESSURE < 130 MM HG: ICD-10-PCS | Mod: CPTII,S$GLB,, | Performed by: INTERNAL MEDICINE

## 2022-04-12 PROCEDURE — 3288F PR FALLS RISK ASSESSMENT DOCUMENTED: ICD-10-PCS | Mod: CPTII,S$GLB,, | Performed by: INTERNAL MEDICINE

## 2022-04-12 PROCEDURE — 1101F PT FALLS ASSESS-DOCD LE1/YR: CPT | Mod: CPTII,S$GLB,, | Performed by: INTERNAL MEDICINE

## 2022-04-12 PROCEDURE — 1159F MED LIST DOCD IN RCRD: CPT | Mod: CPTII,S$GLB,, | Performed by: INTERNAL MEDICINE

## 2022-04-12 PROCEDURE — 3078F PR MOST RECENT DIASTOLIC BLOOD PRESSURE < 80 MM HG: ICD-10-PCS | Mod: CPTII,S$GLB,, | Performed by: INTERNAL MEDICINE

## 2022-04-12 PROCEDURE — 1160F RVW MEDS BY RX/DR IN RCRD: CPT | Mod: CPTII,S$GLB,, | Performed by: INTERNAL MEDICINE

## 2022-04-12 NOTE — PROGRESS NOTES
Subjective:    Patient ID:  Nithin Pino is a 81 y.o. male who presents for evaluation of Hypertension, Hyperlipidemia, Coronary Artery Disease, Congestive Heart Failure, and Peripheral Arterial Disease      HPI Pt presents for eval.  His current medical conditions include CAD, RBBB, diastolic CHF, ICM, MR, TR, AI, CRI, carotid artery disease, COPD, HTN, PAD, DM.   Nonsmoker.   His past history is pertinent for following:  S/p PTCA 1994 for AMI.   Stress MPI 5/11 showed evidence of multivessel CAD (had 2 years of angina) which was confirmed on LHC (significant left main/LAD/ramus, diag disease and 100%  RCA). EF 35% on LV gram.   S/p successful CABG 5/11 (LIMA-LAD, SVG-DIAG, SVG-RAMUS). He had post-op a flutter which resolved.   S/p repeat LHC 2/16 for abnl stress mpi. He underwent atherectomy and PCI of left main and ramus with TUCKER x 2 overall. His svg-ramus had occluded. LIMA-LAD was patent, patent SVG-D1,  RCA with left - right collaterals, EF 45%.    Has chronic R SFA .   Pt has declined runoff numerous times for further evaluation of his PAD in past.   Echo 6/18 normal EF.  RENZO 6/18 R LE 0.86, L LE 0.96  Pt admitted late Dec 2020 with Covid pneumonia, cp sxs. Chart reviewed in detail.  Echo 12/20 normal LV function, LVH.  Troponin leak noted.  Cards consult done and troponin thought to be supply/demand ischemia from Covid infection/pneumonia.  Readmitted few days later with recurrent cp, low BP, dehydration and released.  Troponin leak ranged 0.10 to 0.50 range on both hospitalizations.  Stress MPI 3/21 apical, anteroapical scar, inferoapical scar with some residual ischemia, EF 44%.  Carotid u/s 12/20: no significant blockage noted.   ecg 10/29/21 NSR, RBBB, old inferior/anteroseptal infarct.  Echo 8/21: normal EF, DD, mild MR/TR/AI.  RENZO 8/21: R LE 0.73, L LE 0.71  Now here.  CAD seems clinically stable.  Angina controlled on current med tx.  No active CP sxs.  Denies CP.  No  dyspnea/pnd/orthopnea.  Mild chronic leg edema, stable.  HTN is controlled.  PAD is stable.  Denies leg pain with walking.  Lipids controlled, on statin tx.  DM HGAIC above goal[facility].  CRI stable on last labs.  Compliant w meds.        Past Medical History:   Diagnosis Date    Abnormal brain MRI 1/21/2018    Chronic microvascular ischemia changes per MRI July 9, 2007 in [facility]    Abnormal ECG 10/31/2013    Abnormal stress test 1/28/2016    Alcohol dependence     States alcohol abuse ended in 1994    AP (angina pectoris) 1/28/2016    Asthma     Carotid artery occlusion     Cataract     Chronic combined systolic and diastolic congestive heart failure 5/24/2021    Chronic diastolic heart failure 10/31/2013    Chronic ischemic heart disease 10/31/2013    CKD (chronic kidney disease) stage 3, GFR 30-59 ml/min 11/4/2015    Coronary artery disease     DM (diabetes mellitus) 2013    BS doesn't check 03/28/2017     DM (diabetes mellitus) 2011    BS doesn' check  04/03/2019    Heart valve regurgitation 11/4/2015    Echocardiogram 2/15/16   1 - Concentric hypertrophy.    2 - Normal left ventricular systolic function (EF 60-65%).    3 - Normal left ventricular diastolic function.    4 - Mild left atrial enlargement.    5 - Normal right ventricular systolic function .    6 - Trivial to mild aortic regurgitation.    7 - Trivial to mild mitral regurgitation.  10 - Trivial to mild pulmonic regurgitation.     Hematuria 6/25/2020    History of alcohol abuse 1/22/2018    Patient denies drinking to excess since 1994.    History of atherectomy 1/21/2018 2/2016 [facility]    History of chronic kidney disease 1/22/2018    History of CKD 3    History of PTCA 10/31/2013    History of PTCA 3/9/2016    Hyperlipidemia     Hypertension     Insomnia 6/17/2013    Ischemic cardiomyopathy 10/31/2013    Long term (current) use of antithrombotics/antiplatelets 1/21/2018    Myocardial infarction     Old MI  (myocardial infarction) 1994    Peripheral vascular disease     Polyneuropathy     RBBB 10/31/2013    S/P CABG (coronary artery bypass graft) 10/31/2013    Shortness of breath 10/31/2013    Tobacco dependence     resolved    Trouble in sleeping     Type 2 diabetes with peripheral circulatory disorder, controlled     Wears glasses        Current Outpatient Medications   Medication Instructions    albuterol (PROVENTIL/VENTOLIN HFA) 90 mcg/actuation inhaler albuterol sulfate Take No date recorded No form recorded No frequency recorded No route recorded No set duration recorded No set duration amount recorded active No dosage strength recorded No dosage strength units of measure recorded    amLODIPine (NORVASC) 10 MG tablet TAKE 1 TABLET EVERY DAY    ascorbic acid (vitamin C) (VITAMIN C) 500 mg, Oral, 2 times daily    aspirin 81 mg, Oral, Daily, 1 Tablet, Chewable Oral Every day    BLOOD-GLUCOSE METER (TRUERESULT BLOOD GLUCOSE SYSTM MISC) Misc.(Non-Drug; Combo Route)    cholecalciferol (vitamin D3) 5,000 Units, Oral, Daily    EMBRACE PRO TEST STRIPS Strp CHECK BLOOD SUGAR TWICE DAILY.    finasteride (PROSCAR) 5 mg, Oral, Daily    isosorbide mononitrate (IMDUR) 60 MG 24 hr tablet TAKE 1 TABLET EVERY DAY    LANTUS SOLOSTAR U-100 INSULIN 28 Units, Subcutaneous, Daily    losartan (COZAAR) 100 mg, Oral, Daily    metFORMIN (GLUCOPHAGE) 1,000 mg, Oral, 2 times daily with meals    metoprolol succinate (TOPROL-XL) 25 MG 24 hr tablet TAKE 1 TABLET EVERY EVENING    rosuvastatin (CRESTOR) 40 MG Tab TAKE 1 TABLET EVERY EVENING    solifenacin (VESICARE) 10 mg, Oral, Daily    tamsulosin (FLOMAX) 0.4 mg, Oral, Daily    TRUEPLUS LANCETS 26 gauge Misc CHECK BLOOD SUGAR TWICE DAILY.    TRUERESULT BLOOD GLUCOSE SYSTM kit CHECK BLOOD SUGAR TWICE DAILY.    TRULICITY 1.5 mg, Subcutaneous, Weekly         Review of Systems   Constitutional: Negative.   HENT: Negative.    Eyes: Negative.    Cardiovascular: Positive  "for leg swelling.   Respiratory: Negative.    Endocrine: Negative.    Hematologic/Lymphatic: Negative.    Skin: Negative.    Musculoskeletal: Positive for arthritis.   Gastrointestinal: Negative.    Genitourinary: Negative.    Neurological: Negative.    Psychiatric/Behavioral: Negative.    Allergic/Immunologic: Negative.        /64 (BP Location: Left arm, Patient Position: Sitting, BP Method: Medium (Manual))   Pulse 76   Resp 16   Ht 5' 10" (1.778 m)   Wt 76.9 kg (169 lb 8.5 oz)   SpO2 99%   BMI 24.33 kg/m²     Wt Readings from Last 3 Encounters:   04/12/22 76.9 kg (169 lb 8.5 oz)   12/06/21 80 kg (176 lb 5.9 oz)   09/01/21 74.5 kg (164 lb 3.9 oz)     Temp Readings from Last 3 Encounters:   10/29/21 99.1 °F (37.3 °C) (Oral)   09/01/21 98.1 °F (36.7 °C)   03/24/21 98.4 °F (36.9 °C) (Tympanic)     BP Readings from Last 3 Encounters:   04/12/22 120/64   12/06/21 (!) 142/68   10/29/21 (!) 159/69     Pulse Readings from Last 3 Encounters:   04/12/22 76   12/06/21 75   10/29/21 84          Objective:    Physical Exam  Vitals and nursing note reviewed.   Constitutional:       Appearance: He is well-developed.   HENT:      Head: Normocephalic.   Neck:      Thyroid: No thyromegaly.      Vascular: No carotid bruit or JVD.   Cardiovascular:      Rate and Rhythm: Normal rate and regular rhythm.      Pulses:           Radial pulses are 2+ on the right side and 2+ on the left side.      Heart sounds: S1 normal and S2 normal. Heart sounds not distant. No midsystolic click and no opening snap. No murmur heard.    No friction rub. No S3 or S4 sounds.   Pulmonary:      Effort: Pulmonary effort is normal.      Breath sounds: Normal breath sounds. No wheezing or rales.   Abdominal:      General: Bowel sounds are normal. There is no distension or abdominal bruit.      Palpations: Abdomen is soft. There is no mass.      Tenderness: There is no abdominal tenderness.   Musculoskeletal:      Cervical back: Normal range of " motion and neck supple.      Right lower leg: Edema (trace) present.      Left lower leg: Edema (trace) present.   Skin:     General: Skin is warm.   Neurological:      Mental Status: He is alert and oriented to person, place, and time.   Psychiatric:         Behavior: Behavior normal.       I have reviewed all pertinent labs and cardiac studies.    Component      Latest Ref Rng & Units 12/8/2021   BNP      0 - 99 pg/mL 126 (H)           Chemistry        Component Value Date/Time     12/29/2021 0702    K 4.4 12/29/2021 0702     12/29/2021 0702    CO2 22 (L) 12/29/2021 0702    BUN 24 (H) 12/29/2021 0702    CREATININE 1.3 12/29/2021 0702     12/29/2021 0702        Component Value Date/Time    CALCIUM 10.5 12/29/2021 0702    ALKPHOS 54 (L) 12/29/2021 0702    AST 20 12/29/2021 0702    ALT 20 12/29/2021 0702    BILITOT 0.6 12/29/2021 0702    ESTGFRAFRICA 58.7 (A) 12/29/2021 0702    EGFRNONAA 50.8 (A) 12/29/2021 0702          Lab Results   Component Value Date    WBC 8.49 12/08/2021    HGB 12.3 (L) 12/08/2021    HCT 39.5 (L) 12/08/2021    MCV 90 12/08/2021     12/08/2021       Lab Results   Component Value Date    HGBA1C 7.5 (H) 12/08/2021     Lab Results   Component Value Date    CHOL 82 (L) 12/08/2021    CHOL 77 (L) 06/17/2020    CHOL 114 (L) 04/22/2019     Lab Results   Component Value Date    HDL 48 12/08/2021    HDL 45 06/17/2020    HDL 42 04/22/2019     Lab Results   Component Value Date    LDLCALC 18.8 (L) 12/08/2021    LDLCALC 17.4 (L) 06/17/2020    LDLCALC 45.8 (L) 04/22/2019     Lab Results   Component Value Date    TRIG 76 12/08/2021    TRIG 73 06/17/2020    TRIG 131 04/22/2019     Lab Results   Component Value Date    CHOLHDL 58.5 (H) 12/08/2021    CHOLHDL 58.4 (H) 06/17/2020    CHOLHDL 36.8 04/22/2019           Assessment:       1. Coronary artery disease of bypass graft of native heart with stable angina pectoris    2. Abnormal ECG    3. AP (angina pectoris)    4. Chronic ischemic  heart disease    5. RBBB    6. Peripheral vascular disease    7. Ischemic cardiomyopathy    8. Nonrheumatic aortic valve insufficiency    9. Hypertension associated with diabetes    10. Hyperlipidemia associated with type 2 diabetes mellitus    11. Diabetes mellitus type 2 with peripheral artery disease    12. S/P CABG (coronary artery bypass graft)    13. Asymptomatic stenosis of both carotid arteries without infarction    14. Chronic renal impairment, stage 3a    15. Chronic diastolic heart failure         Plan:             Stable cardiovascular conditions at present time on current medical treatment.  Continue current CV meds.  Reviewed all tests and above medical conditions with patient in detail and formulated treatment plan.  Continue optimal medical treatment for cardiovascular conditions.  Continue med tx for CAD.  Continue med tx for PAD.  Cardiac low salt diet advised.  Daily exercise encouraged, as tolerated.  Maintaining healthy weight and weight loss goals (if needed) were discussed in clinic.  Need for BP control and HTN goals (if needed) were discussed and tx plan formulated.  Importance of optimal lipid control were discussed in detail as well as possible pharmacologic and lifestyle changes that may be needed.  Continue statin tx.  Lower DM HGAIC to goal.  [facility]  Monitor renal function.  F/u in 6 months.      I have reviewed all pertinent labs and cardiac studies independently. Plans and recommendations have been formulated under my direct supervision. All questions answered and patient voiced understanding.

## 2022-05-10 DIAGNOSIS — E11.69 HYPERLIPIDEMIA ASSOCIATED WITH TYPE 2 DIABETES MELLITUS: ICD-10-CM

## 2022-05-10 DIAGNOSIS — I15.2 HYPERTENSION ASSOCIATED WITH DIABETES: ICD-10-CM

## 2022-05-10 DIAGNOSIS — E78.5 HYPERLIPIDEMIA ASSOCIATED WITH TYPE 2 DIABETES MELLITUS: ICD-10-CM

## 2022-05-10 DIAGNOSIS — I20.9 AP (ANGINA PECTORIS): ICD-10-CM

## 2022-05-10 DIAGNOSIS — E11.59 HYPERTENSION ASSOCIATED WITH DIABETES: ICD-10-CM

## 2022-05-10 NOTE — TELEPHONE ENCOUNTER
Refill Routing Note   Medication(s) are not appropriate for processing by [facility] for the following reason(s):      - Indication is outside of scope for [facility]  - Patient has not been seen in over 15 months by PCP  - Patient has been seen in the ED/Hospital since the last PCP visit    [facility] action(s):  Defer Medication-related problems identified: Requires appointment        Medication reconciliation completed: No     Appointments  past 12m or future 3m with PCP    Date Provider   Last Visit   7/27/2020 Jim Tomlin MD   Next Visit   Visit date not found Jim Tomlin MD   ED visits in past 90 days: 0        Note composed:6:43 PM 05/10/2022

## 2022-05-10 NOTE — TELEPHONE ENCOUNTER
Care Due:                  Date            Visit Type   Department     Provider  --------------------------------------------------------------------------------                                EP -                              PRIMARY      [facility] INTERNAL  Last Visit: 07-      CARE ([facility])   MEDICINE       Jim Tomlin  Next Visit: None Scheduled  None         None Found                                                            Last  Test          Frequency    Reason                     Performed    Due Date  --------------------------------------------------------------------------------    Office Visit  12 months..  dulaglutide, finasteride,   07- 07-                             insulin, isosorbide,                             rosuvastatin.............    HBA1C.......  6 months...  dulaglutide, insulin.....  12- 06-    [facility] Embedded Care Gaps. Reference number: [id number]. 5/10/2022   5:55:23 PM CDT

## 2022-05-11 RX ORDER — AMLODIPINE BESYLATE 10 MG/1
TABLET ORAL
Qty: 90 TABLET | Refills: 0 | Status: SHIPPED | OUTPATIENT
Start: 2022-05-11 | End: 2022-10-18 | Stop reason: SDUPTHER

## 2022-05-11 RX ORDER — METOPROLOL SUCCINATE 25 MG/1
TABLET, EXTENDED RELEASE ORAL
Qty: 90 TABLET | Refills: 0 | Status: SHIPPED | OUTPATIENT
Start: 2022-05-11 | End: 2023-05-24

## 2022-05-11 RX ORDER — ISOSORBIDE MONONITRATE 60 MG/1
TABLET, EXTENDED RELEASE ORAL
Qty: 90 TABLET | Refills: 0 | Status: SHIPPED | OUTPATIENT
Start: 2022-05-11 | End: 2022-10-18 | Stop reason: SDUPTHER

## 2022-05-11 RX ORDER — ROSUVASTATIN CALCIUM 40 MG/1
TABLET, COATED ORAL
Qty: 90 TABLET | Refills: 0 | Status: SHIPPED | OUTPATIENT
Start: 2022-05-11 | End: 2023-04-17

## 2022-05-11 NOTE — TELEPHONE ENCOUNTER
Patient overdue for follow up.  Refill is given but the patient needs an appointment with Dr. Tomlin or Brittani George for follow up.

## 2022-06-03 ENCOUNTER — PATIENT OUTREACH (OUTPATIENT)
Dept: ADMINISTRATIVE | Facility: HOSPITAL | Age: 82
End: 2022-06-03
Payer: MEDICARE

## 2022-09-19 ENCOUNTER — PATIENT OUTREACH (OUTPATIENT)
Dept: ADMINISTRATIVE | Facility: HOSPITAL | Age: 82
End: 2022-09-19
Payer: MEDICARE

## 2022-09-19 NOTE — PROGRESS NOTES
Working eGFR Report:     Pt coming due for Rex. Last labs done 12/2021. Called to schedule annual with PCP and labs, left message.

## 2022-10-11 ENCOUNTER — PATIENT OUTREACH (OUTPATIENT)
Dept: ADMINISTRATIVE | Facility: HOSPITAL | Age: 82
End: 2022-10-11
Payer: MEDICARE

## 2022-10-15 DIAGNOSIS — I15.2 HYPERTENSION ASSOCIATED WITH DIABETES: ICD-10-CM

## 2022-10-15 DIAGNOSIS — I20.9 AP (ANGINA PECTORIS): ICD-10-CM

## 2022-10-15 DIAGNOSIS — E11.59 HYPERTENSION ASSOCIATED WITH DIABETES: ICD-10-CM

## 2022-10-15 NOTE — TELEPHONE ENCOUNTER
Care Due:                  Date            Visit Type   Department     Provider  --------------------------------------------------------------------------------    Last Visit: None Found      None         None Found  Next Visit: None Scheduled  None         None Found                                                            Last  Test          Frequency    Reason                     Performed    Due Date  --------------------------------------------------------------------------------    Office Visit  12 months..  finasteride, isosorbide,   Not Found    Overdue                             rosuvastatin.............    CMP.........  12 months..  rosuvastatin.............  12- 12-    Lipid Panel.  12 months..  rosuvastatin.............  12- 12-    [employer] Embedded Care Gaps. Reference number: [id number]. 10/15/2022   12:33:56 AM CDT

## 2022-10-17 ENCOUNTER — OFFICE VISIT (OUTPATIENT)
Dept: CARDIOLOGY | Facility: CLINIC | Age: 82
End: 2022-10-17
Payer: MEDICARE

## 2022-10-17 VITALS
OXYGEN SATURATION: 99 % | WEIGHT: 160.5 LBS | DIASTOLIC BLOOD PRESSURE: 56 MMHG | HEART RATE: 74 BPM | HEIGHT: 70 IN | SYSTOLIC BLOOD PRESSURE: 122 MMHG | BODY MASS INDEX: 22.98 KG/M2

## 2022-10-17 DIAGNOSIS — E11.59 HYPERTENSION ASSOCIATED WITH DIABETES: ICD-10-CM

## 2022-10-17 DIAGNOSIS — E78.5 HYPERLIPIDEMIA ASSOCIATED WITH TYPE 2 DIABETES MELLITUS: ICD-10-CM

## 2022-10-17 DIAGNOSIS — E11.51 DIABETES MELLITUS TYPE 2 WITH PERIPHERAL ARTERY DISEASE: ICD-10-CM

## 2022-10-17 DIAGNOSIS — I20.9 AP (ANGINA PECTORIS): ICD-10-CM

## 2022-10-17 DIAGNOSIS — R94.31 ABNORMAL ECG: ICD-10-CM

## 2022-10-17 DIAGNOSIS — Z95.1 S/P CABG (CORONARY ARTERY BYPASS GRAFT): ICD-10-CM

## 2022-10-17 DIAGNOSIS — I45.10 RBBB: Chronic | ICD-10-CM

## 2022-10-17 DIAGNOSIS — I50.32 CHRONIC DIASTOLIC HEART FAILURE: ICD-10-CM

## 2022-10-17 DIAGNOSIS — I25.5 ISCHEMIC CARDIOMYOPATHY: Chronic | ICD-10-CM

## 2022-10-17 DIAGNOSIS — Z98.890 HISTORY OF ATHERECTOMY: ICD-10-CM

## 2022-10-17 DIAGNOSIS — I35.1 NONRHEUMATIC AORTIC VALVE INSUFFICIENCY: ICD-10-CM

## 2022-10-17 DIAGNOSIS — I15.2 HYPERTENSION ASSOCIATED WITH DIABETES: ICD-10-CM

## 2022-10-17 DIAGNOSIS — I25.708 CORONARY ARTERY DISEASE OF BYPASS GRAFT OF NATIVE HEART WITH STABLE ANGINA PECTORIS: Primary | Chronic | ICD-10-CM

## 2022-10-17 DIAGNOSIS — E11.69 HYPERLIPIDEMIA ASSOCIATED WITH TYPE 2 DIABETES MELLITUS: ICD-10-CM

## 2022-10-17 DIAGNOSIS — I65.23 ASYMPTOMATIC STENOSIS OF BOTH CAROTID ARTERIES WITHOUT INFARCTION: ICD-10-CM

## 2022-10-17 DIAGNOSIS — I20.89 CHRONIC STABLE ANGINA: ICD-10-CM

## 2022-10-17 DIAGNOSIS — I25.9 CHRONIC ISCHEMIC HEART DISEASE: Chronic | ICD-10-CM

## 2022-10-17 PROCEDURE — 1160F RVW MEDS BY RX/DR IN RCRD: CPT | Mod: CPTII,S$GLB,, | Performed by: INTERNAL MEDICINE

## 2022-10-17 PROCEDURE — 1160F PR REVIEW ALL MEDS BY PRESCRIBER/CLIN PHARMACIST DOCUMENTED: ICD-10-PCS | Mod: CPTII,S$GLB,, | Performed by: INTERNAL MEDICINE

## 2022-10-17 PROCEDURE — 99999 PR PBB SHADOW E&M-EST. PATIENT-LVL III: ICD-10-PCS | Mod: PBBFAC,,, | Performed by: INTERNAL MEDICINE

## 2022-10-17 PROCEDURE — 99499 UNLISTED E&M SERVICE: CPT | Mod: S$GLB,,, | Performed by: INTERNAL MEDICINE

## 2022-10-17 PROCEDURE — 3074F SYST BP LT 130 MM HG: CPT | Mod: CPTII,S$GLB,, | Performed by: INTERNAL MEDICINE

## 2022-10-17 PROCEDURE — 99214 PR OFFICE/OUTPT VISIT, EST, LEVL IV, 30-39 MIN: ICD-10-PCS | Mod: S$GLB,,, | Performed by: INTERNAL MEDICINE

## 2022-10-17 PROCEDURE — 99999 PR PBB SHADOW E&M-EST. PATIENT-LVL III: CPT | Mod: PBBFAC,,, | Performed by: INTERNAL MEDICINE

## 2022-10-17 PROCEDURE — 1159F MED LIST DOCD IN RCRD: CPT | Mod: CPTII,S$GLB,, | Performed by: INTERNAL MEDICINE

## 2022-10-17 PROCEDURE — 99499 RISK ADDL DX/OHS AUDIT: ICD-10-PCS | Mod: S$GLB,,, | Performed by: INTERNAL MEDICINE

## 2022-10-17 PROCEDURE — 3078F PR MOST RECENT DIASTOLIC BLOOD PRESSURE < 80 MM HG: ICD-10-PCS | Mod: CPTII,S$GLB,, | Performed by: INTERNAL MEDICINE

## 2022-10-17 PROCEDURE — 1159F PR MEDICATION LIST DOCUMENTED IN MEDICAL RECORD: ICD-10-PCS | Mod: CPTII,S$GLB,, | Performed by: INTERNAL MEDICINE

## 2022-10-17 PROCEDURE — 3078F DIAST BP <80 MM HG: CPT | Mod: CPTII,S$GLB,, | Performed by: INTERNAL MEDICINE

## 2022-10-17 PROCEDURE — 3074F PR MOST RECENT SYSTOLIC BLOOD PRESSURE < 130 MM HG: ICD-10-PCS | Mod: CPTII,S$GLB,, | Performed by: INTERNAL MEDICINE

## 2022-10-17 PROCEDURE — 99214 OFFICE O/P EST MOD 30 MIN: CPT | Mod: S$GLB,,, | Performed by: INTERNAL MEDICINE

## 2022-10-17 RX ORDER — ISOSORBIDE MONONITRATE 60 MG/1
TABLET, EXTENDED RELEASE ORAL
Qty: 90 TABLET | Refills: 0 | OUTPATIENT
Start: 2022-10-17

## 2022-10-17 RX ORDER — AMLODIPINE BESYLATE 10 MG/1
TABLET ORAL
Qty: 90 TABLET | Refills: 0 | OUTPATIENT
Start: 2022-10-17

## 2022-10-17 NOTE — TELEPHONE ENCOUNTER
Spoke w/ pt's daughter. She wants to know if the pt can be seen by Yesi or another physician to get the refills? Or if the appt has to be w/     Please advise.

## 2022-10-17 NOTE — PROGRESS NOTES
Subjective:    Patient ID:  Nithin Pino is a 82 y.o. male who presents for evaluation of Coronary Artery Disease, Hypertension, Hyperlipidemia, Peripheral Arterial Disease, Congestive Heart Failure, and Carotid Artery Disease      HPIPt presents for eval.  His current medical conditions include CAD, RBBB, diastolic CHF, ICM, MR, TR, AI, CRI, carotid artery disease, COPD, HTN, PAD, DM.   Nonsmoker.   His past history is pertinent for following:  S/p PTCA 1994 for AMI.   Stress MPI 5/11 showed evidence of multivessel CAD (had 2 years of angina) which was confirmed on LHC (significant left main/LAD/ramus, diag disease and 100%  RCA). EF 35% on LV gram.   S/p successful CABG 5/11 (LIMA-LAD, SVG-DIAG, SVG-RAMUS). He had post-op a flutter which resolved.   S/p repeat LHC 2/16 for abnl stress MPI. He underwent atherectomy and PCI of left main and ramus with TUCKER x 2 overall. His svg-ramus had occluded. LIMA-LAD was patent, patent SVG-D1,  RCA with left - right collaterals, EF 45%.    Has chronic R SFA .   Pt has declined runoff numerous times for further evaluation of his PAD in past.   Echo 6/18 normal EF.  RENZO 6/18 R LE 0.86, L LE 0.96  Pt admitted late Dec 2020 with Covid pneumonia, cp sxs. Chart reviewed in detail.  Echo 12/20 normal LV function, LVH.  Troponin leak noted.  Cards consult done and troponin thought to be supply/demand ischemia from Covid infection/pneumonia.  Readmitted few days later with recurrent cp, low BP, dehydration and released.  Troponin leak ranged 0.10 to 0.50 range on both hospitalizations.  Stress MPI 3/21 apical, anteroapical scar, inferoapical scar with some residual ischemia, EF 44%.  Carotid u/s 12/20: no significant blockage noted.   ecg 10/29/21 NSR, RBBB, old inferior/anteroseptal infarct.  Echo 8/21: normal EF, DD, mild MR/TR/AI.  RENZO 8/21: R LE 0.73, L LE 0.71  Now here.  CAD is stable.  Angina is controlled on current med tx.  Denies recent chest pain sxs.  Has chronic  EWING, stable.  No pnd/orthopnea.  BP is stable.  Weight is down.  Poor appetite.  Has diabetic neuropathy in feet.  No typical claudication sxs.  No TIA/CVA sxs.  [facility]  He thinks his DM is at goal.      Past Medical History:   Diagnosis Date    Abnormal brain MRI 1/21/2018    Chronic microvascular ischemia changes per MRI July 9, 2007 in [facility]    Abnormal ECG 10/31/2013    Abnormal stress test 1/28/2016    Alcohol dependence     States alcohol abuse ended in 1994    AP (angina pectoris) 1/28/2016    Asthma     Carotid artery occlusion     Cataract     Chronic combined systolic and diastolic congestive heart failure 5/24/2021    Chronic diastolic heart failure 10/31/2013    Chronic ischemic heart disease 10/31/2013    CKD (chronic kidney disease) stage 3, GFR 30-59 ml/min 11/4/2015    Coronary artery disease     DM (diabetes mellitus) 2013    BS doesn't check 03/28/2017     DM (diabetes mellitus) 2011    BS doesn' check  04/03/2019    Heart valve regurgitation 11/4/2015    Echocardiogram 2/15/16   1 - Concentric hypertrophy.    2 - Normal left ventricular systolic function (EF 60-65%).    3 - Normal left ventricular diastolic function.    4 - Mild left atrial enlargement.    5 - Normal right ventricular systolic function .    6 - Trivial to mild aortic regurgitation.    7 - Trivial to mild mitral regurgitation.  10 - Trivial to mild pulmonic regurgitation.     Hematuria 6/25/2020    History of alcohol abuse 1/22/2018    Patient denies drinking to excess since 1994.    History of atherectomy 1/21/2018 2/2016 [facility]    History of chronic kidney disease 1/22/2018    History of CKD 3    History of PTCA 10/31/2013    History of PTCA 3/9/2016    Hyperlipidemia     Hypertension     Insomnia 6/17/2013    Ischemic cardiomyopathy 10/31/2013    Long term (current) use of antithrombotics/antiplatelets 1/21/2018    Myocardial infarction     Old MI (myocardial infarction) 1994    Peripheral vascular disease      Polyneuropathy     RBBB 10/31/2013    S/P CABG (coronary artery bypass graft) 10/31/2013    Shortness of breath 10/31/2013    Tobacco dependence     resolved    Trouble in sleeping     Type 2 diabetes with peripheral circulatory disorder, controlled     Wears glasses        Current Outpatient Medications:     albuterol (PROVENTIL/VENTOLIN HFA) 90 mcg/actuation inhaler, albuterol sulfate Take No date recorded No form recorded No frequency recorded No route recorded No set duration recorded No set duration amount recorded active No dosage strength recorded No dosage strength units of measure recorded, Disp: , Rfl:     amLODIPine (NORVASC) 10 MG tablet, TAKE 1 TABLET EVERY DAY, Disp: 90 tablet, Rfl: 0    ascorbic acid, vitamin C, (VITAMIN C) 500 MG tablet, Take 1 tablet (500 mg total) by mouth 2 (two) times daily., Disp:  , Rfl:     aspirin 81 MG Chew, Take 1 tablet (81 mg total) by mouth once daily. 1 Tablet, Chewable Oral Every day, Disp: 90 tablet, Rfl: 3    BLOOD-GLUCOSE METER (TRUERESULT BLOOD GLUCOSE SYSTM MISC), by Misc.(Non-Drug; Combo Route) route., Disp: , Rfl:     cholecalciferol, vitamin D3, 125 mcg (5,000 unit) Tab, Take 5,000 Units by mouth once daily., Disp: , Rfl:     dulaglutide (TRULICITY) 1.5 mg/0.5 mL PnIj, Inject 1.5 mg into the skin once a week., Disp: 6 mL, Rfl: 3    EMBRACE PRO TEST STRIPS Strp, CHECK BLOOD SUGAR TWICE DAILY., Disp: 50 strip, Rfl: 0    finasteride (PROSCAR) 5 mg tablet, Take 1 tablet (5 mg total) by mouth once daily., Disp: 90 tablet, Rfl: 3    insulin (LANTUS SOLOSTAR U-100 INSULIN) glargine 100 units/mL (3mL) SubQ pen, Inject 28 Units into the skin once daily., Disp: 27 mL, Rfl: 3    isosorbide mononitrate (IMDUR) 60 MG 24 hr tablet, TAKE 1 TABLET EVERY DAY, Disp: 90 tablet, Rfl: 0    losartan (COZAAR) 100 MG tablet, TAKE 1 TABLET (100 MG TOTAL) BY MOUTH ONCE DAILY., Disp: 90 tablet, Rfl: 3    metFORMIN (GLUCOPHAGE) 1000 MG tablet, Take 1,000 mg by mouth 2 (two) times daily  "with meals., Disp: , Rfl:     metoprolol succinate (TOPROL-XL) 25 MG 24 hr tablet, TAKE 1 TABLET EVERY EVENING, Disp: 90 tablet, Rfl: 0    rosuvastatin (CRESTOR) 40 MG Tab, TAKE 1 TABLET EVERY EVENING, Disp: 90 tablet, Rfl: 0    solifenacin (VESICARE) 10 MG tablet, TAKE 1 TABLET EVERY DAY, Disp: 90 tablet, Rfl: 0    tamsulosin (FLOMAX) 0.4 mg Cap, TAKE 1 CAPSULE EVERY DAY, Disp: 90 capsule, Rfl: 3    TRUEPLUS LANCETS 26 gauge Misc, CHECK BLOOD SUGAR TWICE DAILY., Disp: 100 each, Rfl: 0    TRUERESULT BLOOD GLUCOSE SYSTM kit, CHECK BLOOD SUGAR TWICE DAILY., Disp: 1 each, Rfl: 0      Review of Systems   Constitutional: Positive for decreased appetite and weight loss.   HENT: Negative.     Eyes: Negative.    Cardiovascular:  Positive for dyspnea on exertion.   Respiratory:  Positive for shortness of breath.    Endocrine: Negative.    Hematologic/Lymphatic: Negative.    Skin: Negative.    Musculoskeletal:  Positive for arthritis and joint pain.   Gastrointestinal: Negative.    Genitourinary: Negative.    Neurological:  Positive for numbness and weakness.   Psychiatric/Behavioral: Negative.     Allergic/Immunologic: Negative.         BP (!) 122/56 (BP Location: Left arm, Patient Position: Sitting, BP Method: Large (Manual))   Pulse 74   Ht 5' 10" (1.778 m)   Wt 72.8 kg (160 lb 7.9 oz)   SpO2 99%   BMI 23.03 kg/m²     Wt Readings from Last 3 Encounters:   10/17/22 72.8 kg (160 lb 7.9 oz)   04/12/22 76.9 kg (169 lb 8.5 oz)   12/06/21 80 kg (176 lb 5.9 oz)     Temp Readings from Last 3 Encounters:   10/29/21 99.1 °F (37.3 °C) (Oral)   09/01/21 98.1 °F (36.7 °C)   03/24/21 98.4 °F (36.9 °C) (Tympanic)     BP Readings from Last 3 Encounters:   10/17/22 (!) 122/56   04/12/22 120/64   12/06/21 (!) 142/68     Pulse Readings from Last 3 Encounters:   10/17/22 74   04/12/22 76   12/06/21 75       Objective:    Physical Exam  Vitals and nursing note reviewed.   Constitutional:       Appearance: He is well-developed.   HENT:     "  Head: Normocephalic.   Neck:      Thyroid: No thyromegaly.      Vascular: No carotid bruit or JVD.   Cardiovascular:      Rate and Rhythm: Normal rate and regular rhythm.      Pulses:           Radial pulses are 2+ on the right side and 2+ on the left side.      Heart sounds: S1 normal and S2 normal. Heart sounds not distant. No midsystolic click and no opening snap. No murmur heard.    No friction rub. No S3 or S4 sounds.   Pulmonary:      Effort: Pulmonary effort is normal.      Breath sounds: Normal breath sounds. No wheezing or rales.   Abdominal:      General: Bowel sounds are normal. There is no distension or abdominal bruit.      Palpations: Abdomen is soft. There is no mass.      Tenderness: There is no abdominal tenderness.   Musculoskeletal:      Cervical back: Neck supple.   Skin:     General: Skin is warm.   Neurological:      Mental Status: He is alert and oriented to person, place, and time.   Psychiatric:         Behavior: Behavior normal.       I have reviewed all pertinent labs and cardiac studies.      Chemistry        Component Value Date/Time     12/29/2021 0702    K 4.4 12/29/2021 0702     12/29/2021 0702    CO2 22 (L) 12/29/2021 0702    BUN 24 (H) 12/29/2021 0702    CREATININE 1.3 12/29/2021 0702     12/29/2021 0702        Component Value Date/Time    CALCIUM 10.5 12/29/2021 0702    ALKPHOS 54 (L) 12/29/2021 0702    AST 20 12/29/2021 0702    ALT 20 12/29/2021 0702    BILITOT 0.6 12/29/2021 0702    ESTGFRAFRICA 58.7 (A) 12/29/2021 0702    EGFRNONAA 50.8 (A) 12/29/2021 0702        Lab Results   Component Value Date    WBC 8.49 12/08/2021    HGB 12.3 (L) 12/08/2021    HCT 39.5 (L) 12/08/2021    MCV 90 12/08/2021     12/08/2021       Lab Results   Component Value Date    HGBA1C 7.5 (H) 12/08/2021     Lab Results   Component Value Date    CHOL 82 (L) 12/08/2021    CHOL 77 (L) 06/17/2020    CHOL 114 (L) 04/22/2019     Lab Results   Component Value Date    HDL 48 12/08/2021     HDL 45 06/17/2020    HDL 42 04/22/2019     Lab Results   Component Value Date    LDLCALC 18.8 (L) 12/08/2021    LDLCALC 17.4 (L) 06/17/2020    LDLCALC 45.8 (L) 04/22/2019     Lab Results   Component Value Date    TRIG 76 12/08/2021    TRIG 73 06/17/2020    TRIG 131 04/22/2019     Lab Results   Component Value Date    CHOLHDL 58.5 (H) 12/08/2021    CHOLHDL 58.4 (H) 06/17/2020    CHOLHDL 36.8 04/22/2019           Assessment:       1. Coronary artery disease of bypass graft of native heart with stable angina pectoris    2. Abnormal ECG    3. AP (angina pectoris)    4. Asymptomatic stenosis of both carotid arteries without infarction    5. Chronic ischemic heart disease    6. Chronic stable angina    7. Chronic diastolic heart failure    8. Diabetes mellitus type 2 with peripheral artery disease    9. History of atherectomy    10. Hyperlipidemia associated with type 2 diabetes mellitus    11. Nonrheumatic aortic valve insufficiency    12. Ischemic cardiomyopathy    13. Hypertension associated with diabetes    14. RBBB    15. S/P CABG (coronary artery bypass graft)         Plan:               Stable cardiovascular conditions at present time on current medical treatment.  Continue medical tx for CAD/PAD.  Reviewed all tests and above medical conditions with patient in detail and formulated treatment plan.  Continue optimal medical treatment for cardiovascular conditions.  Cardiac low salt diet advised.  Daily exercise as tolerated.  Maintaining healthy weight and weight loss goals (if needed) were discussed in clinic.  Need for BP control and HTN goals (if needed) were discussed and tx plan formulated.  Importance of optimal lipid control were discussed in detail as well as possible pharmacologic and lifestyle changes that may be needed.  Statin tx.  DM control discussed.  [facility]  F/u in 6 months.      I have reviewed all pertinent labs and cardiac studies independently. Plans and recommendations have been  formulated under my direct supervision. All questions answered and patient voiced understanding.

## 2022-10-18 RX ORDER — AMLODIPINE BESYLATE 10 MG/1
10 TABLET ORAL DAILY
Qty: 90 TABLET | Refills: 0 | Status: SHIPPED | OUTPATIENT
Start: 2022-10-18 | End: 2022-10-19 | Stop reason: SDUPTHER

## 2022-10-18 RX ORDER — ISOSORBIDE MONONITRATE 60 MG/1
60 TABLET, EXTENDED RELEASE ORAL DAILY
Qty: 90 TABLET | Refills: 0 | Status: SHIPPED | OUTPATIENT
Start: 2022-10-18 | End: 2023-01-20 | Stop reason: SDUPTHER

## 2022-10-19 ENCOUNTER — OFFICE VISIT (OUTPATIENT)
Dept: INTERNAL MEDICINE | Facility: CLINIC | Age: 82
End: 2022-10-19
Payer: MEDICARE

## 2022-10-19 VITALS
SYSTOLIC BLOOD PRESSURE: 116 MMHG | OXYGEN SATURATION: 91 % | BODY MASS INDEX: 23.26 KG/M2 | WEIGHT: 162.5 LBS | HEIGHT: 70 IN | HEART RATE: 70 BPM | DIASTOLIC BLOOD PRESSURE: 68 MMHG | TEMPERATURE: 98 F

## 2022-10-19 DIAGNOSIS — N40.1 BENIGN PROSTATIC HYPERPLASIA WITH WEAK URINARY STREAM: ICD-10-CM

## 2022-10-19 DIAGNOSIS — I15.2 HYPERTENSION ASSOCIATED WITH DIABETES: ICD-10-CM

## 2022-10-19 DIAGNOSIS — I25.708 CORONARY ARTERY DISEASE OF BYPASS GRAFT OF NATIVE HEART WITH STABLE ANGINA PECTORIS: Chronic | ICD-10-CM

## 2022-10-19 DIAGNOSIS — R39.12 BENIGN PROSTATIC HYPERPLASIA WITH WEAK URINARY STREAM: ICD-10-CM

## 2022-10-19 DIAGNOSIS — E11.59 HYPERTENSION ASSOCIATED WITH DIABETES: ICD-10-CM

## 2022-10-19 DIAGNOSIS — E11.51 DIABETES MELLITUS TYPE 2 WITH PERIPHERAL ARTERY DISEASE: ICD-10-CM

## 2022-10-19 DIAGNOSIS — Z00.00 ANNUAL PHYSICAL EXAM: Primary | ICD-10-CM

## 2022-10-19 PROCEDURE — 1160F RVW MEDS BY RX/DR IN RCRD: CPT | Mod: CPTII,S$GLB,, | Performed by: NURSE PRACTITIONER

## 2022-10-19 PROCEDURE — 1159F PR MEDICATION LIST DOCUMENTED IN MEDICAL RECORD: ICD-10-PCS | Mod: CPTII,S$GLB,, | Performed by: NURSE PRACTITIONER

## 2022-10-19 PROCEDURE — 1125F PR PAIN SEVERITY QUANTIFIED, PAIN PRESENT: ICD-10-PCS | Mod: CPTII,S$GLB,, | Performed by: NURSE PRACTITIONER

## 2022-10-19 PROCEDURE — 3078F PR MOST RECENT DIASTOLIC BLOOD PRESSURE < 80 MM HG: ICD-10-PCS | Mod: CPTII,S$GLB,, | Performed by: NURSE PRACTITIONER

## 2022-10-19 PROCEDURE — 3074F SYST BP LT 130 MM HG: CPT | Mod: CPTII,S$GLB,, | Performed by: NURSE PRACTITIONER

## 2022-10-19 PROCEDURE — 99214 PR OFFICE/OUTPT VISIT, EST, LEVL IV, 30-39 MIN: ICD-10-PCS | Mod: S$GLB,,, | Performed by: NURSE PRACTITIONER

## 2022-10-19 PROCEDURE — 99214 OFFICE O/P EST MOD 30 MIN: CPT | Mod: S$GLB,,, | Performed by: NURSE PRACTITIONER

## 2022-10-19 PROCEDURE — 1125F AMNT PAIN NOTED PAIN PRSNT: CPT | Mod: CPTII,S$GLB,, | Performed by: NURSE PRACTITIONER

## 2022-10-19 PROCEDURE — 3288F PR FALLS RISK ASSESSMENT DOCUMENTED: ICD-10-PCS | Mod: CPTII,S$GLB,, | Performed by: NURSE PRACTITIONER

## 2022-10-19 PROCEDURE — 1160F PR REVIEW ALL MEDS BY PRESCRIBER/CLIN PHARMACIST DOCUMENTED: ICD-10-PCS | Mod: CPTII,S$GLB,, | Performed by: NURSE PRACTITIONER

## 2022-10-19 PROCEDURE — 99999 PR PBB SHADOW E&M-EST. PATIENT-LVL IV: CPT | Mod: PBBFAC,,, | Performed by: NURSE PRACTITIONER

## 2022-10-19 PROCEDURE — 1101F PT FALLS ASSESS-DOCD LE1/YR: CPT | Mod: CPTII,S$GLB,, | Performed by: NURSE PRACTITIONER

## 2022-10-19 PROCEDURE — 99999 PR PBB SHADOW E&M-EST. PATIENT-LVL IV: ICD-10-PCS | Mod: PBBFAC,,, | Performed by: NURSE PRACTITIONER

## 2022-10-19 PROCEDURE — 1159F MED LIST DOCD IN RCRD: CPT | Mod: CPTII,S$GLB,, | Performed by: NURSE PRACTITIONER

## 2022-10-19 PROCEDURE — 3078F DIAST BP <80 MM HG: CPT | Mod: CPTII,S$GLB,, | Performed by: NURSE PRACTITIONER

## 2022-10-19 PROCEDURE — 3074F PR MOST RECENT SYSTOLIC BLOOD PRESSURE < 130 MM HG: ICD-10-PCS | Mod: CPTII,S$GLB,, | Performed by: NURSE PRACTITIONER

## 2022-10-19 PROCEDURE — 3288F FALL RISK ASSESSMENT DOCD: CPT | Mod: CPTII,S$GLB,, | Performed by: NURSE PRACTITIONER

## 2022-10-19 PROCEDURE — 1101F PR PT FALLS ASSESS DOC 0-1 FALLS W/OUT INJ PAST YR: ICD-10-PCS | Mod: CPTII,S$GLB,, | Performed by: NURSE PRACTITIONER

## 2022-10-19 RX ORDER — FINASTERIDE 5 MG/1
5 TABLET, FILM COATED ORAL DAILY
Qty: 90 TABLET | Refills: 3 | Status: SHIPPED | OUTPATIENT
Start: 2022-10-19 | End: 2024-01-18

## 2022-10-19 RX ORDER — LOSARTAN POTASSIUM 100 MG/1
100 TABLET ORAL DAILY
Qty: 90 TABLET | Refills: 3 | Status: SHIPPED | OUTPATIENT
Start: 2022-10-19 | End: 2023-04-17

## 2022-10-19 RX ORDER — AMLODIPINE BESYLATE 10 MG/1
10 TABLET ORAL DAILY
Qty: 90 TABLET | Refills: 3 | Status: SHIPPED | OUTPATIENT
Start: 2022-10-19 | End: 2023-10-24

## 2022-10-19 NOTE — PROGRESS NOTES
"Subjective:       Patient ID: Nithin Pino is a 82 y.o. male.    Chief Complaint: Annual Exam    81 y/o male presents to clinic today for his annual exam  PCP Dr. Tomlin but hasn't seen him in quite some time  [facility]  Reports overall he has been doing well over the last year  No falls or hospitalizations  Normal Bms  Lives alone, still able to do for self  Weight down about 10 lbs since last year  Reports no appetite   Follows with Dr. Sadler for his CAD and had a checkup last week-stable with 6 month follow up    In regards to his diabetes, he believes his last A1C was in the 6 range with the [facility]  He goes Thurs for a checkup and advised him to have [facility]  He is taking metformin, trulicity and Lantus 24 units/day  Reports his sugars vary but unsure of the range   Due for eye exam- reminded pt to schedule     BP well controlled in clinic today    [facility] reported         /68   Pulse 70   Temp 97.7 °F (36.5 °C) (Temporal)   Ht 5' 10" (1.778 m)   Wt 73.7 kg (162 lb 7.7 oz)   SpO2 (!) 91%   BMI 23.31 kg/m²     Review of Systems   Constitutional:  Positive for appetite change. Negative for activity change, chills, diaphoresis, fatigue, fever and unexpected weight change.   HENT: Negative.     Eyes: Negative.    Respiratory:  Negative for apnea, chest tightness, shortness of breath and stridor.    Cardiovascular:  Negative for chest pain, palpitations and leg swelling.   Gastrointestinal: Negative.    Endocrine: Negative.    Genitourinary: Negative.    Musculoskeletal:  Positive for arthralgias and myalgias.   Skin:  Negative for color change, pallor, rash and wound.   Allergic/Immunologic: Negative.    Neurological:  Positive for weakness and numbness. Negative for dizziness, facial asymmetry, light-headedness and headaches.   Hematological:  Negative for adenopathy.   Psychiatric/Behavioral:  Negative for agitation and behavioral " problems.      Objective:      Physical Exam  Vitals and nursing note reviewed.   Constitutional:       General: He is not in acute distress.     Appearance: He is well-developed. He is not diaphoretic.   HENT:      Head: Normocephalic and atraumatic.   Cardiovascular:      Rate and Rhythm: Normal rate and regular rhythm.      Heart sounds: Normal heart sounds.   Pulmonary:      Effort: Pulmonary effort is normal. No respiratory distress.      Breath sounds: Normal breath sounds.   Skin:     General: Skin is warm and dry.      Findings: No rash.   Neurological:      Mental Status: He is alert and oriented to person, place, and time.   Psychiatric:         Behavior: Behavior normal.         Thought Content: Thought content normal.         Judgment: Judgment normal.       Assessment:       1. Annual physical exam    2. Hypertension associated with diabetes    3. Diabetes mellitus type 2 with peripheral artery disease    4. Benign prostatic hyperplasia with weak urinary stream    5. 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 BMI 23.0-23.9, adult          Plan:       Nithin was seen today for annual exam.    Diagnoses and all orders for this visit:    Annual physical exam       -  for age discussed   Hypertension associated with diabetes  -     losartan (COZAAR) 100 MG tablet; Take 1 tablet (100 mg total) by mouth once daily.  -     amLODIPine (NORVASC) 10 MG tablet; Take 1 tablet (10 mg total) by mouth once daily.  -     CBC Auto Differential; Future  -     Comprehensive Metabolic Panel; Future  -     Lipid Panel; Future  - Chronic, stable on current treatment, continue     Diabetes mellitus type 2 with peripheral artery disease  -     Lipid Panel; Future  -     Hemoglobin A1C; Future  -     Microalbumin/Creatinine Ratio, Urine; Future  - Chronic, stable on current meds, continue. Due for updated A1C. [facility]    Benign prostatic hyperplasia with weak  urinary stream  -     finasteride (PROSCAR) 5 mg tablet; Take 1 tablet (5 mg total) by mouth once daily.  - Chronic, stable on current meds. Continue to follow with urology     Coronary artery disease of bypass graft of native heart with stable angina pectoris        - Chronic, stable. Continue current meds and following with Dr. Sadler   BMI 23.0-23.9, adult    Vitals reviewed and stable. BP within normal range at 118/72. Labs due. [facility]     He is encouraged to engage in exercise outside of his day to day activities.      Questions and concerns addressed.      Follow up in 6 months with PCP or PRN.            No

## 2022-11-19 NOTE — TELEPHONE ENCOUNTER
----- Message from Mone Mcpherson sent at 12/20/2018 11:31 AM CST -----  Contact: self   Requesting a call back to schedule nurse visit.pt has flu symptoms and requesting shot.please call back at [phone].      Thanks,  Mone Mcpherson    
Spoke with patient and informed him that the nurse schedule is not for patients with flu symptoms. Patient states he's been coughing and needs to see a nurse to get a shot tomorrow. Explained to the patient that the nurses do not give shots for people who are sick and that he needs to see a MD. Also informed patient that Dr Petersen is full today and out of the office tomorrow, Monday and Tuesday for the holidays and he needs to go to Urgent Care if he does not feel good. Patient states he is not going to urgent care or making an appointment to see a dr and that if a nurse can't see him tomorrow and give him a shot to make him stop coughing then he's not worried about any of it. Offered patient an appointment with Hermilo Plaza NP tomorrow but patient refused and said don't worry about it. Informed patient if he starts feeling worse not to hesitate going straight to urgent care or the ER to prevent complications from exacerbation. Patient said he's not doing any of it and hung up.  
yes

## 2022-11-28 ENCOUNTER — TELEPHONE (OUTPATIENT)
Dept: ADMINISTRATIVE | Facility: HOSPITAL | Age: 82
End: 2022-11-28
Payer: MEDICARE

## 2022-12-07 ENCOUNTER — OFFICE VISIT (OUTPATIENT)
Dept: INTERNAL MEDICINE | Facility: CLINIC | Age: 82
End: 2022-12-07
Payer: MEDICARE

## 2022-12-07 VITALS
HEIGHT: 70 IN | WEIGHT: 166.69 LBS | OXYGEN SATURATION: 99 % | RESPIRATION RATE: 18 BRPM | SYSTOLIC BLOOD PRESSURE: 138 MMHG | BODY MASS INDEX: 23.86 KG/M2 | DIASTOLIC BLOOD PRESSURE: 70 MMHG | TEMPERATURE: 98 F | HEART RATE: 76 BPM

## 2022-12-07 DIAGNOSIS — N32.81 OAB (OVERACTIVE BLADDER): ICD-10-CM

## 2022-12-07 DIAGNOSIS — R31.0 HEMATURIA, GROSS: ICD-10-CM

## 2022-12-07 DIAGNOSIS — N40.1 BENIGN PROSTATIC HYPERPLASIA WITH WEAK URINARY STREAM: ICD-10-CM

## 2022-12-07 DIAGNOSIS — R39.12 BENIGN PROSTATIC HYPERPLASIA WITH WEAK URINARY STREAM: ICD-10-CM

## 2022-12-07 DIAGNOSIS — Z00.00 ENCOUNTER FOR PREVENTIVE HEALTH EXAMINATION: Primary | ICD-10-CM

## 2022-12-07 DIAGNOSIS — E11.69 HYPERLIPIDEMIA ASSOCIATED WITH TYPE 2 DIABETES MELLITUS: ICD-10-CM

## 2022-12-07 DIAGNOSIS — B35.1 ONYCHOMYCOSIS OF MULTIPLE TOENAILS WITH TYPE 2 DIABETES MELLITUS: ICD-10-CM

## 2022-12-07 DIAGNOSIS — I65.23 ASYMPTOMATIC STENOSIS OF BOTH CAROTID ARTERIES WITHOUT INFARCTION: ICD-10-CM

## 2022-12-07 DIAGNOSIS — I70.0 THORACIC AORTA ATHEROSCLEROSIS: ICD-10-CM

## 2022-12-07 DIAGNOSIS — I50.42 CHRONIC COMBINED SYSTOLIC AND DIASTOLIC CONGESTIVE HEART FAILURE: ICD-10-CM

## 2022-12-07 DIAGNOSIS — E78.5 HYPERLIPIDEMIA ASSOCIATED WITH TYPE 2 DIABETES MELLITUS: ICD-10-CM

## 2022-12-07 DIAGNOSIS — I15.2 HYPERTENSION ASSOCIATED WITH DIABETES: ICD-10-CM

## 2022-12-07 DIAGNOSIS — E11.29 MICROALBUMINURIA DUE TO TYPE 2 DIABETES MELLITUS: ICD-10-CM

## 2022-12-07 DIAGNOSIS — I25.5 ISCHEMIC CARDIOMYOPATHY: Chronic | ICD-10-CM

## 2022-12-07 DIAGNOSIS — E11.51 DIABETES MELLITUS TYPE 2 WITH PERIPHERAL ARTERY DISEASE: ICD-10-CM

## 2022-12-07 DIAGNOSIS — E11.69 ONYCHOMYCOSIS OF MULTIPLE TOENAILS WITH TYPE 2 DIABETES MELLITUS: ICD-10-CM

## 2022-12-07 DIAGNOSIS — S81.801A WOUND OF RIGHT LOWER EXTREMITY, INITIAL ENCOUNTER: ICD-10-CM

## 2022-12-07 DIAGNOSIS — J43.1 PANLOBULAR EMPHYSEMA: ICD-10-CM

## 2022-12-07 DIAGNOSIS — R80.9 MICROALBUMINURIA DUE TO TYPE 2 DIABETES MELLITUS: ICD-10-CM

## 2022-12-07 DIAGNOSIS — Z91.81 HISTORY OF FALLING: ICD-10-CM

## 2022-12-07 DIAGNOSIS — N18.31 STAGE 3A CHRONIC KIDNEY DISEASE: ICD-10-CM

## 2022-12-07 DIAGNOSIS — I35.1 NONRHEUMATIC AORTIC VALVE INSUFFICIENCY: ICD-10-CM

## 2022-12-07 DIAGNOSIS — I25.708 CORONARY ARTERY DISEASE OF BYPASS GRAFT OF NATIVE HEART WITH STABLE ANGINA PECTORIS: Chronic | ICD-10-CM

## 2022-12-07 DIAGNOSIS — H90.3 BILATERAL SENSORINEURAL HEARING LOSS: ICD-10-CM

## 2022-12-07 DIAGNOSIS — I45.10 RBBB: Chronic | ICD-10-CM

## 2022-12-07 DIAGNOSIS — E11.59 HYPERTENSION ASSOCIATED WITH DIABETES: ICD-10-CM

## 2022-12-07 DIAGNOSIS — E21.3 HYPERPARATHYROIDISM: ICD-10-CM

## 2022-12-07 PROBLEM — R94.31 ABNORMAL ECG: Status: RESOLVED | Noted: 2020-01-30 | Resolved: 2022-12-07

## 2022-12-07 PROBLEM — Z95.828 PRESENCE OF STENT OF BYPASS GRAFT: Status: RESOLVED | Noted: 2022-12-07 | Resolved: 2022-12-07

## 2022-12-07 PROBLEM — Z95.828 PRESENCE OF STENT OF BYPASS GRAFT: Status: ACTIVE | Noted: 2022-12-07

## 2022-12-07 PROCEDURE — 3075F SYST BP GE 130 - 139MM HG: CPT | Mod: CPTII,S$GLB,, | Performed by: NURSE PRACTITIONER

## 2022-12-07 PROCEDURE — 99499 RISK ADDL DX/OHS AUDIT: ICD-10-PCS | Mod: HCNC,S$GLB,, | Performed by: NURSE PRACTITIONER

## 2022-12-07 PROCEDURE — 1170F PR FUNCTIONAL STATUS ASSESSED: ICD-10-PCS | Mod: CPTII,S$GLB,, | Performed by: NURSE PRACTITIONER

## 2022-12-07 PROCEDURE — G0439 PPPS, SUBSEQ VISIT: HCPCS | Mod: S$GLB,,, | Performed by: NURSE PRACTITIONER

## 2022-12-07 PROCEDURE — 1160F RVW MEDS BY RX/DR IN RCRD: CPT | Mod: CPTII,S$GLB,, | Performed by: NURSE PRACTITIONER

## 2022-12-07 PROCEDURE — 3288F FALL RISK ASSESSMENT DOCD: CPT | Mod: CPTII,S$GLB,, | Performed by: NURSE PRACTITIONER

## 2022-12-07 PROCEDURE — 1100F PTFALLS ASSESS-DOCD GE2>/YR: CPT | Mod: CPTII,S$GLB,, | Performed by: NURSE PRACTITIONER

## 2022-12-07 PROCEDURE — 1125F AMNT PAIN NOTED PAIN PRSNT: CPT | Mod: CPTII,S$GLB,, | Performed by: NURSE PRACTITIONER

## 2022-12-07 PROCEDURE — 3075F PR MOST RECENT SYSTOLIC BLOOD PRESS GE 130-139MM HG: ICD-10-PCS | Mod: CPTII,S$GLB,, | Performed by: NURSE PRACTITIONER

## 2022-12-07 PROCEDURE — 99999 PR PBB SHADOW E&M-EST. PATIENT-LVL V: CPT | Mod: PBBFAC,,, | Performed by: NURSE PRACTITIONER

## 2022-12-07 PROCEDURE — G0439 PR MEDICARE ANNUAL WELLNESS SUBSEQUENT VISIT: ICD-10-PCS | Mod: S$GLB,,, | Performed by: NURSE PRACTITIONER

## 2022-12-07 PROCEDURE — 1159F PR MEDICATION LIST DOCUMENTED IN MEDICAL RECORD: ICD-10-PCS | Mod: CPTII,S$GLB,, | Performed by: NURSE PRACTITIONER

## 2022-12-07 PROCEDURE — 3078F DIAST BP <80 MM HG: CPT | Mod: CPTII,S$GLB,, | Performed by: NURSE PRACTITIONER

## 2022-12-07 PROCEDURE — 1159F MED LIST DOCD IN RCRD: CPT | Mod: CPTII,S$GLB,, | Performed by: NURSE PRACTITIONER

## 2022-12-07 PROCEDURE — 3288F PR FALLS RISK ASSESSMENT DOCUMENTED: ICD-10-PCS | Mod: CPTII,S$GLB,, | Performed by: NURSE PRACTITIONER

## 2022-12-07 PROCEDURE — 1125F PR PAIN SEVERITY QUANTIFIED, PAIN PRESENT: ICD-10-PCS | Mod: CPTII,S$GLB,, | Performed by: NURSE PRACTITIONER

## 2022-12-07 PROCEDURE — 3078F PR MOST RECENT DIASTOLIC BLOOD PRESSURE < 80 MM HG: ICD-10-PCS | Mod: CPTII,S$GLB,, | Performed by: NURSE PRACTITIONER

## 2022-12-07 PROCEDURE — 99999 PR PBB SHADOW E&M-EST. PATIENT-LVL V: ICD-10-PCS | Mod: PBBFAC,,, | Performed by: NURSE PRACTITIONER

## 2022-12-07 PROCEDURE — 1160F PR REVIEW ALL MEDS BY PRESCRIBER/CLIN PHARMACIST DOCUMENTED: ICD-10-PCS | Mod: CPTII,S$GLB,, | Performed by: NURSE PRACTITIONER

## 2022-12-07 PROCEDURE — 1100F PR PT FALLS ASSESS DOC 2+ FALLS/FALL W/INJURY/YR: ICD-10-PCS | Mod: CPTII,S$GLB,, | Performed by: NURSE PRACTITIONER

## 2022-12-07 PROCEDURE — 1170F FXNL STATUS ASSESSED: CPT | Mod: CPTII,S$GLB,, | Performed by: NURSE PRACTITIONER

## 2022-12-07 PROCEDURE — 99499 UNLISTED E&M SERVICE: CPT | Mod: HCNC,S$GLB,, | Performed by: NURSE PRACTITIONER

## 2022-12-07 RX ORDER — DOXYLAMINE SUCCINATE 25 MG
TABLET ORAL
COMMUNITY
Start: 2022-10-25

## 2022-12-07 RX ORDER — CHOLECALCIFEROL (VITAMIN D3) 25 MCG
1 TABLET ORAL DAILY
COMMUNITY
Start: 2021-12-27 | End: 2024-02-13 | Stop reason: SDUPTHER

## 2022-12-07 RX ORDER — IBUPROFEN 200 MG
16 TABLET ORAL
COMMUNITY
Start: 2022-07-01 | End: 2024-02-13 | Stop reason: SDUPTHER

## 2022-12-07 RX ORDER — PIOGLITAZONEHYDROCHLORIDE 15 MG/1
15 TABLET ORAL
COMMUNITY
Start: 2022-11-03 | End: 2023-04-04 | Stop reason: ALTCHOICE

## 2022-12-07 NOTE — PATIENT INSTRUCTIONS
Counseling and Referral of Other Preventative  (Italic type indicates deductible and co-insurance are waived)    Patient Name: Nithin Pino  Today's Date: 12/7/2022    Health Maintenance       Date Due Completion Date    Colonoscopy Never done ---    Shingles Vaccine (3 of 3) 02/17/2020 12/23/2019    Diabetes Urine Screening 06/17/2021 6/17/2020    Eye Exam 08/05/2021 8/5/2020    Override on 8/5/2020: Done    Hemoglobin A1c 06/08/2022 12/8/2021    COVID-19 Vaccine (5 - Booster for Pfizer series) 06/23/2022 4/28/2022    Influenza Vaccine (1) 09/01/2022 10/1/2020    Lipid Panel 12/08/2022 12/8/2021    Aspirin/Antiplatelet Therapy 10/19/2023 10/19/2022    TETANUS VACCINE 04/03/2024 4/3/2014        No orders of the defined types were placed in this encounter.      The following information is provided to all patients.  This information is to help you find resources for any of the problems found today that may be affecting your health:                Living healthy guide: www.[facility].[place].gov      Understanding Diabetes: www.diabetes.org      Eating healthy: www.cdc.gov/healthyweight      CDC home safety checklist: www.cdc.gov/steadi/patient.html      Agency on Aging: www.goea.[place].[facility]      Alcoholics anonymous (AA): www.aa.org      Physical Activity: www.[place].nih.gov/im6wkul      Tobacco use: www.quitwithusla.org

## 2022-12-07 NOTE — PROGRESS NOTES
"  Nithin Pino presented for a follow-up Medicare AWV today. The following components were reviewed and updated:    Medical history  Family History  Social history  Allergies and Current Medications  Health Risk Assessment  Health Maintenance  Care Team    **See Completed Assessments for Annual Wellness visit with in the encounter summary    The following assessments were completed:  Depression Screening  Cognitive function Screening  Timed Get Up Test  Whisper Test          Vitals:    12/07/22 1115   BP: 138/70   BP Location: Left arm   Patient Position: Sitting   Pulse: 76   Resp: 18   Temp: 98.2 °F (36.8 °C)   TempSrc: Temporal   SpO2: 99%   Weight: 75.6 kg (166 lb 10.7 oz)   Height: 5' 10" (1.778 m)     Body mass index is 23.91 kg/m².   ]    Physical Exam  Vitals reviewed.   Constitutional:       Appearance: Normal appearance. He is normal weight.   HENT:      Head: Normocephalic and atraumatic.   Cardiovascular:      Rate and Rhythm: Normal rate and regular rhythm.      Pulses: Normal pulses.      Heart sounds: Normal heart sounds.   Pulmonary:      Effort: Pulmonary effort is normal.      Breath sounds: Normal breath sounds.   Abdominal:      General: Bowel sounds are normal.      Palpations: Abdomen is soft.   Musculoskeletal:         General: Normal range of motion.   Feet:      Right foot:      Toenail Condition: Right toenails are abnormally thick and long. Fungal disease present.     Left foot:      Toenail Condition: Left toenails are abnormally thick and long. Fungal disease present.  Skin:     General: Skin is warm and dry.      Capillary Refill: Capillary refill takes less than 2 seconds.      Findings: Bruising and wound present.      Comments: Right leg wound - see picture below.      Neurological:      General: No focal deficit present.      Mental Status: He is alert and oriented to person, place, and time.   Psychiatric:         Mood and Affect: Mood normal.         Behavior: Behavior normal.    "      Thought Content: Thought content normal.         Judgment: Judgment normal.               Diagnoses and health risks identified today and associated recommendations/orders:  Encounter for preventive health examination  Reviewed and discussed preventive health screenings and vaccinations with the patient.   Eye exam - appt scheduled with ophthalmology   Colonoscopy - patient declined further screenings at this time.   Flu - Patient states he received flu shot this [facility] Reviewed records and last recorded flu shot was 12/2021 - asked patient to get record from [facility] and if not given please let us know so we can get him his vaccine here.   Shingrix - per review, received Zoster in 2018 and Zoster Recombinant in 2019 through [facility] Advised patient to follow up with [facility] for 2nd Recombinant vaccine.   COVID-19 vaccine - Discussed; due for booster.     [facility] appointment 11/3/2022.   Diabetes labs completed 9/15/2022: Hemoglobin Alc 8.9%  DM urine screening done 12/7/2021 - 30.4      Panlobular emphysema  Stable. Continue current treatment plan as previously prescribed with your PCP     Diabetes mellitus type 2 with peripheral artery disease  Diabetes not controlled per hemoglobin Alc 9/15/2022 of 8.9%.   Currently on Metformin, Actos, Trulicity, basal insulin. [facility] - continue current treatment plan as previously prescribed with your PCP and [facility] HCP and follow up with them for further management.   PAD is stable on ASA and statin; this is monitored by cardiology.     Statin: Taking  ACE/ARB: Taking    Hypertension associated with diabetes  Stable on current regimen. Continue current treatment plan as previously prescribed with your PCP and cardiology     BP Readings from Last 3 Encounters:   12/07/22 138/70   10/19/22 116/68   10/17/22 (!) 122/56       Hyperlipidemia associated with type 2 diabetes mellitus  Stable on current regimen, LDL at goal <70. Continue current treatment  plan as previously prescribed with your PCP and cardiology     Lab Results   Component Value Date    CHOL 82 (L) 12/08/2021    CHOL 77 (L) 06/17/2020    CHOL 114 (L) 04/22/2019     Lab Results   Component Value Date    HDL 48 12/08/2021    HDL 45 06/17/2020    HDL 42 04/22/2019     Lab Results   Component Value Date    LDLCALC 18.8 (L) 12/08/2021    LDLCALC 17.4 (L) 06/17/2020    LDLCALC 45.8 (L) 04/22/2019     Lab Results   Component Value Date    TRIG 76 12/08/2021    TRIG 73 06/17/2020    TRIG 131 04/22/2019     Lab Results   Component Value Date    CHOLHDL 58.5 (H) 12/08/2021    CHOLHDL 58.4 (H) 06/17/2020    CHOLHDL 36.8 04/22/2019     Coronary artery disease of bypass graft of native heart with stable angina pectoris  Stable on current regimen, including ASA, statin, BB, and imdur. Continue current treatment plan as previously prescribed with your cardiologist.     Ischemic cardiomyopathy  Stable on current regimen. Continue current treatment plan as previously prescribed with your cardiologist.     Echo: 8/2021  The left ventricle is normal in size with mild concentric hypertrophy and normal systolic function.  The estimated ejection fraction is 55%.  Normal left ventricular diastolic function.  Normal right ventricular size with normal right ventricular systolic function.  Mild mitral regurgitation.  Mild pulmonic regurgitation.  Mild tricuspid regurgitation.  Normal central venous pressure (3 mmHg).  The estimated PA systolic pressure is 18 mmHg.  Mild AR    Chronic combined systolic and diastolic congestive heart failure  Stable on current regimen. Most recent LVEF 55% with normal diastolic function. Continue current treatment plan as previously prescribed with your cardiologist.      Asymptomatic stenosis of both carotid arteries without infarction  Stable on ASA and statin. Continue current treatment plan as previously prescribed with your cardiologist.      Nonrheumatic aortic valve  insufficiency  Stable. Continue current treatment plan as previously prescribed with your cardiologist.     Thoracic aorta atherosclerosis  Stable. Continue current treatment plan as previously prescribed with your cardiologist.     Microalbuminuria due to type 2 diabetes mellitus   ACR 12/2021 30.4. Patient is on ACEI/ARB therapy. Due for updated ACR; ordered by PCP.   Continue evaluation and management per primary care.     Stage 3a chronic kidney disease  Most recent eGFR 50. Continue current treatment plan as previously prescribed with your PCP    Bilateral sensorineural hearing loss  Stable. Continue current treatment plan as previously prescribed with your PCP     Benign prostatic hyperplasia with weak urinary stream  Stable on flomax and finasteride. Followed by urologist.     OAB (overactive bladder)  Stable on Vesicare. Followed by urologist.     Hyperparathyroidism  Last evaluated in 2020. Initiated on vitamin D supplementation at that time and has continued supplement. He is clinically stable. Follow up with PCP for further evaluation and management as necessary.     Hematuria, gross  Evaluated by urology; patient refused cystoscopy due to resolution of hematuria.  Continue current treatment plan as previously prescribed with your urologist.     RBBB  Stable. Continue current treatment plan as previously prescribed with your cardiologist.     Wound of right lower extremity, initial encounter   See picture under ' Physical Exam' above. Patient reports he tripped and fell and injured his leg over 1 week ago. He notes the wound is much improved with minimal pain. There is surrounding erythema with some swelling. I strongly encouraged the patient to schedule with primary care or visit an urgent care facility today; he declined and states he will follow up if it gets any worse or does not continue to improve. Discussed risks with patient, especially with his h/o uncontrolled DM2. Patient verbalized  understanding.     History of falling   Patient reports three falls in the past several months, attributing this to feeling off balance. He denies weakness or presyncope. His last fall occurred >1 week ago resulting in a wound to the right leg (see above #18). Discussed PT with the patient and benefit of PT for balance and gait. He declined at this time. Discussed fall prevention strategies and encouraged f/u with PCP for further evaluation and recommendations.     Onychomycosis of multiple toenails with type 2 diabetes mellitus   Podiatry referral has been placed for foot exam and nail care in diabetic patient.     Opioid screening: patient does not use opioids     I offered to discuss advanced care planning, including how to pick a person who would make decisions for you if you were unable to make them for yourself, called a health care power of , and what kind of decisions you might make such as use of life sustaining treatments such as ventilators and tube feeding when faced with a life limiting illness recorded on a living will that they will need to know. (How you want to be cared for as you near the end of your natural life)     X Patient is interested in learning more about how to make advanced directives.  I provided them paperwork and offered to discuss this with them.    Provided Nithin with a 5-10 year written screening schedule and personal prevention plan. Recommendations were developed using the USPSTF age appropriate recommendations. Education, counseling, and referrals were provided as needed.  After Visit Summary printed and given to patient which includes a list of additional screenings\tests needed.    Follow up in about 1 year (around 12/7/2023).      Moni Felton NP

## 2022-12-16 ENCOUNTER — OFFICE VISIT (OUTPATIENT)
Dept: PODIATRY | Facility: CLINIC | Age: 82
End: 2022-12-16
Payer: MEDICARE

## 2022-12-16 DIAGNOSIS — H90.3 BILATERAL SENSORINEURAL HEARING LOSS: ICD-10-CM

## 2022-12-16 DIAGNOSIS — L60.3 ONYCHODYSTROPHY: ICD-10-CM

## 2022-12-16 DIAGNOSIS — E11.49 DM (DIABETES MELLITUS), TYPE 2 WITH NEUROLOGICAL COMPLICATIONS: ICD-10-CM

## 2022-12-16 DIAGNOSIS — E11.51 DIABETES MELLITUS TYPE 2 WITH PERIPHERAL ARTERY DISEASE: Primary | ICD-10-CM

## 2022-12-16 PROCEDURE — 1159F MED LIST DOCD IN RCRD: CPT | Mod: CPTII,S$GLB,, | Performed by: PODIATRIST

## 2022-12-16 PROCEDURE — 99999 PR PBB SHADOW E&M-EST. PATIENT-LVL III: ICD-10-PCS | Mod: PBBFAC,,, | Performed by: PODIATRIST

## 2022-12-16 PROCEDURE — 3288F PR FALLS RISK ASSESSMENT DOCUMENTED: ICD-10-PCS | Mod: CPTII,S$GLB,, | Performed by: PODIATRIST

## 2022-12-16 PROCEDURE — 99203 OFFICE O/P NEW LOW 30 MIN: CPT | Mod: 25,S$GLB,, | Performed by: PODIATRIST

## 2022-12-16 PROCEDURE — 1100F PR PT FALLS ASSESS DOC 2+ FALLS/FALL W/INJURY/YR: ICD-10-PCS | Mod: CPTII,S$GLB,, | Performed by: PODIATRIST

## 2022-12-16 PROCEDURE — 3288F FALL RISK ASSESSMENT DOCD: CPT | Mod: CPTII,S$GLB,, | Performed by: PODIATRIST

## 2022-12-16 PROCEDURE — 11721 DEBRIDE NAIL 6 OR MORE: CPT | Mod: Q9,S$GLB,, | Performed by: PODIATRIST

## 2022-12-16 PROCEDURE — 11721 PR DEBRIDEMENT OF NAILS, 6 OR MORE: ICD-10-PCS | Mod: Q9,S$GLB,, | Performed by: PODIATRIST

## 2022-12-16 PROCEDURE — 1160F RVW MEDS BY RX/DR IN RCRD: CPT | Mod: CPTII,S$GLB,, | Performed by: PODIATRIST

## 2022-12-16 PROCEDURE — 99203 PR OFFICE/OUTPT VISIT, NEW, LEVL III, 30-44 MIN: ICD-10-PCS | Mod: 25,S$GLB,, | Performed by: PODIATRIST

## 2022-12-16 PROCEDURE — 1159F PR MEDICATION LIST DOCUMENTED IN MEDICAL RECORD: ICD-10-PCS | Mod: CPTII,S$GLB,, | Performed by: PODIATRIST

## 2022-12-16 PROCEDURE — 1160F PR REVIEW ALL MEDS BY PRESCRIBER/CLIN PHARMACIST DOCUMENTED: ICD-10-PCS | Mod: CPTII,S$GLB,, | Performed by: PODIATRIST

## 2022-12-16 PROCEDURE — 99999 PR PBB SHADOW E&M-EST. PATIENT-LVL III: CPT | Mod: PBBFAC,,, | Performed by: PODIATRIST

## 2022-12-16 PROCEDURE — 1100F PTFALLS ASSESS-DOCD GE2>/YR: CPT | Mod: CPTII,S$GLB,, | Performed by: PODIATRIST

## 2022-12-16 NOTE — PROGRESS NOTES
Subjective:       Patient ID: Nithin Pino is a 82 y.o. male.    Chief Complaint: Diabetic Foot Exam (Patient is a diabetic and was last seen on 12.7.22 by NP Moni Felton. He denies pain at present. )      HPI: Nithin Pino presents to the office today, under referral by ,  Moni Felton NP and Jim Tomlin MD, for his annual diabetic foot assessment and risk evaluation.  Patient is a DMII. This patient last saw his/her internal/family medicine physician on 12/07/2022.  States 0/10 pain to the right left foot.  Does complain of burning sensation to the digits which has been ongoing for over 1 year.  Denies any recent trauma or injury.  Denies any recent falls.    Hemoglobin A1C   Date Value Ref Range Status   12/08/2021 7.5 (H) 4.0 - 5.6 % Final     Comment:     ADA Screening Guidelines:  5.7-6.4%  Consistent with prediabetes  >or=6.5%  Consistent with diabetes    High levels of fetal hemoglobin interfere with the HbA1C  assay. Heterozygous hemoglobin variants (HbS, HgC, etc)do  not significantly interfere with this assay.   However, presence of multiple variants may affect accuracy.     11/23/2020 6.3 (H) 4.0 - 5.6 % Final     Comment:     ADA Screening Guidelines:  5.7-6.4%  Consistent with prediabetes  >or=6.5%  Consistent with diabetes  High levels of fetal hemoglobin interfere with the HbA1C  assay. Heterozygous hemoglobin variants (HbS, HgC, etc)do  not significantly interfere with this assay.   However, presence of multiple variants may affect accuracy.     06/17/2020 6.7 (H) 4.0 - 5.6 % Final     Comment:     ADA Screening Guidelines:  5.7-6.4%  Consistent with prediabetes  >or=6.5%  Consistent with diabetes  High levels of fetal hemoglobin interfere with the HbA1C  assay. Heterozygous hemoglobin variants (HbS, HgC, etc)do  not significantly interfere with this assay.   However, presence of multiple variants may affect accuracy.     .    Review of patient's allergies indicates:   Allergen  Reactions    Pseudoephedrine-guaifenesin Shortness Of Breath    Panmist dm  [pseudoephedrine-dm-guaifenesin]      Other reaction(s): Unknown       Past Medical History:   Diagnosis Date    Abnormal brain MRI 1/21/2018    Chronic microvascular ischemia changes per MRI July 9, 2007 in [facility]    Abnormal ECG 10/31/2013    Abnormal stress test 1/28/2016    Alcohol dependence     States alcohol abuse ended in 1994    AP (angina pectoris) 1/28/2016    Asthma     Carotid artery occlusion     Cataract     Chronic combined systolic and diastolic congestive heart failure 5/24/2021    Chronic diastolic heart failure 10/31/2013    Chronic ischemic heart disease 10/31/2013    CKD (chronic kidney disease) stage 3, GFR 30-59 ml/min 11/4/2015    Coronary artery disease     DM (diabetes mellitus) 2013    BS doesn't check 03/28/2017     DM (diabetes mellitus) 2011    BS doesn' check  04/03/2019    Heart valve regurgitation 11/4/2015    Echocardiogram 2/15/16   1 - Concentric hypertrophy.    2 - Normal left ventricular systolic function (EF 60-65%).    3 - Normal left ventricular diastolic function.    4 - Mild left atrial enlargement.    5 - Normal right ventricular systolic function .    6 - Trivial to mild aortic regurgitation.    7 - Trivial to mild mitral regurgitation.  10 - Trivial to mild pulmonic regurgitation.     Hematuria 6/25/2020    History of alcohol abuse 1/22/2018    Patient denies drinking to excess since 1994.    History of atherectomy 1/21/2018 2/2016 [facility]    History of chronic kidney disease 1/22/2018    History of CKD 3    History of PTCA 10/31/2013    History of PTCA 3/9/2016    Hyperlipidemia     Hypertension     Insomnia 6/17/2013    Ischemic cardiomyopathy 10/31/2013    Long term (current) use of antithrombotics/antiplatelets 1/21/2018    Myocardial infarction     Old MI (myocardial infarction) 1994    Peripheral vascular disease     Polyneuropathy     RBBB 10/31/2013    S/P CABG (coronary artery  bypass graft) 10/31/2013    Shortness of breath 10/31/2013    Tobacco dependence     resolved    Trouble in sleeping     Type 2 diabetes with peripheral circulatory disorder, controlled     Wears glasses        Family History   Adopted: Yes   Problem Relation Age of Onset    Diabetes Brother     Diabetes Sister     Cancer Neg Hx     Heart disease Neg Hx     Kidney disease Neg Hx        Social History     Socioeconomic History    Marital status:     Number of children: 5   Tobacco Use    Smoking status: Former     Packs/day: 1.50     Years: 40.00     Pack years: 60.00     Types: Cigarettes     Quit date: 1994     Years since quittin.9    Smokeless tobacco: Former     Quit date: 2011    Tobacco comments:     approx date   Substance and Sexual Activity    Alcohol use: Yes     Comment: occasionally; 1-2 cans of beer a month    Drug use: No    Sexual activity: Never     Birth control/protection: None     Social Determinants of Health     Financial Resource Strain: Low Risk     Difficulty of Paying Living Expenses: Not very hard   Food Insecurity: No Food Insecurity    Worried About Running Out of Food in the Last Year: Never true    Ran Out of Food in the Last Year: Never true   Transportation Needs: No Transportation Needs    Lack of Transportation (Medical): No    Lack of Transportation (Non-Medical): No   Physical Activity: Inactive    Days of Exercise per Week: 0 days    Minutes of Exercise per Session: 0 min   Stress: No Stress Concern Present    Feeling of Stress : Only a little   Social Connections: Socially Isolated    Frequency of Communication with Friends and Family: Twice a week    Frequency of Social Gatherings with Friends and Family: Never    Attends Jainism Services: Never    Active Member of Clubs or Organizations: No    Attends Club or Organization Meetings: Never    Marital Status:    Housing Stability: Low Risk     Unable to Pay for Housing in the Last Year: No    Number  of Places Lived in the Last Year: 1    Unstable Housing in the Last Year: No       Past Surgical History:   Procedure Laterality Date    APPENDECTOMY      CARDIAC CATHETERIZATION      2016    CARDIAC SURGERY  1994    balloon angioplasty    CATARACT EXTRACTION W/  INTRAOCULAR LENS IMPLANT Right 02/01/2017    CORONARY ARTERY BYPASS GRAFT  5/2011    per patient x4    HERNIA REPAIR      PCIOL Bilateral OD 02/01/17/OS 02/15/17    DR. ALONZO       Review of Systems      Objective:   There were no vitals taken for this visit.    Physical Exam  LOWER EXTREMITY PHYSICAL EXAMINATION    ORTHOPEDIC:  No gross or structural anatomic deformity present to the right foot or left foot.  Intrinsic and extrinsic musculature of the lower extremities appear to be functioning appropriately and intact.  Equinus contracture is noted.    VASCULAR:  The right dorsalis pedis pulse 2/4 and the right posterior tibial pulse 1/4.  The left dorsalis pedis pulse 2/4 and posterior tibial pulse on the left is 1/4.  Capillary refill is intact.  Pedal hair growth decreased.   NEUROLOGY:  Protective sensation is not intact to the right left foot.  Vibratory sensation is diminished.  Proprioception is intact.  Sharp/dull is reduced.    DERMATOLOGY:  Skin is supple, moist, intact.  There is no signs of callusing, ulcerations, other lesions identified to the dorsal or plantar aspect of the right or left foot.  The R1, 2, 5 and left L1,2, 5 are thickened, discolored dystrophic.  There is subungual debris.  Nail plates have area of dark discoloration.  The remaining nails 3-4 on the right foot and the left foot are elongated but of normal color, thickness, and texture.   There is no signs of ingrowing into the medial or lateral borders.  There is no evidence of wounds or skin breakdown.  No edema or erythema.  No obvious lacerations or fissuring.  Interdigital spaces are clean, dry, intact.  No rashes or scars appreciated.    Assessment:     1. Diabetes  mellitus type 2 with peripheral artery disease    2. DM (diabetes mellitus), type 2 with neurological complications    3. Bilateral sensorineural hearing loss    4. Onychodystrophy        Plan:     Diabetes mellitus type 2 with peripheral artery disease  -     Ambulatory referral/consult to Podiatry    DM (diabetes mellitus), type 2 with neurological complications    Bilateral sensorineural hearing loss    Onychodystrophy       I counseled the patient on his/her Diabetic Mellitus regarding today's clinical examination and objection findings. We did also discuss recent medication changes, pertinent labs and imaging evaluations and other medical consultation notes and progress notes. Greater than 50% of this visit was spent on counseling and coordination of care. Greater than 20 minutes of this appt. was spent on education about the diabetic foot, in relation to PVD and/or neuropathy, and the prevention of limb loss.     Shoe gear is inspected and wear and proper fit/type. Patient is reminded of the importance of good nutrition and blood sugar control to help prevent podiatric complications of diabetes. Patient instructed on proper foot hygeine. We discussed wearing proper shoe gear, daily foot inspections, never walking without protective shoe gear, never putting sharp instruments to feet.  Patient  will continue to monitor the areas daily, inspect feet, wear protective shoe gear when ambulatory, moisturizer to maintain skin integrity.     Patient's DMI/DMII is managed by Internal/Family Medicine Physician and/or Endocrinology Advanced Practice Provider.    Dystrophic nail plates, as outlined above (R#1,2,5  ; L#1,2,5 ), are sharply debrided with double action nail nipper, and/or with the assistance of a mechanical rotary leon, with removal of all offending nail and nail border(s), for reduction of pains. Nails are reduced in terms of length, width and girth with removal of subungual debris to facilitate pain free  weight bearing and ambulation. The elongated nails as outlined in the objective portion of this note, were trimmed to appropriate length, with a double action nail nipper, for alleviation/reduction of pains as well. Follow up in approx. 3-4 months.

## 2022-12-27 ENCOUNTER — OFFICE VISIT (OUTPATIENT)
Dept: OPHTHALMOLOGY | Facility: CLINIC | Age: 82
End: 2022-12-27
Payer: COMMERCIAL

## 2022-12-27 DIAGNOSIS — H52.13 MYOPIA OF BOTH EYES WITH ASTIGMATISM: ICD-10-CM

## 2022-12-27 DIAGNOSIS — E11.9 DIABETES MELLITUS WITHOUT COMPLICATION: Primary | ICD-10-CM

## 2022-12-27 DIAGNOSIS — H52.203 MYOPIA OF BOTH EYES WITH ASTIGMATISM: ICD-10-CM

## 2022-12-27 DIAGNOSIS — Z96.1 PSEUDOPHAKIA OF BOTH EYES: ICD-10-CM

## 2022-12-27 DIAGNOSIS — E11.51 DIABETES MELLITUS TYPE 2 WITH PERIPHERAL ARTERY DISEASE: ICD-10-CM

## 2022-12-27 LAB
LEFT EYE DM RETINOPATHY: NEGATIVE
RIGHT EYE DM RETINOPATHY: NEGATIVE

## 2022-12-27 PROCEDURE — 92015 DETERMINE REFRACTIVE STATE: CPT | Mod: S$GLB,,, | Performed by: OPTOMETRIST

## 2022-12-27 PROCEDURE — 92015 PR REFRACTION: ICD-10-PCS | Mod: S$GLB,,, | Performed by: OPTOMETRIST

## 2022-12-27 PROCEDURE — 99999 PR PBB SHADOW E&M-EST. PATIENT-LVL III: CPT | Mod: PBBFAC,,, | Performed by: OPTOMETRIST

## 2022-12-27 PROCEDURE — 92014 PR EYE EXAM, EST PATIENT,COMPREHESV: ICD-10-PCS | Mod: S$GLB,,, | Performed by: OPTOMETRIST

## 2022-12-27 PROCEDURE — 92014 COMPRE OPH EXAM EST PT 1/>: CPT | Mod: S$GLB,,, | Performed by: OPTOMETRIST

## 2022-12-27 PROCEDURE — 99999 PR PBB SHADOW E&M-EST. PATIENT-LVL III: ICD-10-PCS | Mod: PBBFAC,,, | Performed by: OPTOMETRIST

## 2022-12-27 RX ORDER — SULFAMETHOXAZOLE AND TRIMETHOPRIM 800; 160 MG/1; MG/1
TABLET ORAL
COMMUNITY
Start: 2022-12-13 | End: 2023-02-17 | Stop reason: ALTCHOICE

## 2022-12-27 NOTE — PROGRESS NOTES
HPI     Annual Exam            Comments: New patient to [facility]   Patient here for diabetic eye exam           Diabetic Eye Exam            Comments: Lab Results       Component                Value               Date                       HGBA1C                   7.5 (H)             12/08/2021                     Comments    Diabetic eye exam  Diagnosed with diabetes in 2019  Recent vision fluctuations None  PCP Dr. Tomlin   Last A1C 8.6     Vision changes since last eye exam?: None noticed      Any eye pain today: No    Other ocular symptoms: No    Interested in contact lens fitting today? No             Last edited by Chaya Bazan MA on 12/27/2022  8:03 AM.            Assessment /Plan     For exam results, see Encounter Report.    Diabetes mellitus without complication  3 years, last A1c 8.6 Stressed importance of DM control to preserve vision. No diabetic retinopathy was seen in either eye today. Continue strict blood glucose control.  Reviewed importance of yearly dilated eye exams. Continue close care with Dr Tomlin regarding diabetes.    Diabetes mellitus type 2 with peripheral artery disease  -     Ambulatory referral/consult to Ophthalmology    Pseudophakia of both eyes  S/p Ce/IOL with Dr POMPA, doing well. Observe      RTC 1 yr for dilated eye exam or sooner if any changes to vision.   Discussed above and answered questions.

## 2023-01-06 NOTE — TELEPHONE ENCOUNTER
Pt may hold asa for 5 days for biopsy.    Dr Sadler     Peng Advancement Flap Text: The defect edges were debeveled with a #15 scalpel blade.  Given the location of the defect, shape of the defect and the proximity to free margins a Peng advancement flap was deemed most appropriate.  Using a sterile surgical marker, an appropriate advancement flap was drawn incorporating the defect and placing the expected incisions within the relaxed skin tension lines where possible. The area thus outlined was incised deep to adipose tissue with a #15 scalpel blade.  The skin margins were undermined to an appropriate distance in all directions utilizing iris scissors.

## 2023-01-19 DIAGNOSIS — I20.9 AP (ANGINA PECTORIS): ICD-10-CM

## 2023-01-19 RX ORDER — ISOSORBIDE MONONITRATE 60 MG/1
60 TABLET, EXTENDED RELEASE ORAL DAILY
Qty: 90 TABLET | Refills: 0 | OUTPATIENT
Start: 2023-01-19

## 2023-01-19 NOTE — TELEPHONE ENCOUNTER
Care Due:                  Date            Visit Type   Department     Provider  --------------------------------------------------------------------------------    Last Visit: None Found      None         None Found  Next Visit: None Scheduled  None         None Found                                                            Last  Test          Frequency    Reason                     Performed    Due Date  --------------------------------------------------------------------------------    Office Visit  12 months..  isosorbide, rosuvastatin.  Not Found    Overdue    CMP.........  12 months..  rosuvastatin.............  12- 12-    Lipid Panel.  12 months..  rosuvastatin.............  12- 12-    [employer] Embedded Care Gaps. Reference number: [id number]. 1/19/2023   8:03:23 AM CST

## 2023-01-20 DIAGNOSIS — I20.9 AP (ANGINA PECTORIS): ICD-10-CM

## 2023-01-20 RX ORDER — ISOSORBIDE MONONITRATE 60 MG/1
60 TABLET, EXTENDED RELEASE ORAL DAILY
Qty: 90 TABLET | Refills: 0 | Status: SHIPPED | OUTPATIENT
Start: 2023-01-20 | End: 2023-01-27 | Stop reason: SDUPTHER

## 2023-01-20 NOTE — TELEPHONE ENCOUNTER
No new care gaps identified.  [facility] Embedded Care Gaps. Reference number: [id number]. 1/20/2023   2:31:33 PM CST

## 2023-01-27 DIAGNOSIS — I20.9 AP (ANGINA PECTORIS): ICD-10-CM

## 2023-01-27 RX ORDER — ISOSORBIDE MONONITRATE 60 MG/1
60 TABLET, EXTENDED RELEASE ORAL DAILY
Qty: 90 TABLET | Refills: 0 | Status: ON HOLD | OUTPATIENT
Start: 2023-01-27 | End: 2023-06-08

## 2023-01-27 NOTE — TELEPHONE ENCOUNTER
No new care gaps identified.  [facility] Embedded Care Gaps. Reference number: [id number]. 1/27/2023   11:44:27 AM CST

## 2023-02-15 ENCOUNTER — EXTERNAL HOSPITAL ADMISSION (OUTPATIENT)
Dept: ADMINISTRATIVE | Facility: CLINIC | Age: 83
End: 2023-02-15
Payer: MEDICARE

## 2023-02-15 ENCOUNTER — PATIENT OUTREACH (OUTPATIENT)
Dept: ADMINISTRATIVE | Facility: CLINIC | Age: 83
End: 2023-02-15
Payer: MEDICARE

## 2023-02-15 PROCEDURE — G0180 PR HOME HEALTH MD CERTIFICATION: ICD-10-PCS | Mod: ,,, | Performed by: INTERNAL MEDICINE

## 2023-02-15 PROCEDURE — G0180 MD CERTIFICATION HHA PATIENT: HCPCS | Mod: ,,, | Performed by: INTERNAL MEDICINE

## 2023-02-15 NOTE — PROGRESS NOTES
C3 nurse spoke with Nithin Pino's daughter, Ai, for a TCC post hospital discharge follow up call. The TCC script was not completed due to she is unable to review medications and she is not with the patient at this time. She asked for a return call either tomorrow afternoon or Friday, as she needs to contact the patient's Diabetes provider to reconcile some discrepancies in medication adjustments upon discharge. The patient does not have a scheduled HOSFU appointment with Jim Tomlin MD within 5-7 days post hospital discharge date 2/14/23. C3 nurse was unable to schedule HOSFU appointment in [facility].    Message sent to PCP staff requesting they contact patient and schedule follow up appointment.    Please contact daughter, Ai, for appointments and questions at this time.     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 Asked her to provide documentation of this so that it can be addressed.

## 2023-02-15 NOTE — PROGRESS NOTES
C3 nurse attempted to contact Nithin Pino for a TCC post hospital discharge follow up call. No answer. The patient does not have a scheduled HOSFU appointment. Message sent to PCP staff to assist in scheduling HOSFU appointment.

## 2023-02-16 NOTE — PROGRESS NOTES
C3 nurse attempted to contact Nithin Pino's daughter, Ai, for a TCC post hospital discharge follow up call. No answer. Left voicemail with callback information. The patient has a scheduled HOSFU appointment with Dr. Tomlin's NP (Abby) on 2/16/23.

## 2023-02-16 NOTE — TELEPHONE ENCOUNTER
Called daughter to schedule hospital followup. No answer. Scheduled appt with MONICA Apple for tomorrow. Left message for daughter on appt time and to call back to reschedule if needed.

## 2023-02-17 ENCOUNTER — OFFICE VISIT (OUTPATIENT)
Dept: INTERNAL MEDICINE | Facility: CLINIC | Age: 83
End: 2023-02-17
Payer: MEDICARE

## 2023-02-17 VITALS
HEIGHT: 70 IN | WEIGHT: 151.44 LBS | DIASTOLIC BLOOD PRESSURE: 74 MMHG | BODY MASS INDEX: 21.68 KG/M2 | TEMPERATURE: 98 F | OXYGEN SATURATION: 98 % | SYSTOLIC BLOOD PRESSURE: 140 MMHG | HEART RATE: 76 BPM

## 2023-02-17 DIAGNOSIS — E11.59 HYPERTENSION ASSOCIATED WITH DIABETES: ICD-10-CM

## 2023-02-17 DIAGNOSIS — S72.92XD CLOSED FRACTURE OF LEFT FEMUR WITH ROUTINE HEALING, UNSPECIFIED FRACTURE MORPHOLOGY, UNSPECIFIED PORTION OF FEMUR, SUBSEQUENT ENCOUNTER: ICD-10-CM

## 2023-02-17 DIAGNOSIS — I15.2 HYPERTENSION ASSOCIATED WITH DIABETES: ICD-10-CM

## 2023-02-17 DIAGNOSIS — Z09 HOSPITAL DISCHARGE FOLLOW-UP: Primary | ICD-10-CM

## 2023-02-17 DIAGNOSIS — E78.5 HYPERLIPIDEMIA ASSOCIATED WITH TYPE 2 DIABETES MELLITUS: ICD-10-CM

## 2023-02-17 DIAGNOSIS — E11.69 HYPERLIPIDEMIA ASSOCIATED WITH TYPE 2 DIABETES MELLITUS: ICD-10-CM

## 2023-02-17 DIAGNOSIS — E11.51 DIABETES MELLITUS TYPE 2 WITH PERIPHERAL ARTERY DISEASE: ICD-10-CM

## 2023-02-17 PROCEDURE — 99496 TRANSITIONAL CARE MANAGE SERVICE 7 DAY DISCHARGE: ICD-10-PCS | Mod: HCNC,S$GLB,, | Performed by: NURSE PRACTITIONER

## 2023-02-17 PROCEDURE — 3072F PR LOW RISK FOR RETINOPATHY: ICD-10-PCS | Mod: HCNC,CPTII,S$GLB, | Performed by: NURSE PRACTITIONER

## 2023-02-17 PROCEDURE — 1100F PR PT FALLS ASSESS DOC 2+ FALLS/FALL W/INJURY/YR: ICD-10-PCS | Mod: HCNC,CPTII,S$GLB, | Performed by: NURSE PRACTITIONER

## 2023-02-17 PROCEDURE — 1125F PR PAIN SEVERITY QUANTIFIED, PAIN PRESENT: ICD-10-PCS | Mod: HCNC,CPTII,S$GLB, | Performed by: NURSE PRACTITIONER

## 2023-02-17 PROCEDURE — 3077F PR MOST RECENT SYSTOLIC BLOOD PRESSURE >= 140 MM HG: ICD-10-PCS | Mod: HCNC,CPTII,S$GLB, | Performed by: NURSE PRACTITIONER

## 2023-02-17 PROCEDURE — 1160F PR REVIEW ALL MEDS BY PRESCRIBER/CLIN PHARMACIST DOCUMENTED: ICD-10-PCS | Mod: HCNC,CPTII,S$GLB, | Performed by: NURSE PRACTITIONER

## 2023-02-17 PROCEDURE — 99496 TRANSJ CARE MGMT HIGH F2F 7D: CPT | Mod: HCNC,S$GLB,, | Performed by: NURSE PRACTITIONER

## 2023-02-17 PROCEDURE — 99999 PR PBB SHADOW E&M-EST. PATIENT-LVL V: ICD-10-PCS | Mod: PBBFAC,HCNC,, | Performed by: NURSE PRACTITIONER

## 2023-02-17 PROCEDURE — 1125F AMNT PAIN NOTED PAIN PRSNT: CPT | Mod: HCNC,CPTII,S$GLB, | Performed by: NURSE PRACTITIONER

## 2023-02-17 PROCEDURE — 3288F FALL RISK ASSESSMENT DOCD: CPT | Mod: HCNC,CPTII,S$GLB, | Performed by: NURSE PRACTITIONER

## 2023-02-17 PROCEDURE — 1159F MED LIST DOCD IN RCRD: CPT | Mod: HCNC,CPTII,S$GLB, | Performed by: NURSE PRACTITIONER

## 2023-02-17 PROCEDURE — 3078F PR MOST RECENT DIASTOLIC BLOOD PRESSURE < 80 MM HG: ICD-10-PCS | Mod: HCNC,CPTII,S$GLB, | Performed by: NURSE PRACTITIONER

## 2023-02-17 PROCEDURE — 99999 PR PBB SHADOW E&M-EST. PATIENT-LVL V: CPT | Mod: PBBFAC,HCNC,, | Performed by: NURSE PRACTITIONER

## 2023-02-17 PROCEDURE — 1160F RVW MEDS BY RX/DR IN RCRD: CPT | Mod: HCNC,CPTII,S$GLB, | Performed by: NURSE PRACTITIONER

## 2023-02-17 PROCEDURE — 1100F PTFALLS ASSESS-DOCD GE2>/YR: CPT | Mod: HCNC,CPTII,S$GLB, | Performed by: NURSE PRACTITIONER

## 2023-02-17 PROCEDURE — 3072F LOW RISK FOR RETINOPATHY: CPT | Mod: HCNC,CPTII,S$GLB, | Performed by: NURSE PRACTITIONER

## 2023-02-17 PROCEDURE — 3288F PR FALLS RISK ASSESSMENT DOCUMENTED: ICD-10-PCS | Mod: HCNC,CPTII,S$GLB, | Performed by: NURSE PRACTITIONER

## 2023-02-17 PROCEDURE — 1159F PR MEDICATION LIST DOCUMENTED IN MEDICAL RECORD: ICD-10-PCS | Mod: HCNC,CPTII,S$GLB, | Performed by: NURSE PRACTITIONER

## 2023-02-17 PROCEDURE — 3078F DIAST BP <80 MM HG: CPT | Mod: HCNC,CPTII,S$GLB, | Performed by: NURSE PRACTITIONER

## 2023-02-17 PROCEDURE — 3077F SYST BP >= 140 MM HG: CPT | Mod: HCNC,CPTII,S$GLB, | Performed by: NURSE PRACTITIONER

## 2023-02-17 RX ORDER — METFORMIN HYDROCHLORIDE EXTENDED-RELEASE TABLETS 500 MG/1
500 TABLET, FILM COATED, EXTENDED RELEASE ORAL 2 TIMES DAILY
Qty: 180 TABLET | Refills: 1 | Status: SHIPPED | OUTPATIENT
Start: 2023-02-17 | End: 2023-02-28 | Stop reason: CLARIF

## 2023-02-17 RX ORDER — METFORMIN HYDROCHLORIDE EXTENDED-RELEASE TABLETS 500 MG/1
500 TABLET, FILM COATED, EXTENDED RELEASE ORAL 2 TIMES DAILY
COMMUNITY
End: 2023-02-17 | Stop reason: SDUPTHER

## 2023-02-17 NOTE — PROGRESS NOTES
"Subjective:       Patient ID: Nithin Pino is a 82 y.o. male.    Chief Complaint: Follow-up    Pt presents to clinic today for hospital follow up with his daughter  He had a fall in Jan and fractured left femur  Had it surgically repaired and then was discharged to rehab  He was discharged from rehab on 2/14  No records are here today for review from [facility]  Daughter reports they stopped his BP meds and his insulin- she is unsure why  Will request records to review  Pt has not been checking his blood glucose so unsure how his sugars are running  Currently staying in a hotel- extended stay  Daughter doesn't feel like she can safely get him in his travel trailer that he lives in  [facility] on his [facility], ramp for his trailer to make his home more accessible  Pt admits to being frustrated with this and the financial strain this places on him  From a recovery standpoint, pt is walking with walker- reports pain with long distances  Has therapy coming out     Transitional Care Note    Family and/or Caretaker present at visit?  Yes.  Diagnostic tests reviewed/disposition: No diagnosic tests pending after this hospitalization.  Disease/illness education: hip fracture   Home health/community services discussion/referrals: Patient has home health established at [facility].   Establishment or re-establishment of referral orders for community resources: No other necessary community resources.   Discussion with other health care providers: No discussion with other health care providers necessary.                         BP (!) 140/74   Pulse 76   Temp 97.5 °F (36.4 °C)   Ht 5' 10" (1.778 m)   Wt 68.7 kg (151 lb 7.3 oz)   SpO2 98%   BMI 21.73 kg/m²     Review of Systems   Constitutional:  Negative for activity change, appetite change, chills, diaphoresis, fatigue, fever and unexpected weight change.   HENT: Negative.     Eyes: Negative.    Respiratory:  Negative for apnea, chest tightness, " shortness of breath and stridor.    Cardiovascular:  Negative for chest pain, palpitations and leg swelling.   Gastrointestinal: Negative.    Endocrine: Negative.    Genitourinary: Negative.    Musculoskeletal:  Positive for arthralgias, gait problem and myalgias.   Skin:  Negative for color change, pallor, rash and wound.   Allergic/Immunologic: Negative.    Neurological:  Negative for dizziness, facial asymmetry, light-headedness and headaches.   Hematological:  Negative for adenopathy.   Psychiatric/Behavioral:  Negative for agitation and behavioral problems.      Objective:      Physical Exam  Vitals and nursing note reviewed.   Constitutional:       General: He is not in acute distress.     Appearance: He is well-developed. He is not diaphoretic.   HENT:      Head: Normocephalic and atraumatic.   Cardiovascular:      Rate and Rhythm: Normal rate and regular rhythm.      Heart sounds: Normal heart sounds.   Pulmonary:      Effort: Pulmonary effort is normal. No respiratory distress.      Breath sounds: Normal breath sounds.   Musculoskeletal:      Comments: Able to walk with rolling walker   Skin:     General: Skin is warm and dry.      Findings: No rash.   Neurological:      Mental Status: He is alert and oriented to person, place, and time.   Psychiatric:         Behavior: Behavior normal.         Thought Content: Thought content normal.         Judgment: Judgment normal.       Assessment:       1. Hospital discharge follow-up    2. Closed fracture of left femur with routine healing, unspecified fracture morphology, unspecified portion of femur, subsequent encounter    3. Diabetes mellitus type 2 with peripheral artery disease    4. Hypertension associated with diabetes    5. Hyperlipidemia associated with type 2 diabetes mellitus    6. BMI 21.0-21.9, adult        Plan:       Nithin was seen today for follow-up.    Diagnoses and all orders for this visit:    Hospital discharge follow-up  Closed fracture of left  femur with routine healing, unspecified fracture morphology, unspecified portion of femur, subsequent encounter       - Follow up with surgeon as scheduled   Diabetes mellitus type 2 with peripheral artery disease       - Chronic, [facility]- advised pt to start monitoring his blood sugars and follow up with [facility] for continued management   Hypertension associated with diabetes        - Chronic, stable currently. Advised daughter to restart isosorbide and follow up with Dr. Sadler   Hyperlipidemia associated with type 2 diabetes mellitus        - Chronic, stable   BMI 21.0-21.9, adult    Other orders  -     metFORMIN (FORTAMET) 500 mg 24hr tablet; Take 1 tablet (500 mg total) by mouth 2 (two) times a day.      Continue meds as discussed  Check blood sugar so we can see how his sugars are running   [facility] with [facility] in [facility]  Keep appt with surgeon  Schedule appt with Dr. Doroteo Tomlin in 3 months  Follow up for worsening or no improvement in symptoms and PRN.

## 2023-02-28 ENCOUNTER — TELEPHONE (OUTPATIENT)
Dept: INTERNAL MEDICINE | Facility: CLINIC | Age: 83
End: 2023-02-28
Payer: MEDICARE

## 2023-02-28 DIAGNOSIS — E11.51 DIABETES MELLITUS TYPE 2 WITH PERIPHERAL ARTERY DISEASE: ICD-10-CM

## 2023-02-28 DIAGNOSIS — S72.92XD CLOSED FRACTURE OF LEFT FEMUR WITH ROUTINE HEALING, UNSPECIFIED FRACTURE MORPHOLOGY, UNSPECIFIED PORTION OF FEMUR, SUBSEQUENT ENCOUNTER: Primary | ICD-10-CM

## 2023-02-28 RX ORDER — METFORMIN HYDROCHLORIDE 500 MG/1
500 TABLET, EXTENDED RELEASE ORAL 2 TIMES DAILY WITH MEALS
Qty: 180 TABLET | Refills: 3 | Status: SHIPPED | OUTPATIENT
Start: 2023-02-28 | End: 2024-02-13

## 2023-02-28 NOTE — TELEPHONE ENCOUNTER
----- Message from Priscila Ramon sent at 2/28/2023  9:18 AM CST -----  [facility] is requesting a call back for clarification of a prescription for the pt. Call back number is [phone] [facility] jm

## 2023-02-28 NOTE — TELEPHONE ENCOUNTER
Spoke to Stephanie. Pt is prescribed Metformin (Fortamet) 500mg 24hr Tablet but insurance does not cover without prior authorization. Is it ok to change to metformin (Glucophage) 500 mg ER

## 2023-03-20 ENCOUNTER — TELEPHONE (OUTPATIENT)
Dept: CARDIOLOGY | Facility: CLINIC | Age: 83
End: 2023-03-20
Payer: OTHER GOVERNMENT

## 2023-03-20 DIAGNOSIS — I25.10 CORONARY ARTERY DISEASE, UNSPECIFIED VESSEL OR LESION TYPE, UNSPECIFIED WHETHER ANGINA PRESENT, UNSPECIFIED WHETHER NATIVE OR TRANSPLANTED HEART: Primary | ICD-10-CM

## 2023-03-23 LAB
LEFT EYE DM RETINOPATHY: NEGATIVE
RIGHT EYE DM RETINOPATHY: NEGATIVE

## 2023-03-27 ENCOUNTER — TELEPHONE (OUTPATIENT)
Dept: CARDIOLOGY | Facility: CLINIC | Age: 83
End: 2023-03-27
Payer: OTHER GOVERNMENT

## 2023-03-27 NOTE — TELEPHONE ENCOUNTER
OB FOLLOW UP  CC- Here for care of pregnancy        Anastasiia Das is a 30 y.o.  12w1d patient being seen today for her obstetrical follow up visit. Patient reports no complaints.     Her prenatal care is complicated by (and status) :    Patient Active Problem List   Diagnosis   • Vaginal delivery   • Pregnancy   • Rh negative status during pregnancy       Desires genetic testing?: No  Flu Status: Declines  Ultrasound Today: No.    ROS -   Patient Reports : No Problems  Patient Denies: Vaginal Spotting, Nausea  and Vomiting   Fetal Movement : too early  All other systems reviewed and are negative.     The additional following portions of the patient's history were reviewed and updated as appropriate: allergies, current medications, past family history, past medical history, past social history, past surgical history and problem list.    I have reviewed and agree with the HPI, ROS, and historical information as entered above. Rosy Blount MD    /74   Wt 70.3 kg (155 lb)   LMP 10/26/2020   BMI 25.02 kg/m²         EXAM:     FHT: Cannot hear fetal heart tones on Doppler.  Will get ultrasound.  Uterine Size: size equals dates  Pelvic Exam: No       Assessment and Plan    Problem List Items Addressed This Visit        Other    Pregnancy - Primary    Overview      x 2 , 2016, largest 8-13         Relevant Orders    POC Urinalysis Dipstick (Completed)    Rh negative status during pregnancy          1. Pregnancy at 12w1d  2. Labs reviewed from New OB Visit.  3. Activity and Exercise discussed.  Return in about 4 weeks (around 2021).    Rosy Blount MD  2021     Thanks for the update.  Pt will need f/u appt with Cardiology and PCP asap after discharge to reassess conditions.    Thanks    Dr Sadler    Spoke to patient's daughter verbalized understanding appt scheduled

## 2023-03-27 NOTE — TELEPHONE ENCOUNTER
Patient's daughter walked into clinic this morning wanting to inform us that patient was in the hospital at [facility] due to CHR  symptoms pt was having a hard time breathing, fluid in lungs and tachycardia. Patient had a ekg done and will be having a U/S. Also wanted to let Dr. Sadler know of the changes in his medications that took place in January when the patient was hospitalized for a broken hip. He's been off his isosorbide, losartan, they changed metoprolol to extended release and other medication changes but couldn't name them all. Wanted to let Dr. Sadler know to see if he was aware of the changes and wanted he advises. Advised daughter that is would let Dr. Sadler know and to follow us with us asap for hospital f/u/. Verbalized understanding.

## 2023-03-27 NOTE — TELEPHONE ENCOUNTER
Thanks for the update.  Pt will need f/u appt with Cardiology and PCP asap after discharge to reassess conditions.    Thanks    Dr Sadler

## 2023-03-29 ENCOUNTER — EXTERNAL HOSPITAL ADMISSION (OUTPATIENT)
Dept: ADMINISTRATIVE | Facility: CLINIC | Age: 83
End: 2023-03-29
Payer: OTHER GOVERNMENT

## 2023-03-29 ENCOUNTER — PATIENT OUTREACH (OUTPATIENT)
Dept: ADMINISTRATIVE | Facility: CLINIC | Age: 83
End: 2023-03-29
Payer: OTHER GOVERNMENT

## 2023-03-29 NOTE — PROGRESS NOTES
C3 nurse attempted to contact patient. The following occurred:   C3 nurse attempted to contact Nithin Pino's daughter, Ai, for a TCC post hospital discharge follow up call. She is unable to review medication list at this time, but several concerns were addressed. She will call back when she has the medication list available.       The patient has a scheduled [facility] appointment with Zechariah Levy NP (PCP) 4/4/23 @ 8:00AM.

## 2023-04-04 ENCOUNTER — TELEPHONE (OUTPATIENT)
Dept: INTERNAL MEDICINE | Facility: CLINIC | Age: 83
End: 2023-04-04
Payer: OTHER GOVERNMENT

## 2023-04-04 ENCOUNTER — OFFICE VISIT (OUTPATIENT)
Dept: INTERNAL MEDICINE | Facility: CLINIC | Age: 83
End: 2023-04-04
Payer: MEDICARE

## 2023-04-04 VITALS
DIASTOLIC BLOOD PRESSURE: 64 MMHG | WEIGHT: 151.69 LBS | HEIGHT: 70 IN | TEMPERATURE: 96 F | OXYGEN SATURATION: 97 % | BODY MASS INDEX: 21.72 KG/M2 | HEART RATE: 110 BPM | SYSTOLIC BLOOD PRESSURE: 116 MMHG

## 2023-04-04 DIAGNOSIS — E11.59 HYPERTENSION ASSOCIATED WITH DIABETES: ICD-10-CM

## 2023-04-04 DIAGNOSIS — Z09 HOSPITAL DISCHARGE FOLLOW-UP: Primary | ICD-10-CM

## 2023-04-04 DIAGNOSIS — E11.51 DIABETES MELLITUS TYPE 2 WITH PERIPHERAL ARTERY DISEASE: ICD-10-CM

## 2023-04-04 DIAGNOSIS — I15.2 HYPERTENSION ASSOCIATED WITH DIABETES: ICD-10-CM

## 2023-04-04 DIAGNOSIS — I50.9 CONGESTIVE HEART FAILURE, UNSPECIFIED HF CHRONICITY, UNSPECIFIED HEART FAILURE TYPE: ICD-10-CM

## 2023-04-04 PROCEDURE — 3074F PR MOST RECENT SYSTOLIC BLOOD PRESSURE < 130 MM HG: ICD-10-PCS | Mod: HCNC,CPTII,S$GLB, | Performed by: NURSE PRACTITIONER

## 2023-04-04 PROCEDURE — 3078F DIAST BP <80 MM HG: CPT | Mod: HCNC,CPTII,S$GLB, | Performed by: NURSE PRACTITIONER

## 2023-04-04 PROCEDURE — 1126F AMNT PAIN NOTED NONE PRSNT: CPT | Mod: HCNC,CPTII,S$GLB, | Performed by: NURSE PRACTITIONER

## 2023-04-04 PROCEDURE — 1160F PR REVIEW ALL MEDS BY PRESCRIBER/CLIN PHARMACIST DOCUMENTED: ICD-10-PCS | Mod: HCNC,CPTII,S$GLB, | Performed by: NURSE PRACTITIONER

## 2023-04-04 PROCEDURE — 99496 TRANSJ CARE MGMT HIGH F2F 7D: CPT | Mod: HCNC,S$GLB,, | Performed by: NURSE PRACTITIONER

## 2023-04-04 PROCEDURE — 99999 PR PBB SHADOW E&M-EST. PATIENT-LVL III: CPT | Mod: PBBFAC,HCNC,, | Performed by: NURSE PRACTITIONER

## 2023-04-04 PROCEDURE — 1159F PR MEDICATION LIST DOCUMENTED IN MEDICAL RECORD: ICD-10-PCS | Mod: HCNC,CPTII,S$GLB, | Performed by: NURSE PRACTITIONER

## 2023-04-04 PROCEDURE — 1160F RVW MEDS BY RX/DR IN RCRD: CPT | Mod: HCNC,CPTII,S$GLB, | Performed by: NURSE PRACTITIONER

## 2023-04-04 PROCEDURE — 99496 TRANSITIONAL CARE MANAGE SERVICE 7 DAY DISCHARGE: ICD-10-PCS | Mod: HCNC,S$GLB,, | Performed by: NURSE PRACTITIONER

## 2023-04-04 PROCEDURE — 1126F PR PAIN SEVERITY QUANTIFIED, NO PAIN PRESENT: ICD-10-PCS | Mod: HCNC,CPTII,S$GLB, | Performed by: NURSE PRACTITIONER

## 2023-04-04 PROCEDURE — 3072F PR LOW RISK FOR RETINOPATHY: ICD-10-PCS | Mod: HCNC,CPTII,S$GLB, | Performed by: NURSE PRACTITIONER

## 2023-04-04 PROCEDURE — 3078F PR MOST RECENT DIASTOLIC BLOOD PRESSURE < 80 MM HG: ICD-10-PCS | Mod: HCNC,CPTII,S$GLB, | Performed by: NURSE PRACTITIONER

## 2023-04-04 PROCEDURE — 3074F SYST BP LT 130 MM HG: CPT | Mod: HCNC,CPTII,S$GLB, | Performed by: NURSE PRACTITIONER

## 2023-04-04 PROCEDURE — 1100F PR PT FALLS ASSESS DOC 2+ FALLS/FALL W/INJURY/YR: ICD-10-PCS | Mod: HCNC,CPTII,S$GLB, | Performed by: NURSE PRACTITIONER

## 2023-04-04 PROCEDURE — 99999 PR PBB SHADOW E&M-EST. PATIENT-LVL III: ICD-10-PCS | Mod: PBBFAC,HCNC,, | Performed by: NURSE PRACTITIONER

## 2023-04-04 PROCEDURE — 3072F LOW RISK FOR RETINOPATHY: CPT | Mod: HCNC,CPTII,S$GLB, | Performed by: NURSE PRACTITIONER

## 2023-04-04 PROCEDURE — 1100F PTFALLS ASSESS-DOCD GE2>/YR: CPT | Mod: HCNC,CPTII,S$GLB, | Performed by: NURSE PRACTITIONER

## 2023-04-04 PROCEDURE — 1159F MED LIST DOCD IN RCRD: CPT | Mod: HCNC,CPTII,S$GLB, | Performed by: NURSE PRACTITIONER

## 2023-04-04 PROCEDURE — 3288F FALL RISK ASSESSMENT DOCD: CPT | Mod: HCNC,CPTII,S$GLB, | Performed by: NURSE PRACTITIONER

## 2023-04-04 PROCEDURE — 3288F PR FALLS RISK ASSESSMENT DOCUMENTED: ICD-10-PCS | Mod: HCNC,CPTII,S$GLB, | Performed by: NURSE PRACTITIONER

## 2023-04-04 RX ORDER — FUROSEMIDE 40 MG/1
40 TABLET ORAL DAILY
COMMUNITY
Start: 2023-03-28 | End: 2023-11-10 | Stop reason: ALTCHOICE

## 2023-04-04 RX ORDER — LIDOCAINE 50 MG/G
PATCH TOPICAL
COMMUNITY
Start: 2023-03-14

## 2023-04-04 RX ORDER — NAPROXEN 500 MG/1
500 TABLET ORAL
COMMUNITY
End: 2023-04-04 | Stop reason: ALTCHOICE

## 2023-04-04 RX ORDER — INSULIN GLARGINE-YFGN 100 [IU]/ML
INJECTION, SOLUTION SUBCUTANEOUS
COMMUNITY
Start: 2023-03-28 | End: 2024-02-13

## 2023-04-04 RX ORDER — DOXYCYCLINE 100 MG/1
CAPSULE ORAL
COMMUNITY
Start: 2023-03-26 | End: 2024-01-09

## 2023-04-04 RX ORDER — ATORVASTATIN CALCIUM 40 MG/1
40 TABLET, FILM COATED ORAL NIGHTLY
COMMUNITY
End: 2023-08-15 | Stop reason: SDUPTHER

## 2023-04-04 RX ORDER — LOSARTAN POTASSIUM 100 MG/1
1 TABLET ORAL DAILY
COMMUNITY
Start: 2023-03-14 | End: 2023-04-17 | Stop reason: SDUPTHER

## 2023-04-04 NOTE — TELEPHONE ENCOUNTER
Good morning,    Per Zechariah Levy NP is there anyway the pt can be seen sooner than his 04/17 appt?

## 2023-04-04 NOTE — PROGRESS NOTES
"Subjective:       Patient ID: Nithin Pino is a 82 y.o. male.    Chief Complaint: Follow-up    Pt presents to clinic today for hospital follow up  Was admitted to [facility] 3/26-3/28 for CHF  Daughter reports he started with trouble breathing  She thinks it is due to his medication changes since being in the hospital at the beginning of the year with a hip fracture  Daughter and pt unsure what meds he is/should be taking   I was able to obtain records from [facility] and clarified his home meds  Daughter is frustrated and would like to see cardio to make sure this doesn't happen again to him  He was having orthopnea and peripheral swelling with some wheezing  Initially went to urgent care and was tx with albuterol and doxy  Breathing worsened so he went to ER and was admitted  He did not require oxygen  He was given IV lasix. Down 2.5 kg during his hospital stay  Since discharge he reports his breathing has been easier  Doesn't have a scale for daily weights- advised his daughter to weigh him at her house  Echo showed EF of 35%    Transitional Care Note    Family and/or Caretaker present at visit?  Yes.  Diagnostic tests reviewed/disposition: No diagnosic tests pending after this hospitalization.  Disease/illness education: CHF  Home health/community services discussion/referrals: Patient does not have home health established from hospital visit.  They do not need home health.  If needed, we will set up home health for the patient.   Establishment or re-establishment of referral orders for community resources: No other necessary community resources.   Discussion with other health care providers: No discussion with other health care providers necessary.              /64   Pulse 110   Temp 96 °F (35.6 °C)   Ht 5' 10" (1.778 m)   Wt 68.8 kg (151 lb 10.8 oz)   SpO2 97%   BMI 21.76 kg/m²     Review of Systems   Constitutional:  Negative for activity change, appetite change, chills, diaphoresis, fatigue, fever and " unexpected weight change.   HENT: Negative.     Eyes: Negative.    Respiratory:  Negative for apnea, chest tightness, shortness of breath and stridor.    Cardiovascular:  Negative for chest pain, palpitations and leg swelling.   Gastrointestinal: Negative.    Endocrine: Negative.    Genitourinary: Negative.    Musculoskeletal:  Negative for arthralgias and myalgias.   Skin:  Negative for color change, pallor, rash and wound.   Allergic/Immunologic: Negative.    Neurological:  Negative for dizziness, facial asymmetry, light-headedness and headaches.   Hematological:  Negative for adenopathy.   Psychiatric/Behavioral:  Negative for agitation and behavioral problems.      Objective:      Physical Exam  Vitals and nursing note reviewed.   Constitutional:       General: He is not in acute distress.     Appearance: He is well-developed. He is not diaphoretic.   HENT:      Head: Normocephalic and atraumatic.   Cardiovascular:      Rate and Rhythm: Normal rate and regular rhythm.      Heart sounds: Normal heart sounds.   Pulmonary:      Effort: Pulmonary effort is normal. No respiratory distress.      Breath sounds: Normal breath sounds.   Musculoskeletal:      Right lower leg: No edema.      Left lower leg: No edema.   Skin:     General: Skin is warm and dry.      Findings: No rash.   Neurological:      Mental Status: He is alert and oriented to person, place, and time.   Psychiatric:         Behavior: Behavior normal.         Thought Content: Thought content normal.         Judgment: Judgment normal.       Assessment:       1. Hospital discharge follow-up    2. Congestive heart failure, unspecified HF chronicity, unspecified heart failure type    3. Diabetes mellitus type 2 with peripheral artery disease    4. Hypertension associated with diabetes    5. BMI 21.0-21.9, adult        Plan:       Nithin was seen today for follow-up.    Diagnoses and all orders for this visit:    Hospital discharge follow-up      - Take meds as  advised on hospital d/c. Will try to arrange sooner appt with DR. Sadler for cardiac opinion on medications  Congestive heart failure, unspecified HF chronicity, unspecified heart failure type       - Reinforced low sodium diet and fluid restriction. Monitor daily weights. Notify cardio for 3-5 lb weight gain overnight   Diabetes mellitus type 2 with peripheral artery disease      - Chronic, stop actos due to CHF as per d/c paperwork. [facility]   Hypertension associated with diabetes       - Chronic, stable on current meds. Continue to follow with Dr. Sadler   BMI 21.0-21.9, adult      Was able to obtain records from [facility]  Appears pt was started on the following  Atarvastatin 40 mg daily  Lasix 40 mg daily  Asa 81 mg daily    His home meds were continued   Amlodipine  Finasteride  Lantus  Isosorbide  Losartan  Meformin  Metoprolol  Solifenacin  Tamsulosin  Trulicity    His actos was d/c due to his CHF

## 2023-04-10 ENCOUNTER — OFFICE VISIT (OUTPATIENT)
Dept: UROLOGY | Facility: CLINIC | Age: 83
End: 2023-04-10
Payer: OTHER GOVERNMENT

## 2023-04-10 VITALS
HEIGHT: 70 IN | SYSTOLIC BLOOD PRESSURE: 105 MMHG | HEART RATE: 64 BPM | RESPIRATION RATE: 18 BRPM | TEMPERATURE: 98 F | BODY MASS INDEX: 22.03 KG/M2 | DIASTOLIC BLOOD PRESSURE: 48 MMHG | WEIGHT: 153.88 LBS

## 2023-04-10 DIAGNOSIS — R82.89 ABNORMAL URINE CYTOLOGY: ICD-10-CM

## 2023-04-10 DIAGNOSIS — R31.0 HEMATURIA, GROSS: Primary | ICD-10-CM

## 2023-04-10 PROCEDURE — 87086 URINE CULTURE/COLONY COUNT: CPT | Mod: HCNC | Performed by: UROLOGY

## 2023-04-10 PROCEDURE — 99213 OFFICE O/P EST LOW 20 MIN: CPT | Mod: PBBFAC,HCNC | Performed by: UROLOGY

## 2023-04-10 PROCEDURE — 99999 PR PBB SHADOW E&M-EST. PATIENT-LVL III: ICD-10-PCS | Mod: PBBFAC,HCNC,, | Performed by: UROLOGY

## 2023-04-10 PROCEDURE — 99214 OFFICE O/P EST MOD 30 MIN: CPT | Mod: S$PBB,HCNC,, | Performed by: UROLOGY

## 2023-04-10 PROCEDURE — 99214 PR OFFICE/OUTPT VISIT, EST, LEVL IV, 30-39 MIN: ICD-10-PCS | Mod: S$PBB,HCNC,, | Performed by: UROLOGY

## 2023-04-10 PROCEDURE — 99999 PR PBB SHADOW E&M-EST. PATIENT-LVL III: CPT | Mod: PBBFAC,HCNC,, | Performed by: UROLOGY

## 2023-04-10 NOTE — PROGRESS NOTES
Chief Complaint   Patient presents with    Other     F/u       History of Present Illness:   Nithin Pino is a 82 y.o. male here for evaluation of Other (F/u)  .   [birth year]81yo male, here today for follow up, last seen in 2021. At that time, here was being treated for BPH with finasteride and tamsulosin and OAB with vesicare. He did have gross hematuria, but refused cystoscopy. He reports that he was seen in the ED at [facility] yesterday with dysuria and gross hematuria. Was prescribed levaquin. States that he hasn't started the levaquin yet. Saw a little blood this am. No fever. Stream is strong. Still on finasteride, vesicare and flomax. No difficulty emptying--states it was checked in the ED yesterday and he emptied completely.     Pt's records from  clinic with Dr. Sanchez reviewed from 12/22/22, noting cytology suspicious for high grade bladder cancer.       Review of Systems   Constitutional:  Negative for chills and fever.   Respiratory:  Negative for shortness of breath.    Cardiovascular:  Negative for chest pain.   Gastrointestinal:  Negative for abdominal pain.   Genitourinary:  Negative for flank pain.   Musculoskeletal:  Positive for arthralgias. Negative for back pain.   All other systems reviewed and are negative.      Past Medical History:   Diagnosis Date    Abnormal brain MRI 1/21/2018    Chronic microvascular ischemia changes per MRI July 9, 2007 in [facility]    Abnormal ECG 10/31/2013    Abnormal stress test 1/28/2016    Alcohol dependence     States alcohol abuse ended in 1994    AP (angina pectoris) 1/28/2016    Asthma     Carotid artery occlusion     Cataract     Chronic combined systolic and diastolic congestive heart failure 5/24/2021    Chronic diastolic heart failure 10/31/2013    Chronic ischemic heart disease 10/31/2013    CKD (chronic kidney disease) stage 3, GFR 30-59 ml/min 11/4/2015    Coronary artery disease     DM (diabetes mellitus) 2013    BS doesn't check 03/28/2017     DM  (diabetes mellitus) 2011    BS doesn' check  04/03/2019    Heart valve regurgitation 11/4/2015    Echocardiogram 2/15/16   1 - Concentric hypertrophy.    2 - Normal left ventricular systolic function (EF 60-65%).    3 - Normal left ventricular diastolic function.    4 - Mild left atrial enlargement.    5 - Normal right ventricular systolic function .    6 - Trivial to mild aortic regurgitation.    7 - Trivial to mild mitral regurgitation.  10 - Trivial to mild pulmonic regurgitation.     Hematuria 6/25/2020    History of alcohol abuse 1/22/2018    Patient denies drinking to excess since 1994.    History of atherectomy 1/21/2018 2/2016 [facility]    History of chronic kidney disease 1/22/2018    History of CKD 3    History of PTCA 10/31/2013    History of PTCA 3/9/2016    Hyperlipidemia     Hypertension     Insomnia 6/17/2013    Ischemic cardiomyopathy 10/31/2013    Long term (current) use of antithrombotics/antiplatelets 1/21/2018    Myocardial infarction     Old MI (myocardial infarction) 1994    Peripheral vascular disease     Polyneuropathy     RBBB 10/31/2013    S/P CABG (coronary artery bypass graft) 10/31/2013    Shortness of breath 10/31/2013    Tobacco dependence     resolved    Trouble in sleeping     Type 2 diabetes with peripheral circulatory disorder, controlled     Wears glasses        Past Surgical History:   Procedure Laterality Date    APPENDECTOMY      CARDIAC CATHETERIZATION      2016    CARDIAC SURGERY  1994    balloon angioplasty    CATARACT EXTRACTION W/  INTRAOCULAR LENS IMPLANT Right 02/01/2017    CORONARY ARTERY BYPASS GRAFT  05/2011    per patient x4    HERNIA REPAIR      OPEN REDUCTION AND INTERNAL FIXATION (ORIF) OF INJURY OF HIP      PCIOL Bilateral OD 02/01/17/OS 02/15/17    DR. ALONZO       Family History   Adopted: Yes   Problem Relation Age of Onset    Diabetes Brother     Diabetes Sister     Cancer Neg Hx     Heart disease Neg Hx     Kidney disease Neg Hx        Social History      Tobacco Use    Smoking status: Former     Packs/day: 1.50     Years: 40.00     Pack years: 60.00     Types: Cigarettes     Quit date: 1994     Years since quittin.3    Smokeless tobacco: Former     Quit date: 2011    Tobacco comments:     approx date   Substance Use Topics    Alcohol use: Yes     Comment: occasionally; 1-2 cans of beer a month    Drug use: No       Current Outpatient Medications   Medication Sig Dispense Refill    albuterol (PROVENTIL/VENTOLIN HFA) 90 mcg/actuation inhaler albuterol sulfate Take No date recorded No form recorded No frequency recorded No route recorded No set duration recorded No set duration amount recorded active No dosage strength recorded No dosage strength units of measure recorded      amLODIPine (NORVASC) 10 MG tablet Take 1 tablet (10 mg total) by mouth once daily. 90 tablet 3    aspirin 81 MG Chew Take 1 tablet (81 mg total) by mouth once daily. 1 Tablet, Chewable Oral Every day 90 tablet 3    atorvastatin (LIPITOR) 40 MG tablet Take 40 mg by mouth.      BLOOD-GLUCOSE METER (TRUERESULT BLOOD GLUCOSE SYSTM MISC) by Misc.(Non-Drug; Combo Route) route.      collagenase (SANTYL) ointment APPLY MODERATE AMOUNT TOPICALLY TWICE A DAY - APPLY TO RIGHT LOWER LEG WOUND TWICE EVERY DAY      doxycycline (VIBRAMYCIN) 100 MG Cap Take by mouth.      dulaglutide (TRULICITY) 4.5 mg/0.5 mL pen injector INJECT 4.5MG SUBCUTANEOUSLY EVERY WEEK FOR DIABETES      dulaglutide (TRULICITY) 4.5 mg/0.5 mL pen injector Inject 4.5 mg into the skin.      EMBRACE PRO TEST STRIPS Strp CHECK BLOOD SUGAR TWICE DAILY. 50 strip 0    finasteride (PROSCAR) 5 mg tablet Take 1 tablet (5 mg total) by mouth once daily. 90 tablet 3    furosemide (LASIX) 40 MG tablet Take 40 mg by mouth once daily.      glucose 4 GM chewable tablet 16 g.      insulin (LANTUS SOLOSTAR U-100 INSULIN) glargine 100 units/mL (3mL) SubQ pen Inject 28 Units into the skin once daily. 27 mL 3    insulin glargine-yfgn 100  unit/mL (3 mL) InPn INJECT 24 UNITS SUBCUTANEOUSLY EVERY DAY FOR DIABETES      isosorbide mononitrate (IMDUR) 60 MG 24 hr tablet Take 1 tablet (60 mg total) by mouth once daily. 90 tablet 0    LIDOcaine (LIDODERM) 5 % APPLY 1 PATCH TOPICALLY EVERY DAY FOR PAIN  WEAR FOR 12 HOURS, THEN REMOVE. DO NOT APPLY NEW PATCH FOR AT LEAST 12 HOURS.      metFORMIN (GLUCOPHAGE-XR) 500 MG ER 24hr tablet Take 1 tablet (500 mg total) by mouth 2 (two) times daily with meals. 180 tablet 3    metoprolol succinate (TOPROL-XL) 25 MG 24 hr tablet TAKE 1 TABLET EVERY EVENING 90 tablet 0    miconazole (MICOTIN) 2 % cream APPLY SMALL AMOUNT TOPICALLY TWICE A DAY AS NEEDED FOR FUNGAL INFECTION      solifenacin (VESICARE) 10 MG tablet TAKE 1 TABLET EVERY DAY 90 tablet 0    tamsulosin (FLOMAX) 0.4 mg Cap TAKE 1 CAPSULE EVERY DAY 90 capsule 3    TRUEPLUS LANCETS 26 gauge Misc CHECK BLOOD SUGAR TWICE DAILY. 100 each 0    TRUERESULT BLOOD GLUCOSE SYSTM kit CHECK BLOOD SUGAR TWICE DAILY. 1 each 0    vitamin D (VITAMIN D3) 1000 units Tab Take 1 tablet by mouth once daily.      losartan (COZAAR) 100 MG tablet Take 0.5 tablets (50 mg total) by mouth once daily. 90 tablet 3     No current facility-administered medications for this visit.       Review of patient's allergies indicates:   Allergen Reactions    Pseudoephedrine-guaifenesin Shortness Of Breath    Panmist dm  [pseudoephedrine-dm-guaifenesin]      Other reaction(s): Unknown       Physical Exam  Vitals:    04/10/23 0948   BP: (!) 105/48   Pulse: 64   Resp: 18   Temp: 97.6 °F (36.4 °C)       General: Well-developed, well-nourished in no acute distress  HEENT: Normocephalic, atraumatic, Extraocular movements intact  Neck: supple, trachea midline, no cervical or supraclavicular lymphadenopathy  Respirations: even and unlabored  Back: midline spine, no CVA tenderness  Abdomen: soft, Non-tender, non-distended, no organomegaly or palpable masses, no rebound or guarding  Extremities: atraumatic,  moves all equally, no clubbing, cyanosis or edema  Psych: normal affect  Skin: warm and dry, no lesions  Neuro: Alert and oriented, Cranial nerves II-XII grossly intact    Urinalysis  pH, UA   Date Value Ref Range Status   10/29/2021 8.0 5.0 - 8.0 Final     Glucose, UA   Date Value Ref Range Status   10/29/2021 1+ (A) Negative Final     Occult Blood UA   Date Value Ref Range Status   10/29/2021 Negative Negative Final     Nitrite, UA   Date Value Ref Range Status   10/29/2021 Negative Negative Final     Leukocytes, UA   Date Value Ref Range Status   10/29/2021 Negative Negative Final         Lab Results   Component Value Date    PSA 0.29 09/16/2020    PSA 0.82 06/09/2015    PSA 0.84 03/25/2014       Assessment:   1. Hematuria, gross  Urine culture    BUN    CT Urogram Abd Pelvis W WO    Creatinine, Serum      2. Abnormal urine cytology            Plan:  Hematuria, gross  -     Urine culture  -     BUN; Future; Expected date: 04/10/2023  -     CT Urogram Abd Pelvis W WO; Future; Expected date: 04/10/2023  -     Creatinine, Serum; Future; Expected date: 04/10/2023    Abnormal urine cytology      Pt to start levaquin, which was prescribed at [facility] ED. Pt was given 5 days, so will need more called in if culture specific.   Follow up for Cystoscopy.

## 2023-04-12 LAB — BACTERIA UR CULT: NORMAL

## 2023-04-13 DIAGNOSIS — R07.9 CHEST PAIN, UNSPECIFIED TYPE: ICD-10-CM

## 2023-04-13 DIAGNOSIS — I20.9 AP (ANGINA PECTORIS): ICD-10-CM

## 2023-04-13 DIAGNOSIS — I25.708 CORONARY ARTERY DISEASE OF BYPASS GRAFT OF NATIVE HEART WITH STABLE ANGINA PECTORIS: Primary | Chronic | ICD-10-CM

## 2023-04-17 ENCOUNTER — OFFICE VISIT (OUTPATIENT)
Dept: CARDIOLOGY | Facility: CLINIC | Age: 83
End: 2023-04-17
Payer: MEDICARE

## 2023-04-17 ENCOUNTER — HOSPITAL ENCOUNTER (OUTPATIENT)
Dept: CARDIOLOGY | Facility: HOSPITAL | Age: 83
Discharge: HOME | End: 2023-04-17
Attending: INTERNAL MEDICINE
Payer: MEDICARE

## 2023-04-17 VITALS
HEIGHT: 70 IN | BODY MASS INDEX: 21.78 KG/M2 | DIASTOLIC BLOOD PRESSURE: 54 MMHG | OXYGEN SATURATION: 98 % | WEIGHT: 152.13 LBS | SYSTOLIC BLOOD PRESSURE: 104 MMHG | HEART RATE: 78 BPM

## 2023-04-17 DIAGNOSIS — R94.39 ABNORMAL NUCLEAR STRESS TEST: ICD-10-CM

## 2023-04-17 DIAGNOSIS — I25.708 CORONARY ARTERY DISEASE OF BYPASS GRAFT OF NATIVE HEART WITH STABLE ANGINA PECTORIS: Chronic | ICD-10-CM

## 2023-04-17 DIAGNOSIS — I65.23 ASYMPTOMATIC STENOSIS OF BOTH CAROTID ARTERIES WITHOUT INFARCTION: ICD-10-CM

## 2023-04-17 DIAGNOSIS — E11.51 DIABETES MELLITUS TYPE 2 WITH PERIPHERAL ARTERY DISEASE: ICD-10-CM

## 2023-04-17 DIAGNOSIS — I20.9 AP (ANGINA PECTORIS): ICD-10-CM

## 2023-04-17 DIAGNOSIS — I25.9 CHRONIC ISCHEMIC HEART DISEASE: Chronic | ICD-10-CM

## 2023-04-17 DIAGNOSIS — Z95.1 S/P CABG (CORONARY ARTERY BYPASS GRAFT): ICD-10-CM

## 2023-04-17 DIAGNOSIS — R94.31 ABNORMAL ECG: Chronic | ICD-10-CM

## 2023-04-17 DIAGNOSIS — I20.89 CHRONIC STABLE ANGINA: ICD-10-CM

## 2023-04-17 DIAGNOSIS — N18.31 CHRONIC RENAL IMPAIRMENT, STAGE 3A: ICD-10-CM

## 2023-04-17 DIAGNOSIS — I25.5 ISCHEMIC CARDIOMYOPATHY: Chronic | ICD-10-CM

## 2023-04-17 DIAGNOSIS — I73.9 PERIPHERAL VASCULAR DISEASE: ICD-10-CM

## 2023-04-17 DIAGNOSIS — I15.2 HYPERTENSION ASSOCIATED WITH DIABETES: ICD-10-CM

## 2023-04-17 DIAGNOSIS — R07.9 CHEST PAIN, UNSPECIFIED TYPE: ICD-10-CM

## 2023-04-17 DIAGNOSIS — I50.42 CHRONIC COMBINED SYSTOLIC AND DIASTOLIC CONGESTIVE HEART FAILURE: Primary | ICD-10-CM

## 2023-04-17 DIAGNOSIS — E11.59 HYPERTENSION ASSOCIATED WITH DIABETES: ICD-10-CM

## 2023-04-17 DIAGNOSIS — J43.1 PANLOBULAR EMPHYSEMA: ICD-10-CM

## 2023-04-17 DIAGNOSIS — I45.10 RBBB: Chronic | ICD-10-CM

## 2023-04-17 DIAGNOSIS — E11.69 HYPERLIPIDEMIA ASSOCIATED WITH TYPE 2 DIABETES MELLITUS: ICD-10-CM

## 2023-04-17 DIAGNOSIS — I35.1 NONRHEUMATIC AORTIC VALVE INSUFFICIENCY: ICD-10-CM

## 2023-04-17 DIAGNOSIS — E78.5 HYPERLIPIDEMIA ASSOCIATED WITH TYPE 2 DIABETES MELLITUS: ICD-10-CM

## 2023-04-17 PROCEDURE — 93010 ELECTROCARDIOGRAM REPORT: CPT | Mod: HCNC,,, | Performed by: INTERNAL MEDICINE

## 2023-04-17 PROCEDURE — 1126F AMNT PAIN NOTED NONE PRSNT: CPT | Mod: HCNC,CPTII,S$GLB, | Performed by: INTERNAL MEDICINE

## 2023-04-17 PROCEDURE — 99999 PR PBB SHADOW E&M-EST. PATIENT-LVL II: ICD-10-PCS | Mod: PBBFAC,HCNC,, | Performed by: INTERNAL MEDICINE

## 2023-04-17 PROCEDURE — 1126F PR PAIN SEVERITY QUANTIFIED, NO PAIN PRESENT: ICD-10-PCS | Mod: HCNC,CPTII,S$GLB, | Performed by: INTERNAL MEDICINE

## 2023-04-17 PROCEDURE — 3074F SYST BP LT 130 MM HG: CPT | Mod: HCNC,CPTII,S$GLB, | Performed by: INTERNAL MEDICINE

## 2023-04-17 PROCEDURE — 3078F DIAST BP <80 MM HG: CPT | Mod: HCNC,CPTII,S$GLB, | Performed by: INTERNAL MEDICINE

## 2023-04-17 PROCEDURE — 1160F RVW MEDS BY RX/DR IN RCRD: CPT | Mod: HCNC,CPTII,S$GLB, | Performed by: INTERNAL MEDICINE

## 2023-04-17 PROCEDURE — 93010 EKG 12-LEAD: ICD-10-PCS | Mod: HCNC,,, | Performed by: INTERNAL MEDICINE

## 2023-04-17 PROCEDURE — 3074F PR MOST RECENT SYSTOLIC BLOOD PRESSURE < 130 MM HG: ICD-10-PCS | Mod: HCNC,CPTII,S$GLB, | Performed by: INTERNAL MEDICINE

## 2023-04-17 PROCEDURE — 3078F PR MOST RECENT DIASTOLIC BLOOD PRESSURE < 80 MM HG: ICD-10-PCS | Mod: HCNC,CPTII,S$GLB, | Performed by: INTERNAL MEDICINE

## 2023-04-17 PROCEDURE — 1160F PR REVIEW ALL MEDS BY PRESCRIBER/CLIN PHARMACIST DOCUMENTED: ICD-10-PCS | Mod: HCNC,CPTII,S$GLB, | Performed by: INTERNAL MEDICINE

## 2023-04-17 PROCEDURE — 1159F PR MEDICATION LIST DOCUMENTED IN MEDICAL RECORD: ICD-10-PCS | Mod: HCNC,CPTII,S$GLB, | Performed by: INTERNAL MEDICINE

## 2023-04-17 PROCEDURE — 1159F MED LIST DOCD IN RCRD: CPT | Mod: HCNC,CPTII,S$GLB, | Performed by: INTERNAL MEDICINE

## 2023-04-17 PROCEDURE — 93005 ELECTROCARDIOGRAM TRACING: CPT | Mod: HCNC

## 2023-04-17 PROCEDURE — 99999 PR PBB SHADOW E&M-EST. PATIENT-LVL II: CPT | Mod: PBBFAC,HCNC,, | Performed by: INTERNAL MEDICINE

## 2023-04-17 PROCEDURE — 99214 OFFICE O/P EST MOD 30 MIN: CPT | Mod: HCNC,S$GLB,, | Performed by: INTERNAL MEDICINE

## 2023-04-17 PROCEDURE — 3072F PR LOW RISK FOR RETINOPATHY: ICD-10-PCS | Mod: HCNC,CPTII,S$GLB, | Performed by: INTERNAL MEDICINE

## 2023-04-17 PROCEDURE — 99214 PR OFFICE/OUTPT VISIT, EST, LEVL IV, 30-39 MIN: ICD-10-PCS | Mod: HCNC,S$GLB,, | Performed by: INTERNAL MEDICINE

## 2023-04-17 PROCEDURE — 3072F LOW RISK FOR RETINOPATHY: CPT | Mod: HCNC,CPTII,S$GLB, | Performed by: INTERNAL MEDICINE

## 2023-04-17 RX ORDER — LOSARTAN POTASSIUM 100 MG/1
50 TABLET ORAL DAILY
Qty: 90 TABLET | Refills: 3
Start: 2023-04-17 | End: 2023-10-24

## 2023-04-17 NOTE — PROGRESS NOTES
Subjective:    Patient ID:  Nithin Pino is a 82 y.o. male who presents for evaluation of Congestive Heart Failure, Coronary Artery Disease, Peripheral Arterial Disease, Hypertension, and Hyperlipidemia      HPI: Pt presents for eval.  His current medical conditions include CAD, RBBB, diastolic CHF, ICM, MR, TR, AI, CRI, carotid artery disease, COPD, HTN, PAD, DM.   Nonsmoker.   His past history is pertinent for following:  S/p PTCA 1994 for AMI.   Stress MPI 5/11 showed evidence of multivessel CAD (had 2 years of angina) which was confirmed on LHC (significant left main/LAD/ramus, diag disease and 100%  RCA). EF 35% on LV gram.   S/p successful CABG 5/11 (LIMA-LAD, SVG-DIAG, SVG-RAMUS). He had post-op a flutter which resolved.   S/p repeat LHC 2/16 for abnl stress MPI. He underwent atherectomy and PCI of left main and ramus with TUCKER x 2 overall. His svg-ramus had occluded. LIMA-LAD was patent, patent SVG-D1,  RCA with left - right collaterals, EF 45%.    Has chronic R SFA .   Pt has declined runoff numerous times for further evaluation of his PAD in past.   Echo 6/18 normal EF.  RENZO 6/18 R LE 0.86, L LE 0.96  Pt admitted late Dec 2020 with Covid pneumonia, cp sxs. Chart reviewed in detail.  Echo 12/20 normal LV function, LVH.  Troponin leak noted.  Cards consult done and troponin thought to be supply/demand ischemia from Covid infection/pneumonia.  Readmitted few days later with recurrent cp, low BP, dehydration and released.  Troponin leak ranged 0.10 to 0.50 range on both hospitalizations.  Stress MPI 3/21 apical, anteroapical scar, inferoapical scar with some residual ischemia, EF 44%.  Carotid u/s 12/20: no significant blockage noted.   ecg 10/29/21 NSR, RBBB, old inferior/anteroseptal infarct.  Echo 8/21: normal EF, DD, mild MR/TR/AI.  RENZO 8/21: R LE 0.73, L LE 0.71  Now here.  S/p hip fx surgery Jan 2023 at [facility].  Pt readmitted March 2023 with CHF sxs.  EF 35%.  Lasix added.  Meds adjusted.  No  pnd/orthopnea.  Has EWING, stable.  No acute angina.  Angina controlled.  Poses fall risk.  BP soft.  ECG today 4/17/23 NSR, RBBB, possible old septal, inferior infarct.  No acute changes noted.  Uses COPD inhalers prn.  Stable claudication.      Past Medical History:   Diagnosis Date    Abnormal brain MRI 1/21/2018    Chronic microvascular ischemia changes per MRI July 9, 2007 in [facility]    Abnormal ECG 10/31/2013    Abnormal stress test 1/28/2016    Alcohol dependence     States alcohol abuse ended in 1994    AP (angina pectoris) 1/28/2016    Asthma     Carotid artery occlusion     Cataract     Chronic combined systolic and diastolic congestive heart failure 5/24/2021    Chronic diastolic heart failure 10/31/2013    Chronic ischemic heart disease 10/31/2013    CKD (chronic kidney disease) stage 3, GFR 30-59 ml/min 11/4/2015    Coronary artery disease     DM (diabetes mellitus) 2013    BS doesn't check 03/28/2017     DM (diabetes mellitus) 2011    BS doesn' check  04/03/2019    Heart valve regurgitation 11/4/2015    Echocardiogram 2/15/16   1 - Concentric hypertrophy.    2 - Normal left ventricular systolic function (EF 60-65%).    3 - Normal left ventricular diastolic function.    4 - Mild left atrial enlargement.    5 - Normal right ventricular systolic function .    6 - Trivial to mild aortic regurgitation.    7 - Trivial to mild mitral regurgitation.  10 - Trivial to mild pulmonic regurgitation.     Hematuria 6/25/2020    History of alcohol abuse 1/22/2018    Patient denies drinking to excess since 1994.    History of atherectomy 1/21/2018 2/2016 [facility]    History of chronic kidney disease 1/22/2018    History of CKD 3    History of PTCA 10/31/2013    History of PTCA 3/9/2016    Hyperlipidemia     Hypertension     Insomnia 6/17/2013    Ischemic cardiomyopathy 10/31/2013    Long term (current) use of antithrombotics/antiplatelets 1/21/2018    Myocardial infarction     Old MI (myocardial infarction) 1994     Peripheral vascular disease     Polyneuropathy     RBBB 10/31/2013    S/P CABG (coronary artery bypass graft) 10/31/2013    Shortness of breath 10/31/2013    Tobacco dependence     resolved    Trouble in sleeping     Type 2 diabetes with peripheral circulatory disorder, controlled     Wears glasses        Current Outpatient Medications:     aspirin 81 MG Chew, Take 1 tablet (81 mg total) by mouth once daily. 1 Tablet, Chewable Oral Every day, Disp: 90 tablet, Rfl: 3    albuterol (PROVENTIL/VENTOLIN HFA) 90 mcg/actuation inhaler, albuterol sulfate Take No date recorded No form recorded No frequency recorded No route recorded No set duration recorded No set duration amount recorded active No dosage strength recorded No dosage strength units of measure recorded, Disp: , Rfl:     amLODIPine (NORVASC) 10 MG tablet, Take 1 tablet (10 mg total) by mouth once daily., Disp: 90 tablet, Rfl: 3    atorvastatin (LIPITOR) 40 MG tablet, Take 40 mg by mouth., Disp: , Rfl:     BLOOD-GLUCOSE METER (TRUERESULT BLOOD GLUCOSE SYSTM MISC), by Misc.(Non-Drug; Combo Route) route., Disp: , Rfl:     collagenase (SANTYL) ointment, APPLY MODERATE AMOUNT TOPICALLY TWICE A DAY - APPLY TO RIGHT LOWER LEG WOUND TWICE EVERY DAY, Disp: , Rfl:     doxycycline (VIBRAMYCIN) 100 MG Cap, Take by mouth., Disp: , Rfl:     dulaglutide (TRULICITY) 4.5 mg/0.5 mL pen injector, INJECT 4.5MG SUBCUTANEOUSLY EVERY WEEK FOR DIABETES, Disp: , Rfl:     dulaglutide (TRULICITY) 4.5 mg/0.5 mL pen injector, Inject 4.5 mg into the skin., Disp: , Rfl:     EMBRACE PRO TEST STRIPS Strp, CHECK BLOOD SUGAR TWICE DAILY., Disp: 50 strip, Rfl: 0    finasteride (PROSCAR) 5 mg tablet, Take 1 tablet (5 mg total) by mouth once daily., Disp: 90 tablet, Rfl: 3    furosemide (LASIX) 40 MG tablet, Take 40 mg by mouth once daily., Disp: , Rfl:     glucose 4 GM chewable tablet, 16 g., Disp: , Rfl:     insulin (LANTUS SOLOSTAR U-100 INSULIN) glargine 100 units/mL (3mL) SubQ pen, Inject 28  Units into the skin once daily., Disp: 27 mL, Rfl: 3    insulin glargine-yfgn 100 unit/mL (3 mL) InPn, INJECT 24 UNITS SUBCUTANEOUSLY EVERY DAY FOR DIABETES, Disp: , Rfl:     isosorbide mononitrate (IMDUR) 60 MG 24 hr tablet, Take 1 tablet (60 mg total) by mouth once daily., Disp: 90 tablet, Rfl: 0    LIDOcaine (LIDODERM) 5 %, APPLY 1 PATCH TOPICALLY EVERY DAY FOR PAIN  WEAR FOR 12 HOURS, THEN REMOVE. DO NOT APPLY NEW PATCH FOR AT LEAST 12 HOURS., Disp: , Rfl:     losartan (COZAAR) 100 MG tablet, Take 1 tablet (100 mg total) by mouth once daily., Disp: 90 tablet, Rfl: 3    metFORMIN (GLUCOPHAGE-XR) 500 MG ER 24hr tablet, Take 1 tablet (500 mg total) by mouth 2 (two) times daily with meals., Disp: 180 tablet, Rfl: 3    metoprolol succinate (TOPROL-XL) 25 MG 24 hr tablet, TAKE 1 TABLET EVERY EVENING, Disp: 90 tablet, Rfl: 0    miconazole (MICOTIN) 2 % cream, APPLY SMALL AMOUNT TOPICALLY TWICE A DAY AS NEEDED FOR FUNGAL INFECTION, Disp: , Rfl:     solifenacin (VESICARE) 10 MG tablet, TAKE 1 TABLET EVERY DAY, Disp: 90 tablet, Rfl: 0    tamsulosin (FLOMAX) 0.4 mg Cap, TAKE 1 CAPSULE EVERY DAY, Disp: 90 capsule, Rfl: 3    TRUEPLUS LANCETS 26 gauge Misc, CHECK BLOOD SUGAR TWICE DAILY., Disp: 100 each, Rfl: 0    TRUERESULT BLOOD GLUCOSE SYSTM kit, CHECK BLOOD SUGAR TWICE DAILY., Disp: 1 each, Rfl: 0    vitamin D (VITAMIN D3) 1000 units Tab, Take 1 tablet by mouth once daily., Disp: , Rfl:       Review of Systems   Constitutional: Positive for decreased appetite and weight loss.   HENT: Negative.     Eyes: Negative.    Cardiovascular:  Positive for claudication and dyspnea on exertion.   Respiratory:  Positive for shortness of breath.    Endocrine: Negative.    Hematologic/Lymphatic: Negative.    Skin: Negative.    Musculoskeletal:  Positive for arthritis and joint pain.   Gastrointestinal: Negative.    Genitourinary: Negative.    Neurological:  Positive for loss of balance, numbness and weakness.   Psychiatric/Behavioral:  "Negative.     Allergic/Immunologic: Negative.         BP (!) 104/54 (BP Location: Right arm, Patient Position: Sitting, BP Method: Large (Manual))   Pulse 78   Ht 5' 10" (1.778 m)   Wt 69 kg (152 lb 1.9 oz)   SpO2 98%   BMI 21.83 kg/m²     Wt Readings from Last 3 Encounters:   04/17/23 69 kg (152 lb 1.9 oz)   04/10/23 69.8 kg (153 lb 14.1 oz)   04/04/23 68.8 kg (151 lb 10.8 oz)     Temp Readings from Last 3 Encounters:   04/10/23 97.6 °F (36.4 °C) (Oral)   04/04/23 96 °F (35.6 °C)   02/17/23 97.5 °F (36.4 °C)     BP Readings from Last 3 Encounters:   04/17/23 (!) 104/54   04/10/23 (!) 105/48   04/04/23 116/64     Pulse Readings from Last 3 Encounters:   04/17/23 78   04/10/23 64   04/04/23 110       Objective:    Physical Exam  Vitals and nursing note reviewed.   Constitutional:       Appearance: He is well-developed.   HENT:      Head: Normocephalic.   Neck:      Thyroid: No thyromegaly.      Vascular: No carotid bruit or JVD.   Cardiovascular:      Rate and Rhythm: Normal rate and regular rhythm.      Pulses:           Radial pulses are 2+ on the right side and 2+ on the left side.      Heart sounds: S1 normal and S2 normal. Heart sounds not distant. No midsystolic click and no opening snap. No murmur heard.    No friction rub. No S3 or S4 sounds.   Pulmonary:      Effort: Pulmonary effort is normal.      Breath sounds: Normal breath sounds. No wheezing or rales.   Abdominal:      General: Bowel sounds are normal. There is no distension or abdominal bruit.      Palpations: Abdomen is soft. There is no mass.      Tenderness: There is no abdominal tenderness.   Musculoskeletal:      Cervical back: Neck supple.   Skin:     General: Skin is warm.   Neurological:      Mental Status: He is alert and oriented to person, place, and time.   Psychiatric:         Behavior: Behavior normal.       I have reviewed all pertinent labs and cardiac studies.      Chemistry        Component Value Date/Time     12/29/2021 " 0702    K 4.4 12/29/2021 0702     12/29/2021 0702    CO2 22 (L) 12/29/2021 0702    BUN 24 (H) 12/29/2021 0702    CREATININE 1.3 12/29/2021 0702     12/29/2021 0702        Component Value Date/Time    CALCIUM 10.5 12/29/2021 0702    ALKPHOS 54 (L) 12/29/2021 0702    AST 20 12/29/2021 0702    ALT 20 12/29/2021 0702    BILITOT 0.6 12/29/2021 0702    ESTGFRAFRICA 58.7 (A) 12/29/2021 0702    EGFRNONAA 50.8 (A) 12/29/2021 0702        Lab Results   Component Value Date    WBC 8.49 12/08/2021    HGB 12.3 (L) 12/08/2021    HCT 39.5 (L) 12/08/2021    MCV 90 12/08/2021     12/08/2021       Lab Results   Component Value Date    HGBA1C 7.5 (H) 12/08/2021     Lab Results   Component Value Date    CHOL 82 (L) 12/08/2021    CHOL 77 (L) 06/17/2020    CHOL 114 (L) 04/22/2019     Lab Results   Component Value Date    HDL 48 12/08/2021    HDL 45 06/17/2020    HDL 42 04/22/2019     Lab Results   Component Value Date    LDLCALC 18.8 (L) 12/08/2021    LDLCALC 17.4 (L) 06/17/2020    LDLCALC 45.8 (L) 04/22/2019     Lab Results   Component Value Date    TRIG 76 12/08/2021    TRIG 73 06/17/2020    TRIG 131 04/22/2019     Lab Results   Component Value Date    CHOLHDL 58.5 (H) 12/08/2021    CHOLHDL 58.4 (H) 06/17/2020    CHOLHDL 36.8 04/22/2019           Assessment:       1. Chronic combined systolic and diastolic congestive heart failure    2. Asymptomatic stenosis of both carotid arteries without infarction    3. Abnormal ECG    4. Abnormal nuclear stress test    5. AP (angina pectoris)    6. Coronary artery disease of bypass graft of native heart with stable angina pectoris    7. Chronic ischemic heart disease    8. Chronic stable angina    9. Diabetes mellitus type 2 with peripheral artery disease    10. S/P CABG (coronary artery bypass graft)    11. RBBB    12. Peripheral vascular disease    13. Nonrheumatic aortic valve insufficiency    14. Ischemic cardiomyopathy    15. Hypertension associated with diabetes    16.  Hyperlipidemia associated with type 2 diabetes mellitus    17. Chronic renal impairment, stage 3a    18. Panlobular emphysema           Plan:               CHF compensated.  Continue Lasix 40 mg qd.  Pt counseled on low salt diet, < 2 g/day.  Fluid restriction 1.5 liter/day.  Continue medical tx for CAD/PAD.  Reviewed all tests and above medical conditions with patient in detail and formulated treatment plan.  Continue optimal medical treatment for cardiovascular conditions.  Cardiac low salt diet advised.  Fall risk precautions discussed.  Maintaining healthy weight and weight loss goals (if needed) were discussed in clinic.  Cut Losartan back to 50 mg qd.  Importance of optimal lipid control were discussed in detail as well as possible pharmacologic and lifestyle changes that may be needed.  Statin tx.  DM control discussed.  F/u w PCP in few weeks.  Update labs.  F/u 3 months.       I have reviewed all pertinent labs and cardiac studies independently. Plans and recommendations have been formulated under my direct supervision. All questions answered and patient voiced understanding.

## 2023-05-05 ENCOUNTER — PATIENT OUTREACH (OUTPATIENT)
Dept: ADMINISTRATIVE | Facility: HOSPITAL | Age: 83
End: 2023-05-05
Payer: OTHER GOVERNMENT

## 2023-05-05 NOTE — PROGRESS NOTES
Working Diabetes Lab Report.    Pt has lab appt scheduled, 5/08/23.    Spoke with daughter regarding lab appt. States he is currently at the ER. He is unable to fast for lipid. Advised her would check lipid at another time.    2022 Eye Exam hyper linked to .

## 2023-05-08 ENCOUNTER — HOSPITAL ENCOUNTER (OUTPATIENT)
Dept: RADIOLOGY | Facility: HOSPITAL | Age: 83
Discharge: HOME | End: 2023-05-08
Attending: UROLOGY
Payer: MEDICARE

## 2023-05-08 DIAGNOSIS — R31.0 HEMATURIA, GROSS: ICD-10-CM

## 2023-05-08 PROCEDURE — 74178 CT UROGRAM ABD PELVIS W WO: ICD-10-PCS | Mod: 26,,, | Performed by: STUDENT IN AN ORGANIZED HEALTH CARE EDUCATION/TRAINING PROGRAM

## 2023-05-08 PROCEDURE — 74178 CT ABD&PLV WO CNTR FLWD CNTR: CPT | Mod: TC

## 2023-05-08 PROCEDURE — 74178 CT ABD&PLV WO CNTR FLWD CNTR: CPT | Mod: 26,,, | Performed by: STUDENT IN AN ORGANIZED HEALTH CARE EDUCATION/TRAINING PROGRAM

## 2023-05-08 PROCEDURE — 25500020 PHARM REV CODE 255: Performed by: UROLOGY

## 2023-05-08 RX ADMIN — IOHEXOL 75 ML: 350 INJECTION, SOLUTION INTRAVENOUS at 01:05

## 2023-05-09 ENCOUNTER — OFFICE VISIT (OUTPATIENT)
Dept: INTERNAL MEDICINE | Facility: CLINIC | Age: 83
End: 2023-05-09
Payer: MEDICARE

## 2023-05-09 VITALS
DIASTOLIC BLOOD PRESSURE: 60 MMHG | WEIGHT: 159.63 LBS | SYSTOLIC BLOOD PRESSURE: 120 MMHG | BODY MASS INDEX: 22.85 KG/M2 | HEART RATE: 63 BPM | HEIGHT: 70 IN | TEMPERATURE: 98 F

## 2023-05-09 DIAGNOSIS — I50.9 CONGESTIVE HEART FAILURE, UNSPECIFIED HF CHRONICITY, UNSPECIFIED HEART FAILURE TYPE: ICD-10-CM
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
## 2023-05-09 DIAGNOSIS — E11.51 DIABETES MELLITUS TYPE 2 WITH PERIPHERAL ARTERY DISEASE: ICD-10-CM

## 2023-05-09 PROCEDURE — 3072F LOW RISK FOR RETINOPATHY: CPT | Mod: CPTII,S$GLB,, | Performed by: NURSE PRACTITIONER

## 2023-05-09 PROCEDURE — 99999 PR PBB SHADOW E&M-EST. PATIENT-LVL V: CPT | Mod: PBBFAC,,, | Performed by: NURSE PRACTITIONER

## 2023-05-09 PROCEDURE — 1160F RVW MEDS BY RX/DR IN RCRD: CPT | Mod: CPTII,S$GLB,, | Performed by: NURSE PRACTITIONER

## 2023-05-09 PROCEDURE — 3288F PR FALLS RISK ASSESSMENT DOCUMENTED: ICD-10-PCS | Mod: CPTII,S$GLB,, | Performed by: NURSE PRACTITIONER

## 2023-05-09 PROCEDURE — 1159F PR MEDICATION LIST DOCUMENTED IN MEDICAL RECORD: ICD-10-PCS | Mod: CPTII,S$GLB,, | Performed by: NURSE PRACTITIONER

## 2023-05-09 PROCEDURE — 1160F PR REVIEW ALL MEDS BY PRESCRIBER/CLIN PHARMACIST DOCUMENTED: ICD-10-PCS | Mod: CPTII,S$GLB,, | Performed by: NURSE PRACTITIONER

## 2023-05-09 PROCEDURE — 1101F PT FALLS ASSESS-DOCD LE1/YR: CPT | Mod: CPTII,S$GLB,, | Performed by: NURSE PRACTITIONER

## 2023-05-09 PROCEDURE — 3074F PR MOST RECENT SYSTOLIC BLOOD PRESSURE < 130 MM HG: ICD-10-PCS | Mod: CPTII,S$GLB,, | Performed by: NURSE PRACTITIONER

## 2023-05-09 PROCEDURE — 1126F PR PAIN SEVERITY QUANTIFIED, NO PAIN PRESENT: ICD-10-PCS | Mod: CPTII,S$GLB,, | Performed by: NURSE PRACTITIONER

## 2023-05-09 PROCEDURE — 3072F PR LOW RISK FOR RETINOPATHY: ICD-10-PCS | Mod: CPTII,S$GLB,, | Performed by: NURSE PRACTITIONER

## 2023-05-09 PROCEDURE — 1126F AMNT PAIN NOTED NONE PRSNT: CPT | Mod: CPTII,S$GLB,, | Performed by: NURSE PRACTITIONER

## 2023-05-09 PROCEDURE — 3074F SYST BP LT 130 MM HG: CPT | Mod: CPTII,S$GLB,, | Performed by: NURSE PRACTITIONER

## 2023-05-09 PROCEDURE — 1101F PR PT FALLS ASSESS DOC 0-1 FALLS W/OUT INJ PAST YR: ICD-10-PCS | Mod: CPTII,S$GLB,, | Performed by: NURSE PRACTITIONER

## 2023-05-09 PROCEDURE — 99999 PR PBB SHADOW E&M-EST. PATIENT-LVL V: ICD-10-PCS | Mod: PBBFAC,,, | Performed by: NURSE PRACTITIONER

## 2023-05-09 PROCEDURE — 3078F PR MOST RECENT DIASTOLIC BLOOD PRESSURE < 80 MM HG: ICD-10-PCS | Mod: CPTII,S$GLB,, | Performed by: NURSE PRACTITIONER

## 2023-05-09 PROCEDURE — 99214 OFFICE O/P EST MOD 30 MIN: CPT | Mod: S$GLB,,, | Performed by: NURSE PRACTITIONER

## 2023-05-09 PROCEDURE — 3078F DIAST BP <80 MM HG: CPT | Mod: CPTII,S$GLB,, | Performed by: NURSE PRACTITIONER

## 2023-05-09 PROCEDURE — 99214 PR OFFICE/OUTPT VISIT, EST, LEVL IV, 30-39 MIN: ICD-10-PCS | Mod: S$GLB,,, | Performed by: NURSE PRACTITIONER

## 2023-05-09 PROCEDURE — 3288F FALL RISK ASSESSMENT DOCD: CPT | Mod: CPTII,S$GLB,, | Performed by: NURSE PRACTITIONER

## 2023-05-09 PROCEDURE — 1159F MED LIST DOCD IN RCRD: CPT | Mod: CPTII,S$GLB,, | Performed by: NURSE PRACTITIONER

## 2023-05-09 NOTE — PROGRESS NOTES
"Subjective:       Patient ID: Nithin Pino is a 82 y.o. male.    Chief Complaint: Follow-up    Pt presents to clinic today for diabetes follow up  A1C has increased to 8.6   He was admitted to the hospital recently for CHF and his pioglitazone was d/c   He reports home readings around 200  He is taking his trulicity,metformin and lantus 24 units/day  [facility]   He is hesitant for me to make changes to his meds  We discussed out diabetes team within [facility] and daughter is interested but would rukhsana to try to [facility] with [facility]    Of note, he did have a fall this am  Reports he was going outside to get his breakfast and slipped in the grass  Denies any loc or hitting his head  Hit his left elbow  No issues there       /60   Pulse 63   Temp 97.8 °F (36.6 °C)   Ht 5' 10" (1.778 m)   Wt 72.4 kg (159 lb 9.8 oz)   BMI 22.90 kg/m²     Review of Systems   Constitutional:  Negative for activity change, chills, diaphoresis, fever and unexpected weight change.   HENT: Negative.     Eyes: Negative.    Respiratory:  Negative for apnea, chest tightness, shortness of breath and stridor.    Cardiovascular:  Negative for chest pain, palpitations and leg swelling.   Gastrointestinal: Negative.    Endocrine: Negative.    Genitourinary: Negative.    Musculoskeletal:  Positive for arthralgias and back pain. Negative for myalgias.   Skin:  Negative for color change, pallor, rash and wound.   Allergic/Immunologic: Negative.    Neurological:  Positive for weakness. Negative for dizziness, facial asymmetry, light-headedness and headaches.   Hematological:  Negative for adenopathy.   Psychiatric/Behavioral:  Negative for agitation and behavioral problems.      Objective:      Physical Exam  Vitals and nursing note reviewed.   Constitutional:       General: He is not in acute distress.     Appearance: He is well-developed. He is not diaphoretic.   HENT:      Head: Normocephalic and " atraumatic.   Cardiovascular:      Rate and Rhythm: Normal rate and regular rhythm.      Heart sounds: Normal heart sounds.   Pulmonary:      Effort: Pulmonary effort is normal. No respiratory distress.      Breath sounds: Normal breath sounds.   Skin:     General: Skin is warm and dry.      Findings: No rash.   Neurological:      Mental Status: He is alert and oriented to person, place, and time.   Psychiatric:         Behavior: Behavior normal.         Thought Content: Thought content normal.         Judgment: Judgment normal.       Assessment:       1. Fall, initial encounter    2. Diabetes mellitus type 2 with peripheral artery disease    3. Congestive heart failure, unspecified HF chronicity, unspecified heart failure type    4. BMI 22.0-22.9, adult        Plan:       Nithin was seen today for follow-up.    Diagnoses and all orders for this visit:    Fall, initial encounter      - Fall precautions discussed. Pt denies any injuries today from the fall  Diabetes mellitus type 2 with peripheral artery disease       - Chronic, not well controlled. [facility] to [facility] per request  Congestive heart failure, unspecified HF chronicity, unspecified heart failure type       - Chronic, continue to follow with Dr. Sadler. Fluid restrictions, daily weights discussed   BMI 22.0-22.9, adult      Patient Instructions   A1C 8.6. Please follow up with [facility] for [facility] management     3 month follow up with Dr. Tomlin and [place]

## 2023-05-15 ENCOUNTER — HOSPITAL ENCOUNTER (OUTPATIENT)
Dept: RADIOLOGY | Facility: HOSPITAL | Age: 83
Discharge: HOME | End: 2023-05-15
Attending: UROLOGY
Payer: OTHER GOVERNMENT

## 2023-05-15 ENCOUNTER — PROCEDURE VISIT (OUTPATIENT)
Dept: UROLOGY | Facility: CLINIC | Age: 83
End: 2023-05-15
Payer: OTHER GOVERNMENT

## 2023-05-15 VITALS
BODY MASS INDEX: 22.85 KG/M2 | RESPIRATION RATE: 18 BRPM | HEART RATE: 69 BPM | WEIGHT: 159.63 LBS | HEIGHT: 70 IN | SYSTOLIC BLOOD PRESSURE: 120 MMHG | DIASTOLIC BLOOD PRESSURE: 71 MMHG

## 2023-05-15 DIAGNOSIS — R31.0 HEMATURIA, GROSS: Primary | ICD-10-CM

## 2023-05-15 DIAGNOSIS — D49.4 BLADDER TUMOR: ICD-10-CM

## 2023-05-15 DIAGNOSIS — Z01.818 PRE-OP TESTING: ICD-10-CM

## 2023-05-15 LAB
BILIRUB SERPL-MCNC: NEGATIVE MG/DL
BLOOD URINE, POC: NORMAL
CLARITY, POC UA: CLEAR
COLOR, POC UA: YELLOW
GLUCOSE UR QL STRIP: NORMAL
KETONES UR QL STRIP: NEGATIVE
LEUKOCYTE ESTERASE URINE, POC: NEGATIVE
NITRITE, POC UA: NEGATIVE
PH, POC UA: 6.5
PROTEIN, POC: NORMAL
SPECIFIC GRAVITY, POC UA: 1.02
UROBILINOGEN, POC UA: NORMAL

## 2023-05-15 PROCEDURE — 52000 PR CYSTOURETHROSCOPY: ICD-10-PCS | Mod: S$PBB,HCNC,, | Performed by: UROLOGY

## 2023-05-15 PROCEDURE — 52000 CYSTOURETHROSCOPY: CPT | Mod: S$PBB,HCNC,, | Performed by: UROLOGY

## 2023-05-15 PROCEDURE — 81003 URINALYSIS AUTO W/O SCOPE: CPT | Mod: PBBFAC,HCNC

## 2023-05-15 PROCEDURE — 71046 XR CHEST PA AND LATERAL: ICD-10-PCS | Mod: 26,,, | Performed by: RADIOLOGY

## 2023-05-15 PROCEDURE — 99999PBSHW PR PBB SHADOW TECHNICAL ONLY FILED TO HB: Mod: PBBFAC,HCNC,,

## 2023-05-15 PROCEDURE — 87086 URINE CULTURE/COLONY COUNT: CPT | Performed by: UROLOGY

## 2023-05-15 PROCEDURE — 99999PBSHW PR PBB SHADOW TECHNICAL ONLY FILED TO HB: ICD-10-PCS | Mod: PBBFAC,HCNC,,

## 2023-05-15 PROCEDURE — 71046 X-RAY EXAM CHEST 2 VIEWS: CPT | Mod: TC

## 2023-05-15 PROCEDURE — 52000 CYSTOURETHROSCOPY: CPT | Mod: PBBFAC,HCNC | Performed by: UROLOGY

## 2023-05-15 PROCEDURE — 71046 X-RAY EXAM CHEST 2 VIEWS: CPT | Mod: 26,,, | Performed by: RADIOLOGY

## 2023-05-15 RX ORDER — CIPROFLOXACIN 500 MG/1
500 TABLET ORAL
Status: COMPLETED | OUTPATIENT
Start: 2023-05-15 | End: 2023-05-15

## 2023-05-15 RX ORDER — CIPROFLOXACIN 2 MG/ML
400 INJECTION, SOLUTION INTRAVENOUS
Status: CANCELLED | OUTPATIENT
Start: 2023-05-15

## 2023-05-15 RX ADMIN — CIPROFLOXACIN 500 MG: 500 TABLET ORAL at 10:05

## 2023-05-15 NOTE — PROGRESS NOTES
..Per Dr. Contreras oral ciprofloxacin was given to pt. Pt instructed to remain in clinic for 15 minutes to monitor for signs & symptoms of adverse reaction. Pt verbalized understanding.      Loraine Milanes RN

## 2023-05-15 NOTE — H&P
Chief Complaint   Patient presents with    Other     Cysto       History of Present Illness:   Nithin Pino is a 83 y.o. male here for evaluation of Other (Cysto)    5/15/23- pt here for cysto. CT scan personally reviewed, showing a polypoid tumor at the left bladder wall, enhancing.   4/10/23-81yo male, here today for follow up, last seen in 2021. At that time, here was being treated for BPH with finasteride and tamsulosin and OAB with vesicare. He did have gross hematuria, but refused cystoscopy. He reports that he was seen in the ED at [facility] yesterday with dysuria and gross hematuria. Was prescribed levaquin. States that he hasn't started the levaquin yet. Saw a little blood this am. No fever. Stream is strong. Still on finasteride, vesicare and flomax. No difficulty emptying--states it was checked in the ED yesterday and he emptied completely.     Pt's records from  clinic with Dr. Sanchez reviewed from 12/22/22, noting cytology suspicious for high grade bladder cancer.       Review of Systems   Constitutional:  Negative for chills and fever.   Respiratory:  Negative for shortness of breath.    Cardiovascular:  Negative for chest pain.   Gastrointestinal:  Negative for abdominal pain.   Genitourinary:  Negative for flank pain.   Musculoskeletal:  Positive for arthralgias. Negative for back pain.   All other systems reviewed and are negative.      Past Medical History:   Diagnosis Date    Abnormal brain MRI 1/21/2018    Chronic microvascular ischemia changes per MRI July 9, 2007 in [facility]    Abnormal ECG 10/31/2013    Abnormal stress test 1/28/2016    Alcohol dependence     States alcohol abuse ended in 1994    AP (angina pectoris) 1/28/2016    Asthma     Carotid artery occlusion     Cataract     Chronic combined systolic and diastolic congestive heart failure 5/24/2021    Chronic diastolic heart failure 10/31/2013    Chronic ischemic heart disease 10/31/2013    CKD (chronic kidney disease) stage 3,  GFR 30-59 ml/min 11/4/2015    Coronary artery disease     DM (diabetes mellitus) 2013    BS doesn't check 03/28/2017     DM (diabetes mellitus) 2011    BS doesn' check  04/03/2019    Heart valve regurgitation 11/4/2015    Echocardiogram 2/15/16   1 - Concentric hypertrophy.    2 - Normal left ventricular systolic function (EF 60-65%).    3 - Normal left ventricular diastolic function.    4 - Mild left atrial enlargement.    5 - Normal right ventricular systolic function .    6 - Trivial to mild aortic regurgitation.    7 - Trivial to mild mitral regurgitation.  10 - Trivial to mild pulmonic regurgitation.     Hematuria 6/25/2020    History of alcohol abuse 1/22/2018    Patient denies drinking to excess since 1994.    History of atherectomy 1/21/2018 2/2016 [facility]    History of chronic kidney disease 1/22/2018    History of CKD 3    History of PTCA 10/31/2013    History of PTCA 3/9/2016    Hyperlipidemia     Hypertension     Insomnia 6/17/2013    Ischemic cardiomyopathy 10/31/2013    Long term (current) use of antithrombotics/antiplatelets 1/21/2018    Myocardial infarction     Old MI (myocardial infarction) 1994    Peripheral vascular disease     Polyneuropathy     RBBB 10/31/2013    S/P CABG (coronary artery bypass graft) 10/31/2013    Shortness of breath 10/31/2013    Tobacco dependence     resolved    Trouble in sleeping     Type 2 diabetes with peripheral circulatory disorder, controlled     Wears glasses        Past Surgical History:   Procedure Laterality Date    APPENDECTOMY      CARDIAC CATHETERIZATION      2016    CARDIAC SURGERY  1994    balloon angioplasty    CATARACT EXTRACTION W/  INTRAOCULAR LENS IMPLANT Right 02/01/2017    CORONARY ARTERY BYPASS GRAFT  05/2011    per patient x4    HERNIA REPAIR      OPEN REDUCTION AND INTERNAL FIXATION (ORIF) OF INJURY OF HIP      PCIOL Bilateral OD 02/01/17/OS 02/15/17    DR. ALONZO       Family History   Adopted: Yes   Problem Relation Age of Onset    Diabetes  Brother     Diabetes Sister     Cancer Neg Hx     Heart disease Neg Hx     Kidney disease Neg Hx        Social History     Tobacco Use    Smoking status: Former     Packs/day: 1.50     Years: 40.00     Pack years: 60.00     Types: Cigarettes     Quit date: 1994     Years since quittin.3    Smokeless tobacco: Former     Quit date: 2011    Tobacco comments:     approx date   Substance Use Topics    Alcohol use: Yes     Comment: occasionally; 1-2 cans of beer a month    Drug use: No       Current Outpatient Medications   Medication Sig Dispense Refill    albuterol (PROVENTIL/VENTOLIN HFA) 90 mcg/actuation inhaler albuterol sulfate Take No date recorded No form recorded No frequency recorded No route recorded No set duration recorded No set duration amount recorded active No dosage strength recorded No dosage strength units of measure recorded      amLODIPine (NORVASC) 10 MG tablet Take 1 tablet (10 mg total) by mouth once daily. 90 tablet 3    aspirin 81 MG Chew Take 1 tablet (81 mg total) by mouth once daily. 1 Tablet, Chewable Oral Every day 90 tablet 3    atorvastatin (LIPITOR) 40 MG tablet Take 40 mg by mouth.      BLOOD-GLUCOSE METER (TRUERESULT BLOOD GLUCOSE SYSTM MISC) by Misc.(Non-Drug; Combo Route) route.      doxycycline (VIBRAMYCIN) 100 MG Cap Take by mouth.      dulaglutide (TRULICITY) 4.5 mg/0.5 mL pen injector INJECT 4.5MG SUBCUTANEOUSLY EVERY WEEK FOR DIABETES      dulaglutide (TRULICITY) 4.5 mg/0.5 mL pen injector Inject 4.5 mg into the skin.      EMBRACE PRO TEST STRIPS Strp CHECK BLOOD SUGAR TWICE DAILY. 50 strip 0    finasteride (PROSCAR) 5 mg tablet Take 1 tablet (5 mg total) by mouth once daily. 90 tablet 3    furosemide (LASIX) 40 MG tablet Take 40 mg by mouth once daily.      glucose 4 GM chewable tablet 16 g.      insulin (LANTUS SOLOSTAR U-100 INSULIN) glargine 100 units/mL (3mL) SubQ pen Inject 28 Units into the skin once daily. 27 mL 3    insulin glargine-yfgn 100 unit/mL (3 mL)  InPn INJECT 24 UNITS SUBCUTANEOUSLY EVERY DAY FOR DIABETES      isosorbide mononitrate (IMDUR) 60 MG 24 hr tablet Take 1 tablet (60 mg total) by mouth once daily. 90 tablet 0    LIDOcaine (LIDODERM) 5 % APPLY 1 PATCH TOPICALLY EVERY DAY FOR PAIN  WEAR FOR 12 HOURS, THEN REMOVE. DO NOT APPLY NEW PATCH FOR AT LEAST 12 HOURS.      losartan (COZAAR) 100 MG tablet Take 0.5 tablets (50 mg total) by mouth once daily. 90 tablet 3    metFORMIN (GLUCOPHAGE-XR) 500 MG ER 24hr tablet Take 1 tablet (500 mg total) by mouth 2 (two) times daily with meals. 180 tablet 3    metoprolol succinate (TOPROL-XL) 25 MG 24 hr tablet TAKE 1 TABLET EVERY EVENING 90 tablet 0    miconazole (MICOTIN) 2 % cream APPLY SMALL AMOUNT TOPICALLY TWICE A DAY AS NEEDED FOR FUNGAL INFECTION      solifenacin (VESICARE) 10 MG tablet TAKE 1 TABLET EVERY DAY 90 tablet 0    tamsulosin (FLOMAX) 0.4 mg Cap TAKE 1 CAPSULE EVERY DAY 90 capsule 3    TRUEPLUS LANCETS 26 gauge Misc CHECK BLOOD SUGAR TWICE DAILY. 100 each 0    TRUERESULT BLOOD GLUCOSE SYSTM kit CHECK BLOOD SUGAR TWICE DAILY. 1 each 0    vitamin D (VITAMIN D3) 1000 units Tab Take 1 tablet by mouth once daily.       No current facility-administered medications for this visit.       Review of patient's allergies indicates:   Allergen Reactions    Pseudoephedrine-guaifenesin Shortness Of Breath    Panmist dm  [pseudoephedrine-dm-guaifenesin]      Other reaction(s): Unknown       Physical Exam  Vitals:    05/15/23 1022   BP: 120/71   Pulse: 69   Resp: 18       General: Well-developed, well-nourished in no acute distress  HEENT: Normocephalic, atraumatic, Extraocular movements intact  Neck: supple, trachea midline, no cervical or supraclavicular lymphadenopathy  Respirations: even and unlabored  Back: midline spine, no CVA tenderness  Abdomen: soft, Non-tender, non-distended, no organomegaly or palpable masses, no rebound or guarding  Extremities: atraumatic, moves all equally, no clubbing, cyanosis or  edema  Psych: normal affect  Skin: warm and dry, no lesions  Neuro: Alert and oriented, Cranial nerves II-XII grossly intact    Urinalysis  pH, UA   Date Value Ref Range Status   05/15/2023 6.5  Final   10/29/2021 8.0 5.0 - 8.0 Final     Glucose, UA   Date Value Ref Range Status   10/29/2021 1+ (A) Negative Final     Occult Blood UA   Date Value Ref Range Status   10/29/2021 Negative Negative Final     Nitrite, UA   Date Value Ref Range Status   05/15/2023 Negative  Final   10/29/2021 Negative Negative Final     Leukocytes, UA   Date Value Ref Range Status   10/29/2021 Negative Negative Final         Lab Results   Component Value Date    PSA 0.29 09/16/2020    PSA 0.82 06/09/2015    PSA 0.84 03/25/2014     Procedure: Cystoscopy      Procedure in detail:   Informed consent was obtained. The patient's genitalia was prepped and draped in the usual sterile fashion. Lidocaine jelly was administered per urethra. I utilized the flexible cystoscope and advanced it into the urethral meatus. No urethral strictures were noted. Bladder access was obtained. Systematic review of the bladder was performed, noting a large tumor of the left posterior and lateral bladder wall with several associated papillary tumors. Total diameter at least 5cm. Bilateral ureteral orifices were visualized and noted to be effluxing clear urine. Retroflexion was utilized, noting no median lobe. The scope was slowly backed out of the urethra, and the prostate was noted to be fairly small. No urethral lesions were identified. The patient tolerated the procedure well. Estimated blood loss was 0cc.           Assessment:   1. Hematuria, gross  POCT URINE DIPSTICK WITHOUT MICROSCOPE    Urine culture      2. Bladder tumor  Place in Outpatient    Case Request Operating Room: TURBT (TRANSURETHRAL RESECTION OF BLADDER TUMOR)    Diet NPO    Place sequential compression device      3. Pre-op testing  CBC Auto Differential    Comprehensive Metabolic Panel    X-Ray  Chest PA And Lateral          Plan:  Hematuria, gross  -     POCT URINE DIPSTICK WITHOUT MICROSCOPE  -     Urine culture    Bladder tumor  -     Place in Outpatient; Standing  -     Case Request Operating Room: TURBT (TRANSURETHRAL RESECTION OF BLADDER TUMOR)  -     Diet NPO; Standing  -     Place sequential compression device; Standing    Pre-op testing  -     CBC Auto Differential; Future; Expected date: 05/15/2023  -     Comprehensive Metabolic Panel; Future; Expected date: 05/15/2023  -     X-Ray Chest PA And Lateral; Future; Expected date: 05/15/2023    Other orders  -     ciprofloxacin HCl tablet 500 mg  -     IP VTE LOW RISK PATIENT; Standing  -     ciprofloxacin (CIPRO)400mg/200ml D5W IVPB 400 mg      I had a detailed discussion with the patient, his daughter and sister regarding the bladder tumor and noted high grade bladder cancer on cytology. We discussed TURBT and the associated risks/benefits. We also discussed adjuvant treatment such as intravesical chemo, which he doesn't want. Will schedule for 5/8/23. He will need cardiac clearance.

## 2023-05-15 NOTE — H&P (VIEW-ONLY)
Chief Complaint   Patient presents with    Other     Cysto       History of Present Illness:   Nithin Pino is a 83 y.o. male here for evaluation of Other (Cysto)    5/15/23- pt here for cysto. CT scan personally reviewed, showing a polypoid tumor at the left bladder wall, enhancing.   4/10/23-81yo male, here today for follow up, last seen in 2021. At that time, here was being treated for BPH with finasteride and tamsulosin and OAB with vesicare. He did have gross hematuria, but refused cystoscopy. He reports that he was seen in the ED at [facility] yesterday with dysuria and gross hematuria. Was prescribed levaquin. States that he hasn't started the levaquin yet. Saw a little blood this am. No fever. Stream is strong. Still on finasteride, vesicare and flomax. No difficulty emptying--states it was checked in the ED yesterday and he emptied completely.     Pt's records from  clinic with Dr. Sanchez reviewed from 12/22/22, noting cytology suspicious for high grade bladder cancer.       Review of Systems   Constitutional:  Negative for chills and fever.   Respiratory:  Negative for shortness of breath.    Cardiovascular:  Negative for chest pain.   Gastrointestinal:  Negative for abdominal pain.   Genitourinary:  Negative for flank pain.   Musculoskeletal:  Positive for arthralgias. Negative for back pain.   All other systems reviewed and are negative.      Past Medical History:   Diagnosis Date    Abnormal brain MRI 1/21/2018    Chronic microvascular ischemia changes per MRI July 9, 2007 in [facility]    Abnormal ECG 10/31/2013    Abnormal stress test 1/28/2016    Alcohol dependence     States alcohol abuse ended in 1994    AP (angina pectoris) 1/28/2016    Asthma     Carotid artery occlusion     Cataract     Chronic combined systolic and diastolic congestive heart failure 5/24/2021    Chronic diastolic heart failure 10/31/2013    Chronic ischemic heart disease 10/31/2013    CKD (chronic kidney disease) stage 3,  GFR 30-59 ml/min 11/4/2015    Coronary artery disease     DM (diabetes mellitus) 2013    BS doesn't check 03/28/2017     DM (diabetes mellitus) 2011    BS doesn' check  04/03/2019    Heart valve regurgitation 11/4/2015    Echocardiogram 2/15/16   1 - Concentric hypertrophy.    2 - Normal left ventricular systolic function (EF 60-65%).    3 - Normal left ventricular diastolic function.    4 - Mild left atrial enlargement.    5 - Normal right ventricular systolic function .    6 - Trivial to mild aortic regurgitation.    7 - Trivial to mild mitral regurgitation.  10 - Trivial to mild pulmonic regurgitation.     Hematuria 6/25/2020    History of alcohol abuse 1/22/2018    Patient denies drinking to excess since 1994.    History of atherectomy 1/21/2018 2/2016 [facility]    History of chronic kidney disease 1/22/2018    History of CKD 3    History of PTCA 10/31/2013    History of PTCA 3/9/2016    Hyperlipidemia     Hypertension     Insomnia 6/17/2013    Ischemic cardiomyopathy 10/31/2013    Long term (current) use of antithrombotics/antiplatelets 1/21/2018    Myocardial infarction     Old MI (myocardial infarction) 1994    Peripheral vascular disease     Polyneuropathy     RBBB 10/31/2013    S/P CABG (coronary artery bypass graft) 10/31/2013    Shortness of breath 10/31/2013    Tobacco dependence     resolved    Trouble in sleeping     Type 2 diabetes with peripheral circulatory disorder, controlled     Wears glasses        Past Surgical History:   Procedure Laterality Date    APPENDECTOMY      CARDIAC CATHETERIZATION      2016    CARDIAC SURGERY  1994    balloon angioplasty    CATARACT EXTRACTION W/  INTRAOCULAR LENS IMPLANT Right 02/01/2017    CORONARY ARTERY BYPASS GRAFT  05/2011    per patient x4    HERNIA REPAIR      OPEN REDUCTION AND INTERNAL FIXATION (ORIF) OF INJURY OF HIP      PCIOL Bilateral OD 02/01/17/OS 02/15/17    DR. ALONZO       Family History   Adopted: Yes   Problem Relation Age of Onset    Diabetes  Brother     Diabetes Sister     Cancer Neg Hx     Heart disease Neg Hx     Kidney disease Neg Hx        Social History     Tobacco Use    Smoking status: Former     Packs/day: 1.50     Years: 40.00     Pack years: 60.00     Types: Cigarettes     Quit date: 1994     Years since quittin.3    Smokeless tobacco: Former     Quit date: 2011    Tobacco comments:     approx date   Substance Use Topics    Alcohol use: Yes     Comment: occasionally; 1-2 cans of beer a month    Drug use: No       Current Outpatient Medications   Medication Sig Dispense Refill    albuterol (PROVENTIL/VENTOLIN HFA) 90 mcg/actuation inhaler albuterol sulfate Take No date recorded No form recorded No frequency recorded No route recorded No set duration recorded No set duration amount recorded active No dosage strength recorded No dosage strength units of measure recorded      amLODIPine (NORVASC) 10 MG tablet Take 1 tablet (10 mg total) by mouth once daily. 90 tablet 3    aspirin 81 MG Chew Take 1 tablet (81 mg total) by mouth once daily. 1 Tablet, Chewable Oral Every day 90 tablet 3    atorvastatin (LIPITOR) 40 MG tablet Take 40 mg by mouth.      BLOOD-GLUCOSE METER (TRUERESULT BLOOD GLUCOSE SYSTM MISC) by Misc.(Non-Drug; Combo Route) route.      doxycycline (VIBRAMYCIN) 100 MG Cap Take by mouth.      dulaglutide (TRULICITY) 4.5 mg/0.5 mL pen injector INJECT 4.5MG SUBCUTANEOUSLY EVERY WEEK FOR DIABETES      dulaglutide (TRULICITY) 4.5 mg/0.5 mL pen injector Inject 4.5 mg into the skin.      EMBRACE PRO TEST STRIPS Strp CHECK BLOOD SUGAR TWICE DAILY. 50 strip 0    finasteride (PROSCAR) 5 mg tablet Take 1 tablet (5 mg total) by mouth once daily. 90 tablet 3    furosemide (LASIX) 40 MG tablet Take 40 mg by mouth once daily.      glucose 4 GM chewable tablet 16 g.      insulin (LANTUS SOLOSTAR U-100 INSULIN) glargine 100 units/mL (3mL) SubQ pen Inject 28 Units into the skin once daily. 27 mL 3    insulin glargine-yfgn 100 unit/mL (3 mL)  InPn INJECT 24 UNITS SUBCUTANEOUSLY EVERY DAY FOR DIABETES      isosorbide mononitrate (IMDUR) 60 MG 24 hr tablet Take 1 tablet (60 mg total) by mouth once daily. 90 tablet 0    LIDOcaine (LIDODERM) 5 % APPLY 1 PATCH TOPICALLY EVERY DAY FOR PAIN  WEAR FOR 12 HOURS, THEN REMOVE. DO NOT APPLY NEW PATCH FOR AT LEAST 12 HOURS.      losartan (COZAAR) 100 MG tablet Take 0.5 tablets (50 mg total) by mouth once daily. 90 tablet 3    metFORMIN (GLUCOPHAGE-XR) 500 MG ER 24hr tablet Take 1 tablet (500 mg total) by mouth 2 (two) times daily with meals. 180 tablet 3    metoprolol succinate (TOPROL-XL) 25 MG 24 hr tablet TAKE 1 TABLET EVERY EVENING 90 tablet 0    miconazole (MICOTIN) 2 % cream APPLY SMALL AMOUNT TOPICALLY TWICE A DAY AS NEEDED FOR FUNGAL INFECTION      solifenacin (VESICARE) 10 MG tablet TAKE 1 TABLET EVERY DAY 90 tablet 0    tamsulosin (FLOMAX) 0.4 mg Cap TAKE 1 CAPSULE EVERY DAY 90 capsule 3    TRUEPLUS LANCETS 26 gauge Misc CHECK BLOOD SUGAR TWICE DAILY. 100 each 0    TRUERESULT BLOOD GLUCOSE SYSTM kit CHECK BLOOD SUGAR TWICE DAILY. 1 each 0    vitamin D (VITAMIN D3) 1000 units Tab Take 1 tablet by mouth once daily.       No current facility-administered medications for this visit.       Review of patient's allergies indicates:   Allergen Reactions    Pseudoephedrine-guaifenesin Shortness Of Breath    Panmist dm  [pseudoephedrine-dm-guaifenesin]      Other reaction(s): Unknown       Physical Exam  Vitals:    05/15/23 1022   BP: 120/71   Pulse: 69   Resp: 18       General: Well-developed, well-nourished in no acute distress  HEENT: Normocephalic, atraumatic, Extraocular movements intact  Neck: supple, trachea midline, no cervical or supraclavicular lymphadenopathy  Respirations: even and unlabored  Back: midline spine, no CVA tenderness  Abdomen: soft, Non-tender, non-distended, no organomegaly or palpable masses, no rebound or guarding  Extremities: atraumatic, moves all equally, no clubbing, cyanosis or  edema  Psych: normal affect  Skin: warm and dry, no lesions  Neuro: Alert and oriented, Cranial nerves II-XII grossly intact    Urinalysis  pH, UA   Date Value Ref Range Status   05/15/2023 6.5  Final   10/29/2021 8.0 5.0 - 8.0 Final     Glucose, UA   Date Value Ref Range Status   10/29/2021 1+ (A) Negative Final     Occult Blood UA   Date Value Ref Range Status   10/29/2021 Negative Negative Final     Nitrite, UA   Date Value Ref Range Status   05/15/2023 Negative  Final   10/29/2021 Negative Negative Final     Leukocytes, UA   Date Value Ref Range Status   10/29/2021 Negative Negative Final         Lab Results   Component Value Date    PSA 0.29 09/16/2020    PSA 0.82 06/09/2015    PSA 0.84 03/25/2014     Procedure: Cystoscopy      Procedure in detail:   Informed consent was obtained. The patient's genitalia was prepped and draped in the usual sterile fashion. Lidocaine jelly was administered per urethra. I utilized the flexible cystoscope and advanced it into the urethral meatus. No urethral strictures were noted. Bladder access was obtained. Systematic review of the bladder was performed, noting a large tumor of the left posterior and lateral bladder wall with several associated papillary tumors. Total diameter at least 5cm. Bilateral ureteral orifices were visualized and noted to be effluxing clear urine. Retroflexion was utilized, noting no median lobe. The scope was slowly backed out of the urethra, and the prostate was noted to be fairly small. No urethral lesions were identified. The patient tolerated the procedure well. Estimated blood loss was 0cc.           Assessment:   1. Hematuria, gross  POCT URINE DIPSTICK WITHOUT MICROSCOPE    Urine culture      2. Bladder tumor  Place in Outpatient    Case Request Operating Room: TURBT (TRANSURETHRAL RESECTION OF BLADDER TUMOR)    Diet NPO    Place sequential compression device      3. Pre-op testing  CBC Auto Differential    Comprehensive Metabolic Panel    X-Ray  Chest PA And Lateral          Plan:  Hematuria, gross  -     POCT URINE DIPSTICK WITHOUT MICROSCOPE  -     Urine culture    Bladder tumor  -     Place in Outpatient; Standing  -     Case Request Operating Room: TURBT (TRANSURETHRAL RESECTION OF BLADDER TUMOR)  -     Diet NPO; Standing  -     Place sequential compression device; Standing    Pre-op testing  -     CBC Auto Differential; Future; Expected date: 05/15/2023  -     Comprehensive Metabolic Panel; Future; Expected date: 05/15/2023  -     X-Ray Chest PA And Lateral; Future; Expected date: 05/15/2023    Other orders  -     ciprofloxacin HCl tablet 500 mg  -     IP VTE LOW RISK PATIENT; Standing  -     ciprofloxacin (CIPRO)400mg/200ml D5W IVPB 400 mg      I had a detailed discussion with the patient, his daughter and sister regarding the bladder tumor and noted high grade bladder cancer on cytology. We discussed TURBT and the associated risks/benefits. We also discussed adjuvant treatment such as intravesical chemo, which he doesn't want. Will schedule for 5/8/23. He will need cardiac clearance.

## 2023-05-16 ENCOUNTER — TELEPHONE (OUTPATIENT)
Dept: UROLOGY | Facility: CLINIC | Age: 83
End: 2023-05-16
Payer: OTHER GOVERNMENT

## 2023-05-16 ENCOUNTER — LAB VISIT (OUTPATIENT)
Dept: LAB | Facility: HOSPITAL | Age: 83
End: 2023-05-16
Attending: UROLOGY
Payer: MEDICARE

## 2023-05-16 DIAGNOSIS — E87.5 HYPERKALEMIA: Primary | ICD-10-CM

## 2023-05-16 DIAGNOSIS — E87.5 HYPERKALEMIA: ICD-10-CM

## 2023-05-16 LAB
ANION GAP SERPL CALC-SCNC: 14 MMOL/L (ref 8–16)
BACTERIA UR CULT: NO GROWTH
BUN SERPL-MCNC: 25 MG/DL (ref 8–23)
CALCIUM SERPL-MCNC: 10.2 MG/DL (ref 8.7–10.5)
CHLORIDE SERPL-SCNC: 104 MMOL/L (ref 95–110)
CO2 SERPL-SCNC: 22 MMOL/L (ref 23–29)
CREAT SERPL-MCNC: 1.6 MG/DL (ref 0.5–1.4)
EST. GFR  (NO RACE VARIABLE): 42.2 ML/MIN/1.73 M^2
GLUCOSE SERPL-MCNC: 223 MG/DL (ref 70–110)
POTASSIUM SERPL-SCNC: 5 MMOL/L (ref 3.5–5.1)
SODIUM SERPL-SCNC: 140 MMOL/L (ref 136–145)

## 2023-05-16 PROCEDURE — 80048 BASIC METABOLIC PNL TOTAL CA: CPT | Performed by: UROLOGY

## 2023-05-16 PROCEDURE — 36415 COLL VENOUS BLD VENIPUNCTURE: CPT | Mod: PO | Performed by: UROLOGY

## 2023-05-16 NOTE — TELEPHONE ENCOUNTER
Attempt to reach out to pt's daughter but was unsuccessful. LVM informing her that he was scheduled for labs today for his upcoming sx.

## 2023-05-24 DIAGNOSIS — I15.2 HYPERTENSION ASSOCIATED WITH DIABETES: ICD-10-CM

## 2023-05-24 DIAGNOSIS — E11.59 HYPERTENSION ASSOCIATED WITH DIABETES: ICD-10-CM

## 2023-05-24 RX ORDER — METOPROLOL SUCCINATE 25 MG/1
TABLET, EXTENDED RELEASE ORAL
Qty: 90 TABLET | Refills: 0 | Status: SHIPPED | OUTPATIENT
Start: 2023-05-24 | End: 2023-10-16

## 2023-05-24 NOTE — TELEPHONE ENCOUNTER
No care due was identified.  [facility] Embedded Care Due Messages. Reference number: [id number].   5/24/2023 9:38:46 AM CDT

## 2023-06-01 ENCOUNTER — TELEPHONE (OUTPATIENT)
Dept: CARDIOLOGY | Facility: CLINIC | Age: 83
End: 2023-06-01
Payer: MEDICARE

## 2023-06-01 NOTE — PRE ADMISSION SCREENING
Spoke with BESS White in urology office regarding cardiac clearance requested by Dr Michael. No clearance received yet per Cindy, but stated she will reach out to cardiologist regarding pending clearance.

## 2023-06-01 NOTE — TELEPHONE ENCOUNTER
This pt will be having surgery on 6/8/23 with Dr Michael. He will be having a TURBT.Transurethral resection of bladder tumor (TURBT)     Please advise on whether this patient can be cleared to proceed from your standpoint or will need any further medical therapy or testing to ensure they are optimized to proceed. Thank you in advance

## 2023-06-01 NOTE — PRE-PROCEDURE INSTRUCTIONS
Pre op instructions reviewed with patient's daughter,  Genesis Vick, per phone on 6/01/23: Spoke about pre op process and surgery instructions, verbalized understanding.    Surgery is scheduled on 6/08/23.  We will call you the business day prior to surgery to confirm arrival time (after 2:30 pm), as it is subject to change due to cancellations & emergencies.    Please report to the [facility] (1st Floor) at [facility] located off of [facility] (2nd building on the left, in front of the flag pole),address: [address] [place] [address] [place] [id number]    INSTRUCTIONS IMPORTANT!!!  Do Not Eat, Drink, or Smoke after 12 midnight! NO WATER after midnight! OK to brush teeth, no gum, candy or mints!    Take only these medicines with a small swallow of water-morning of surgery:  Amlodipine  Isosorbide Mononitrate  Flomax, if he can take it on an empty stomach    CONTINUE TO HOLD THE ASPIRIN AND THE VITAMIN D    TAKE HIS LAST METFORMIN, PRIOR TO SURGERY, ON 6/6/23.    PLEASE TEST HIS GLUCOSE LEVEL ON THE MORNING OF SURGERY. IF THE READING IS ABOVE 400 PLEASE GIVE HIM 12 UNITS OF HIS INSULIN.       ____  NO Acrylic/fake nails or nail polish worn day of surgery (specifically hand/arm & foot surgeries).  ____  NO powder, lotions, deodorants, oils or creams on body.  ____  Please Remove All jewelry & piercings prior to surgery.  ____  Please Remove Dentures, Hearing Aids & Contact Lens prior to the start of surgery.  ____  Please bring photo ID and insurance information to hospital (Leave Valuables at Home).  ____  If going home the same day, arrange for a ride home. You will not be able to drive 24 hrs if Anesthesia was used.   ____  Wear clean, loose fitting clothing. Allow for dressings, bandages.  ____  Stop all Aspirin products, Ibuprofen, Advil, Motrin & Aleve at least 5-7 days before surgery, unless otherwise instructed by your doctor, or the nurse.   ____  Blood Thinners are stopped based on your Provider's  recommendation; Call Surgeon's Office to inquire when to stop/hold.  ____  Stop taking any Fish Oil supplements or Vitamins at least 5 days prior to surgery, unless instructed otherwise by your Doctor.            Diabetic Patients: If you take diabetic medication, do NOT take morning of surgery unless instructed by             Doctor. Metformin to be stopped 24 hrs prior to surgery time. DO NOT take long-acting insulin the evening before surgery. Blood sugars will be checked in pre-op morning of.    Bathing Instructions:    -Do not shave your face or body the day before or the day of surgery.              -Shower & Rinse your body as usual with anti-bacterial Soap (Dial), on the evening prior to surgery and the morning of surgery.               -Rinse your body thoroughly.                -Pat yourself day with a clean, soft towel.               -Do not use lotion, cream, powder or deodorant               -Dress in clean clothes     [facility] Visitor/Ride Policy:  Only 2 adults allowed (over the age of 18) to accompany you to the Hospital. You Must have a ride home from a responsible adult that you know and trust. Medical Transport, Uber or Lyft can only be used if patient has a responsible adult to accompany them during ride home.    Post-Op Instructions: You will receive Post-op/Discharge instructions by your Discharge Nurse prior to going home. Please call your Surgeon's office with any post-surgery questions/concerns.    *Call [facility] Pre-Admissions Department with surgery instruction questions @ [phone]  or [phone]  (Mon-Fri 8 am to 4 pm)    *If you are running late or have questions the morning of surgery, please call the Surgery Dept @ [phone]  *Insurance/ Financial Questions, please call [phone].

## 2023-06-05 NOTE — TELEPHONE ENCOUNTER
Called patient and spoke with his daughter. Moderate to high risk status discussed. She states that she will speak with him before the surgery on Thursday.

## 2023-06-06 ENCOUNTER — ANESTHESIA EVENT (OUTPATIENT)
Dept: SURGERY | Facility: HOSPITAL | Age: 83
End: 2023-06-06
Payer: MEDICARE

## 2023-06-06 NOTE — PRE ADMISSION SCREENING
Called and spoke with Dr Rome with Anesthesia Dept regarding pt health history, specifically the patient's cardiac history. Anesthesia response: will evaluate day of surgery in preop. Patient scheduled for surgery on 6/8/23.

## 2023-06-07 ENCOUNTER — TELEPHONE (OUTPATIENT)
Dept: UROLOGY | Facility: CLINIC | Age: 83
End: 2023-06-07
Payer: MEDICARE

## 2023-06-07 ENCOUNTER — TELEPHONE (OUTPATIENT)
Dept: PREADMISSION TESTING | Facility: HOSPITAL | Age: 83
End: 2023-06-07
Payer: MEDICARE

## 2023-06-07 DIAGNOSIS — I20.9 AP (ANGINA PECTORIS): ICD-10-CM

## 2023-06-07 NOTE — ANESTHESIA PREPROCEDURE EVALUATION
06/07/2023  Nithin Pino is a 83 y.o., male.    Patient Active Problem List   Diagnosis    Hypertension associated with diabetes    CAD (coronary artery disease), s/p CABG x4    Benign prostatic hyperplasia    COPD (chronic obstructive pulmonary disease)    Chronic ischemic heart disease    RBBB    Ischemic cardiomyopathy    Claudication in peripheral vascular disease    Hyperlipidemia associated with type 2 diabetes mellitus    Hyperparathyroidism    Diabetes mellitus type 2 with peripheral artery disease    Thoracic aorta atherosclerosis    Peripheral vascular disease    Asymptomatic stenosis of both carotid arteries without infarction    Chronic stable angina    CRI (chronic renal insufficiency)    OAB (overactive bladder)    COVID-19 virus infection    Elevated troponin    Chest pain    AP (angina pectoris)    Abnormal nuclear stress test    Chronic combined systolic and diastolic congestive heart failure    Hematuria, gross    Nonrheumatic aortic valve insufficiency    Bilateral sensorineural hearing loss     Pre-op Assessment    I have reviewed the Patient Summary Reports.     I have reviewed the Nursing Notes. I have reviewed the NPO Status.   I have reviewed the Medications.     Review of Systems  Anesthesia Hx:  Denies Family Hx of Anesthesia complications.   Denies Personal Hx of Anesthesia complications.   Hematology/Oncology:  Hematology Normal   Oncology Normal     EENT/Dental:EENT/Dental Normal   Cardiovascular:   Hypertension Past MI (remote) CAD asymptomatic CABG/stent   Denies Angina. CHF PVD ECG has been reviewed. Cleared by cardiologist who noted moderate to high risk.  2021 NMST was abnormal  No subsequent cath  Bilateral 50% carotid ds  RBBB  Remote CABG, PCI   Pulmonary:   COPD Asthma    Renal/:   Chronic Renal Disease, CKD    Hepatic/GI:  Hepatic/GI Normal     Musculoskeletal:  Musculoskeletal Normal    Neurological:   Peripheral Neuropathy    Endocrine:   Diabetes    Dermatological:  Skin Normal    Psych:  Psychiatric Normal           Physical Exam  General: Alert and Oriented    Airway:  Mallampati: II   Mouth Opening: Normal  TM Distance: Normal  Tongue: Normal  Neck ROM: Normal ROM    2021 NMST       Abnormal myocardial perfusion scan.    There is a severe intensity, mostly fixed with some reversibilty defect in the apical wall(s).    There is a second moderate intensity, mostly fixed with some reversibilty defect in the mid to apical inferior and anteroapical wall(s).    The gated perfusion images showed an ejection fraction of 44% at rest. The gated perfusion images showed an ejection fraction of 51% post stress.    The EKG portion of this study is negative for ischemia.    2021 TTE   The left ventricle is normal in size with mild concentric hypertrophy and normal systolic function.   The estimated ejection fraction is 55%.   Normal left ventricular diastolic function.   Normal right ventricular size with normal right ventricular systolic function.   Mild mitral regurgitation.   Mild pulmonic regurgitation.   Mild tricuspid regurgitation.   Normal central venous pressure (3 mmHg).   The estimated PA systolic pressure is 18 mmHg.   Mild aortic regurgitation.         Anesthesia Plan  Type of Anesthesia, risks & benefits discussed:    Anesthesia Type: Gen ETT, Gen Supraglottic Airway  Intra-op Monitoring Plan: Standard ASA Monitors  Induction:  IV  Informed Consent: Informed consent signed with the Patient and all parties understand the risks and agree with anesthesia plan.  All questions answered.   ASA Score: 3  Day of Surgery Review of History & Physical: I have interviewed and examined the patient. I have reviewed the patient's H&P dated:     Ready For Surgery From Anesthesia Perspective.     .

## 2023-06-07 NOTE — TELEPHONE ENCOUNTER
Message left for pt to return call       ----- Message from Cheng Felton sent at 6/6/2023  5:43 PM CDT -----  Type:  Patient Returning Call    Who Called:pt daughter  Who Left Message for Patient:  Does the patient know what this is regarding?:procedure  Would the patient rather a call back or a response via [facility]? call  Best Call Back Number:[phone]  Additional Information: pt  is having surgery on  06/08

## 2023-06-08 ENCOUNTER — HOSPITAL ENCOUNTER (OUTPATIENT)
Facility: HOSPITAL | Age: 83
Discharge: HOME | End: 2023-06-08
Attending: UROLOGY | Admitting: UROLOGY
Payer: MEDICARE

## 2023-06-08 ENCOUNTER — ANESTHESIA (OUTPATIENT)
Dept: SURGERY | Facility: HOSPITAL | Age: 83
End: 2023-06-08
Payer: MEDICARE

## 2023-06-08 ENCOUNTER — TELEPHONE (OUTPATIENT)
Dept: UROLOGY | Facility: CLINIC | Age: 83
End: 2023-06-08
Payer: MEDICARE

## 2023-06-08 DIAGNOSIS — D49.4 BLADDER TUMOR: Primary | ICD-10-CM

## 2023-06-08 DIAGNOSIS — R31.0 HEMATURIA, GROSS: ICD-10-CM

## 2023-06-08 LAB — POCT GLUCOSE: 270 MG/DL (ref 70–110)

## 2023-06-08 PROCEDURE — 52240 PR CYSTOURETHROSCOPY,FULGUR >5 CM LESN: ICD-10-PCS | Mod: ,,, | Performed by: UROLOGY

## 2023-06-08 PROCEDURE — 52240 CYSTOSCOPY AND TREATMENT: CPT | Mod: ,,, | Performed by: UROLOGY

## 2023-06-08 PROCEDURE — 37000009 HC ANESTHESIA EA ADD 15 MINS: Performed by: UROLOGY

## 2023-06-08 PROCEDURE — 88307 TISSUE EXAM BY PATHOLOGIST: CPT | Mod: 26,,, | Performed by: PATHOLOGY

## 2023-06-08 PROCEDURE — 88307 TISSUE EXAM BY PATHOLOGIST: CPT | Performed by: PATHOLOGY

## 2023-06-08 PROCEDURE — 63600175 PHARM REV CODE 636 W HCPCS: Performed by: UROLOGY

## 2023-06-08 PROCEDURE — 71000033 HC RECOVERY, INTIAL HOUR: Performed by: UROLOGY

## 2023-06-08 PROCEDURE — 37000008 HC ANESTHESIA 1ST 15 MINUTES: Performed by: UROLOGY

## 2023-06-08 PROCEDURE — 71000015 HC POSTOP RECOV 1ST HR: Performed by: UROLOGY

## 2023-06-08 PROCEDURE — 63600175 PHARM REV CODE 636 W HCPCS: Performed by: NURSE ANESTHETIST, CERTIFIED REGISTERED

## 2023-06-08 PROCEDURE — 25000003 PHARM REV CODE 250: Performed by: NURSE ANESTHETIST, CERTIFIED REGISTERED

## 2023-06-08 PROCEDURE — 36000707: Performed by: UROLOGY

## 2023-06-08 PROCEDURE — 25000003 PHARM REV CODE 250: Performed by: ANESTHESIOLOGY

## 2023-06-08 PROCEDURE — 36000706: Performed by: UROLOGY

## 2023-06-08 PROCEDURE — 71000039 HC RECOVERY, EACH ADD'L HOUR: Performed by: UROLOGY

## 2023-06-08 PROCEDURE — 25000003 PHARM REV CODE 250: Performed by: UROLOGY

## 2023-06-08 PROCEDURE — 27201423 OPTIME MED/SURG SUP & DEVICES STERILE SUPPLY: Performed by: UROLOGY

## 2023-06-08 PROCEDURE — 88307 PR  SURG PATH,LEVEL V: ICD-10-PCS | Mod: 26,,, | Performed by: PATHOLOGY

## 2023-06-08 PROCEDURE — 63600175 PHARM REV CODE 636 W HCPCS: Performed by: ANESTHESIOLOGY

## 2023-06-08 RX ORDER — ONDANSETRON 2 MG/ML
INJECTION INTRAMUSCULAR; INTRAVENOUS
Status: DISCONTINUED | OUTPATIENT
Start: 2023-06-08 | End: 2023-06-08

## 2023-06-08 RX ORDER — OXYCODONE HYDROCHLORIDE 5 MG/1
5 TABLET ORAL
Status: DISCONTINUED | OUTPATIENT
Start: 2023-06-08 | End: 2023-06-08 | Stop reason: HOSPADM

## 2023-06-08 RX ORDER — HYDROMORPHONE HYDROCHLORIDE 2 MG/ML
0.2 INJECTION, SOLUTION INTRAMUSCULAR; INTRAVENOUS; SUBCUTANEOUS EVERY 5 MIN PRN
Status: DISCONTINUED | OUTPATIENT
Start: 2023-06-08 | End: 2023-06-08 | Stop reason: HOSPADM

## 2023-06-08 RX ORDER — CIPROFLOXACIN 2 MG/ML
400 INJECTION, SOLUTION INTRAVENOUS
Status: COMPLETED | OUTPATIENT
Start: 2023-06-08 | End: 2023-06-08

## 2023-06-08 RX ORDER — ROCURONIUM BROMIDE 10 MG/ML
INJECTION, SOLUTION INTRAVENOUS
Status: DISCONTINUED | OUTPATIENT
Start: 2023-06-08 | End: 2023-06-08

## 2023-06-08 RX ORDER — HYOSCYAMINE SULFATE 0.12 MG/1
0.25 TABLET SUBLINGUAL EVERY 4 HOURS PRN
Qty: 30 TABLET | Refills: 1 | Status: SHIPPED | OUTPATIENT
Start: 2023-06-08

## 2023-06-08 RX ORDER — METOPROLOL SUCCINATE 25 MG/1
25 TABLET, EXTENDED RELEASE ORAL DAILY
Status: COMPLETED | OUTPATIENT
Start: 2023-06-08 | End: 2023-06-08

## 2023-06-08 RX ORDER — PHENAZOPYRIDINE HYDROCHLORIDE 200 MG/1
200 TABLET, FILM COATED ORAL 3 TIMES DAILY PRN
Qty: 15 TABLET | Refills: 0 | Status: SHIPPED | OUTPATIENT
Start: 2023-06-08 | End: 2023-06-18

## 2023-06-08 RX ORDER — SODIUM CHLORIDE 0.9 % (FLUSH) 0.9 %
3 SYRINGE (ML) INJECTION
Status: DISCONTINUED | OUTPATIENT
Start: 2023-06-08 | End: 2023-06-08 | Stop reason: HOSPADM

## 2023-06-08 RX ORDER — MEPERIDINE HYDROCHLORIDE 25 MG/ML
12.5 INJECTION INTRAMUSCULAR; INTRAVENOUS; SUBCUTANEOUS ONCE
Status: DISCONTINUED | OUTPATIENT
Start: 2023-06-08 | End: 2023-06-08 | Stop reason: HOSPADM

## 2023-06-08 RX ORDER — PHENAZOPYRIDINE HYDROCHLORIDE 100 MG/1
200 TABLET, FILM COATED ORAL
Status: DISCONTINUED | OUTPATIENT
Start: 2023-06-08 | End: 2023-06-08 | Stop reason: HOSPADM

## 2023-06-08 RX ORDER — CEFUROXIME AXETIL 500 MG/1
500 TABLET ORAL 2 TIMES DAILY
Qty: 14 TABLET | Refills: 0 | Status: SHIPPED | OUTPATIENT
Start: 2023-06-08 | End: 2023-06-15

## 2023-06-08 RX ORDER — PROPOFOL 10 MG/ML
VIAL (ML) INTRAVENOUS
Status: DISCONTINUED | OUTPATIENT
Start: 2023-06-08 | End: 2023-06-08

## 2023-06-08 RX ORDER — ISOSORBIDE MONONITRATE 60 MG/1
60 TABLET, EXTENDED RELEASE ORAL DAILY
Qty: 90 TABLET | Refills: 0 | Status: SHIPPED | OUTPATIENT
Start: 2023-06-08 | End: 2023-10-25

## 2023-06-08 RX ORDER — PROCHLORPERAZINE EDISYLATE 5 MG/ML
5 INJECTION INTRAMUSCULAR; INTRAVENOUS EVERY 30 MIN PRN
Status: DISCONTINUED | OUTPATIENT
Start: 2023-06-08 | End: 2023-06-08 | Stop reason: HOSPADM

## 2023-06-08 RX ORDER — CEFDINIR 300 MG/1
300 CAPSULE ORAL 2 TIMES DAILY
Qty: 14 CAPSULE | Refills: 0 | Status: SHIPPED | OUTPATIENT
Start: 2023-06-08 | End: 2023-06-08 | Stop reason: HOSPADM

## 2023-06-08 RX ORDER — KETOROLAC TROMETHAMINE 30 MG/ML
15 INJECTION, SOLUTION INTRAMUSCULAR; INTRAVENOUS EVERY 8 HOURS PRN
Status: DISCONTINUED | OUTPATIENT
Start: 2023-06-08 | End: 2023-06-08 | Stop reason: HOSPADM

## 2023-06-08 RX ORDER — DIPHENHYDRAMINE HYDROCHLORIDE 50 MG/ML
25 INJECTION INTRAMUSCULAR; INTRAVENOUS EVERY 6 HOURS PRN
Status: DISCONTINUED | OUTPATIENT
Start: 2023-06-08 | End: 2023-06-08 | Stop reason: HOSPADM

## 2023-06-08 RX ORDER — HYOSCYAMINE SULFATE 0.12 MG/1
0.12 TABLET SUBLINGUAL EVERY 4 HOURS PRN
Status: DISCONTINUED | OUTPATIENT
Start: 2023-06-08 | End: 2023-06-08 | Stop reason: HOSPADM

## 2023-06-08 RX ORDER — ONDANSETRON 2 MG/ML
4 INJECTION INTRAMUSCULAR; INTRAVENOUS DAILY PRN
Status: DISCONTINUED | OUTPATIENT
Start: 2023-06-08 | End: 2023-06-08 | Stop reason: HOSPADM

## 2023-06-08 RX ORDER — LIDOCAINE HYDROCHLORIDE 20 MG/ML
INJECTION INTRAVENOUS
Status: DISCONTINUED | OUTPATIENT
Start: 2023-06-08 | End: 2023-06-08

## 2023-06-08 RX ORDER — ALBUTEROL SULFATE 0.83 MG/ML
2.5 SOLUTION RESPIRATORY (INHALATION) EVERY 4 HOURS PRN
Status: DISCONTINUED | OUTPATIENT
Start: 2023-06-08 | End: 2023-06-08 | Stop reason: HOSPADM

## 2023-06-08 RX ADMIN — PHENAZOPYRIDINE 200 MG: 100 TABLET ORAL at 10:06

## 2023-06-08 RX ADMIN — ROCURONIUM BROMIDE 50 MG: 10 INJECTION, SOLUTION INTRAVENOUS at 08:06

## 2023-06-08 RX ADMIN — PROPOFOL 100 MG: 10 INJECTION, EMULSION INTRAVENOUS at 08:06

## 2023-06-08 RX ADMIN — ONDANSETRON 4 MG: 2 INJECTION INTRAMUSCULAR; INTRAVENOUS at 09:06

## 2023-06-08 RX ADMIN — HYOSCYAMINE SULFATE 0.12 MG: 0.12 TABLET SUBLINGUAL at 10:06

## 2023-06-08 RX ADMIN — CIPROFLOXACIN 400 MG: 2 INJECTION, SOLUTION INTRAVENOUS at 08:06

## 2023-06-08 RX ADMIN — SODIUM CHLORIDE, POTASSIUM CHLORIDE, SODIUM LACTATE AND CALCIUM CHLORIDE: 600; 310; 30; 20 INJECTION, SOLUTION INTRAVENOUS at 08:06

## 2023-06-08 RX ADMIN — SUGAMMADEX 200 MG: 100 INJECTION, SOLUTION INTRAVENOUS at 09:06

## 2023-06-08 RX ADMIN — METOPROLOL SUCCINATE 25 MG: 25 TABLET, EXTENDED RELEASE ORAL at 07:06

## 2023-06-08 RX ADMIN — HYDROMORPHONE HYDROCHLORIDE 0.2 MG: 2 INJECTION INTRAMUSCULAR; INTRAVENOUS; SUBCUTANEOUS at 10:06

## 2023-06-08 RX ADMIN — LIDOCAINE HYDROCHLORIDE 80 MG: 20 INJECTION INTRAVENOUS at 08:06

## 2023-06-08 NOTE — PLAN OF CARE
POC and discharge instructions extensively reviewed with pt and pt's sister. Sister able to return demonstrate proper procedure for changing from leg bag to standard drainage bag without issue. No questions noted.

## 2023-06-08 NOTE — TRANSFER OF CARE
"Anesthesia Transfer of Care Note    Patient: Nithin Pino    Procedure(s) Performed: Procedure(s) (LRB):  TURBT (TRANSURETHRAL RESECTION OF BLADDER TUMOR) (N/A)    Patient location: PACU    Anesthesia Type: general    Transport from OR: Transported from OR on room air with adequate spontaneous ventilation    Post pain: adequate analgesia    Post assessment: no apparent anesthetic complications and tolerated procedure well    Post vital signs: stable    Level of consciousness: awake, alert and oriented    Nausea/Vomiting: no nausea/vomiting    Complications: none    Transfer of care protocol was followed      Last vitals:   Visit Vitals  BP (!) 157/72   Pulse 71   Temp 36.1 °C (97 °F) (Temporal)   Resp 18   Ht 5' 10" (1.778 m)   Wt 72.8 kg (160 lb 7.9 oz)   SpO2 99%   BMI 23.03 kg/m²     "

## 2023-06-08 NOTE — ANESTHESIA PROCEDURE NOTES
Intubation    Date/Time: 6/8/2023 8:42 AM  Performed by: Juanpablo Muñiz CRNA  Authorized by: Mando Dobbins MD     Intubation:     Induction:  Intravenous    Intubated:  Postinduction    Mask Ventilation:  Easy mask    Attempts:  1    Attempted By:  CRNA    Method of Intubation:  Direct    Blade:  Son 3    Laryngeal View Grade: Grade I - full view of cords      Difficult Airway Encountered?: No      Complications:  None    Airway Device:  Oral endotracheal tube    Airway Device Size:  7.0    Style/Cuff Inflation:  Cuffed (inflated to minimal occlusive pressure)    Tube secured:  22    Secured at:  The lips    Placement Verified By:  Capnometry    Complicating Factors:  None    Findings Post-Intubation:  BS equal bilateral

## 2023-06-08 NOTE — OP NOTE
Date of Procedure: 06/08/2023    PREOP DIAGNOSIS:  Bladder tumor    POSTOP DIAGNOSIS:  Bladder tumor    PROCEDURES:      1. TURBT -- >5cm    2. Tumor fulguration    SURGEON:  Hortencia Michael MD    Assistants: None    Specimen: Bladder tumor    ANESTHESIA:  General endotracheal.    BLOOD LOSS:  10cc.    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  After being sterilely prepped and draped a 24 Divehi resectoscope was then inserted into the urethra and bladder.  I visualized the entire bladder, noting extensive papillary tumor present on the left lateral bladder wall, extending down to the level of the trigone and onto the posterior bladder wall and to the level of the bladder neck.  Bilateral ureteral orifices were visualized in an orthotopic position effluxing clear urine.  Dissection was begun and continued until the tumor was completely dissected from the bladder wall.  I then fulgurated the edges and the base of the tumor.  The ureteral orifices were unaffected by the resection.  At the conclusion hemostasis was meticulously maintained and there was no evidence of residual tumor burden within the bladder.  An Fusion Smoothies evacuator was utilized to evacuate all tumor.  This was sent for pathology.  A 20 Divehi Carter catheter was placed without difficulty, 15 mL of sterile water was placed within the bladder and it sat nicely open the bladder neck.  Patient was transferred to the PACU in stable condition.  Patient will return for a nurse visit for a Carter catheter voiding trial in 1 week.  I will have the patient return in 2 weeks to discuss pathology.    COMPLICATIONS: None

## 2023-06-08 NOTE — DISCHARGE SUMMARY
[facility] - Surgery (Hospital)  Discharge Note  Short Stay    Procedure(s) (LRB):  TURBT (TRANSURETHRAL RESECTION OF BLADDER TUMOR) (N/A)      OUTCOME: Patient tolerated treatment/procedure well without complication and is now ready for discharge.    DISPOSITION: Home or Self Care    FINAL DIAGNOSIS:  Bladder tumor    FOLLOWUP: In clinic    DISCHARGE INSTRUCTIONS:    Discharge Procedure Orders   Ambulatory referral/consult to Outpatient Case Management   Referral Priority: Routine Referral Type: Consultation   Referral Reason: Specialty Services Required   Number of Visits Requested: 1     Diet Adult Regular     No dressing needed     Notify your health care provider if you experience any of the following:  severe uncontrolled pain     Notify your health care provider if you experience any of the following:  persistent nausea and vomiting or diarrhea     Notify your health care provider if you experience any of the following:  temperature >100.4     Activity as tolerated        TIME SPENT ON DISCHARGE: 10 minutes

## 2023-06-08 NOTE — TELEPHONE ENCOUNTER
----- Message from Hortencia Michael MD sent at 6/8/2023 10:15 AM CDT -----  Please schedule nurse visit for vazquez removal in  in 1 week. Schedule follow up with me in 2 weeks in [place].

## 2023-06-08 NOTE — TELEPHONE ENCOUNTER
No care due was identified.  [facility] Embedded Care Due Messages. Reference number: [id number].   6/07/2023 11:55:12 PM CDT

## 2023-06-09 NOTE — ANESTHESIA POSTPROCEDURE EVALUATION
Anesthesia Post Evaluation    Patient: Nithin Pino    Procedure(s) Performed: Procedure(s) (LRB):  TURBT (TRANSURETHRAL RESECTION OF BLADDER TUMOR) (N/A)    Final Anesthesia Type: general      Patient location during evaluation: PACU  Patient participation: Yes- Able to Participate  Level of consciousness: awake  Post-procedure vital signs: reviewed and stable  Pain management: adequate  Airway patency: patent    PONV status at discharge: No PONV  Anesthetic complications: no      Cardiovascular status: hemodynamically stable  Respiratory status: unassisted  Hydration status: euvolemic  Follow-up not needed.          Vitals Value Taken Time   /62 06/08/23 1100   Temp 36.7 °C (98 °F) 06/08/23 1100   Pulse 68 06/08/23 1100   Resp 17 06/08/23 1100   SpO2 98 % 06/08/23 1100         Event Time   Out of Recovery 10:48:04         Pain/Adi Score: Pain Rating Prior to Med Admin: 6 (6/8/2023 10:00 AM)  Adi Score: 10 (6/8/2023 11:00 AM)

## 2023-06-12 ENCOUNTER — OUTPATIENT CASE MANAGEMENT (OUTPATIENT)
Dept: ADMINISTRATIVE | Facility: OTHER | Age: 83
End: 2023-06-12
Payer: MEDICARE

## 2023-06-12 VITALS
SYSTOLIC BLOOD PRESSURE: 128 MMHG | DIASTOLIC BLOOD PRESSURE: 62 MMHG | OXYGEN SATURATION: 98 % | HEART RATE: 68 BPM | HEIGHT: 70 IN | TEMPERATURE: 98 F | WEIGHT: 160.5 LBS | BODY MASS INDEX: 22.98 KG/M2 | RESPIRATION RATE: 17 BRPM

## 2023-06-12 NOTE — PROGRESS NOTES
Outpatient Care Management  Patient Does Not Consent    Patient: Nithin Pino  MRN:  [id number]  Date of Service:  6/12/2023  Completed by:  Jacki Calzada RN    Chief Complaint   Patient presents with    OPCM Enrollment Call    Case Closure       Patient Summary           Consent Received:  Decline

## 2023-06-13 LAB
FINAL PATHOLOGIC DIAGNOSIS: NORMAL
GROSS: NORMAL
Lab: NORMAL

## 2023-06-15 ENCOUNTER — NURSE TRIAGE (OUTPATIENT)
Dept: ADMINISTRATIVE | Facility: CLINIC | Age: 83
End: 2023-06-15
Payer: MEDICARE

## 2023-06-15 ENCOUNTER — CLINICAL SUPPORT (OUTPATIENT)
Dept: UROLOGY | Facility: CLINIC | Age: 83
End: 2023-06-15
Payer: MEDICARE

## 2023-06-15 ENCOUNTER — TELEPHONE (OUTPATIENT)
Dept: UROLOGY | Facility: CLINIC | Age: 83
End: 2023-06-15
Payer: MEDICARE

## 2023-06-15 DIAGNOSIS — D49.4 BLADDER TUMOR: Primary | ICD-10-CM

## 2023-06-15 RX ORDER — CIPROFLOXACIN 250 MG/1
250 TABLET, FILM COATED ORAL 2 TIMES DAILY
Qty: 6 TABLET | Refills: 0 | Status: SHIPPED | OUTPATIENT
Start: 2023-06-15 | End: 2023-06-18

## 2023-06-15 NOTE — TELEPHONE ENCOUNTER
Called pt and spoke to his daughter and sister regarding the catheter. Urine wasn't draining this am, so sister changed the catheter. Pt just had vazquez removal nurse visit and all is well. Reports urine is pyridium orange. No other issues currently.

## 2023-06-15 NOTE — PROGRESS NOTES
.Pt came to clinic for a voiding trail. Leg bag was removed with 100 mL of yellow urine. I put in 180 mL of sterile water and pt said that he had to urinate. Using a 10 cc syringe, the balloon was deflated and catheter was removed. After a few minutes pt urinated 200 ml of clear urine. PVR was 8 mL. Dr Contreras was notified. Patient left clinic ambulatory per self.     Loraine Milanes RN

## 2023-06-15 NOTE — TELEPHONE ENCOUNTER
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  On 6/8/23    Patient 's sisterr Callie sister of patient calling.   Have appt today. 9:30   woke up this morning and urinary cath not draining.   Tube was clogged.  And nothing coming out.   Patient took the cath out and put in a new one.       Last urine was an hour  No urine output right now.  Ab pain, denies.  Care see drAshley With 24 hours,   if an;y new or worsening call me back O[facility] RN.   UC/ED.    Reason for Disposition   [1] Bloody or red-colored urine AND [2] no recent prostate or bladder surgery  (Exception: brief episode and urine now clear)    Additional Information   Negative: Shock suspected (e.g., cold/pale/clammy skin, too weak to stand, low BP, rapid pulse)   Negative: Sounds like a life-threatening emergency to the triager   Negative: [1] Catheter was accidentally pulled-out AND [2] bright red continuous bleeding   Negative: SEVERE abdominal pain   Negative: Fever > 100.4 F (38.0 C)   Negative: [1] Drinking very little AND [2] dehydration suspected (e.g., no urine > 12 hours, very dry mouth, very lightheaded)   Negative: Patient sounds very sick or weak to the triager   Negative: Catheter was accidentally pulled-out   Negative: [1] Catheter is broken AND [2] is not usable   Negative: Bleeding around catheter (e.g., from penis or female urethra)   Negative: Lower abdominal pain or distention   Negative: [1] No urine in bag > 4 hours AND [2] catheter is not kinked   Negative: [1] Tea-colored or slightly red urine lasts > 24 hours AND [2] not cleared by increasing fluid intake    AND [3] no recent prostate or bladder surgery   Negative: [1] Cloudy urine lasts > 24 hours AND [2] not cleared by increasing fluid intake    Protocols used: Urinary Catheter Symptoms and [id number]

## 2023-06-23 ENCOUNTER — OFFICE VISIT (OUTPATIENT)
Dept: UROLOGY | Facility: CLINIC | Age: 83
End: 2023-06-23
Payer: OTHER GOVERNMENT

## 2023-06-23 VITALS
HEIGHT: 70 IN | BODY MASS INDEX: 22.98 KG/M2 | HEART RATE: 90 BPM | WEIGHT: 160.5 LBS | DIASTOLIC BLOOD PRESSURE: 64 MMHG | SYSTOLIC BLOOD PRESSURE: 112 MMHG

## 2023-06-23 DIAGNOSIS — R31.0 HEMATURIA, GROSS: ICD-10-CM

## 2023-06-23 DIAGNOSIS — E11.51 DIABETES MELLITUS TYPE 2 WITH PERIPHERAL ARTERY DISEASE: ICD-10-CM

## 2023-06-23 DIAGNOSIS — C67.8 MALIGNANT NEOPLASM OF OVERLAPPING SITES OF BLADDER: Primary | ICD-10-CM

## 2023-06-23 LAB
BILIRUB SERPL-MCNC: NORMAL MG/DL
BLOOD URINE, POC: NORMAL
CLARITY, POC UA: NORMAL
COLOR, POC UA: YELLOW
GLUCOSE UR QL STRIP: 500
KETONES UR QL STRIP: NORMAL
LEUKOCYTE ESTERASE URINE, POC: NORMAL
NITRITE, POC UA: NORMAL
PH, POC UA: 5.5
PROTEIN, POC: NORMAL
SPECIFIC GRAVITY, POC UA: 1.01
UROBILINOGEN, POC UA: 0.2

## 2023-06-23 PROCEDURE — 99999PBSHW POCT URINE DIPSTICK WITHOUT MICROSCOPE: Mod: PBBFAC,HCNC,,

## 2023-06-23 PROCEDURE — 99214 OFFICE O/P EST MOD 30 MIN: CPT | Mod: S$PBB,HCNC,, | Performed by: UROLOGY

## 2023-06-23 PROCEDURE — 99213 OFFICE O/P EST LOW 20 MIN: CPT | Mod: PBBFAC,HCNC,PN | Performed by: UROLOGY

## 2023-06-23 PROCEDURE — 99999 PR PBB SHADOW E&M-EST. PATIENT-LVL III: CPT | Mod: PBBFAC,HCNC,, | Performed by: UROLOGY

## 2023-06-23 PROCEDURE — 81002 URINALYSIS NONAUTO W/O SCOPE: CPT | Mod: PBBFAC,HCNC,PN | Performed by: UROLOGY

## 2023-06-23 PROCEDURE — 99214 PR OFFICE/OUTPT VISIT, EST, LEVL IV, 30-39 MIN: ICD-10-PCS | Mod: S$PBB,HCNC,, | Performed by: UROLOGY

## 2023-06-23 PROCEDURE — 87086 URINE CULTURE/COLONY COUNT: CPT | Performed by: UROLOGY

## 2023-06-23 PROCEDURE — 99999PBSHW POCT URINE DIPSTICK WITHOUT MICROSCOPE: ICD-10-PCS | Mod: PBBFAC,HCNC,,

## 2023-06-23 PROCEDURE — 99999 PR PBB SHADOW E&M-EST. PATIENT-LVL III: ICD-10-PCS | Mod: PBBFAC,HCNC,, | Performed by: UROLOGY

## 2023-06-23 NOTE — PROGRESS NOTES
Chief Complaint   Patient presents with    Follow-up       History of Present Illness:   Nithin Pino is a 83 y.o. male here for evaluation of Follow-up    6/23/23-Pt s/p TURBT. 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 No gross hematuria or dysuria. No weakness or fever. States that he is urinating well.   5/15/23- pt here for cysto. CT scan personally reviewed, showing a polypoid tumor at the left bladder wall, enhancing.   4/10/23-83yo male, here today for follow up, last seen in 2021. At that time, here was being treated for BPH with finasteride and tamsulosin and OAB with vesicare. He did have gross hematuria, but refused cystoscopy. He reports that he was seen in the ED at [facility] yesterday with dysuria and gross hematuria. Was prescribed levaquin. States that he hasn't started the levaquin yet. Saw a little blood this am. No fever. Stream is strong. Still on finasteride, vesicare and flomax. No difficulty emptying--states it was checked in the ED yesterday and he emptied completely.     Pt's records from  clinic with Dr. Sanchez reviewed from 12/22/22, noting cytology suspicious for high grade bladder cancer.       Review of Systems   Constitutional:  Negative for chills and fever.   Respiratory:  Negative for shortness of breath.    Cardiovascular:  Negative for chest pain.   Gastrointestinal:  Negative for abdominal pain.   Genitourinary:  Negative for flank pain.   Musculoskeletal:  Positive for arthralgias. Negative for back pain.   All other systems reviewed and are negative.      Past Medical History:   Diagnosis Date    Abnormal brain MRI 1/21/2018    Chronic microvascular ischemia changes per MRI July 9, 2007 in [facility]    Abnormal ECG 10/31/2013    Abnormal stress test 1/28/2016    Alcohol dependence     States alcohol abuse ended in 1994    AP (angina pectoris) 1/28/2016    Asthma     Carotid artery occlusion     Cataract     Chronic combined systolic and  diastolic congestive heart failure 5/24/2021    Chronic diastolic heart failure 10/31/2013    Chronic ischemic heart disease 10/31/2013    CKD (chronic kidney disease) stage 3, GFR 30-59 ml/min 11/4/2015    Coronary artery disease     DM (diabetes mellitus) 2013    BS doesn't check 03/28/2017     DM (diabetes mellitus) 2011    BS doesn' check  04/03/2019    Heart valve regurgitation 11/4/2015    Echocardiogram 2/15/16   1 - Concentric hypertrophy.    2 - Normal left ventricular systolic function (EF 60-65%).    3 - Normal left ventricular diastolic function.    4 - Mild left atrial enlargement.    5 - Normal right ventricular systolic function .    6 - Trivial to mild aortic regurgitation.    7 - Trivial to mild mitral regurgitation.  10 - Trivial to mild pulmonic regurgitation.     Hematuria 6/25/2020    History of alcohol abuse 1/22/2018    Patient denies drinking to excess since 1994.    History of atherectomy 1/21/2018 2/2016 [facility]    History of chronic kidney disease 1/22/2018    History of CKD 3    History of PTCA 10/31/2013    History of PTCA 3/9/2016    Hyperlipidemia     Hypertension     Insomnia 6/17/2013    Ischemic cardiomyopathy 10/31/2013    Long term (current) use of antithrombotics/antiplatelets 1/21/2018    Myocardial infarction     Old MI (myocardial infarction) 1994    Peripheral vascular disease     Polyneuropathy     RBBB 10/31/2013    S/P CABG (coronary artery bypass graft) 10/31/2013    Shortness of breath 10/31/2013    Tobacco dependence     resolved    Trouble in sleeping     Type 2 diabetes with peripheral circulatory disorder, controlled     Wears glasses        Past Surgical History:   Procedure Laterality Date    APPENDECTOMY      CARDIAC CATHETERIZATION      2016    CARDIAC SURGERY  1994    balloon angioplasty    CATARACT EXTRACTION W/  INTRAOCULAR LENS IMPLANT Right 02/01/2017    CORONARY ARTERY BYPASS GRAFT  05/2011    per patient x4    HERNIA REPAIR      OPEN REDUCTION AND INTERNAL  FIXATION (ORIF) OF INJURY OF HIP      PCIOL Bilateral OD 17/OS 02/15/17    DR. ALONZO    TURBT (TRANSURETHRAL RESECTION OF BLADDER TUMOR) N/A 2023    Procedure: TURBT (TRANSURETHRAL RESECTION OF BLADDER TUMOR);  Surgeon: Hortencia Michael MD;  Location: [facility];  Service: Urology;  Laterality: N/A;       Family History   Adopted: Yes   Problem Relation Age of Onset    Diabetes Brother     Diabetes Sister     Cancer Neg Hx     Heart disease Neg Hx     Kidney disease Neg Hx        Social History     Tobacco Use    Smoking status: Former     Packs/day: 1.50     Years: 40.00     Pack years: 60.00     Types: Cigarettes     Quit date: 1994     Years since quittin.4    Smokeless tobacco: Former     Quit date: 2011    Tobacco comments:     approx date   Substance Use Topics    Alcohol use: Yes     Comment: occasionally; 1-2 cans of beer a month    Drug use: No       Current Outpatient Medications   Medication Sig Dispense Refill    albuterol (PROVENTIL/VENTOLIN HFA) 90 mcg/actuation inhaler albuterol sulfate Take No date recorded No form recorded No frequency recorded No route recorded No set duration recorded No set duration amount recorded active No dosage strength recorded No dosage strength units of measure recorded      amLODIPine (NORVASC) 10 MG tablet Take 1 tablet (10 mg total) by mouth once daily. 90 tablet 3    aspirin 81 MG Chew Take 1 tablet (81 mg total) by mouth once daily. 1 Tablet, Chewable Oral Every day 90 tablet 3    atorvastatin (LIPITOR) 40 MG tablet Take 40 mg by mouth every evening.      BLOOD-GLUCOSE METER (TRUERESULT BLOOD GLUCOSE SYSTM MISC) by Misc.(Non-Drug; Combo Route) route.      doxycycline (VIBRAMYCIN) 100 MG Cap Take by mouth.      dulaglutide (TRULICITY) 4.5 mg/0.5 mL pen injector Inject 4.5 mg into the skin every 7 days.       dulaglutide (TRULICITY) 4.5 mg/0.5 mL pen injector Inject 4.5 mg into the skin.      EMBRACE PRO TEST STRIPS Strp CHECK BLOOD SUGAR  TWICE DAILY. 50 strip 0    finasteride (PROSCAR) 5 mg tablet Take 1 tablet (5 mg total) by mouth once daily. 90 tablet 3    furosemide (LASIX) 40 MG tablet Take 40 mg by mouth once daily.      glucose 4 GM chewable tablet Take 16 g by mouth as needed.      hyoscyamine (LEVSIN/SL) 0.125 mg Subl Place 2 tablets (0.25 mg total) under the tongue every 4 (four) hours as needed (bladder spasms). 30 tablet 1    insulin (LANTUS SOLOSTAR U-100 INSULIN) glargine 100 units/mL (3mL) SubQ pen Inject 28 Units into the skin once daily. 27 mL 3    insulin glargine-yfgn 100 unit/mL (3 mL) InPn INJECT 24 UNITS SUBCUTANEOUSLY EVERY DAY FOR DIABETES      isosorbide mononitrate (IMDUR) 60 MG 24 hr tablet TAKE 1 TABLET (60 MG TOTAL) BY MOUTH ONCE DAILY. 90 tablet 0    LIDOcaine (LIDODERM) 5 % APPLY 1 PATCH TOPICALLY EVERY DAY FOR PAIN  WEAR FOR 12 HOURS, THEN REMOVE. DO NOT APPLY NEW PATCH FOR AT LEAST 12 HOURS.      losartan (COZAAR) 100 MG tablet Take 0.5 tablets (50 mg total) by mouth once daily. 90 tablet 3    metFORMIN (GLUCOPHAGE-XR) 500 MG ER 24hr tablet Take 1 tablet (500 mg total) by mouth 2 (two) times daily with meals. 180 tablet 3    metoprolol succinate (TOPROL-XL) 25 MG 24 hr tablet TAKE 1 TABLET EVERY EVENING 90 tablet 0    miconazole (MICOTIN) 2 % cream APPLY SMALL AMOUNT TOPICALLY TWICE A DAY AS NEEDED FOR FUNGAL INFECTION      solifenacin (VESICARE) 10 MG tablet TAKE 1 TABLET EVERY DAY 90 tablet 0    tamsulosin (FLOMAX) 0.4 mg Cap TAKE 1 CAPSULE EVERY DAY 90 capsule 3    TRUEPLUS LANCETS 26 gauge Misc CHECK BLOOD SUGAR TWICE DAILY. 100 each 0    TRUERESULT BLOOD GLUCOSE SYSTM kit CHECK BLOOD SUGAR TWICE DAILY. 1 each 0    vitamin D (VITAMIN D3) 1000 units Tab Take 1 tablet by mouth once daily.       No current facility-administered medications for this visit.       Review of patient's allergies indicates:   Allergen Reactions    Pseudoephedrine-guaifenesin Shortness Of Breath    Panmist dm   [pseudoephedrine-dm-guaifenesin]      Other reaction(s): Unknown       Physical Exam  Vitals:    06/23/23 1220   BP: 112/64   Pulse: 90       General: Well-developed, well-nourished in no acute distress  HEENT: Normocephalic, atraumatic, Extraocular movements intact  Neck: supple, trachea midline, no cervical or supraclavicular lymphadenopathy  Respirations: even and unlabored  Extremities: atraumatic, moves all equally, no clubbing, cyanosis or edema  Psych: normal affect  Skin: warm and dry, no lesions  Neuro: Alert and oriented, Cranial nerves II-XII grossly intact    Urinalysis  Small blood, glucose 500, small LE      Lab Results   Component Value Date    PSA 0.29 09/16/2020    PSA 0.82 06/09/2015    PSA 0.84 03/25/2014         Assessment:   1. Malignant neoplasm of overlapping sites of bladder  Prior authorization Order      2. Hematuria, gross  CULTURE, URINE      3. Diabetes mellitus type 2 with peripheral artery disease              Plan:  Malignant neoplasm of overlapping sites of bladder  -     Prior authorization Order    Hematuria, gross  -     CULTURE, URINE    Diabetes mellitus type 2 with peripheral artery disease      I had a detailed discussion with the patient and his daughter regarding this new diagnosis of bladder cancer. We discussed risks/benefits of BCG induction and they have agreed to proceed. Will schedule in 2-3 weeks from now x 6 treatments.     Follow up in about 3 months (around 9/23/2023) for Cystoscopy.

## 2023-06-25 LAB — BACTERIA UR CULT: NO GROWTH

## 2023-07-10 ENCOUNTER — CLINICAL SUPPORT (OUTPATIENT)
Dept: UROLOGY | Facility: CLINIC | Age: 83
End: 2023-07-10
Payer: MEDICARE

## 2023-07-10 DIAGNOSIS — C67.8 MALIGNANT NEOPLASM OF OVERLAPPING SITES OF BLADDER: Primary | ICD-10-CM

## 2023-07-10 PROCEDURE — 51720 TREATMENT OF BLADDER LESION: CPT | Mod: S$GLB,,, | Performed by: NURSE PRACTITIONER

## 2023-07-10 PROCEDURE — 99999 PR PBB SHADOW E&M-EST. PATIENT-LVL III: ICD-10-PCS | Mod: PBBFAC,HCNC,,

## 2023-07-10 PROCEDURE — 51720 PR INSTILL, ANTICANCER AGENT, BLADDER: ICD-10-PCS | Mod: S$GLB,,, | Performed by: NURSE PRACTITIONER

## 2023-07-10 PROCEDURE — 99999 PR PBB SHADOW E&M-EST. PATIENT-LVL III: CPT | Mod: PBBFAC,HCNC,,

## 2023-07-10 NOTE — PROGRESS NOTES
Patient in today for BCG treatment 1/6 BCG and preservative free sodium chloride 0.9% mixed as per instructions.  Using aseptic technique, a sterile fenestrated drape was placed over penis. pt was catheterized using a #14fr red rubber to allow bladder emptying. Private area cleansed with betadine. Using closed system, BCG instilled via gravity directly into bladder.  Pt tolerated procedure well.  Patient instructed to keep BCG solution in bladder for 2 hours.  Pt for safety reasons, pt was given directions to follow for the next 6 hours: (1) sit on the toilet at home to urinate;(2) immediately after urinating,  add 2 ups of undiluted chlorine bleach to the toilet and close the lid. (3) Let the bleach stand for 15 minutes before flushing, to disinfect the toilet; (4) Drink plenty of water to flush away any remaining BCG bacteria, and (5) Disinfect the toilet with 2 cups of undiluted chlorine bleach for 15 minutes each time you urinate for the 6 hours hours after treatment.  Patient was given the opportunity to ask questions and verbalized understanding of instructions.

## 2023-07-12 ENCOUNTER — EXTERNAL HOME HEALTH (OUTPATIENT)
Dept: HOME HEALTH SERVICES | Facility: HOSPITAL | Age: 83
End: 2023-07-12
Payer: MEDICARE

## 2023-07-17 ENCOUNTER — CLINICAL SUPPORT (OUTPATIENT)
Dept: UROLOGY | Facility: CLINIC | Age: 83
End: 2023-07-17
Payer: MEDICARE

## 2023-07-17 VITALS — HEIGHT: 70 IN | WEIGHT: 160 LBS | BODY MASS INDEX: 22.9 KG/M2

## 2023-07-17 DIAGNOSIS — C67.8 MALIGNANT NEOPLASM OF OVERLAPPING SITES OF BLADDER: Primary | ICD-10-CM

## 2023-07-17 LAB
BILIRUB SERPL-MCNC: NORMAL MG/DL
BLOOD URINE, POC: NORMAL
CLARITY, POC UA: CLEAR
COLOR, POC UA: YELLOW
GLUCOSE UR QL STRIP: 500
KETONES UR QL STRIP: NORMAL
LEUKOCYTE ESTERASE URINE, POC: NORMAL
NITRITE, POC UA: NORMAL
PH, POC UA: 6
PROTEIN, POC: NORMAL
SPECIFIC GRAVITY, POC UA: 1.02
UROBILINOGEN, POC UA: NORMAL

## 2023-07-17 PROCEDURE — 99999 PR PBB SHADOW E&M-EST. PATIENT-LVL III: ICD-10-PCS | Mod: PBBFAC,HCNC,,

## 2023-07-17 PROCEDURE — 51720 TREATMENT OF BLADDER LESION: CPT | Mod: S$GLB,,, | Performed by: NURSE PRACTITIONER

## 2023-07-17 PROCEDURE — 51720 PR INSTILL, ANTICANCER AGENT, BLADDER: ICD-10-PCS | Mod: S$GLB,,, | Performed by: NURSE PRACTITIONER

## 2023-07-17 PROCEDURE — 81002 URINALYSIS NONAUTO W/O SCOPE: CPT | Mod: HCNC,S$GLB,, | Performed by: UROLOGY

## 2023-07-17 PROCEDURE — 81002 POCT URINE DIPSTICK WITHOUT MICROSCOPE: ICD-10-PCS | Mod: HCNC,S$GLB,, | Performed by: UROLOGY

## 2023-07-17 PROCEDURE — 99999 PR PBB SHADOW E&M-EST. PATIENT-LVL III: CPT | Mod: PBBFAC,HCNC,,

## 2023-07-17 NOTE — PROGRESS NOTES
..Patient in today for BCG treatment 2/6. BCG and preservative free sodium chloride 0.9% mixed as per instructions.  Using aseptic technique, a sterile fenestrated drape was placed over penis. pt was catheterized using a #14fr red rubber to allow bladder emptying. Private area cleansed with betadine. Using closed system, BCG instilled via gravity directly into bladder.  Pt tolerated procedure well.  Patient instructed to keep BCG solution in bladder for 2 hours.  Pt for safety reasons, pt was given directions to follow for the next 6 hours: (1) sit on the toilet at home to urinate;(2) immediately after urinating,  add 2 ups of undiluted chlorine bleach to the toilet and close the lid. (3) Let the bleach stand for 15 minutes before flushing, to disinfect the toilet; (4) Drink plenty of water to flush away any remaining BCG bacteria, and (5) Disinfect the toilet with 2 cups of undiluted chlorine bleach for 15 minutes each time you urinate for the 6 hours hours after treatment.  Patient was given the opportunity to ask questions and verbalized understanding of instructions.

## 2023-07-24 ENCOUNTER — CLINICAL SUPPORT (OUTPATIENT)
Dept: UROLOGY | Facility: CLINIC | Age: 83
End: 2023-07-24
Payer: MEDICARE

## 2023-07-24 VITALS — WEIGHT: 160 LBS | BODY MASS INDEX: 22.9 KG/M2 | HEIGHT: 70 IN

## 2023-07-24 DIAGNOSIS — C67.9 MALIGNANT NEOPLASM OF URINARY BLADDER, UNSPECIFIED SITE: Primary | ICD-10-CM

## 2023-07-24 LAB
BILIRUB SERPL-MCNC: NORMAL MG/DL
BLOOD URINE, POC: NORMAL
CLARITY, POC UA: CLEAR
COLOR, POC UA: YELLOW
GLUCOSE UR QL STRIP: NORMAL
KETONES UR QL STRIP: NORMAL
LEUKOCYTE ESTERASE URINE, POC: NORMAL
NITRITE, POC UA: NORMAL
PH, POC UA: 6
PROTEIN, POC: NORMAL
SPECIFIC GRAVITY, POC UA: 1.01
UROBILINOGEN, POC UA: NORMAL

## 2023-07-24 PROCEDURE — 81002 POCT URINE DIPSTICK WITHOUT MICROSCOPE: ICD-10-PCS | Mod: HCNC,S$GLB,, | Performed by: UROLOGY

## 2023-07-24 PROCEDURE — 99999 PR PBB SHADOW E&M-EST. PATIENT-LVL III: ICD-10-PCS | Mod: PBBFAC,HCNC,,

## 2023-07-24 PROCEDURE — 51720 TREATMENT OF BLADDER LESION: CPT | Mod: HCNC,S$GLB,, | Performed by: NURSE PRACTITIONER

## 2023-07-24 PROCEDURE — 99999 PR PBB SHADOW E&M-EST. PATIENT-LVL III: CPT | Mod: PBBFAC,HCNC,,

## 2023-07-24 PROCEDURE — 81002 URINALYSIS NONAUTO W/O SCOPE: CPT | Mod: HCNC,S$GLB,, | Performed by: UROLOGY

## 2023-07-24 PROCEDURE — 51720 PR INSTILL, ANTICANCER AGENT, BLADDER: ICD-10-PCS | Mod: HCNC,S$GLB,, | Performed by: NURSE PRACTITIONER

## 2023-07-24 NOTE — PROGRESS NOTES
......Patient in today for BCG treatment.  #3/6. One vial of BCG and 50ml perservative free sodium chloride 0.9% mixed as per instructions.  Using aseptic technique, a sterile fenestrated drape was placed over perineum.  Pt was catheterized using a #16Fr Coude catheter to allow bladder emptying.  Using closed system, one vial of BCG instilled via gravity directly into bladder.  Pt tolerated well.  Instructed pt to keep BCG solution in bladder for 2 hours.  Pt was given instructions to follow for the next 6 hours, including sitting on toilet at home and using 2 cups of undiluted chlorine bleach and closing the lid.  Pt was told to allow bleach to stand for 15 minutes before flushing toilet.  Patient was also told to drink plenty of water to flush any remaining BCG bacteria.  Patient verbalized understanding.

## 2023-07-31 ENCOUNTER — CLINICAL SUPPORT (OUTPATIENT)
Dept: UROLOGY | Facility: CLINIC | Age: 83
End: 2023-07-31
Payer: MEDICARE

## 2023-07-31 DIAGNOSIS — C67.8 MALIGNANT NEOPLASM OF OVERLAPPING SITES OF BLADDER: Primary | ICD-10-CM

## 2023-07-31 PROCEDURE — 51720 TREATMENT OF BLADDER LESION: CPT | Mod: HCNC,S$GLB,, | Performed by: NURSE PRACTITIONER

## 2023-07-31 PROCEDURE — 99999 PR PBB SHADOW E&M-EST. PATIENT-LVL III: CPT | Mod: PBBFAC,HCNC,,

## 2023-07-31 PROCEDURE — 99999 PR PBB SHADOW E&M-EST. PATIENT-LVL III: ICD-10-PCS | Mod: PBBFAC,HCNC,,

## 2023-07-31 PROCEDURE — 51720 PR INSTILL, ANTICANCER AGENT, BLADDER: ICD-10-PCS | Mod: HCNC,S$GLB,, | Performed by: NURSE PRACTITIONER

## 2023-07-31 NOTE — PROGRESS NOTES
Patient in today for BCG treatment 4/6. BCG and preservative free sodium chloride 0.9% mixed as per instructions.  Using aseptic technique, a sterile fenestrated drape was placed over penis. pt was catheterized using a #16fr red rubber coude to allow bladder emptying. Private area cleansed with betadine. Using closed system, BCG instilled via gravity directly into bladder.  Pt tolerated procedure well.  Patient instructed to keep BCG solution in bladder for 2 hours.  Pt for safety reasons, pt was given directions to follow for the next 6 hours: (1) sit on the toilet at home to urinate;(2) immediately after urinating,  add 2 ups of undiluted chlorine bleach to the toilet and close the lid. (3) Let the bleach stand for 15 minutes before flushing, to disinfect the toilet; (4) Drink plenty of water to flush away any remaining BCG bacteria, and (5) Disinfect the toilet with 2 cups of undiluted chlorine bleach for 15 minutes each time you urinate for the 6 hours hours after treatment.  Patient was given the opportunity to ask questions and verbalized understanding of instructions.

## 2023-08-07 ENCOUNTER — TELEPHONE (OUTPATIENT)
Dept: UROLOGY | Facility: CLINIC | Age: 83
End: 2023-08-07
Payer: MEDICARE

## 2023-08-07 ENCOUNTER — CLINICAL SUPPORT (OUTPATIENT)
Dept: UROLOGY | Facility: CLINIC | Age: 83
End: 2023-08-07
Payer: MEDICARE

## 2023-08-07 DIAGNOSIS — C67.8 MALIGNANT NEOPLASM OF OVERLAPPING SITES OF BLADDER: Primary | ICD-10-CM

## 2023-08-07 PROCEDURE — 51720 TREATMENT OF BLADDER LESION: CPT | Mod: S$GLB,,, | Performed by: NURSE PRACTITIONER

## 2023-08-07 PROCEDURE — 99999 PR PBB SHADOW E&M-EST. PATIENT-LVL III: CPT | Mod: PBBFAC,HCNC,,

## 2023-08-07 PROCEDURE — 51720 PR INSTILL, ANTICANCER AGENT, BLADDER: ICD-10-PCS | Mod: S$GLB,,, | Performed by: NURSE PRACTITIONER

## 2023-08-07 PROCEDURE — 99999 PR PBB SHADOW E&M-EST. PATIENT-LVL III: ICD-10-PCS | Mod: PBBFAC,HCNC,,

## 2023-08-07 NOTE — TELEPHONE ENCOUNTER
Spoke with pt caregiver and advised that we haven't seen a red flip phone or been made aware of one that is lost. She stated that the message was also sent to lost and found.

## 2023-08-07 NOTE — PROGRESS NOTES
.Patient in today for BCG treatment 5/6. BCG and preservative free sodium chloride 0.9% mixed as per instructions.  Using aseptic technique, a sterile fenestrated drape was placed over penis. pt was catheterized using a #14fr red rubber to allow bladder emptying. Private area cleansed with betadine. Using closed system, BCG instilled via gravity directly into bladder.  Pt tolerated procedure well.  Patient instructed to keep BCG solution in bladder for 2 hours.  Pt for safety reasons, pt was given directions to follow for the next 6 hours: (1) sit on the toilet at home to urinate;(2) immediately after urinating,  add 2 ups of undiluted chlorine bleach to the toilet and close the lid. (3) Let the bleach stand for 15 minutes before flushing, to disinfect the toilet; (4) Drink plenty of water to flush away any remaining BCG bacteria, and (5) Disinfect the toilet with 2 cups of undiluted chlorine bleach for 15 minutes each time you urinate for the 6 hours hours after treatment.  Patient was given the opportunity to ask questions and verbalized understanding of instructions.

## 2023-08-07 NOTE — TELEPHONE ENCOUNTER
----- Message from Alex Vo sent at 8/7/2023  3:53 PM CDT -----  Contact: [phone]  Pt is calling to see if a red flip phone was found ([employer]), pt lost phone this morning (appt). Please call back at  [phone] . Thanksdj

## 2023-08-14 ENCOUNTER — OFFICE VISIT (OUTPATIENT)
Dept: INTERNAL MEDICINE | Facility: CLINIC | Age: 83
End: 2023-08-14
Payer: MEDICARE

## 2023-08-14 ENCOUNTER — CLINICAL SUPPORT (OUTPATIENT)
Dept: UROLOGY | Facility: CLINIC | Age: 83
End: 2023-08-14
Payer: MEDICARE

## 2023-08-14 VITALS
OXYGEN SATURATION: 98 % | DIASTOLIC BLOOD PRESSURE: 66 MMHG | SYSTOLIC BLOOD PRESSURE: 138 MMHG | TEMPERATURE: 99 F | HEART RATE: 103 BPM | BODY MASS INDEX: 22.59 KG/M2 | WEIGHT: 157.44 LBS

## 2023-08-14 VITALS — WEIGHT: 160 LBS | BODY MASS INDEX: 22.9 KG/M2 | HEIGHT: 70 IN

## 2023-08-14 DIAGNOSIS — E11.59 HYPERTENSION ASSOCIATED WITH DIABETES: ICD-10-CM

## 2023-08-14 DIAGNOSIS — I15.2 HYPERTENSION ASSOCIATED WITH DIABETES: ICD-10-CM

## 2023-08-14 DIAGNOSIS — J43.1 PANLOBULAR EMPHYSEMA: ICD-10-CM

## 2023-08-14 DIAGNOSIS — C67.0 MALIGNANT NEOPLASM OF TRIGONE OF URINARY BLADDER: Primary | ICD-10-CM

## 2023-08-14 DIAGNOSIS — E11.51 DIABETES MELLITUS TYPE 2 WITH PERIPHERAL ARTERY DISEASE: Primary | ICD-10-CM

## 2023-08-14 DIAGNOSIS — I50.42 CHRONIC COMBINED SYSTOLIC AND DIASTOLIC CONGESTIVE HEART FAILURE: ICD-10-CM

## 2023-08-14 LAB
BILIRUB SERPL-MCNC: NORMAL MG/DL
BLOOD URINE, POC: NORMAL
CLARITY, POC UA: CLEAR
COLOR, POC UA: YELLOW
GLUCOSE UR QL STRIP: 500
KETONES UR QL STRIP: NORMAL
LEUKOCYTE ESTERASE URINE, POC: NORMAL
NITRITE, POC UA: NORMAL
PH, POC UA: 6.5
PROTEIN, POC: NORMAL
SPECIFIC GRAVITY, POC UA: 1.02
UROBILINOGEN, POC UA: 0.2

## 2023-08-14 PROCEDURE — 51720 PR INSTILL, ANTICANCER AGENT, BLADDER: ICD-10-PCS | Mod: HCNC,S$GLB,, | Performed by: NURSE PRACTITIONER

## 2023-08-14 PROCEDURE — 3078F DIAST BP <80 MM HG: CPT | Mod: HCNC,CPTII,S$GLB, | Performed by: NURSE PRACTITIONER

## 2023-08-14 PROCEDURE — 1160F RVW MEDS BY RX/DR IN RCRD: CPT | Mod: HCNC,CPTII,S$GLB, | Performed by: NURSE PRACTITIONER

## 2023-08-14 PROCEDURE — 1159F MED LIST DOCD IN RCRD: CPT | Mod: HCNC,CPTII,S$GLB, | Performed by: NURSE PRACTITIONER

## 2023-08-14 PROCEDURE — 1160F PR REVIEW ALL MEDS BY PRESCRIBER/CLIN PHARMACIST DOCUMENTED: ICD-10-PCS | Mod: HCNC,CPTII,S$GLB, | Performed by: NURSE PRACTITIONER

## 2023-08-14 PROCEDURE — 3078F PR MOST RECENT DIASTOLIC BLOOD PRESSURE < 80 MM HG: ICD-10-PCS | Mod: HCNC,CPTII,S$GLB, | Performed by: NURSE PRACTITIONER

## 2023-08-14 PROCEDURE — 1125F PR PAIN SEVERITY QUANTIFIED, PAIN PRESENT: ICD-10-PCS | Mod: HCNC,CPTII,S$GLB, | Performed by: NURSE PRACTITIONER

## 2023-08-14 PROCEDURE — 81002 URINALYSIS NONAUTO W/O SCOPE: CPT | Mod: HCNC,S$GLB,, | Performed by: UROLOGY

## 2023-08-14 PROCEDURE — 1100F PTFALLS ASSESS-DOCD GE2>/YR: CPT | Mod: HCNC,CPTII,S$GLB, | Performed by: NURSE PRACTITIONER

## 2023-08-14 PROCEDURE — 1125F AMNT PAIN NOTED PAIN PRSNT: CPT | Mod: HCNC,CPTII,S$GLB, | Performed by: NURSE PRACTITIONER

## 2023-08-14 PROCEDURE — 3288F FALL RISK ASSESSMENT DOCD: CPT | Mod: HCNC,CPTII,S$GLB, | Performed by: NURSE PRACTITIONER

## 2023-08-14 PROCEDURE — 1159F PR MEDICATION LIST DOCUMENTED IN MEDICAL RECORD: ICD-10-PCS | Mod: HCNC,CPTII,S$GLB, | Performed by: NURSE PRACTITIONER

## 2023-08-14 PROCEDURE — 81002 POCT URINE DIPSTICK WITHOUT MICROSCOPE: ICD-10-PCS | Mod: HCNC,S$GLB,, | Performed by: UROLOGY

## 2023-08-14 PROCEDURE — 99999 PR PBB SHADOW E&M-EST. PATIENT-LVL III: CPT | Mod: PBBFAC,HCNC,, | Performed by: NURSE PRACTITIONER

## 2023-08-14 PROCEDURE — 99214 PR OFFICE/OUTPT VISIT, EST, LEVL IV, 30-39 MIN: ICD-10-PCS | Mod: HCNC,S$GLB,, | Performed by: NURSE PRACTITIONER

## 2023-08-14 PROCEDURE — 99999 PR PBB SHADOW E&M-EST. PATIENT-LVL I: ICD-10-PCS | Mod: PBBFAC,HCNC,,

## 2023-08-14 PROCEDURE — 99999 PR PBB SHADOW E&M-EST. PATIENT-LVL III: ICD-10-PCS | Mod: PBBFAC,HCNC,, | Performed by: NURSE PRACTITIONER

## 2023-08-14 PROCEDURE — 3075F PR MOST RECENT SYSTOLIC BLOOD PRESS GE 130-139MM HG: ICD-10-PCS | Mod: HCNC,CPTII,S$GLB, | Performed by: NURSE PRACTITIONER

## 2023-08-14 PROCEDURE — 3075F SYST BP GE 130 - 139MM HG: CPT | Mod: HCNC,CPTII,S$GLB, | Performed by: NURSE PRACTITIONER

## 2023-08-14 PROCEDURE — 99999 PR PBB SHADOW E&M-EST. PATIENT-LVL I: CPT | Mod: PBBFAC,HCNC,,

## 2023-08-14 PROCEDURE — 99214 OFFICE O/P EST MOD 30 MIN: CPT | Mod: HCNC,S$GLB,, | Performed by: NURSE PRACTITIONER

## 2023-08-14 PROCEDURE — 51720 TREATMENT OF BLADDER LESION: CPT | Mod: HCNC,S$GLB,, | Performed by: NURSE PRACTITIONER

## 2023-08-14 PROCEDURE — 1100F PR PT FALLS ASSESS DOC 2+ FALLS/FALL W/INJURY/YR: ICD-10-PCS | Mod: HCNC,CPTII,S$GLB, | Performed by: NURSE PRACTITIONER

## 2023-08-14 PROCEDURE — 3288F PR FALLS RISK ASSESSMENT DOCUMENTED: ICD-10-PCS | Mod: HCNC,CPTII,S$GLB, | Performed by: NURSE PRACTITIONER

## 2023-08-14 RX ORDER — LIDOCAINE HYDROCHLORIDE 20 MG/ML
JELLY TOPICAL
Status: COMPLETED | OUTPATIENT
Start: 2023-08-14 | End: 2023-08-14

## 2023-08-14 RX ADMIN — LIDOCAINE HYDROCHLORIDE 10 ML: 20 JELLY TOPICAL at 08:08

## 2023-08-14 NOTE — PROGRESS NOTES
.....Patient in today for BCG treatment  #6/6.  One vial of BCG and 50ml perservative free sodium chloride 0.9% mixed as per instructions.  Using aseptic technique, a sterile fenestrated drape was placed over penis and perineal area cleansed with betadine.  Pt was catheterized using a #16 Fr coude catheter to allow bladder emptying.  Using closed system, one vial BCG instilled via gravity directly into bladder.  Pt tolerated well.  Instructed pt to keep BCG solution in bladder for 2 hours.  Pt was given instructions to follow for the next 6 hours, including sitting on his toilet at home and using 2 cups of undiluted chlorine bleach and closing the lid.  Pt was told to allow bleach to stand for 15 minutes before flushing toilet.  He was also told to drink plenty of water to flush any remaining BCG bacteria.  Patient verbalized understanding.    Next appt scheduled for cysto with Dr. Michael.

## 2023-08-14 NOTE — PROGRESS NOTES
Subjective:       Patient ID: Nithin Pino is a 83 y.o. male.    Chief Complaint: Follow-up    Pt presents to clinic today for diabetes follow up  A1C has increased to 10.6 [facility]   He was admitted to the hospital last year for CHF and his pioglitazone was d/c   He reports home readings around 200  He is taking his trulicity,metformin and lantus 30 units/day (recent increase per [facility])  [facility] his [facility] - has appt this week  He is hesitant for me to make changes to his med  Has freestyle libre2, reports he is feeling good     Breathing is stable  Has history of copd     Follows with Dr. Sadler for his CHF  BP stable, denies any worsening of his shortness of breath        /66   Pulse 103   Temp 98.7 °F (37.1 °C) (Tympanic)   Wt 71.4 kg (157 lb 6.5 oz)   SpO2 98%   BMI 22.59 kg/m²     Review of Systems   Constitutional:  Negative for activity change, chills, diaphoresis, fever and unexpected weight change.   HENT: Negative.     Eyes: Negative.    Respiratory:  Negative for apnea, chest tightness, shortness of breath and stridor.    Cardiovascular:  Negative for chest pain, palpitations and leg swelling.   Gastrointestinal: Negative.    Endocrine: Negative.    Genitourinary: Negative.    Musculoskeletal:  Positive for arthralgias and back pain. Negative for myalgias.   Skin:  Negative for color change, pallor, rash and wound.   Allergic/Immunologic: Negative.    Neurological:  Positive for weakness. Negative for dizziness, facial asymmetry, light-headedness and headaches.   Hematological:  Negative for adenopathy.   Psychiatric/Behavioral:  Negative for agitation and behavioral problems.        Objective:      Physical Exam  Vitals and nursing note reviewed.   Constitutional:       General: He is not in acute distress.     Appearance: He is well-developed. He is not diaphoretic.   HENT:      Head: Normocephalic and atraumatic.   Cardiovascular:      Rate and  Rhythm: Normal rate and regular rhythm.      Heart sounds: Normal heart sounds.   Pulmonary:      Effort: Pulmonary effort is normal. No respiratory distress.      Breath sounds: Normal breath sounds.   Skin:     General: Skin is warm and dry.      Findings: No rash.   Neurological:      Mental Status: He is alert and oriented to person, place, and time.   Psychiatric:         Behavior: Behavior normal.         Thought Content: Thought content normal.         Judgment: Judgment normal.         Assessment:       1. Diabetes mellitus type 2 with peripheral artery disease    2. Hypertension associated with diabetes    3. Panlobular emphysema    4. Chronic combined systolic and diastolic congestive heart failure    5. BMI 22.0-22.9, adult        Plan:       Nithin was seen today for follow-up.    Diagnoses and all orders for this visit:    Diabetes mellitus type 2 with peripheral artery disease       - Chronic, worsening-- recently [facility] to 15 units bid and he has follow up this week. Continue to monitor blood sugars. Keep follow up  Hypertension associated with diabetes       - Chronic, stable on current meds. Continue   Panlobular emphysema      - Chronic, stable   Chronic combined systolic and diastolic congestive heart failure        -  Chronic, stable- continue following with Dr. Sadler   BMI 22.0-22.9, adult      [facility]  Appt with Dr. Tomlin in 3 months and PRN      Additional Progress Note...

## 2023-08-15 NOTE — TELEPHONE ENCOUNTER
Care Due:                  Date            Visit Type   Department     Provider  --------------------------------------------------------------------------------    Last Visit: None Found      None         None Found                              EP -                              PRIMARY      [facility] INTERNAL  Next Visit: 11-      CARE ([facility])   MEDICINE       Jim Tomlin                                                            Last  Test          Frequency    Reason                     Performed    Due Date  --------------------------------------------------------------------------------    HBA1C.......  6 months...  metFORMIN................  05- 11-    [facility] Embedded Care Due Messages. Reference number: [id number].   8/15/2023 3:55:12 PM CDT

## 2023-08-15 NOTE — TELEPHONE ENCOUNTER
----- Message from Lincoln Downs sent at 8/15/2023  2:17 PM CDT -----  Contact: Ai Cloud patients daughter is calling to see if all prescriptions we [facility] to [facility] or just to be put on file. Please give Ai a call at [phone]

## 2023-08-16 DIAGNOSIS — R39.12 BENIGN PROSTATIC HYPERPLASIA WITH WEAK URINARY STREAM: ICD-10-CM

## 2023-08-16 DIAGNOSIS — N40.1 BENIGN PROSTATIC HYPERPLASIA WITH WEAK URINARY STREAM: ICD-10-CM

## 2023-08-16 RX ORDER — ATORVASTATIN CALCIUM 40 MG/1
40 TABLET, FILM COATED ORAL NIGHTLY
Qty: 90 TABLET | Refills: 3 | Status: SHIPPED | OUTPATIENT
Start: 2023-08-16 | End: 2024-02-13 | Stop reason: SDUPTHER

## 2023-08-16 RX ORDER — TAMSULOSIN HYDROCHLORIDE 0.4 MG/1
0.4 CAPSULE ORAL DAILY
Qty: 90 CAPSULE | Refills: 3 | Status: SHIPPED | OUTPATIENT
Start: 2023-08-16 | End: 2024-02-13 | Stop reason: SDUPTHER

## 2023-08-17 ENCOUNTER — OFFICE VISIT (OUTPATIENT)
Dept: UROLOGY | Facility: CLINIC | Age: 83
End: 2023-08-17
Payer: MEDICARE

## 2023-08-17 VITALS
BODY MASS INDEX: 22.06 KG/M2 | SYSTOLIC BLOOD PRESSURE: 129 MMHG | RESPIRATION RATE: 18 BRPM | TEMPERATURE: 98 F | WEIGHT: 154.13 LBS | DIASTOLIC BLOOD PRESSURE: 73 MMHG | HEART RATE: 120 BPM | HEIGHT: 70 IN

## 2023-08-17 DIAGNOSIS — C67.0 MALIGNANT NEOPLASM OF TRIGONE OF URINARY BLADDER: Primary | ICD-10-CM

## 2023-08-17 DIAGNOSIS — T83.511A URINARY TRACT INFECTION ASSOCIATED WITH CATHETERIZATION OF URINARY TRACT, UNSPECIFIED INDWELLING URINARY CATHETER TYPE, INITIAL ENCOUNTER: ICD-10-CM

## 2023-08-17 DIAGNOSIS — N39.0 URINARY TRACT INFECTION ASSOCIATED WITH CATHETERIZATION OF URINARY TRACT, UNSPECIFIED INDWELLING URINARY CATHETER TYPE, INITIAL ENCOUNTER: ICD-10-CM

## 2023-08-17 LAB
BILIRUB SERPL-MCNC: NORMAL MG/DL
BLOOD URINE, POC: NORMAL
CLARITY, POC UA: CLEAR
COLOR, POC UA: NORMAL
GLUCOSE UR QL STRIP: 100
KETONES UR QL STRIP: NORMAL
LEUKOCYTE ESTERASE URINE, POC: NORMAL
NITRITE, POC UA: NORMAL
PH, POC UA: 5.5
PROTEIN, POC: 100
SPECIFIC GRAVITY, POC UA: >=1.03
UROBILINOGEN, POC UA: 1

## 2023-08-17 PROCEDURE — 96372 THER/PROPH/DIAG INJ SC/IM: CPT | Mod: HCNC,S$GLB,, | Performed by: UROLOGY

## 2023-08-17 PROCEDURE — 99213 OFFICE O/P EST LOW 20 MIN: CPT | Mod: PBBFAC,HCNC,PN | Performed by: UROLOGY

## 2023-08-17 PROCEDURE — 81002 POCT URINE DIPSTICK WITHOUT MICROSCOPE: ICD-10-PCS | Mod: HCNC,S$GLB,, | Performed by: UROLOGY

## 2023-08-17 PROCEDURE — 99213 PR OFFICE/OUTPT VISIT, EST, LEVL III, 20-29 MIN: ICD-10-PCS | Mod: HCNC,25,S$GLB, | Performed by: UROLOGY

## 2023-08-17 PROCEDURE — 99213 OFFICE O/P EST LOW 20 MIN: CPT | Mod: HCNC,25,S$GLB, | Performed by: UROLOGY

## 2023-08-17 PROCEDURE — 96372 PR INJECTION,THERAP/PROPH/DIAG2ST, IM OR SUBCUT: ICD-10-PCS | Mod: HCNC,S$GLB,, | Performed by: UROLOGY

## 2023-08-17 PROCEDURE — 99999 PR PBB SHADOW E&M-EST. PATIENT-LVL III: CPT | Mod: PBBFAC,HCNC,, | Performed by: UROLOGY

## 2023-08-17 PROCEDURE — 96372 THER/PROPH/DIAG INJ SC/IM: CPT | Mod: PBBFAC,HCNC,PN

## 2023-08-17 PROCEDURE — 99999 PR PBB SHADOW E&M-EST. PATIENT-LVL III: ICD-10-PCS | Mod: PBBFAC,HCNC,, | Performed by: UROLOGY

## 2023-08-17 PROCEDURE — 87086 URINE CULTURE/COLONY COUNT: CPT | Mod: HCNC | Performed by: UROLOGY

## 2023-08-17 PROCEDURE — 81002 URINALYSIS NONAUTO W/O SCOPE: CPT | Mod: PBBFAC,HCNC,PN | Performed by: UROLOGY

## 2023-08-17 RX ORDER — CEFTRIAXONE 1 G/1
1 INJECTION, POWDER, FOR SOLUTION INTRAMUSCULAR; INTRAVENOUS
Status: COMPLETED | OUTPATIENT
Start: 2023-08-17 | End: 2023-08-17

## 2023-08-17 RX ADMIN — CEFTRIAXONE SODIUM 1 G: 1 INJECTION, POWDER, FOR SOLUTION INTRAMUSCULAR; INTRAVENOUS at 02:08

## 2023-08-17 NOTE — PROGRESS NOTES
Chief Complaint:   Encounter Diagnosis   Name Primary?    Malignant neoplasm of trigone of urinary bladder Yes       HPI:  HPI Nithin Pino devin 83 y.o. male who presents with complaints of difficulty voiding.  He is undergoing BCG induction at the [facility] office.  He states yesterday after a catheterization which was difficult and painful he developed difficulty voiding.  He could not void all day yesterday and eventually was able to void a very large amount this morning.  Prior to voiding he was having chills.  He states he is still not feeling well.  Although he was able to give a specimen in the office today.    History:  Past Medical History:   Diagnosis Date    Abnormal brain MRI 1/21/2018    Chronic microvascular ischemia changes per MRI July 9, 2007 in [facility]    Abnormal ECG 10/31/2013    Abnormal stress test 1/28/2016    Alcohol dependence     States alcohol abuse ended in 1994    AP (angina pectoris) 1/28/2016    Asthma     Carotid artery occlusion     Cataract     Chronic combined systolic and diastolic congestive heart failure 5/24/2021    Chronic diastolic heart failure 10/31/2013    Chronic ischemic heart disease 10/31/2013    CKD (chronic kidney disease) stage 3, GFR 30-59 ml/min 11/4/2015    Coronary artery disease     DM (diabetes mellitus) 2013    BS doesn't check 03/28/2017     DM (diabetes mellitus) 2011    BS doesn' check  04/03/2019    Heart valve regurgitation 11/4/2015    Echocardiogram 2/15/16   1 - Concentric hypertrophy.    2 - Normal left ventricular systolic function (EF 60-65%).    3 - Normal left ventricular diastolic function.    4 - Mild left atrial enlargement.    5 - Normal right ventricular systolic function .    6 - Trivial to mild aortic regurgitation.    7 - Trivial to mild mitral regurgitation.  10 - Trivial to mild pulmonic regurgitation.     Hematuria 6/25/2020    History of alcohol abuse 1/22/2018    Patient denies drinking to excess since 1994.    History of  atherectomy 1/21/2018 2/2016 [facility]    History of chronic kidney disease 1/22/2018    History of CKD 3    History of PTCA 10/31/2013    History of PTCA 3/9/2016    Hyperlipidemia     Hypertension     Insomnia 6/17/2013    Ischemic cardiomyopathy 10/31/2013    Long term (current) use of antithrombotics/antiplatelets 1/21/2018    Myocardial infarction     Old MI (myocardial infarction) 1994    Peripheral vascular disease     Polyneuropathy     RBBB 10/31/2013    S/P CABG (coronary artery bypass graft) 10/31/2013    Shortness of breath 10/31/2013    Tobacco dependence     resolved    Trouble in sleeping     Type 2 diabetes with peripheral circulatory disorder, controlled     Wears glasses      Past Surgical History:   Procedure Laterality Date    APPENDECTOMY      CARDIAC CATHETERIZATION      2016    CARDIAC SURGERY  1994    balloon angioplasty    CATARACT EXTRACTION W/  INTRAOCULAR LENS IMPLANT Right 02/01/2017    CORONARY ARTERY BYPASS GRAFT  05/2011    per patient x4    HERNIA REPAIR      OPEN REDUCTION AND INTERNAL FIXATION (ORIF) OF INJURY OF HIP      PCIOL Bilateral OD 02/01/17/OS 02/15/17    DR. ALONZO    TURBT (TRANSURETHRAL RESECTION OF BLADDER TUMOR) N/A 6/8/2023    Procedure: TURBT (TRANSURETHRAL RESECTION OF BLADDER TUMOR);  Surgeon: Hortencia Michael MD;  Location: [facility];  Service: Urology;  Laterality: N/A;     Family History   Adopted: Yes   Problem Relation Age of Onset    Diabetes Brother     Diabetes Sister     Cancer Neg Hx     Heart disease Neg Hx     Kidney disease Neg Hx        Current Outpatient Medications on File Prior to Visit   Medication Sig Dispense Refill    albuterol (PROVENTIL/VENTOLIN HFA) 90 mcg/actuation inhaler albuterol sulfate Take No date recorded No form recorded No frequency recorded No route recorded No set duration recorded No set duration amount recorded active No dosage strength recorded No dosage strength units of measure recorded      amLODIPine (NORVASC) 10 MG tablet  Take 1 tablet (10 mg total) by mouth once daily. 90 tablet 3    aspirin 81 MG Chew Take 1 tablet (81 mg total) by mouth once daily. 1 Tablet, Chewable Oral Every day 90 tablet 3    atorvastatin (LIPITOR) 40 MG tablet Take 1 tablet (40 mg total) by mouth every evening. 90 tablet 3    BLOOD-GLUCOSE METER (TRUERESULT BLOOD GLUCOSE SYSTM MISC) by Misc.(Non-Drug; Combo Route) route.      doxycycline (VIBRAMYCIN) 100 MG Cap Take by mouth.      dulaglutide (TRULICITY) 4.5 mg/0.5 mL pen injector Inject 4.5 mg into the skin every 7 days. SUNDAY      dulaglutide (TRULICITY) 4.5 mg/0.5 mL pen injector Inject 4.5 mg into the skin.      finasteride (PROSCAR) 5 mg tablet Take 1 tablet (5 mg total) by mouth once daily. 90 tablet 3    furosemide (LASIX) 40 MG tablet Take 40 mg by mouth once daily.      glucose 4 GM chewable tablet Take 16 g by mouth as needed.      insulin (LANTUS SOLOSTAR U-100 INSULIN) glargine 100 units/mL (3mL) SubQ pen Inject 28 Units into the skin once daily. 27 mL 3    insulin glargine-yfgn 100 unit/mL (3 mL) InPn INJECT 24 UNITS SUBCUTANEOUSLY EVERY DAY FOR DIABETES      isosorbide mononitrate (IMDUR) 60 MG 24 hr tablet TAKE 1 TABLET (60 MG TOTAL) BY MOUTH ONCE DAILY. 90 tablet 0    LIDOcaine (LIDODERM) 5 % APPLY 1 PATCH TOPICALLY EVERY DAY FOR PAIN  WEAR FOR 12 HOURS, THEN REMOVE. DO NOT APPLY NEW PATCH FOR AT LEAST 12 HOURS.      losartan (COZAAR) 100 MG tablet Take 0.5 tablets (50 mg total) by mouth once daily. 90 tablet 3    metFORMIN (GLUCOPHAGE-XR) 500 MG ER 24hr tablet Take 1 tablet (500 mg total) by mouth 2 (two) times daily with meals. 180 tablet 3    miconazole (MICOTIN) 2 % cream APPLY SMALL AMOUNT TOPICALLY TWICE A DAY AS NEEDED FOR FUNGAL INFECTION      tamsulosin (FLOMAX) 0.4 mg Cap Take 1 capsule (0.4 mg total) by mouth once daily. 90 capsule 3    TRUEPLUS LANCETS 26 gauge Misc CHECK BLOOD SUGAR TWICE DAILY. 100 each 0    TRUERESULT BLOOD GLUCOSE SYSTM kit CHECK BLOOD SUGAR TWICE DAILY. 1  "each 0    vitamin D (VITAMIN D3) 1000 units Tab Take 1 tablet by mouth once daily.      EMBRACE PRO TEST STRIPS Strp CHECK BLOOD SUGAR TWICE DAILY. (Patient not taking: Reported on 8/14/2023) 50 strip 0    hyoscyamine (LEVSIN/SL) 0.125 mg Subl Place 2 tablets (0.25 mg total) under the tongue every 4 (four) hours as needed (bladder spasms). (Patient not taking: Reported on 8/14/2023) 30 tablet 1    metoprolol succinate (TOPROL-XL) 25 MG 24 hr tablet TAKE 1 TABLET EVERY EVENING (Patient not taking: Reported on 8/14/2023) 90 tablet 0    solifenacin (VESICARE) 10 MG tablet TAKE 1 TABLET EVERY DAY (Patient not taking: Reported on 8/14/2023) 90 tablet 0     No current facility-administered medications on file prior to visit.        Objective:     Vitals:    08/17/23 1304   BP: 129/73   Pulse: (!) 120   Resp: 18   Temp: 98.4 °F (36.9 °C)   TempSrc: Temporal   Weight: 69.9 kg (154 lb 1.6 oz)   Height: 5' 10" (1.778 m)      BMI Readings from Last 1 Encounters:   08/17/23 22.11 kg/m²          Physical Exam  Abdomen is soft bladder is not palpable     No bladder scanner available.    Lab Results   Component Value Date    PSA 0.29 09/16/2020    PSA 0.82 06/09/2015    PSA 0.84 03/25/2014        Lab Results   Component Value Date    CREATININE 1.6 (H) 05/16/2023      Assessment:       1. Malignant neoplasm of trigone of urinary bladder        Plan:     1. Malignant neoplasm of trigone of urinary bladder       Orders Placed This Encounter    Urine culture    POCT URINE DIPSTICK WITHOUT MICROSCOPE      Urinalysis consistent with BCG therapy and/or infection.  We will get a urine culture.  We will cover him with a g of Rocephin for the next 24 hours.  He does have some tachycardia today.  He understands if he develops worsening difficulty voiding or fever or chills he is to present to the emergency room immediately.  I also talked to his daughter on the phone and let them know these instructions.  "

## 2023-08-17 NOTE — PROGRESS NOTES
Rocephin 1gm administered IM to right ventrogluteal using aseptic technique. Pt tolerated procedure well. Patient agreed to wait 15 minutes after injection in the clinic and to report any adverse reactions.

## 2023-08-20 LAB — BACTERIA UR CULT: NO GROWTH

## 2023-09-26 ENCOUNTER — PROCEDURE VISIT (OUTPATIENT)
Dept: UROLOGY | Facility: CLINIC | Age: 83
End: 2023-09-26
Payer: OTHER GOVERNMENT

## 2023-09-26 ENCOUNTER — TELEPHONE (OUTPATIENT)
Dept: CARDIOLOGY | Facility: CLINIC | Age: 83
End: 2023-09-26
Payer: MEDICARE

## 2023-09-26 ENCOUNTER — TELEPHONE (OUTPATIENT)
Dept: UROLOGY | Facility: CLINIC | Age: 83
End: 2023-09-26

## 2023-09-26 DIAGNOSIS — Z01.818 PREOP TESTING: ICD-10-CM

## 2023-09-26 DIAGNOSIS — C67.8 MALIGNANT NEOPLASM OF OVERLAPPING SITES OF BLADDER: Primary | ICD-10-CM

## 2023-09-26 LAB
BILIRUB UR QL STRIP: NEGATIVE
GLUCOSE UR QL STRIP: POSITIVE
KETONES UR QL STRIP: NEGATIVE
LEUKOCYTE ESTERASE UR QL STRIP: NEGATIVE
PH, POC UA: 6
POC BLOOD, URINE: POSITIVE
POC NITRATES, URINE: NEGATIVE
PROT UR QL STRIP: NEGATIVE
SP GR UR STRIP: 1.02 (ref 1–1.03)
UROBILINOGEN UR STRIP-ACNC: ABNORMAL (ref 0.3–2.2)

## 2023-09-26 PROCEDURE — 87086 URINE CULTURE/COLONY COUNT: CPT | Performed by: UROLOGY

## 2023-09-26 PROCEDURE — 52000 CYSTOURETHROSCOPY: CPT | Mod: S$PBB,,, | Performed by: UROLOGY

## 2023-09-26 PROCEDURE — 99999PBSHW POCT URINALYSIS, DIPSTICK, AUTOMATED, W/O SCOPE: ICD-10-PCS | Mod: PBBFAC,,,

## 2023-09-26 PROCEDURE — 99999PBSHW POCT URINALYSIS, DIPSTICK, AUTOMATED, W/O SCOPE: Mod: PBBFAC,,,

## 2023-09-26 PROCEDURE — 52000 PR CYSTOURETHROSCOPY: ICD-10-PCS | Mod: S$PBB,,, | Performed by: UROLOGY

## 2023-09-26 PROCEDURE — 52000 CYSTOURETHROSCOPY: CPT | Mod: PBBFAC,PN | Performed by: UROLOGY

## 2023-09-26 PROCEDURE — 81003 URINALYSIS AUTO W/O SCOPE: CPT | Mod: PBBFAC,PN | Performed by: UROLOGY

## 2023-09-26 RX ORDER — CIPROFLOXACIN 2 MG/ML
400 INJECTION, SOLUTION INTRAVENOUS
OUTPATIENT
Start: 2023-09-26

## 2023-09-26 RX ORDER — LIDOCAINE HYDROCHLORIDE 20 MG/ML
JELLY TOPICAL
Status: COMPLETED | OUTPATIENT
Start: 2023-09-26 | End: 2023-09-26

## 2023-09-26 RX ORDER — CIPROFLOXACIN 500 MG/1
500 TABLET ORAL
Status: COMPLETED | OUTPATIENT
Start: 2023-09-26 | End: 2023-09-26

## 2023-09-26 RX ADMIN — CIPROFLOXACIN 500 MG: 500 TABLET ORAL at 10:09

## 2023-09-26 RX ADMIN — LIDOCAINE HYDROCHLORIDE: 20 JELLY TOPICAL at 10:09

## 2023-09-26 NOTE — TELEPHONE ENCOUNTER
Pt is scheduled for TURBT with Dr. Hortencia Michael due to Bladder Cancer on 10-26-23. Pt will need cardiac clearance prior to this. I attempted to schedule an appointment with Dr. Sadler, but couldn't find any prior to this. Could patient be scheduled for an appointment for Cardiac Clearance, will also need to hold his ASA 7 days prior to the procedure.     FYI, pt is scheduled for an EKG on 10-9-23, along with Labs.    Thank you.

## 2023-09-26 NOTE — PROCEDURES
CC: cysto    History of Present Illness:   Nithin Pino is a 83 y.o. male here for follow up.       9/26/23: pt here for surveillance cysto. Pt completed 6 BCG induction treatments.   6/23/23-Pt s/p TURBT. 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 No gross hematuria or dysuria. No weakness or fever. States that he is urinating well.   5/15/23- pt here for cysto. CT scan personally reviewed, showing a polypoid tumor at the left bladder wall, enhancing.   4/10/23-81yo male, here today for follow up, last seen in 2021. At that time, here was being treated for BPH with finasteride and tamsulosin and OAB with vesicare. He did have gross hematuria, but refused cystoscopy. He reports that he was seen in the ED at [facility] yesterday with dysuria and gross hematuria. Was prescribed levaquin. States that he hasn't started the levaquin yet. Saw a little blood this am. No fever. Stream is strong. Still on finasteride, vesicare and flomax. No difficulty emptying--states it was checked in the ED yesterday and he emptied completely.     Pt's records from  clinic with Dr. Sanchez reviewed from 12/22/22, noting cytology suspicious for high grade bladder cancer.       Review of Systems   Constitutional:  Negative for chills and fever.   Respiratory:  Negative for shortness of breath.    Cardiovascular:  Negative for chest pain.   Gastrointestinal:  Negative for abdominal pain.   Genitourinary:  Negative for flank pain.   Musculoskeletal:  Positive for arthralgias. Negative for back pain.   All other systems reviewed and are negative.        Past Medical History:   Diagnosis Date    Abnormal brain MRI 1/21/2018    Chronic microvascular ischemia changes per MRI July 9, 2007 in [facility]    Abnormal ECG 10/31/2013    Abnormal stress test 1/28/2016    Alcohol dependence     States alcohol abuse ended in 1994    AP (angina pectoris) 1/28/2016    Asthma     Carotid artery occlusion     Cataract      Chronic combined systolic and diastolic congestive heart failure 5/24/2021    Chronic diastolic heart failure 10/31/2013    Chronic ischemic heart disease 10/31/2013    CKD (chronic kidney disease) stage 3, GFR 30-59 ml/min 11/4/2015    Coronary artery disease     DM (diabetes mellitus) 2013    BS doesn't check 03/28/2017     DM (diabetes mellitus) 2011    BS doesn' check  04/03/2019    Heart valve regurgitation 11/4/2015    Echocardiogram 2/15/16   1 - Concentric hypertrophy.    2 - Normal left ventricular systolic function (EF 60-65%).    3 - Normal left ventricular diastolic function.    4 - Mild left atrial enlargement.    5 - Normal right ventricular systolic function .    6 - Trivial to mild aortic regurgitation.    7 - Trivial to mild mitral regurgitation.  10 - Trivial to mild pulmonic regurgitation.     Hematuria 6/25/2020    History of alcohol abuse 1/22/2018    Patient denies drinking to excess since 1994.    History of atherectomy 1/21/2018 2/2016 [facility]    History of chronic kidney disease 1/22/2018    History of CKD 3    History of PTCA 10/31/2013    History of PTCA 3/9/2016    Hyperlipidemia     Hypertension     Insomnia 6/17/2013    Ischemic cardiomyopathy 10/31/2013    Long term (current) use of antithrombotics/antiplatelets 1/21/2018    Myocardial infarction     Old MI (myocardial infarction) 1994    Peripheral vascular disease     Polyneuropathy     RBBB 10/31/2013    S/P CABG (coronary artery bypass graft) 10/31/2013    Shortness of breath 10/31/2013    Tobacco dependence     resolved    Trouble in sleeping     Type 2 diabetes with peripheral circulatory disorder, controlled     Wears glasses        Past Surgical History:   Procedure Laterality Date    APPENDECTOMY      CARDIAC CATHETERIZATION      2016    CARDIAC SURGERY  1994    balloon angioplasty    CATARACT EXTRACTION W/  INTRAOCULAR LENS IMPLANT Right 02/01/2017    CORONARY ARTERY BYPASS GRAFT  05/2011    per patient x4    HERNIA REPAIR       OPEN REDUCTION AND INTERNAL FIXATION (ORIF) OF INJURY OF HIP      PCIOL Bilateral OD 17/OS 02/15/17    DR. ALONZO    TURBT (TRANSURETHRAL RESECTION OF BLADDER TUMOR) N/A 2023    Procedure: TURBT (TRANSURETHRAL RESECTION OF BLADDER TUMOR);  Surgeon: Hortencia Michael MD;  Location: [facility];  Service: Urology;  Laterality: N/A;       Family History   Adopted: Yes   Problem Relation Age of Onset    Diabetes Brother     Diabetes Sister     Cancer Neg Hx     Heart disease Neg Hx     Kidney disease Neg Hx        Social History     Tobacco Use    Smoking status: Former     Current packs/day: 0.00     Average packs/day: 1.5 packs/day for 40.0 years (60.0 ttl pk-yrs)     Types: Cigarettes     Start date: 1954     Quit date: 1994     Years since quittin.7    Smokeless tobacco: Former     Quit date: 2011    Tobacco comments:     approx date   Substance Use Topics    Alcohol use: Yes     Comment: occasionally; 1-2 cans of beer a month    Drug use: No       Current Outpatient Medications   Medication Sig Dispense Refill    albuterol (PROVENTIL/VENTOLIN HFA) 90 mcg/actuation inhaler albuterol sulfate Take No date recorded No form recorded No frequency recorded No route recorded No set duration recorded No set duration amount recorded active No dosage strength recorded No dosage strength units of measure recorded      amLODIPine (NORVASC) 10 MG tablet Take 1 tablet (10 mg total) by mouth once daily. 90 tablet 3    aspirin 81 MG Chew Take 1 tablet (81 mg total) by mouth once daily. 1 Tablet, Chewable Oral Every day 90 tablet 3    atorvastatin (LIPITOR) 40 MG tablet Take 1 tablet (40 mg total) by mouth every evening. 90 tablet 3    BLOOD-GLUCOSE METER (TRUERESULT BLOOD GLUCOSE SYSTM MISC) by Misc.(Non-Drug; Combo Route) route.      doxycycline (VIBRAMYCIN) 100 MG Cap Take by mouth.      dulaglutide (TRULICITY) 4.5 mg/0.5 mL pen injector Inject 4.5 mg into the skin every 7 days.       dulaglutide  (TRULICITY) 4.5 mg/0.5 mL pen injector Inject 4.5 mg into the skin.      EMBRACE PRO TEST STRIPS Strp CHECK BLOOD SUGAR TWICE DAILY. (Patient not taking: Reported on 8/14/2023) 50 strip 0    finasteride (PROSCAR) 5 mg tablet Take 1 tablet (5 mg total) by mouth once daily. 90 tablet 3    furosemide (LASIX) 40 MG tablet Take 40 mg by mouth once daily.      glucose 4 GM chewable tablet Take 16 g by mouth as needed.      hyoscyamine (LEVSIN/SL) 0.125 mg Subl Place 2 tablets (0.25 mg total) under the tongue every 4 (four) hours as needed (bladder spasms). (Patient not taking: Reported on 8/14/2023) 30 tablet 1    insulin (LANTUS SOLOSTAR U-100 INSULIN) glargine 100 units/mL (3mL) SubQ pen Inject 28 Units into the skin once daily. 27 mL 3    insulin glargine-yfgn 100 unit/mL (3 mL) InPn INJECT 24 UNITS SUBCUTANEOUSLY EVERY DAY FOR DIABETES      isosorbide mononitrate (IMDUR) 60 MG 24 hr tablet TAKE 1 TABLET (60 MG TOTAL) BY MOUTH ONCE DAILY. 90 tablet 0    LIDOcaine (LIDODERM) 5 % APPLY 1 PATCH TOPICALLY EVERY DAY FOR PAIN  WEAR FOR 12 HOURS, THEN REMOVE. DO NOT APPLY NEW PATCH FOR AT LEAST 12 HOURS.      losartan (COZAAR) 100 MG tablet Take 0.5 tablets (50 mg total) by mouth once daily. 90 tablet 3    metFORMIN (GLUCOPHAGE-XR) 500 MG ER 24hr tablet Take 1 tablet (500 mg total) by mouth 2 (two) times daily with meals. 180 tablet 3    metoprolol succinate (TOPROL-XL) 25 MG 24 hr tablet TAKE 1 TABLET EVERY EVENING (Patient not taking: Reported on 8/14/2023) 90 tablet 0    miconazole (MICOTIN) 2 % cream APPLY SMALL AMOUNT TOPICALLY TWICE A DAY AS NEEDED FOR FUNGAL INFECTION      solifenacin (VESICARE) 10 MG tablet TAKE 1 TABLET EVERY DAY (Patient not taking: Reported on 8/14/2023) 90 tablet 0    tamsulosin (FLOMAX) 0.4 mg Cap Take 1 capsule (0.4 mg total) by mouth once daily. 90 capsule 3    TRUEPLUS LANCETS 26 gauge Misc CHECK BLOOD SUGAR TWICE DAILY. 100 each 0    TRUERESULT BLOOD GLUCOSE SYSTM kit CHECK BLOOD SUGAR TWICE  DAILY. 1 each 0    vitamin D (VITAMIN D3) 1000 units Tab Take 1 tablet by mouth once daily.       No current facility-administered medications for this visit.       Review of patient's allergies indicates:   Allergen Reactions    Pseudoephedrine-guaifenesin Shortness Of Breath    Panmist dm  [pseudoephedrine-dm-guaifenesin]      Other reaction(s): Unknown       Physical Exam  There were no vitals filed for this visit.      General: Well-developed, well-nourished in no acute distress  HEENT: Normocephalic, atraumatic, Extraocular movements intact  Neck: supple, trachea midline, no cervical or supraclavicular lymphadenopathy  Respirations: even and unlabored  Extremities: atraumatic, moves all equally, no clubbing, cyanosis or edema  Psych: normal affect  Skin: warm and dry, no lesions  Neuro: Alert and oriented, Cranial nerves II-XII grossly intact    Urinalysis  Small blood, glucose 500, small LE      Lab Results   Component Value Date    PSA 0.37 12/22/2022    PSA 0.29 09/16/2020    PSA 0.82 06/09/2015     Procedure: Cystoscopy      Procedure in detail:   Informed consent was obtained. The patient's genitalia was prepped and draped in the usual sterile fashion. Lidocaine jelly was administered per urethra. I utilized the flexible cystoscope and advanced it into the urethral meatus. No urethral strictures were noted. Bladder access was obtained. Systematic review of the bladder was performed, noting no stones or foreign bodies. There were 2 small papillary tumors at the dome of the bladder. There was a region of erythema and edema with scarring evident posterior to the left UO. Retroflexion was utilized, noting no significant median lobe. The scope was slowly backed out of the urethra, and the prostate was noted to be laterally obstructing. No urethral lesions were identified. The patient tolerated the procedure well. Estimated blood loss was 0cc.         Assessment:   1. Malignant neoplasm of overlapping sites of  bladder  Place in Outpatient    Case Request Operating Room: TURBT (TRANSURETHRAL RESECTION OF BLADDER TUMOR)    Diet NPO    Place sequential compression device    Urine culture    POCT Urinalysis, Dipstick, Automated, W/O Scope      2. Bladder cancer  POCT Urinalysis, Dipstick, Automated, W/O Scope      3. Preop testing  SCHEDULED EKG 12-LEAD (to Muse)    Urine culture            Plan:  Malignant neoplasm of overlapping sites of bladder  -     Place in Outpatient; Standing  -     Case Request Operating Room: TURBT (TRANSURETHRAL RESECTION OF BLADDER TUMOR)  -     Diet NPO; Standing  -     Place sequential compression device; Standing  -     Urine culture  -     POCT Urinalysis, Dipstick, Automated, W/O Scope    Bladder cancer  -     POCT Urinalysis, Dipstick, Automated, W/O Scope    Preop testing  -     SCHEDULED EKG 12-LEAD (to Muse); Future  -     Urine culture    Other orders  -     ciprofloxacin HCl tablet 500 mg  -     LIDOcaine HCl 2% urojet  -     IP VTE LOW RISK PATIENT; Standing  -     ciprofloxacin (CIPRO)400mg/200ml D5W IVPB 400 mg      Discussed recurrence. TURBT scheduled 10/26/23.

## 2023-09-28 ENCOUNTER — TELEPHONE (OUTPATIENT)
Dept: INFUSION THERAPY | Facility: HOSPITAL | Age: 83
End: 2023-09-28
Payer: MEDICARE

## 2023-09-28 LAB — BACTERIA UR CULT: NO GROWTH

## 2023-09-28 NOTE — TELEPHONE ENCOUNTER
----- Message from Theresa Muñiz MA sent at 9/28/2023  3:39 PM CDT -----  Patient has a new patient appointment scheduled with MD Ever on 11/02 that needs to be RS. Patient would like to see MONICA Rivera on 11/17 at 8:30 at the [facility]. Please RS

## 2023-10-09 ENCOUNTER — HOSPITAL ENCOUNTER (OUTPATIENT)
Dept: CARDIOLOGY | Facility: HOSPITAL | Age: 83
Discharge: HOME | End: 2023-10-09
Attending: UROLOGY
Payer: MEDICARE

## 2023-10-09 DIAGNOSIS — Z01.818 PREOP TESTING: ICD-10-CM

## 2023-10-09 PROCEDURE — 93010 EKG 12-LEAD: ICD-10-PCS | Mod: HCNC,,, | Performed by: INTERNAL MEDICINE

## 2023-10-09 PROCEDURE — 93010 ELECTROCARDIOGRAM REPORT: CPT | Mod: HCNC,,, | Performed by: INTERNAL MEDICINE

## 2023-10-09 PROCEDURE — 93005 ELECTROCARDIOGRAM TRACING: CPT | Mod: HCNC,PO

## 2023-10-19 ENCOUNTER — OFFICE VISIT (OUTPATIENT)
Dept: HEMATOLOGY/ONCOLOGY | Facility: CLINIC | Age: 83
End: 2023-10-19
Payer: OTHER GOVERNMENT

## 2023-10-19 ENCOUNTER — OFFICE VISIT (OUTPATIENT)
Dept: CARDIOLOGY | Facility: CLINIC | Age: 83
End: 2023-10-19
Payer: OTHER GOVERNMENT

## 2023-10-19 VITALS
HEIGHT: 70 IN | SYSTOLIC BLOOD PRESSURE: 140 MMHG | WEIGHT: 159.38 LBS | HEART RATE: 70 BPM | BODY MASS INDEX: 22.82 KG/M2 | OXYGEN SATURATION: 99 % | DIASTOLIC BLOOD PRESSURE: 50 MMHG

## 2023-10-19 VITALS
BODY MASS INDEX: 22.82 KG/M2 | SYSTOLIC BLOOD PRESSURE: 137 MMHG | WEIGHT: 159.38 LBS | DIASTOLIC BLOOD PRESSURE: 65 MMHG | HEIGHT: 70 IN | HEART RATE: 70 BPM

## 2023-10-19 DIAGNOSIS — I20.9 AP (ANGINA PECTORIS): ICD-10-CM

## 2023-10-19 DIAGNOSIS — Z83.49 FAMILY HISTORY OF B12 DEFICIENCY: ICD-10-CM

## 2023-10-19 DIAGNOSIS — I25.708 CORONARY ARTERY DISEASE OF BYPASS GRAFT OF NATIVE HEART WITH STABLE ANGINA PECTORIS: Chronic | ICD-10-CM

## 2023-10-19 DIAGNOSIS — I15.2 HYPERTENSION ASSOCIATED WITH DIABETES: ICD-10-CM

## 2023-10-19 DIAGNOSIS — D50.9 IRON DEFICIENCY ANEMIA, UNSPECIFIED IRON DEFICIENCY ANEMIA TYPE: ICD-10-CM

## 2023-10-19 DIAGNOSIS — I73.9 PERIPHERAL VASCULAR DISEASE: ICD-10-CM

## 2023-10-19 DIAGNOSIS — I50.42 CHRONIC COMBINED SYSTOLIC AND DIASTOLIC CONGESTIVE HEART FAILURE: ICD-10-CM

## 2023-10-19 DIAGNOSIS — R94.31 ABNORMAL ECG: Chronic | ICD-10-CM

## 2023-10-19 DIAGNOSIS — E11.59 HYPERTENSION ASSOCIATED WITH DIABETES: ICD-10-CM

## 2023-10-19 DIAGNOSIS — E11.51 DIABETES MELLITUS TYPE 2 WITH PERIPHERAL ARTERY DISEASE: ICD-10-CM

## 2023-10-19 DIAGNOSIS — Z85.51 HISTORY OF BLADDER CANCER: ICD-10-CM

## 2023-10-19 DIAGNOSIS — R60.0 EDEMA OF BOTH LEGS: ICD-10-CM

## 2023-10-19 DIAGNOSIS — D64.9 ANEMIA, UNSPECIFIED TYPE: Primary | ICD-10-CM

## 2023-10-19 DIAGNOSIS — Z01.810 PREOP CARDIOVASCULAR EXAM: Primary | ICD-10-CM

## 2023-10-19 DIAGNOSIS — Z95.1 S/P CABG (CORONARY ARTERY BYPASS GRAFT): ICD-10-CM

## 2023-10-19 DIAGNOSIS — I20.89 CHRONIC STABLE ANGINA: ICD-10-CM

## 2023-10-19 DIAGNOSIS — I45.10 RBBB: Chronic | ICD-10-CM

## 2023-10-19 DIAGNOSIS — I70.0 THORACIC AORTA ATHEROSCLEROSIS: ICD-10-CM

## 2023-10-19 DIAGNOSIS — E11.69 HYPERLIPIDEMIA ASSOCIATED WITH TYPE 2 DIABETES MELLITUS: ICD-10-CM

## 2023-10-19 DIAGNOSIS — I25.9 CHRONIC ISCHEMIC HEART DISEASE: Chronic | ICD-10-CM

## 2023-10-19 DIAGNOSIS — I65.23 ASYMPTOMATIC STENOSIS OF BOTH CAROTID ARTERIES WITHOUT INFARCTION: ICD-10-CM

## 2023-10-19 DIAGNOSIS — E78.5 HYPERLIPIDEMIA ASSOCIATED WITH TYPE 2 DIABETES MELLITUS: ICD-10-CM

## 2023-10-19 PROCEDURE — 99999 PR PBB SHADOW E&M-EST. PATIENT-LVL III: ICD-10-PCS | Mod: PBBFAC,,, | Performed by: INTERNAL MEDICINE

## 2023-10-19 PROCEDURE — 99999 PR PBB SHADOW E&M-EST. PATIENT-LVL V: ICD-10-PCS | Mod: PBBFAC,,, | Performed by: NURSE PRACTITIONER

## 2023-10-19 PROCEDURE — 99999 PR PBB SHADOW E&M-EST. PATIENT-LVL V: CPT | Mod: PBBFAC,,, | Performed by: NURSE PRACTITIONER

## 2023-10-19 PROCEDURE — 99215 PR OFFICE/OUTPT VISIT, EST, LEVL V, 40-54 MIN: ICD-10-PCS | Mod: S$PBB,,, | Performed by: INTERNAL MEDICINE

## 2023-10-19 PROCEDURE — 99215 OFFICE O/P EST HI 40 MIN: CPT | Mod: PBBFAC,PO | Performed by: NURSE PRACTITIONER

## 2023-10-19 PROCEDURE — 99215 PR OFFICE/OUTPT VISIT, EST, LEVL V, 40-54 MIN: ICD-10-PCS | Mod: S$GLB,,, | Performed by: NURSE PRACTITIONER

## 2023-10-19 PROCEDURE — 99213 OFFICE O/P EST LOW 20 MIN: CPT | Mod: PBBFAC,27 | Performed by: INTERNAL MEDICINE

## 2023-10-19 PROCEDURE — 99215 OFFICE O/P EST HI 40 MIN: CPT | Mod: S$GLB,,, | Performed by: NURSE PRACTITIONER

## 2023-10-19 PROCEDURE — 99999 PR PBB SHADOW E&M-EST. PATIENT-LVL III: CPT | Mod: PBBFAC,,, | Performed by: INTERNAL MEDICINE

## 2023-10-19 PROCEDURE — 99215 OFFICE O/P EST HI 40 MIN: CPT | Mod: S$PBB,,, | Performed by: INTERNAL MEDICINE

## 2023-10-19 RX ORDER — NITROGLYCERIN 0.4 MG/1
0.4 TABLET SUBLINGUAL
COMMUNITY
Start: 2023-05-25

## 2023-10-19 RX ORDER — HEPARIN 100 UNIT/ML
500 SYRINGE INTRAVENOUS
Status: CANCELLED | OUTPATIENT
Start: 2023-10-19

## 2023-10-19 RX ORDER — SODIUM CHLORIDE 0.9 % (FLUSH) 0.9 %
10 SYRINGE (ML) INJECTION
Status: CANCELLED | OUTPATIENT
Start: 2023-10-19

## 2023-10-19 RX ORDER — HEPARIN 100 UNIT/ML
500 SYRINGE INTRAVENOUS
Status: CANCELLED | OUTPATIENT
Start: 2023-10-28

## 2023-10-19 RX ORDER — SODIUM CHLORIDE 0.9 % (FLUSH) 0.9 %
10 SYRINGE (ML) INJECTION
Status: CANCELLED | OUTPATIENT
Start: 2023-10-28

## 2023-10-19 NOTE — PROGRESS NOTES
Subjective:      Patient ID: Nithin Pino is a 83 y.o. male.    Chief Complaint: fatigue    HPI:  84 yo male presents today as a new patient for further evaluation and recommendation of iron deficiency anemia.  Is unable to tolerate oral iron due to constipation.  Currently with normocytic anemia.  Hgb 9.7 g/dL.  Ferritin low on outside records located in media section.      Is scheduled for repeat bladder surgery on 10/26/2023.  Has history of bladder cancer  treated  with surgery followed by Dr. Michael.      Denies any know bleeding in urine.  Did an occult stool sample with no blood found.  Denies blood noses.  Has a history of B12 deficiency.   Not currently taking B12 supplements.     Denies f/c/ns or unintentional weight loss.  Denies any known abnormal lymphadenopathy.    Former smoker - quit smoking in .  Former skoal - quit 15 years   Former heavy drinker - occasional drinker now - quit about 15-18 years ago.    Complains of increased fatigue.     Social History     Socioeconomic History    Marital status:     Number of children: 5   Tobacco Use    Smoking status: Former     Current packs/day: 0.00     Average packs/day: 1.5 packs/day for 40.0 years (60.0 ttl pk-yrs)     Types: Cigarettes     Start date: 1954     Quit date: 1994     Years since quittin.8    Smokeless tobacco: Former     Quit date: 2011    Tobacco comments:     approx date   Substance and Sexual Activity    Alcohol use: Yes     Comment: occasionally; 1-2 cans of beer a month    Drug use: No    Sexual activity: Never     Birth control/protection: None     Social Determinants of Health     Financial Resource Strain: Low Risk  (2022)    Overall Financial Resource Strain (CARDIA)     Difficulty of Paying Living Expenses: Not very hard   Food Insecurity: No Food Insecurity (2022)    Hunger Vital Sign     Worried About Running Out of Food in the Last Year: Never true     Ran Out of Food in the Last  Year: Never true   Transportation Needs: No Transportation Needs (12/7/2022)    PRAPARE - Transportation     Lack of Transportation (Medical): No     Lack of Transportation (Non-Medical): No   Physical Activity: Inactive (12/7/2022)    Exercise Vital Sign     Days of Exercise per Week: 0 days     Minutes of Exercise per Session: 0 min   Stress: No Stress Concern Present (12/7/2022)    [employer] - Occupational Stress Questionnaire     Feeling of Stress : Only a little   Social Connections: Socially Isolated (12/7/2022)    Social Connection and Isolation Panel [NHANES]     Frequency of Communication with Friends and Family: Twice a week     Frequency of Social Gatherings with Friends and Family: Never     Attends [employer] Services: Never     Active Member of Clubs or Organizations: No     Attends Club or Organization Meetings: Never     Marital Status:    Housing Stability: Low Risk  (12/7/2022)    Housing Stability Vital Sign     Unable to Pay for Housing in the Last Year: No     Number of Places Lived in the Last Year: 1     Unstable Housing in the Last Year: No       Family History   Adopted: Yes   Problem Relation Age of Onset    Diabetes Brother     Diabetes Sister     Cancer Neg Hx     Heart disease Neg Hx     Kidney disease Neg Hx        Past Surgical History:   Procedure Laterality Date    APPENDECTOMY      CARDIAC CATHETERIZATION      2016    CARDIAC SURGERY  1994    balloon angioplasty    CATARACT EXTRACTION W/  INTRAOCULAR LENS IMPLANT Right 02/01/2017    CORONARY ARTERY BYPASS GRAFT  05/2011    per patient x4    HERNIA REPAIR      OPEN REDUCTION AND INTERNAL FIXATION (ORIF) OF INJURY OF HIP      PCIOL Bilateral OD 02/01/17/OS 02/15/17    DR. ALONZO    TURBT (TRANSURETHRAL RESECTION OF BLADDER TUMOR) N/A 6/8/2023    Procedure: TURBT (TRANSURETHRAL RESECTION OF BLADDER TUMOR);  Surgeon: Hortencia Michael MD;  Location: [facility];  Service: Urology;  Laterality: N/A;        Past Medical History:   Diagnosis Date    Abnormal brain MRI 01/21/2018    Chronic microvascular ischemia changes per MRI July 9, 2007 in [facility]    Abnormal ECG 10/31/2013    Abnormal stress test 01/28/2016    Alcohol dependence     States alcohol abuse ended in 1994    AP (angina pectoris) 01/28/2016    Asthma     Bladder cancer     Carotid artery occlusion     Cataract     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    2 - Normal left ventricular systolic function (EF 60-65%).    3 - Normal left ventricular diastolic function.    4 - Mild left atrial enlargement.    5 - Normal right ventricular systolic function .    6 - Trivial to mild aortic regurgitation.    7 - Trivial to mild mitral regurgitation.  10 - Trivial to mild pulmonic regurgitation.     Hematuria 06/25/2020    History of alcohol abuse 01/22/2018    Patient denies drinking to excess since 1994.    History of atherectomy 01/21/2018 2/2016 [facility]    History of chronic kidney disease 01/22/2018    History of CKD 3    History of PTCA 10/31/2013    History of PTCA 03/09/2016    Hyperlipidemia     Hypertension     Insomnia 06/17/2013    Iron deficiency anemia 10/19/2023    Ischemic cardiomyopathy 10/31/2013    Long term (current) use of antithrombotics/antiplatelets 01/21/2018    Myocardial infarction     Old MI (myocardial infarction) 1994    Peripheral vascular disease     Polyneuropathy     RBBB 10/31/2013    S/P CABG (coronary artery bypass graft) 10/31/2013    Shortness of breath 10/31/2013    Tobacco  dependence     resolved    Trouble in sleeping     Type 2 diabetes with peripheral circulatory disorder, controlled     Wears glasses        Review of Systems   Constitutional:  Positive for fatigue.   HENT:  Negative for nosebleeds.    Eyes: Negative.    Gastrointestinal:  Negative for anal bleeding and blood in stool.   Endocrine: Negative.    Genitourinary:  Negative for hematuria.   Musculoskeletal:  Positive for gait problem.   Skin:  Negative for rash and wound.   Allergic/Immunologic: Negative.    Hematological:  Bruises/bleeds easily.   Psychiatric/Behavioral: Negative.            Medication List with Changes/Refills   Current Medications    ALBUTEROL (PROVENTIL/VENTOLIN HFA) 90 MCG/ACTUATION INHALER    albuterol sulfate Take No date recorded No form recorded No frequency recorded No route recorded No set duration recorded No set duration amount recorded active No dosage strength recorded No dosage strength units of measure recorded    AMLODIPINE (NORVASC) 10 MG TABLET    Take 1 tablet (10 mg total) by mouth once daily.    ASPIRIN 81 MG CHEW    Take 1 tablet (81 mg total) by mouth once daily. 1 Tablet, Chewable Oral Every day    ATORVASTATIN (LIPITOR) 40 MG TABLET    Take 1 tablet (40 mg total) by mouth every evening.    BLOOD-GLUCOSE METER (TRUERESULT BLOOD GLUCOSE SYSTM MISC)    by Misc.(Non-Drug; Combo Route) route.    DOXYCYCLINE (VIBRAMYCIN) 100 MG CAP    Take by mouth.    DULAGLUTIDE (TRULICITY) 4.5 MG/0.5 ML PEN INJECTOR    Inject 4.5 mg into the skin every 7 days. SUNDAY    DULAGLUTIDE (TRULICITY) 4.5 MG/0.5 ML PEN INJECTOR    Inject 4.5 mg into the skin.    EMBRACE PRO TEST STRIPS STRP    CHECK BLOOD SUGAR TWICE DAILY.    FINASTERIDE (PROSCAR) 5 MG TABLET    Take 1 tablet (5 mg total) by mouth once daily.    FUROSEMIDE (LASIX) 40 MG TABLET    Take 40 mg by mouth once daily.    GLUCOSE 4 GM CHEWABLE TABLET    Take 16 g by mouth as needed.    HYOSCYAMINE (LEVSIN/SL) 0.125 MG SUBL    Place 2 tablets  (0.25 mg total) under the tongue every 4 (four) hours as needed (bladder spasms).    INSULIN (LANTUS SOLOSTAR U-100 INSULIN) GLARGINE 100 UNITS/ML (3ML) SUBQ PEN    Inject 28 Units into the skin once daily.    INSULIN GLARGINE-YFGN 100 UNIT/ML (3 ML) INPN    INJECT 24 UNITS SUBCUTANEOUSLY EVERY DAY FOR DIABETES    ISOSORBIDE MONONITRATE (IMDUR) 60 MG 24 HR TABLET    TAKE 1 TABLET (60 MG TOTAL) BY MOUTH ONCE DAILY.    LIDOCAINE (LIDODERM) 5 %    APPLY 1 PATCH TOPICALLY EVERY DAY FOR PAIN  WEAR FOR 12 HOURS, THEN REMOVE. DO NOT APPLY NEW PATCH FOR AT LEAST 12 HOURS.    LOSARTAN (COZAAR) 100 MG TABLET    Take 0.5 tablets (50 mg total) by mouth once daily.    METFORMIN (GLUCOPHAGE-XR) 500 MG ER 24HR TABLET    Take 1 tablet (500 mg total) by mouth 2 (two) times daily with meals.    METOPROLOL SUCCINATE (TOPROL-XL) 25 MG 24 HR TABLET    TAKE 1 TABLET EVERY EVENING    MICONAZOLE (MICOTIN) 2 % CREAM    APPLY SMALL AMOUNT TOPICALLY TWICE A DAY AS NEEDED FOR FUNGAL INFECTION    NITROGLYCERIN (NITROSTAT) 0.4 MG SL TABLET    0.4 mg.    SOLIFENACIN (VESICARE) 10 MG TABLET    TAKE 1 TABLET EVERY DAY    TAMSULOSIN (FLOMAX) 0.4 MG CAP    Take 1 capsule (0.4 mg total) by mouth once daily.    TRUEPLUS LANCETS 26 GAUGE MISC    CHECK BLOOD SUGAR TWICE DAILY.    TRUERESULT BLOOD GLUCOSE SYSTM KIT    CHECK BLOOD SUGAR TWICE DAILY.    VITAMIN D (VITAMIN D3) 1000 UNITS TAB    Take 1 tablet by mouth once daily.        Objective:     Vitals:    10/19/23 1310   BP: 137/65   Pulse: 70       Physical Exam  Vitals reviewed.   Constitutional:       Appearance: Normal appearance.   HENT:      Head: Normocephalic and atraumatic.      Right Ear: External ear normal. Decreased hearing noted.      Left Ear: External ear normal. Decreased hearing noted.   Cardiovascular:      Rate and Rhythm: Normal rate and regular rhythm.      Heart sounds: Normal heart sounds, S1 normal and S2 normal.   Pulmonary:      Effort: Pulmonary effort is normal.      Breath  sounds: Normal breath sounds.   Abdominal:      General: There is no distension.   Musculoskeletal:         General: Normal range of motion.      Cervical back: Normal range of motion.   Skin:     General: Skin is warm and dry.   Neurological:      General: No focal deficit present.      Mental Status: He is alert and oriented to person, place, and time.      Gait: Gait abnormal.   Psychiatric:         Attention and Perception: Attention and perception normal.         Mood and Affect: Mood and affect normal.         Speech: Speech normal.         Behavior: Behavior normal. Behavior is cooperative.         Thought Content: Thought content normal.         Cognition and Memory: Cognition and memory normal.         Judgment: Judgment normal.         Assessment:     Problem List Items Addressed This Visit          Oncology    Iron deficiency anemia    Relevant Orders    CBC Auto Differential    Comprehensive Metabolic Panel    Ferritin    Iron and TIBC     Other Visit Diagnoses       Anemia, unspecified type    -  Primary    Relevant Orders    CBC Auto Differential    Comprehensive Metabolic Panel    Ferritin    Iron and TIBC    Vitamin B12    Haptoglobin    Lactate Dehydrogenase    Immunofixation Electrophoresis    Protein Electrophoresis, Serum    Immunoglobulin Free LT Chains Blood    Folate    History of bladder cancer        Relevant Orders    CBC Auto Differential    Comprehensive Metabolic Panel    Ferritin    Iron and TIBC            Lab Results   Component Value Date    WBC 5.35 10/09/2023    RBC 3.97 (L) 10/09/2023    HGB 9.7 (L) 10/09/2023    HCT 33.8 (L) 10/09/2023    MCV 85 10/09/2023    MCH 24.4 (L) 10/09/2023    MCHC 28.7 (L) 10/09/2023    RDW 16.2 (H) 10/09/2023     10/09/2023    MPV 11.6 10/09/2023    GRAN 2.8 10/09/2023    GRAN 53.0 10/09/2023    LYMPH 1.8 10/09/2023    LYMPH 34.4 10/09/2023    MONO 0.4 10/09/2023    MONO 7.9 10/09/2023    EOS 0.2 10/09/2023    BASO 0.02 10/09/2023    EOSINOPHIL  4.1 10/09/2023    BASOPHIL 0.4 10/09/2023      Lab Results   Component Value Date     10/09/2023    K 4.5 10/09/2023     (H) 10/09/2023    CO2 25 10/09/2023    BUN 25 (H) 10/09/2023    CREATININE 1.1 10/09/2023    CALCIUM 10.0 10/09/2023    ANIONGAP 8 10/09/2023    ESTGFRAFRICA 58.7 (A) 12/29/2021    EGFRNONAA 50.8 (A) 12/29/2021     Lab Results   Component Value Date    ALT 11 10/09/2023    AST 17 10/09/2023    ALKPHOS 65 10/09/2023    BILITOT 0.4 10/09/2023     Ferritin 4.9 on outside labs found in media      Plan:   Anemia, unspecified type  -     CBC Auto Differential; Future; Expected date: 10/19/2023  -     Comprehensive Metabolic Panel; Future; Expected date: 10/19/2023  -     Ferritin; Future; Expected date: 10/19/2023  -     Iron and TIBC; Future; Expected date: 10/19/2023  -     Vitamin B12; Future; Expected date: 10/19/2023  -     Haptoglobin; Future; Expected date: 10/19/2023  -     Lactate Dehydrogenase; Future; Expected date: 10/19/2023  -     Immunofixation Electrophoresis; Future; Expected date: 10/19/2023  -     Protein Electrophoresis, Serum; Future; Expected date: 10/19/2023  -     Immunoglobulin Free LT Chains Blood; Future; Expected date: 10/19/2023  -     Folate; Future; Expected date: 10/19/2023    Iron deficiency anemia, unspecified iron deficiency anemia type  -     CBC Auto Differential; Future; Expected date: 10/19/2023  -     Comprehensive Metabolic Panel; Future; Expected date: 10/19/2023  -     Ferritin; Future; Expected date: 10/19/2023  -     Iron and TIBC; Future; Expected date: 10/19/2023    History of bladder cancer  -     CBC Auto Differential; Future; Expected date: 10/19/2023  -     Comprehensive Metabolic Panel; Future; Expected date: 10/19/2023  -     Ferritin; Future; Expected date: 10/19/2023  -     Iron and TIBC; Future; Expected date: 10/19/2023    Other orders  -     ferumoxytoL (FERAHEME) 510 mg in dextrose 5 % (D5W) 100 mL IVPB  -     sodium chloride 0.9%  250 mL flush bag  -     sodium chloride 0.9% flush 10 mL  -     heparin, porcine (PF) 100 unit/mL injection flush 500 Units  -     alteplase injection 2 mg  -     ferumoxytoL (FERAHEME) 510 mg in dextrose 5 % (D5W) 100 mL IVPB  -     sodium chloride 0.9% 250 mL flush bag  -     sodium chloride 0.9% flush 10 mL  -     heparin, porcine (PF) 100 unit/mL injection flush 500 Units  -     alteplase injection 2 mg      Med Onc Chart Routing      Follow up with physician    Follow up with ISAIAS . F/u 6 weeks after last dose of IV Feraheme - in person visit at [facility]   Infusion scheduling note   schedule Feraheme infusions x 2 at    Injection scheduling note n/a   Labs   Scheduling:  Preferred lab: [facility]  Lab interval:  labs prior to next appointment cbc, cmp, iron studies, b12, folate, spep, flc, ketan, haptoglobin, ldh   Imaging   N/a   Pharmacy appointment No pharmacy appointment needed      Other referrals       No additional referrals needed  n/a         Total time spent on encounter: 60 minutes     Collaborating Provider:  Dr. Franklin Ross    Thank You,  Rachael Rivera, FNP-C  Hematology Oncology

## 2023-10-19 NOTE — PROGRESS NOTES
Subjective:    Patient ID:  Nithin Pino is a 83 y.o. male who presents for evaluation of Pre-op Exam      HPI: Pt presents for eval.  His current medical conditions include CAD, RBBB, bladder cancer, diastolic CHF, ICM, MR, TR, AI, CRI, carotid artery disease, COPD, HTN, PAD, DM.   Nonsmoker.   His past history is pertinent for following:  S/p PTCA 1994 for AMI.   Stress MPI 5/11 showed evidence of multivessel CAD (had 2 years of angina) which was confirmed on LHC (significant left main/LAD/ramus, diag disease and 100%  RCA). EF 35% on LV gram.   S/p successful CABG 5/11 (LIMA-LAD, SVG-DIAG, SVG-RAMUS). He had post-op a flutter which resolved.   S/p repeat [facility] 2/16 for abnl stress MPI. He underwent atherectomy and PCI of left main and ramus with TUCKER x 2 overall. His svg-ramus had occluded. LIMA-LAD was patent, patent SVG-D1,  RCA with left - right collaterals, EF 45%.    Has chronic R SFA .   Pt has declined runoff numerous times for further evaluation of his PAD in past.   Echo 6/18 normal EF.  REZNO 6/18 R LE 0.86, L LE 0.96  Pt admitted late Dec 2020 with Covid pneumonia, cp sxs. Chart reviewed in detail.  Echo 12/20 normal LV function, LVH.  Troponin leak noted.  Cards consult done and troponin thought to be supply/demand ischemia from Covid infection/pneumonia.  Readmitted few days later with recurrent cp, low BP, dehydration and released.  Troponin leak ranged 0.10 to 0.50 range on both hospitalizations.  Stress MPI 3/21 apical, anteroapical scar, inferoapical scar with some residual ischemia, EF 44%.  Carotid u/s 12/20: no significant blockage noted.   ecg 10/29/21 NSR, RBBB, old inferior/anteroseptal infarct.  Echo 8/21: normal EF, DD, mild MR/TR/AI.  RENZO 8/21: R LE 0.73, L LE 0.71  S/p hip fx surgery Jan 2023 at [facility].  Pt readmitted March 2023 with CHF sxs.  EF 35%.  Lasix added.  Meds adjusted.  ECG 4/17/23 NSR, RBBB, possible old septal, inferior infarct.  Now here.  Due for bladder cancer  surgery this month.  Had urological surgery June 2023 without CV complications.  Has leg edema, R > L E last few days.  Denies CP/dyspnea.  No dizziness.  No syncope.  Does not walk much.  No obvious worsening claudication issues.  Ecg 10/9/23 NSR, RBBB, chronic septal infarct.   ? If he is taking Lasix or not.      Past Medical History:   Diagnosis Date    Abnormal brain MRI 01/21/2018    Chronic microvascular ischemia changes per MRI July 9, 2007 in [facility]    Abnormal ECG 10/31/2013    Abnormal stress test 01/28/2016    Alcohol dependence     States alcohol abuse ended in 1994    AP (angina pectoris) 01/28/2016    Asthma     Bladder cancer     Carotid artery occlusion     Cataract     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    2 - Normal left ventricular systolic function (EF 60-65%).    3 - Normal left ventricular diastolic function.    4 - Mild left atrial enlargement.    5 - Normal right ventricular systolic function .    6 - Trivial to mild aortic regurgitation.    7 - Trivial to mild mitral regurgitation.  10 - Trivial to mild pulmonic regurgitation.     Hematuria 06/25/2020    History of alcohol abuse 01/22/2018    Patient denies drinking to excess since 1994.    History of atherectomy 01/21/2018 2/2016 [facility]    History of chronic kidney disease 01/22/2018    History of CKD 3    History of PTCA 10/31/2013    History of PTCA 03/09/2016    Hyperlipidemia     Hypertension     Insomnia 06/17/2013    Iron deficiency anemia 10/19/2023    Ischemic cardiomyopathy  10/31/2013    Long term (current) use of antithrombotics/antiplatelets 01/21/2018    Myocardial infarction     Old MI (myocardial infarction) 1994    Peripheral vascular disease     Polyneuropathy     RBBB 10/31/2013    S/P CABG (coronary artery bypass graft) 10/31/2013    Shortness of breath 10/31/2013    Tobacco dependence     resolved    Trouble in sleeping     Type 2 diabetes with peripheral circulatory disorder, controlled     Wears glasses        Current Outpatient Medications:     albuterol (PROVENTIL/VENTOLIN HFA) 90 mcg/actuation inhaler, albuterol sulfate Take No date recorded No form recorded No frequency recorded No route recorded No set duration recorded No set duration amount recorded active No dosage strength recorded No dosage strength units of measure recorded, Disp: , Rfl:     amLODIPine (NORVASC) 10 MG tablet, Take 1 tablet (10 mg total) by mouth once daily., Disp: 90 tablet, Rfl: 3    aspirin 81 MG Chew, Take 1 tablet (81 mg total) by mouth once daily. 1 Tablet, Chewable Oral Every day, Disp: 90 tablet, Rfl: 3    atorvastatin (LIPITOR) 40 MG tablet, Take 1 tablet (40 mg total) by mouth every evening., Disp: 90 tablet, Rfl: 3    BLOOD-GLUCOSE METER (TRUERESULT BLOOD GLUCOSE SYSTM MISC), by Misc.(Non-Drug; Combo Route) route., Disp: , Rfl:     doxycycline (VIBRAMYCIN) 100 MG Cap, Take by mouth., Disp: , Rfl:     dulaglutide (TRULICITY) 4.5 mg/0.5 mL pen injector, Inject 4.5 mg into the skin every 7 days. SUNDAY, Disp: , Rfl:     dulaglutide (TRULICITY) 4.5 mg/0.5 mL pen injector, Inject 4.5 mg into the skin., Disp: , Rfl:     EMBRACE PRO TEST STRIPS Strp, CHECK BLOOD SUGAR TWICE DAILY. (Patient not taking: Reported on 8/14/2023), Disp: 50 strip, Rfl: 0    finasteride (PROSCAR) 5 mg tablet, Take 1 tablet (5 mg total) by mouth once daily., Disp: 90 tablet, Rfl: 3    furosemide (LASIX) 40 MG tablet, Take 40 mg by mouth once daily., Disp: , Rfl:     glucose 4 GM chewable tablet, Take 16 g by mouth as  needed., Disp: , Rfl:     hyoscyamine (LEVSIN/SL) 0.125 mg Subl, Place 2 tablets (0.25 mg total) under the tongue every 4 (four) hours as needed (bladder spasms). (Patient not taking: Reported on 8/14/2023), Disp: 30 tablet, Rfl: 1    insulin (LANTUS SOLOSTAR U-100 INSULIN) glargine 100 units/mL (3mL) SubQ pen, Inject 28 Units into the skin once daily., Disp: 27 mL, Rfl: 3    insulin glargine-yfgn 100 unit/mL (3 mL) InPn, INJECT 24 UNITS SUBCUTANEOUSLY EVERY DAY FOR DIABETES, Disp: , Rfl:     isosorbide mononitrate (IMDUR) 60 MG 24 hr tablet, TAKE 1 TABLET (60 MG TOTAL) BY MOUTH ONCE DAILY., Disp: 90 tablet, Rfl: 0    LIDOcaine (LIDODERM) 5 %, APPLY 1 PATCH TOPICALLY EVERY DAY FOR PAIN  WEAR FOR 12 HOURS, THEN REMOVE. DO NOT APPLY NEW PATCH FOR AT LEAST 12 HOURS., Disp: , Rfl:     losartan (COZAAR) 100 MG tablet, Take 0.5 tablets (50 mg total) by mouth once daily., Disp: 90 tablet, Rfl: 3    metFORMIN (GLUCOPHAGE-XR) 500 MG ER 24hr tablet, Take 1 tablet (500 mg total) by mouth 2 (two) times daily with meals., Disp: 180 tablet, Rfl: 3    metoprolol succinate (TOPROL-XL) 25 MG 24 hr tablet, TAKE 1 TABLET EVERY EVENING, Disp: 90 tablet, Rfl: 3    miconazole (MICOTIN) 2 % cream, APPLY SMALL AMOUNT TOPICALLY TWICE A DAY AS NEEDED FOR FUNGAL INFECTION, Disp: , Rfl:     nitroGLYCERIN (NITROSTAT) 0.4 MG SL tablet, 0.4 mg., Disp: , Rfl:     solifenacin (VESICARE) 10 MG tablet, TAKE 1 TABLET EVERY DAY (Patient not taking: Reported on 8/14/2023), Disp: 90 tablet, Rfl: 0    tamsulosin (FLOMAX) 0.4 mg Cap, Take 1 capsule (0.4 mg total) by mouth once daily., Disp: 90 capsule, Rfl: 3    TRUEPLUS LANCETS 26 gauge Misc, CHECK BLOOD SUGAR TWICE DAILY., Disp: 100 each, Rfl: 0    TRUERESULT BLOOD GLUCOSE SYSTM kit, CHECK BLOOD SUGAR TWICE DAILY., Disp: 1 each, Rfl: 0    vitamin D (VITAMIN D3) 1000 units Tab, Take 1 tablet by mouth once daily., Disp: , Rfl:       Review of Systems   Constitutional: Positive for weight gain.   HENT:  "Negative.     Eyes: Negative.    Cardiovascular:  Positive for leg swelling.   Respiratory: Negative.     Endocrine: Negative.    Hematologic/Lymphatic: Negative.    Skin: Negative.    Musculoskeletal:  Positive for arthritis and joint pain.   Gastrointestinal: Negative.    Genitourinary: Negative.    Neurological:  Positive for numbness and weakness.   Psychiatric/Behavioral: Negative.     Allergic/Immunologic: Negative.           BP (!) 140/50 (BP Location: Left arm, Patient Position: Sitting, BP Method: Large (Manual))   Pulse 70   Ht 5' 10" (1.778 m)   Wt 72.3 kg (159 lb 6.3 oz)   SpO2 99%   BMI 22.87 kg/m²     Wt Readings from Last 3 Encounters:   10/19/23 72.3 kg (159 lb 6.3 oz)   10/19/23 72.3 kg (159 lb 6.3 oz)   08/17/23 69.9 kg (154 lb 1.6 oz)     Temp Readings from Last 3 Encounters:   08/17/23 98.4 °F (36.9 °C) (Temporal)   08/14/23 98.7 °F (37.1 °C) (Tympanic)   06/08/23 98 °F (36.7 °C) (Temporal)     BP Readings from Last 3 Encounters:   10/19/23 (!) 140/50   10/19/23 137/65   08/17/23 129/73     Pulse Readings from Last 3 Encounters:   10/19/23 70   10/19/23 70   08/17/23 (!) 120       Objective:    Physical Exam  Vitals and nursing note reviewed.   Constitutional:       Appearance: He is well-developed.   HENT:      Head: Normocephalic.   Neck:      Thyroid: No thyromegaly.      Vascular: No carotid bruit or JVD.   Cardiovascular:      Rate and Rhythm: Normal rate and regular rhythm.      Pulses:           Radial pulses are 2+ on the right side and 2+ on the left side.      Heart sounds: S1 normal and S2 normal. Heart sounds not distant. No midsystolic click and no opening snap. No murmur heard.     No friction rub. No S3 or S4 sounds.   Pulmonary:      Effort: Pulmonary effort is normal.      Breath sounds: Normal breath sounds. No wheezing or rales.   Abdominal:      General: Bowel sounds are normal. There is no distension or abdominal bruit.      Palpations: Abdomen is soft. There is no mass. "      Tenderness: There is no abdominal tenderness.   Musculoskeletal:      Cervical back: Neck supple.      Right lower leg: Edema present.      Left lower leg: Edema present.   Skin:     General: Skin is warm.   Neurological:      Mental Status: He is alert and oriented to person, place, and time.   Psychiatric:         Behavior: Behavior normal.         I have reviewed all pertinent labs and cardiac studies.      Chemistry        Component Value Date/Time     10/09/2023 0800    K 4.5 10/09/2023 0800     (H) 10/09/2023 0800    CO2 25 10/09/2023 0800    BUN 25 (H) 10/09/2023 0800    CREATININE 1.1 10/09/2023 0800     (H) 10/09/2023 0800        Component Value Date/Time    CALCIUM 10.0 10/09/2023 0800    ALKPHOS 65 10/09/2023 0800    AST 17 10/09/2023 0800    ALT 11 10/09/2023 0800    BILITOT 0.4 10/09/2023 0800    ESTGFRAFRICA 58.7 (A) 12/29/2021 0702    EGFRNONAA 50.8 (A) 12/29/2021 0702        Lab Results   Component Value Date    WBC 5.35 10/09/2023    HGB 9.7 (L) 10/09/2023    HCT 33.8 (L) 10/09/2023    MCV 85 10/09/2023     10/09/2023       Lab Results   Component Value Date    HGBA1C 8.6 (H) 05/08/2023     Lab Results   Component Value Date    CHOL 84 (L) 10/09/2023    CHOL 82 (L) 12/08/2021    CHOL 77 (L) 06/17/2020     Lab Results   Component Value Date    HDL 41 10/09/2023    HDL 48 12/08/2021    HDL 45 06/17/2020     Lab Results   Component Value Date    LDLCALC 27.8 (L) 10/09/2023    LDLCALC 18.8 (L) 12/08/2021    LDLCALC 17.4 (L) 06/17/2020     Lab Results   Component Value Date    TRIG 76 10/09/2023    TRIG 76 12/08/2021    TRIG 73 06/17/2020     Lab Results   Component Value Date    CHOLHDL 48.8 10/09/2023    CHOLHDL 58.5 (H) 12/08/2021    CHOLHDL 58.4 (H) 06/17/2020           Assessment:       1. Preop cardiovascular exam    2. Edema of both legs    3. Abnormal ECG    4. AP (angina pectoris)    5. Coronary artery disease of bypass graft of native heart with stable angina  pectoris    6. Chronic combined systolic and diastolic congestive heart failure    7. Asymptomatic stenosis of both carotid arteries without infarction    8. Diabetes mellitus type 2 with peripheral artery disease    9. Chronic stable angina    10. Chronic ischemic heart disease    11. Hypertension associated with diabetes    12. Hyperlipidemia associated with type 2 diabetes mellitus    13. Peripheral vascular disease    14. RBBB    15. S/P CABG (coronary artery bypass graft)    16. Thoracic aorta atherosclerosis           Plan:             Echocardiogram.  B LE venous u/s.  CXR.  CMP, BNP.  Nuclear stress test.  Continue current meds.  OMT advised for CHF/CAD.  Continue Lasix 40 mg qd.  Pt counseled on low salt diet, < 2 g/day.  Fluid restriction 1.5 liter/day.  Continue medical tx for PAD.  Reviewed all tests and above medical conditions with patient in detail and formulated treatment plan.  Continue optimal medical treatment for cardiovascular conditions.  Cardiac low salt diet advised.  Fall risk precautions discussed.  Maintaining healthy weight and weight loss goals (if needed) were discussed in clinic.  Importance of optimal lipid control were discussed in detail as well as possible pharmacologic and lifestyle changes that may be needed.  Statin tx.  DM control discussed.    Delay urological surgery to allow above CV tests -- notified Urology.    F/u 3 weeks to review CV tests and make further tx decisions.      I have reviewed all pertinent labs and cardiac studies independently. Plans and recommendations have been formulated under my direct supervision. All questions answered and patient voiced understanding.

## 2023-10-20 ENCOUNTER — TELEPHONE (OUTPATIENT)
Dept: INFUSION THERAPY | Facility: HOSPITAL | Age: 83
End: 2023-10-20
Payer: MEDICARE

## 2023-10-20 NOTE — TELEPHONE ENCOUNTER
Spoke with patient to clarify iron infusion appointments. Patient confirms appts. Call ended well.

## 2023-10-23 DIAGNOSIS — E11.59 HYPERTENSION ASSOCIATED WITH DIABETES: ICD-10-CM

## 2023-10-23 DIAGNOSIS — I15.2 HYPERTENSION ASSOCIATED WITH DIABETES: ICD-10-CM

## 2023-10-24 DIAGNOSIS — I20.9 AP (ANGINA PECTORIS): ICD-10-CM

## 2023-10-24 RX ORDER — AMLODIPINE BESYLATE 10 MG/1
10 TABLET ORAL
Qty: 90 TABLET | Refills: 10 | Status: SHIPPED | OUTPATIENT
Start: 2023-10-24 | End: 2023-11-20 | Stop reason: SDUPTHER

## 2023-10-24 RX ORDER — LOSARTAN POTASSIUM 100 MG/1
100 TABLET ORAL
Qty: 90 TABLET | Refills: 10 | Status: SHIPPED | OUTPATIENT
Start: 2023-10-24 | End: 2024-02-13 | Stop reason: SDUPTHER

## 2023-10-24 NOTE — TELEPHONE ENCOUNTER
No care due was identified.  [facility] Embedded Care Due Messages. Reference number: [id number].   10/24/2023 5:05:17 PM CDT

## 2023-10-24 NOTE — TELEPHONE ENCOUNTER
Refill Routing Note   Medication(s) are not appropriate for processing by [facility] for the following reason(s):      Patient not seen by provider within 15 months  Patient seen in ED/Hospital since LOV with provider  Required vitals abnormal    [facility] action(s):  Defer Care Due:  None identified            Appointments  past 12m or future 3m with PCP    Date Provider   Last Visit   7/27/2020 Jim Tomlin MD   Next Visit   11/13/2023 Jim Tomlin MD   ED visits in past 90 days: 0        Note composed:11:40 AM 10/24/2023

## 2023-10-25 ENCOUNTER — HOSPITAL ENCOUNTER (OUTPATIENT)
Dept: CARDIOLOGY | Facility: HOSPITAL | Age: 83
Discharge: HOME | End: 2023-10-25
Attending: INTERNAL MEDICINE
Payer: OTHER GOVERNMENT

## 2023-10-25 ENCOUNTER — TELEPHONE (OUTPATIENT)
Dept: CARDIOLOGY | Facility: CLINIC | Age: 83
End: 2023-10-25
Payer: MEDICARE

## 2023-10-25 ENCOUNTER — HOSPITAL ENCOUNTER (OUTPATIENT)
Dept: RADIOLOGY | Facility: HOSPITAL | Age: 83
Discharge: HOME | End: 2023-10-25
Attending: INTERNAL MEDICINE
Payer: OTHER GOVERNMENT

## 2023-10-25 VITALS
SYSTOLIC BLOOD PRESSURE: 140 MMHG | DIASTOLIC BLOOD PRESSURE: 50 MMHG | HEIGHT: 70 IN | WEIGHT: 159 LBS | BODY MASS INDEX: 22.76 KG/M2

## 2023-10-25 VITALS
BODY MASS INDEX: 22.76 KG/M2 | DIASTOLIC BLOOD PRESSURE: 50 MMHG | WEIGHT: 159 LBS | HEIGHT: 70 IN | SYSTOLIC BLOOD PRESSURE: 140 MMHG

## 2023-10-25 DIAGNOSIS — I25.708 CORONARY ARTERY DISEASE OF BYPASS GRAFT OF NATIVE HEART WITH STABLE ANGINA PECTORIS: Chronic | ICD-10-CM

## 2023-10-25 DIAGNOSIS — I15.2 HYPERTENSION ASSOCIATED WITH DIABETES: ICD-10-CM

## 2023-10-25 DIAGNOSIS — E11.59 HYPERTENSION ASSOCIATED WITH DIABETES: ICD-10-CM

## 2023-10-25 DIAGNOSIS — Z01.810 PREOP CARDIOVASCULAR EXAM: ICD-10-CM

## 2023-10-25 DIAGNOSIS — I65.23 ASYMPTOMATIC STENOSIS OF BOTH CAROTID ARTERIES WITHOUT INFARCTION: ICD-10-CM

## 2023-10-25 DIAGNOSIS — R60.0 EDEMA OF BOTH LEGS: ICD-10-CM

## 2023-10-25 DIAGNOSIS — I70.0 THORACIC AORTA ATHEROSCLEROSIS: ICD-10-CM

## 2023-10-25 DIAGNOSIS — I20.89 CHRONIC STABLE ANGINA: ICD-10-CM

## 2023-10-25 DIAGNOSIS — R94.31 ABNORMAL ECG: Chronic | ICD-10-CM

## 2023-10-25 DIAGNOSIS — I73.9 PERIPHERAL VASCULAR DISEASE: ICD-10-CM

## 2023-10-25 DIAGNOSIS — E11.69 HYPERLIPIDEMIA ASSOCIATED WITH TYPE 2 DIABETES MELLITUS: ICD-10-CM

## 2023-10-25 DIAGNOSIS — E11.51 DIABETES MELLITUS TYPE 2 WITH PERIPHERAL ARTERY DISEASE: ICD-10-CM

## 2023-10-25 DIAGNOSIS — Z95.1 S/P CABG (CORONARY ARTERY BYPASS GRAFT): ICD-10-CM

## 2023-10-25 DIAGNOSIS — I45.10 RBBB: Chronic | ICD-10-CM

## 2023-10-25 DIAGNOSIS — E78.5 HYPERLIPIDEMIA ASSOCIATED WITH TYPE 2 DIABETES MELLITUS: ICD-10-CM

## 2023-10-25 DIAGNOSIS — I25.9 CHRONIC ISCHEMIC HEART DISEASE: Chronic | ICD-10-CM

## 2023-10-25 DIAGNOSIS — I20.9 AP (ANGINA PECTORIS): ICD-10-CM

## 2023-10-25 DIAGNOSIS — I50.42 CHRONIC COMBINED SYSTOLIC AND DIASTOLIC CONGESTIVE HEART FAILURE: ICD-10-CM

## 2023-10-25 DIAGNOSIS — I50.42 CHRONIC COMBINED SYSTOLIC AND DIASTOLIC CONGESTIVE HEART FAILURE: Primary | ICD-10-CM

## 2023-10-25 PROCEDURE — 93306 ECHO (CUPID ONLY): ICD-10-PCS | Mod: 26,,, | Performed by: INTERNAL MEDICINE

## 2023-10-25 PROCEDURE — 93970 EXTREMITY STUDY: CPT | Mod: 26,,, | Performed by: INTERNAL MEDICINE

## 2023-10-25 PROCEDURE — 71046 X-RAY EXAM CHEST 2 VIEWS: CPT | Mod: TC

## 2023-10-25 PROCEDURE — 93306 TTE W/DOPPLER COMPLETE: CPT | Mod: 26,,, | Performed by: INTERNAL MEDICINE

## 2023-10-25 PROCEDURE — 93970 CV US DOPPLER VENOUS LEGS BILATERAL (CUPID ONLY): ICD-10-PCS | Mod: 26,,, | Performed by: INTERNAL MEDICINE

## 2023-10-25 PROCEDURE — 93306 TTE W/DOPPLER COMPLETE: CPT

## 2023-10-25 PROCEDURE — 93970 EXTREMITY STUDY: CPT

## 2023-10-25 PROCEDURE — 71046 XR CHEST PA AND LATERAL: ICD-10-PCS | Mod: 26,,, | Performed by: RADIOLOGY

## 2023-10-25 PROCEDURE — 71046 X-RAY EXAM CHEST 2 VIEWS: CPT | Mod: 26,,, | Performed by: RADIOLOGY

## 2023-10-25 RX ORDER — ISOSORBIDE MONONITRATE 60 MG/1
60 TABLET, EXTENDED RELEASE ORAL
Qty: 90 TABLET | Refills: 0 | Status: SHIPPED | OUTPATIENT
Start: 2023-10-25 | End: 2024-02-13 | Stop reason: SDUPTHER

## 2023-10-25 NOTE — TELEPHONE ENCOUNTER
Refill Routing Note   Medication(s) are not appropriate for processing by [facility] for the following reason(s):      Patient not seen by provider within 15 months  Patient seen in ED/Hospital since LOV with provider  Required vitals abnormal    [facility] action(s):  Defer Care Due:  None identified            Appointments  past 12m or future 3m with PCP    Date Provider   Last Visit   7/27/2020 Jim Tomlin MD   Next Visit   11/13/2023 Jim Tomlin MD   ED visits in past 90 days: 0        Note composed:9:16 AM 10/25/2023

## 2023-10-26 LAB
AORTIC ROOT ANNULUS: 4.05 CM
ASCENDING AORTA: 3.51 CM
AV INDEX (PROSTH): 0.58
AV MEAN GRADIENT: 3 MMHG
AV PEAK GRADIENT: 5 MMHG
AV REGURGITATION PRESSURE HALF TIME: 800.74 MS
AV VALVE AREA BY VELOCITY RATIO: 3.03 CM²
AV VALVE AREA: 3.01 CM²
AV VELOCITY RATIO: 0.58
BSA FOR ECHO PROCEDURE: 1.89 M2
CV ECHO LV RWT: 0.53 CM
DOP CALC AO PEAK VEL: 1.13 M/S
DOP CALC AO VTI: 27.6 CM
DOP CALC LVOT AREA: 5.2 CM2
DOP CALC LVOT DIAMETER: 2.57 CM
DOP CALC LVOT PEAK VEL: 0.66 M/S
DOP CALC LVOT STROKE VOLUME: 82.96 CM3
DOP CALC RVOT PEAK VEL: 0.46 M/S
DOP CALC RVOT VTI: 9.7 CM
DOP CALCLVOT PEAK VEL VTI: 16 CM
E WAVE DECELERATION TIME: 206.92 MSEC
E/A RATIO: 0.73
E/E' RATIO: 8.53 M/S
ECHO LV POSTERIOR WALL: 1.29 CM (ref 0.6–1.1)
FRACTIONAL SHORTENING: 19 % (ref 28–44)
INTERVENTRICULAR SEPTUM: 1.43 CM (ref 0.6–1.1)
IVC DIAMETER: 1.02 CM
IVRT: 77.07 MSEC
LA MAJOR: 5.97 CM
LA MINOR: 5.98 CM
LA WIDTH: 4.4 CM
LEFT ATRIUM SIZE: 5.04 CM
LEFT ATRIUM VOLUME INDEX MOD: 38.2 ML/M2
LEFT ATRIUM VOLUME INDEX: 59.6 ML/M2
LEFT ATRIUM VOLUME MOD: 72.16 CM3
LEFT ATRIUM VOLUME: 112.63 CM3
LEFT INTERNAL DIMENSION IN SYSTOLE: 3.93 CM (ref 2.1–4)
LEFT VENTRICLE DIASTOLIC VOLUME INDEX: 59.02 ML/M2
LEFT VENTRICLE DIASTOLIC VOLUME: 111.55 ML
LEFT VENTRICLE MASS INDEX: 142 G/M2
LEFT VENTRICLE SYSTOLIC VOLUME INDEX: 35.5 ML/M2
LEFT VENTRICLE SYSTOLIC VOLUME: 67.15 ML
LEFT VENTRICULAR INTERNAL DIMENSION IN DIASTOLE: 4.88 CM (ref 3.5–6)
LEFT VENTRICULAR MASS: 269.14 G
LV LATERAL E/E' RATIO: 7.11 M/S
LV SEPTAL E/E' RATIO: 10.67 M/S
LVOT MG: 0.95 MMHG
LVOT MV: 0.46 CM/S
MV PEAK A VEL: 0.88 M/S
MV PEAK E VEL: 0.64 M/S
MV STENOSIS PRESSURE HALF TIME: 60.01 MS
MV VALVE AREA P 1/2 METHOD: 3.67 CM2
PISA AR MAX VEL: 2.87 M/S
PISA TR MAX VEL: 2.28 M/S
PULM VEIN S/D RATIO: 1.06
PV MEAN GRADIENT: 1 MMHG
PV MV: 0.54 M/S
PV PEAK D VEL: 0.33 M/S
PV PEAK GRADIENT: 3 MMHG
PV PEAK S VEL: 0.35 M/S
PV PEAK VELOCITY: 0.83 M/S
RA MAJOR: 5.75 CM
RA PRESSURE ESTIMATED: 3 MMHG
RA WIDTH: 4.15 CM
RIGHT VENTRICULAR END-DIASTOLIC DIMENSION: 3.97 CM
RV TB RVSP: 5 MMHG
RV TISSUE DOPPLER FREE WALL SYSTOLIC VELOCITY 1 (APICAL 4 CHAMBER VIEW): 9.61 CM/S
SINUS: 4.04 CM
STJ: 3.13 CM
TASV: 10 CM/S
TDI LATERAL: 0.09 M/S
TDI SEPTAL: 0.06 M/S
TDI: 0.08 M/S
TR MAX PG: 21 MMHG
TRICUSPID ANNULAR PLANE SYSTOLIC EXCURSION: 2.02 CM
TV REST PULMONARY ARTERY PRESSURE: 24 MMHG
Z-SCORE OF LEFT VENTRICULAR DIMENSION IN END DIASTOLE: -0.81
Z-SCORE OF LEFT VENTRICULAR DIMENSION IN END SYSTOLE: 1.48

## 2023-10-26 NOTE — TELEPHONE ENCOUNTER
Please call pt.  Labs reviewed.  Heart failure blood test higher.    Change lasix from 40 mg daily to following:    Take 40 mg twice daily x 5 days then continue 40 mg in am and take 20 mg in pm.    Schedule repeat CMP, BNP in 2 weeks.    Dr Sadler      Spoke to patient verbalized understanding repeat labs scheduled

## 2023-10-26 NOTE — TELEPHONE ENCOUNTER
Please call pt.  Labs reviewed.  Heart failure blood test higher.    Change lasix from 40 mg daily to following:    Take 40 mg twice daily x 5 days then continue 40 mg in am and take 20 mg in pm.    Schedule repeat CMP, BNP in 2 weeks.    Dr Sadler

## 2023-10-27 ENCOUNTER — TELEPHONE (OUTPATIENT)
Dept: CARDIOLOGY | Facility: CLINIC | Age: 83
End: 2023-10-27
Payer: MEDICARE

## 2023-10-27 ENCOUNTER — INFUSION (OUTPATIENT)
Dept: INFUSION THERAPY | Facility: HOSPITAL | Age: 83
End: 2023-10-27
Attending: NURSE PRACTITIONER
Payer: OTHER GOVERNMENT

## 2023-10-27 VITALS
DIASTOLIC BLOOD PRESSURE: 61 MMHG | SYSTOLIC BLOOD PRESSURE: 132 MMHG | WEIGHT: 154.75 LBS | TEMPERATURE: 98 F | RESPIRATION RATE: 18 BRPM | HEART RATE: 74 BPM | HEIGHT: 70 IN | OXYGEN SATURATION: 98 % | BODY MASS INDEX: 22.15 KG/M2

## 2023-10-27 DIAGNOSIS — D50.9 IRON DEFICIENCY ANEMIA, UNSPECIFIED IRON DEFICIENCY ANEMIA TYPE: Primary | ICD-10-CM

## 2023-10-27 PROCEDURE — 96365 THER/PROPH/DIAG IV INF INIT: CPT

## 2023-10-27 PROCEDURE — 25000003 PHARM REV CODE 250: Performed by: NURSE PRACTITIONER

## 2023-10-27 PROCEDURE — 63600175 PHARM REV CODE 636 W HCPCS: Mod: JZ,JG | Performed by: NURSE PRACTITIONER

## 2023-10-27 RX ADMIN — FERUMOXYTOL 510 MG: 510 INJECTION INTRAVENOUS at 01:10

## 2023-10-27 NOTE — LETTER
October 27, 2023      [facility] 4th Fl  [address]  [place] [address]  Phone: [phone]  Fax: [phone]       Patient: Nithin Pino   YOB: 1940  Date of Visit: 10/27/2023    To Whom It May Concern:    Morgan Pino  was at [facility] on 10/27/2023. The patient was in the clinic to receive Feraheme infusion. If you have any questions or concerns, or if I can be of further assistance, please do not hesitate to contact me.    Sincerely,    Stacia Agarwal RN

## 2023-10-27 NOTE — PROGRESS NOTES
Please contact the patient and let them know that their echo results are stable and do not require any change in treatment.    Continue current meds and f/u next scheduled appt.    Dr Sadler

## 2023-10-27 NOTE — PLAN OF CARE
Problem: Adult Inpatient Plan of Care  Goal: Plan of Care Review  Outcome: Ongoing, Progressing  Flowsheets (Taken 10/27/2023 1407)  Plan of Care Reviewed With: patient  Goal: Patient-Specific Goal (Individualized)  Outcome: Ongoing, Progressing  Flowsheets (Taken 10/27/2023 1407)  Anxieties, Fears or Concerns: denies  Individualized Care Needs:   feet elevated   pillow   snack/drink provided   educational handout provided  Goal: Optimal Comfort and Wellbeing  Outcome: Ongoing, Progressing  Intervention: Monitor Pain and Promote Comfort  Flowsheets (Taken 10/27/2023 1407)  Pain Management Interventions:   quiet environment facilitated   relaxation techniques promoted   pillow support provided  Intervention: Provide Person-Centered Care  Flowsheets (Taken 10/27/2023 1407)  Trust Relationship/Rapport:   care explained   questions encouraged   reassurance provided   choices provided   thoughts/feelings acknowledged   emotional support provided   empathic listening provided   questions answered     Problem: Anemia  Goal: Anemia Symptom Improvement  Outcome: Ongoing, Progressing  Intervention: Monitor and Manage Anemia  Flowsheets (Taken 10/27/2023 1407)  Safety Promotion/Fall Prevention: (cane)   nonskid shoes/socks when out of bed   medications reviewed   in recliner, wheels locked   assistive device/personal item within reach  Fatigue Management:   frequent rest breaks encouraged   paced activity encouraged     Problem: Fall Injury Risk  Goal: Absence of Fall and Fall-Related Injury  Outcome: Ongoing, Progressing  Intervention: Identify and Manage Contributors  Flowsheets (Taken 10/27/2023 1407)  Self-Care Promotion: (cane) BADL personal objects within reach  Medication Review/Management: medications reviewed  Intervention: Promote Injury-Free Environment  Flowsheets (Taken 10/27/2023 1407)  Safety Promotion/Fall Prevention: (cane)   nonskid shoes/socks when out of bed   medications reviewed   in recliner, wheels  locked   assistive device/personal item within reach

## 2023-10-27 NOTE — DISCHARGE INSTRUCTIONS
FALL PREVENTION   Falls often occur due to slipping, tripping or losing your balance. Here are ways to reduce your risk of falling again.   Was there anything that caused your fall that can be fixed, removed or replaced?   Make your home safe by keeping walkways clear of objects you may trip over.   Use non-slip pads under rugs.   Do not walk in poorly lit areas.   Do not stand on chairs or wobbly ladders.   Use caution when reaching overhead or looking upward. This position can cause a loss of balance.   Be sure your shoes fit properly, have non-slip bottoms and are in good condition.   Be cautious when going up and down stairs, curbs, and when walking on uneven sidewalks.   If your balance is poor, consider using a cane or walker.   If your fall was related to alcohol use, stop or limit alcohol intake.   If your fall was related to use of sleeping medicines, talk to your doctor about this. You may need to reduce your dosage at bedtime if you awaken during the night to go to the bathroom.   To reduce the need for nighttime bathroom trips:   Avoid drinking fluids for several hours before going to bed   Empty your bladder before going to bed   Men can keep a urinal at the bedside   © 2298-2105 [facility], [address] [place] [address], [place] [address]. All rights reserved. This information is not intended as a substitute for professional medical care. Always follow your healthcare professional's instructions.

## 2023-10-27 NOTE — NURSING
Reviewed plan of care and discussed treatment. Receiving Feraheme 1 of 2 with a post observation of 45 minutes. Patient verbalized understanding. Addressed any ongoing symptoms; states feeling tired.     Tolerated treatment. No adverse reaction. RTC 11/3/2023 for next Feraheme infusion.

## 2023-11-03 ENCOUNTER — INFUSION (OUTPATIENT)
Dept: INFUSION THERAPY | Facility: HOSPITAL | Age: 83
End: 2023-11-03
Attending: NURSE PRACTITIONER
Payer: OTHER GOVERNMENT

## 2023-11-03 VITALS
OXYGEN SATURATION: 97 % | TEMPERATURE: 98 F | DIASTOLIC BLOOD PRESSURE: 52 MMHG | RESPIRATION RATE: 16 BRPM | HEART RATE: 69 BPM | SYSTOLIC BLOOD PRESSURE: 108 MMHG

## 2023-11-03 DIAGNOSIS — D50.9 IRON DEFICIENCY ANEMIA, UNSPECIFIED IRON DEFICIENCY ANEMIA TYPE: Primary | ICD-10-CM

## 2023-11-03 PROCEDURE — 96374 THER/PROPH/DIAG INJ IV PUSH: CPT

## 2023-11-03 PROCEDURE — 25000003 PHARM REV CODE 250: Performed by: NURSE PRACTITIONER

## 2023-11-03 PROCEDURE — 63600175 PHARM REV CODE 636 W HCPCS: Mod: JZ,JG | Performed by: NURSE PRACTITIONER

## 2023-11-03 PROCEDURE — A4216 STERILE WATER/SALINE, 10 ML: HCPCS | Performed by: NURSE PRACTITIONER

## 2023-11-03 RX ORDER — SODIUM CHLORIDE 0.9 % (FLUSH) 0.9 %
10 SYRINGE (ML) INJECTION
Status: DISCONTINUED | OUTPATIENT
Start: 2023-11-03 | End: 2023-11-03 | Stop reason: HOSPADM

## 2023-11-03 RX ADMIN — Medication 10 ML: at 01:11

## 2023-11-03 RX ADMIN — FERUMOXYTOL 510 MG: 510 INJECTION INTRAVENOUS at 01:11

## 2023-11-03 NOTE — PLAN OF CARE
Patient tolerated Feraheme well today; no adverse reaction noted.  POC reviewed with pt.  No questions or concerns voiced.  NAD noted upon discharge.  No significant new complaints voiced.   Has f/u appt(s) scheduled per MD request.    Problem: Adult Inpatient Plan of Care  Goal: Plan of Care Review  Outcome: Ongoing, Progressing  Goal: Patient-Specific Goal (Individualized)  Outcome: Ongoing, Progressing  Goal: Optimal Comfort and Wellbeing  Outcome: Ongoing, Progressing  Intervention: Provide Person-Centered Care  Flowsheets (Taken 11/3/2023 1351)  Trust Relationship/Rapport:   reassurance provided   choices provided   care explained   thoughts/feelings acknowledged   emotional support provided   empathic listening provided   questions answered   questions encouraged  Goal: Absence of Hospital-Acquired Illness or Injury  Outcome: Ongoing, Progressing  Intervention: Identify and Manage Fall Risk  Flowsheets (Taken 11/3/2023 1351)  Safety Promotion/Fall Prevention: in recliner, wheels locked

## 2023-11-09 ENCOUNTER — LAB VISIT (OUTPATIENT)
Dept: LAB | Facility: HOSPITAL | Age: 83
End: 2023-11-09
Attending: INTERNAL MEDICINE
Payer: MEDICARE

## 2023-11-09 ENCOUNTER — TELEPHONE (OUTPATIENT)
Dept: CARDIOLOGY | Facility: CLINIC | Age: 83
End: 2023-11-09
Payer: MEDICARE

## 2023-11-09 DIAGNOSIS — I50.42 CHRONIC COMBINED SYSTOLIC AND DIASTOLIC CONGESTIVE HEART FAILURE: ICD-10-CM

## 2023-11-09 DIAGNOSIS — I50.42 CHRONIC COMBINED SYSTOLIC AND DIASTOLIC CONGESTIVE HEART FAILURE: Primary | ICD-10-CM

## 2023-11-09 LAB
ALBUMIN SERPL BCP-MCNC: 3.6 G/DL (ref 3.5–5.2)
ALP SERPL-CCNC: 56 U/L (ref 55–135)
ALT SERPL W/O P-5'-P-CCNC: 13 U/L (ref 10–44)
ANION GAP SERPL CALC-SCNC: 5 MMOL/L (ref 8–16)
AST SERPL-CCNC: 17 U/L (ref 10–40)
BILIRUB SERPL-MCNC: 0.5 MG/DL (ref 0.1–1)
BNP SERPL-MCNC: 539 PG/ML (ref 0–99)
BUN SERPL-MCNC: 20 MG/DL (ref 8–23)
CALCIUM SERPL-MCNC: 9.6 MG/DL (ref 8.7–10.5)
CHLORIDE SERPL-SCNC: 110 MMOL/L (ref 95–110)
CO2 SERPL-SCNC: 24 MMOL/L (ref 23–29)
CREAT SERPL-MCNC: 1.4 MG/DL (ref 0.5–1.4)
EST. GFR  (NO RACE VARIABLE): 49.6 ML/MIN/1.73 M^2
GLUCOSE SERPL-MCNC: 214 MG/DL (ref 70–110)
POTASSIUM SERPL-SCNC: 5.7 MMOL/L (ref 3.5–5.1)
PROT SERPL-MCNC: 6 G/DL (ref 6–8.4)
SODIUM SERPL-SCNC: 139 MMOL/L (ref 136–145)

## 2023-11-09 PROCEDURE — 36415 COLL VENOUS BLD VENIPUNCTURE: CPT | Mod: PO | Performed by: INTERNAL MEDICINE

## 2023-11-09 PROCEDURE — 80053 COMPREHEN METABOLIC PANEL: CPT | Performed by: INTERNAL MEDICINE

## 2023-11-09 PROCEDURE — 83880 ASSAY OF NATRIURETIC PEPTIDE: CPT | Performed by: INTERNAL MEDICINE

## 2023-11-10 DIAGNOSIS — I50.42 CHRONIC COMBINED SYSTOLIC AND DIASTOLIC CONGESTIVE HEART FAILURE: Primary | ICD-10-CM

## 2023-11-10 RX ORDER — FUROSEMIDE 40 MG/1
40 TABLET ORAL 2 TIMES DAILY
Qty: 60 TABLET | Refills: 11 | Status: SHIPPED | OUTPATIENT
Start: 2023-11-10 | End: 2023-11-20 | Stop reason: SDUPTHER

## 2023-11-10 NOTE — TELEPHONE ENCOUNTER
Please call pt.  Labs reviewed.  Heart failure blood test higher.     Change lasix to 40 mg twice daily.  Potassium is elevated.    Make sure he is not taking any potassium pills, prescription or over the counter.     Schedule repeat CMP, BNP in 1weeks.     Dr Sadler    Called patient to advise per Dr. Spoke to patient verbalized understanding repeat labs schedule

## 2023-11-10 NOTE — TELEPHONE ENCOUNTER
Please call pt.  Labs reviewed.  Heart failure blood test higher.     Change lasix to 40 mg twice daily.  Potassium is elevated.    Make sure he is not taking any potassium pills, prescription or over the counter.     Schedule repeat CMP, BNP in 1weeks.     Dr Sadler

## 2023-11-13 ENCOUNTER — OFFICE VISIT (OUTPATIENT)
Dept: INTERNAL MEDICINE | Facility: CLINIC | Age: 83
End: 2023-11-13
Payer: OTHER GOVERNMENT

## 2023-11-13 VITALS
DIASTOLIC BLOOD PRESSURE: 60 MMHG | OXYGEN SATURATION: 98 % | WEIGHT: 158.75 LBS | BODY MASS INDEX: 22.78 KG/M2 | HEART RATE: 86 BPM | SYSTOLIC BLOOD PRESSURE: 100 MMHG

## 2023-11-13 DIAGNOSIS — E78.5 HYPERLIPIDEMIA ASSOCIATED WITH TYPE 2 DIABETES MELLITUS: ICD-10-CM

## 2023-11-13 DIAGNOSIS — D50.9 IRON DEFICIENCY ANEMIA, UNSPECIFIED IRON DEFICIENCY ANEMIA TYPE: ICD-10-CM

## 2023-11-13 DIAGNOSIS — E11.69 HYPERLIPIDEMIA ASSOCIATED WITH TYPE 2 DIABETES MELLITUS: ICD-10-CM

## 2023-11-13 DIAGNOSIS — E11.51 DIABETES MELLITUS TYPE 2 WITH PERIPHERAL ARTERY DISEASE: Primary | ICD-10-CM

## 2023-11-13 DIAGNOSIS — E21.3 HYPERPARATHYROIDISM: ICD-10-CM

## 2023-11-13 DIAGNOSIS — I25.708 CORONARY ARTERY DISEASE OF BYPASS GRAFT OF NATIVE HEART WITH STABLE ANGINA PECTORIS: Chronic | ICD-10-CM

## 2023-11-13 DIAGNOSIS — I15.2 HYPERTENSION ASSOCIATED WITH DIABETES: ICD-10-CM

## 2023-11-13 DIAGNOSIS — E11.59 HYPERTENSION ASSOCIATED WITH DIABETES: ICD-10-CM

## 2023-11-13 PROBLEM — Z01.810 PREOP CARDIOVASCULAR EXAM: Status: RESOLVED | Noted: 2023-10-19 | Resolved: 2023-11-13

## 2023-11-13 PROBLEM — I20.9 AP (ANGINA PECTORIS): Status: RESOLVED | Noted: 2021-05-24 | Resolved: 2023-11-13

## 2023-11-13 PROBLEM — I73.9 PERIPHERAL VASCULAR DISEASE: Status: RESOLVED | Noted: 2018-07-10 | Resolved: 2023-11-13

## 2023-11-13 PROBLEM — R07.9 CHEST PAIN: Status: RESOLVED | Noted: 2021-01-04 | Resolved: 2023-11-13

## 2023-11-13 PROCEDURE — 99215 OFFICE O/P EST HI 40 MIN: CPT | Mod: PBBFAC,PO | Performed by: INTERNAL MEDICINE

## 2023-11-13 PROCEDURE — 99999 PR PBB SHADOW E&M-EST. PATIENT-LVL V: ICD-10-PCS | Mod: PBBFAC,,, | Performed by: INTERNAL MEDICINE

## 2023-11-13 PROCEDURE — 99214 OFFICE O/P EST MOD 30 MIN: CPT | Mod: S$GLB,,, | Performed by: INTERNAL MEDICINE

## 2023-11-13 PROCEDURE — 99999 PR PBB SHADOW E&M-EST. PATIENT-LVL V: CPT | Mod: PBBFAC,,, | Performed by: INTERNAL MEDICINE

## 2023-11-13 PROCEDURE — 99214 PR OFFICE/OUTPT VISIT, EST, LEVL IV, 30-39 MIN: ICD-10-PCS | Mod: S$GLB,,, | Performed by: INTERNAL MEDICINE

## 2023-11-13 NOTE — PROGRESS NOTES
Subjective:       Patient ID: Nithin Pino is a 83 y.o. male.    Chief Complaint: follow up       HPI:  Patient is an 83-year-old male presenting today following up on chronic health issues.  Patient has history of type 2 diabetes, hypertension, hyperlipidemia, coronary artery disease, iron deficiency anemia, hyperparathyroidism and bladder tumor.      Patient's situation is complicated.  [facility] 3 years.  He comes in periodically and check in with us to get refills on certain medications at the [facility] does not cover.  In the meantime he has been getting all of his [facility] and just using us for the refills that he needs.  By our understanding and by what we are able to see the the diabetes is not under good control at this time.  In discussing his diabetes with him today he describes a regimen of medications which is not the same as what we have in our medicine list.  I talked to him about my concerns about having two different groups of people trying to manage his healthcare and he indicated that he does not have to groups of people doing so.  He says the [facility] is managing his diabetes we are not.  We discussed the fact that he is as I am listed as PCP I am responsible for helping manage his care but if he is not going to use me as his primary care doctor he needs to just go to the [facility] for the [facility].    His blood pressure and cholesterol have been stable.  Blood pressure looks good today the cholesterol numbers have been stable historically.    He reports that he was seeing Cardiology for a preoperative assessment before having work done on his bladder tumor and they found him to be significantly anemic.  It looks like he was subsequently seen by Hematology who noted the iron-deficiency be an ongoing issue and he had been unresponsive to p.o. iron in the past.  They subsequently recommended to infusions of IV iron.   He got the 1st 1 last week and we are getting another 1 week or so.  Hematology as monitoring this some following this and will refer him back to cardiology for his clearance for the bladder tumor upon his numbers improving.    Patient has had a history of hyperparathyroidism.  His calcium levels have been normal with an elevated PTH.  He does not describe any symptoms or have any evidence of any symptoms related to hypercalcemia or hyperparathyroidism at this time.  We will update his labs.      In regards to his coronary artery disease he will continue to follow with Dr. Sadler and we will defer cardiac recommendations to him.    Review of Systems   Constitutional:  Negative for fever and unexpected weight change.   Respiratory:  Negative for cough, shortness of breath and wheezing.    Cardiovascular:  Negative for chest pain and palpitations.   Gastrointestinal:  Negative for constipation, diarrhea, nausea and vomiting.   Genitourinary:  Negative for dysuria and hematuria.       Objective:   /60   Pulse 86   Wt 72 kg (158 lb 11.7 oz)   SpO2 98%   BMI 22.78 kg/m²      Physical Exam  Vitals reviewed.   Constitutional:       Appearance: He is well-developed.   HENT:      Head: Normocephalic and atraumatic.      Right Ear: Tympanic membrane, ear canal and external ear normal.      Left Ear: Tympanic membrane, ear canal and external ear normal.   Eyes:      Pupils: Pupils are equal, round, and reactive to light.   Neck:      Thyroid: No thyromegaly.   Cardiovascular:      Rate and Rhythm: Normal rate and regular rhythm.      Heart sounds: Normal heart sounds. No murmur heard.     No friction rub. No gallop.   Pulmonary:      Effort: Pulmonary effort is normal.      Breath sounds: Normal breath sounds. No wheezing or rales.   Abdominal:      General: Bowel sounds are normal. There is no distension.      Palpations: Abdomen is soft.      Tenderness: There is no abdominal tenderness.   Musculoskeletal:       Cervical back: Neck supple.   Psychiatric:         Mood and Affect: Mood normal.         Lab Visit on 11/09/2023   Component Date Value    Sodium 11/09/2023 139     Potassium 11/09/2023 5.7 (H)     Chloride 11/09/2023 110     CO2 11/09/2023 24     Glucose 11/09/2023 214 (H)     BUN 11/09/2023 20     Creatinine 11/09/2023 1.4     Calcium 11/09/2023 9.6     Total Protein 11/09/2023 6.0     Albumin 11/09/2023 3.6     Total Bilirubin 11/09/2023 0.5     Alkaline Phosphatase 11/09/2023 56     AST 11/09/2023 17     ALT 11/09/2023 13     eGFR 11/09/2023 49.6 (A)     Anion Gap 11/09/2023 5 (L)     BNP 11/09/2023 539 (H)        Assessment:       1. Diabetes mellitus type 2 with peripheral artery disease    2. Hypertension associated with diabetes    3. Hyperlipidemia associated with type 2 diabetes mellitus    4. Iron deficiency anemia, unspecified iron deficiency anemia type    5. Hyperparathyroidism    6. 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  We will continue the current regimen.  Will work on regular aerobic exercise and a low salt diet.        Hyperlipidemia associated with type 2 diabetes mellitus  Cholesterol numbers look good.  We will continue the current regimen at this time.  Remain focused on low fat diet and high dietary fiber intake.    Diabetes mellitus type 2 with peripheral artery disease  Comments:  continue medicine, update labs. [facility].  Orders:  -     Cancel: Hemoglobin A1C; Future; Expected date: 11/13/2023    Hypertension associated with diabetes    Hyperlipidemia associated with type 2 diabetes mellitus    Iron deficiency anemia, unspecified iron deficiency anemia type  Comments:  Seeing Hematolgoy.  Received one dose iv iron. will be getting second dose in a week    Hyperparathyroidism  Comments:  Normal calcium on  recent labs.  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    Mr. Pino indicates he will continue to see the [facility] for [facility] care.  I have expressed to him that the issues that we are dealing with around him having primary care at the [facility] as well as primary care here is causing some disconnection to his quality of care and that in an ideal world he would pick 1 of the other.  He indicates he is not able to do that due to this insurance issue so he intends to continue to follow with us for certain medications and certain refills but he indicates the [facility] we will continue to treat his diabetes.  Currently in spite of the [facility] being on electronic record we have not been able to the validate the medication list so I do not have cough in his that his medication list for his diabetes is accurate in our chart at this time.  He will try to bring his medications in some point so that we can update our medicine list to try to match what he is currently taking through the [facility].

## 2023-11-14 ENCOUNTER — LAB VISIT (OUTPATIENT)
Dept: LAB | Facility: HOSPITAL | Age: 83
End: 2023-11-14
Attending: INTERNAL MEDICINE
Payer: OTHER GOVERNMENT

## 2023-11-14 DIAGNOSIS — E21.3 HYPERPARATHYROIDISM: ICD-10-CM

## 2023-11-14 LAB — PTH-INTACT SERPL-MCNC: 167.6 PG/ML (ref 9–77)

## 2023-11-14 PROCEDURE — 83970 ASSAY OF PARATHORMONE: CPT | Performed by: INTERNAL MEDICINE

## 2023-11-15 DIAGNOSIS — E21.3 HYPERPARATHYROIDISM: Primary | ICD-10-CM

## 2023-11-15 DIAGNOSIS — E55.9 VITAMIN D DEFICIENCY: ICD-10-CM

## 2023-11-16 ENCOUNTER — TELEPHONE (OUTPATIENT)
Dept: CARDIOLOGY | Facility: CLINIC | Age: 83
End: 2023-11-16
Payer: MEDICARE

## 2023-11-16 NOTE — TELEPHONE ENCOUNTER
Deniz Sadler., MD [facility] Staff  Please contact the patient and let them know that their lab results were reviewed.  BNP heart failure test much improved.  Renal function variably abnormal.    Stay on Lasix 40 mg twice daily until f/u appt next week.    Dr Sadler    Called patient to advise per Dr. Sadler     Spoke to patient, verbalized understanding

## 2023-11-17 ENCOUNTER — HOSPITAL ENCOUNTER (OUTPATIENT)
Dept: RADIOLOGY | Facility: HOSPITAL | Age: 83
Discharge: HOME | End: 2023-11-17
Attending: INTERNAL MEDICINE
Payer: MEDICARE

## 2023-11-17 DIAGNOSIS — I73.9 PERIPHERAL VASCULAR DISEASE: ICD-10-CM

## 2023-11-17 DIAGNOSIS — E11.69 HYPERLIPIDEMIA ASSOCIATED WITH TYPE 2 DIABETES MELLITUS: ICD-10-CM

## 2023-11-17 DIAGNOSIS — I50.42 CHRONIC COMBINED SYSTOLIC AND DIASTOLIC CONGESTIVE HEART FAILURE: ICD-10-CM

## 2023-11-17 DIAGNOSIS — I70.0 THORACIC AORTA ATHEROSCLEROSIS: ICD-10-CM

## 2023-11-17 DIAGNOSIS — R60.0 EDEMA OF BOTH LEGS: ICD-10-CM

## 2023-11-17 DIAGNOSIS — I20.9 AP (ANGINA PECTORIS): ICD-10-CM

## 2023-11-17 DIAGNOSIS — Z01.810 PREOP CARDIOVASCULAR EXAM: ICD-10-CM

## 2023-11-17 DIAGNOSIS — I25.9 CHRONIC ISCHEMIC HEART DISEASE: Chronic | ICD-10-CM

## 2023-11-17 DIAGNOSIS — R94.31 ABNORMAL ECG: Chronic | ICD-10-CM

## 2023-11-17 DIAGNOSIS — I45.10 RBBB: Chronic | ICD-10-CM

## 2023-11-17 DIAGNOSIS — I25.708 CORONARY ARTERY DISEASE OF BYPASS GRAFT OF NATIVE HEART WITH STABLE ANGINA PECTORIS: Chronic | ICD-10-CM

## 2023-11-17 DIAGNOSIS — I15.2 HYPERTENSION ASSOCIATED WITH DIABETES: ICD-10-CM

## 2023-11-17 DIAGNOSIS — Z95.1 S/P CABG (CORONARY ARTERY BYPASS GRAFT): ICD-10-CM

## 2023-11-17 DIAGNOSIS — I20.89 CHRONIC STABLE ANGINA: ICD-10-CM

## 2023-11-17 DIAGNOSIS — E11.59 HYPERTENSION ASSOCIATED WITH DIABETES: ICD-10-CM

## 2023-11-17 DIAGNOSIS — E78.5 HYPERLIPIDEMIA ASSOCIATED WITH TYPE 2 DIABETES MELLITUS: ICD-10-CM

## 2023-11-17 DIAGNOSIS — E11.51 DIABETES MELLITUS TYPE 2 WITH PERIPHERAL ARTERY DISEASE: ICD-10-CM

## 2023-11-17 DIAGNOSIS — I65.23 ASYMPTOMATIC STENOSIS OF BOTH CAROTID ARTERIES WITHOUT INFARCTION: ICD-10-CM

## 2023-11-19 NOTE — PROGRESS NOTES
Subjective:    Patient ID:  Nithin Pino is a 83 y.o. male who presents for evaluation of Hypertension, Hyperlipidemia, Coronary Artery Disease, Congestive Heart Failure, and Peripheral Arterial Disease        HPI: Pt presents for eval.  His current medical conditions include CAD, RBBB, bladder cancer, diastolic CHF, ICM, MR, TR, AI, CRI, carotid artery disease, COPD, HTN, PAD, DM.   Nonsmoker.   His past history is pertinent for following:  S/p PTCA 1994 for AMI.   Stress MPI 5/11 showed evidence of multivessel CAD (had 2 years of angina) which was confirmed on LHC (significant left main/LAD/ramus, diag disease and 100%  RCA). EF 35% on LV gram.   S/p successful CABG 5/11 (LIMA-LAD, SVG-DIAG, SVG-RAMUS). He had post-op a flutter which resolved.   S/p repeat LHC 2/16 for abnl stress MPI. He underwent atherectomy and PCI of left main and ramus with TUCKER x 2 overall. His svg-ramus had occluded. LIMA-LAD was patent, patent SVG-D1,  RCA with left - right collaterals, EF 45%.    Has chronic R SFA .   Pt has declined runoff numerous times for further evaluation of his PAD in past.   Echo 6/18 normal EF.  RENZO 6/18 R LE 0.86, L LE 0.96  Pt admitted late Dec 2020 with Covid pneumonia, cp sxs. Chart reviewed in detail.  Echo 12/20 normal LV function, LVH.  Troponin leak noted.  Cards consult done and troponin thought to be supply/demand ischemia from Covid infection/pneumonia.  Readmitted few days later with recurrent cp, low BP, dehydration and released.  Troponin leak ranged 0.10 to 0.50 range on both hospitalizations.  Stress MPI 3/21 apical, anteroapical scar, inferoapical scar with some residual ischemia, EF 44%.  Carotid u/s 12/20: no significant blockage noted.   ecg 10/29/21 NSR, RBBB, old inferior/anteroseptal infarct.  Echo 8/21: normal EF, DD, mild MR/TR/AI.  RENZO 8/21: R LE 0.73, L LE 0.71  S/p hip fx surgery Jan 2023 at [facility].  Pt readmitted March 2023 with CHF sxs.  EF 35%.  Lasix added.  Meds  adjusted.  ECG 4/17/23 NSR, RBBB, possible old septal, inferior infarct.  Had urological surgery June 2023 without CV complications.  Ecg 10/9/23 NSR, RBBB, chronic septal infarct.   Now here.  Pt seen Oct 2023 for preop eval for bladder cancer surgery.  Surgery delayed and CV testing done for sxs.  Pt had leg edema, CHF sxs.  BNP as high as 539; diuretic doses increased then normalized on recent labs.  Lasix 40 mg bid.  Creatinine bumped up on diuretics.  Echo Oct 2023: EF 50%, DD, LVH, WMA, mild MR, normal PAP.  Nuclear stress test pending today.  B LE venous u/s Oct 2023: no DVT.  Poor appetite.  Leg edema resolved.  Does not seem to have claudication sxs.  HGAIC above goal.  Lipids controlled on statin tx.  Denies CP/angina right now.  No concerning dyspnea.        Past Medical History:   Diagnosis Date    Abnormal brain MRI 01/21/2018    Chronic microvascular ischemia changes per MRI July 9, 2007 in [facility]    Abnormal ECG 10/31/2013    Abnormal stress test 01/28/2016    Alcohol dependence     States alcohol abuse ended in 1994    AP (angina pectoris) 01/28/2016    Asthma     Bladder cancer     Carotid artery occlusion     Cataract     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    2 - Normal left ventricular systolic function (EF 60-65%).    3 - Normal left ventricular diastolic function.    4 - Mild left atrial enlargement.    5 - Normal right ventricular systolic function .    6 - Trivial to mild aortic regurgitation.    7 - Trivial to mild mitral regurgitation.  10 -  Trivial to mild pulmonic regurgitation.     Hematuria 06/25/2020    History of alcohol abuse 01/22/2018    Patient denies drinking to excess since 1994.    History of atherectomy 01/21/2018 2/2016 [facility]    History of chronic kidney disease 01/22/2018    History of CKD 3    History of PTCA 10/31/2013    History of PTCA 03/09/2016    Hyperlipidemia     Hypertension     Insomnia 06/17/2013    Iron deficiency anemia 10/19/2023    Ischemic cardiomyopathy 10/31/2013    Long term (current) use of antithrombotics/antiplatelets 01/21/2018    Myocardial infarction     Old MI (myocardial infarction) 1994    Peripheral vascular disease     Polyneuropathy     RBBB 10/31/2013    S/P CABG (coronary artery bypass graft) 10/31/2013    Shortness of breath 10/31/2013    Tobacco dependence     resolved    Trouble in sleeping     Type 2 diabetes with peripheral circulatory disorder, controlled     Wears glasses        Current Outpatient Medications:     albuterol (PROVENTIL/VENTOLIN HFA) 90 mcg/actuation inhaler, albuterol sulfate Take No date recorded No form recorded No frequency recorded No route recorded No set duration recorded No set duration amount recorded active No dosage strength recorded No dosage strength units of measure recorded, Disp: , Rfl:     amLODIPine (NORVASC) 10 MG tablet, TAKE 1 TABLET ONE TIME DAILY, Disp: 90 tablet, Rfl: 10    aspirin 81 MG Chew, Take 1 tablet (81 mg total) by mouth once daily. 1 Tablet, Chewable Oral Every day, Disp: 90 tablet, Rfl: 3    atorvastatin (LIPITOR) 40 MG tablet, Take 1 tablet (40 mg total) by mouth every evening., Disp: 90 tablet, Rfl: 3    BLOOD-GLUCOSE METER (TRUERESULT BLOOD GLUCOSE SYSTM MISC), by Misc.(Non-Drug; Combo Route) route., Disp: , Rfl:     doxycycline (VIBRAMYCIN) 100 MG Cap, Take by mouth., Disp: , Rfl:     dulaglutide (TRULICITY) 4.5 mg/0.5 mL pen injector, Inject 4.5 mg into the skin every 7 days. SUNDAY, Disp: , Rfl:     EMBRACE PRO TEST STRIPS Strp, CHECK BLOOD  SUGAR TWICE DAILY., Disp: 50 strip, Rfl: 0    finasteride (PROSCAR) 5 mg tablet, Take 1 tablet (5 mg total) by mouth once daily., Disp: 90 tablet, Rfl: 3    furosemide (LASIX) 40 MG tablet, Take 1 tablet (40 mg total) by mouth 2 (two) times daily., Disp: 60 tablet, Rfl: 11    glucose 4 GM chewable tablet, Take 16 g by mouth as needed., Disp: , Rfl:     hyoscyamine (LEVSIN/SL) 0.125 mg Subl, Place 2 tablets (0.25 mg total) under the tongue every 4 (four) hours as needed (bladder spasms)., Disp: 30 tablet, Rfl: 1    insulin (LANTUS SOLOSTAR U-100 INSULIN) glargine 100 units/mL (3mL) SubQ pen, Inject 28 Units into the skin once daily., Disp: 27 mL, Rfl: 3    insulin glargine-yfgn 100 unit/mL (3 mL) InPn, INJECT 24 UNITS SUBCUTANEOUSLY EVERY DAY FOR DIABETES, Disp: , Rfl:     isosorbide mononitrate (IMDUR) 60 MG 24 hr tablet, TAKE 1 TABLET ONE TIME DAILY, Disp: 90 tablet, Rfl: 0    LIDOcaine (LIDODERM) 5 %, APPLY 1 PATCH TOPICALLY EVERY DAY FOR PAIN  WEAR FOR 12 HOURS, THEN REMOVE. DO NOT APPLY NEW PATCH FOR AT LEAST 12 HOURS., Disp: , Rfl:     losartan (COZAAR) 100 MG tablet, TAKE 1 TABLET ONE TIME DAILY, Disp: 90 tablet, Rfl: 10    metFORMIN (GLUCOPHAGE-XR) 500 MG ER 24hr tablet, Take 1 tablet (500 mg total) by mouth 2 (two) times daily with meals., Disp: 180 tablet, Rfl: 3    metoprolol succinate (TOPROL-XL) 25 MG 24 hr tablet, TAKE 1 TABLET EVERY EVENING, Disp: 90 tablet, Rfl: 3    miconazole (MICOTIN) 2 % cream, APPLY SMALL AMOUNT TOPICALLY TWICE A DAY AS NEEDED FOR FUNGAL INFECTION, Disp: , Rfl:     nitroGLYCERIN (NITROSTAT) 0.4 MG SL tablet, 0.4 mg., Disp: , Rfl:     solifenacin (VESICARE) 10 MG tablet, TAKE 1 TABLET EVERY DAY, Disp: 90 tablet, Rfl: 0    tamsulosin (FLOMAX) 0.4 mg Cap, Take 1 capsule (0.4 mg total) by mouth once daily., Disp: 90 capsule, Rfl: 3    TRUEPLUS LANCETS 26 gauge Misc, CHECK BLOOD SUGAR TWICE DAILY., Disp: 100 each, Rfl: 0    TRUERESULT BLOOD GLUCOSE SYSTM kit, CHECK BLOOD SUGAR TWICE  "DAILY., Disp: 1 each, Rfl: 0    vitamin D (VITAMIN D3) 1000 units Tab, Take 1 tablet by mouth once daily., Disp: , Rfl:     Current Facility-Administered Medications:     regadenoson injection 0.4 mg, 0.4 mg, Intravenous, 1 time in Clinic/HOD, Deniz Sadler MD      Review of Systems   Constitutional: Positive for decreased appetite.   HENT: Negative.     Eyes: Negative.    Cardiovascular: Negative.    Respiratory: Negative.     Endocrine: Negative.    Hematologic/Lymphatic: Negative.    Skin: Negative.    Musculoskeletal:  Positive for arthritis and joint pain.   Gastrointestinal: Negative.    Genitourinary: Negative.    Neurological:  Positive for numbness and weakness.   Psychiatric/Behavioral: Negative.     Allergic/Immunologic: Negative.           BP (!) 100/50 (BP Location: Left arm, Patient Position: Sitting, BP Method: Large (Manual))   Pulse 76   Ht 5' 10" (1.778 m)   Wt 70.6 kg (155 lb 10.3 oz)   SpO2 98%   BMI 22.33 kg/m²     Wt Readings from Last 3 Encounters:   11/20/23 70.6 kg (155 lb 10.3 oz)   11/13/23 72 kg (158 lb 11.7 oz)   10/27/23 70.2 kg (154 lb 12.2 oz)     Temp Readings from Last 3 Encounters:   11/03/23 98.3 °F (36.8 °C)   10/27/23 97.6 °F (36.4 °C)   08/17/23 98.4 °F (36.9 °C) (Temporal)     BP Readings from Last 3 Encounters:   11/20/23 (!) 100/50   11/13/23 100/60   11/03/23 (!) 108/52     Pulse Readings from Last 3 Encounters:   11/20/23 76   11/13/23 86   11/03/23 69       Objective:    Physical Exam  Vitals and nursing note reviewed.   Constitutional:       Appearance: He is well-developed.   HENT:      Head: Normocephalic.   Neck:      Thyroid: No thyromegaly.      Vascular: No carotid bruit or JVD.   Cardiovascular:      Rate and Rhythm: Normal rate and regular rhythm.      Pulses:           Radial pulses are 2+ on the right side and 2+ on the left side.      Heart sounds: S1 normal and S2 normal. Heart sounds not distant. No midsystolic click and no opening snap. No murmur " heard.     No friction rub. No S3 or S4 sounds.   Pulmonary:      Effort: Pulmonary effort is normal.      Breath sounds: Normal breath sounds. No wheezing or rales.   Abdominal:      General: Bowel sounds are normal. There is no distension or abdominal bruit.      Palpations: Abdomen is soft. There is no mass.      Tenderness: There is no abdominal tenderness.   Musculoskeletal:      Cervical back: Neck supple.      Right lower leg: No edema.      Left lower leg: No edema.   Skin:     General: Skin is warm.   Neurological:      Mental Status: He is alert and oriented to person, place, and time.   Psychiatric:         Behavior: Behavior normal.         I have reviewed all pertinent labs and cardiac studies.      Chemistry        Component Value Date/Time     11/14/2023 0800    K 4.8 11/14/2023 0800    CL 99 11/14/2023 0800    CO2 27 11/14/2023 0800    BUN 28 (H) 11/14/2023 0800    CREATININE 1.7 (H) 11/14/2023 0800     (H) 11/14/2023 0800        Component Value Date/Time    CALCIUM 10.5 11/14/2023 0800    ALKPHOS 71 11/14/2023 0800    AST 14 11/14/2023 0800    ALT 14 11/14/2023 0800    BILITOT 0.6 11/14/2023 0800    ESTGFRAFRICA 58.7 (A) 12/29/2021 0702    EGFRNONAA 50.8 (A) 12/29/2021 0702        Lab Results   Component Value Date    WBC 5.35 10/09/2023    HGB 9.7 (L) 10/09/2023    HCT 33.8 (L) 10/09/2023    MCV 85 10/09/2023     10/09/2023       Lab Results   Component Value Date    HGBA1C 8.6 (H) 05/08/2023     Lab Results   Component Value Date    CHOL 84 (L) 10/09/2023    CHOL 82 (L) 12/08/2021    CHOL 77 (L) 06/17/2020     Lab Results   Component Value Date    HDL 41 10/09/2023    HDL 48 12/08/2021    HDL 45 06/17/2020     Lab Results   Component Value Date    LDLCALC 27.8 (L) 10/09/2023    LDLCALC 18.8 (L) 12/08/2021    LDLCALC 17.4 (L) 06/17/2020     Lab Results   Component Value Date    TRIG 76 10/09/2023    TRIG 76 12/08/2021    TRIG 73 06/17/2020     Lab Results   Component Value Date     CHOLHDL 48.8 10/09/2023    CHOLHDL 58.5 (H) 12/08/2021    CHOLHDL 58.4 (H) 06/17/2020       Results for orders placed during the hospital encounter of 10/25/23    Echo    Interpretation Summary    Left Ventricle: The left ventricle is normal in size. Normal wall thickness. regional wall motion abnormalities present. There is low normal systolic function with a visually estimated ejection fraction of 50 - 55%.    Left Atrium: Left atrium is severely dilated.    Right Ventricle: Normal right ventricular cavity size. Wall thickness is normal. Right ventricle wall motion  is normal. Systolic function is normal.    Right Atrium: Right atrium is dilated.    Mitral Valve: There is mild regurgitation.    Pulmonic Valve: There is mild regurgitation.    Pulmonary Artery: The estimated pulmonary artery systolic pressure is 24 mmHg.    IVC/SVC: Normal venous pressure at 3 mmHg.        Results for orders placed during the hospital encounter of 03/24/21    Nuclear Stress - Cardiology Interpreted    Interpretation Summary    Abnormal myocardial perfusion scan.    There is a severe intensity, mostly fixed with some reversibilty defect in the apical wall(s).    There is a second moderate intensity, mostly fixed with some reversibilty defect in the mid to apical inferior and anteroapical wall(s).    The gated perfusion images showed an ejection fraction of 44% at rest. The gated perfusion images showed an ejection fraction of 51% post stress.    The EKG portion of this study is negative for ischemia.          Assessment:       1. Preop cardiovascular exam    2. Abnormal ECG    3. Abnormal nuclear stress test    4. Asymptomatic stenosis of both carotid arteries without infarction    5. 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 Chronic combined systolic and diastolic congestive heart failure    7. Diabetes mellitus type 2 with peripheral artery disease    8. Chronic renal impairment,  stage 3a    9. Claudication in peripheral vascular disease    10. Hyperlipidemia associated with type 2 diabetes mellitus    11. Ischemic cardiomyopathy    12. Nonrheumatic aortic valve insufficiency    13. RBBB    14. Thoracic aorta atherosclerosis           Plan:         Nuclear stress test today.  Discussed possible LHC if stress test shows significant ischemia.  Indications/risks/benefits of LHC/PCI dicussed.  Pt regarded as higher risk for complications with his age and comorbidities.  Change Lasix to 40 mg am, 20 mg pm.  I dicussed with daughter on phone.  May have erroneously been taking 2 of his 40 mg lasix in am and pm.  CMP, BNP in one week.  OMT advised for CHF/CAD.  Pt counseled on low salt diet, < 2 g/day.  Fluid restriction 1.5 liter/day.  Continue medical tx for PAD.  Reviewed all tests and above medical conditions with patient in detail and formulated treatment plan.  Continue optimal medical treatment for cardiovascular conditions.  Cardiac low salt diet advised.  Fall risk precautions discussed.  Maintaining healthy weight and weight loss goals (if needed) were discussed in clinic.  Importance of optimal lipid control were discussed in detail as well as possible pharmacologic and lifestyle changes that may be needed.  Statin tx.  DM control discussed.  Cut Amlodipine back to 5 mg qd due to soft BP.  Delay urological surgery to allow above CV tests -- notified Urology.    F/u 4 weeks.      I have reviewed all pertinent labs and cardiac studies independently. Plans and recommendations have been formulated under my direct supervision. All questions answered and patient voiced understanding.

## 2023-11-20 ENCOUNTER — OFFICE VISIT (OUTPATIENT)
Dept: CARDIOLOGY | Facility: CLINIC | Age: 83
End: 2023-11-20
Payer: MEDICARE

## 2023-11-20 ENCOUNTER — TELEPHONE (OUTPATIENT)
Dept: CARDIOLOGY | Facility: HOSPITAL | Age: 83
End: 2023-11-20
Payer: MEDICARE

## 2023-11-20 ENCOUNTER — HOSPITAL ENCOUNTER (OUTPATIENT)
Dept: RADIOLOGY | Facility: HOSPITAL | Age: 83
Discharge: HOME | End: 2023-11-20
Attending: INTERNAL MEDICINE
Payer: MEDICARE

## 2023-11-20 ENCOUNTER — HOSPITAL ENCOUNTER (OUTPATIENT)
Dept: CARDIOLOGY | Facility: HOSPITAL | Age: 83
Discharge: HOME | End: 2023-11-20
Attending: INTERNAL MEDICINE
Payer: MEDICARE

## 2023-11-20 VITALS
HEIGHT: 70 IN | WEIGHT: 155.63 LBS | HEART RATE: 76 BPM | BODY MASS INDEX: 22.28 KG/M2 | OXYGEN SATURATION: 98 % | SYSTOLIC BLOOD PRESSURE: 100 MMHG | DIASTOLIC BLOOD PRESSURE: 50 MMHG

## 2023-11-20 DIAGNOSIS — Z01.810 PREOP CARDIOVASCULAR EXAM: Primary | ICD-10-CM

## 2023-11-20 DIAGNOSIS — I25.708 CORONARY ARTERY DISEASE OF BYPASS GRAFT OF NATIVE HEART WITH STABLE ANGINA PECTORIS: Chronic | ICD-10-CM

## 2023-11-20 DIAGNOSIS — I45.10 RBBB: Chronic | ICD-10-CM

## 2023-11-20 DIAGNOSIS — E11.59 HYPERTENSION ASSOCIATED WITH DIABETES: ICD-10-CM

## 2023-11-20 DIAGNOSIS — E11.69 HYPERLIPIDEMIA ASSOCIATED WITH TYPE 2 DIABETES MELLITUS: ICD-10-CM

## 2023-11-20 DIAGNOSIS — I25.5 ISCHEMIC CARDIOMYOPATHY: Chronic | ICD-10-CM

## 2023-11-20 DIAGNOSIS — I50.42 CHRONIC COMBINED SYSTOLIC AND DIASTOLIC CONGESTIVE HEART FAILURE: ICD-10-CM

## 2023-11-20 DIAGNOSIS — I65.23 ASYMPTOMATIC STENOSIS OF BOTH CAROTID ARTERIES WITHOUT INFARCTION: ICD-10-CM

## 2023-11-20 DIAGNOSIS — N18.31 CHRONIC RENAL IMPAIRMENT, STAGE 3A: ICD-10-CM

## 2023-11-20 DIAGNOSIS — I35.1 NONRHEUMATIC AORTIC VALVE INSUFFICIENCY: ICD-10-CM

## 2023-11-20 DIAGNOSIS — R94.39 ABNORMAL NUCLEAR STRESS TEST: ICD-10-CM

## 2023-11-20 DIAGNOSIS — I15.2 HYPERTENSION ASSOCIATED WITH DIABETES: ICD-10-CM

## 2023-11-20 DIAGNOSIS — E78.5 HYPERLIPIDEMIA ASSOCIATED WITH TYPE 2 DIABETES MELLITUS: ICD-10-CM

## 2023-11-20 DIAGNOSIS — I70.0 THORACIC AORTA ATHEROSCLEROSIS: ICD-10-CM

## 2023-11-20 DIAGNOSIS — E11.51 DIABETES MELLITUS TYPE 2 WITH PERIPHERAL ARTERY DISEASE: ICD-10-CM

## 2023-11-20 DIAGNOSIS — I73.9 CLAUDICATION IN PERIPHERAL VASCULAR DISEASE: ICD-10-CM

## 2023-11-20 DIAGNOSIS — R94.31 ABNORMAL ECG: Chronic | ICD-10-CM

## 2023-11-20 PROCEDURE — 93018 CV STRESS TEST I&R ONLY: CPT | Mod: HCNC,,, | Performed by: INTERNAL MEDICINE

## 2023-11-20 PROCEDURE — 99999 PR PBB SHADOW E&M-EST. PATIENT-LVL III: ICD-10-PCS | Mod: PBBFAC,HCNC,, | Performed by: INTERNAL MEDICINE

## 2023-11-20 PROCEDURE — 1159F MED LIST DOCD IN RCRD: CPT | Mod: HCNC,CPTII,S$GLB, | Performed by: INTERNAL MEDICINE

## 2023-11-20 PROCEDURE — 3078F DIAST BP <80 MM HG: CPT | Mod: HCNC,CPTII,S$GLB, | Performed by: INTERNAL MEDICINE

## 2023-11-20 PROCEDURE — 93016 CV STRESS TEST SUPVJ ONLY: CPT | Mod: HCNC,,, | Performed by: INTERNAL MEDICINE

## 2023-11-20 PROCEDURE — 1160F RVW MEDS BY RX/DR IN RCRD: CPT | Mod: HCNC,CPTII,S$GLB, | Performed by: INTERNAL MEDICINE

## 2023-11-20 PROCEDURE — 99999 PR PBB SHADOW E&M-EST. PATIENT-LVL III: CPT | Mod: PBBFAC,HCNC,, | Performed by: INTERNAL MEDICINE

## 2023-11-20 PROCEDURE — 99215 OFFICE O/P EST HI 40 MIN: CPT | Mod: HCNC,25,S$GLB, | Performed by: INTERNAL MEDICINE

## 2023-11-20 PROCEDURE — 93017 CV STRESS TEST TRACING ONLY: CPT | Mod: HCNC

## 2023-11-20 PROCEDURE — 1159F PR MEDICATION LIST DOCUMENTED IN MEDICAL RECORD: ICD-10-PCS | Mod: HCNC,CPTII,S$GLB, | Performed by: INTERNAL MEDICINE

## 2023-11-20 PROCEDURE — 93018 NUCLEAR STRESS - CARDIOLOGY INTERPRETED (CUPID ONLY): ICD-10-PCS | Mod: HCNC,,, | Performed by: INTERNAL MEDICINE

## 2023-11-20 PROCEDURE — 93016 NUCLEAR STRESS - CARDIOLOGY INTERPRETED (CUPID ONLY): ICD-10-PCS | Mod: HCNC,,, | Performed by: INTERNAL MEDICINE

## 2023-11-20 PROCEDURE — 1160F PR REVIEW ALL MEDS BY PRESCRIBER/CLIN PHARMACIST DOCUMENTED: ICD-10-PCS | Mod: HCNC,CPTII,S$GLB, | Performed by: INTERNAL MEDICINE

## 2023-11-20 PROCEDURE — 3078F PR MOST RECENT DIASTOLIC BLOOD PRESSURE < 80 MM HG: ICD-10-PCS | Mod: HCNC,CPTII,S$GLB, | Performed by: INTERNAL MEDICINE

## 2023-11-20 PROCEDURE — 3074F PR MOST RECENT SYSTOLIC BLOOD PRESSURE < 130 MM HG: ICD-10-PCS | Mod: HCNC,CPTII,S$GLB, | Performed by: INTERNAL MEDICINE

## 2023-11-20 PROCEDURE — 63600175 PHARM REV CODE 636 W HCPCS: Mod: HCNC | Performed by: INTERNAL MEDICINE

## 2023-11-20 PROCEDURE — 99215 PR OFFICE/OUTPT VISIT, EST, LEVL V, 40-54 MIN: ICD-10-PCS | Mod: HCNC,25,S$GLB, | Performed by: INTERNAL MEDICINE

## 2023-11-20 PROCEDURE — 1126F AMNT PAIN NOTED NONE PRSNT: CPT | Mod: HCNC,CPTII,S$GLB, | Performed by: INTERNAL MEDICINE

## 2023-11-20 PROCEDURE — 78452 HT MUSCLE IMAGE SPECT MULT: CPT | Mod: 26,HCNC,, | Performed by: INTERNAL MEDICINE

## 2023-11-20 PROCEDURE — 78452 HT MUSCLE IMAGE SPECT MULT: CPT | Mod: HCNC

## 2023-11-20 PROCEDURE — 78452 NUCLEAR STRESS - CARDIOLOGY INTERPRETED (CUPID ONLY): ICD-10-PCS | Mod: 26,HCNC,, | Performed by: INTERNAL MEDICINE

## 2023-11-20 PROCEDURE — 3074F SYST BP LT 130 MM HG: CPT | Mod: HCNC,CPTII,S$GLB, | Performed by: INTERNAL MEDICINE

## 2023-11-20 PROCEDURE — 1126F PR PAIN SEVERITY QUANTIFIED, NO PAIN PRESENT: ICD-10-PCS | Mod: HCNC,CPTII,S$GLB, | Performed by: INTERNAL MEDICINE

## 2023-11-20 RX ORDER — AMLODIPINE BESYLATE 10 MG/1
5 TABLET ORAL DAILY
Qty: 90 TABLET | Refills: 10
Start: 2023-11-20 | End: 2024-02-13 | Stop reason: SDUPTHER

## 2023-11-20 RX ORDER — REGADENOSON 0.08 MG/ML
0.4 INJECTION, SOLUTION INTRAVENOUS
Status: COMPLETED | OUTPATIENT
Start: 2023-11-20 | End: 2023-11-20

## 2023-11-20 RX ORDER — FUROSEMIDE 40 MG/1
TABLET ORAL
Qty: 60 TABLET | Refills: 11
Start: 2023-11-20

## 2023-11-20 RX ADMIN — REGADENOSON 0.4 MG: 0.08 INJECTION, SOLUTION INTRAVENOUS at 11:11

## 2023-11-20 NOTE — TELEPHONE ENCOUNTER
----- Message from Zeynep Washington sent at 11/20/2023 10:17 AM CST -----  Contact: Ai/ daughter  .Type:  Patient Returning Call    Who Called: Pts daughter   Who Left Message for Patient: Jane   Does the patient know what this is regarding?: pts daughter says if pot is lost, he should be in Dr longoria's office   Would the patient rather a call back or a response via [facility]?  Call   Best Call Back Number: .[phone]   Additional Information:

## 2023-11-21 LAB
CV STRESS BASE HR: 75 BPM
DIASTOLIC BLOOD PRESSURE: 58 MMHG
NUC REST EJECTION FRACTION: 65
NUC STRESS EJECTION FRACTION: 46 %
OHS CV CPX 85 PERCENT MAX PREDICTED HEART RATE MALE: 116
OHS CV CPX ESTIMATED METS: 1
OHS CV CPX MAX PREDICTED HEART RATE: 136
OHS CV CPX PATIENT IS FEMALE: 0
OHS CV CPX PATIENT IS MALE: 1
OHS CV CPX PEAK DIASTOLIC BLOOD PRESSURE: 57 MMHG
OHS CV CPX PEAK HEAR RATE: 89 BPM
OHS CV CPX PEAK RATE PRESSURE PRODUCT: NORMAL
OHS CV CPX PEAK SYSTOLIC BLOOD PRESSURE: 131 MMHG
OHS CV CPX PERCENT MAX PREDICTED HEART RATE ACHIEVED: 65
OHS CV CPX RATE PRESSURE PRODUCT PRESENTING: 9525
SYSTOLIC BLOOD PRESSURE: 127 MMHG

## 2023-11-27 ENCOUNTER — LAB VISIT (OUTPATIENT)
Dept: LAB | Facility: HOSPITAL | Age: 83
End: 2023-11-27
Attending: INTERNAL MEDICINE
Payer: MEDICARE

## 2023-11-27 DIAGNOSIS — I50.42 CHRONIC COMBINED SYSTOLIC AND DIASTOLIC CONGESTIVE HEART FAILURE: ICD-10-CM

## 2023-11-27 LAB
ALBUMIN SERPL BCP-MCNC: 3.6 G/DL (ref 3.5–5.2)
ALP SERPL-CCNC: 76 U/L (ref 55–135)
ALT SERPL W/O P-5'-P-CCNC: 14 U/L (ref 10–44)
ANION GAP SERPL CALC-SCNC: 10 MMOL/L (ref 8–16)
AST SERPL-CCNC: 15 U/L (ref 10–40)
BILIRUB SERPL-MCNC: 0.5 MG/DL (ref 0.1–1)
BNP SERPL-MCNC: 203 PG/ML (ref 0–99)
BUN SERPL-MCNC: 28 MG/DL (ref 8–23)
CALCIUM SERPL-MCNC: 10.3 MG/DL (ref 8.7–10.5)
CHLORIDE SERPL-SCNC: 104 MMOL/L (ref 95–110)
CO2 SERPL-SCNC: 28 MMOL/L (ref 23–29)
CREAT SERPL-MCNC: 1.4 MG/DL (ref 0.5–1.4)
EST. GFR  (NO RACE VARIABLE): 49.6 ML/MIN/1.73 M^2
GLUCOSE SERPL-MCNC: 186 MG/DL (ref 70–110)
POTASSIUM SERPL-SCNC: 4.1 MMOL/L (ref 3.5–5.1)
PROT SERPL-MCNC: 6.5 G/DL (ref 6–8.4)
SODIUM SERPL-SCNC: 142 MMOL/L (ref 136–145)

## 2023-11-27 PROCEDURE — 36415 COLL VENOUS BLD VENIPUNCTURE: CPT | Mod: HCNC,PO | Performed by: INTERNAL MEDICINE

## 2023-11-27 PROCEDURE — 83880 ASSAY OF NATRIURETIC PEPTIDE: CPT | Mod: HCNC | Performed by: INTERNAL MEDICINE

## 2023-11-27 PROCEDURE — 80053 COMPREHEN METABOLIC PANEL: CPT | Mod: HCNC | Performed by: INTERNAL MEDICINE

## 2023-11-28 NOTE — PROGRESS NOTES
Please contact the patient and let them know that their lab results were stable and do not require any change in treatment.    Continue current dose of Lasix.    F/u as scheduled in Dec 2023.    Dr Sadler

## 2023-11-30 ENCOUNTER — TELEPHONE (OUTPATIENT)
Dept: CARDIOLOGY | Facility: CLINIC | Age: 83
End: 2023-11-30
Payer: MEDICARE

## 2023-11-30 NOTE — TELEPHONE ENCOUNTER
Spoke w/ pt's daughter. She verbalized understanding of normal lab results, and to continue current treatment, Lasix, and f/u as scheduled.     ----- Message from Deniz Sadler MD sent at 11/28/2023 10:36 AM CST -----  Please contact the patient and let them know that their lab results were stable and do not require any change in treatment.    Continue current dose of Lasix.    F/u as scheduled in Dec 2023.    Dr Sadler

## 2023-12-08 ENCOUNTER — LAB VISIT (OUTPATIENT)
Dept: LAB | Facility: HOSPITAL | Age: 83
End: 2023-12-08
Attending: NURSE PRACTITIONER
Payer: MEDICARE

## 2023-12-08 DIAGNOSIS — D64.9 ANEMIA, UNSPECIFIED TYPE: ICD-10-CM

## 2023-12-08 DIAGNOSIS — D50.9 IRON DEFICIENCY ANEMIA, UNSPECIFIED IRON DEFICIENCY ANEMIA TYPE: ICD-10-CM

## 2023-12-08 DIAGNOSIS — Z85.51 HISTORY OF BLADDER CANCER: ICD-10-CM

## 2023-12-08 LAB
ALBUMIN SERPL BCP-MCNC: 3.6 G/DL (ref 3.5–5.2)
ALP SERPL-CCNC: 78 U/L (ref 55–135)
ALT SERPL W/O P-5'-P-CCNC: 15 U/L (ref 10–44)
ANION GAP SERPL CALC-SCNC: 11 MMOL/L (ref 8–16)
AST SERPL-CCNC: 21 U/L (ref 10–40)
BASOPHILS # BLD AUTO: 0.02 K/UL (ref 0–0.2)
BASOPHILS NFR BLD: 0.2 % (ref 0–1.9)
BILIRUB SERPL-MCNC: 0.4 MG/DL (ref 0.1–1)
BUN SERPL-MCNC: 28 MG/DL (ref 8–23)
CALCIUM SERPL-MCNC: 10.3 MG/DL (ref 8.7–10.5)
CHLORIDE SERPL-SCNC: 102 MMOL/L (ref 95–110)
CO2 SERPL-SCNC: 27 MMOL/L (ref 23–29)
CREAT SERPL-MCNC: 1.6 MG/DL (ref 0.5–1.4)
DIFFERENTIAL METHOD: ABNORMAL
EOSINOPHIL # BLD AUTO: 0.3 K/UL (ref 0–0.5)
EOSINOPHIL NFR BLD: 2.6 % (ref 0–8)
ERYTHROCYTE [DISTWIDTH] IN BLOOD BY AUTOMATED COUNT: 18.7 % (ref 11.5–14.5)
EST. GFR  (NO RACE VARIABLE): 42 ML/MIN/1.73 M^2
FERRITIN SERPL-MCNC: 173 NG/ML (ref 20–300)
FOLATE SERPL-MCNC: 14 NG/ML (ref 4–24)
GLUCOSE SERPL-MCNC: 289 MG/DL (ref 70–110)
HAPTOGLOB SERPL-MCNC: 138 MG/DL (ref 30–250)
HCT VFR BLD AUTO: 36.1 % (ref 40–54)
HGB BLD-MCNC: 11.2 G/DL (ref 14–18)
IMM GRANULOCYTES # BLD AUTO: 0.05 K/UL (ref 0–0.04)
IMM GRANULOCYTES NFR BLD AUTO: 0.4 % (ref 0–0.5)
IRON SERPL-MCNC: 56 UG/DL (ref 45–160)
LDH SERPL L TO P-CCNC: 152 U/L (ref 110–260)
LYMPHOCYTES # BLD AUTO: 2.4 K/UL (ref 1–4.8)
LYMPHOCYTES NFR BLD: 20.5 % (ref 18–48)
MCH RBC QN AUTO: 28.1 PG (ref 27–31)
MCHC RBC AUTO-ENTMCNC: 31 G/DL (ref 32–36)
MCV RBC AUTO: 91 FL (ref 82–98)
MONOCYTES # BLD AUTO: 0.6 K/UL (ref 0.3–1)
MONOCYTES NFR BLD: 4.7 % (ref 4–15)
NEUTROPHILS # BLD AUTO: 8.4 K/UL (ref 1.8–7.7)
NEUTROPHILS NFR BLD: 71.6 % (ref 38–73)
NRBC BLD-RTO: 0 /100 WBC
PLATELET # BLD AUTO: 255 K/UL (ref 150–450)
PMV BLD AUTO: 11.1 FL (ref 9.2–12.9)
POTASSIUM SERPL-SCNC: 4.5 MMOL/L (ref 3.5–5.1)
PROT SERPL-MCNC: 6.4 G/DL (ref 6–8.4)
RBC # BLD AUTO: 3.98 M/UL (ref 4.6–6.2)
SATURATED IRON: 19 % (ref 20–50)
SODIUM SERPL-SCNC: 140 MMOL/L (ref 136–145)
TOTAL IRON BINDING CAPACITY: 299 UG/DL (ref 250–450)
TRANSFERRIN SERPL-MCNC: 202 MG/DL (ref 200–375)
VIT B12 SERPL-MCNC: >2000 PG/ML (ref 210–950)
WBC # BLD AUTO: 11.7 K/UL (ref 3.9–12.7)

## 2023-12-08 PROCEDURE — 84165 PROTEIN E-PHORESIS SERUM: CPT | Mod: HCNC | Performed by: NURSE PRACTITIONER

## 2023-12-08 PROCEDURE — 82728 ASSAY OF FERRITIN: CPT | Mod: HCNC | Performed by: NURSE PRACTITIONER

## 2023-12-08 PROCEDURE — 86334 PATHOLOGIST INTERPRETATION IFE: ICD-10-PCS | Mod: 26,HCNC,, | Performed by: PATHOLOGY

## 2023-12-08 PROCEDURE — 82746 ASSAY OF FOLIC ACID SERUM: CPT | Mod: HCNC | Performed by: NURSE PRACTITIONER

## 2023-12-08 PROCEDURE — 85025 COMPLETE CBC W/AUTO DIFF WBC: CPT | Mod: HCNC | Performed by: NURSE PRACTITIONER

## 2023-12-08 PROCEDURE — 36415 COLL VENOUS BLD VENIPUNCTURE: CPT | Mod: HCNC,PO | Performed by: NURSE PRACTITIONER

## 2023-12-08 PROCEDURE — 86334 IMMUNOFIX E-PHORESIS SERUM: CPT | Mod: HCNC | Performed by: NURSE PRACTITIONER

## 2023-12-08 PROCEDURE — 83010 ASSAY OF HAPTOGLOBIN QUANT: CPT | Mod: HCNC | Performed by: NURSE PRACTITIONER

## 2023-12-08 PROCEDURE — 80053 COMPREHEN METABOLIC PANEL: CPT | Mod: HCNC | Performed by: NURSE PRACTITIONER

## 2023-12-08 PROCEDURE — 82607 VITAMIN B-12: CPT | Mod: HCNC | Performed by: NURSE PRACTITIONER

## 2023-12-08 PROCEDURE — 83540 ASSAY OF IRON: CPT | Mod: HCNC | Performed by: NURSE PRACTITIONER

## 2023-12-08 PROCEDURE — 84165 PROTEIN E-PHORESIS SERUM: CPT | Mod: 26,HCNC,, | Performed by: PATHOLOGY

## 2023-12-08 PROCEDURE — 83615 LACTATE (LD) (LDH) ENZYME: CPT | Mod: HCNC | Performed by: NURSE PRACTITIONER

## 2023-12-08 PROCEDURE — 83521 IG LIGHT CHAINS FREE EACH: CPT | Mod: 59,HCNC | Performed by: NURSE PRACTITIONER

## 2023-12-08 PROCEDURE — 86334 IMMUNOFIX E-PHORESIS SERUM: CPT | Mod: 26,HCNC,, | Performed by: PATHOLOGY

## 2023-12-08 PROCEDURE — 84165 PATHOLOGIST INTERPRETATION SPE: ICD-10-PCS | Mod: 26,HCNC,, | Performed by: PATHOLOGY

## 2023-12-08 PROCEDURE — 84466 ASSAY OF TRANSFERRIN: CPT | Mod: HCNC | Performed by: NURSE PRACTITIONER

## 2023-12-10 NOTE — PROGRESS NOTES
Subjective:    Patient ID:  Nithin Pino is a 83 y.o. male who presents for evaluation of Hypertension, Hyperlipidemia, Coronary Artery Disease, Congestive Heart Failure, and Peripheral Arterial Disease        HPI: Pt presents for eval.  His current medical conditions include CAD, RBBB, bladder cancer, diastolic CHF, ICM, MR, TR, AI, CRI, carotid artery disease, COPD, HTN, PAD, DM.   Nonsmoker.   His past history is pertinent for following:  S/p PTCA 1994 for AMI.   Stress MPI 5/11 showed evidence of multivessel CAD (had 2 years of angina) which was confirmed on LHC (significant left main/LAD/ramus, diag disease and 100%  RCA). EF 35% on LV gram.   S/p successful CABG 5/11 (LIMA-LAD, SVG-DIAG, SVG-RAMUS). He had post-op a flutter which resolved.   S/p repeat LHC 2/16 for abnl stress MPI. He underwent atherectomy and PCI of left main and ramus with TUCKER x 2 overall. His svg-ramus had occluded. LIMA-LAD was patent, patent SVG-D1,  RCA with left - right collaterals, EF 45%.    Has chronic R SFA .   Pt has declined runoff numerous times for further evaluation of his PAD in past.   Echo 6/18 normal EF.  RENZO 6/18 R LE 0.86, L LE 0.96  Pt admitted late Dec 2020 with Covid pneumonia, cp sxs. Chart reviewed in detail.  Echo 12/20 normal LV function, LVH.  Troponin leak noted.  Cards consult done and troponin thought to be supply/demand ischemia from Covid infection/pneumonia.  Readmitted few days later with recurrent cp, low BP, dehydration and released.  Troponin leak ranged 0.10 to 0.50 range on both hospitalizations.  Stress MPI 3/21 apical, anteroapical scar, inferoapical scar with some residual ischemia, EF 44%.  Carotid u/s 12/20: no significant blockage noted.   ecg 10/29/21 NSR, RBBB, old inferior/anteroseptal infarct.  Echo 8/21: normal EF, DD, mild MR/TR/AI.  RENZO 8/21: R LE 0.73, L LE 0.71  S/p hip fx surgery Jan 2023 at [facility].  Pt readmitted March 2023 with CHF sxs.  EF 35%.  Lasix added.  Meds  adjusted.  ECG 4/17/23 NSR, RBBB, possible old septal, inferior infarct.  Had urological surgery June 2023 without CV complications.  Ecg 10/9/23 NSR, RBBB, chronic septal infarct.   Pt seen Oct 2023 for preop eval for bladder cancer surgery.  Surgery delayed and CV testing done for sxs.  Pt had leg edema, CHF sxs.  Echo Oct 2023: EF 50%, DD, LVH, WMA, mild MR, normal PAP.  B LE venous u/s Oct 2023: no DVT.  Now here.  Pt seen last month.  Lasix changed to 40 mg am, 20 mg pm.  Nuclear stress test Nov 2023: no ischemia, anterior/inferior scar, normal EF at rest.  CHF sxs stable.  Leg edema remains resolved for now.  Minimal occasional dyspnea.  CAD seems stable.  Angina controlled on current med tx.  No active CP sxs.  Denies CP sxs.  Stable claudication.        Past Medical History:   Diagnosis Date    Abnormal brain MRI 01/21/2018    Chronic microvascular ischemia changes per MRI July 9, 2007 in [facility]    Abnormal ECG 10/31/2013    Abnormal stress test 01/28/2016    Alcohol dependence     States alcohol abuse ended in 1994    AP (angina pectoris) 01/28/2016    Asthma     Bladder cancer     Carotid artery occlusion     Cataract     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    2 - Normal left ventricular systolic function (EF 60-65%).    3 - Normal left ventricular diastolic function.    4 - Mild left atrial enlargement.    5 - Normal right ventricular systolic function .    6 - Trivial to mild aortic regurgitation.    7 - Trivial to mild mitral regurgitation.  10 - Trivial  to mild pulmonic regurgitation.     Hematuria 06/25/2020    History of alcohol abuse 01/22/2018    Patient denies drinking to excess since 1994.    History of atherectomy 01/21/2018 2/2016 [facility]    History of chronic kidney disease 01/22/2018    History of CKD 3    History of PTCA 10/31/2013    History of PTCA 03/09/2016    Hyperlipidemia     Hypertension     Insomnia 06/17/2013    Iron deficiency anemia 10/19/2023    Ischemic cardiomyopathy 10/31/2013    Long term (current) use of antithrombotics/antiplatelets 01/21/2018    Myocardial infarction     Old MI (myocardial infarction) 1994    Peripheral vascular disease     Polyneuropathy     RBBB 10/31/2013    S/P CABG (coronary artery bypass graft) 10/31/2013    Shortness of breath 10/31/2013    Tobacco dependence     resolved    Trouble in sleeping     Type 2 diabetes with peripheral circulatory disorder, controlled     Wears glasses        Current Outpatient Medications:     albuterol (PROVENTIL/VENTOLIN HFA) 90 mcg/actuation inhaler, albuterol sulfate Take No date recorded No form recorded No frequency recorded No route recorded No set duration recorded No set duration amount recorded active No dosage strength recorded No dosage strength units of measure recorded, Disp: , Rfl:     amLODIPine (NORVASC) 10 MG tablet, Take 0.5 tablets (5 mg total) by mouth once daily., Disp: 90 tablet, Rfl: 10    aspirin 81 MG Chew, Take 1 tablet (81 mg total) by mouth once daily. 1 Tablet, Chewable Oral Every day, Disp: 90 tablet, Rfl: 3    atorvastatin (LIPITOR) 40 MG tablet, Take 1 tablet (40 mg total) by mouth every evening., Disp: 90 tablet, Rfl: 3    BLOOD-GLUCOSE METER (TRUERESULT BLOOD GLUCOSE SYSTM MISC), by Misc.(Non-Drug; Combo Route) route., Disp: , Rfl:     doxycycline (VIBRAMYCIN) 100 MG Cap, Take by mouth., Disp: , Rfl:     dulaglutide (TRULICITY) 4.5 mg/0.5 mL pen injector, Inject 4.5 mg into the skin every 7 days. SUNDAY, Disp: , Rfl:     EMBRACE PRO TEST STRIPS  Strp, CHECK BLOOD SUGAR TWICE DAILY., Disp: 50 strip, Rfl: 0    finasteride (PROSCAR) 5 mg tablet, Take 1 tablet (5 mg total) by mouth once daily., Disp: 90 tablet, Rfl: 3    furosemide (LASIX) 40 MG tablet, TAKE 1 PILL IN AM, AND 1/2 PILL IN PM, Disp: 60 tablet, Rfl: 11    glucose 4 GM chewable tablet, Take 16 g by mouth as needed., Disp: , Rfl:     hyoscyamine (LEVSIN/SL) 0.125 mg Subl, Place 2 tablets (0.25 mg total) under the tongue every 4 (four) hours as needed (bladder spasms)., Disp: 30 tablet, Rfl: 1    insulin (LANTUS SOLOSTAR U-100 INSULIN) glargine 100 units/mL (3mL) SubQ pen, Inject 28 Units into the skin once daily., Disp: 27 mL, Rfl: 3    insulin glargine-yfgn 100 unit/mL (3 mL) InPn, INJECT 24 UNITS SUBCUTANEOUSLY EVERY DAY FOR DIABETES, Disp: , Rfl:     isosorbide mononitrate (IMDUR) 60 MG 24 hr tablet, TAKE 1 TABLET ONE TIME DAILY, Disp: 90 tablet, Rfl: 0    LIDOcaine (LIDODERM) 5 %, APPLY 1 PATCH TOPICALLY EVERY DAY FOR PAIN  WEAR FOR 12 HOURS, THEN REMOVE. DO NOT APPLY NEW PATCH FOR AT LEAST 12 HOURS., Disp: , Rfl:     losartan (COZAAR) 100 MG tablet, TAKE 1 TABLET ONE TIME DAILY, Disp: 90 tablet, Rfl: 10    metFORMIN (GLUCOPHAGE-XR) 500 MG ER 24hr tablet, Take 1 tablet (500 mg total) by mouth 2 (two) times daily with meals., Disp: 180 tablet, Rfl: 3    metoprolol succinate (TOPROL-XL) 25 MG 24 hr tablet, TAKE 1 TABLET EVERY EVENING, Disp: 90 tablet, Rfl: 3    miconazole (MICOTIN) 2 % cream, APPLY SMALL AMOUNT TOPICALLY TWICE A DAY AS NEEDED FOR FUNGAL INFECTION, Disp: , Rfl:     nitroGLYCERIN (NITROSTAT) 0.4 MG SL tablet, 0.4 mg., Disp: , Rfl:     solifenacin (VESICARE) 10 MG tablet, TAKE 1 TABLET EVERY DAY, Disp: 90 tablet, Rfl: 0    tamsulosin (FLOMAX) 0.4 mg Cap, Take 1 capsule (0.4 mg total) by mouth once daily., Disp: 90 capsule, Rfl: 3    TRUEPLUS LANCETS 26 gauge Misc, CHECK BLOOD SUGAR TWICE DAILY., Disp: 100 each, Rfl: 0    TRUERESULT BLOOD GLUCOSE SYSTM kit, CHECK BLOOD SUGAR TWICE  "DAILY., Disp: 1 each, Rfl: 0    vitamin D (VITAMIN D3) 1000 units Tab, Take 1 tablet by mouth once daily., Disp: , Rfl:       Review of Systems   Constitutional: Positive for decreased appetite.   HENT: Negative.     Eyes: Negative.    Cardiovascular:  Positive for claudication.   Respiratory:  Positive for shortness of breath.    Endocrine: Negative.    Hematologic/Lymphatic: Negative.    Skin: Negative.    Musculoskeletal:  Positive for arthritis and joint pain.   Gastrointestinal: Negative.    Genitourinary: Negative.    Neurological:  Positive for numbness and weakness.   Psychiatric/Behavioral: Negative.     Allergic/Immunologic: Negative.           /76 (BP Location: Left arm, Patient Position: Sitting, BP Method: Medium (Manual))   Pulse 75   Ht 5' 10" (1.778 m)   Wt 71.4 kg (157 lb 6.5 oz)   SpO2 99%   BMI 22.59 kg/m²     Wt Readings from Last 3 Encounters:   12/11/23 71.4 kg (157 lb 6.5 oz)   11/20/23 70.6 kg (155 lb 10.3 oz)   11/13/23 72 kg (158 lb 11.7 oz)     Temp Readings from Last 3 Encounters:   11/03/23 98.3 °F (36.8 °C)   10/27/23 97.6 °F (36.4 °C)   08/17/23 98.4 °F (36.9 °C) (Temporal)     BP Readings from Last 3 Encounters:   12/11/23 120/76   11/20/23 (!) 100/50   11/13/23 100/60     Pulse Readings from Last 3 Encounters:   12/11/23 75   11/20/23 76   11/13/23 86       Objective:    Physical Exam  Vitals and nursing note reviewed.   Constitutional:       Appearance: He is well-developed.   HENT:      Head: Normocephalic.   Neck:      Thyroid: No thyromegaly.      Vascular: No carotid bruit or JVD.   Cardiovascular:      Rate and Rhythm: Normal rate and regular rhythm.      Pulses:           Radial pulses are 2+ on the right side and 2+ on the left side.      Heart sounds: S1 normal and S2 normal. Heart sounds not distant. No midsystolic click and no opening snap. No murmur heard.     No friction rub. No S3 or S4 sounds.   Pulmonary:      Effort: Pulmonary effort is normal.      Breath " sounds: Normal breath sounds. No wheezing or rales.   Abdominal:      General: Bowel sounds are normal. There is no distension or abdominal bruit.      Palpations: Abdomen is soft. There is no mass.      Tenderness: There is no abdominal tenderness.   Musculoskeletal:      Cervical back: Neck supple.      Right lower leg: No edema.      Left lower leg: No edema.   Skin:     General: Skin is warm.   Neurological:      Mental Status: He is alert and oriented to person, place, and time.   Psychiatric:         Behavior: Behavior normal.         I have reviewed all pertinent labs and cardiac studies.    Component      Latest Ref Rng 11/27/2023   BNP      0 - 99 pg/mL 203 (H)       Legend:  (H) High      Chemistry        Component Value Date/Time     12/08/2023 0944    K 4.5 12/08/2023 0944     12/08/2023 0944    CO2 27 12/08/2023 0944    BUN 28 (H) 12/08/2023 0944    CREATININE 1.6 (H) 12/08/2023 0944     (H) 12/08/2023 0944        Component Value Date/Time    CALCIUM 10.3 12/08/2023 0944    ALKPHOS 78 12/08/2023 0944    AST 21 12/08/2023 0944    ALT 15 12/08/2023 0944    BILITOT 0.4 12/08/2023 0944    ESTGFRAFRICA 58.7 (A) 12/29/2021 0702    EGFRNONAA 50.8 (A) 12/29/2021 0702        Lab Results   Component Value Date    WBC 11.70 12/08/2023    HGB 11.2 (L) 12/08/2023    HCT 36.1 (L) 12/08/2023    MCV 91 12/08/2023     12/08/2023       Lab Results   Component Value Date    HGBA1C 8.6 (H) 05/08/2023     Lab Results   Component Value Date    CHOL 84 (L) 10/09/2023    CHOL 82 (L) 12/08/2021    CHOL 77 (L) 06/17/2020     Lab Results   Component Value Date    HDL 41 10/09/2023    HDL 48 12/08/2021    HDL 45 06/17/2020     Lab Results   Component Value Date    LDLCALC 27.8 (L) 10/09/2023    LDLCALC 18.8 (L) 12/08/2021    LDLCALC 17.4 (L) 06/17/2020     Lab Results   Component Value Date    TRIG 76 10/09/2023    TRIG 76 12/08/2021    TRIG 73 06/17/2020     Lab Results   Component Value Date    CHOLHDL  48.8 10/09/2023    CHOLHDL 58.5 (H) 12/08/2021    CHOLHDL 58.4 (H) 06/17/2020       Results for orders placed during the hospital encounter of 10/25/23    Echo    Interpretation Summary    Left Ventricle: The left ventricle is normal in size. Normal wall thickness. regional wall motion abnormalities present. There is low normal systolic function with a visually estimated ejection fraction of 50 - 55%.    Left Atrium: Left atrium is severely dilated.    Right Ventricle: Normal right ventricular cavity size. Wall thickness is normal. Right ventricle wall motion  is normal. Systolic function is normal.    Right Atrium: Right atrium is dilated.    Mitral Valve: There is mild regurgitation.    Pulmonic Valve: There is mild regurgitation.    Pulmonary Artery: The estimated pulmonary artery systolic pressure is 24 mmHg.    IVC/SVC: Normal venous pressure at 3 mmHg.          Results for orders placed during the hospital encounter of 11/17/23    Nuclear Stress - Cardiology Interpreted    Interpretation Summary    Abnormal myocardial perfusion scan.    There is a moderate to severe intensity, fixed perfusion abnormality consistent with scar in the anterior, inferior and anteroapical wall(s).    No reversible defects noted to suggest coronary ischemia.    The gated perfusion images showed an ejection fraction of 65% at rest. The gated perfusion images showed an ejection fraction of 46% post stress.    The ECG portion of the study is negative for ischemia.    The patient reported no chest pain during the stress test.    During stress, occasional PVCs are noted.        Assessment:       1. Preop cardiovascular exam    2. Chronic combined systolic and diastolic congestive heart failure    3. Abnormal ECG    4. Abnormal nuclear stress test    5. Asymptomatic stenosis of both carotid arteries without infarction    6. Coronary artery disease of bypass graft of native heart with stable angina pectoris    7. Hyperlipidemia associated  with type 2 diabetes mellitus    8. Diabetes mellitus type 2 with peripheral artery disease    9. Nonrheumatic aortic valve insufficiency    10. Ischemic cardiomyopathy    11. RBBB    12. Thoracic aorta atherosclerosis    13. Hypertension associated with diabetes    14. Stage 3a chronic kidney disease    15. Chronic stable angina           Plan:         Stable CV status on current med tx.  CAD: No ischemia on Nov 2023 stress MPI.  Continue GDMT for chronic multivessel CAD.  Preop CV eval: Pt regarded as moderate CV risk for urological surgery and may proceed.  CHF: Compensated.  Continue OMT for CHF.  Continue Lasix 40 mg qam, 20 mg pm.  Pt counseled on low salt diet, < 2 g/day.  Fluid restriction 1.5 liter/day.  PAD: Continue medical tx for PAD.  Carotid disease: medical mgt. F/u carotid u/s in future.  Reviewed all tests and above medical conditions with patient in detail and formulated treatment plan.  Continue optimal medical treatment for cardiovascular conditions.  Cardiac low salt diet advised.  Fall risk precautions discussed.  Maintaining healthy weight and weight loss goals (if needed) were discussed in clinic.  Lipids:Importance of optimal lipid control were discussed in detail as well as possible pharmacologic and lifestyle changes that may be needed.  Continue statin tx.  DM: Optimal HGAIC control advised.  CRI: Monitor.  Abnl ecg: monitor.  Valvular heart disease: monitor w f/u echo in future.    F/u 2 months, sooner if needed.      I have reviewed all pertinent labs and cardiac studies independently. Plans and recommendations have been formulated under my direct supervision. All questions answered and patient voiced understanding.

## 2023-12-11 ENCOUNTER — OFFICE VISIT (OUTPATIENT)
Dept: CARDIOLOGY | Facility: CLINIC | Age: 83
End: 2023-12-11
Payer: MEDICARE

## 2023-12-11 ENCOUNTER — TELEPHONE (OUTPATIENT)
Dept: UROLOGY | Facility: CLINIC | Age: 83
End: 2023-12-11
Payer: MEDICARE

## 2023-12-11 VITALS
SYSTOLIC BLOOD PRESSURE: 120 MMHG | HEIGHT: 70 IN | BODY MASS INDEX: 22.54 KG/M2 | DIASTOLIC BLOOD PRESSURE: 76 MMHG | OXYGEN SATURATION: 99 % | WEIGHT: 157.44 LBS | HEART RATE: 75 BPM

## 2023-12-11 DIAGNOSIS — N18.31 STAGE 3A CHRONIC KIDNEY DISEASE: ICD-10-CM

## 2023-12-11 DIAGNOSIS — I20.89 CHRONIC STABLE ANGINA: ICD-10-CM

## 2023-12-11 DIAGNOSIS — R94.31 ABNORMAL ECG: Chronic | ICD-10-CM

## 2023-12-11 DIAGNOSIS — E11.69 HYPERLIPIDEMIA ASSOCIATED WITH TYPE 2 DIABETES MELLITUS: ICD-10-CM

## 2023-12-11 DIAGNOSIS — I25.708 CORONARY ARTERY DISEASE OF BYPASS GRAFT OF NATIVE HEART WITH STABLE ANGINA PECTORIS: Chronic | ICD-10-CM

## 2023-12-11 DIAGNOSIS — I25.5 ISCHEMIC CARDIOMYOPATHY: Chronic | ICD-10-CM

## 2023-12-11 DIAGNOSIS — I35.1 NONRHEUMATIC AORTIC VALVE INSUFFICIENCY: ICD-10-CM

## 2023-12-11 DIAGNOSIS — Z01.810 PREOP CARDIOVASCULAR EXAM: Primary | ICD-10-CM

## 2023-12-11 DIAGNOSIS — E11.51 DIABETES MELLITUS TYPE 2 WITH PERIPHERAL ARTERY DISEASE: ICD-10-CM

## 2023-12-11 DIAGNOSIS — E78.5 HYPERLIPIDEMIA ASSOCIATED WITH TYPE 2 DIABETES MELLITUS: ICD-10-CM

## 2023-12-11 DIAGNOSIS — I65.23 ASYMPTOMATIC STENOSIS OF BOTH CAROTID ARTERIES WITHOUT INFARCTION: ICD-10-CM

## 2023-12-11 DIAGNOSIS — I15.2 HYPERTENSION ASSOCIATED WITH DIABETES: ICD-10-CM

## 2023-12-11 DIAGNOSIS — I45.10 RBBB: Chronic | ICD-10-CM

## 2023-12-11 DIAGNOSIS — I70.0 THORACIC AORTA ATHEROSCLEROSIS: ICD-10-CM

## 2023-12-11 DIAGNOSIS — R94.39 ABNORMAL NUCLEAR STRESS TEST: ICD-10-CM

## 2023-12-11 DIAGNOSIS — E11.59 HYPERTENSION ASSOCIATED WITH DIABETES: ICD-10-CM

## 2023-12-11 DIAGNOSIS — I50.42 CHRONIC COMBINED SYSTOLIC AND DIASTOLIC CONGESTIVE HEART FAILURE: ICD-10-CM

## 2023-12-11 LAB
ALBUMIN SERPL ELPH-MCNC: 3.58 G/DL (ref 3.35–5.55)
ALPHA1 GLOB SERPL ELPH-MCNC: 0.32 G/DL (ref 0.17–0.41)
ALPHA2 GLOB SERPL ELPH-MCNC: 0.78 G/DL (ref 0.43–0.99)
B-GLOBULIN SERPL ELPH-MCNC: 0.8 G/DL (ref 0.5–1.1)
GAMMA GLOB SERPL ELPH-MCNC: 0.52 G/DL (ref 0.67–1.58)
INTERPRETATION SERPL IFE-IMP: NORMAL
KAPPA LC SER QL IA: 3.48 MG/DL (ref 0.33–1.94)
KAPPA LC/LAMBDA SER IA: 1.08 (ref 0.26–1.65)
LAMBDA LC SER QL IA: 3.22 MG/DL (ref 0.57–2.63)
PROT SERPL-MCNC: 6 G/DL (ref 6–8.4)

## 2023-12-11 PROCEDURE — 99214 OFFICE O/P EST MOD 30 MIN: CPT | Mod: HCNC,S$GLB,, | Performed by: INTERNAL MEDICINE

## 2023-12-11 PROCEDURE — 99214 PR OFFICE/OUTPT VISIT, EST, LEVL IV, 30-39 MIN: ICD-10-PCS | Mod: HCNC,S$GLB,, | Performed by: INTERNAL MEDICINE

## 2023-12-11 PROCEDURE — 3078F DIAST BP <80 MM HG: CPT | Mod: HCNC,CPTII,S$GLB, | Performed by: INTERNAL MEDICINE

## 2023-12-11 PROCEDURE — 1160F PR REVIEW ALL MEDS BY PRESCRIBER/CLIN PHARMACIST DOCUMENTED: ICD-10-PCS | Mod: HCNC,CPTII,S$GLB, | Performed by: INTERNAL MEDICINE

## 2023-12-11 PROCEDURE — 3074F PR MOST RECENT SYSTOLIC BLOOD PRESSURE < 130 MM HG: ICD-10-PCS | Mod: HCNC,CPTII,S$GLB, | Performed by: INTERNAL MEDICINE

## 2023-12-11 PROCEDURE — 3288F PR FALLS RISK ASSESSMENT DOCUMENTED: ICD-10-PCS | Mod: HCNC,CPTII,S$GLB, | Performed by: INTERNAL MEDICINE

## 2023-12-11 PROCEDURE — 99999 PR PBB SHADOW E&M-EST. PATIENT-LVL III: ICD-10-PCS | Mod: PBBFAC,HCNC,, | Performed by: INTERNAL MEDICINE

## 2023-12-11 PROCEDURE — 1101F PT FALLS ASSESS-DOCD LE1/YR: CPT | Mod: HCNC,CPTII,S$GLB, | Performed by: INTERNAL MEDICINE

## 2023-12-11 PROCEDURE — 99999 PR PBB SHADOW E&M-EST. PATIENT-LVL III: CPT | Mod: PBBFAC,HCNC,, | Performed by: INTERNAL MEDICINE

## 2023-12-11 PROCEDURE — 3288F FALL RISK ASSESSMENT DOCD: CPT | Mod: HCNC,CPTII,S$GLB, | Performed by: INTERNAL MEDICINE

## 2023-12-11 PROCEDURE — 3074F SYST BP LT 130 MM HG: CPT | Mod: HCNC,CPTII,S$GLB, | Performed by: INTERNAL MEDICINE

## 2023-12-11 PROCEDURE — 1160F RVW MEDS BY RX/DR IN RCRD: CPT | Mod: HCNC,CPTII,S$GLB, | Performed by: INTERNAL MEDICINE

## 2023-12-11 PROCEDURE — 1101F PR PT FALLS ASSESS DOC 0-1 FALLS W/OUT INJ PAST YR: ICD-10-PCS | Mod: HCNC,CPTII,S$GLB, | Performed by: INTERNAL MEDICINE

## 2023-12-11 PROCEDURE — 1125F AMNT PAIN NOTED PAIN PRSNT: CPT | Mod: HCNC,CPTII,S$GLB, | Performed by: INTERNAL MEDICINE

## 2023-12-11 PROCEDURE — 1159F PR MEDICATION LIST DOCUMENTED IN MEDICAL RECORD: ICD-10-PCS | Mod: HCNC,CPTII,S$GLB, | Performed by: INTERNAL MEDICINE

## 2023-12-11 PROCEDURE — 1159F MED LIST DOCD IN RCRD: CPT | Mod: HCNC,CPTII,S$GLB, | Performed by: INTERNAL MEDICINE

## 2023-12-11 PROCEDURE — 1125F PR PAIN SEVERITY QUANTIFIED, PAIN PRESENT: ICD-10-PCS | Mod: HCNC,CPTII,S$GLB, | Performed by: INTERNAL MEDICINE

## 2023-12-11 PROCEDURE — 3078F PR MOST RECENT DIASTOLIC BLOOD PRESSURE < 80 MM HG: ICD-10-PCS | Mod: HCNC,CPTII,S$GLB, | Performed by: INTERNAL MEDICINE

## 2023-12-11 NOTE — TELEPHONE ENCOUNTER
Pt's daughter stated that cardiologist approved pt for sx. A message was sent to the provider regarding the situation.

## 2023-12-12 ENCOUNTER — TELEPHONE (OUTPATIENT)
Dept: UROLOGY | Facility: CLINIC | Age: 83
End: 2023-12-12
Payer: MEDICARE

## 2023-12-12 LAB
PATHOLOGIST INTERPRETATION IFE: NORMAL
PATHOLOGIST INTERPRETATION SPE: NORMAL

## 2023-12-12 NOTE — TELEPHONE ENCOUNTER
Returned call there was no answer. Appt made for pt per Dr Michael       ----- Message from Hortencia Michael MD sent at 12/11/2023  4:57 PM CST -----  Patient was last seen 6 months ago. Please schedule a follow up.   ----- Message -----  From: Reilly Gupta MA  Sent: 12/11/2023   4:00 PM CST  To: Hortencia Michael MD    Pt's daughter informed us that Pt's cardiologist stated that the pt is clear for sx.    ----- Message -----  From: Juan Lux  Sent: 12/11/2023   2:41 PM CST  To: Alec ALCOCER Staff    .Type:  Needs Medical Advice    Who Called: pt daughter Ai    Would the patient rather a call back or a response via [facility]? Call back  Best Call Back Number:   Additional Information:     Pt daughter stated pt cardiologist gave the go ahead for the surgery please rui cordon pt daughter back

## 2023-12-14 ENCOUNTER — OFFICE VISIT (OUTPATIENT)
Dept: HEMATOLOGY/ONCOLOGY | Facility: CLINIC | Age: 83
End: 2023-12-14
Payer: OTHER GOVERNMENT

## 2023-12-14 VITALS
DIASTOLIC BLOOD PRESSURE: 65 MMHG | BODY MASS INDEX: 22.32 KG/M2 | WEIGHT: 155.88 LBS | HEIGHT: 70 IN | SYSTOLIC BLOOD PRESSURE: 113 MMHG | HEART RATE: 67 BPM

## 2023-12-14 DIAGNOSIS — I15.2 HYPERTENSION ASSOCIATED WITH DIABETES: ICD-10-CM

## 2023-12-14 DIAGNOSIS — D50.9 IRON DEFICIENCY ANEMIA, UNSPECIFIED IRON DEFICIENCY ANEMIA TYPE: ICD-10-CM

## 2023-12-14 DIAGNOSIS — E11.59 HYPERTENSION ASSOCIATED WITH DIABETES: ICD-10-CM

## 2023-12-14 DIAGNOSIS — J02.9 PHARYNGITIS, UNSPECIFIED ETIOLOGY: Primary | ICD-10-CM

## 2023-12-14 DIAGNOSIS — N18.30 CKD STAGE 3 DUE TO TYPE 1 DIABETES MELLITUS: ICD-10-CM

## 2023-12-14 DIAGNOSIS — E10.22 CKD STAGE 3 DUE TO TYPE 1 DIABETES MELLITUS: ICD-10-CM

## 2023-12-14 DIAGNOSIS — J02.9 PHARYNGITIS, UNSPECIFIED ETIOLOGY: ICD-10-CM

## 2023-12-14 DIAGNOSIS — D49.4 BLADDER TUMOR: ICD-10-CM

## 2023-12-14 PROCEDURE — 99214 PR OFFICE/OUTPT VISIT, EST, LEVL IV, 30-39 MIN: ICD-10-PCS | Mod: S$GLB,,, | Performed by: NURSE PRACTITIONER

## 2023-12-14 PROCEDURE — 99214 OFFICE O/P EST MOD 30 MIN: CPT | Mod: S$GLB,,, | Performed by: NURSE PRACTITIONER

## 2023-12-14 PROCEDURE — 99999 PR PBB SHADOW E&M-EST. PATIENT-LVL IV: CPT | Mod: PBBFAC,,, | Performed by: NURSE PRACTITIONER

## 2023-12-14 PROCEDURE — 99214 OFFICE O/P EST MOD 30 MIN: CPT | Mod: PBBFAC,PO | Performed by: NURSE PRACTITIONER

## 2023-12-14 PROCEDURE — 99999 PR PBB SHADOW E&M-EST. PATIENT-LVL IV: ICD-10-PCS | Mod: PBBFAC,,, | Performed by: NURSE PRACTITIONER

## 2023-12-14 RX ORDER — AMOXICILLIN 500 MG/1
500 TABLET, FILM COATED ORAL EVERY 12 HOURS
Qty: 20 TABLET | Refills: 0 | Status: SHIPPED | OUTPATIENT
Start: 2023-12-14 | End: 2023-12-24

## 2023-12-14 RX ORDER — AMOXICILLIN 500 MG/1
500 TABLET, FILM COATED ORAL EVERY 12 HOURS
Qty: 20 TABLET | Refills: 0 | Status: CANCELLED | OUTPATIENT
Start: 2023-12-14 | End: 2023-12-24

## 2023-12-14 NOTE — TELEPHONE ENCOUNTER
----- Message from Bette Salinas sent at 12/14/2023  3:54 PM CST -----  Contact: Pt @ [phone]  Requesting an RX refill or new RX.  Is this a refill or new RX: resend     RX name and strength (copy/paste from chart):    amoxicillin (AMOXIL) 500 MG Tab    Is this a 30 day or 90 day RX:   Pharmacy name and phone # (copy/paste from chart):      [facility] [address] [address] [address]  [address] [address] [address]  Phone: [phone] Fax: [phone]     The doctors have asked that we provide their patients with the following 2 reminders -- prescription refills can take up to 72 hours, and a friendly reminder that in the future you can use your [facility] account to request refills: Yes

## 2023-12-14 NOTE — PROGRESS NOTES
Subjective:      Patient ID: Nithin Pino is a 83 y.o. male.    Chief Complaint: sore throat    HPI:  84 yo male presents today as a new patient for further evaluation and recommendation of iron deficiency anemia.  Is unable to tolerate oral iron due to constipation.  Currently with normocytic anemia.  Hgb 9.7 g/dL.  Ferritin low on outside records located in media section.       Has  bladder cancer 2022 treated  with surgery followed by Dr. Michaelcompleted 6 BCG induction treatments.   6/23/23-Pt s/p TURBT. 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  Is scheduled for repeat bladder surgery on 10/26/2023.  .       Denies any know bleeding in urine.  Did an occult stool sample with no blood found.  Denies blood noses.  Has a history of B12 deficiency.   Not currently taking B12 supplements.      Denies f/c/ns or unintentional weight loss.  Denies any known abnormal lymphadenopathy.     Former smoker - quit smoking in 1994.  Former skoal - quit 15 years   Former heavy drinker - occasional drinker now - quit about 15-18 years ago.     Complains of increased fatigue.    Interval History:  12/14/2023 Found with [facility] and received Feraheme infusions x 2 with last dose received on 11/30/2023.  Presents today to evaluate response.  Hgb improved from 9.7 to 11.2.  Noted elevated granulocyte count today.  Slightly low iron with saturated iron of 19%.  Elevated creatinine 1.6, elevated BUN, elevated  with CKD.  States that he is drinking almost a gallon of water a day.  Denies any urinary symptoms.  On novolog sliding scale increased yesterday  Yesterday Lantus  increase from 10-15 units yesterday.  Hopefully this will decrease bg and improved kidney.       Social History     Socioeconomic History    Marital status:     Number of children: 5   Tobacco Use    Smoking status: Former     Current packs/day: 0.00     Average packs/day: 1.5 packs/day for 40.0 years (60.0 ttl pk-yrs)      Types: Cigarettes     Start date: 1954     Quit date: 1994     Years since quittin.9    Smokeless tobacco: Former     Quit date: 2011    Tobacco comments:     approx date   Substance and Sexual Activity    Alcohol use: Yes     Comment: occasionally; 1-2 cans of beer a month    Drug use: No    Sexual activity: Never     Birth control/protection: None     Social Determinants of Health     Financial Resource Strain: Low Risk  (2022)    Overall Financial Resource Strain (CARDIA)     Difficulty of Paying Living Expenses: Not very hard   Food Insecurity: No Food Insecurity (2022)    Hunger Vital Sign     Worried About Running Out of Food in the Last Year: Never true     Ran Out of Food in the Last Year: Never true   Transportation Needs: No Transportation Needs (2022)    PRAPARE - Transportation     Lack of Transportation (Medical): No     Lack of Transportation (Non-Medical): No   Physical Activity: Inactive (2022)    Exercise Vital Sign     Days of Exercise per Week: 0 days     Minutes of Exercise per Session: 0 min   Stress: No Stress Concern Present (2022)    [employer] - Occupational Stress Questionnaire     Feeling of Stress : Only a little   Social Connections: Socially Isolated (2022)    Social Connection and Isolation Panel [NHANES]     Frequency of Communication with Friends and Family: Twice a week     Frequency of Social Gatherings with Friends and Family: Never     Attends [employer] Services: Never     Active Member of Clubs or Organizations: No     Attends Club or Organization Meetings: Never     Marital Status:    Housing Stability: Low Risk  (2022)    Housing Stability Vital Sign     Unable to Pay for Housing in the Last Year: No     Number of Places Lived in the Last Year: 1     Unstable Housing in the Last Year: No       Family History   Adopted: Yes   Problem Relation Age of Onset    Diabetes Brother     Diabetes  Sister     Cancer Neg Hx     Heart disease Neg Hx     Kidney disease Neg Hx        Past Surgical History:   Procedure Laterality Date    APPENDECTOMY      CARDIAC CATHETERIZATION      2016    CARDIAC SURGERY  1994    balloon angioplasty    CATARACT EXTRACTION W/  INTRAOCULAR LENS IMPLANT Right 02/01/2017    CORONARY ARTERY BYPASS GRAFT  05/2011    per patient x4    HERNIA REPAIR      OPEN REDUCTION AND INTERNAL FIXATION (ORIF) OF INJURY OF HIP      PCIOL Bilateral OD 02/01/17/OS 02/15/17    DR. ALONZO    TURBT (TRANSURETHRAL RESECTION OF BLADDER TUMOR) N/A 6/8/2023    Procedure: TURBT (TRANSURETHRAL RESECTION OF BLADDER TUMOR);  Surgeon: Hortencia Michael MD;  Location: [facility];  Service: Urology;  Laterality: N/A;       Past Medical History:   Diagnosis Date    Abnormal brain MRI 01/21/2018    Chronic microvascular ischemia changes per MRI July 9, 2007 in [facility]    Abnormal ECG 10/31/2013    Abnormal stress test 01/28/2016    Alcohol dependence     States alcohol abuse ended in 1994    AP (angina pectoris) 01/28/2016    Asthma     Bladder cancer     Carotid artery occlusion     Cataract     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    2 - Normal left ventricular systolic function (EF 60-65%).    3 - Normal left ventricular diastolic function.    4 - Mild left atrial enlargement.    5 - Normal right ventricular systolic function .    6 - Trivial to mild aortic regurgitation.    7 - Trivial to mild mitral regurgitation.  10 - Trivial to mild pulmonic regurgitation.      Hematuria 06/25/2020    History of alcohol abuse 01/22/2018    Patient denies drinking to excess since 1994.    History of atherectomy 01/21/2018 2/2016 [facility]    History of chronic kidney disease 01/22/2018    History of CKD 3    History of PTCA 10/31/2013    History of PTCA 03/09/2016    Hyperlipidemia     Hypertension     Insomnia 06/17/2013    Iron deficiency anemia 10/19/2023    Ischemic cardiomyopathy 10/31/2013    Long term (current) use of antithrombotics/antiplatelets 01/21/2018    Myocardial infarction     Old MI (myocardial infarction) 1994    Peripheral vascular disease     Polyneuropathy     RBBB 10/31/2013    S/P CABG (coronary artery bypass graft) 10/31/2013    Shortness of breath 10/31/2013    Tobacco dependence     resolved    Trouble in sleeping     Type 2 diabetes with peripheral circulatory disorder, controlled     Wears glasses        Review of Systems   Constitutional:  Positive for fatigue and fever. Diaphoresis: for one night.  HENT:  Positive for postnasal drip and sore throat. Negative for nosebleeds.    Eyes: Negative.    Respiratory: Negative.     Cardiovascular: Negative.    Gastrointestinal:  Negative for anal bleeding and blood in stool.   Endocrine: Negative.    Genitourinary:  Negative for hematuria.   Musculoskeletal:  Positive for gait problem.   Skin:  Negative for rash and wound.   Allergic/Immunologic: Negative.    Hematological:  Bruises/bleeds easily.   Psychiatric/Behavioral: Negative.            Medication List with Changes/Refills   New Medications    AMOXICILLIN (AMOXIL) 500 MG TAB    Take 1 tablet (500 mg total) by mouth every 12 (twelve) hours. for 10 days   Current Medications    ALBUTEROL (PROVENTIL/VENTOLIN HFA) 90 MCG/ACTUATION INHALER    albuterol sulfate Take No date recorded No form recorded No frequency recorded No route recorded No set duration recorded No set duration amount recorded active No dosage strength recorded No dosage strength units of measure  recorded    AMLODIPINE (NORVASC) 10 MG TABLET    Take 0.5 tablets (5 mg total) by mouth once daily.    ASPIRIN 81 MG CHEW    Take 1 tablet (81 mg total) by mouth once daily. 1 Tablet, Chewable Oral Every day    ATORVASTATIN (LIPITOR) 40 MG TABLET    Take 1 tablet (40 mg total) by mouth every evening.    BLOOD-GLUCOSE METER (TRUERESULT BLOOD GLUCOSE SYSTM MISC)    by Misc.(Non-Drug; Combo Route) route.    DOXYCYCLINE (VIBRAMYCIN) 100 MG CAP    Take by mouth.    DULAGLUTIDE (TRULICITY) 4.5 MG/0.5 ML PEN INJECTOR    Inject 4.5 mg into the skin every 7 days. SUNDAY    EMBRACE PRO TEST STRIPS STRP    CHECK BLOOD SUGAR TWICE DAILY.    FINASTERIDE (PROSCAR) 5 MG TABLET    Take 1 tablet (5 mg total) by mouth once daily.    FUROSEMIDE (LASIX) 40 MG TABLET    TAKE 1 PILL IN AM, AND 1/2 PILL IN PM    GLUCOSE 4 GM CHEWABLE TABLET    Take 16 g by mouth as needed.    HYOSCYAMINE (LEVSIN/SL) 0.125 MG SUBL    Place 2 tablets (0.25 mg total) under the tongue every 4 (four) hours as needed (bladder spasms).    INSULIN (LANTUS SOLOSTAR U-100 INSULIN) GLARGINE 100 UNITS/ML (3ML) SUBQ PEN    Inject 28 Units into the skin once daily.    INSULIN GLARGINE-YFGN 100 UNIT/ML (3 ML) INPN    INJECT 24 UNITS SUBCUTANEOUSLY EVERY DAY FOR DIABETES    ISOSORBIDE MONONITRATE (IMDUR) 60 MG 24 HR TABLET    TAKE 1 TABLET ONE TIME DAILY    LIDOCAINE (LIDODERM) 5 %    APPLY 1 PATCH TOPICALLY EVERY DAY FOR PAIN  WEAR FOR 12 HOURS, THEN REMOVE. DO NOT APPLY NEW PATCH FOR AT LEAST 12 HOURS.    LOSARTAN (COZAAR) 100 MG TABLET    TAKE 1 TABLET ONE TIME DAILY    METFORMIN (GLUCOPHAGE-XR) 500 MG ER 24HR TABLET    Take 1 tablet (500 mg total) by mouth 2 (two) times daily with meals.    METOPROLOL SUCCINATE (TOPROL-XL) 25 MG 24 HR TABLET    TAKE 1 TABLET EVERY EVENING    MICONAZOLE (MICOTIN) 2 % CREAM    APPLY SMALL AMOUNT TOPICALLY TWICE A DAY AS NEEDED FOR FUNGAL INFECTION    NITROGLYCERIN (NITROSTAT) 0.4 MG SL TABLET    0.4 mg.    SOLIFENACIN (VESICARE) 10 MG  TABLET    TAKE 1 TABLET EVERY DAY    TAMSULOSIN (FLOMAX) 0.4 MG CAP    Take 1 capsule (0.4 mg total) by mouth once daily.    TRUEPLUS LANCETS 26 GAUGE MISC    CHECK BLOOD SUGAR TWICE DAILY.    TRUERESULT BLOOD GLUCOSE SYSTM KIT    CHECK BLOOD SUGAR TWICE DAILY.    VITAMIN D (VITAMIN D3) 1000 UNITS TAB    Take 1 tablet by mouth once daily.        Objective:     Vitals:    12/14/23 0818   BP: 113/65   Pulse: 67       Physical Exam  Vitals reviewed.   Constitutional:       Appearance: Normal appearance.   HENT:      Head: Normocephalic and atraumatic.      Right Ear: External ear normal. Decreased hearing noted.      Left Ear: External ear normal. Decreased hearing noted.   Cardiovascular:      Rate and Rhythm: Normal rate and regular rhythm.      Heart sounds: Normal heart sounds, S1 normal and S2 normal.   Pulmonary:      Effort: Pulmonary effort is normal.      Breath sounds: Normal breath sounds.   Abdominal:      General: There is no distension.   Musculoskeletal:         General: Normal range of motion.      Cervical back: Normal range of motion.   Skin:     General: Skin is warm and dry.   Neurological:      General: No focal deficit present.      Mental Status: He is alert and oriented to person, place, and time.      Gait: Gait abnormal.   Psychiatric:         Attention and Perception: Attention and perception normal.         Mood and Affect: Mood and affect normal.         Speech: Speech normal.         Behavior: Behavior normal. Behavior is cooperative.         Thought Content: Thought content normal.         Cognition and Memory: Cognition and memory normal.         Judgment: Judgment normal.         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        Relevant Orders    Basic Metabolic Panel            Lab Results   Component Value Date    WBC 11.70 12/08/2023    RBC 3.98 (L) 12/08/2023    HGB 11.2 (L) 12/08/2023    HCT 36.1 (L) 12/08/2023    MCV 91 12/08/2023    MCH 28.1 12/08/2023    MCHC 31.0 (L) 12/08/2023    RDW 18.7 (H) 12/08/2023     12/08/2023    MPV 11.1 12/08/2023    GRAN 8.4 (H) 12/08/2023    GRAN 71.6 12/08/2023    LYMPH 2.4 12/08/2023    LYMPH 20.5 12/08/2023    MONO 0.6 12/08/2023    MONO 4.7 12/08/2023    EOS 0.3 12/08/2023    BASO 0.02 12/08/2023    EOSINOPHIL 2.6 12/08/2023    BASOPHIL 0.2 12/08/2023      Lab Results   Component Value Date     12/08/2023    K 4.5 12/08/2023     12/08/2023    CO2 27 12/08/2023    BUN 28 (H) 12/08/2023    CREATININE 1.6 (H) 12/08/2023    CALCIUM 10.3 12/08/2023    ANIONGAP 11 12/08/2023    ESTGFRAFRICA 58.7 (A) 12/29/2021    EGFRNONAA 50.8 (A) 12/29/2021     Lab Results   Component Value Date    ALT 15 12/08/2023    AST 21 12/08/2023    ALKPHOS 78 12/08/2023    BILITOT 0.4 12/08/2023     Lab Results   Component Value Date    IRON 56 12/08/2023    TRANSFERRIN 202 12/08/2023    TIBC 299 12/08/2023    FESATURATED 19 (L) 12/08/2023      Lab Results   Component Value Date    FERRITIN 173 12/08/2023     Lab Results   Component Value Date    HNMSRFEO39 >2000 (H) 12/08/2023     Lab Results   Component Value Date    FOLATE 14.0 12/08/2023       Plan:   Pharyngitis, unspecified etiology  -     amoxicillin (AMOXIL) 500 MG Tab; Take 1 tablet (500 mg total) by mouth every 12 (twelve) hours. for 10 days  Dispense: 20 tablet; Refill: 0    Iron deficiency anemia, unspecified iron deficiency anemia type  -     CBC Auto Differential; Future; Expected date: 12/14/2023  -     Basic Metabolic Panel; Future; Expected date:  12/14/2023  -     Ferritin; Future; Expected date: 12/14/2023  -     Iron and TIBC; Future; Expected date: 12/14/2023    Bladder tumor  -     CBC Auto Differential; Future; Expected date: 12/14/2023  -     Basic Metabolic Panel; Future; Expected date: 12/14/2023  -     Ferritin; Future; Expected date: 12/14/2023  -     Iron and TIBC; Future; Expected date: 12/14/2023    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 F/u 6 weeks after Feraheme infusion with labs prior - [facility]   Infusion scheduling note   schedule for Feraheme infusion x 1 at [facility]   Injection scheduling note n/a   Labs   Scheduling:  Preferred lab: [facility]  Lab interval:  cbc, bmp, iron studies   Imaging   N/a   Pharmacy appointment No pharmacy appointment needed      Other referrals       No additional referrals needed  n/a          Collaborating Provider:  Dr. Franklin Ross    Thank You,  Rachael Rivera, FNP-C  Hematology Oncology

## 2024-01-02 ENCOUNTER — TELEPHONE (OUTPATIENT)
Dept: CARDIOLOGY | Facility: CLINIC | Age: 84
End: 2024-01-02
Payer: OTHER GOVERNMENT

## 2024-01-02 NOTE — TELEPHONE ENCOUNTER
Please call pt and schedule f/u appt with Cards mid level asap.    Su Lala Staff  Caller: Tonja/ daughter (Today,  2:19 PM)  .Patients daughter is calling to speak with the nurse regarding questions and concerns . Reports that the patients right leg is swelling and wanting to know if the patient needs to come in or if the patient needs to increase medication . Please give patients daughter a call back at .[phone].     Pt daughter states that pt right leg is swelling Dr. Sadler increased his lasix the last time wants to if he can increase it again. BP been okay no chest patient dizziness or SOB.

## 2024-01-02 NOTE — TELEPHONE ENCOUNTER
Su Boateng Staff  Caller: Tonja/ daughter (Today,  2:19 PM)  .Patients daughter is calling to speak with the nurse regarding questions and concerns . Reports that the patients right leg is swelling and wanting to know if the patient needs to come in or if the patient needs to increase medication . Please give patients daughter a call back at .[phone].    Pt daughter states that pt right leg is swelling Dr. Sadler increased his lasix the last time wants to if he can increase it again. BP been okay no chest patient dizziness or SOB.

## 2024-01-03 ENCOUNTER — TELEPHONE (OUTPATIENT)
Dept: CARDIOLOGY | Facility: CLINIC | Age: 84
End: 2024-01-03
Payer: OTHER GOVERNMENT

## 2024-01-03 NOTE — TELEPHONE ENCOUNTER
Spoke to patient appt Please call pt and schedule f/u appt with Cards mid level asap.    Su Lala Staff  Caller: Tonja/ daughter (Today,  2:19 PM)  .Patients daughter is calling to speak with the nurse regarding questions and concerns . Reports that the patients right leg is swelling and wanting to know if the patient needs to come in or if the patient needs to increase medication . Please give patients daughter a call back at .[phone].     Pt daughter states that pt right leg is swelling Dr. Sadler increased his lasix the last time wants to if he can increase it again. BP been okay no chest patient dizziness or SOB.

## 2024-01-03 NOTE — TELEPHONE ENCOUNTER
Spoke to patient appt Please call pt and schedule f/u appt with Cards mid level asap.     Dr Sadler          Called patient to schedule appt appt scheduled 01/09

## 2024-01-09 ENCOUNTER — OFFICE VISIT (OUTPATIENT)
Dept: CARDIOLOGY | Facility: CLINIC | Age: 84
End: 2024-01-09
Payer: MEDICARE

## 2024-01-09 ENCOUNTER — LAB VISIT (OUTPATIENT)
Dept: LAB | Facility: HOSPITAL | Age: 84
End: 2024-01-09
Payer: MEDICARE

## 2024-01-09 VITALS
HEIGHT: 70 IN | BODY MASS INDEX: 23.07 KG/M2 | OXYGEN SATURATION: 99 % | WEIGHT: 161.19 LBS | SYSTOLIC BLOOD PRESSURE: 130 MMHG | HEART RATE: 84 BPM | DIASTOLIC BLOOD PRESSURE: 64 MMHG

## 2024-01-09 DIAGNOSIS — E11.69 HYPERLIPIDEMIA ASSOCIATED WITH TYPE 2 DIABETES MELLITUS: ICD-10-CM

## 2024-01-09 DIAGNOSIS — I45.10 RBBB: Chronic | ICD-10-CM

## 2024-01-09 DIAGNOSIS — E11.59 HYPERTENSION ASSOCIATED WITH DIABETES: ICD-10-CM

## 2024-01-09 DIAGNOSIS — I50.42 CHRONIC COMBINED SYSTOLIC AND DIASTOLIC CONGESTIVE HEART FAILURE: ICD-10-CM

## 2024-01-09 DIAGNOSIS — I20.89 CHRONIC STABLE ANGINA: ICD-10-CM

## 2024-01-09 DIAGNOSIS — I25.5 ISCHEMIC CARDIOMYOPATHY: Chronic | ICD-10-CM

## 2024-01-09 DIAGNOSIS — I73.9 CLAUDICATION IN PERIPHERAL VASCULAR DISEASE: ICD-10-CM

## 2024-01-09 DIAGNOSIS — I65.23 ASYMPTOMATIC STENOSIS OF BOTH CAROTID ARTERIES WITHOUT INFARCTION: ICD-10-CM

## 2024-01-09 DIAGNOSIS — I50.42 CHRONIC COMBINED SYSTOLIC AND DIASTOLIC CONGESTIVE HEART FAILURE: Primary | ICD-10-CM

## 2024-01-09 DIAGNOSIS — E11.51 DIABETES MELLITUS TYPE 2 WITH PERIPHERAL ARTERY DISEASE: ICD-10-CM

## 2024-01-09 DIAGNOSIS — I15.2 HYPERTENSION ASSOCIATED WITH DIABETES: ICD-10-CM

## 2024-01-09 DIAGNOSIS — R94.39 ABNORMAL NUCLEAR STRESS TEST: ICD-10-CM

## 2024-01-09 DIAGNOSIS — E78.5 HYPERLIPIDEMIA ASSOCIATED WITH TYPE 2 DIABETES MELLITUS: ICD-10-CM

## 2024-01-09 DIAGNOSIS — I70.0 THORACIC AORTA ATHEROSCLEROSIS: ICD-10-CM

## 2024-01-09 DIAGNOSIS — I25.708 CORONARY ARTERY DISEASE OF BYPASS GRAFT OF NATIVE HEART WITH STABLE ANGINA PECTORIS: ICD-10-CM

## 2024-01-09 DIAGNOSIS — I35.1 NONRHEUMATIC AORTIC VALVE INSUFFICIENCY: ICD-10-CM

## 2024-01-09 PROCEDURE — 3078F DIAST BP <80 MM HG: CPT | Mod: HCNC,CPTII,S$GLB,

## 2024-01-09 PROCEDURE — 1125F AMNT PAIN NOTED PAIN PRSNT: CPT | Mod: HCNC,CPTII,S$GLB,

## 2024-01-09 PROCEDURE — 1160F RVW MEDS BY RX/DR IN RCRD: CPT | Mod: HCNC,CPTII,S$GLB,

## 2024-01-09 PROCEDURE — 80048 BASIC METABOLIC PNL TOTAL CA: CPT | Mod: HCNC

## 2024-01-09 PROCEDURE — 36415 COLL VENOUS BLD VENIPUNCTURE: CPT | Mod: HCNC

## 2024-01-09 PROCEDURE — 83880 ASSAY OF NATRIURETIC PEPTIDE: CPT | Mod: HCNC

## 2024-01-09 PROCEDURE — 99214 OFFICE O/P EST MOD 30 MIN: CPT | Mod: HCNC,S$GLB,,

## 2024-01-09 PROCEDURE — 99999 PR PBB SHADOW E&M-EST. PATIENT-LVL IV: CPT | Mod: PBBFAC,HCNC,,

## 2024-01-09 PROCEDURE — 3288F FALL RISK ASSESSMENT DOCD: CPT | Mod: HCNC,CPTII,S$GLB,

## 2024-01-09 PROCEDURE — 1159F MED LIST DOCD IN RCRD: CPT | Mod: HCNC,CPTII,S$GLB,

## 2024-01-09 PROCEDURE — 3075F SYST BP GE 130 - 139MM HG: CPT | Mod: HCNC,CPTII,S$GLB,

## 2024-01-09 PROCEDURE — 1101F PT FALLS ASSESS-DOCD LE1/YR: CPT | Mod: HCNC,CPTII,S$GLB,

## 2024-01-09 RX ORDER — AMOXICILLIN 500 MG/1
CAPSULE ORAL EVERY 12 HOURS
COMMUNITY
Start: 2023-12-14 | End: 2024-02-13

## 2024-01-09 RX ORDER — MULTIVIT WITH MINERALS/HERBS
1 TABLET ORAL DAILY
COMMUNITY

## 2024-01-09 RX ORDER — LANOLIN ALCOHOL/MO/W.PET/CERES
100 CREAM (GRAM) TOPICAL DAILY
COMMUNITY

## 2024-01-09 NOTE — PROGRESS NOTES
Subjective:   Patient ID:  Nithin Pino is a 83 y.o. male who presents for evaluation of No chief complaint on file.      HPI 84y/o male with  CAD s/p CABG 5/11, RBBB, bladder cancer, diastolic CHF, ICM, MR, TR, AI, CRI, carotid artery disease, COPD, HTN, PAD, DM. Followed by Dr. Sadler in cards clinic here today for CV follow up. C/o LE edema worse in RLE, relieved with elevation, denies any SOB or CP at this time. Denies any pain with walking. No edema noted on exam in clinic today. BP stable on exam, compliant with medications    Nuclear stress test Nov 2023: no ischemia, anterior/inferior scar, normal EF at rest.   Echo Oct 2023: EF 50%, DD, LVH, WMA, mild MR, normal PAP.  B LE venous u/s Oct 2023: no DVT.  Past Medical History:   Diagnosis Date    Abnormal brain MRI 01/21/2018    Chronic microvascular ischemia changes per MRI July 9, 2007 in [facility]    Abnormal ECG 10/31/2013    Abnormal stress test 01/28/2016    Alcohol dependence     States alcohol abuse ended in 1994    AP (angina pectoris) 01/28/2016    Asthma     Bladder cancer     Carotid artery occlusion     Cataract     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    2 - Normal left ventricular systolic function (EF 60-65%).    3 - Normal left ventricular diastolic function.    4 - Mild left atrial enlargement.    5 - Normal right ventricular systolic function .    6 - Trivial to mild aortic regurgitation.    7 - Trivial to mild mitral regurgitation.  10 - Trivial to mild pulmonic regurgitation.     Hematuria  2020    History of alcohol abuse 2018    Patient denies drinking to excess since .    History of atherectomy 2018 [facility]    History of chronic kidney disease 2018    History of CKD 3    History of PTCA 10/31/2013    History of PTCA 2016    Hyperlipidemia     Hypertension     Insomnia 2013    Iron deficiency anemia 10/19/2023    Ischemic cardiomyopathy 10/31/2013    Long term (current) use of antithrombotics/antiplatelets 2018    Myocardial infarction     Old MI (myocardial infarction)     Peripheral vascular disease     Polyneuropathy     RBBB 10/31/2013    S/P CABG (coronary artery bypass graft) 10/31/2013    Shortness of breath 10/31/2013    Tobacco dependence     resolved    Trouble in sleeping     Type 2 diabetes with peripheral circulatory disorder, controlled     Wears glasses        Past Surgical History:   Procedure Laterality Date    APPENDECTOMY      CARDIAC CATHETERIZATION          CARDIAC SURGERY      balloon angioplasty    CATARACT EXTRACTION W/  INTRAOCULAR LENS IMPLANT Right 2017    CORONARY ARTERY BYPASS GRAFT  2011    per patient x4    HERNIA REPAIR      OPEN REDUCTION AND INTERNAL FIXATION (ORIF) OF INJURY OF HIP      PCIOL Bilateral OD 17/OS 02/15/17    DR. ALONZO    TURBT (TRANSURETHRAL RESECTION OF BLADDER TUMOR) N/A 2023    Procedure: TURBT (TRANSURETHRAL RESECTION OF BLADDER TUMOR);  Surgeon: Hortencia Michael MD;  Location: [facility];  Service: Urology;  Laterality: N/A;       Social History     Tobacco Use    Smoking status: Former     Current packs/day: 0.00     Average packs/day: 1.5 packs/day for 40.0 years (60.0 ttl pk-yrs)     Types: Cigarettes     Start date: 1954     Quit date: 1994     Years since quittin.0    Smokeless tobacco: Former     Quit date: 2011    Tobacco comments:     approx date   Substance Use Topics    Alcohol use: Yes     Comment: occasionally; 1-2 cans of beer a month     Drug use: No       Family History   Adopted: Yes   Problem Relation Age of Onset    Diabetes Brother     Diabetes Sister     Cancer Neg Hx     Heart disease Neg Hx     Kidney disease Neg Hx        Current Outpatient Medications on File Prior to Visit   Medication Sig Dispense Refill    amLODIPine (NORVASC) 10 MG tablet Take 0.5 tablets (5 mg total) by mouth once daily. 90 tablet 10    amoxicillin (AMOXIL) 500 MG capsule Take by mouth every 12 (twelve) hours.      aspirin 81 MG Chew Take 1 tablet (81 mg total) by mouth once daily. 1 Tablet, Chewable Oral Every day 90 tablet 3    atorvastatin (LIPITOR) 40 MG tablet Take 1 tablet (40 mg total) by mouth every evening. 90 tablet 3    b complex vitamins tablet Take 1 tablet by mouth once daily.      BLOOD-GLUCOSE METER (TRUERESULT BLOOD GLUCOSE SYSTM MISC) by Misc.(Non-Drug; Combo Route) route.      cyanocobalamin (VITAMIN B-12) 1000 MCG tablet Take 100 mcg by mouth once daily.      dulaglutide (TRULICITY) 4.5 mg/0.5 mL pen injector Inject 4.5 mg into the skin every 7 days. SUNDAY      EMBRACE PRO TEST STRIPS Strp CHECK BLOOD SUGAR TWICE DAILY. 50 strip 0    finasteride (PROSCAR) 5 mg tablet Take 1 tablet (5 mg total) by mouth once daily. 90 tablet 3    furosemide (LASIX) 40 MG tablet TAKE 1 PILL IN AM, AND 1/2 PILL IN PM 60 tablet 11    glucose 4 GM chewable tablet Take 16 g by mouth as needed.      insulin (LANTUS SOLOSTAR U-100 INSULIN) glargine 100 units/mL (3mL) SubQ pen Inject 28 Units into the skin once daily. 27 mL 3    insulin glargine-yfgn 100 unit/mL (3 mL) InPn INJECT 24 UNITS SUBCUTANEOUSLY EVERY DAY FOR DIABETES      isosorbide mononitrate (IMDUR) 60 MG 24 hr tablet TAKE 1 TABLET ONE TIME DAILY 90 tablet 0    LIDOcaine (LIDODERM) 5 % APPLY 1 PATCH TOPICALLY EVERY DAY FOR PAIN  WEAR FOR 12 HOURS, THEN REMOVE. DO NOT APPLY NEW PATCH FOR AT LEAST 12 HOURS.      losartan (COZAAR) 100 MG tablet TAKE 1 TABLET ONE TIME DAILY 90 tablet 10    metFORMIN  (GLUCOPHAGE-XR) 500 MG ER 24hr tablet Take 1 tablet (500 mg total) by mouth 2 (two) times daily with meals. 180 tablet 3    metoprolol succinate (TOPROL-XL) 25 MG 24 hr tablet TAKE 1 TABLET EVERY EVENING 90 tablet 3    miconazole (MICOTIN) 2 % cream APPLY SMALL AMOUNT TOPICALLY TWICE A DAY AS NEEDED FOR FUNGAL INFECTION      multivit-minerals/folic acid (DIABETIC MULTIVITAMIN ORAL) Take by mouth.      nitroGLYCERIN (NITROSTAT) 0.4 MG SL tablet 0.4 mg.      tamsulosin (FLOMAX) 0.4 mg Cap Take 1 capsule (0.4 mg total) by mouth once daily. 90 capsule 3    TRUEPLUS LANCETS 26 gauge Misc CHECK BLOOD SUGAR TWICE DAILY. 100 each 0    TRUERESULT BLOOD GLUCOSE SYSTM kit CHECK BLOOD SUGAR TWICE DAILY. 1 each 0    vitamin D (VITAMIN D3) 1000 units Tab Take 1 tablet by mouth once daily.      hyoscyamine (LEVSIN/SL) 0.125 mg Subl Place 2 tablets (0.25 mg total) under the tongue every 4 (four) hours as needed (bladder spasms). (Patient not taking: Reported on 1/9/2024) 30 tablet 1    [DISCONTINUED] albuterol (PROVENTIL/VENTOLIN HFA) 90 mcg/actuation inhaler albuterol sulfate Take No date recorded No form recorded No frequency recorded No route recorded No set duration recorded No set duration amount recorded active No dosage strength recorded No dosage strength units of measure recorded      [DISCONTINUED] doxycycline (VIBRAMYCIN) 100 MG Cap Take by mouth.      [DISCONTINUED] solifenacin (VESICARE) 10 MG tablet TAKE 1 TABLET EVERY DAY 90 tablet 0     No current facility-administered medications on file prior to visit.      Wt Readings from Last 3 Encounters:   01/09/24 73.1 kg (161 lb 2.5 oz)   12/14/23 70.7 kg (155 lb 13.8 oz)   12/11/23 71.4 kg (157 lb 6.5 oz)     Temp Readings from Last 3 Encounters:   11/03/23 98.3 °F (36.8 °C)   10/27/23 97.6 °F (36.4 °C)   08/17/23 98.4 °F (36.9 °C) (Temporal)     BP Readings from Last 3 Encounters:   01/09/24 130/64   12/14/23 113/65   12/11/23 120/76     Pulse Readings from Last 3  Encounters:   01/09/24 84   12/14/23 67   12/11/23 75        Review of Systems   Constitutional: Negative.   HENT: Negative.     Eyes: Negative.    Cardiovascular:  Positive for leg swelling.   Respiratory: Negative.     Skin: Negative.    Musculoskeletal: Negative.    Gastrointestinal: Negative.    Genitourinary: Negative.    Neurological: Negative.    Psychiatric/Behavioral: Negative.         Objective:   Physical Exam  Vitals and nursing note reviewed.   Constitutional:       Appearance: Normal appearance.   HENT:      Head: Normocephalic and atraumatic.   Eyes:      General:         Right eye: No discharge.         Left eye: No discharge.      Pupils: Pupils are equal, round, and reactive to light.   Cardiovascular:      Rate and Rhythm: Normal rate and regular rhythm.      Heart sounds: S1 normal and S2 normal. No murmur heard.     No friction rub.   Pulmonary:      Effort: Pulmonary effort is normal. No respiratory distress.      Breath sounds: Normal breath sounds. No rales.   Abdominal:      Palpations: Abdomen is soft.      Tenderness: There is no abdominal tenderness.   Musculoskeletal:      Cervical back: Neck supple.      Right lower leg: No edema.      Left lower leg: No edema.   Skin:     General: Skin is warm and dry.   Neurological:      General: No focal deficit present.      Mental Status: He is alert and oriented to person, place, and time.   Psychiatric:         Mood and Affect: Mood normal.         Behavior: Behavior normal.         Thought Content: Thought content normal.         Lab Results   Component Value Date    CHOL 84 (L) 10/09/2023    CHOL 82 (L) 12/08/2021    CHOL 77 (L) 06/17/2020     Lab Results   Component Value Date    HDL 41 10/09/2023    HDL 48 12/08/2021    HDL 45 06/17/2020     Lab Results   Component Value Date    LDLCALC 27.8 (L) 10/09/2023    LDLCALC 18.8 (L) 12/08/2021    LDLCALC 17.4 (L) 06/17/2020     Lab Results   Component Value Date    TRIG 76 10/09/2023    TRIG 76  12/08/2021    TRIG 73 06/17/2020     Lab Results   Component Value Date    CHOLHDL 48.8 10/09/2023    CHOLHDL 58.5 (H) 12/08/2021    CHOLHDL 58.4 (H) 06/17/2020       Chemistry        Component Value Date/Time     12/08/2023 0944    K 4.5 12/08/2023 0944     12/08/2023 0944    CO2 27 12/08/2023 0944    BUN 28 (H) 12/08/2023 0944    CREATININE 1.6 (H) 12/08/2023 0944     (H) 12/08/2023 0944        Component Value Date/Time    CALCIUM 10.3 12/08/2023 0944    ALKPHOS 78 12/08/2023 0944    AST 21 12/08/2023 0944    ALT 15 12/08/2023 0944    BILITOT 0.4 12/08/2023 0944    ESTGFRAFRICA 58.7 (A) 12/29/2021 0702    EGFRNONAA 50.8 (A) 12/29/2021 0702          Lab Results   Component Value Date    TSH 2.269 09/06/2018     Lab Results   Component Value Date    INR 1.0 01/29/2016    INR 1.5 (H) 06/07/2011    INR 1.5 (H) 05/20/2011     @RESUFAST(WBC,HGB,HCT,MCV,PLT)  @LABRCNTIP(BNP,BNPTRIAGEBLO)@  CrCl cannot be calculated (Patient's most recent lab result is older than the maximum 7 days allowed.).     Results for orders placed during the hospital encounter of 10/25/23    Echo    Interpretation Summary    Left Ventricle: The left ventricle is normal in size. Normal wall thickness. regional wall motion abnormalities present. There is low normal systolic function with a visually estimated ejection fraction of 50 - 55%.    Left Atrium: Left atrium is severely dilated.    Right Ventricle: Normal right ventricular cavity size. Wall thickness is normal. Right ventricle wall motion  is normal. Systolic function is normal.    Right Atrium: Right atrium is dilated.    Mitral Valve: There is mild regurgitation.    Pulmonic Valve: There is mild regurgitation.    Pulmonary Artery: The estimated pulmonary artery systolic pressure is 24 mmHg.    IVC/SVC: Normal venous pressure at 3 mmHg.     Results for orders placed during the hospital encounter of 11/17/23    Nuclear Stress - Cardiology Interpreted    Interpretation  Summary    Abnormal myocardial perfusion scan.    There is a moderate to severe intensity, fixed perfusion abnormality consistent with scar in the anterior, inferior and anteroapical wall(s).    No reversible defects noted to suggest coronary ischemia.    The gated perfusion images showed an ejection fraction of 65% at rest. The gated perfusion images showed an ejection fraction of 46% post stress.    The ECG portion of the study is negative for ischemia.    The patient reported no chest pain during the stress test.    During stress, occasional PVCs are noted.     Assessment:      1. RBBB    2. Chronic combined systolic and diastolic congestive heart failure    3. Thoracic aorta atherosclerosis    4. Coronary artery disease of bypass graft of native heart with stable angina pectoris    5. Ischemic cardiomyopathy    6. Hypertension associated with diabetes    7. Claudication in peripheral vascular disease    8. Hyperlipidemia associated with type 2 diabetes mellitus    9. Asymptomatic stenosis of both carotid arteries without infarction    10. Chronic stable angina    11. Abnormal nuclear stress test    12. Nonrheumatic aortic valve insufficiency    13. Diabetes mellitus type 2 with peripheral artery disease        Plan:   RBBB    Chronic combined systolic and diastolic congestive heart failure    Thoracic aorta atherosclerosis    Coronary artery disease of bypass graft of native heart with stable angina pectoris    Ischemic cardiomyopathy    Hypertension associated with diabetes    Claudication in peripheral vascular disease    Hyperlipidemia associated with type 2 diabetes mellitus    Asymptomatic stenosis of both carotid arteries without infarction    Chronic stable angina    Abnormal nuclear stress test    Nonrheumatic aortic valve insufficiency    Diabetes mellitus type 2 with peripheral artery disease      Continue current CV medications  Risk factor modifications   Daily exercise as tolerated  Low-sodium  low-fat diet     Return to clinic in 3 months or sooner if needed    We will get labs today with phone review.    Courtney Guillot, FNP-C [facility], Cardiology

## 2024-01-10 ENCOUNTER — TELEPHONE (OUTPATIENT)
Dept: INFUSION THERAPY | Facility: HOSPITAL | Age: 84
End: 2024-01-10
Payer: OTHER GOVERNMENT

## 2024-01-10 DIAGNOSIS — D63.8 ANEMIA IN OTHER CHRONIC DISEASES CLASSIFIED ELSEWHERE: Primary | ICD-10-CM

## 2024-01-10 LAB
ANION GAP SERPL CALC-SCNC: 7 MMOL/L (ref 8–16)
BNP SERPL-MCNC: 91 PG/ML (ref 0–99)
BUN SERPL-MCNC: 28 MG/DL (ref 8–23)
CALCIUM SERPL-MCNC: 9.5 MG/DL (ref 8.7–10.5)
CHLORIDE SERPL-SCNC: 105 MMOL/L (ref 95–110)
CO2 SERPL-SCNC: 24 MMOL/L (ref 23–29)
CREAT SERPL-MCNC: 1.5 MG/DL (ref 0.5–1.4)
EST. GFR  (NO RACE VARIABLE): 45.6 ML/MIN/1.73 M^2
GLUCOSE SERPL-MCNC: 372 MG/DL (ref 70–110)
POTASSIUM SERPL-SCNC: 4.7 MMOL/L (ref 3.5–5.1)
SODIUM SERPL-SCNC: 136 MMOL/L (ref 136–145)

## 2024-01-10 RX ORDER — HEPARIN 100 UNIT/ML
500 SYRINGE INTRAVENOUS
Status: CANCELLED | OUTPATIENT
Start: 2024-01-10

## 2024-01-10 RX ORDER — EPINEPHRINE 0.3 MG/.3ML
0.3 INJECTION SUBCUTANEOUS ONCE AS NEEDED
Status: CANCELLED | OUTPATIENT
Start: 2024-01-10

## 2024-01-10 RX ORDER — SODIUM CHLORIDE 0.9 % (FLUSH) 0.9 %
10 SYRINGE (ML) INJECTION
Status: CANCELLED | OUTPATIENT
Start: 2024-01-10

## 2024-01-10 RX ORDER — DIPHENHYDRAMINE HYDROCHLORIDE 50 MG/ML
50 INJECTION, SOLUTION INTRAMUSCULAR; INTRAVENOUS ONCE AS NEEDED
Status: CANCELLED | OUTPATIENT
Start: 2024-01-10

## 2024-01-10 NOTE — TELEPHONE ENCOUNTER
Spoke with Ms. Cloud and informed her to the message below from Pre-Service. Infusion notified. Ms. Cloud stated understanding and will notify Mr. Pino.

## 2024-01-11 ENCOUNTER — TELEPHONE (OUTPATIENT)
Dept: CARDIOLOGY | Facility: CLINIC | Age: 84
End: 2024-01-11
Payer: OTHER GOVERNMENT

## 2024-01-11 NOTE — TELEPHONE ENCOUNTER
Attempted to contact pt to inform him Fluid marker was normal and kidney function stable from previous. Cont current medications. No answer, LVM    ----- Message from Maylin Day NP sent at 1/10/2024  7:47 PM CST -----  Fluid marker was normal and kidney function stable from previous. Cont current medications

## 2024-01-17 ENCOUNTER — OFFICE VISIT (OUTPATIENT)
Dept: UROLOGY | Facility: CLINIC | Age: 84
End: 2024-01-17
Payer: MEDICARE

## 2024-01-17 VITALS
SYSTOLIC BLOOD PRESSURE: 132 MMHG | DIASTOLIC BLOOD PRESSURE: 70 MMHG | HEIGHT: 70 IN | HEART RATE: 67 BPM | RESPIRATION RATE: 18 BRPM | BODY MASS INDEX: 22.94 KG/M2 | OXYGEN SATURATION: 99 % | WEIGHT: 160.25 LBS

## 2024-01-17 DIAGNOSIS — Z01.818 PRE-OP TESTING: Primary | ICD-10-CM

## 2024-01-17 DIAGNOSIS — I25.708 CORONARY ARTERY DISEASE OF BYPASS GRAFT OF NATIVE HEART WITH STABLE ANGINA PECTORIS: ICD-10-CM

## 2024-01-17 DIAGNOSIS — C67.8 MALIGNANT NEOPLASM OF OVERLAPPING SITES OF BLADDER: ICD-10-CM

## 2024-01-17 DIAGNOSIS — E11.51 DIABETES MELLITUS TYPE 2 WITH PERIPHERAL ARTERY DISEASE: ICD-10-CM

## 2024-01-17 LAB — POC RESIDUAL URINE VOLUME: 29 ML (ref 0–100)

## 2024-01-17 PROCEDURE — 99214 OFFICE O/P EST MOD 30 MIN: CPT | Mod: S$GLB,,, | Performed by: UROLOGY

## 2024-01-17 PROCEDURE — 3078F DIAST BP <80 MM HG: CPT | Mod: CPTII,S$GLB,, | Performed by: UROLOGY

## 2024-01-17 PROCEDURE — 3288F FALL RISK ASSESSMENT DOCD: CPT | Mod: CPTII,S$GLB,, | Performed by: UROLOGY

## 2024-01-17 PROCEDURE — 99999 PR PBB SHADOW E&M-EST. PATIENT-LVL V: CPT | Mod: PBBFAC,HCNC,, | Performed by: UROLOGY

## 2024-01-17 PROCEDURE — 1101F PT FALLS ASSESS-DOCD LE1/YR: CPT | Mod: CPTII,S$GLB,, | Performed by: UROLOGY

## 2024-01-17 PROCEDURE — 51798 US URINE CAPACITY MEASURE: CPT | Mod: S$GLB,,, | Performed by: UROLOGY

## 2024-01-17 PROCEDURE — 1159F MED LIST DOCD IN RCRD: CPT | Mod: CPTII,S$GLB,, | Performed by: UROLOGY

## 2024-01-17 PROCEDURE — 1126F AMNT PAIN NOTED NONE PRSNT: CPT | Mod: CPTII,S$GLB,, | Performed by: UROLOGY

## 2024-01-17 PROCEDURE — 3075F SYST BP GE 130 - 139MM HG: CPT | Mod: CPTII,S$GLB,, | Performed by: UROLOGY

## 2024-01-17 RX ORDER — CIPROFLOXACIN 2 MG/ML
400 INJECTION, SOLUTION INTRAVENOUS
Status: CANCELLED | OUTPATIENT
Start: 2024-01-17

## 2024-01-17 NOTE — PROGRESS NOTES
CC:bladder cancer    History of Present Illness:   Nithin Pino is a 83 y.o. male here for follow up.     1/17/24-Pt now cleared for TURBT by cardiology. No gross hematuria. No dysuria. No stranguria. Pt states that the BCG treatment are painful and asks about the natural history of bladder cancer, should he elect against treatment.   9/26/23: pt here for surveillance cysto. Pt completed 6 BCG induction treatments.   6/23/23-Pt s/p TURBT. 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 No gross hematuria or dysuria. No weakness or fever. States that he is urinating well.   5/15/23- pt here for cysto. CT scan personally reviewed, showing a polypoid tumor at the left bladder wall, enhancing.   4/10/23-81yo male, here today for follow up, last seen in 2021. At that time, here was being treated for BPH with finasteride and tamsulosin and OAB with vesicare. He did have gross hematuria, but refused cystoscopy. He reports that he was seen in the ED at [facility] yesterday with dysuria and gross hematuria. Was prescribed levaquin. States that he hasn't started the levaquin yet. Saw a little blood this am. No fever. Stream is strong. Still on finasteride, vesicare and flomax. No difficulty emptying--states it was checked in the ED yesterday and he emptied completely.     Pt's records from  clinic with Dr. Sanchez reviewed from 12/22/22, noting cytology suspicious for high grade bladder cancer.       Review of Systems   Constitutional:  Negative for chills and fever.   Respiratory:  Negative for shortness of breath.    Cardiovascular:  Negative for chest pain.   Gastrointestinal:  Negative for abdominal pain.   Genitourinary:  Negative for flank pain.   Musculoskeletal:  Positive for arthralgias. Negative for back pain.   All other systems reviewed and are negative.        Past Medical History:   Diagnosis Date    Abnormal brain MRI 01/21/2018    Chronic microvascular ischemia changes per MRI July 9,  2007 in [facility]    Abnormal ECG 10/31/2013    Abnormal stress test 01/28/2016    Alcohol dependence     States alcohol abuse ended in 1994    AP (angina pectoris) 01/28/2016    Asthma     Bladder cancer     Carotid artery occlusion     Cataract     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    2 - Normal left ventricular systolic function (EF 60-65%).    3 - Normal left ventricular diastolic function.    4 - Mild left atrial enlargement.    5 - Normal right ventricular systolic function .    6 - Trivial to mild aortic regurgitation.    7 - Trivial to mild mitral regurgitation.  10 - Trivial to mild pulmonic regurgitation.     Hematuria 06/25/2020    History of alcohol abuse 01/22/2018    Patient denies drinking to excess since 1994.    History of atherectomy 01/21/2018 2/2016 [facility]    History of chronic kidney disease 01/22/2018    History of CKD 3    History of PTCA 10/31/2013    History of PTCA 03/09/2016    Hyperlipidemia     Hypertension     Insomnia 06/17/2013    Iron deficiency anemia 10/19/2023    Ischemic cardiomyopathy 10/31/2013    Long term (current) use of antithrombotics/antiplatelets 01/21/2018    Myocardial infarction     Old MI (myocardial infarction) 1994    Peripheral vascular disease     Polyneuropathy     RBBB 10/31/2013    S/P CABG (coronary artery bypass graft) 10/31/2013    Shortness of breath 10/31/2013    Tobacco dependence     resolved    Trouble in sleeping     Type 2 diabetes with peripheral circulatory disorder, controlled     Wears glasses         Past Surgical History:   Procedure Laterality Date    APPENDECTOMY      CARDIAC CATHETERIZATION      2016    CARDIAC SURGERY      balloon angioplasty    CATARACT EXTRACTION W/  INTRAOCULAR LENS IMPLANT Right 2017    CORONARY ARTERY BYPASS GRAFT  2011    per patient x4    HERNIA REPAIR      OPEN REDUCTION AND INTERNAL FIXATION (ORIF) OF INJURY OF HIP      PCIOL Bilateral OD 17/OS 02/15/17    DR. ALONZO    TURBT (TRANSURETHRAL RESECTION OF BLADDER TUMOR) N/A 2023    Procedure: TURBT (TRANSURETHRAL RESECTION OF BLADDER TUMOR);  Surgeon: Hortencia Michael MD;  Location: [facility];  Service: Urology;  Laterality: N/A;       Family History   Adopted: Yes   Problem Relation Age of Onset    Diabetes Brother     Diabetes Sister     Cancer Neg Hx     Heart disease Neg Hx     Kidney disease Neg Hx        Social History     Tobacco Use    Smoking status: Former     Current packs/day: 0.00     Average packs/day: 1.5 packs/day for 40.0 years (60.0 ttl pk-yrs)     Types: Cigarettes     Start date: 1954     Quit date: 1994     Years since quittin.0    Smokeless tobacco: Former     Quit date: 2011    Tobacco comments:     approx date   Substance Use Topics    Alcohol use: Yes     Comment: occasionally; 1-2 cans of beer a month    Drug use: No       Current Outpatient Medications   Medication Sig Dispense Refill    amLODIPine (NORVASC) 10 MG tablet Take 0.5 tablets (5 mg total) by mouth once daily. 90 tablet 10    amoxicillin (AMOXIL) 500 MG capsule Take by mouth every 12 (twelve) hours.      aspirin 81 MG Chew Take 1 tablet (81 mg total) by mouth once daily. 1 Tablet, Chewable Oral Every day 90 tablet 3    atorvastatin (LIPITOR) 40 MG tablet Take 1 tablet (40 mg total) by mouth every evening. 90 tablet 3    b complex vitamins tablet Take 1 tablet by mouth once daily.      BLOOD-GLUCOSE METER (TRUERESULT BLOOD GLUCOSE SYSTM MISC) by Misc.(Non-Drug; Combo Route) route.      cyanocobalamin  (VITAMIN B-12) 1000 MCG tablet Take 100 mcg by mouth once daily.      dulaglutide (TRULICITY) 4.5 mg/0.5 mL pen injector Inject 4.5 mg into the skin every 7 days. SUNDAY      EMBRACE PRO TEST STRIPS Strp CHECK BLOOD SUGAR TWICE DAILY. 50 strip 0    finasteride (PROSCAR) 5 mg tablet Take 1 tablet (5 mg total) by mouth once daily. 90 tablet 3    furosemide (LASIX) 40 MG tablet TAKE 1 PILL IN AM, AND 1/2 PILL IN PM 60 tablet 11    glucose 4 GM chewable tablet Take 16 g by mouth as needed.      hyoscyamine (LEVSIN/SL) 0.125 mg Subl Place 2 tablets (0.25 mg total) under the tongue every 4 (four) hours as needed (bladder spasms). 30 tablet 1    insulin (LANTUS SOLOSTAR U-100 INSULIN) glargine 100 units/mL (3mL) SubQ pen Inject 28 Units into the skin once daily. 27 mL 3    insulin glargine-yfgn 100 unit/mL (3 mL) InPn INJECT 24 UNITS SUBCUTANEOUSLY EVERY DAY FOR DIABETES      isosorbide mononitrate (IMDUR) 60 MG 24 hr tablet TAKE 1 TABLET ONE TIME DAILY 90 tablet 0    LIDOcaine (LIDODERM) 5 % APPLY 1 PATCH TOPICALLY EVERY DAY FOR PAIN  WEAR FOR 12 HOURS, THEN REMOVE. DO NOT APPLY NEW PATCH FOR AT LEAST 12 HOURS.      losartan (COZAAR) 100 MG tablet TAKE 1 TABLET ONE TIME DAILY 90 tablet 10    metFORMIN (GLUCOPHAGE-XR) 500 MG ER 24hr tablet Take 1 tablet (500 mg total) by mouth 2 (two) times daily with meals. 180 tablet 3    metoprolol succinate (TOPROL-XL) 25 MG 24 hr tablet TAKE 1 TABLET EVERY EVENING 90 tablet 3    miconazole (MICOTIN) 2 % cream APPLY SMALL AMOUNT TOPICALLY TWICE A DAY AS NEEDED FOR FUNGAL INFECTION      multivit-minerals/folic acid (DIABETIC MULTIVITAMIN ORAL) Take by mouth.      nitroGLYCERIN (NITROSTAT) 0.4 MG SL tablet 0.4 mg.      tamsulosin (FLOMAX) 0.4 mg Cap Take 1 capsule (0.4 mg total) by mouth once daily. 90 capsule 3    TRUEPLUS LANCETS 26 gauge Misc CHECK BLOOD SUGAR TWICE DAILY. 100 each 0    TRUERESULT BLOOD GLUCOSE SYSTM kit CHECK BLOOD SUGAR TWICE DAILY. 1 each 0    vitamin D (VITAMIN  D3) 1000 units Tab Take 1 tablet by mouth once daily.       No current facility-administered medications for this visit.       Review of patient's allergies indicates:   Allergen Reactions    Pseudoephedrine-guaifenesin Shortness Of Breath    Panmist dm  [pseudoephedrine-dm-guaifenesin]      Other reaction(s): Unknown       Physical Exam  Vitals:    01/17/24 1521   BP: 132/70   Pulse: 67   Resp: 18         General: Well-developed, well-nourished in no acute distress  HEENT: Normocephalic, atraumatic, Extraocular movements intact  Neck: supple, trachea midline, no cervical or supraclavicular lymphadenopathy  Respirations: even and unlabored  Extremities: atraumatic, moves all equally, no clubbing, cyanosis or edema  Psych: normal affect  Skin: warm and dry, no lesions  Neuro: Alert and oriented, Cranial nerves II-XII grossly intact    PVR: 29cc    Urinalysis  >1000 glucose, otherwise negative      Lab Results   Component Value Date    PSA 0.37 12/22/2022    PSA 0.29 09/16/2020    PSA 0.82 06/09/2015         Assessment:   1. Pre-op testing  CBC Auto Differential    Basic Metabolic Panel    Urine culture      2. Malignant neoplasm of overlapping sites of bladder  POCT Urinalysis, Dipstick, Automated, W/O Scope    POCT Bladder Scan    Place in Outpatient    Case Request Operating Room: TURBT (TRANSURETHRAL RESECTION OF BLADDER TUMOR)    Diet NPO    Place sequential compression device      3. Coronary artery disease of bypass graft of native heart with stable angina pectoris        4. Diabetes mellitus type 2 with peripheral artery disease                Plan:  Pre-op testing  -     CBC Auto Differential; Future; Expected date: 01/17/2024  -     Basic Metabolic Panel; Future; Expected date: 01/17/2024  -     Urine culture; Future; Expected date: 01/31/2024    Malignant neoplasm of overlapping sites of bladder  -     POCT Urinalysis, Dipstick, Automated, W/O Scope  -     POCT Bladder Scan  -     Place in Outpatient;  Standing  -     Case Request Operating Room: TURBT (TRANSURETHRAL RESECTION OF BLADDER TUMOR)  -     Diet NPO; Standing  -     Place sequential compression device; Standing    Coronary artery disease of bypass graft of native heart with stable angina pectoris    Diabetes mellitus type 2 with peripheral artery disease    Other orders  -     IP VTE HIGH RISK PATIENT; Standing  -     ciprofloxacin (CIPRO)400mg/200ml D5W IVPB 400 mg      I had a detailed discussion with the patient regarding natural history of untreated bladder cancer. Pt elects to proceed with TURBT. Scheduled on 2/8/24.

## 2024-01-17 NOTE — H&P (VIEW-ONLY)
CC:bladder cancer    History of Present Illness:   Nithin Pino is a 83 y.o. male here for follow up.     1/17/24-Pt now cleared for TURBT by cardiology. No gross hematuria. No dysuria. No stranguria. Pt states that the BCG treatment are painful and asks about the natural history of bladder cancer, should he elect against treatment.   9/26/23: pt here for surveillance cysto. Pt completed 6 BCG induction treatments.   6/23/23-Pt s/p TURBT. 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 No gross hematuria or dysuria. No weakness or fever. States that he is urinating well.   5/15/23- pt here for cysto. CT scan personally reviewed, showing a polypoid tumor at the left bladder wall, enhancing.   4/10/23-81yo male, here today for follow up, last seen in 2021. At that time, here was being treated for BPH with finasteride and tamsulosin and OAB with vesicare. He did have gross hematuria, but refused cystoscopy. He reports that he was seen in the ED at [facility] yesterday with dysuria and gross hematuria. Was prescribed levaquin. States that he hasn't started the levaquin yet. Saw a little blood this am. No fever. Stream is strong. Still on finasteride, vesicare and flomax. No difficulty emptying--states it was checked in the ED yesterday and he emptied completely.     Pt's records from  clinic with Dr. Sanchez reviewed from 12/22/22, noting cytology suspicious for high grade bladder cancer.       Review of Systems   Constitutional:  Negative for chills and fever.   Respiratory:  Negative for shortness of breath.    Cardiovascular:  Negative for chest pain.   Gastrointestinal:  Negative for abdominal pain.   Genitourinary:  Negative for flank pain.   Musculoskeletal:  Positive for arthralgias. Negative for back pain.   All other systems reviewed and are negative.        Past Medical History:   Diagnosis Date    Abnormal brain MRI 01/21/2018    Chronic microvascular ischemia changes per MRI July 9,  2007 in [facility]    Abnormal ECG 10/31/2013    Abnormal stress test 01/28/2016    Alcohol dependence     States alcohol abuse ended in 1994    AP (angina pectoris) 01/28/2016    Asthma     Bladder cancer     Carotid artery occlusion     Cataract     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    2 - Normal left ventricular systolic function (EF 60-65%).    3 - Normal left ventricular diastolic function.    4 - Mild left atrial enlargement.    5 - Normal right ventricular systolic function .    6 - Trivial to mild aortic regurgitation.    7 - Trivial to mild mitral regurgitation.  10 - Trivial to mild pulmonic regurgitation.     Hematuria 06/25/2020    History of alcohol abuse 01/22/2018    Patient denies drinking to excess since 1994.    History of atherectomy 01/21/2018 2/2016 [facility]    History of chronic kidney disease 01/22/2018    History of CKD 3    History of PTCA 10/31/2013    History of PTCA 03/09/2016    Hyperlipidemia     Hypertension     Insomnia 06/17/2013    Iron deficiency anemia 10/19/2023    Ischemic cardiomyopathy 10/31/2013    Long term (current) use of antithrombotics/antiplatelets 01/21/2018    Myocardial infarction     Old MI (myocardial infarction) 1994    Peripheral vascular disease     Polyneuropathy     RBBB 10/31/2013    S/P CABG (coronary artery bypass graft) 10/31/2013    Shortness of breath 10/31/2013    Tobacco dependence     resolved    Trouble in sleeping     Type 2 diabetes with peripheral circulatory disorder, controlled     Wears glasses         Past Surgical History:   Procedure Laterality Date    APPENDECTOMY      CARDIAC CATHETERIZATION      2016    CARDIAC SURGERY      balloon angioplasty    CATARACT EXTRACTION W/  INTRAOCULAR LENS IMPLANT Right 2017    CORONARY ARTERY BYPASS GRAFT  2011    per patient x4    HERNIA REPAIR      OPEN REDUCTION AND INTERNAL FIXATION (ORIF) OF INJURY OF HIP      PCIOL Bilateral OD 17/OS 02/15/17    DR. ALONZO    TURBT (TRANSURETHRAL RESECTION OF BLADDER TUMOR) N/A 2023    Procedure: TURBT (TRANSURETHRAL RESECTION OF BLADDER TUMOR);  Surgeon: Hortencia Michael MD;  Location: [facility];  Service: Urology;  Laterality: N/A;       Family History   Adopted: Yes   Problem Relation Age of Onset    Diabetes Brother     Diabetes Sister     Cancer Neg Hx     Heart disease Neg Hx     Kidney disease Neg Hx        Social History     Tobacco Use    Smoking status: Former     Current packs/day: 0.00     Average packs/day: 1.5 packs/day for 40.0 years (60.0 ttl pk-yrs)     Types: Cigarettes     Start date: 1954     Quit date: 1994     Years since quittin.0    Smokeless tobacco: Former     Quit date: 2011    Tobacco comments:     approx date   Substance Use Topics    Alcohol use: Yes     Comment: occasionally; 1-2 cans of beer a month    Drug use: No       Current Outpatient Medications   Medication Sig Dispense Refill    amLODIPine (NORVASC) 10 MG tablet Take 0.5 tablets (5 mg total) by mouth once daily. 90 tablet 10    amoxicillin (AMOXIL) 500 MG capsule Take by mouth every 12 (twelve) hours.      aspirin 81 MG Chew Take 1 tablet (81 mg total) by mouth once daily. 1 Tablet, Chewable Oral Every day 90 tablet 3    atorvastatin (LIPITOR) 40 MG tablet Take 1 tablet (40 mg total) by mouth every evening. 90 tablet 3    b complex vitamins tablet Take 1 tablet by mouth once daily.      BLOOD-GLUCOSE METER (TRUERESULT BLOOD GLUCOSE SYSTM MISC) by Misc.(Non-Drug; Combo Route) route.      cyanocobalamin  (VITAMIN B-12) 1000 MCG tablet Take 100 mcg by mouth once daily.      dulaglutide (TRULICITY) 4.5 mg/0.5 mL pen injector Inject 4.5 mg into the skin every 7 days. SUNDAY      EMBRACE PRO TEST STRIPS Strp CHECK BLOOD SUGAR TWICE DAILY. 50 strip 0    finasteride (PROSCAR) 5 mg tablet Take 1 tablet (5 mg total) by mouth once daily. 90 tablet 3    furosemide (LASIX) 40 MG tablet TAKE 1 PILL IN AM, AND 1/2 PILL IN PM 60 tablet 11    glucose 4 GM chewable tablet Take 16 g by mouth as needed.      hyoscyamine (LEVSIN/SL) 0.125 mg Subl Place 2 tablets (0.25 mg total) under the tongue every 4 (four) hours as needed (bladder spasms). 30 tablet 1    insulin (LANTUS SOLOSTAR U-100 INSULIN) glargine 100 units/mL (3mL) SubQ pen Inject 28 Units into the skin once daily. 27 mL 3    insulin glargine-yfgn 100 unit/mL (3 mL) InPn INJECT 24 UNITS SUBCUTANEOUSLY EVERY DAY FOR DIABETES      isosorbide mononitrate (IMDUR) 60 MG 24 hr tablet TAKE 1 TABLET ONE TIME DAILY 90 tablet 0    LIDOcaine (LIDODERM) 5 % APPLY 1 PATCH TOPICALLY EVERY DAY FOR PAIN  WEAR FOR 12 HOURS, THEN REMOVE. DO NOT APPLY NEW PATCH FOR AT LEAST 12 HOURS.      losartan (COZAAR) 100 MG tablet TAKE 1 TABLET ONE TIME DAILY 90 tablet 10    metFORMIN (GLUCOPHAGE-XR) 500 MG ER 24hr tablet Take 1 tablet (500 mg total) by mouth 2 (two) times daily with meals. 180 tablet 3    metoprolol succinate (TOPROL-XL) 25 MG 24 hr tablet TAKE 1 TABLET EVERY EVENING 90 tablet 3    miconazole (MICOTIN) 2 % cream APPLY SMALL AMOUNT TOPICALLY TWICE A DAY AS NEEDED FOR FUNGAL INFECTION      multivit-minerals/folic acid (DIABETIC MULTIVITAMIN ORAL) Take by mouth.      nitroGLYCERIN (NITROSTAT) 0.4 MG SL tablet 0.4 mg.      tamsulosin (FLOMAX) 0.4 mg Cap Take 1 capsule (0.4 mg total) by mouth once daily. 90 capsule 3    TRUEPLUS LANCETS 26 gauge Misc CHECK BLOOD SUGAR TWICE DAILY. 100 each 0    TRUERESULT BLOOD GLUCOSE SYSTM kit CHECK BLOOD SUGAR TWICE DAILY. 1 each 0    vitamin D (VITAMIN  D3) 1000 units Tab Take 1 tablet by mouth once daily.       No current facility-administered medications for this visit.       Review of patient's allergies indicates:   Allergen Reactions    Pseudoephedrine-guaifenesin Shortness Of Breath    Panmist dm  [pseudoephedrine-dm-guaifenesin]      Other reaction(s): Unknown       Physical Exam  Vitals:    01/17/24 1521   BP: 132/70   Pulse: 67   Resp: 18         General: Well-developed, well-nourished in no acute distress  HEENT: Normocephalic, atraumatic, Extraocular movements intact  Neck: supple, trachea midline, no cervical or supraclavicular lymphadenopathy  Respirations: even and unlabored  Extremities: atraumatic, moves all equally, no clubbing, cyanosis or edema  Psych: normal affect  Skin: warm and dry, no lesions  Neuro: Alert and oriented, Cranial nerves II-XII grossly intact    PVR: 29cc    Urinalysis  >1000 glucose, otherwise negative      Lab Results   Component Value Date    PSA 0.37 12/22/2022    PSA 0.29 09/16/2020    PSA 0.82 06/09/2015         Assessment:   1. Pre-op testing  CBC Auto Differential    Basic Metabolic Panel    Urine culture      2. Malignant neoplasm of overlapping sites of bladder  POCT Urinalysis, Dipstick, Automated, W/O Scope    POCT Bladder Scan    Place in Outpatient    Case Request Operating Room: TURBT (TRANSURETHRAL RESECTION OF BLADDER TUMOR)    Diet NPO    Place sequential compression device      3. Coronary artery disease of bypass graft of native heart with stable angina pectoris        4. Diabetes mellitus type 2 with peripheral artery disease                Plan:  Pre-op testing  -     CBC Auto Differential; Future; Expected date: 01/17/2024  -     Basic Metabolic Panel; Future; Expected date: 01/17/2024  -     Urine culture; Future; Expected date: 01/31/2024    Malignant neoplasm of overlapping sites of bladder  -     POCT Urinalysis, Dipstick, Automated, W/O Scope  -     POCT Bladder Scan  -     Place in Outpatient;  Standing  -     Case Request Operating Room: TURBT (TRANSURETHRAL RESECTION OF BLADDER TUMOR)  -     Diet NPO; Standing  -     Place sequential compression device; Standing    Coronary artery disease of bypass graft of native heart with stable angina pectoris    Diabetes mellitus type 2 with peripheral artery disease    Other orders  -     IP VTE HIGH RISK PATIENT; Standing  -     ciprofloxacin (CIPRO)400mg/200ml D5W IVPB 400 mg      I had a detailed discussion with the patient regarding natural history of untreated bladder cancer. Pt elects to proceed with TURBT. Scheduled on 2/8/24.

## 2024-01-18 DIAGNOSIS — N40.1 BENIGN PROSTATIC HYPERPLASIA WITH WEAK URINARY STREAM: ICD-10-CM

## 2024-01-18 DIAGNOSIS — R39.12 BENIGN PROSTATIC HYPERPLASIA WITH WEAK URINARY STREAM: ICD-10-CM

## 2024-01-18 RX ORDER — FINASTERIDE 5 MG/1
5 TABLET, FILM COATED ORAL
Qty: 90 TABLET | Refills: 3 | Status: SHIPPED | OUTPATIENT
Start: 2024-01-18

## 2024-01-19 ENCOUNTER — INFUSION (OUTPATIENT)
Dept: INFUSION THERAPY | Facility: HOSPITAL | Age: 84
End: 2024-01-19
Attending: NURSE PRACTITIONER
Payer: MEDICARE

## 2024-01-19 VITALS
RESPIRATION RATE: 18 BRPM | HEART RATE: 65 BPM | SYSTOLIC BLOOD PRESSURE: 112 MMHG | OXYGEN SATURATION: 99 % | DIASTOLIC BLOOD PRESSURE: 55 MMHG | TEMPERATURE: 99 F

## 2024-01-19 DIAGNOSIS — D63.8 ANEMIA IN OTHER CHRONIC DISEASES CLASSIFIED ELSEWHERE: Primary | ICD-10-CM

## 2024-01-19 DIAGNOSIS — D50.9 IRON DEFICIENCY ANEMIA, UNSPECIFIED IRON DEFICIENCY ANEMIA TYPE: ICD-10-CM

## 2024-01-19 PROCEDURE — 63600175 PHARM REV CODE 636 W HCPCS: Performed by: NURSE PRACTITIONER

## 2024-01-19 PROCEDURE — 96374 THER/PROPH/DIAG INJ IV PUSH: CPT

## 2024-01-19 RX ORDER — DIPHENHYDRAMINE HYDROCHLORIDE 50 MG/ML
50 INJECTION INTRAMUSCULAR; INTRAVENOUS ONCE AS NEEDED
Status: CANCELLED | OUTPATIENT
Start: 2024-01-26

## 2024-01-19 RX ORDER — EPINEPHRINE 0.3 MG/.3ML
0.3 INJECTION SUBCUTANEOUS ONCE AS NEEDED
Status: CANCELLED | OUTPATIENT
Start: 2024-01-26

## 2024-01-19 RX ORDER — SODIUM CHLORIDE 0.9 % (FLUSH) 0.9 %
10 SYRINGE (ML) INJECTION
Status: CANCELLED | OUTPATIENT
Start: 2024-01-26

## 2024-01-19 RX ADMIN — IRON SUCROSE 200 MG: 20 INJECTION, SOLUTION INTRAVENOUS at 12:01

## 2024-01-19 NOTE — PLAN OF CARE
Discussed plan of care with pt. Addressed any and ongoing concerns. Pt denies    Problem: Adult Inpatient Plan of Care  Goal: Plan of Care Review  Outcome: Ongoing, Progressing  Flowsheets (Taken 1/19/2024 1242)  Plan of Care Reviewed With: patient  Goal: Patient-Specific Goal (Individualized)  Outcome: Ongoing, Progressing  Goal: Absence of Hospital-Acquired Illness or Injury  Outcome: Ongoing, Progressing  Intervention: Identify and Manage Fall Risk  Flowsheets (Taken 1/19/2024 1242)  Safety Promotion/Fall Prevention:   in recliner, wheels locked   nonskid shoes/socks when out of bed   room near unit station  Intervention: Prevent Infection  Flowsheets (Taken 1/19/2024 1242)  Infection Prevention:   hand hygiene promoted   equipment surfaces disinfected  Goal: Optimal Comfort and Wellbeing  Outcome: Ongoing, Progressing  Intervention: Monitor Pain and Promote Comfort  Flowsheets (Taken 1/19/2024 1242)  Pain Management Interventions:   warm blanket provided   quiet environment facilitated  Intervention: Provide Person-Centered Care  Flowsheets (Taken 1/19/2024 1242)  Trust Relationship/Rapport:   care explained   questions encouraged   reassurance provided   choices provided   emotional support provided   thoughts/feelings acknowledged   empathic listening provided   questions answered

## 2024-01-26 ENCOUNTER — TELEPHONE (OUTPATIENT)
Dept: PREADMISSION TESTING | Facility: HOSPITAL | Age: 84
End: 2024-01-26
Payer: OTHER GOVERNMENT

## 2024-01-26 ENCOUNTER — INFUSION (OUTPATIENT)
Dept: INFUSION THERAPY | Facility: HOSPITAL | Age: 84
End: 2024-01-26
Attending: NURSE PRACTITIONER
Payer: MEDICARE

## 2024-01-26 VITALS
RESPIRATION RATE: 18 BRPM | HEART RATE: 75 BPM | SYSTOLIC BLOOD PRESSURE: 120 MMHG | TEMPERATURE: 97 F | DIASTOLIC BLOOD PRESSURE: 66 MMHG | OXYGEN SATURATION: 98 %

## 2024-01-26 DIAGNOSIS — D50.9 IRON DEFICIENCY ANEMIA, UNSPECIFIED IRON DEFICIENCY ANEMIA TYPE: ICD-10-CM

## 2024-01-26 DIAGNOSIS — D63.8 ANEMIA IN OTHER CHRONIC DISEASES CLASSIFIED ELSEWHERE: Primary | ICD-10-CM

## 2024-01-26 PROCEDURE — 63600175 PHARM REV CODE 636 W HCPCS: Performed by: NURSE PRACTITIONER

## 2024-01-26 PROCEDURE — 96374 THER/PROPH/DIAG INJ IV PUSH: CPT

## 2024-01-26 RX ORDER — EPINEPHRINE 0.3 MG/.3ML
0.3 INJECTION SUBCUTANEOUS ONCE AS NEEDED
Status: CANCELLED | OUTPATIENT
Start: 2024-02-02

## 2024-01-26 RX ORDER — DIPHENHYDRAMINE HYDROCHLORIDE 50 MG/ML
50 INJECTION INTRAMUSCULAR; INTRAVENOUS ONCE AS NEEDED
Status: CANCELLED | OUTPATIENT
Start: 2024-02-02

## 2024-01-26 RX ORDER — SODIUM CHLORIDE 0.9 % (FLUSH) 0.9 %
10 SYRINGE (ML) INJECTION
Status: CANCELLED | OUTPATIENT
Start: 2024-02-02

## 2024-01-26 RX ADMIN — IRON SUCROSE 200 MG: 20 INJECTION, SOLUTION INTRAVENOUS at 11:01

## 2024-01-26 NOTE — PLAN OF CARE
Discussed plan of care with pt. Addressed any and ongoing concerns. Pt denies    Problem: Adult Inpatient Plan of Care  Goal: Plan of Care Review  Outcome: Ongoing, Progressing  Flowsheets (Taken 1/26/2024 1117)  Plan of Care Reviewed With: patient  Goal: Absence of Hospital-Acquired Illness or Injury  Outcome: Ongoing, Progressing  Intervention: Identify and Manage Fall Risk  Flowsheets (Taken 1/26/2024 1117)  Safety Promotion/Fall Prevention:   room near unit station   in recliner, wheels locked   nonskid shoes/socks when out of bed  Intervention: Prevent Infection  Flowsheets (Taken 1/26/2024 1117)  Infection Prevention:   hand hygiene promoted   equipment surfaces disinfected  Goal: Optimal Comfort and Wellbeing  Outcome: Ongoing, Progressing  Intervention: Monitor Pain and Promote Comfort  Flowsheets (Taken 1/26/2024 1117)  Pain Management Interventions: quiet environment facilitated  Intervention: Provide Person-Centered Care  Flowsheets (Taken 1/26/2024 1117)  Trust Relationship/Rapport:   care explained   questions encouraged   choices provided   reassurance provided   thoughts/feelings acknowledged   emotional support provided   empathic listening provided   questions answered

## 2024-01-26 NOTE — TELEPHONE ENCOUNTER
----- Message from Maylin Day NP sent at 1/26/2024  3:34 PM CST -----  Regarding: RE: surgery  Pt is moderate risk for jean-pierre and post op cardiac complications, ok to hold ASA 5-7 days prior to procedure , resume once hemodynamically stable.    [facility] please fax over clearance  ----- Message -----  From: Rhoda Chavarria RN  Sent: 1/26/2024  11:40 AM CST  To: Maylin Day NP  Subject: surgery                                          Good morning, you recently saw this pt on 1/9/24 and Surgery Pre-Admit wanted to know if there are any contraindications or concerns for him to proceed with his urology surgery on 2/8/24 with Dr Michael? Also, any recommendations on when he should hold his aspirin? Thanks in advance.    -Surgery Pre Admit Dept.  [phone]

## 2024-01-31 ENCOUNTER — LAB VISIT (OUTPATIENT)
Dept: LAB | Facility: HOSPITAL | Age: 84
End: 2024-01-31
Attending: UROLOGY
Payer: OTHER GOVERNMENT

## 2024-01-31 DIAGNOSIS — Z01.818 PRE-OP TESTING: ICD-10-CM

## 2024-01-31 LAB
ANION GAP SERPL CALC-SCNC: 7 MMOL/L (ref 8–16)
BASOPHILS # BLD AUTO: 0.04 K/UL (ref 0–0.2)
BASOPHILS NFR BLD: 0.5 % (ref 0–1.9)
BUN SERPL-MCNC: 28 MG/DL (ref 8–23)
CALCIUM SERPL-MCNC: 10 MG/DL (ref 8.7–10.5)
CHLORIDE SERPL-SCNC: 102 MMOL/L (ref 95–110)
CO2 SERPL-SCNC: 27 MMOL/L (ref 23–29)
CREAT SERPL-MCNC: 1.6 MG/DL (ref 0.5–1.4)
DIFFERENTIAL METHOD BLD: ABNORMAL
EOSINOPHIL # BLD AUTO: 0.5 K/UL (ref 0–0.5)
EOSINOPHIL NFR BLD: 5.2 % (ref 0–8)
ERYTHROCYTE [DISTWIDTH] IN BLOOD BY AUTOMATED COUNT: 15.1 % (ref 11.5–14.5)
EST. GFR  (NO RACE VARIABLE): 42.2 ML/MIN/1.73 M^2
GLUCOSE SERPL-MCNC: 485 MG/DL (ref 70–110)
HCT VFR BLD AUTO: 42.2 % (ref 40–54)
HGB BLD-MCNC: 13.4 G/DL (ref 14–18)
IMM GRANULOCYTES # BLD AUTO: 0.02 K/UL (ref 0–0.04)
IMM GRANULOCYTES NFR BLD AUTO: 0.2 % (ref 0–0.5)
LYMPHOCYTES # BLD AUTO: 2.9 K/UL (ref 1–4.8)
LYMPHOCYTES NFR BLD: 32.5 % (ref 18–48)
MCH RBC QN AUTO: 30.2 PG (ref 27–31)
MCHC RBC AUTO-ENTMCNC: 31.8 G/DL (ref 32–36)
MCV RBC AUTO: 95 FL (ref 82–98)
MONOCYTES # BLD AUTO: 0.6 K/UL (ref 0.3–1)
MONOCYTES NFR BLD: 6.8 % (ref 4–15)
NEUTROPHILS # BLD AUTO: 4.8 K/UL (ref 1.8–7.7)
NEUTROPHILS NFR BLD: 54.8 % (ref 38–73)
NRBC BLD-RTO: 0 /100 WBC
PLATELET # BLD AUTO: 241 K/UL (ref 150–450)
PMV BLD AUTO: 10.8 FL (ref 9.2–12.9)
POTASSIUM SERPL-SCNC: 5 MMOL/L (ref 3.5–5.1)
RBC # BLD AUTO: 4.44 M/UL (ref 4.6–6.2)
SODIUM SERPL-SCNC: 136 MMOL/L (ref 136–145)
WBC # BLD AUTO: 8.82 K/UL (ref 3.9–12.7)

## 2024-01-31 PROCEDURE — 36415 COLL VENOUS BLD VENIPUNCTURE: CPT | Mod: PO | Performed by: UROLOGY

## 2024-01-31 PROCEDURE — 80048 BASIC METABOLIC PNL TOTAL CA: CPT | Performed by: UROLOGY

## 2024-01-31 PROCEDURE — 85025 COMPLETE CBC W/AUTO DIFF WBC: CPT | Performed by: UROLOGY

## 2024-02-01 ENCOUNTER — TELEPHONE (OUTPATIENT)
Dept: UROLOGY | Facility: CLINIC | Age: 84
End: 2024-02-01
Payer: OTHER GOVERNMENT

## 2024-02-01 DIAGNOSIS — E10.65 TYPE 1 DIABETES MELLITUS WITH HYPERGLYCEMIA: Primary | ICD-10-CM

## 2024-02-01 NOTE — TELEPHONE ENCOUNTER
----- Message from Reilly Gupta MA sent at 2/1/2024  2:45 PM CST -----  Pt's daughter requests that you call her tomorrow.    ----- Message -----  From: Hortencia Michael MD  Sent: 2/1/2024  11:56 AM CST  To: [facility] Staff    Okay, well he won't be able to have his surgery unless it is much lower.   ----- Message -----  From: Reilly Gupta MA  Sent: 2/1/2024  11:54 AM CST  To: Hortencia Michael MD    Pt's daughter stated that his blood glucose typically runs high.    ----- Message -----  From: Hortencia Michael MD  Sent: 2/1/2024   7:08 AM CST  To: [facility] Staff    Please notify pt that his blood glucose is exceptionally high and he needs to seek treatment for this immediately--today. May even need to go to the ED. He will not be able to have his procedure until this is taken care of.

## 2024-02-01 NOTE — TELEPHONE ENCOUNTER
Called pt's daughter regarding elevated blood glucose. Pt will get with his endocrinologist regarding blood glucose control.

## 2024-02-02 ENCOUNTER — TELEPHONE (OUTPATIENT)
Dept: PREADMISSION TESTING | Facility: HOSPITAL | Age: 84
End: 2024-02-02
Payer: OTHER GOVERNMENT

## 2024-02-02 DIAGNOSIS — Z01.818 PRE-OP TESTING: Primary | ICD-10-CM

## 2024-02-02 NOTE — TELEPHONE ENCOUNTER
Pre op instructions reviewed with pt daughter over telephone, verbalized understanding.    To confirm, Surgery is scheduled on 2/8/24. We will call you late afternoon the business day prior to surgery with your arrival time.    *Please report to the [facility] Lobby (1st Floor) located off of [facility] (2nd Entrance/Building on the left, in front of the flag pole).  Address: [address] [place] [address] [place] [address]    Testing/Clearances needed before Surgery: Lab appt 2/5 @ [facility]      INSTRUCTIONS IMPORTANT!!!  DO NOT Eat, Drink, or Smoke after 12 midnight unless instructed otherwise by your Surgeon. OK to brush teeth, no gum, candy or mints!    >>>MEDICATION INSTRUCTIONS<<<: Morning of Surgery, take small sip of water with ONLY these medications:  Amlodipine  Isosorbide  Flomax       *Diabetic Patients: !!!If you take diabetic or weight loss medication, Do NOT take morning of surgery unless instructed by Doctor!!!  Metformin to be stopped 24 hrs prior to surgery.   Ozempic/ Mounjaro/ Wegovy/ Trulicity/ Semaglutide injections or weight loss medication to be stopped 7 days prior to surgery.  Long Acting Insulin Instructions: Hold the night before surgery.    *Patients should HOLD all vitamins, herbal supplements, Weight loss medication/injections, aspirin products & NSAIDS 7 days prior to surgery, as these can thin the blood. Ok to take Tylenol.    ____  Avoid Alcoholic beverages 3 days prior to surgery, as it can thin the blood.  ____  NO Acrylic/fake nails or nail polish worn day of surgery (specifically hand/arm & foot surgeries).  ____  NO powder, lotions, deodorants, oils or cream on body.  ____  Remove all jewelry & piercings & foreign objects before arrival & leave at home.  ____  Remove Dentures, Hearing Aids & Contact Lens prior to surgery.  ____  Bring photo ID and insurance information to hospital (Leave Valuables at Home).  ____  If going home the same day, arrange for a ride home. You  will not be able to drive for 24 hrs if Anesthesia was used.   ____  Females (ages 11-60): may need to give a urine sample the morning of surgery; please see Pre op Nurse prior to using the restroom.  ____  Males: Stop ED medications (Viagra, Cialis) 24 hrs prior to surgery.  ____  Wear clean, loose fitting clothing to allow for dressings/ bandages.      Bathing Instructions:    -Shower with anti-bacterial Soap (Hibiclens or Dial) the night before surgery and the morning of.   -Do not use Hibiclens on your face or genitals.   -Apply clean clothes after shower.  -Do not shave your face or body 2 days prior to surgery unless instructed otherwise by your Surgeon.  -Do not shave pubic hair 7 days prior to surgery (gyn pt's).    [facility] Visitor/Ride Policy:  Only 2 adults allowed in pre op/recovery area during your procedure. You MUST HAVE A RIDE HOME from a responsible adult that you know and trust. Medical Transport, Uber or Lyft can ONLY be used if patient has a responsible adult to accompany them during ride home.       *Signs and symptoms of Infection Before or After Surgery:               !!!If you experience any fever, chills, nausea/ vomiting, foul odor/ excessive drainage from surgical site, flu-like symptoms, new wounds or cuts, PLEASE CALL THE SURGEON OFFICE at [phone] or SEND MESSAGE THROUGH [employer] PORTAL!!!       *If you are running late the morning of surgery, please call the [facility] Surgery Dept @ [phone].     *Billing questions:  [phone]       Thank you,  -[facility] Surgery Pre Admit Dept.  [phone] or [phone]  M-F 7:30 am-4:00 pm (Closed Major Holidays)

## 2024-02-05 ENCOUNTER — LAB VISIT (OUTPATIENT)
Dept: LAB | Facility: HOSPITAL | Age: 84
End: 2024-02-05
Attending: UROLOGY
Payer: OTHER GOVERNMENT

## 2024-02-05 DIAGNOSIS — E10.65 TYPE 1 DIABETES MELLITUS WITH HYPERGLYCEMIA: ICD-10-CM

## 2024-02-05 LAB
ANION GAP SERPL CALC-SCNC: 11 MMOL/L (ref 8–16)
BUN SERPL-MCNC: 32 MG/DL (ref 8–23)
CALCIUM SERPL-MCNC: 10.3 MG/DL (ref 8.7–10.5)
CHLORIDE SERPL-SCNC: 105 MMOL/L (ref 95–110)
CO2 SERPL-SCNC: 25 MMOL/L (ref 23–29)
CREAT SERPL-MCNC: 1.5 MG/DL (ref 0.5–1.4)
EST. GFR  (NO RACE VARIABLE): 45.6 ML/MIN/1.73 M^2
GLUCOSE SERPL-MCNC: 161 MG/DL (ref 70–110)
POTASSIUM SERPL-SCNC: 5 MMOL/L (ref 3.5–5.1)
SODIUM SERPL-SCNC: 141 MMOL/L (ref 136–145)

## 2024-02-05 PROCEDURE — 80048 BASIC METABOLIC PNL TOTAL CA: CPT | Performed by: UROLOGY

## 2024-02-05 PROCEDURE — 36415 COLL VENOUS BLD VENIPUNCTURE: CPT | Performed by: UROLOGY

## 2024-02-07 ENCOUNTER — TELEPHONE (OUTPATIENT)
Dept: UROLOGY | Facility: CLINIC | Age: 84
End: 2024-02-07
Payer: OTHER GOVERNMENT

## 2024-02-07 ENCOUNTER — TELEPHONE (OUTPATIENT)
Dept: PREADMISSION TESTING | Facility: HOSPITAL | Age: 84
End: 2024-02-07
Payer: OTHER GOVERNMENT

## 2024-02-07 NOTE — TELEPHONE ENCOUNTER
02/07/2024 1641 - Outgoing call placed to patient's daughter to return her call regarding procedure tomorrow, notified daughter that Dr. Michael stated that the outcome of patient's procedure will determine if he needs a catheter or not afterwards, daughter verbalized understanding and no further assistance needed at this time.     Ryan Payne RN     ----- Message from Kerry Zuniga sent at 2/7/2024  3:31 PM CST -----  Name of Who is Calling:daughter           What is the request in detail:requesting a call asap as patient has procedure tomorrow and daughter needs to know if patient will be getting a catheter as she needs to be able to make family accommodations at home asap.           Can the clinic reply by [facility]:no           What Number to Call Back if not in [facility]: [phone]

## 2024-02-07 NOTE — TELEPHONE ENCOUNTER
Called and spoke with patient's daughter about the following:     Please [facility] at 8:00am on 2/08/24 for your scheduled procedure.  Address: [address] [place] [address] [place] [address] (2nd Building on left, 1st Floor Lobby)  >>>NO eating or drinking after midnight unless instructed otherwise by your Surgeon<<<    Thank you,  -[facility] Pre [facility] Testing Dept.  Mon-Fri 7:30 am - 4 pm [phone]

## 2024-02-08 ENCOUNTER — HOSPITAL ENCOUNTER (OUTPATIENT)
Facility: HOSPITAL | Age: 84
Discharge: HOME | End: 2024-02-08
Attending: UROLOGY | Admitting: UROLOGY
Payer: MEDICARE

## 2024-02-08 ENCOUNTER — ANESTHESIA (OUTPATIENT)
Dept: SURGERY | Facility: HOSPITAL | Age: 84
End: 2024-02-08
Payer: MEDICARE

## 2024-02-08 ENCOUNTER — ANESTHESIA EVENT (OUTPATIENT)
Dept: SURGERY | Facility: HOSPITAL | Age: 84
End: 2024-02-08
Payer: MEDICARE

## 2024-02-08 DIAGNOSIS — C67.8 MALIGNANT NEOPLASM OF OVERLAPPING SITES OF BLADDER: Primary | ICD-10-CM

## 2024-02-08 LAB — POCT GLUCOSE: 120 MG/DL (ref 70–110)

## 2024-02-08 PROCEDURE — 27201423 OPTIME MED/SURG SUP & DEVICES STERILE SUPPLY: Mod: HCNC | Performed by: UROLOGY

## 2024-02-08 PROCEDURE — 63600175 PHARM REV CODE 636 W HCPCS: Performed by: UROLOGY

## 2024-02-08 PROCEDURE — 71000039 HC RECOVERY, EACH ADD'L HOUR: Mod: HCNC | Performed by: UROLOGY

## 2024-02-08 PROCEDURE — 63600175 PHARM REV CODE 636 W HCPCS: Mod: HCNC | Performed by: NURSE ANESTHETIST, CERTIFIED REGISTERED

## 2024-02-08 PROCEDURE — 88307 TISSUE EXAM BY PATHOLOGIST: CPT | Mod: HCNC | Performed by: PATHOLOGY

## 2024-02-08 PROCEDURE — 63600175 PHARM REV CODE 636 W HCPCS: Performed by: STUDENT IN AN ORGANIZED HEALTH CARE EDUCATION/TRAINING PROGRAM

## 2024-02-08 PROCEDURE — 71000033 HC RECOVERY, INTIAL HOUR: Mod: HCNC | Performed by: UROLOGY

## 2024-02-08 PROCEDURE — 52234 CYSTOSCOPY AND TREATMENT: CPT | Mod: ,,, | Performed by: UROLOGY

## 2024-02-08 PROCEDURE — 88307 TISSUE EXAM BY PATHOLOGIST: CPT | Mod: 26,HCNC,, | Performed by: PATHOLOGY

## 2024-02-08 PROCEDURE — 25000003 PHARM REV CODE 250: Mod: HCNC | Performed by: NURSE ANESTHETIST, CERTIFIED REGISTERED

## 2024-02-08 PROCEDURE — 37000009 HC ANESTHESIA EA ADD 15 MINS: Mod: HCNC | Performed by: UROLOGY

## 2024-02-08 PROCEDURE — 36000707: Mod: HCNC | Performed by: UROLOGY

## 2024-02-08 PROCEDURE — 25000003 PHARM REV CODE 250: Performed by: STUDENT IN AN ORGANIZED HEALTH CARE EDUCATION/TRAINING PROGRAM

## 2024-02-08 PROCEDURE — 37000008 HC ANESTHESIA 1ST 15 MINUTES: Mod: HCNC | Performed by: UROLOGY

## 2024-02-08 PROCEDURE — 36000706: Mod: HCNC | Performed by: UROLOGY

## 2024-02-08 PROCEDURE — 71000015 HC POSTOP RECOV 1ST HR: Mod: HCNC | Performed by: UROLOGY

## 2024-02-08 RX ORDER — PHENYLEPHRINE HYDROCHLORIDE 10 MG/ML
INJECTION INTRAVENOUS
Status: DISCONTINUED | OUTPATIENT
Start: 2024-02-08 | End: 2024-02-08

## 2024-02-08 RX ORDER — OXYCODONE AND ACETAMINOPHEN 5; 325 MG/1; MG/1
1 TABLET ORAL
Status: DISCONTINUED | OUTPATIENT
Start: 2024-02-08 | End: 2024-02-08 | Stop reason: HOSPADM

## 2024-02-08 RX ORDER — ACETAMINOPHEN 325 MG/1
650 TABLET ORAL
Status: COMPLETED | OUTPATIENT
Start: 2024-02-08 | End: 2024-02-08

## 2024-02-08 RX ORDER — CIPROFLOXACIN 2 MG/ML
400 INJECTION, SOLUTION INTRAVENOUS
Status: COMPLETED | OUTPATIENT
Start: 2024-02-08 | End: 2024-02-08

## 2024-02-08 RX ORDER — ONDANSETRON HYDROCHLORIDE 2 MG/ML
4 INJECTION, SOLUTION INTRAVENOUS DAILY PRN
Status: DISCONTINUED | OUTPATIENT
Start: 2024-02-08 | End: 2024-02-08 | Stop reason: HOSPADM

## 2024-02-08 RX ORDER — LIDOCAINE HYDROCHLORIDE 20 MG/ML
INJECTION, SOLUTION EPIDURAL; INFILTRATION; INTRACAUDAL; PERINEURAL
Status: DISCONTINUED | OUTPATIENT
Start: 2024-02-08 | End: 2024-02-08

## 2024-02-08 RX ORDER — HYDROMORPHONE HYDROCHLORIDE 2 MG/ML
0.2 INJECTION, SOLUTION INTRAMUSCULAR; INTRAVENOUS; SUBCUTANEOUS EVERY 5 MIN PRN
Status: DISCONTINUED | OUTPATIENT
Start: 2024-02-08 | End: 2024-02-08 | Stop reason: HOSPADM

## 2024-02-08 RX ORDER — PROPOFOL 10 MG/ML
VIAL (ML) INTRAVENOUS
Status: DISCONTINUED | OUTPATIENT
Start: 2024-02-08 | End: 2024-02-08

## 2024-02-08 RX ORDER — FENTANYL CITRATE 50 UG/ML
INJECTION, SOLUTION INTRAMUSCULAR; INTRAVENOUS
Status: DISCONTINUED | OUTPATIENT
Start: 2024-02-08 | End: 2024-02-08

## 2024-02-08 RX ORDER — ONDANSETRON HYDROCHLORIDE 2 MG/ML
INJECTION, SOLUTION INTRAVENOUS
Status: DISCONTINUED | OUTPATIENT
Start: 2024-02-08 | End: 2024-02-08

## 2024-02-08 RX ADMIN — SUGAMMADEX 200 MG: 100 INJECTION, SOLUTION INTRAVENOUS at 09:02

## 2024-02-08 RX ADMIN — ACETAMINOPHEN 650 MG: 325 TABLET ORAL at 08:02

## 2024-02-08 RX ADMIN — HYDROMORPHONE HYDROCHLORIDE 0.2 MG: 2 INJECTION INTRAMUSCULAR; INTRAVENOUS; SUBCUTANEOUS at 10:02

## 2024-02-08 RX ADMIN — PHENYLEPHRINE HYDROCHLORIDE 100 MCG: 10 INJECTION INTRAVENOUS at 09:02

## 2024-02-08 RX ADMIN — FENTANYL CITRATE 100 MCG: 50 INJECTION, SOLUTION INTRAMUSCULAR; INTRAVENOUS at 09:02

## 2024-02-08 RX ADMIN — PROPOFOL 80 MG: 10 INJECTION, EMULSION INTRAVENOUS at 09:02

## 2024-02-08 RX ADMIN — CIPROFLOXACIN 400 MG: 2 INJECTION, SOLUTION INTRAVENOUS at 09:02

## 2024-02-08 RX ADMIN — SODIUM CHLORIDE, POTASSIUM CHLORIDE, SODIUM LACTATE AND CALCIUM CHLORIDE: 600; 310; 30; 20 INJECTION, SOLUTION INTRAVENOUS at 09:02

## 2024-02-08 RX ADMIN — ONDANSETRON 4 MG: 2 INJECTION INTRAMUSCULAR; INTRAVENOUS at 09:02

## 2024-02-08 RX ADMIN — LIDOCAINE HYDROCHLORIDE 50 MG: 20 INJECTION, SOLUTION EPIDURAL; INFILTRATION; INTRACAUDAL; PERINEURAL at 09:02

## 2024-02-08 NOTE — DISCHARGE SUMMARY
[facility] - Surgery (Hospital)  Discharge Note  Short Stay    Procedure(s) (LRB):  TURBT (TRANSURETHRAL RESECTION OF BLADDER TUMOR) (N/A)      OUTCOME: Patient tolerated treatment/procedure well without complication and is now ready for discharge.    DISPOSITION: Home or Self Care    FINAL DIAGNOSIS:  Malignant neoplasm of overlapping sites of bladder    FOLLOWUP: In clinic    DISCHARGE INSTRUCTIONS:    Discharge Procedure Orders   Diet Adult Regular     Notify your health care provider if you experience any of the following:  temperature >100.4     Notify your health care provider if you experience any of the following:  persistent nausea and vomiting or diarrhea     Notify your health care provider if you experience any of the following:  severe uncontrolled pain     No dressing needed     Activity as tolerated        TIME SPENT ON DISCHARGE: 10 minutes

## 2024-02-08 NOTE — TRANSFER OF CARE
"Anesthesia Transfer of Care Note    Patient: Nithin Pino    Procedure(s) Performed: Procedure(s) (LRB):  TURBT (TRANSURETHRAL RESECTION OF BLADDER TUMOR) (N/A)    Patient location: PACU    Anesthesia Type: general    Transport from OR: Transported from OR on room air with adequate spontaneous ventilation    Post pain: adequate analgesia    Post assessment: no apparent anesthetic complications    Post vital signs: stable    Level of consciousness: sedated    Nausea/Vomiting: no nausea/vomiting    Complications: none    Transfer of care protocol was followed      Last vitals: Visit Vitals  BP (!) 160/70   Pulse 62   Temp 36.9 °C (98.4 °F) (Temporal)   Resp 16   Ht 5' 10" (1.778 m)   Wt 75.3 kg (166 lb 0.1 oz)   SpO2 97%   BMI 23.82 kg/m²     "

## 2024-02-08 NOTE — ANESTHESIA PROCEDURE NOTES
Intubation    Date/Time: 2/8/2024 9:12 AM    Performed by: Jimbo Valdes CRNA  Authorized by: Mynor Landis MD    Intubation:     Induction:  Intravenous    Mask Ventilation:  Easy mask    Attempts:  1    Attempted By:  CRNA    Method of Intubation:  Direct    Blade:  Son 3    Laryngeal View Grade: Grade IIA - cords partially seen      Difficult Airway Encountered?: No      Complications:  None    Airway Device:  Oral endotracheal tube    Airway Device Size:  7.5    Style/Cuff Inflation:  Cuffed (inflated to minimal occlusive pressure)    Tube secured:  22    Secured at:  The lips    Placement Verified By:  Capnometry    Complicating Factors:  None    Findings Post-Intubation:  BS equal bilateral

## 2024-02-08 NOTE — ANESTHESIA PREPROCEDURE EVALUATION
02/08/2024  Nithin Pino is a 83 y.o., male.    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 heart with stable angina pectoris    Benign prostatic hyperplasia    COPD (chronic obstructive pulmonary disease)    RBBB    Ischemic cardiomyopathy    Claudication in peripheral vascular disease    Hyperlipidemia associated with type 2 diabetes mellitus    Hyperparathyroidism    Diabetes mellitus type 2 with peripheral artery disease    Thoracic aorta atherosclerosis    Asymptomatic stenosis of both carotid arteries without infarction    Chronic stable angina    CRI (chronic renal insufficiency)    OAB (overactive bladder)    COVID-19 virus infection    Elevated troponin    Abnormal nuclear stress test    Chronic combined systolic and diastolic congestive heart failure    Hematuria, gross    Nonrheumatic aortic valve insufficiency    Bilateral sensorineural hearing loss    Bladder tumor    Iron deficiency anemia    Anemia in other chronic diseases classified elsewhere     Past Medical History:   Diagnosis Date    Abnormal brain MRI 01/21/2018    Chronic microvascular ischemia changes per MRI July 9, 2007 in [facility]    Abnormal ECG 10/31/2013    Abnormal stress test 01/28/2016    Alcohol dependence     States alcohol abuse ended in 1994    AP (angina pectoris) 01/28/2016    Asthma     Bladder cancer     Carotid artery occlusion     Cataract     Chronic combined systolic and diastolic congestive heart failure 05/24/2021    Chronic diastolic heart failure 10/31/2013    Chronic ischemic heart disease 10/31/2013    CKD (chronic kidney disease) stage 3, GFR 30-59 ml/min 11/04/2015    Coronary artery disease     DM (diabetes mellitus) 2013    BS doesn't check 03/28/2017     DM (diabetes mellitus) 2011    BS doesn' check   04/03/2019    Heart valve regurgitation 11/04/2015    Echocardiogram 2/15/16   1 - Concentric hypertrophy.    2 - Normal left ventricular systolic function (EF 60-65%).    3 - Normal left ventricular diastolic function.    4 - Mild left atrial enlargement.    5 - Normal right ventricular systolic function .    6 - Trivial to mild aortic regurgitation.    7 - Trivial to mild mitral regurgitation.  10 - Trivial to mild pulmonic regurgitation.     Hematuria 06/25/2020    History of alcohol abuse 01/22/2018    Patient denies drinking to excess since 1994.    History of atherectomy 01/21/2018 2/2016 [facility]    History of chronic kidney disease 01/22/2018    History of CKD 3    History of PTCA 10/31/2013    History of PTCA 03/09/2016    Hyperlipidemia     Hypertension     Insomnia 06/17/2013    Iron deficiency anemia 10/19/2023    Ischemic cardiomyopathy 10/31/2013    Long term (current) use of antithrombotics/antiplatelets 01/21/2018    Myocardial infarction     Old MI (myocardial infarction) 1994    Peripheral vascular disease     Polyneuropathy     RBBB 10/31/2013    S/P CABG (coronary artery bypass graft) 10/31/2013    Shortness of breath 10/31/2013    Tobacco dependence     resolved    Trouble in sleeping     Type 2 diabetes with peripheral circulatory disorder, controlled     Wears glasses      Past Surgical History:   Procedure Laterality Date    APPENDECTOMY      CARDIAC CATHETERIZATION      2016    CARDIAC SURGERY  1994    balloon angioplasty    CATARACT EXTRACTION W/  INTRAOCULAR LENS IMPLANT Right 02/01/2017    CORONARY ARTERY BYPASS GRAFT  05/2011    per patient x4    HERNIA REPAIR      OPEN REDUCTION AND INTERNAL FIXATION (ORIF) OF INJURY OF HIP      PCIOL Bilateral OD 02/01/17/OS 02/15/17    DR. ALONZO    TURBT (TRANSURETHRAL RESECTION OF BLADDER TUMOR) N/A 6/8/2023    Procedure: TURBT (TRANSURETHRAL RESECTION OF BLADDER TUMOR);  Surgeon: Hortencia Michael MD;  Location: [facility];  Service: Urology;   "Laterality: N/A;     Nuc Stress: (10/19/23)  Conclusion         Abnormal myocardial perfusion scan.    There is a moderate to severe intensity, fixed perfusion abnormality consistent with scar in the anterior, inferior and anteroapical wall(s).    No reversible defects noted to suggest coronary ischemia.    The gated perfusion images showed an ejection fraction of 65% at rest. The gated perfusion images showed an ejection fraction of 46% post stress.    The ECG portion of the study is negative for ischemia.    The patient reported no chest pain during the stress test.    During stress, occasional PVCs are noted.    ECHO: (10/19/23)  Summary         Left Ventricle: The left ventricle is normal in size. Normal wall thickness. regional wall motion abnormalities present. There is low normal systolic function with a visually estimated ejection fraction of 50 - 55%.    Left Atrium: Left atrium is severely dilated.    Right Ventricle: Normal right ventricular cavity size. Wall thickness is normal. Right ventricle wall motion  is normal. Systolic function is normal.    Right Atrium: Right atrium is dilated.    Mitral Valve: There is mild regurgitation.    Pulmonic Valve: There is mild regurgitation.    Pulmonary Artery: The estimated pulmonary artery systolic pressure is 24 mmHg.    IVC/SVC: Normal venous pressure at 3 mmHg.    *recently cleared by Delfina Day NP (1/26/24): "Pt is moderate risk for jean-pierre and post op cardiac complications, ok to hold ASA 5-7 days prior to procedure , resume once hemodynamically stable"     Pre-op Assessment    I have reviewed the Patient Summary Reports.     I have reviewed the Nursing Notes. I have reviewed the NPO Status.   I have reviewed the Medications.     Review of Systems  Anesthesia Hx:  No problems with previous Anesthesia   History of prior surgery of interest to airway management or planning:  Previous anesthesia: General        Denies Family Hx of Anesthesia complications.   "  Denies Personal Hx of Anesthesia complications.                    Social:  Former Smoker       Hematology/Oncology:  Hematology Normal   Oncology Normal                                   EENT/Dental:  EENT/Dental Normal           Cardiovascular:     Hypertension  Past MI (remote) CAD  asymptomatic CABG/stent Dysrhythmias   Denies Angina. CHF   PVD    ECG has been reviewed. Cleared by cardiologist who noted moderate to high risk.  2021 NMST was abnormal  No subsequent cath  Bilateral 50% carotid ds  RBBB  Remote CABG, PCI                           Pulmonary:   COPD Asthma  Shortness of breath                  Renal/:  Chronic Renal Disease, CKD                Hepatic/GI:  Hepatic/GI Normal                 Musculoskeletal:  Musculoskeletal Normal                Neurological:        Peripheral Neuropathy                          Endocrine:  Diabetes, poorly controlled   BMI 24        Dermatological:  Skin Normal    Psych:  Psychiatric Normal                    Chemistry        Component Value Date/Time     02/05/2024 0801    K 5.0 02/05/2024 0801     02/05/2024 0801    CO2 25 02/05/2024 0801    BUN 32 (H) 02/05/2024 0801    CREATININE 1.5 (H) 02/05/2024 0801     (H) 02/05/2024 0801        Component Value Date/Time    CALCIUM 10.3 02/05/2024 0801    ALKPHOS 78 12/08/2023 0944    AST 21 12/08/2023 0944    ALT 15 12/08/2023 0944    BILITOT 0.4 12/08/2023 0944    ESTGFRAFRICA 58.7 (A) 12/29/2021 0702    EGFRNONAA 50.8 (A) 12/29/2021 0702        Lab Results   Component Value Date    WBC 8.82 01/31/2024    HGB 13.4 (L) 01/31/2024    HCT 42.2 01/31/2024    MCV 95 01/31/2024     01/31/2024       Physical Exam  General: Alert, Oriented and Cooperative    Airway:  Mallampati: II   Mouth Opening: Normal  TM Distance: Normal  Tongue: Normal  Neck ROM: Normal ROM    Dental:  Edentulous        Anesthesia Plan  Type of Anesthesia, risks & benefits discussed:    Anesthesia Type: Gen ETT  Intra-op  Monitoring Plan: Standard ASA Monitors  Post Op Pain Control Plan: multimodal analgesia and IV/PO Opioids PRN  Induction:  IV  Airway Plan: Direct, Post-Induction  Informed Consent: Informed consent signed with the Patient and all parties understand the risks and agree with anesthesia plan.  All questions answered.   ASA Score: 3  Day of Surgery Review of History & Physical: H&P Update referred to the surgeon/provider.  Anesthesia Plan Notes: Intubation     Date/Time: 6/8/2023 8:42 AM  Performed by: Juanpablo Muñiz CRNA  Authorized by: Mando Dobbins MD      Intubation:     Induction:  Intravenous    Intubated:  Postinduction    Mask Ventilation:  Easy mask    Attempts:  1    Attempted By:  CRNA    Method of Intubation:  Direct    Blade:  Son 3    Laryngeal View Grade: Grade I - full view of cords      Difficult Airway Encountered?: No      Complications:  None    Airway Device:  Oral endotracheal tube    Airway Device Size:  7.0    Style/Cuff Inflation:  Cuffed (inflated to minimal occlusive pressure)    Tube secured:  22    Secured at:  The lips    Placement Verified By:  Capnometry    Complicating Factors:  None    Findings Post-Intubation:  BS equal bilateral      Ready For Surgery From Anesthesia Perspective.     .

## 2024-02-08 NOTE — OP NOTE
Date of Procedure: 02/08/2024    PREOP DIAGNOSIS:  Bladder cancer.    POSTOP DIAGNOSIS:  Bladder cancer.    PROCEDURES:      1. TURBT -- medium.    2. Tumor fulguration    SURGEON:  Hortencia Michael MD    Assistants: None    Specimen: Bladder tumor, 3cm total    ANESTHESIA:  General endotracheal.    BLOOD LOSS:  None.    FINDINGS:  At least 4 separate regions of papillary tumor present, all completely removed and fulgurated, including surrounding tissue.     PROCEDURE IN DETAIL:  Patient was brought to the operative suite and placed under general anesthesia and positioned into the dorsal lithotomy position.  After being sterilely prepped and draped a 21 Beninese sheath cystoscope was inserted into a normal urethra.   Bladder was examined, tumor was identified posterior to the Left UO, at the posterior bladder wall and at the dome. Each tumor was approximately 1cm. Bilateral ureteral orifices are normal in size, shape, caliber and location.  There was a scar visible at the left posterior bladder wall. Cold cup biopsy forceps were utilized to remove the tumors.  I then fulgurated the edges and the base of the tumor.  There was a total of 3cm of bladder wall that was treated.  At the conclusion hemostasis was meticulously maintained and there was no evidence of residual tumor burden within the bladder.      COMPLICATIONS: None

## 2024-02-09 VITALS
RESPIRATION RATE: 13 BRPM | TEMPERATURE: 98 F | SYSTOLIC BLOOD PRESSURE: 127 MMHG | WEIGHT: 166 LBS | HEART RATE: 59 BPM | OXYGEN SATURATION: 95 % | BODY MASS INDEX: 23.77 KG/M2 | DIASTOLIC BLOOD PRESSURE: 60 MMHG | HEIGHT: 70 IN

## 2024-02-09 NOTE — ANESTHESIA POSTPROCEDURE EVALUATION
Anesthesia Post Evaluation    Patient: Nithin iPno    Procedure(s) Performed: Procedure(s) (LRB):  TURBT (TRANSURETHRAL RESECTION OF BLADDER TUMOR) (N/A)    Final Anesthesia Type: general      Patient location during evaluation: PACU  Patient participation: Yes- Able to Participate  Level of consciousness: awake and alert and oriented  Post-procedure vital signs: reviewed and stable  Pain management: adequate  Airway patency: patent  THOMAS mitigation strategies: Verification of full reversal of neuromuscular block  PONV status at discharge: No PONV  Anesthetic complications: no      Cardiovascular status: blood pressure returned to baseline and hemodynamically stable  Respiratory status: unassisted  Hydration status: euvolemic  Follow-up not needed.              Vitals Value Taken Time   /60 02/08/24 1129   Temp 36.4 °C (97.5 °F) 02/08/24 1128   Pulse 59 02/08/24 1130   Resp 18 02/08/24 1130   SpO2 98 % 02/08/24 1130   Vitals shown include unvalidated device data.      Event Time   Out of Recovery 11:33:00         Pain/Adi Score: Pain Rating Prior to Med Admin: 6 (2/8/2024 10:25 AM)  Adi Score: 10 (2/8/2024 11:33 AM)

## 2024-02-13 ENCOUNTER — TELEPHONE (OUTPATIENT)
Dept: UROLOGY | Facility: CLINIC | Age: 84
End: 2024-02-13
Payer: OTHER GOVERNMENT

## 2024-02-13 ENCOUNTER — OFFICE VISIT (OUTPATIENT)
Dept: INTERNAL MEDICINE | Facility: CLINIC | Age: 84
End: 2024-02-13
Payer: MEDICARE

## 2024-02-13 ENCOUNTER — TELEPHONE (OUTPATIENT)
Dept: CARDIOLOGY | Facility: HOSPITAL | Age: 84
End: 2024-02-13
Payer: OTHER GOVERNMENT

## 2024-02-13 VITALS
TEMPERATURE: 97 F | BODY MASS INDEX: 23.8 KG/M2 | RESPIRATION RATE: 18 BRPM | SYSTOLIC BLOOD PRESSURE: 110 MMHG | HEART RATE: 78 BPM | HEIGHT: 70 IN | WEIGHT: 166.25 LBS | OXYGEN SATURATION: 98 % | DIASTOLIC BLOOD PRESSURE: 58 MMHG

## 2024-02-13 DIAGNOSIS — I15.2 HYPERTENSION ASSOCIATED WITH DIABETES: Chronic | ICD-10-CM

## 2024-02-13 DIAGNOSIS — Z09 NEED FOR CASE MANAGEMENT FOLLOW-UP: ICD-10-CM

## 2024-02-13 DIAGNOSIS — R39.12 BENIGN PROSTATIC HYPERPLASIA WITH WEAK URINARY STREAM: ICD-10-CM

## 2024-02-13 DIAGNOSIS — E11.65 TYPE 2 DIABETES MELLITUS WITH HYPERGLYCEMIA, WITH LONG-TERM CURRENT USE OF INSULIN: Primary | Chronic | ICD-10-CM

## 2024-02-13 DIAGNOSIS — E21.3 HYPERPARATHYROIDISM: Chronic | ICD-10-CM

## 2024-02-13 DIAGNOSIS — I65.23 ASYMPTOMATIC STENOSIS OF BOTH CAROTID ARTERIES WITHOUT INFARCTION: Chronic | ICD-10-CM

## 2024-02-13 DIAGNOSIS — I50.42 CHRONIC COMBINED SYSTOLIC AND DIASTOLIC CONGESTIVE HEART FAILURE: Chronic | ICD-10-CM

## 2024-02-13 DIAGNOSIS — I70.0 THORACIC AORTA ATHEROSCLEROSIS: Chronic | ICD-10-CM

## 2024-02-13 DIAGNOSIS — E78.5 HYPERLIPIDEMIA ASSOCIATED WITH TYPE 2 DIABETES MELLITUS: Chronic | ICD-10-CM

## 2024-02-13 DIAGNOSIS — N18.31 STAGE 3A CHRONIC KIDNEY DISEASE: Chronic | ICD-10-CM

## 2024-02-13 DIAGNOSIS — J43.1 PANLOBULAR EMPHYSEMA: Chronic | ICD-10-CM

## 2024-02-13 DIAGNOSIS — I25.708 CORONARY ARTERY DISEASE OF BYPASS GRAFT OF NATIVE HEART WITH STABLE ANGINA PECTORIS: Chronic | ICD-10-CM

## 2024-02-13 DIAGNOSIS — E11.59 HYPERTENSION ASSOCIATED WITH DIABETES: Chronic | ICD-10-CM

## 2024-02-13 DIAGNOSIS — E11.69 HYPERLIPIDEMIA ASSOCIATED WITH TYPE 2 DIABETES MELLITUS: Chronic | ICD-10-CM

## 2024-02-13 DIAGNOSIS — E11.51 DIABETES MELLITUS TYPE 2 WITH PERIPHERAL ARTERY DISEASE: Chronic | ICD-10-CM

## 2024-02-13 DIAGNOSIS — Z79.4 TYPE 2 DIABETES MELLITUS WITH HYPERGLYCEMIA, WITH LONG-TERM CURRENT USE OF INSULIN: Primary | Chronic | ICD-10-CM

## 2024-02-13 DIAGNOSIS — N40.1 BENIGN PROSTATIC HYPERPLASIA WITH WEAK URINARY STREAM: ICD-10-CM

## 2024-02-13 PROBLEM — C67.8 MALIGNANT NEOPLASM OF OVERLAPPING SITES OF BLADDER: Chronic | Status: ACTIVE | Noted: 2023-06-08

## 2024-02-13 PROBLEM — C67.8 MALIGNANT NEOPLASM OF OVERLAPPING SITES OF BLADDER: Status: ACTIVE | Noted: 2023-06-08

## 2024-02-13 PROBLEM — D50.9 IRON DEFICIENCY ANEMIA: Chronic | Status: ACTIVE | Noted: 2023-10-19

## 2024-02-13 PROBLEM — I20.89 CHRONIC STABLE ANGINA: Chronic | Status: ACTIVE | Noted: 2019-01-21

## 2024-02-13 PROCEDURE — 3074F SYST BP LT 130 MM HG: CPT | Mod: HCNC,CPTII,S$GLB, | Performed by: FAMILY MEDICINE

## 2024-02-13 PROCEDURE — 99999 PR PBB SHADOW E&M-EST. PATIENT-LVL V: CPT | Mod: PBBFAC,HCNC,, | Performed by: FAMILY MEDICINE

## 2024-02-13 PROCEDURE — 1101F PT FALLS ASSESS-DOCD LE1/YR: CPT | Mod: HCNC,CPTII,S$GLB, | Performed by: FAMILY MEDICINE

## 2024-02-13 PROCEDURE — 3078F DIAST BP <80 MM HG: CPT | Mod: HCNC,CPTII,S$GLB, | Performed by: FAMILY MEDICINE

## 2024-02-13 PROCEDURE — 99215 OFFICE O/P EST HI 40 MIN: CPT | Mod: HCNC,S$GLB,, | Performed by: FAMILY MEDICINE

## 2024-02-13 PROCEDURE — 1126F AMNT PAIN NOTED NONE PRSNT: CPT | Mod: HCNC,CPTII,S$GLB, | Performed by: FAMILY MEDICINE

## 2024-02-13 PROCEDURE — 3288F FALL RISK ASSESSMENT DOCD: CPT | Mod: HCNC,CPTII,S$GLB, | Performed by: FAMILY MEDICINE

## 2024-02-13 RX ORDER — ASPIRIN 81 MG/1
81 TABLET ORAL DAILY
Qty: 90 TABLET | Refills: 3 | Status: SHIPPED | OUTPATIENT
Start: 2024-02-13 | End: 2025-02-12

## 2024-02-13 RX ORDER — TAMSULOSIN HYDROCHLORIDE 0.4 MG/1
0.4 CAPSULE ORAL DAILY
Qty: 90 CAPSULE | Refills: 3 | Status: SHIPPED | OUTPATIENT
Start: 2024-02-13 | End: 2024-05-13 | Stop reason: SDUPTHER

## 2024-02-13 RX ORDER — AMLODIPINE BESYLATE 10 MG/1
5 TABLET ORAL DAILY
Qty: 90 TABLET | Refills: 3 | Status: SHIPPED | OUTPATIENT
Start: 2024-02-13 | End: 2024-05-13 | Stop reason: SDUPTHER

## 2024-02-13 RX ORDER — INSULIN GLARGINE 100 [IU]/ML
INJECTION, SOLUTION SUBCUTANEOUS
COMMUNITY

## 2024-02-13 RX ORDER — IBUPROFEN 200 MG
16 TABLET ORAL
Qty: 100 TABLET | Refills: 5 | Status: SHIPPED | OUTPATIENT
Start: 2024-02-13

## 2024-02-13 RX ORDER — ISOSORBIDE MONONITRATE 60 MG/1
60 TABLET, EXTENDED RELEASE ORAL DAILY
Qty: 90 TABLET | Refills: 3 | Status: SHIPPED | OUTPATIENT
Start: 2024-02-13 | End: 2024-05-13 | Stop reason: SDUPTHER

## 2024-02-13 RX ORDER — METOPROLOL SUCCINATE 25 MG/1
25 TABLET, EXTENDED RELEASE ORAL NIGHTLY
Qty: 90 TABLET | Refills: 3 | Status: SHIPPED | OUTPATIENT
Start: 2024-02-13 | End: 2024-05-13 | Stop reason: SDUPTHER

## 2024-02-13 RX ORDER — ATORVASTATIN CALCIUM 40 MG/1
40 TABLET, FILM COATED ORAL NIGHTLY
Qty: 90 TABLET | Refills: 3 | Status: SHIPPED | OUTPATIENT
Start: 2024-02-13 | End: 2024-05-13 | Stop reason: SDUPTHER

## 2024-02-13 RX ORDER — CHOLECALCIFEROL (VITAMIN D3) 25 MCG
1000 TABLET ORAL DAILY
Qty: 90 TABLET | Refills: 3 | Status: SHIPPED | OUTPATIENT
Start: 2024-02-13

## 2024-02-13 RX ORDER — INSULIN ASPART 100 [IU]/ML
3 INJECTION, SOLUTION INTRAVENOUS; SUBCUTANEOUS EVERY 4 HOURS PRN
COMMUNITY

## 2024-02-13 RX ORDER — LOSARTAN POTASSIUM 100 MG/1
100 TABLET ORAL DAILY
Qty: 90 TABLET | Refills: 3 | Status: SHIPPED | OUTPATIENT
Start: 2024-02-13 | End: 2024-05-13 | Stop reason: SDUPTHER

## 2024-02-13 NOTE — ASSESSMENT & PLAN NOTE
Lab Results   Component Value Date    EGFRNORACEVR 45.6 (A) 02/05/2024    EGFRNORACEVR 42.2 (A) 01/31/2024    EGFRNORACEVR 45.6 (A) 01/09/2024    CREATININE 1.5 (H) 02/05/2024    CREATININE 1.6 (H) 01/31/2024    CREATININE 1.5 (H) 01/09/2024    BUN 32 (H) 02/05/2024    BUN 28 (H) 01/31/2024    BUN 28 (H) 01/09/2024

## 2024-02-13 NOTE — LETTER
ATTN: CARE COORDINATION     PATIENT IDENTIFYING INFORMATION:  NITHIN GILMAN JOMAR   [address] [place] [address] YOB: 1940  OUR MRN: [id number]     RECORDS  REQUESTED FROM:   [facility] [place]   ADDRESS / PHONE:        REQUESTED DELIVERY METHOD FOR RECORDS: Fax ([phone]) or secure Email  to [email or web address]   DATES OF SERVICE: Specified below.     AUTHORIZATION:   For the purpose of continuity of care, I, Nithin GILMAN Jomar, hereby authorize the hospital, physician, or entity named above to release my medical records for all dates of service from 10 years prior to date signed through the present date to: No, Primary Doctor; [facility]; [address] [facility]; [place] [address]; PH: [phone] SPECIFIC RECORDS REQUESTED:  [x] diabetic eye exam report (last 2 years)       In authorizing the release of the confidential information identified above, I hereby waive all restrictions or privileges imposed by law and release [facility] and Its affiliates and their staff from any restriction or privilege Imposed by law in connection with the disclosure or release of any professional record, observation or communication. I do understand that the information that is being released may be subject to re-disclosure by the recipient and may no longer be protected. I understand that my treatment, payment, enrollment or eligibility for benefits may not be conditioned on signing this authorization.  This authorization may be revoked in writing at any time, except to the extent that [facility] and its affiliates have already taken action in reliance on it. Letters to revoke this authorization should be addressed to [facility], Release of Information Department, [address] [place] [id number].     If not previously revoked in writing, this authorization will terminate or    26   months after the date signed  below.                   Signature of Patient    Date Signed

## 2024-02-13 NOTE — PROGRESS NOTES
OFFICE VISIT 2/13/24  8:20 AM CST    CHIEF COMPLAINT: Establish Care    This is my first time treating Nithin.  Nithin is requesting that I assume the role of their primary care provider.    SUBJECTIVE  He manages his diabetes with Trulicity, NovoLog, and Lantus (15 units of Lantus at night and 18 units in the morning, 3 units of Novolog every four hours prior to eating certain foods). He reports occasional blood sugar spikes to > 300 and uses a continuous glucose monitor for daily monitoring.    Nithin reports chronic difficulty in walking, occasionally dragging his right leg. This is potentially connected to a past hip fracture that was repaired with a emily. He also states that he has low circulation in his right leg, an ongoing issue for the past 11 years.    Nithin's hypertension, prostate & bladder issues, high cholesterol, and benign prostatic hyperplasia remain stable. His hypertension is managed effectively with Lisinopril, Prostate and bladder conditions with medications prescribed by his urologist, high cholesterol with atorvastatin, BPH symptoms remain stable using Tamsulosin.    He has a history of emphysema leading to dependence on inhalers. However, upon quitting smoking in 1994, he no longer requires inhalers for the condition.    Nithin's congestive heart failure appears compensated with no current adverse symptoms and is being managed for peripheral arterial disease in the context of his type 1 diabetes.    He is currently being treated for bladder cancer by a urologist.    The patient is scheduled for labs to monitor his hyperparathyroidism.    The patient's last vision follow-up was recorded as 11 months ago as [facility]      ASSESSMENT   Type 2 Diabetes Mellitus: He has type 2 diabetes mellitus, insulin-dependent, with hyperglycemia, apparently uncontrolled based on his report of home glucose readings typically greater than 200. He wants to receive his diabetes [facility] due to zero cost to him. He agreed to obtain labs as ordered and we would readdress this at his next appointment. In the meantime, he is to continue his current diabetes medications unchanged.   Peripheral Arterial Disease: Peripheral arterial disease in type 2 diabetes persists, and his feet are cold on exam today. He has not had lower extremity arterial imaging in some time. He does report claudication, but this is stable.   Hypertension: Hypertension is controlled.   BPH: BPH symptoms stable on tamsulosin, previously prescribed by urologist, who is also treating his bladder cancer.   Hyperlipidemia: Tolerating atorvastatin for hyperlipidemia. Checking lipid panel.   Coronary Artery Disease: Coronary artery disease with stable angina is compensated and controlled as long as he takes his beta-blocker, isosorbide mononitrate, amlodipine, and atorvastatin and aspirin.   Aortic Atherosclerosis: Aortic atherosclerosis is asymptomatic and clinically stable. It is asymptomatic and clinically stable on atorvastatin.   Congestive Heart Failure: Congestive heart failure appears compensated.   Hyperparathyroidism: He is due for labs to evaluate and monitor his hyperparathyroidism.   Emphysema: He was previously treated with albuterol for his emphysema, but he states that gradually over time since he stopped smoking he no longer needs albuterol or any other treatment. He declined further evaluation and management of this condition at this point.      PLAN  DIABETES MANAGEMENT:   Continue the patient's current diabetes medications without changes and planned to monitor the [facility] management [facility].   Scheduled a follow-up visit in 3 months to reassess the patient's diabetes management and overall conditions.   Educated the patient on the distinction between type 1 and type 2 diabetes.   Emphasized the importance of effective diabetes management and the necessity for regular  labs.   Explained the function of the patient's current diabetes medications.    COORDINATION OF CARE:   Coordinated with the patient's [facility] doctors to ensure seamless care.    LAB TESTS:   Ordered labs once to evaluate the patient's metabolism and kidney function.    CIRCULATION EVALUATION:   Communicated with Dr. Sadler to arrange an evaluation of the patient's leg circulation.    1. Type 2 diabetes mellitus with hyperglycemia, with long-term current use of insulin  -     Hemoglobin A1C; Standing  -     Lipid Panel; Standing  -     Microalbumin/Creatinine Ratio, Urine; Standing  -     glucose 4 GM chewable tablet; Take 4 tablets (16 g total) by mouth as needed for Low blood sugar.  Dispense: 100 tablet; Refill: 5  -     Renal Function Panel; Standing    2. Diabetes mellitus type 2 with peripheral artery disease  -     Hemoglobin A1C; Standing  -     Lipid Panel; Standing  -     Microalbumin/Creatinine Ratio, Urine; Standing  -     CV Ultrasound doppler arterial legs bilat; Future  -     aspirin (ECOTRIN) 81 MG EC tablet; Take 1 tablet (81 mg total) by mouth once daily.  Dispense: 90 tablet; Refill: 3  -     atorvastatin (LIPITOR) 40 MG tablet; Take 1 tablet (40 mg total) by mouth every evening.  Dispense: 90 tablet; Refill: 3  -     glucose 4 GM chewable tablet; Take 4 tablets (16 g total) by mouth as needed for Low blood sugar.  Dispense: 100 tablet; Refill: 5  -     Renal Function Panel; Standing    3. Hypertension associated with diabetes  -     amLODIPine (NORVASC) 10 MG tablet; Take 0.5 tablets (5 mg total) by mouth once daily.  Dispense: 90 tablet; Refill: 3  -     losartan (COZAAR) 100 MG tablet; Take 1 tablet (100 mg total) by mouth once daily.  Dispense: 90 tablet; Refill: 3  -     metoprolol succinate (TOPROL-XL) 25 MG 24 hr tablet; Take 1 tablet (25 mg total) by mouth every evening.  Dispense: 90 tablet; Refill: 3  -     Renal Function Panel; Standing    4. Benign prostatic hyperplasia with weak urinary  stream  Overview:  Patient states has urge incontinence    Orders:  -     tamsulosin (FLOMAX) 0.4 mg Cap; Take 1 capsule (0.4 mg total) by mouth once daily.  Dispense: 90 capsule; Refill: 3    5. Hyperlipidemia associated with type 2 diabetes mellitus  -     atorvastatin (LIPITOR) 40 MG tablet; Take 1 tablet (40 mg total) by mouth every evening.  Dispense: 90 tablet; Refill: 3  -     AST (SGOT); Future; Expected date: 02/13/2024  -     ALT (SGPT); Future; Expected date: 02/13/2024    6. Coronary artery disease of bypass graft of native heart with stable angina pectoris  Overview:  Per patient s/p CABG x4 5/2011    Orders:  -     aspirin (ECOTRIN) 81 MG EC tablet; Take 1 tablet (81 mg total) by mouth once daily.  Dispense: 90 tablet; Refill: 3  -     amLODIPine (NORVASC) 10 MG tablet; Take 0.5 tablets (5 mg total) by mouth once daily.  Dispense: 90 tablet; Refill: 3  -     atorvastatin (LIPITOR) 40 MG tablet; Take 1 tablet (40 mg total) by mouth every evening.  Dispense: 90 tablet; Refill: 3  -     isosorbide mononitrate (IMDUR) 60 MG 24 hr tablet; Take 1 tablet (60 mg total) by mouth once daily.  Dispense: 90 tablet; Refill: 3  -     metoprolol succinate (TOPROL-XL) 25 MG 24 hr tablet; Take 1 tablet (25 mg total) by mouth every evening.  Dispense: 90 tablet; Refill: 3    7. Thoracic aorta atherosclerosis  Overview:  CXR 2///11- TOP NORMAL HEART SIZE WITH MILD TORTUOSITY AND   ATHEROSCLEROTIC CALCIFICATION OF THE THORACIC AORTA.    Orders:  -     atorvastatin (LIPITOR) 40 MG tablet; Take 1 tablet (40 mg total) by mouth every evening.  Dispense: 90 tablet; Refill: 3    8. Chronic combined systolic and diastolic congestive heart failure  -     losartan (COZAAR) 100 MG tablet; Take 1 tablet (100 mg total) by mouth once daily.  Dispense: 90 tablet; Refill: 3  -     metoprolol succinate (TOPROL-XL) 25 MG 24 hr tablet; Take 1 tablet (25 mg total) by mouth every evening.  Dispense: 90 tablet; Refill: 3    9. Asymptomatic  stenosis of both carotid arteries without infarction  -     aspirin (ECOTRIN) 81 MG EC tablet; Take 1 tablet (81 mg total) by mouth once daily.  Dispense: 90 tablet; Refill: 3  -     atorvastatin (LIPITOR) 40 MG tablet; Take 1 tablet (40 mg total) by mouth every evening.  Dispense: 90 tablet; Refill: 3    10. Stage 3a chronic kidney disease  Assessment & Plan:  Lab Results   Component Value Date    EGFRNORACEVR 45.6 (A) 02/05/2024    EGFRNORACEVR 42.2 (A) 01/31/2024    EGFRNORACEVR 45.6 (A) 01/09/2024    CREATININE 1.5 (H) 02/05/2024    CREATININE 1.6 (H) 01/31/2024    CREATININE 1.5 (H) 01/09/2024    BUN 32 (H) 02/05/2024    BUN 28 (H) 01/31/2024    BUN 28 (H) 01/09/2024        Orders:  -     losartan (COZAAR) 100 MG tablet; Take 1 tablet (100 mg total) by mouth once daily.  Dispense: 90 tablet; Refill: 3  -     Renal Function Panel; Standing    11. Hyperparathyroidism  -     vitamin D (VITAMIN D3) 1000 units Tab; Take 1 tablet (1,000 Units total) by mouth once daily.  Dispense: 90 tablet; Refill: 3  -     PTH, Intact; Standing  -     Renal Function Panel; Standing  -     Vitamin D; Standing    12. Panlobular emphysema    13. Need for case management follow-up  -     Ambulatory referral/consult to Outpatient Case Management    Unless noted herein, any chronic conditions are represented as and appear stable, and no other significant complaints or concerns were reported.    Follow up in about 3 months (around 5/13/2024) for office visit, re-evaluation.   Future Appointments   Date Time Provider Department Center   2/19/2024 12:20 PM LAB[facility]ATORY, [place] [facility] LAB [place]   2/21/2024  2:00 PM Hortencia Michael MD [facility] [place]   2/22/2024  2:00 PM Maureen Rivera NP [facility] [place]   3/12/2024 11:00 AM Deniz Sadler MD [facility] CARDIO [place]   5/13/2024  8:40 AM MUKUL Garg MD [facility]  [place]       Review of Systems   Respiratory:  Negative for chest tightness and shortness  "of breath.    Cardiovascular:  Negative for chest pain.   Endocrine: Negative for polydipsia and polyuria.       Vitals:    02/13/24 0757   BP: (!) 110/58   BP Location: Left arm   Patient Position: Sitting   BP Method: Large (Manual)   Pulse: 78   Resp: 18   Temp: 96.8 °F (36 °C)   SpO2: 98%   Weight: 75.4 kg (166 lb 3.6 oz)   Height: 5' 10" (1.778 m)   Physical Exam  Vitals reviewed.   Constitutional:       General: He is not in acute distress.     Appearance: Normal appearance. He is not ill-appearing or diaphoretic.   Cardiovascular:      Rate and Rhythm: Normal rate and regular rhythm.   Pulmonary:      Effort: Pulmonary effort is normal.      Breath sounds: Normal breath sounds.   Skin:     General: Skin is warm and dry.   Neurological:      Mental Status: He is alert and oriented to person, place, and time. Mental status is at baseline.   Psychiatric:         Mood and Affect: Mood normal.         Behavior: Behavior normal.         Judgment: Judgment normal.       DIABETIC FOOT EXAM:  Protective Sensation (w/ 10 gram monofilament):  Right: Decreased  Left: Decreased    Visual Inspection:  Onychomycosis -  Bilateral    Pedal Pulses:   Right: Absent  Left: Absent    Posterior Tibialis Pulses:   Right:Absent  Left: Absent  TOTAL TIME evaluating and managing this patient for this encounter was greater than or equal to 40 minutes. This time was spent personally by me on the following activities: review of nurse's notes, pre-charting, review of patient's past medical history, assessing age-appropriate health maintenance needs, review of any interval history, medication reconciliation, reconciling/updating problem list, reconciling/updating PMFSH, obtaining history from the patient, examination of the patient, review and interpretation of lab results, review and interpretation of imaging test results, review and interpretation of cardiology test results, reviewing consulting specialist notes, evaluation of the " "patient's response to treatment, managing and/or ordering prescription medications, ordering labs, ordering cardiology tests, educating patient and answering their questions about risks and benefits of treatment options, educating patient and answering their questions about treatment plan, goals of treatment, and follow-up, counseling on appropriate therapeutic lifestyle changes, discussing strategies to help overcome any barriers to adherence to treatment plan, and final documentation of the visit. . This time was exclusive of any separately billable procedures for this patient and exclusive of time spent treating any other patients.   Documentation entered by me for this encounter may have been done in part using speech-recognition technology. Although I have made an effort to ensure accuracy, "sound like" errors may exist and should be interpreted in context.  "

## 2024-02-13 NOTE — TELEPHONE ENCOUNTER
02/13/2024 0811 - Outgoing call placed to patient's daughter in regards to her concern about patient having incontinence after procedure, , she verified patient's name and date of birth, daughter stated that it has resolved but she was also informed of what Dr. Michael stated that she didn't do anything near his sphincter. It may just be irritation of the bladder from the procedure and wouldn't have expected that to persist. Daughter verbalized understanding and stated that it is resolved and no further assistance needed.    Ryan Payne RN    ----- Message from Hortencia Michael MD sent at 2/12/2024  4:03 PM CST -----  Contact: pt's daughter/Ai  I would not expect incontinence. I didn't do anything near his sphincter. It may just be irritation of the bladder from the procedure. I wouldn't expect that to persist.   ----- Message -----  From: Reilly Gupta MA  Sent: 2/9/2024   3:16 PM CST  To: Hortencia Michael MD    Pt c/o incontinence and wanted to know if that is normal.    ----- Message -----  From: Jacki Moe  Sent: 2/9/2024   8:30 AM CST  To: Alec Robles    Ai is call inf regard to following the pt's procedure on yesterday, he is dealing with incontinence.  Please call her at [phone]  Please call in the afternoon if possible, Ai has appts this morning.

## 2024-02-14 ENCOUNTER — LAB VISIT (OUTPATIENT)
Dept: LAB | Facility: HOSPITAL | Age: 84
End: 2024-02-14
Attending: NURSE PRACTITIONER
Payer: MEDICARE

## 2024-02-14 DIAGNOSIS — N18.30 CKD STAGE 3 DUE TO TYPE 1 DIABETES MELLITUS: ICD-10-CM

## 2024-02-14 DIAGNOSIS — Z79.4 TYPE 2 DIABETES MELLITUS WITH HYPERGLYCEMIA, WITH LONG-TERM CURRENT USE OF INSULIN: Chronic | ICD-10-CM

## 2024-02-14 DIAGNOSIS — E21.3 HYPERPARATHYROIDISM: Chronic | ICD-10-CM

## 2024-02-14 DIAGNOSIS — E11.69 HYPERLIPIDEMIA ASSOCIATED WITH TYPE 2 DIABETES MELLITUS: Chronic | ICD-10-CM

## 2024-02-14 DIAGNOSIS — E11.65 TYPE 2 DIABETES MELLITUS WITH HYPERGLYCEMIA, WITH LONG-TERM CURRENT USE OF INSULIN: Chronic | ICD-10-CM

## 2024-02-14 DIAGNOSIS — I15.2 HYPERTENSION ASSOCIATED WITH DIABETES: Chronic | ICD-10-CM

## 2024-02-14 DIAGNOSIS — E11.51 DIABETES MELLITUS TYPE 2 WITH PERIPHERAL ARTERY DISEASE: Chronic | ICD-10-CM

## 2024-02-14 DIAGNOSIS — E11.59 HYPERTENSION ASSOCIATED WITH DIABETES: Chronic | ICD-10-CM

## 2024-02-14 DIAGNOSIS — N18.31 STAGE 3A CHRONIC KIDNEY DISEASE: Chronic | ICD-10-CM

## 2024-02-14 DIAGNOSIS — E10.22 CKD STAGE 3 DUE TO TYPE 1 DIABETES MELLITUS: ICD-10-CM

## 2024-02-14 DIAGNOSIS — E78.5 HYPERLIPIDEMIA ASSOCIATED WITH TYPE 2 DIABETES MELLITUS: Chronic | ICD-10-CM

## 2024-02-14 DIAGNOSIS — D50.9 IRON DEFICIENCY ANEMIA, UNSPECIFIED IRON DEFICIENCY ANEMIA TYPE: ICD-10-CM

## 2024-02-14 DIAGNOSIS — D49.4 BLADDER TUMOR: ICD-10-CM

## 2024-02-14 LAB
25(OH)D3+25(OH)D2 SERPL-MCNC: 43 NG/ML (ref 30–96)
ALBUMIN SERPL BCP-MCNC: 3.9 G/DL (ref 3.5–5.2)
ALBUMIN/CREAT UR: 82.9 UG/MG (ref 0–30)
ALT SERPL W/O P-5'-P-CCNC: 19 U/L (ref 10–44)
ANION GAP SERPL CALC-SCNC: 9 MMOL/L (ref 8–16)
ANION GAP SERPL CALC-SCNC: 9 MMOL/L (ref 8–16)
AST SERPL-CCNC: 19 U/L (ref 10–40)
BASOPHILS # BLD AUTO: 0.01 K/UL (ref 0–0.2)
BASOPHILS NFR BLD: 0.1 % (ref 0–1.9)
BUN SERPL-MCNC: 26 MG/DL (ref 8–23)
BUN SERPL-MCNC: 26 MG/DL (ref 8–23)
CALCIUM SERPL-MCNC: 10.3 MG/DL (ref 8.7–10.5)
CALCIUM SERPL-MCNC: 10.3 MG/DL (ref 8.7–10.5)
CHLORIDE SERPL-SCNC: 102 MMOL/L (ref 95–110)
CHLORIDE SERPL-SCNC: 102 MMOL/L (ref 95–110)
CHOLEST SERPL-MCNC: 123 MG/DL (ref 120–199)
CHOLEST/HDLC SERPL: 2.3 {RATIO} (ref 2–5)
CO2 SERPL-SCNC: 28 MMOL/L (ref 23–29)
CO2 SERPL-SCNC: 28 MMOL/L (ref 23–29)
CREAT SERPL-MCNC: 1.3 MG/DL (ref 0.5–1.4)
CREAT SERPL-MCNC: 1.3 MG/DL (ref 0.5–1.4)
CREAT UR-MCNC: 129 MG/DL (ref 23–375)
DIFFERENTIAL METHOD BLD: ABNORMAL
EOSINOPHIL # BLD AUTO: 0.4 K/UL (ref 0–0.5)
EOSINOPHIL NFR BLD: 4.9 % (ref 0–8)
ERYTHROCYTE [DISTWIDTH] IN BLOOD BY AUTOMATED COUNT: 14 % (ref 11.5–14.5)
EST. GFR  (NO RACE VARIABLE): 54.2 ML/MIN/1.73 M^2
EST. GFR  (NO RACE VARIABLE): 54.2 ML/MIN/1.73 M^2
ESTIMATED AVG GLUCOSE: 220 MG/DL (ref 68–131)
FERRITIN SERPL-MCNC: 223 NG/ML (ref 20–300)
GLUCOSE SERPL-MCNC: 92 MG/DL (ref 70–110)
GLUCOSE SERPL-MCNC: 92 MG/DL (ref 70–110)
HBA1C MFR BLD: 9.3 % (ref 4–5.6)
HCT VFR BLD AUTO: 42.2 % (ref 40–54)
HDLC SERPL-MCNC: 54 MG/DL (ref 40–75)
HDLC SERPL: 43.9 % (ref 20–50)
HGB BLD-MCNC: 13.6 G/DL (ref 14–18)
IMM GRANULOCYTES # BLD AUTO: 0.01 K/UL (ref 0–0.04)
IMM GRANULOCYTES NFR BLD AUTO: 0.1 % (ref 0–0.5)
IRON SERPL-MCNC: 56 UG/DL (ref 45–160)
LDLC SERPL CALC-MCNC: 52.6 MG/DL (ref 63–159)
LYMPHOCYTES # BLD AUTO: 2 K/UL (ref 1–4.8)
LYMPHOCYTES NFR BLD: 26.9 % (ref 18–48)
MCH RBC QN AUTO: 30.6 PG (ref 27–31)
MCHC RBC AUTO-ENTMCNC: 32.2 G/DL (ref 32–36)
MCV RBC AUTO: 95 FL (ref 82–98)
MICROALBUMIN UR DL<=1MG/L-MCNC: 107 UG/ML
MONOCYTES # BLD AUTO: 0.5 K/UL (ref 0.3–1)
MONOCYTES NFR BLD: 6.9 % (ref 4–15)
NEUTROPHILS # BLD AUTO: 4.6 K/UL (ref 1.8–7.7)
NEUTROPHILS NFR BLD: 61.1 % (ref 38–73)
NONHDLC SERPL-MCNC: 69 MG/DL
NRBC BLD-RTO: 0 /100 WBC
PHOSPHATE SERPL-MCNC: 2.8 MG/DL (ref 2.7–4.5)
PLATELET # BLD AUTO: 232 K/UL (ref 150–450)
PMV BLD AUTO: 11 FL (ref 9.2–12.9)
POTASSIUM SERPL-SCNC: 3.9 MMOL/L (ref 3.5–5.1)
POTASSIUM SERPL-SCNC: 3.9 MMOL/L (ref 3.5–5.1)
PTH-INTACT SERPL-MCNC: 147.4 PG/ML (ref 9–77)
RBC # BLD AUTO: 4.45 M/UL (ref 4.6–6.2)
SATURATED IRON: 17 % (ref 20–50)
SODIUM SERPL-SCNC: 139 MMOL/L (ref 136–145)
SODIUM SERPL-SCNC: 139 MMOL/L (ref 136–145)
TOTAL IRON BINDING CAPACITY: 339 UG/DL (ref 250–450)
TRANSFERRIN SERPL-MCNC: 229 MG/DL (ref 200–375)
TRIGL SERPL-MCNC: 82 MG/DL (ref 30–150)
WBC # BLD AUTO: 7.51 K/UL (ref 3.9–12.7)

## 2024-02-14 PROCEDURE — 82728 ASSAY OF FERRITIN: CPT | Mod: HCNC | Performed by: NURSE PRACTITIONER

## 2024-02-14 PROCEDURE — 83970 ASSAY OF PARATHORMONE: CPT | Mod: HCNC | Performed by: FAMILY MEDICINE

## 2024-02-14 PROCEDURE — 84460 ALANINE AMINO (ALT) (SGPT): CPT | Mod: HCNC | Performed by: FAMILY MEDICINE

## 2024-02-14 PROCEDURE — 82306 VITAMIN D 25 HYDROXY: CPT | Mod: HCNC | Performed by: FAMILY MEDICINE

## 2024-02-14 PROCEDURE — 80069 RENAL FUNCTION PANEL: CPT | Mod: HCNC | Performed by: FAMILY MEDICINE

## 2024-02-14 PROCEDURE — 83540 ASSAY OF IRON: CPT | Mod: HCNC | Performed by: NURSE PRACTITIONER

## 2024-02-14 PROCEDURE — 85025 COMPLETE CBC W/AUTO DIFF WBC: CPT | Mod: HCNC | Performed by: NURSE PRACTITIONER

## 2024-02-14 PROCEDURE — 80061 LIPID PANEL: CPT | Mod: HCNC | Performed by: FAMILY MEDICINE

## 2024-02-14 PROCEDURE — 84450 TRANSFERASE (AST) (SGOT): CPT | Mod: HCNC | Performed by: FAMILY MEDICINE

## 2024-02-14 PROCEDURE — 83036 HEMOGLOBIN GLYCOSYLATED A1C: CPT | Mod: HCNC | Performed by: FAMILY MEDICINE

## 2024-02-14 PROCEDURE — 36415 COLL VENOUS BLD VENIPUNCTURE: CPT | Mod: HCNC,PO | Performed by: FAMILY MEDICINE

## 2024-02-14 PROCEDURE — 82043 UR ALBUMIN QUANTITATIVE: CPT | Mod: HCNC | Performed by: FAMILY MEDICINE

## 2024-02-16 ENCOUNTER — PATIENT OUTREACH (OUTPATIENT)
Dept: ADMINISTRATIVE | Facility: HOSPITAL | Age: 84
End: 2024-02-16
Payer: OTHER GOVERNMENT

## 2024-02-16 LAB
FINAL PATHOLOGIC DIAGNOSIS: NORMAL
GROSS: NORMAL
Lab: NORMAL

## 2024-02-19 ENCOUNTER — OUTPATIENT CASE MANAGEMENT (OUTPATIENT)
Dept: ADMINISTRATIVE | Facility: OTHER | Age: 84
End: 2024-02-19
Payer: OTHER GOVERNMENT

## 2024-02-19 NOTE — LETTER
Nithin Pino  [id number] [place] [place] [address]    Dear Nithin Pino,     Welcome to [facility] Outpatient Care Management Program. We are here to assist patients with multiple long-term (chronic) conditions who often need more personalized healthcare.    It was a pleasure talking with you today. My name is Arias Simpson RN. I look forward to working with you as your Care Manager. I will be contacting you by telephone routinely to help coordinate care and resolve issues.    My goal is to help you function at the healthiest and highest level possible. You can contact me directly at [phone].    As an [facility] patient with [employer], some of the services we provide, at no cost to you, include:     Development of an individualized care plan with a Registered Nurse   Connection with a   Assistance from a Community Health Worker  Connection with available resources and services    Coordinate communication among your care team members   Provide coaching and education  Help you understand your doctor's treatment plan  Help you obtain information about your insurance coverage.    All services provided by [facility]s Outpatient Care Managers and other care team members are coordinated with and communicated to your primary care team.      As part of your enrollment, you will be receiving education materials and more information about these services in your [facility] account, by phone, or through the mail. If you do not wish to participate or receive information, you can Opt Out by contacting our office at [phone].      Sincerely,        Arias Simpson RN  [facility]   Outpatient Care Management

## 2024-02-19 NOTE — PROGRESS NOTES
Outpatient Care Management  Initial Patient Assessment    Patient: Nithin Pino  MRN: [id number]  Date of Service: 02/19/2024  Completed by: Arias Simpson RN  Referral Date: 02/13/2024  Date of Eligibility: 2/14/2024  Program: High Risk  Status: Ongoing  Effective Dates: 2/19/2024 - present  Responsible Staff: Arias Simpson RN        Reason for Visit   Patient presents with    Nursing Assessment    OPCM Enrollment Call       Brief Summary:  Nithin Pino was referred by Dr. Garg for case management follow up. Patient qualifies for program based on his risk score of 85.1%.   Active problem list, medical, surgical and social history reviewed.  Areas of need identified by patient's daughter Ai include hypertension education/management  .   Next steps: send welcome letter  Send hypertension education  Follow up in one week    Disability Status  Is the patient alert and oriented (person, place, time, and situation)?: Alert and oriented x 4  Hearing Difficulty or Deaf: yes  Visual Difficulty or Blind: yes  Visual and Hearing Needs Conclusion: very hard of hearing, can't speak on the phone, wears glasses for every day use  Difficulty Concentrating, Remembering or Making Decisions: no  Communication Difficulty: no  Eating/Swallowing Difficulty: no  Walking or Climbing Stairs Difficulty: no  Dressing/Bathing Difficulty: no  Toileting : Independent  Continence : Continence - Not a problem  Difficulty Managing Errands Independently: yes  Errands Management: -- (daughter will manage all errands)  Equipment Currently Used at Home: none  ADL Conclusion Statement: able to feed, bathe, and dress, unable to drive  Change in Functional Status Since Onset of Current Illness/Injury: no        Spiritual Beliefs  Spiritual, Cultural Beliefs, Rastafarian Practices, Values that Affect Care: no      Social History     Socioeconomic History    Marital status:     Number of children: 5   Tobacco Use    Smoking status:  Former     Current packs/day: 0.00     Average packs/day: 1.5 packs/day for 40.0 years (60.0 ttl pk-yrs)     Types: Cigarettes     Start date: 1954     Quit date: 1994     Years since quittin.1    Smokeless tobacco: Former     Quit date: 2011    Tobacco comments:     approx date   Substance and Sexual Activity    Alcohol use: Yes     Comment: occasionally; 1-2 cans of beer a month    Drug use: No    Sexual activity: Never     Birth control/protection: None     Social Determinants of Health     Financial Resource Strain: Low Risk  (2024)    Overall Financial Resource Strain (CARDIA)     Difficulty of Paying Living Expenses: Not very hard   Food Insecurity: No Food Insecurity (2024)    Hunger Vital Sign     Worried About Running Out of Food in the Last Year: Never true     Ran Out of Food in the Last Year: Never true   Transportation Needs: No Transportation Needs (2024)    PRAPARE - Transportation     Lack of Transportation (Medical): No     Lack of Transportation (Non-Medical): No   Physical Activity: Inactive (2024)    Exercise Vital Sign     Days of Exercise per Week: 0 days     Minutes of Exercise per Session: 0 min   Stress: No Stress Concern Present (2024)    [employer] - Occupational Stress Questionnaire     Feeling of Stress : Only a little   Social Connections: Socially Isolated (2024)    Social Connection and Isolation Panel [NHANES]     Frequency of Communication with Friends and Family: Twice a week     Frequency of Social Gatherings with Friends and Family: Twice a week     Attends Jewish Services: Never     Active Member of Clubs or Organizations: No     Attends Club or Organization Meetings: Never     Marital Status:    Housing Stability: Low Risk  (2024)    Housing Stability Vital Sign     Unable to Pay for Housing in the Last Year: No     Number of Places Lived in the Last Year: 1     Unstable Housing in the  Last Year: No       Roles and Relationships  Primary Source of Support/Comfort: child(blanka)  Name of Support/Comfort Primary Source: Ai      Advance Directives (For Healthcare)  Advance Directive  (If Adv Dir status is received, view document under Adv Dir in header or Chart Review Media tab): Patient does not have Advance Directive, declines information.        Patient Reported Insurance  Verified current insurance plan:: [employer]age  [employer] benefits discussed:: Transportation; Mail Order Pharmacy            2/19/2024     2:03 PM 2/13/2024     7:57 AM 2/17/2023     1:37 PM 12/7/2022    11:28 AM 10/19/2022     7:11 AM 9/30/2020     3:12 PM 1/22/2018     1:28 PM   Depression Patient Health Questionnaire   Over the last two weeks how often have you been bothered by little interest or pleasure in doing things Not at all Not at all Not at all Not at all Not at all Not at all Several days   Over the last two weeks how often have you been bothered by feeling down, depressed or hopeless Not at all Not at all Not at all Not at all Not at all Not at all Several days   PHQ-2 Total Score 0 0 0 0 0 0 2   Over the last two weeks how often have you been bothered by trouble falling or staying asleep, or sleeping too much       Nearly every day   Over the last two weeks how often have you been bothered by feeling tired or having little energy       Nearly every day   Over the last two weeks how often have you been bothered by a poor appetite or overeating       Nearly every day   Over the last two weeks how often have you been bothered by feeling bad about yourself - or that you are a failure or have let yourself or your family down       Several days   Over the last two weeks how often have you been bothered by trouble concentrating on things, such as reading the newspaper or watching television       Not at all   Over the last two weeks how often have you been bothered by moving or speaking so slowly that other  people could have noticed. Or the opposite - being so fidgety or restless that you have been moving around a lot more than usual.       Not at all   Over the last two weeks how often have you been bothered by thoughts that you would be better off dead, or of hurting yourself       Not at all   If you checked off any problems, how difficult have these problems made it for you to do your work, take care of things at home or get along with other people?       Not difficult at all   PHQ-9 Score       12       Learning Assessment       02/19/2024 1405 [facility] (2/19/2024 - Present)   Created by Arias Simpson, RN -  (Nurse) Status: Complete                 PRIMARY LEARNER     Primary Learner Name:  Ai (daughter) JM - 02/19/2024 1405    Relationship:  Family JM - 02/19/2024 1405    Does the primary learner have any barriers to learning?:  No Barriers JM - 02/19/2024 1405    What is the preferred language of the primary learner?:  English JM - 02/19/2024 1405    Is an  required?:  No JM - 02/19/2024 1405    How does the primary learner prefer to learn new concepts?:  Listening, Reading JM - 02/19/2024 1405    How often do you need to have someone help you read instructions, pamphlets, or written material from your doctor or pharmacy?:  Rarely JM - 02/19/2024 1405        CO-LEARNER #1     No question answered        CO-LEARNER #2     No question answered        SPECIAL TOPICS     No question answered        ANSWERED BY:     No question answered        Edit History       Arias Simpson, RN -  (Nurse)   02/19/2024 1405

## 2024-02-21 ENCOUNTER — OFFICE VISIT (OUTPATIENT)
Dept: UROLOGY | Facility: CLINIC | Age: 84
End: 2024-02-21
Payer: MEDICARE

## 2024-02-21 ENCOUNTER — TELEPHONE (OUTPATIENT)
Dept: UROLOGY | Facility: CLINIC | Age: 84
End: 2024-02-21
Payer: OTHER GOVERNMENT

## 2024-02-21 VITALS
BODY MASS INDEX: 23.54 KG/M2 | SYSTOLIC BLOOD PRESSURE: 117 MMHG | HEIGHT: 70 IN | DIASTOLIC BLOOD PRESSURE: 63 MMHG | WEIGHT: 164.44 LBS | HEART RATE: 68 BPM

## 2024-02-21 DIAGNOSIS — R31.0 HEMATURIA, GROSS: Primary | ICD-10-CM

## 2024-02-21 DIAGNOSIS — C67.8 MALIGNANT NEOPLASM OF OVERLAPPING SITES OF BLADDER: ICD-10-CM

## 2024-02-21 DIAGNOSIS — I25.5 ISCHEMIC CARDIOMYOPATHY: Chronic | ICD-10-CM

## 2024-02-21 DIAGNOSIS — E11.51 DIABETES MELLITUS TYPE 2 WITH PERIPHERAL ARTERY DISEASE: Chronic | ICD-10-CM

## 2024-02-21 LAB
BILIRUB UR QL STRIP: NEGATIVE
GLUCOSE UR QL STRIP: POSITIVE
KETONES UR QL STRIP: POSITIVE
LEUKOCYTE ESTERASE UR QL STRIP: POSITIVE
PH, POC UA: 5
POC BLOOD, URINE: POSITIVE
POC NITRATES, URINE: NEGATIVE
PROT UR QL STRIP: POSITIVE
SP GR UR STRIP: 1.02 (ref 1–1.03)
UROBILINOGEN UR STRIP-ACNC: 0.2 (ref 0.3–2.2)

## 2024-02-21 PROCEDURE — 81003 URINALYSIS AUTO W/O SCOPE: CPT | Mod: QW,HCNC,S$GLB, | Performed by: UROLOGY

## 2024-02-21 PROCEDURE — 1101F PT FALLS ASSESS-DOCD LE1/YR: CPT | Mod: HCNC,CPTII,S$GLB, | Performed by: UROLOGY

## 2024-02-21 PROCEDURE — 3074F SYST BP LT 130 MM HG: CPT | Mod: HCNC,CPTII,S$GLB, | Performed by: UROLOGY

## 2024-02-21 PROCEDURE — 87086 URINE CULTURE/COLONY COUNT: CPT | Mod: HCNC | Performed by: UROLOGY

## 2024-02-21 PROCEDURE — 1126F AMNT PAIN NOTED NONE PRSNT: CPT | Mod: HCNC,CPTII,S$GLB, | Performed by: UROLOGY

## 2024-02-21 PROCEDURE — 3078F DIAST BP <80 MM HG: CPT | Mod: HCNC,CPTII,S$GLB, | Performed by: UROLOGY

## 2024-02-21 PROCEDURE — 99214 OFFICE O/P EST MOD 30 MIN: CPT | Mod: HCNC,S$GLB,, | Performed by: UROLOGY

## 2024-02-21 PROCEDURE — 99999 PR PBB SHADOW E&M-EST. PATIENT-LVL IV: CPT | Mod: PBBFAC,HCNC,, | Performed by: UROLOGY

## 2024-02-21 PROCEDURE — 3288F FALL RISK ASSESSMENT DOCD: CPT | Mod: HCNC,CPTII,S$GLB, | Performed by: UROLOGY

## 2024-02-21 NOTE — PROGRESS NOTES
CC:bladder cancer    History of Present Illness:   Nithin Pino is a 83 y.o. male here for follow up.     2/20/24-Path showing HG Ta urothelial cell carcinoma in all regions of tumor. Upon review with the pt, he only previously received 3 BCG treatments, but didn't get a full 6 treatment induction. No gross hematuria at this point. He was having some incontinence for a few days following TURBT. Now it has resolved.    1/17/24-Pt now cleared for TURBT by cardiology. No gross hematuria. No dysuria. No stranguria. Pt states that the BCG treatment are painful and asks about the natural history of bladder cancer, should he elect against treatment.   9/26/23: pt here for surveillance cysto.   6/23/23-Pt s/p TURBT. 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 No gross hematuria or dysuria. No weakness or fever. States that he is urinating well.   5/15/23- pt here for cysto. CT scan personally reviewed, showing a polypoid tumor at the left bladder wall, enhancing.   4/10/23-83yo male, here today for follow up, last seen in 2021. At that time, here was being treated for BPH with finasteride and tamsulosin and OAB with vesicare. He did have gross hematuria, but refused cystoscopy. He reports that he was seen in the ED at [facility] yesterday with dysuria and gross hematuria. Was prescribed levaquin. States that he hasn't started the levaquin yet. Saw a little blood this am. No fever. Stream is strong. Still on finasteride, vesicare and flomax. No difficulty emptying--states it was checked in the ED yesterday and he emptied completely.     Pt's records from [facility] with Dr. Sanchez reviewed from 12/22/22, noting cytology suspicious for high grade bladder cancer.       Review of Systems   Constitutional:  Negative for chills and fever.   Respiratory:  Negative for shortness of breath.    Cardiovascular:  Negative for chest pain.   Gastrointestinal:  Negative for abdominal pain.   Genitourinary:   Negative for flank pain.   Musculoskeletal:  Positive for arthralgias. Negative for back pain.   All other systems reviewed and are negative.        Past Medical History:   Diagnosis Date    Abnormal brain MRI 01/21/2018    Chronic microvascular ischemia changes per MRI July 9, 2007 in [facility]    Abnormal ECG 10/31/2013    Abnormal stress test 01/28/2016    Alcohol dependence     States alcohol abuse ended in 1994    AP (angina pectoris) 01/28/2016    Asthma     Bladder cancer     Carotid artery occlusion     Cataract     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    2 - Normal left ventricular systolic function (EF 60-65%).    3 - Normal left ventricular diastolic function.    4 - Mild left atrial enlargement.    5 - Normal right ventricular systolic function .    6 - Trivial to mild aortic regurgitation.    7 - Trivial to mild mitral regurgitation.  10 - Trivial to mild pulmonic regurgitation.     Hematuria 06/25/2020    History of alcohol abuse 01/22/2018    Patient denies drinking to excess since 1994.    History of atherectomy 01/21/2018 2/2016 [facility]    History of chronic kidney disease 01/22/2018    History of CKD 3    History of PTCA 10/31/2013    History of PTCA 03/09/2016    Hyperlipidemia     Hypertension     Insomnia 06/17/2013    Iron deficiency anemia 10/19/2023    Ischemic cardiomyopathy 10/31/2013    Long term (current) use of antithrombotics/antiplatelets 01/21/2018    Malignant neoplasm of overlapping sites of bladder 06/08/2023    Myocardial infarction      Old MI (myocardial infarction)     Peripheral vascular disease     Polyneuropathy     RBBB 10/31/2013    S/P CABG (coronary artery bypass graft) 10/31/2013    Shortness of breath 10/31/2013    Tobacco dependence     resolved    Trouble in sleeping     Type 2 diabetes with peripheral circulatory disorder, controlled     Wears glasses        Past Surgical History:   Procedure Laterality Date    APPENDECTOMY      CARDIAC CATHETERIZATION      2016    CARDIAC SURGERY  1994    balloon angioplasty    CATARACT EXTRACTION W/  INTRAOCULAR LENS IMPLANT Right 2017    CORONARY ARTERY BYPASS GRAFT  2011    per patient x4    HERNIA REPAIR      OPEN REDUCTION AND INTERNAL FIXATION (ORIF) OF INJURY OF HIP      PCIOL Bilateral OD 17/OS 02/15/17    DR. ALONZO    TURBT (TRANSURETHRAL RESECTION OF BLADDER TUMOR) N/A 2023    Procedure: TURBT (TRANSURETHRAL RESECTION OF BLADDER TUMOR);  Surgeon: Hortencia Michael MD;  Location: [facility];  Service: Urology;  Laterality: N/A;    TURBT (TRANSURETHRAL RESECTION OF BLADDER TUMOR) N/A 2024    Procedure: TURBT (TRANSURETHRAL RESECTION OF BLADDER TUMOR);  Surgeon: Hortencia Michael MD;  Location: [facility] OR;  Service: Urology;  Laterality: N/A;       Family History   Adopted: Yes   Problem Relation Age of Onset    Diabetes Brother     Diabetes Sister     Cancer Neg Hx     Heart disease Neg Hx     Kidney disease Neg Hx        Social History     Tobacco Use    Smoking status: Former     Current packs/day: 0.00     Average packs/day: 1.5 packs/day for 40.0 years (60.0 ttl pk-yrs)     Types: Cigarettes     Start date: 1954     Quit date: 1994     Years since quittin.1    Smokeless tobacco: Former     Quit date: 2011    Tobacco comments:     approx date   Substance Use Topics    Alcohol use: Yes     Comment: occasionally; 1-2 cans of beer a month    Drug use: No       Current Outpatient Medications   Medication Sig Dispense Refill    amLODIPine (NORVASC) 10  MG tablet Take 0.5 tablets (5 mg total) by mouth once daily. 90 tablet 3    aspirin (ECOTRIN) 81 MG EC tablet Take 1 tablet (81 mg total) by mouth once daily. 90 tablet 3    atorvastatin (LIPITOR) 40 MG tablet Take 1 tablet (40 mg total) by mouth every evening. 90 tablet 3    b complex vitamins tablet Take 1 tablet by mouth once daily.      BLOOD-GLUCOSE METER (TRUERESULT BLOOD GLUCOSE SYSTM MISC) by Misc.(Non-Drug; Combo Route) route.      cyanocobalamin (VITAMIN B-12) 1000 MCG tablet Take 100 mcg by mouth once daily.      dulaglutide (TRULICITY) 4.5 mg/0.5 mL pen injector Inject 4.5 mg into the skin every 7 days. SUNDAY      EMBRACE PRO TEST STRIPS Strp CHECK BLOOD SUGAR TWICE DAILY. 50 strip 0    finasteride (PROSCAR) 5 mg tablet TAKE 1 TABLET ONE TIME DAILY 90 tablet 3    furosemide (LASIX) 40 MG tablet TAKE 1 PILL IN AM, AND 1/2 PILL IN PM 60 tablet 11    glucose 4 GM chewable tablet Take 4 tablets (16 g total) by mouth as needed for Low blood sugar. 100 tablet 5    hyoscyamine (LEVSIN/SL) 0.125 mg Subl Place 2 tablets (0.25 mg total) under the tongue every 4 (four) hours as needed (bladder spasms). 30 tablet 1    insulin (LANTUS SOLOSTAR U-100 INSULIN) glargine 100 units/mL SubQ pen Inject into the skin. 15 units QHS and 18 units QAM      insulin aspart U-100 (NOVOLOG) 100 unit/mL injection Inject 3 Units into the skin every 4 (four) hours as needed (for high blood sugar or carbohydrate intake).      isosorbide mononitrate (IMDUR) 60 MG 24 hr tablet Take 1 tablet (60 mg total) by mouth once daily. 90 tablet 3    LIDOcaine (LIDODERM) 5 % APPLY 1 PATCH TOPICALLY EVERY DAY FOR PAIN  WEAR FOR 12 HOURS, THEN REMOVE. DO NOT APPLY NEW PATCH FOR AT LEAST 12 HOURS.      losartan (COZAAR) 100 MG tablet Take 1 tablet (100 mg total) by mouth once daily. 90 tablet 3    metoprolol succinate (TOPROL-XL) 25 MG 24 hr tablet Take 1 tablet (25 mg total) by mouth every evening. 90 tablet 3    miconazole (MICOTIN) 2 % cream APPLY  SMALL AMOUNT TOPICALLY TWICE A DAY AS NEEDED FOR FUNGAL INFECTION      multivit-minerals/folic acid (DIABETIC MULTIVITAMIN ORAL) Take by mouth.      nitroGLYCERIN (NITROSTAT) 0.4 MG SL tablet 0.4 mg.      tamsulosin (FLOMAX) 0.4 mg Cap Take 1 capsule (0.4 mg total) by mouth once daily. 90 capsule 3    TRUEPLUS LANCETS 26 gauge Misc CHECK BLOOD SUGAR TWICE DAILY. 100 each 0    TRUERESULT BLOOD GLUCOSE SYSTM kit CHECK BLOOD SUGAR TWICE DAILY. 1 each 0    vitamin D (VITAMIN D3) 1000 units Tab Take 1 tablet (1,000 Units total) by mouth once daily. 90 tablet 3     No current facility-administered medications for this visit.       Review of patient's allergies indicates:   Allergen Reactions    Pseudoephedrine-guaifenesin Shortness Of Breath    Panmist dm  [pseudoephedrine-dm-guaifenesin]      Other reaction(s): Unknown       Physical Exam  Vitals:    02/21/24 1255   BP: 117/63   Pulse: 68         General: Well-developed, well-nourished in no acute distress  HEENT: Normocephalic, atraumatic, Extraocular movements intact  Neck: supple, trachea midline, no cervical or supraclavicular lymphadenopathy  Respirations: even and unlabored  Extremities: atraumatic, moves all equally, no clubbing, cyanosis or edema  Psych: normal affect  Skin: warm and dry, no lesions  Neuro: Alert and oriented, Cranial nerves II-XII grossly intact    PVR: 29cc    Urinalysis  Moderate blood, trace LE, nit negative      Lab Results   Component Value Date    PSA 0.37 12/22/2022    PSA 0.29 09/16/2020    PSA 0.82 06/09/2015         Assessment:   1. Hematuria, gross  POCT Urinalysis, Dipstick, Automated, W/O Scope    Urine culture      2. Malignant neoplasm of overlapping sites of bladder  Prior authorization Order      3. Ischemic cardiomyopathy        4. Diabetes mellitus type 2 with peripheral artery disease                Plan:  Hematuria, gross  -     POCT Urinalysis, Dipstick, Automated, W/O Scope  -     Urine culture    Malignant neoplasm of  overlapping sites of bladder  -     Prior authorization Order    Ischemic cardiomyopathy    Diabetes mellitus type 2 with peripheral artery disease    Pt to have repeat induction BCG x 6 instillations. Risks/benefits and expected side effects discussed.     Follow up in about 3 months (around 5/21/2024) for Cystoscopy.

## 2024-02-22 ENCOUNTER — OFFICE VISIT (OUTPATIENT)
Dept: HEMATOLOGY/ONCOLOGY | Facility: CLINIC | Age: 84
End: 2024-02-22
Payer: MEDICARE

## 2024-02-22 VITALS
DIASTOLIC BLOOD PRESSURE: 73 MMHG | WEIGHT: 162.25 LBS | HEART RATE: 72 BPM | BODY MASS INDEX: 23.23 KG/M2 | SYSTOLIC BLOOD PRESSURE: 149 MMHG | OXYGEN SATURATION: 99 % | HEIGHT: 70 IN

## 2024-02-22 DIAGNOSIS — E61.1 IRON DEFICIENCY: Primary | ICD-10-CM

## 2024-02-22 DIAGNOSIS — C67.8 MALIGNANT NEOPLASM OF OVERLAPPING SITES OF BLADDER: Chronic | ICD-10-CM

## 2024-02-22 PROCEDURE — 99215 OFFICE O/P EST HI 40 MIN: CPT | Mod: HCNC,S$GLB,, | Performed by: NURSE PRACTITIONER

## 2024-02-22 PROCEDURE — 3078F DIAST BP <80 MM HG: CPT | Mod: HCNC,CPTII,S$GLB, | Performed by: NURSE PRACTITIONER

## 2024-02-22 PROCEDURE — 1159F MED LIST DOCD IN RCRD: CPT | Mod: HCNC,CPTII,S$GLB, | Performed by: NURSE PRACTITIONER

## 2024-02-22 PROCEDURE — 3077F SYST BP >= 140 MM HG: CPT | Mod: HCNC,CPTII,S$GLB, | Performed by: NURSE PRACTITIONER

## 2024-02-22 PROCEDURE — 1160F RVW MEDS BY RX/DR IN RCRD: CPT | Mod: HCNC,CPTII,S$GLB, | Performed by: NURSE PRACTITIONER

## 2024-02-22 PROCEDURE — 1101F PT FALLS ASSESS-DOCD LE1/YR: CPT | Mod: HCNC,CPTII,S$GLB, | Performed by: NURSE PRACTITIONER

## 2024-02-22 PROCEDURE — 3288F FALL RISK ASSESSMENT DOCD: CPT | Mod: HCNC,CPTII,S$GLB, | Performed by: NURSE PRACTITIONER

## 2024-02-22 PROCEDURE — 99999 PR PBB SHADOW E&M-EST. PATIENT-LVL V: CPT | Mod: PBBFAC,HCNC,, | Performed by: NURSE PRACTITIONER

## 2024-02-22 PROCEDURE — 1126F AMNT PAIN NOTED NONE PRSNT: CPT | Mod: HCNC,CPTII,S$GLB, | Performed by: NURSE PRACTITIONER

## 2024-02-22 NOTE — PROGRESS NOTES
Subjective:      Patient ID: Nithin Pino is a 83 y.o. male.    Chief Complaint: fatigue    HPI:  84 yo male presents today as a new patient for further evaluation and recommendation of iron deficiency anemia.  Is unable to tolerate oral iron due to constipation.  Currently with normocytic anemia.  Hgb 9.7 g/dL.  Ferritin low on outside records located in media section.       Has  bladder cancer 2022 treated  with surgery followed by Dr. Michaelcompleted 6 BCG induction treatments.   6/23/23-Pt s/p TURBT. 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  Is scheduled for repeat bladder surgery on 10/26/2023.  .       Denies any know bleeding in urine.  Did an occult stool sample with no blood found.  Denies blood noses.  Has a history of B12 deficiency.   Not currently taking B12 supplements.      Denies f/c/ns or unintentional weight loss.  Denies any known abnormal lymphadenopathy.     Former smoker - quit smoking in 1994.  Former skoal - quit 15 years   Former heavy drinker - occasional drinker now - quit about 15-18 years ago.     Complains of increased fatigue.     Interval History:  12/14/2023 Found with JOSE MARIA and received Feraheme infusions x 2 with last dose received on 11/30/2023.  Presents today to evaluate response.  Hgb improved from 9.7 to 11.2.  Noted elevated granulocyte count today.  Slightly low iron with saturated iron of 19%.  Elevated creatinine 1.6, elevated BUN, elevated  with CKD.  States that he is drinking almost a gallon of water a day.  Denies any urinary symptoms.  On novolog sliding scale increased yesterday  Yesterday Lantus  increase from 10-15 units yesterday.  Hopefully this will decrease bg and improved kidney.      Interval History:  2/22/2024  Presents today for f/u iron deficiency anemia.  Received 2 doses of IV venofer 200 mg  with last dose received on 12/14/2023.  Denies any known blood loss. Continues to be followed by urology and will start  treatment for bladder cancer soon. Biopsy of bladder 2024 showed multiple areas high grade papillary urothelial carcinoma, noninvasive.  Has been eating liver daily.  Has no complaints today.     I have reviewed all of the patient's relevant lab work available in the medical record and have utilized this in my evaluation and management recommendations today.      Social History     Socioeconomic History    Marital status:     Number of children: 5   Tobacco Use    Smoking status: Former     Current packs/day: 0.00     Average packs/day: 1.5 packs/day for 40.0 years (60.0 ttl pk-yrs)     Types: Cigarettes     Start date: 1954     Quit date: 1994     Years since quittin.1    Smokeless tobacco: Former     Quit date: 2011    Tobacco comments:     approx date   Substance and Sexual Activity    Alcohol use: Yes     Comment: occasionally; 1-2 cans of beer a month    Drug use: No    Sexual activity: Never     Birth control/protection: None     Social Determinants of Health     Financial Resource Strain: Low Risk  (2024)    Overall Financial Resource Strain (CARDIA)     Difficulty of Paying Living Expenses: Not very hard   Food Insecurity: No Food Insecurity (2024)    Hunger Vital Sign     Worried About Running Out of Food in the Last Year: Never true     Ran Out of Food in the Last Year: Never true   Transportation Needs: No Transportation Needs (2024)    PRAPARE - Transportation     Lack of Transportation (Medical): No     Lack of Transportation (Non-Medical): No   Physical Activity: Inactive (2024)    Exercise Vital Sign     Days of Exercise per Week: 0 days     Minutes of Exercise per Session: 0 min   Stress: No Stress Concern Present (2024)    [place] [employer] - Occupational Stress Questionnaire     Feeling of Stress : Only a little   Social Connections: Socially Isolated (2024)    Social Connection and Isolation Panel [NHANES]      Frequency of Communication with Friends and Family: Twice a week     Frequency of Social Gatherings with Friends and Family: Twice a week     Attends Hoahaoism Services: Never     Active Member of Clubs or Organizations: No     Attends Club or Organization Meetings: Never     Marital Status:    Housing Stability: Low Risk  (2/19/2024)    Housing Stability Vital Sign     Unable to Pay for Housing in the Last Year: No     Number of Places Lived in the Last Year: 1     Unstable Housing in the Last Year: No       Family History   Adopted: Yes   Problem Relation Age of Onset    Diabetes Brother     Diabetes Sister     Cancer Neg Hx     Heart disease Neg Hx     Kidney disease Neg Hx        Past Surgical History:   Procedure Laterality Date    APPENDECTOMY      CARDIAC CATHETERIZATION      2016    CARDIAC SURGERY  1994    balloon angioplasty    CATARACT EXTRACTION W/  INTRAOCULAR LENS IMPLANT Right 02/01/2017    CORONARY ARTERY BYPASS GRAFT  05/2011    per patient x4    HERNIA REPAIR      OPEN REDUCTION AND INTERNAL FIXATION (ORIF) OF INJURY OF HIP      PCIOL Bilateral OD 02/01/17/OS 02/15/17    DR. ALONZO    TURBT (TRANSURETHRAL RESECTION OF BLADDER TUMOR) N/A 6/8/2023    Procedure: TURBT (TRANSURETHRAL RESECTION OF BLADDER TUMOR);  Surgeon: Hortencia Michael MD;  Location: [facility] OR;  Service: Urology;  Laterality: N/A;    TURBT (TRANSURETHRAL RESECTION OF BLADDER TUMOR) N/A 2/8/2024    Procedure: TURBT (TRANSURETHRAL RESECTION OF BLADDER TUMOR);  Surgeon: Hortencia Michael MD;  Location: [facility] OR;  Service: Urology;  Laterality: N/A;       Past Medical History:   Diagnosis Date    Abnormal brain MRI 01/21/2018    Chronic microvascular ischemia changes per MRI July 9, 2007 in [facility]    Abnormal ECG 10/31/2013    Abnormal stress test 01/28/2016    Alcohol dependence     States alcohol abuse ended in 1994    AP (angina pectoris) 01/28/2016    Asthma     Bladder cancer     Carotid artery occlusion      Cataract     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    2 - Normal left ventricular systolic function (EF 60-65%).    3 - Normal left ventricular diastolic function.    4 - Mild left atrial enlargement.    5 - Normal right ventricular systolic function .    6 - Trivial to mild aortic regurgitation.    7 - Trivial to mild mitral regurgitation.  10 - Trivial to mild pulmonic regurgitation.     Hematuria 06/25/2020    History of alcohol abuse 01/22/2018    Patient denies drinking to excess since 1994.    History of atherectomy 01/21/2018 2/2016 [facility]    History of chronic kidney disease 01/22/2018    History of CKD 3    History of PTCA 10/31/2013    History of PTCA 03/09/2016    Hyperlipidemia     Hypertension     Insomnia 06/17/2013    Iron deficiency anemia 10/19/2023    Ischemic cardiomyopathy 10/31/2013    Long term (current) use of antithrombotics/antiplatelets 01/21/2018    Malignant neoplasm of overlapping sites of bladder 06/08/2023    Myocardial infarction     Old MI (myocardial infarction) 1994    Peripheral vascular disease     Polyneuropathy     RBBB 10/31/2013    S/P CABG (coronary artery bypass graft) 10/31/2013    Shortness of breath 10/31/2013    Tobacco dependence     resolved    Trouble in sleeping     Type 2 diabetes with peripheral circulatory disorder, controlled     Wears glasses        Review of Systems   Constitutional:  Positive for fatigue.   HENT:  Negative for nosebleeds.    Gastrointestinal:  Negative for anal bleeding and blood in stool.    Genitourinary: Negative.    Musculoskeletal:  Positive for gait problem.          Medication List with Changes/Refills   Current Medications    AMLODIPINE (NORVASC) 10 MG TABLET    Take 0.5 tablets (5 mg total) by mouth once daily.    ASPIRIN (ECOTRIN) 81 MG EC TABLET    Take 1 tablet (81 mg total) by mouth once daily.    ATORVASTATIN (LIPITOR) 40 MG TABLET    Take 1 tablet (40 mg total) by mouth every evening.    B COMPLEX VITAMINS TABLET    Take 1 tablet by mouth once daily.    BLOOD-GLUCOSE METER (TRUERESULT BLOOD GLUCOSE SYSTM MISC)    by Misc.(Non-Drug; Combo Route) route.    CYANOCOBALAMIN (VITAMIN B-12) 1000 MCG TABLET    Take 100 mcg by mouth once daily.    DULAGLUTIDE (TRULICITY) 4.5 MG/0.5 ML PEN INJECTOR    Inject 4.5 mg into the skin every 7 days. SUNDAY    EMBRACE PRO TEST STRIPS STRP    CHECK BLOOD SUGAR TWICE DAILY.    FINASTERIDE (PROSCAR) 5 MG TABLET    TAKE 1 TABLET ONE TIME DAILY    FUROSEMIDE (LASIX) 40 MG TABLET    TAKE 1 PILL IN AM, AND 1/2 PILL IN PM    GLUCOSE 4 GM CHEWABLE TABLET    Take 4 tablets (16 g total) by mouth as needed for Low blood sugar.    HYOSCYAMINE (LEVSIN/SL) 0.125 MG SUBL    Place 2 tablets (0.25 mg total) under the tongue every 4 (four) hours as needed (bladder spasms).    INSULIN (LANTUS SOLOSTAR U-100 INSULIN) GLARGINE 100 UNITS/ML SUBQ PEN    Inject into the skin. 15 units QHS and 18 units QAM    INSULIN ASPART U-100 (NOVOLOG) 100 UNIT/ML INJECTION    Inject 3 Units into the skin every 4 (four) hours as needed (for high blood sugar or carbohydrate intake).    ISOSORBIDE MONONITRATE (IMDUR) 60 MG 24 HR TABLET    Take 1 tablet (60 mg total) by mouth once daily.    LIDOCAINE (LIDODERM) 5 %    APPLY 1 PATCH TOPICALLY EVERY DAY FOR PAIN  WEAR FOR 12 HOURS, THEN REMOVE. DO NOT APPLY NEW PATCH FOR AT LEAST 12 HOURS.    LOSARTAN (COZAAR) 100 MG TABLET    Take 1 tablet (100 mg total) by mouth once daily.    METOPROLOL SUCCINATE (TOPROL-XL) 25 MG 24 HR TABLET    Take 1 tablet  (25 mg total) by mouth every evening.    MICONAZOLE (MICOTIN) 2 % CREAM    APPLY SMALL AMOUNT TOPICALLY TWICE A DAY AS NEEDED FOR FUNGAL INFECTION    MULTIVIT-MINERALS/FOLIC ACID (DIABETIC MULTIVITAMIN ORAL)    Take by mouth.    NITROGLYCERIN (NITROSTAT) 0.4 MG SL TABLET    0.4 mg.    TAMSULOSIN (FLOMAX) 0.4 MG CAP    Take 1 capsule (0.4 mg total) by mouth once daily.    TRUEPLUS LANCETS 26 GAUGE MISC    CHECK BLOOD SUGAR TWICE DAILY.    TRUERESULT BLOOD GLUCOSE SYSTM KIT    CHECK BLOOD SUGAR TWICE DAILY.    VITAMIN D (VITAMIN D3) 1000 UNITS TAB    Take 1 tablet (1,000 Units total) by mouth once daily.        Objective:     Vitals:    02/22/24 1308   BP: (!) 149/73   Pulse: 72       Physical Exam  Vitals reviewed.   Constitutional:       Appearance: Normal appearance.   HENT:      Head: Normocephalic and atraumatic.      Right Ear: External ear normal. Decreased hearing noted.      Left Ear: External ear normal. Decreased hearing noted.   Cardiovascular:      Rate and Rhythm: Normal rate and regular rhythm.      Heart sounds: Normal heart sounds, S1 normal and S2 normal.   Pulmonary:      Effort: Pulmonary effort is normal.      Breath sounds: Normal breath sounds.   Abdominal:      General: There is no distension.   Musculoskeletal:         General: Normal range of motion.      Cervical back: Normal range of motion.   Skin:     General: Skin is warm and dry.   Neurological:      General: No focal deficit present.      Mental Status: He is alert and oriented to person, place, and time.      Gait: Gait abnormal.   Psychiatric:         Attention and Perception: Attention and perception normal.         Mood and Affect: Mood and affect normal.         Speech: Speech normal.         Behavior: Behavior normal. Behavior is cooperative.         Thought Content: Thought content normal.         Cognition and Memory: Cognition and memory normal.         Judgment: Judgment normal.         Assessment:     Problem List Items  Addressed This Visit          Oncology    Malignant neoplasm of overlapping sites of bladder (Chronic)    Relevant Orders    CBC Auto Differential    Comprehensive Metabolic Panel    Ferritin    Iron and TIBC     Other Visit Diagnoses       Iron deficiency    -  Primary    Relevant Orders    CBC Auto Differential    Comprehensive Metabolic Panel    Ferritin    Iron and TIBC            Lab Results   Component Value Date    WBC 7.51 02/14/2024    RBC 4.45 (L) 02/14/2024    HGB 13.6 (L) 02/14/2024    HCT 42.2 02/14/2024    MCV 95 02/14/2024    MCH 30.6 02/14/2024    MCHC 32.2 02/14/2024    RDW 14.0 02/14/2024     02/14/2024    MPV 11.0 02/14/2024    GRAN 4.6 02/14/2024    GRAN 61.1 02/14/2024    LYMPH 2.0 02/14/2024    LYMPH 26.9 02/14/2024    MONO 0.5 02/14/2024    MONO 6.9 02/14/2024    EOS 0.4 02/14/2024    BASO 0.01 02/14/2024    EOSINOPHIL 4.9 02/14/2024    BASOPHIL 0.1 02/14/2024      Lab Results   Component Value Date     02/14/2024     02/14/2024    K 3.9 02/14/2024    K 3.9 02/14/2024     02/14/2024     02/14/2024    CO2 28 02/14/2024    CO2 28 02/14/2024    BUN 26 (H) 02/14/2024    BUN 26 (H) 02/14/2024    CREATININE 1.3 02/14/2024    CREATININE 1.3 02/14/2024    CALCIUM 10.3 02/14/2024    CALCIUM 10.3 02/14/2024    ANIONGAP 9 02/14/2024    ANIONGAP 9 02/14/2024    ESTGFRAFRICA 58.7 (A) 12/29/2021    EGFRNONAA 50.8 (A) 12/29/2021     Lab Results   Component Value Date    ALT 19 02/14/2024    AST 19 02/14/2024    ALKPHOS 78 12/08/2023    BILITOT 0.4 12/08/2023     Lab Results   Component Value Date    IRON 56 02/14/2024    TRANSFERRIN 229 02/14/2024    TIBC 339 02/14/2024    FESATURATED 17 (L) 02/14/2024    FERRITIN 223 02/14/2024        Plan:   Iron deficiency  -     CBC Auto Differential; Future; Expected date: 02/22/2024  -     Comprehensive Metabolic Panel; Future; Expected date: 02/22/2024  -     Ferritin; Future; Expected date: 02/22/2024  -     Iron and TIBC; Future;  Expected date: 02/22/2024    Malignant neoplasm of overlapping sites of bladder  -     CBC Auto Differential; Future; Expected date: 02/22/2024  -     Comprehensive Metabolic Panel; Future; Expected date: 02/22/2024  -     Ferritin; Future; Expected date: 02/22/2024  -     Iron and TIBC; Future; Expected date: 02/22/2024    Other orders  -     Cancel: iron sucrose injection 200 mg  -     sodium chloride 0.9% 250 mL flush bag  -     sodium chloride 0.9% flush 10 mL  -     Cancel: iron sucrose injection 200 mg  -     iron sucrose injection 200 mg      Med Onc Chart Routing      Follow up with physician    Follow up with ISAIAS . F/u 5 weeks after last does of IV venofer - in person visit in [place]   Infusion scheduling note   schedule venofer 200 mg IV push x 2 weekly at [facility]   Injection scheduling note n/a   Labs   Scheduling:  Preferred lab: [facility]  Lab interval:  cbc, cmp, iron studies   Imaging   N./a   Pharmacy appointment No pharmacy appointment needed      Other referrals       No additional referrals needed  n/a          Continue eating high iron content foods.     Total time spent on encounter: 45 minutes    Collaborating Provider:  Dr. Franklin Ross    Thank You,  Rachael Rivera, FNP-C  Benign Hematology

## 2024-02-23 LAB — BACTERIA UR CULT: NO GROWTH

## 2024-02-23 RX ORDER — SODIUM CHLORIDE 0.9 % (FLUSH) 0.9 %
10 SYRINGE (ML) INJECTION
Status: CANCELLED | OUTPATIENT
Start: 2024-02-23

## 2024-02-25 ENCOUNTER — DOCUMENTATION ONLY (OUTPATIENT)
Dept: INTERNAL MEDICINE | Facility: CLINIC | Age: 84
End: 2024-02-25

## 2024-02-25 NOTE — PROGRESS NOTES
"      02/25/2024        NITHIN HADLEY   [id number] [id number]        Dear Nithin,    I hope this letter finds you in good spirits. I'm writing to share the results of your recent lab tests, which we conducted on February 14, 2024. It's important to keep track of these numbers as they help us understand how well we are managing your health.    Your HbA1c, which is a measure of your average blood sugar over the past three months, came back at 9.3%. This is higher than we'd like to see, as it indicates that your blood sugar has been consistently above the target range. For reference, your previous results were 8.6% in May 2023 and 7.5% in December 2021.    The urine test for microalbumin/creatinine ratio is 82.9, which is above the normal range. This test helps us check for early signs of kidney changes, and a higher number can indicate that your kidneys are working harder to filter your blood.    Your LDL cholesterol, often referred to as "bad" cholesterol, is 52.6, which is excellent.    Your parathyroid hormone (PTH) level is 147.4, which is higher than the normal range. This can affect calcium levels in your body and may be something we need to monitor closely.    Your calcium level is 10.3, which is within the normal range, and your vitamin D level is 43, which is a healthy level and important for bone strength and overall health.    The eGFR, which estimates how well your kidneys filter waste, is 54.2. This is a bit lower than the average range, indicating that your kidney function may need closer attention. Your creatinine level, another indicator of kidney health, is 1.3, which is slightly above the normal range.    I know this is a lot of information to take in, but I wanted to give you a heads-up on your results before we meet. I've enclosed a copy of your lab results, and it's important that you share these results with your [facility] physician as well, as they will be a crucial part of your " healthcare team in managing these conditions.    We'll discuss these results and what they mean for you in more detail during your next appointment on May 13, 2024. Until then, please take care and continue following any current treatment plans.    Sincerely,     MICKEY Garg MD, [facility]    ENCLOSURES        Lab Test Results for Nithin Pino,  1940    Office Visit on 2024   Component Date Value Ref Range Status    POC Blood, Urine 2024 Positive (A)  Negative Final    POC Bilirubin, Urine 2024 Negative  Negative Final    POC Urobilinogen, Urine 2024 0.2 (A)  0.3 - 2.2 Final    POC Ketones, Urine 2024 Positive (A)  Negative Final    POC Protein, Urine 2024 Positive (A)  Negative Final    POC Nitrates, Urine 2024 Negative  Negative Final    POC Glucose, Urine 2024 Positive (A)  Negative Final    pH, UA 2024 5.0   Final    POC Specific Gravity, Urine 2024 1.020 (A)  1.003 - 1.029 Final    POC Leukocytes, Urine 2024 Positive (A)  Negative Final    Urine Culture, Routine 2024 No growth   Final   Patient Outreach on 2024   Component Date Value Ref Range Status    Left Eye DM Retinopathy 2023 Negative   Final    Right Eye DM Retinopathy 2023 Negative   Final   Lab Visit on 2024   Component Date Value Ref Range Status    WBC 2024 7.51  3.90 - 12.70 K/uL Final    RBC 2024 4.45 (L)  4.60 - 6.20 M/uL Final    Hemoglobin 2024 13.6 (L)  14.0 - 18.0 g/dL Final    Hematocrit 2024 42.2  40.0 - 54.0 % Final    MCV 2024 95  82 - 98 fL Final    MCH 2024 30.6  27.0 - 31.0 pg Final    MCHC 2024 32.2  32.0 - 36.0 g/dL Final    RDW 2024 14.0  11.5 - 14.5 % Final    Platelets 2024 232  150 - 450 K/uL Final    MPV 2024 11.0  9.2 - 12.9 fL Final    Immature Granulocytes 2024 0.1  0.0 - 0.5 % Final    Gran # (ANC) 2024 4.6  1.8 - 7.7 K/uL Final    Immature Grans  (Abs) 02/14/2024 0.01  0.00 - 0.04 K/uL Final    Lymph # 02/14/2024 2.0  1.0 - 4.8 K/uL Final    Mono # 02/14/2024 0.5  0.3 - 1.0 K/uL Final    Eos # 02/14/2024 0.4  0.0 - 0.5 K/uL Final    Baso # 02/14/2024 0.01  0.00 - 0.20 K/uL Final    nRBC 02/14/2024 0  0 /100 WBC Final    Gran % 02/14/2024 61.1  38.0 - 73.0 % Final    Lymph % 02/14/2024 26.9  18.0 - 48.0 % Final    Mono % 02/14/2024 6.9  4.0 - 15.0 % Final    Eosinophil % 02/14/2024 4.9  0.0 - 8.0 % Final    Basophil % 02/14/2024 0.1  0.0 - 1.9 % Final    Differential Method 02/14/2024 Automated   Final    Sodium 02/14/2024 139  136 - 145 mmol/L Final    Potassium 02/14/2024 3.9  3.5 - 5.1 mmol/L Final    Chloride 02/14/2024 102  95 - 110 mmol/L Final    CO2 02/14/2024 28  23 - 29 mmol/L Final    Glucose 02/14/2024 92  70 - 110 mg/dL Final    BUN 02/14/2024 26 (H)  8 - 23 mg/dL Final    Creatinine 02/14/2024 1.3  0.5 - 1.4 mg/dL Final    Calcium 02/14/2024 10.3  8.7 - 10.5 mg/dL Final    Anion Gap 02/14/2024 9  8 - 16 mmol/L Final    eGFR 02/14/2024 54.2 (A)  >60 mL/min/1.73 m^2 Final    Ferritin 02/14/2024 223  20.0 - 300.0 ng/mL Final    Iron 02/14/2024 56  45 - 160 ug/dL Final    Transferrin 02/14/2024 229  200 - 375 mg/dL Final    TIBC 02/14/2024 339  250 - 450 ug/dL Final    Saturated Iron 02/14/2024 17 (L)  20 - 50 % Final    Hemoglobin A1C 02/14/2024 9.3 (H)  4.0 - 5.6 % Final    Estimated Avg Glucose 02/14/2024 220 (H)  68 - 131 mg/dL Final    Cholesterol 02/14/2024 123  120 - 199 mg/dL Final    Triglycerides 02/14/2024 82  30 - 150 mg/dL Final    HDL 02/14/2024 54  40 - 75 mg/dL Final    LDL Cholesterol 02/14/2024 52.6 (L)  63.0 - 159.0 mg/dL Final    HDL/Cholesterol Ratio 02/14/2024 43.9  20.0 - 50.0 % Final    Total Cholesterol/HDL Ratio 02/14/2024 2.3  2.0 - 5.0 Final    Non-HDL Cholesterol 02/14/2024 69  mg/dL Final    Microalbumin, Urine 02/14/2024 107.0  ug/mL Final    Creatinine, Urine 02/14/2024 129.0  23.0 - 375.0 mg/dL Final     Microalb/Creat Ratio 02/14/2024 82.9 (H)  0.0 - 30.0 ug/mg Final    PTH, Intact 02/14/2024 147.4 (H)  9.0 - 77.0 pg/mL Final    Glucose 02/14/2024 92  70 - 110 mg/dL Final    Sodium 02/14/2024 139  136 - 145 mmol/L Final    Potassium 02/14/2024 3.9  3.5 - 5.1 mmol/L Final    Chloride 02/14/2024 102  95 - 110 mmol/L Final    CO2 02/14/2024 28  23 - 29 mmol/L Final    BUN 02/14/2024 26 (H)  8 - 23 mg/dL Final    Calcium 02/14/2024 10.3  8.7 - 10.5 mg/dL Final    Creatinine 02/14/2024 1.3  0.5 - 1.4 mg/dL Final    Albumin 02/14/2024 3.9  3.5 - 5.2 g/dL Final    Phosphorus 02/14/2024 2.8  2.7 - 4.5 mg/dL Final    eGFR 02/14/2024 54.2 (A)  >60 mL/min/1.73 m^2 Final    Anion Gap 02/14/2024 9  8 - 16 mmol/L Final    Vit D, 25-Hydroxy 02/14/2024 43  30 - 96 ng/mL Final    AST 02/14/2024 19  10 - 40 U/L Final    ALT 02/14/2024 19  10 - 44 U/L Final

## 2024-02-26 ENCOUNTER — LAB VISIT (OUTPATIENT)
Dept: LAB | Facility: HOSPITAL | Age: 84
End: 2024-02-26
Attending: NURSE PRACTITIONER
Payer: MEDICARE

## 2024-02-26 DIAGNOSIS — E61.1 IRON DEFICIENCY: ICD-10-CM

## 2024-02-26 DIAGNOSIS — C67.8 MALIGNANT NEOPLASM OF OVERLAPPING SITES OF BLADDER: Chronic | ICD-10-CM

## 2024-02-26 LAB
ALBUMIN SERPL BCP-MCNC: 3.9 G/DL (ref 3.5–5.2)
ALP SERPL-CCNC: 74 U/L (ref 55–135)
ALT SERPL W/O P-5'-P-CCNC: 19 U/L (ref 10–44)
ANION GAP SERPL CALC-SCNC: 11 MMOL/L (ref 8–16)
AST SERPL-CCNC: 23 U/L (ref 10–40)
BASOPHILS # BLD AUTO: 0.03 K/UL (ref 0–0.2)
BASOPHILS NFR BLD: 0.4 % (ref 0–1.9)
BILIRUB SERPL-MCNC: 0.6 MG/DL (ref 0.1–1)
BUN SERPL-MCNC: 23 MG/DL (ref 8–23)
CALCIUM SERPL-MCNC: 10.1 MG/DL (ref 8.7–10.5)
CHLORIDE SERPL-SCNC: 102 MMOL/L (ref 95–110)
CO2 SERPL-SCNC: 27 MMOL/L (ref 23–29)
CREAT SERPL-MCNC: 1.4 MG/DL (ref 0.5–1.4)
DIFFERENTIAL METHOD BLD: ABNORMAL
EOSINOPHIL # BLD AUTO: 0.5 K/UL (ref 0–0.5)
EOSINOPHIL NFR BLD: 5.8 % (ref 0–8)
ERYTHROCYTE [DISTWIDTH] IN BLOOD BY AUTOMATED COUNT: 13.4 % (ref 11.5–14.5)
EST. GFR  (NO RACE VARIABLE): 50 ML/MIN/1.73 M^2
FERRITIN SERPL-MCNC: 185 NG/ML (ref 20–300)
GLUCOSE SERPL-MCNC: 145 MG/DL (ref 70–110)
HCT VFR BLD AUTO: 42.9 % (ref 40–54)
HGB BLD-MCNC: 13.8 G/DL (ref 14–18)
IMM GRANULOCYTES # BLD AUTO: 0.02 K/UL (ref 0–0.04)
IMM GRANULOCYTES NFR BLD AUTO: 0.2 % (ref 0–0.5)
IRON SERPL-MCNC: 73 UG/DL (ref 45–160)
LYMPHOCYTES # BLD AUTO: 2.8 K/UL (ref 1–4.8)
LYMPHOCYTES NFR BLD: 34.4 % (ref 18–48)
MCH RBC QN AUTO: 31 PG (ref 27–31)
MCHC RBC AUTO-ENTMCNC: 32.2 G/DL (ref 32–36)
MCV RBC AUTO: 96 FL (ref 82–98)
MONOCYTES # BLD AUTO: 0.6 K/UL (ref 0.3–1)
MONOCYTES NFR BLD: 7.1 % (ref 4–15)
NEUTROPHILS # BLD AUTO: 4.2 K/UL (ref 1.8–7.7)
NEUTROPHILS NFR BLD: 52.1 % (ref 38–73)
NRBC BLD-RTO: 0 /100 WBC
PLATELET # BLD AUTO: 243 K/UL (ref 150–450)
PMV BLD AUTO: 10.5 FL (ref 9.2–12.9)
POTASSIUM SERPL-SCNC: 4.5 MMOL/L (ref 3.5–5.1)
PROT SERPL-MCNC: 7 G/DL (ref 6–8.4)
RBC # BLD AUTO: 4.45 M/UL (ref 4.6–6.2)
SATURATED IRON: 22 % (ref 20–50)
SODIUM SERPL-SCNC: 140 MMOL/L (ref 136–145)
TOTAL IRON BINDING CAPACITY: 336 UG/DL (ref 250–450)
TRANSFERRIN SERPL-MCNC: 227 MG/DL (ref 200–375)
WBC # BLD AUTO: 8.07 K/UL (ref 3.9–12.7)

## 2024-02-26 PROCEDURE — 80053 COMPREHEN METABOLIC PANEL: CPT | Mod: HCNC | Performed by: NURSE PRACTITIONER

## 2024-02-26 PROCEDURE — 36415 COLL VENOUS BLD VENIPUNCTURE: CPT | Mod: HCNC,PO | Performed by: NURSE PRACTITIONER

## 2024-02-26 PROCEDURE — 83540 ASSAY OF IRON: CPT | Mod: HCNC | Performed by: NURSE PRACTITIONER

## 2024-02-26 PROCEDURE — 82728 ASSAY OF FERRITIN: CPT | Mod: HCNC | Performed by: NURSE PRACTITIONER

## 2024-02-26 PROCEDURE — 85025 COMPLETE CBC W/AUTO DIFF WBC: CPT | Mod: HCNC | Performed by: NURSE PRACTITIONER

## 2024-03-01 ENCOUNTER — TELEPHONE (OUTPATIENT)
Dept: INFUSION THERAPY | Facility: HOSPITAL | Age: 84
End: 2024-03-01
Payer: OTHER GOVERNMENT

## 2024-03-02 DIAGNOSIS — E11.65 TYPE 2 DIABETES MELLITUS WITH HYPERGLYCEMIA, WITH LONG-TERM CURRENT USE OF INSULIN: Primary | ICD-10-CM

## 2024-03-02 DIAGNOSIS — Z79.4 TYPE 2 DIABETES MELLITUS WITH HYPERGLYCEMIA, WITH LONG-TERM CURRENT USE OF INSULIN: Primary | ICD-10-CM

## 2024-03-03 NOTE — PROGRESS NOTES
Orders Placed This Encounter   Procedures    Ambulatory referral/consult to Diabetic Advanced Practice Providers (Medical Management)    Ambulatory referral/consult to Diabetes Education

## 2024-03-03 NOTE — PROGRESS NOTES
Notify him of referrals ordered due to uncontrolled diabetes. Reassure him that they will take good care of him.  ·Ambulatory referral/consult to Diabetic Clinic Advanced Practice Providers (Medical Management)  ·Ambulatory referral/consult to Diabetes Education

## 2024-03-04 ENCOUNTER — TELEPHONE (OUTPATIENT)
Dept: DIABETES | Facility: CLINIC | Age: 84
End: 2024-03-04
Payer: OTHER GOVERNMENT

## 2024-03-04 ENCOUNTER — TELEPHONE (OUTPATIENT)
Dept: INTERNAL MEDICINE | Facility: CLINIC | Age: 84
End: 2024-03-04
Payer: OTHER GOVERNMENT

## 2024-03-04 NOTE — TELEPHONE ENCOUNTER
Spoke with patients daughter and she states that the [facility]. Patients daughter would like Dr Garg to give her a call     Referral will be cancelled

## 2024-03-04 NOTE — TELEPHONE ENCOUNTER
----- Message from MUKUL Garg MD sent at 3/2/2024  6:40 PM CST -----  Notify him of referrals ordered due to uncontrolled diabetes. Reassure him that they will take good care of him.  ·Ambulatory referral/consult to Diabetic Clinic Advanced Practice Providers (Medical Management)  ·Ambulatory referral/consult to Diabetes Education

## 2024-03-05 ENCOUNTER — OFFICE VISIT (OUTPATIENT)
Dept: UROLOGY | Facility: CLINIC | Age: 84
End: 2024-03-05
Payer: MEDICARE

## 2024-03-05 VITALS
WEIGHT: 162 LBS | HEIGHT: 70 IN | DIASTOLIC BLOOD PRESSURE: 82 MMHG | RESPIRATION RATE: 16 BRPM | BODY MASS INDEX: 23.19 KG/M2 | HEART RATE: 70 BPM | SYSTOLIC BLOOD PRESSURE: 132 MMHG

## 2024-03-05 DIAGNOSIS — C67.0 MALIGNANT NEOPLASM OF TRIGONE OF URINARY BLADDER: Primary | ICD-10-CM

## 2024-03-05 PROBLEM — U07.1 COVID-19 VIRUS INFECTION: Status: RESOLVED | Noted: 2020-12-28 | Resolved: 2024-03-05

## 2024-03-05 LAB
BILIRUB UR QL STRIP: NEGATIVE
GLUCOSE UR QL STRIP: NEGATIVE
KETONES UR QL STRIP: NEGATIVE
LEUKOCYTE ESTERASE UR QL STRIP: NEGATIVE
PH, POC UA: 5.5
POC BLOOD, URINE: NEGATIVE
POC NITRATES, URINE: NEGATIVE
PROT UR QL STRIP: NEGATIVE
SP GR UR STRIP: 1.02 (ref 1–1.03)
UROBILINOGEN UR STRIP-ACNC: 0.2 (ref 0.3–2.2)

## 2024-03-05 PROCEDURE — 3075F SYST BP GE 130 - 139MM HG: CPT | Mod: HCNC,CPTII,S$GLB, | Performed by: NURSE PRACTITIONER

## 2024-03-05 PROCEDURE — 3079F DIAST BP 80-89 MM HG: CPT | Mod: HCNC,CPTII,S$GLB, | Performed by: NURSE PRACTITIONER

## 2024-03-05 PROCEDURE — 1126F AMNT PAIN NOTED NONE PRSNT: CPT | Mod: HCNC,CPTII,S$GLB, | Performed by: NURSE PRACTITIONER

## 2024-03-05 PROCEDURE — 81003 URINALYSIS AUTO W/O SCOPE: CPT | Mod: QW,HCNC,S$GLB, | Performed by: NURSE PRACTITIONER

## 2024-03-05 PROCEDURE — 99999 PR PBB SHADOW E&M-EST. PATIENT-LVL IV: CPT | Mod: PBBFAC,HCNC,, | Performed by: NURSE PRACTITIONER

## 2024-03-05 PROCEDURE — 1159F MED LIST DOCD IN RCRD: CPT | Mod: HCNC,CPTII,S$GLB, | Performed by: NURSE PRACTITIONER

## 2024-03-05 PROCEDURE — 99499 UNLISTED E&M SERVICE: CPT | Mod: HCNC,S$GLB,, | Performed by: NURSE PRACTITIONER

## 2024-03-05 PROCEDURE — 51720 TREATMENT OF BLADDER LESION: CPT | Mod: HCNC,S$GLB,, | Performed by: NURSE PRACTITIONER

## 2024-03-05 RX ORDER — LIDOCAINE HYDROCHLORIDE 20 MG/ML
JELLY TOPICAL
Status: COMPLETED | OUTPATIENT
Start: 2024-03-05 | End: 2024-03-05

## 2024-03-05 RX ADMIN — LIDOCAINE HYDROCHLORIDE 10 ML: 20 JELLY TOPICAL at 08:03

## 2024-03-05 NOTE — PROGRESS NOTES
......Patient in today for BCG treatment.  #1/6 per Dr. Michael.. Urine checked and was normal. One vial of BCG and 50ml perservative free sodium chloride 0.9% mixed as per instructions.  Using aseptic technique, a sterile fenestrated drape was placed over penis.  Pt was catheterized using a #14Fr red rubber catheter to allow bladder emptying.  Using closed system,one vial BCG instilled via gravity directly into bladder.  Pt tolerated well.  Instructed pt to keep BCG solution in bladder for 2 hours.  Pt was given instructions to follow for the next 6 hours, including sitting on his toilet at home and using 2 cups of undiluted chlorine bleach and closing this lid.  Pt was told to allow bleach to stand for 15 minutes before flushing toilet.  He was also told to drink plenty of water to flush any remaining BCG bacteria.  Patient verbalized understanding.

## 2024-03-05 NOTE — PROGRESS NOTES
Chief Complaint:   Bladder cancer    HPI:   Patient is presenting for BCG instillation.  Urine in clinic is negative.  02/21/2024  Nithin Pino is a 83 y.o. male here for follow up.      2/20/24-Path showing HG Ta urothelial cell carcinoma in all regions of tumor. Upon review with the pt, he only previously received 3 BCG treatments, but didn't get a full 6 treatment induction. No gross hematuria at this point. He was having some incontinence for a few days following TURBT. Now it has resolved.    1/17/24-Pt now cleared for TURBT by cardiology. No gross hematuria. No dysuria. No stranguria. Pt states that the BCG treatment are painful and asks about the natural history of bladder cancer, should he elect against treatment.   9/26/23: pt here for surveillance cysto.   6/23/23-Pt s/p TURBT. 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 No gross hematuria or dysuria. No weakness or fever. States that he is urinating well.   5/15/23- pt here for cysto. CT scan personally reviewed, showing a polypoid tumor at the left bladder wall, enhancing.   4/10/23-83yo male, here today for follow up, last seen in 2021. At that time, here was being treated for BPH with finasteride and tamsulosin and OAB with vesicare. He did have gross hematuria, but refused cystoscopy. He reports that he was seen in the ED at [facility] yesterday with dysuria and gross hematuria. Was prescribed levaquin. States that he hasn't started the levaquin yet. Saw a little blood this am. No fever. Stream is strong. Still on finasteride, vesicare and flomax. No difficulty emptying--states it was checked in the ED yesterday and he emptied completely.      Pt's records from [facility] with Dr. Sanchez reviewed from 12/22/22, noting cytology suspicious for high grade bladder cancer.       Allergies:  Pseudoephedrine-guaifenesin and Panmist dm  [pseudoephedrine-dm-guaifenesin]    Medications:  has a current medication list which includes  the following prescription(s): amlodipine, aspirin, atorvastatin, b complex vitamins, blood-glucose meter, cyanocobalamin, dulaglutide, embrace pro test strips, finasteride, furosemide, glucose, hyoscyamine, lantus solostar u-100 insulin, insulin aspart u-100, isosorbide mononitrate, lidocaine, losartan, metoprolol succinate, miconazole, multivit-minerals/folic acid, nitroglycerin, tamsulosin, trueplus lancets, trueresult blood glucose systm, and vitamin d, and the following Facility-Administered Medications: BCG live (LISHA) 50 mg in sodium chloride 0.9% 50 mL bladder instillation and lidocaine hcl 2%.    Review of Systems:  General: No fever, chills, fatigability, or weight loss.  Skin: No rashes, itching, or changes in color or texture of skin.  Chest: Denies EWING, cyanosis, wheezing, cough, and sputum production.  Abdomen: Appetite fine. No weight loss. Denies diarrhea, abdominal pain, hematemesis, or blood in stool.  Musculoskeletal: No joint stiffness or swelling. Denies back pain.  : As above.  All other review of systems negative.    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), Chronic diastolic heart failure (10/31/2013), Chronic ischemic heart disease (10/31/2013), CKD (chronic kidney disease) stage 3, GFR 30-59 ml/min (11/04/2015), Coronary artery disease, DM (diabetes mellitus) (2013), DM (diabetes mellitus) (2011), Heart valve regurgitation (11/04/2015), Hematuria (06/25/2020), History of alcohol abuse (01/22/2018), History of atherectomy (01/21/2018), History of chronic kidney disease (01/22/2018), History of PTCA (10/31/2013), History of PTCA (03/09/2016), Hyperlipidemia, Hypertension, Insomnia (06/17/2013), Iron deficiency  anemia (10/19/2023), Ischemic cardiomyopathy (10/31/2013), Long term (current) use of antithrombotics/antiplatelets (01/21/2018), Malignant neoplasm of overlapping sites of bladder (06/08/2023), Myocardial infarction, Old MI (myocardial infarction) (1994), Peripheral vascular disease, Polyneuropathy, RBBB (10/31/2013), S/P CABG (coronary artery bypass graft) (10/31/2013), Shortness of breath (10/31/2013), Tobacco dependence, Trouble in sleeping, Type 2 diabetes with peripheral circulatory disorder, controlled, and Wears glasses.    PSH:   has a past surgical history that includes Cardiac surgery (1994); Appendectomy; Coronary artery bypass graft (05/2011); Hernia repair; Cardiac catheterization; PCIOL (Bilateral, OD 02/01/17/OS 02/15/17); Cataract extraction w/  intraocular lens implant (Right, 02/01/2017); Open reduction and internal fixation (ORIF) of injury of hip; turbt (transurethral resection of bladder tumor) (N/A, 6/8/2023); and turbt (transurethral resection of bladder tumor) (N/A, 2/8/2024).    FamHx: family history includes Diabetes in his brother and sister. He was adopted.    SocHx:  reports that he quit smoking about 30 years ago. His smoking use included cigarettes. He started smoking about 70 years ago. He has a 60.0 pack-year smoking history. He quit smokeless tobacco use about 13 years ago. He reports current alcohol use. He reports that he does not use drugs.      Physical Exam:  General: A&Ox3, no apparent distress, no deformities  Neck: No masses, normal thyroid  Lungs: normal inspiration, no use of accessory muscles  Heart: normal pulse, no arrhythmias  Abdomen: Soft, NT, ND, no masses, no hernias, no hepatosplenomegaly  Lymphatic: Neck and groin nodes negative  Labs/Studies:   See HPI    Impression/Plan:   Bladder cancer  BCG instillation, see nursing note

## 2024-03-06 ENCOUNTER — HOSPITAL ENCOUNTER (OUTPATIENT)
Dept: CARDIOLOGY | Facility: HOSPITAL | Age: 84
Discharge: HOME | End: 2024-03-06
Attending: FAMILY MEDICINE
Payer: MEDICARE

## 2024-03-06 DIAGNOSIS — E11.51 DIABETES MELLITUS TYPE 2 WITH PERIPHERAL ARTERY DISEASE: Chronic | ICD-10-CM

## 2024-03-06 LAB
LEFT CFA PSV: 116 CM/S
LEFT DORSALIS PEDIS PSV: 9.14 CM/S
LEFT PERONEAL SYS PSV: 52 CM/S
LEFT POPLITEAL PSV: 19 CM/S
LEFT POST TIBIAL SYS PSV: 14 CM/S
LEFT PROFUNDA SYS PSV: 196 CM/S
LEFT SUPER FEMORAL DIST SYS PSV: 57 CM/S
LEFT SUPER FEMORAL OSTIAL SYS PSV: 31 CM/S
LEFT TIB/PER TRUNK SYS PSV: 37 CM/S
OHS CV LEFT LOWER EXTREMITY ABI (NO CALC): 0.69
OHS CV RIGHT ABI LOWER EXTREMITY (NO CALC): 0.61
RIGHT ANT TIBIAL SYS PSV: 17 CM/S
RIGHT CFA PSV: 105 CM/S
RIGHT DORSALIS PEDIS PSV: 10.22 CM/S
RIGHT PERONEAL SYS PSV: 34 CM/S
RIGHT POPLITEAL PSV: 28 CM/S
RIGHT PROFUNDA SYS PSV: 123 CM/S
RIGHT SUPER FEMORAL DIST SYS PSV: 15 CM/S
RIGHT SUPER FEMORAL MID SYS PSV: 112 CM/S
RIGHT SUPER FEMORAL OSTIAL SYS PSV: 50 CM/S
RIGHT SUPER FEMORAL PROX SYS PSV: 0 CM/S
RIGHT TIB/PER TRUNK SYS PSV: 43 CM/S

## 2024-03-06 PROCEDURE — 93925 LOWER EXTREMITY STUDY: CPT | Mod: HCNC

## 2024-03-06 PROCEDURE — 93925 LOWER EXTREMITY STUDY: CPT | Mod: 26,HCNC,, | Performed by: INTERNAL MEDICINE

## 2024-03-11 ENCOUNTER — INFUSION (OUTPATIENT)
Dept: INFUSION THERAPY | Facility: HOSPITAL | Age: 84
End: 2024-03-11
Attending: NURSE PRACTITIONER
Payer: OTHER GOVERNMENT

## 2024-03-11 VITALS
TEMPERATURE: 99 F | SYSTOLIC BLOOD PRESSURE: 137 MMHG | RESPIRATION RATE: 16 BRPM | HEART RATE: 91 BPM | DIASTOLIC BLOOD PRESSURE: 70 MMHG | OXYGEN SATURATION: 99 %

## 2024-03-11 DIAGNOSIS — D63.8 ANEMIA IN OTHER CHRONIC DISEASES CLASSIFIED ELSEWHERE: Primary | ICD-10-CM

## 2024-03-11 DIAGNOSIS — D50.9 IRON DEFICIENCY ANEMIA, UNSPECIFIED IRON DEFICIENCY ANEMIA TYPE: ICD-10-CM

## 2024-03-11 PROCEDURE — A4216 STERILE WATER/SALINE, 10 ML: HCPCS | Mod: HCNC | Performed by: NURSE PRACTITIONER

## 2024-03-11 PROCEDURE — 25000003 PHARM REV CODE 250: Mod: HCNC | Performed by: NURSE PRACTITIONER

## 2024-03-11 PROCEDURE — 96374 THER/PROPH/DIAG INJ IV PUSH: CPT | Mod: HCNC

## 2024-03-11 PROCEDURE — 63600175 PHARM REV CODE 636 W HCPCS: Mod: HCNC | Performed by: NURSE PRACTITIONER

## 2024-03-11 RX ORDER — SODIUM CHLORIDE 0.9 % (FLUSH) 0.9 %
10 SYRINGE (ML) INJECTION
Status: CANCELLED | OUTPATIENT
Start: 2024-03-18

## 2024-03-11 RX ORDER — EPINEPHRINE 0.3 MG/.3ML
0.3 INJECTION SUBCUTANEOUS ONCE AS NEEDED
Status: CANCELLED | OUTPATIENT
Start: 2024-03-18

## 2024-03-11 RX ORDER — SODIUM CHLORIDE 0.9 % (FLUSH) 0.9 %
10 SYRINGE (ML) INJECTION
Status: DISCONTINUED | OUTPATIENT
Start: 2024-03-11 | End: 2024-03-11 | Stop reason: HOSPADM

## 2024-03-11 RX ORDER — DIPHENHYDRAMINE HYDROCHLORIDE 50 MG/ML
50 INJECTION INTRAMUSCULAR; INTRAVENOUS ONCE AS NEEDED
Status: CANCELLED | OUTPATIENT
Start: 2024-03-18

## 2024-03-11 RX ADMIN — Medication 10 ML: at 10:03

## 2024-03-11 RX ADMIN — IRON SUCROSE 200 MG: 20 INJECTION, SOLUTION INTRAVENOUS at 10:03

## 2024-03-11 NOTE — NURSING
Infusion # 1 of 2 - Venofer 200 mg   Last dose- 1/26/24     Premeds-none     Venofer 200 mg administered IV at a 5 minute rate per orders; see MAR and vitals for more details. Tolerated well without adverse events, discharged and ambulatory out of clinic.

## 2024-03-11 NOTE — PLAN OF CARE
Plan of care reviewed with patient. Discussed if there are any new or ongoing concerns. Denies.   Problem: Adult Inpatient Plan of Care  Goal: Plan of Care Review  Outcome: Ongoing, Progressing  Flowsheets (Taken 3/11/2024 1238)  Plan of Care Reviewed With: patient  Goal: Absence of Hospital-Acquired Illness or Injury  Outcome: Ongoing, Progressing  Intervention: Identify and Manage Fall Risk  Flowsheets (Taken 3/11/2024 1238)  Safety Promotion/Fall Prevention: in recliner, wheels locked  Goal: Optimal Comfort and Wellbeing  Outcome: Ongoing, Progressing  Intervention: Provide Person-Centered Care  Flowsheets (Taken 3/11/2024 1238)  Trust Relationship/Rapport:   care explained   reassurance provided   choices provided   thoughts/feelings acknowledged   emotional support provided   empathic listening provided   questions answered   questions encouraged

## 2024-03-12 ENCOUNTER — OFFICE VISIT (OUTPATIENT)
Dept: CARDIOLOGY | Facility: CLINIC | Age: 84
End: 2024-03-12
Payer: MEDICARE

## 2024-03-12 ENCOUNTER — OFFICE VISIT (OUTPATIENT)
Dept: UROLOGY | Facility: CLINIC | Age: 84
End: 2024-03-12
Payer: MEDICARE

## 2024-03-12 VITALS
HEIGHT: 70 IN | BODY MASS INDEX: 23.35 KG/M2 | DIASTOLIC BLOOD PRESSURE: 60 MMHG | WEIGHT: 163.13 LBS | SYSTOLIC BLOOD PRESSURE: 136 MMHG | HEART RATE: 98 BPM | OXYGEN SATURATION: 98 %

## 2024-03-12 DIAGNOSIS — I20.9 AP (ANGINA PECTORIS): ICD-10-CM

## 2024-03-12 DIAGNOSIS — E11.69 HYPERLIPIDEMIA ASSOCIATED WITH TYPE 2 DIABETES MELLITUS: Chronic | ICD-10-CM

## 2024-03-12 DIAGNOSIS — R94.39 ABNORMAL NUCLEAR STRESS TEST: ICD-10-CM

## 2024-03-12 DIAGNOSIS — I25.5 ISCHEMIC CARDIOMYOPATHY: Chronic | ICD-10-CM

## 2024-03-12 DIAGNOSIS — I70.0 THORACIC AORTA ATHEROSCLEROSIS: Chronic | ICD-10-CM

## 2024-03-12 DIAGNOSIS — I73.9 PERIPHERAL VASCULAR DISEASE: ICD-10-CM

## 2024-03-12 DIAGNOSIS — E78.5 HYPERLIPIDEMIA ASSOCIATED WITH TYPE 2 DIABETES MELLITUS: Chronic | ICD-10-CM

## 2024-03-12 DIAGNOSIS — R94.31 ABNORMAL ECG: Chronic | ICD-10-CM

## 2024-03-12 DIAGNOSIS — I50.42 CHRONIC COMBINED SYSTOLIC AND DIASTOLIC CONGESTIVE HEART FAILURE: Chronic | ICD-10-CM

## 2024-03-12 DIAGNOSIS — I15.2 HYPERTENSION ASSOCIATED WITH DIABETES: Chronic | ICD-10-CM

## 2024-03-12 DIAGNOSIS — E11.51 DIABETES MELLITUS TYPE 2 WITH PERIPHERAL ARTERY DISEASE: Chronic | ICD-10-CM

## 2024-03-12 DIAGNOSIS — I25.708 CORONARY ARTERY DISEASE OF BYPASS GRAFT OF NATIVE HEART WITH STABLE ANGINA PECTORIS: Primary | Chronic | ICD-10-CM

## 2024-03-12 DIAGNOSIS — I65.23 ASYMPTOMATIC STENOSIS OF BOTH CAROTID ARTERIES WITHOUT INFARCTION: Chronic | ICD-10-CM

## 2024-03-12 DIAGNOSIS — R31.0 HEMATURIA, GROSS: Primary | ICD-10-CM

## 2024-03-12 DIAGNOSIS — N18.31 STAGE 3A CHRONIC KIDNEY DISEASE: ICD-10-CM

## 2024-03-12 DIAGNOSIS — C67.0 MALIGNANT NEOPLASM OF TRIGONE OF URINARY BLADDER: ICD-10-CM

## 2024-03-12 DIAGNOSIS — I35.1 NONRHEUMATIC AORTIC VALVE INSUFFICIENCY: ICD-10-CM

## 2024-03-12 DIAGNOSIS — Z95.1 S/P CABG (CORONARY ARTERY BYPASS GRAFT): ICD-10-CM

## 2024-03-12 DIAGNOSIS — E11.59 HYPERTENSION ASSOCIATED WITH DIABETES: Chronic | ICD-10-CM

## 2024-03-12 DIAGNOSIS — I45.10 RBBB: Chronic | ICD-10-CM

## 2024-03-12 LAB
BILIRUB UR QL STRIP: NEGATIVE
BILIRUB UR QL STRIP: NEGATIVE
GLUCOSE UR QL STRIP: NEGATIVE
GLUCOSE UR QL STRIP: POSITIVE
KETONES UR QL STRIP: NEGATIVE
KETONES UR QL STRIP: NEGATIVE
LEUKOCYTE ESTERASE UR QL STRIP: NEGATIVE
LEUKOCYTE ESTERASE UR QL STRIP: NEGATIVE
PH, POC UA: 5.5
PH, POC UA: 5.5
POC BLOOD, URINE: NEGATIVE
POC BLOOD, URINE: NEGATIVE
POC NITRATES, URINE: NEGATIVE
POC NITRATES, URINE: NEGATIVE
PROT UR QL STRIP: NEGATIVE
PROT UR QL STRIP: NEGATIVE
SP GR UR STRIP: 1.03 (ref 1–1.03)
SP GR UR STRIP: 1.03 (ref 1–1.03)
UROBILINOGEN UR STRIP-ACNC: 0.2 (ref 0.3–2.2)
UROBILINOGEN UR STRIP-ACNC: 0.2 (ref 0.3–2.2)

## 2024-03-12 PROCEDURE — 1160F RVW MEDS BY RX/DR IN RCRD: CPT | Mod: HCNC,CPTII,S$GLB, | Performed by: INTERNAL MEDICINE

## 2024-03-12 PROCEDURE — 99499 UNLISTED E&M SERVICE: CPT | Mod: HCNC,S$GLB,, | Performed by: NURSE PRACTITIONER

## 2024-03-12 PROCEDURE — 51720 TREATMENT OF BLADDER LESION: CPT | Mod: HCNC,S$GLB,, | Performed by: NURSE PRACTITIONER

## 2024-03-12 PROCEDURE — 3078F DIAST BP <80 MM HG: CPT | Mod: HCNC,CPTII,S$GLB, | Performed by: INTERNAL MEDICINE

## 2024-03-12 PROCEDURE — 99214 OFFICE O/P EST MOD 30 MIN: CPT | Mod: HCNC,S$GLB,, | Performed by: INTERNAL MEDICINE

## 2024-03-12 PROCEDURE — 99999 PR PBB SHADOW E&M-EST. PATIENT-LVL III: CPT | Mod: PBBFAC,HCNC,, | Performed by: INTERNAL MEDICINE

## 2024-03-12 PROCEDURE — 81003 URINALYSIS AUTO W/O SCOPE: CPT | Mod: QW,HCNC,S$GLB, | Performed by: NURSE PRACTITIONER

## 2024-03-12 PROCEDURE — 99999 PR PBB SHADOW E&M-EST. PATIENT-LVL III: CPT | Mod: PBBFAC,HCNC,, | Performed by: NURSE PRACTITIONER

## 2024-03-12 PROCEDURE — 1126F AMNT PAIN NOTED NONE PRSNT: CPT | Mod: HCNC,CPTII,S$GLB, | Performed by: INTERNAL MEDICINE

## 2024-03-12 PROCEDURE — 1159F MED LIST DOCD IN RCRD: CPT | Mod: HCNC,CPTII,S$GLB, | Performed by: INTERNAL MEDICINE

## 2024-03-12 PROCEDURE — 3075F SYST BP GE 130 - 139MM HG: CPT | Mod: HCNC,CPTII,S$GLB, | Performed by: INTERNAL MEDICINE

## 2024-03-12 RX ORDER — LIDOCAINE HYDROCHLORIDE 20 MG/ML
JELLY TOPICAL
Status: COMPLETED | OUTPATIENT
Start: 2024-03-12 | End: 2024-03-12

## 2024-03-12 RX ADMIN — LIDOCAINE HYDROCHLORIDE 10 ML: 20 JELLY TOPICAL at 09:03

## 2024-03-12 NOTE — PROGRESS NOTES
Subjective:    Patient ID:  Nithin Pino is a 83 y.o. male who presents for evaluation of Congestive Heart Failure, Hyperlipidemia, Coronary Artery Disease, Peripheral Arterial Disease, and Hypertension        HPI: Pt presents for eval.  His current medical conditions include CAD, RBBB, bladder cancer, diastolic CHF, ICM, MR, TR, AI, CRI, carotid artery disease, COPD, HTN, PAD, DM.   Nonsmoker.   His past history is pertinent for following:  S/p PTCA 1994 for AMI.   Stress MPI 5/11 showed evidence of multivessel CAD (had 2 years of angina) which was confirmed on LHC (significant left main/LAD/ramus, diag disease and 100%  RCA). EF 35% on LV gram.   S/p successful CABG 5/11 (LIMA-LAD, SVG-DIAG, SVG-RAMUS). He had post-op a flutter which resolved.   S/p repeat LHC 2/16 for abnl stress MPI. He underwent atherectomy and PCI of left main and ramus with TUCKER x 2 overall. His svg-ramus had occluded. LIMA-LAD was patent, patent SVG-D1,  RCA with left - right collaterals, EF 45%.    Has chronic R SFA .   Pt has declined runoff numerous times for further evaluation of his PAD in past.   Echo 6/18 normal EF.  RENZO 6/18 R LE 0.86, L LE 0.96  Pt admitted late Dec 2020 with Covid pneumonia, cp sxs. Chart reviewed in detail.  Echo 12/20 normal LV function, LVH.  Troponin leak noted.  Cards consult done and troponin thought to be supply/demand ischemia from Covid infection/pneumonia.  Readmitted few days later with recurrent cp, low BP, dehydration and released.  Troponin leak ranged 0.10 to 0.50 range on both hospitalizations.  Stress MPI 3/21 apical, anteroapical scar, inferoapical scar with some residual ischemia, EF 44%.  Carotid u/s 12/20: no significant blockage noted.   ecg 10/29/21 NSR, RBBB, old inferior/anteroseptal infarct.  Echo 8/21: normal EF, DD, mild MR/TR/AI.  RENZO 8/21: R LE 0.73, L LE 0.71  S/p hip fx surgery Jan 2023 at [facility].  Pt readmitted March 2023 with CHF sxs.  EF 35%.  Lasix added.  Meds  adjusted.  Had urological surgery June 2023 without CV complications.  Ecg 10/9/23 NSR, RBBB, chronic septal infarct.   Pt seen Oct 2023 for preop eval for bladder cancer surgery.  Surgery delayed and CV testing done for sxs.  Pt had leg edema, CHF sxs.  Echo Oct 2023: EF 50%, DD, LVH, WMA, mild MR, normal PAP.  B LE venous u/s Oct 2023: no DVT.  Nuclear stress test Nov 2023: no ischemia, anterior/inferior scar, normal EF at rest.  Now here.  CAD is stable.  Angina controlled on current med tx.  Denies any concerning CP/angina.  CHF is stable.  No concerning dyspnea.  Some occasional palpitations.  No LE edema.  No syncope.  B LE arterial u/s March 2024:  Moderately decreased left RENZO and severely decreased right RENZO; Right ostial SFA is occluded, with distal reconstitution, right PT is occluded, AT flow is blunted; Left ostial SFA is occluded, with reconstitution distally , left PT and AT are occluded.  The AT reconstitutes at the level of the DP.  RENZO March 2024:  R LE 0.61, L LE 0.69  R LE RENZO in 2014 was 0.59  Denies claudication sxs or foot ulcers.          Past Medical History:   Diagnosis Date    Abnormal brain MRI 01/21/2018    Chronic microvascular ischemia changes per MRI July 9, 2007 in [facility]    Abnormal ECG 10/31/2013    Abnormal stress test 01/28/2016    Alcohol dependence     States alcohol abuse ended in 1994    AP (angina pectoris) 01/28/2016    Asthma     Bladder cancer     Carotid artery occlusion     Cataract     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     2 - Normal left ventricular systolic function (EF 60-65%).    3 - Normal left ventricular diastolic function.    4 - Mild left atrial enlargement.    5 - Normal right ventricular systolic function .    6 - Trivial to mild aortic regurgitation.    7 - Trivial to mild mitral regurgitation.  10 - Trivial to mild pulmonic regurgitation.     Hematuria 06/25/2020    History of alcohol abuse 01/22/2018    Patient denies drinking to excess since 1994.    History of atherectomy 01/21/2018 2/2016 [facility]    History of chronic kidney disease 01/22/2018    History of CKD 3    History of PTCA 10/31/2013    History of PTCA 03/09/2016    Hyperlipidemia     Hypertension     Insomnia 06/17/2013    Iron deficiency anemia 10/19/2023    Ischemic cardiomyopathy 10/31/2013    Long term (current) use of antithrombotics/antiplatelets 01/21/2018    Malignant neoplasm of overlapping sites of bladder 06/08/2023    Myocardial infarction     Old MI (myocardial infarction) 1994    Peripheral vascular disease     Polyneuropathy     RBBB 10/31/2013    S/P CABG (coronary artery bypass graft) 10/31/2013    Shortness of breath 10/31/2013    Tobacco dependence     resolved    Trouble in sleeping     Type 2 diabetes with peripheral circulatory disorder, controlled     Wears glasses        Current Outpatient Medications:     amLODIPine (NORVASC) 10 MG tablet, Take 0.5 tablets (5 mg total) by mouth once daily., Disp: 90 tablet, Rfl: 3    aspirin (ECOTRIN) 81 MG EC tablet, Take 1 tablet (81 mg total) by mouth once daily., Disp: 90 tablet, Rfl: 3    atorvastatin (LIPITOR) 40 MG tablet, Take 1 tablet (40 mg total) by mouth every evening., Disp: 90 tablet, Rfl: 3    b complex vitamins tablet, Take 1 tablet by mouth once daily., Disp: , Rfl:     BLOOD-GLUCOSE METER (TRUERESULT BLOOD GLUCOSE SYSTM MISC), by Misc.(Non-Drug; Combo Route) route., Disp: , Rfl:     cyanocobalamin (VITAMIN B-12) 1000 MCG tablet, Take 100 mcg by mouth once daily., Disp: , Rfl:      dulaglutide (TRULICITY) 4.5 mg/0.5 mL pen injector, Inject 4.5 mg into the skin every 7 days. SUNDAY, Disp: , Rfl:     EMBRACE PRO TEST STRIPS Strp, CHECK BLOOD SUGAR TWICE DAILY., Disp: 50 strip, Rfl: 0    finasteride (PROSCAR) 5 mg tablet, TAKE 1 TABLET ONE TIME DAILY, Disp: 90 tablet, Rfl: 3    furosemide (LASIX) 40 MG tablet, TAKE 1 PILL IN AM, AND 1/2 PILL IN PM, Disp: 60 tablet, Rfl: 11    glucose 4 GM chewable tablet, Take 4 tablets (16 g total) by mouth as needed for Low blood sugar., Disp: 100 tablet, Rfl: 5    hyoscyamine (LEVSIN/SL) 0.125 mg Subl, Place 2 tablets (0.25 mg total) under the tongue every 4 (four) hours as needed (bladder spasms)., Disp: 30 tablet, Rfl: 1    insulin (LANTUS SOLOSTAR U-100 INSULIN) glargine 100 units/mL SubQ pen, Inject into the skin. 15 units QHS and 18 units QAM, Disp: , Rfl:     insulin aspart U-100 (NOVOLOG) 100 unit/mL injection, Inject 3 Units into the skin every 4 (four) hours as needed (for high blood sugar or carbohydrate intake)., Disp: , Rfl:     isosorbide mononitrate (IMDUR) 60 MG 24 hr tablet, Take 1 tablet (60 mg total) by mouth once daily., Disp: 90 tablet, Rfl: 3    LIDOcaine (LIDODERM) 5 %, APPLY 1 PATCH TOPICALLY EVERY DAY FOR PAIN  WEAR FOR 12 HOURS, THEN REMOVE. DO NOT APPLY NEW PATCH FOR AT LEAST 12 HOURS., Disp: , Rfl:     losartan (COZAAR) 100 MG tablet, Take 1 tablet (100 mg total) by mouth once daily., Disp: 90 tablet, Rfl: 3    metoprolol succinate (TOPROL-XL) 25 MG 24 hr tablet, Take 1 tablet (25 mg total) by mouth every evening., Disp: 90 tablet, Rfl: 3    miconazole (MICOTIN) 2 % cream, APPLY SMALL AMOUNT TOPICALLY TWICE A DAY AS NEEDED FOR FUNGAL INFECTION, Disp: , Rfl:     multivit-minerals/folic acid (DIABETIC MULTIVITAMIN ORAL), Take by mouth., Disp: , Rfl:     nitroGLYCERIN (NITROSTAT) 0.4 MG SL tablet, 0.4 mg., Disp: , Rfl:     tamsulosin (FLOMAX) 0.4 mg Cap, Take 1 capsule (0.4 mg total) by mouth once daily., Disp: 90 capsule, Rfl: 3     "TRUEPLUS LANCETS 26 gauge Misc, CHECK BLOOD SUGAR TWICE DAILY., Disp: 100 each, Rfl: 0    TRUERESULT BLOOD GLUCOSE SYSTM kit, CHECK BLOOD SUGAR TWICE DAILY., Disp: 1 each, Rfl: 0    vitamin D (VITAMIN D3) 1000 units Tab, Take 1 tablet (1,000 Units total) by mouth once daily., Disp: 90 tablet, Rfl: 3  No current facility-administered medications for this visit.      Review of Systems   Constitutional: Negative.   HENT: Negative.     Eyes: Negative.    Cardiovascular:  Positive for palpitations.   Respiratory: Negative.     Endocrine: Negative.    Hematologic/Lymphatic: Negative.    Skin: Negative.    Musculoskeletal:  Positive for arthritis, joint pain and stiffness.   Gastrointestinal: Negative.    Genitourinary: Negative.    Neurological:  Positive for weakness.   Psychiatric/Behavioral: Negative.     Allergic/Immunologic: Negative.           /60 (BP Location: Left arm, Patient Position: Sitting, BP Method: Large (Manual))   Pulse 98   Ht 5' 10" (1.778 m)   Wt 74 kg (163 lb 2.3 oz)   SpO2 98%   BMI 23.41 kg/m²     Wt Readings from Last 3 Encounters:   03/12/24 74 kg (163 lb 2.3 oz)   03/05/24 73.5 kg (162 lb)   02/22/24 73.6 kg (162 lb 4.1 oz)     Temp Readings from Last 3 Encounters:   03/11/24 98.8 °F (37.1 °C)   02/13/24 96.8 °F (36 °C)   02/08/24 97.5 °F (36.4 °C) (Temporal)     BP Readings from Last 3 Encounters:   03/12/24 136/60   03/11/24 137/70   03/05/24 132/82     Pulse Readings from Last 3 Encounters:   03/12/24 98   03/11/24 91   03/05/24 70       Objective:    Physical Exam  Vitals and nursing note reviewed.   Constitutional:       Appearance: He is well-developed.   HENT:      Head: Normocephalic.   Neck:      Thyroid: No thyromegaly.      Vascular: No carotid bruit or JVD.   Cardiovascular:      Rate and Rhythm: Normal rate and regular rhythm.      Pulses:           Radial pulses are 2+ on the right side and 2+ on the left side.      Heart sounds: S1 normal and S2 normal. Heart sounds not " distant. No midsystolic click and no opening snap. No murmur heard.     No friction rub. No S3 or S4 sounds.   Pulmonary:      Effort: Pulmonary effort is normal.      Breath sounds: Normal breath sounds. No wheezing or rales.   Abdominal:      General: Bowel sounds are normal. There is no distension or abdominal bruit.      Palpations: Abdomen is soft.      Tenderness: There is no abdominal tenderness.   Musculoskeletal:      Cervical back: Neck supple.      Right lower leg: No edema.      Left lower leg: No edema.   Skin:     General: Skin is warm.   Neurological:      Mental Status: He is alert and oriented to person, place, and time.   Psychiatric:         Behavior: Behavior normal.         I have reviewed all pertinent labs and cardiac studies.  Component      Latest Ref Rng 1/9/2024   BNP      0 - 99 pg/mL 91              Component      Latest Ref Rng 11/27/2023   BNP      0 - 99 pg/mL 203 (H)       Legend:  (H) High      Chemistry        Component Value Date/Time     02/26/2024 0730    K 4.5 02/26/2024 0730     02/26/2024 0730    CO2 27 02/26/2024 0730    BUN 23 02/26/2024 0730    CREATININE 1.4 02/26/2024 0730     (H) 02/26/2024 0730        Component Value Date/Time    CALCIUM 10.1 02/26/2024 0730    ALKPHOS 74 02/26/2024 0730    AST 23 02/26/2024 0730    ALT 19 02/26/2024 0730    BILITOT 0.6 02/26/2024 0730    ESTGFRAFRICA 58.7 (A) 12/29/2021 0702    EGFRNONAA 50.8 (A) 12/29/2021 0702        Lab Results   Component Value Date    WBC 8.07 02/26/2024    HGB 13.8 (L) 02/26/2024    HCT 42.9 02/26/2024    MCV 96 02/26/2024     02/26/2024       Lab Results   Component Value Date    HGBA1C 9.3 (H) 02/14/2024     Lab Results   Component Value Date    CHOL 123 02/14/2024    CHOL 84 (L) 10/09/2023    CHOL 82 (L) 12/08/2021     Lab Results   Component Value Date    HDL 54 02/14/2024    HDL 41 10/09/2023    HDL 48 12/08/2021     Lab Results   Component Value Date    LDLCALC 52.6 (L) 02/14/2024     LDLCALC 27.8 (L) 10/09/2023    LDLCALC 18.8 (L) 12/08/2021     Lab Results   Component Value Date    TRIG 82 02/14/2024    TRIG 76 10/09/2023    TRIG 76 12/08/2021     Lab Results   Component Value Date    CHOLHDL 43.9 02/14/2024    CHOLHDL 48.8 10/09/2023    CHOLHDL 58.5 (H) 12/08/2021       Results for orders placed during the hospital encounter of 10/25/23    Echo    Interpretation Summary    Left Ventricle: The left ventricle is normal in size. Normal wall thickness. regional wall motion abnormalities present. There is low normal systolic function with a visually estimated ejection fraction of 50 - 55%.    Left Atrium: Left atrium is severely dilated.    Right Ventricle: Normal right ventricular cavity size. Wall thickness is normal. Right ventricle wall motion  is normal. Systolic function is normal.    Right Atrium: Right atrium is dilated.    Mitral Valve: There is mild regurgitation.    Pulmonic Valve: There is mild regurgitation.    Pulmonary Artery: The estimated pulmonary artery systolic pressure is 24 mmHg.    IVC/SVC: Normal venous pressure at 3 mmHg.          Results for orders placed during the hospital encounter of 11/17/23    Nuclear Stress - Cardiology Interpreted    Interpretation Summary    Abnormal myocardial perfusion scan.    There is a moderate to severe intensity, fixed perfusion abnormality consistent with scar in the anterior, inferior and anteroapical wall(s).    No reversible defects noted to suggest coronary ischemia.    The gated perfusion images showed an ejection fraction of 65% at rest. The gated perfusion images showed an ejection fraction of 46% post stress.    The ECG portion of the study is negative for ischemia.    The patient reported no chest pain during the stress test.    During stress, occasional PVCs are noted.        Assessment:       1. Coronary artery disease of bypass graft of native heart with stable angina pectoris    2. Abnormal ECG    3. Abnormal nuclear stress  test    4. Asymptomatic stenosis of both carotid arteries without infarction    5. AP (angina pectoris)    6. Chronic combined systolic and diastolic congestive heart failure    7. Diabetes mellitus type 2 with peripheral artery disease    8. Hypertension associated with diabetes    9. Hyperlipidemia associated with type 2 diabetes mellitus    10. Ischemic cardiomyopathy    11. Peripheral vascular disease    12. Nonrheumatic aortic valve insufficiency    13. RBBB    14. S/P CABG (coronary artery bypass graft)    15. Stage 3a chronic kidney disease    16. Thoracic aorta atherosclerosis           Plan:         Stable CV status on current med tx.  CAD: No ischemia on Nov 2023 stress MPI.  Continue GDMT for chronic multivessel CAD.  CHF: Compensated.  Continue OMT for CHF.  Continue Lasix 40 mg qam, 20 mg pm.  Pt counseled on low salt diet, < 2 g/day.  Fluid restriction 1.5 liter/day.  PAD: Chronic severe B LE PAD for very long time.    Discussed PAD mgt with pt.    Recommend continued medical therapy since pt does not have any lifestyle limiting claudication sxs and/or foot ulcer/sores.  Carotid disease: medical mgt. F/u carotid u/s in future.  Reviewed all tests and above medical conditions with patient in detail and formulated treatment plan.  Continue optimal medical treatment for cardiovascular conditions.  Cardiac low salt diet advised.  Fall risk precautions discussed.  Maintaining healthy weight and weight loss goals (if needed) were discussed in clinic.  Lipids:Importance of optimal lipid control were discussed in detail as well as possible pharmacologic and lifestyle changes that may be needed.  Continue statin tx.  DM: Optimal HGAIC control advised.  CRI: Monitor.  Abnl ecg: monitor.  Valvular heart disease: monitor w f/u echo in future.      F/u 3 months.      I have reviewed all pertinent labs and cardiac studies independently. Plans and recommendations have been formulated under my direct supervision. All  questions answered and patient voiced understanding.

## 2024-03-12 NOTE — PROGRESS NOTES
......Patient in today for BCG treatment.  #2/6.  Urine clean upon dipstick.  One vial of BCG and 50ml perservative free sodium chloride 0.9% mixed as per instructions.  Using aseptic technique, a sterile fenestrated drape was placed over perineum.  Pt was catheterized using a #14Fr red rubber catheter to allow bladder emptying.  Using closed system, one vial of BCG instilled via gravity directly into bladder.  Pt tolerated well.  Instructed pt to keep BCG solution in bladder for 2 hours.  Pt was given instructions to follow for the next 6 hours, including sitting on toilet at home and using 2 cups of undiluted chlorine bleach and closing the lid.  Pt was told to allow bleach to stand for 15 minutes before flushing toilet.  Patient was also told to drink plenty of water to flush any remaining BCG bacteria.  Patient verbalized understanding.

## 2024-03-12 NOTE — PROGRESS NOTES
Chief Complaint:   Bladder cancer     HPI:   Patient is presenting for BCG instillation.  Urine in clinic is negative.  02/21/2024  Nithin Pino is a 83 y.o. male here for follow up.      2/20/24-Path showing HG Ta urothelial cell carcinoma in all regions of tumor. Upon review with the pt, he only previously received 3 BCG treatments, but didn't get a full 6 treatment induction. No gross hematuria at this point. He was having some incontinence for a few days following TURBT. Now it has resolved.    1/17/24-Pt now cleared for TURBT by cardiology. No gross hematuria. No dysuria. No stranguria. Pt states that the BCG treatment are painful and asks about the natural history of bladder cancer, should he elect against treatment.   9/26/23: pt here for surveillance cysto.   6/23/23-Pt s/p TURBT. 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 No gross hematuria or dysuria. No weakness or fever. States that he is urinating well.   5/15/23- pt here for cysto. CT scan personally reviewed, showing a polypoid tumor at the left bladder wall, enhancing.   4/10/23-81yo male, here today for follow up, last seen in 2021. At that time, here was being treated for BPH with finasteride and tamsulosin and OAB with vesicare. He did have gross hematuria, but refused cystoscopy. He reports that he was seen in the ED at [facility] yesterday with dysuria and gross hematuria. Was prescribed levaquin. States that he hasn't started the levaquin yet. Saw a little blood this am. No fever. Stream is strong. Still on finasteride, vesicare and flomax. No difficulty emptying--states it was checked in the ED yesterday and he emptied completely.      Pt's records from [facility] with Dr. Sanchez reviewed from 12/22/22, noting cytology suspicious for high grade bladder cancer.         Allergies:  Pseudoephedrine-guaifenesin and Panmist dm  [pseudoephedrine-dm-guaifenesin]     Medications:  has a current medication list which  includes the following prescription(s): amlodipine, aspirin, atorvastatin, b complex vitamins, blood-glucose meter, cyanocobalamin, dulaglutide, embrace pro test strips, finasteride, furosemide, glucose, hyoscyamine, lantus solostar u-100 insulin, insulin aspart u-100, isosorbide mononitrate, lidocaine, losartan, metoprolol succinate, miconazole, multivit-minerals/folic acid, nitroglycerin, tamsulosin, trueplus lancets, trueresult blood glucose systm, and vitamin d, and the following Facility-Administered Medications: BCG live (LISHA) 50 mg in sodium chloride 0.9% 50 mL bladder instillation and lidocaine hcl 2%.     Review of Systems:  General: No fever, chills, fatigability, or weight loss.  Skin: No rashes, itching, or changes in color or texture of skin.  Chest: Denies EWING, cyanosis, wheezing, cough, and sputum production.  Abdomen: Appetite fine. No weight loss. Denies diarrhea, abdominal pain, hematemesis, or blood in stool.  Musculoskeletal: No joint stiffness or swelling. Denies back pain.  : As above.  All other review of systems negative.     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), Chronic diastolic heart failure (10/31/2013), Chronic ischemic heart disease (10/31/2013), CKD (chronic kidney disease) stage 3, GFR 30-59 ml/min (11/04/2015), Coronary artery disease, DM (diabetes mellitus) (2013), DM (diabetes mellitus) (2011), Heart valve regurgitation (11/04/2015), Hematuria (06/25/2020), History of alcohol abuse (01/22/2018), History of atherectomy (01/21/2018), History of chronic kidney disease (01/22/2018), History of PTCA (10/31/2013), History of PTCA (03/09/2016), Hyperlipidemia, Hypertension, Insomnia (06/17/2013), Iron  deficiency anemia (10/19/2023), Ischemic cardiomyopathy (10/31/2013), Long term (current) use of antithrombotics/antiplatelets (01/21/2018), Malignant neoplasm of overlapping sites of bladder (06/08/2023), Myocardial infarction, Old MI (myocardial infarction) (1994), Peripheral vascular disease, Polyneuropathy, RBBB (10/31/2013), S/P CABG (coronary artery bypass graft) (10/31/2013), Shortness of breath (10/31/2013), Tobacco dependence, Trouble in sleeping, Type 2 diabetes with peripheral circulatory disorder, controlled, and Wears glasses.     PSH:   has a past surgical history that includes Cardiac surgery (1994); Appendectomy; Coronary artery bypass graft (05/2011); Hernia repair; Cardiac catheterization; PCIOL (Bilateral, OD 02/01/17/OS 02/15/17); Cataract extraction w/  intraocular lens implant (Right, 02/01/2017); Open reduction and internal fixation (ORIF) of injury of hip; turbt (transurethral resection of bladder tumor) (N/A, 6/8/2023); and turbt (transurethral resection of bladder tumor) (N/A, 2/8/2024).     FamHx: family history includes Diabetes in his brother and sister. He was adopted.     SocHx:  reports that he quit smoking about 30 years ago. His smoking use included cigarettes. He started smoking about 70 years ago. He has a 60.0 pack-year smoking history. He quit smokeless tobacco use about 13 years ago. He reports current alcohol use. He reports that he does not use drugs.       Physical Exam:  General: A&Ox3, no apparent distress, no deformities  Neck: No masses, normal thyroid  Lungs: normal inspiration, no use of accessory muscles  Heart: normal pulse, no arrhythmias  Abdomen: Soft, NT, ND, no masses, no hernias, no hepatosplenomegaly  Lymphatic: Neck and groin nodes negative  Labs/Studies:   See HPI     Impression/Plan:   Bladder cancer  BCG instillation, see nursing note

## 2024-03-14 NOTE — PROGRESS NOTES
Addressed by cardiology: Recommend continued medical therapy since pt does not have any lifestyle limiting claudication sxs and/or foot ulcer/sores.

## 2024-03-15 LAB — PSA: 0.5 (ref 0–4)

## 2024-03-18 ENCOUNTER — INFUSION (OUTPATIENT)
Dept: INFUSION THERAPY | Facility: HOSPITAL | Age: 84
End: 2024-03-18
Attending: NURSE PRACTITIONER
Payer: OTHER GOVERNMENT

## 2024-03-18 VITALS
SYSTOLIC BLOOD PRESSURE: 153 MMHG | TEMPERATURE: 98 F | RESPIRATION RATE: 18 BRPM | HEART RATE: 68 BPM | OXYGEN SATURATION: 99 % | DIASTOLIC BLOOD PRESSURE: 80 MMHG

## 2024-03-18 DIAGNOSIS — D63.8 ANEMIA IN OTHER CHRONIC DISEASES CLASSIFIED ELSEWHERE: Primary | ICD-10-CM

## 2024-03-18 DIAGNOSIS — D50.9 IRON DEFICIENCY ANEMIA, UNSPECIFIED IRON DEFICIENCY ANEMIA TYPE: ICD-10-CM

## 2024-03-18 PROCEDURE — 63600175 PHARM REV CODE 636 W HCPCS: Mod: HCNC | Performed by: NURSE PRACTITIONER

## 2024-03-18 PROCEDURE — 25000003 PHARM REV CODE 250: Mod: HCNC | Performed by: NURSE PRACTITIONER

## 2024-03-18 PROCEDURE — A4216 STERILE WATER/SALINE, 10 ML: HCPCS | Mod: HCNC | Performed by: NURSE PRACTITIONER

## 2024-03-18 PROCEDURE — 96374 THER/PROPH/DIAG INJ IV PUSH: CPT | Mod: HCNC

## 2024-03-18 RX ORDER — SODIUM CHLORIDE 0.9 % (FLUSH) 0.9 %
10 SYRINGE (ML) INJECTION
Status: DISCONTINUED | OUTPATIENT
Start: 2024-03-18 | End: 2024-03-18 | Stop reason: HOSPADM

## 2024-03-18 RX ORDER — SODIUM CHLORIDE 0.9 % (FLUSH) 0.9 %
10 SYRINGE (ML) INJECTION
OUTPATIENT
Start: 2024-03-25

## 2024-03-18 RX ORDER — DIPHENHYDRAMINE HYDROCHLORIDE 50 MG/ML
50 INJECTION INTRAMUSCULAR; INTRAVENOUS ONCE AS NEEDED
OUTPATIENT
Start: 2024-03-25

## 2024-03-18 RX ORDER — EPINEPHRINE 0.3 MG/.3ML
0.3 INJECTION SUBCUTANEOUS ONCE AS NEEDED
OUTPATIENT
Start: 2024-03-25

## 2024-03-18 RX ADMIN — Medication 10 ML: at 07:03

## 2024-03-18 RX ADMIN — IRON SUCROSE 200 MG: 20 INJECTION, SOLUTION INTRAVENOUS at 07:03

## 2024-03-19 ENCOUNTER — OFFICE VISIT (OUTPATIENT)
Dept: UROLOGY | Facility: CLINIC | Age: 84
End: 2024-03-19
Payer: MEDICARE

## 2024-03-19 DIAGNOSIS — C67.0 MALIGNANT NEOPLASM OF TRIGONE OF URINARY BLADDER: Primary | ICD-10-CM

## 2024-03-19 PROCEDURE — 1160F RVW MEDS BY RX/DR IN RCRD: CPT | Mod: HCNC,CPTII,S$GLB, | Performed by: NURSE PRACTITIONER

## 2024-03-19 PROCEDURE — 99999 PR PBB SHADOW E&M-EST. PATIENT-LVL III: CPT | Mod: PBBFAC,HCNC,, | Performed by: NURSE PRACTITIONER

## 2024-03-19 PROCEDURE — 51720 TREATMENT OF BLADDER LESION: CPT | Mod: HCNC,S$GLB,, | Performed by: NURSE PRACTITIONER

## 2024-03-19 PROCEDURE — 1159F MED LIST DOCD IN RCRD: CPT | Mod: HCNC,CPTII,S$GLB, | Performed by: NURSE PRACTITIONER

## 2024-03-19 PROCEDURE — 99499 UNLISTED E&M SERVICE: CPT | Mod: HCNC,S$GLB,, | Performed by: NURSE PRACTITIONER

## 2024-03-19 NOTE — PROGRESS NOTES
Chief Complaint:   Bladder cancer     HPI:   Patient is presenting for BCG instillation.  Urine in clinic is negative.3/6 BCG  02/21/2024  Nithin Pino is a 83 y.o. male here for follow up.      2/20/24-Path showing HG Ta urothelial cell carcinoma in all regions of tumor. Upon review with the pt, he only previously received 3 BCG treatments, but didn't get a full 6 treatment induction. No gross hematuria at this point. He was having some incontinence for a few days following TURBT. Now it has resolved.    1/17/24-Pt now cleared for TURBT by cardiology. No gross hematuria. No dysuria. No stranguria. Pt states that the BCG treatment are painful and asks about the natural history of bladder cancer, should he elect against treatment.   9/26/23: pt here for surveillance cysto.   6/23/23-Pt s/p TURBT. 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 No gross hematuria or dysuria. No weakness or fever. States that he is urinating well.   5/15/23- pt here for cysto. CT scan personally reviewed, showing a polypoid tumor at the left bladder wall, enhancing.   4/10/23-81yo male, here today for follow up, last seen in 2021. At that time, here was being treated for BPH with finasteride and tamsulosin and OAB with vesicare. He did have gross hematuria, but refused cystoscopy. He reports that he was seen in the ED at [facility] yesterday with dysuria and gross hematuria. Was prescribed levaquin. States that he hasn't started the levaquin yet. Saw a little blood this am. No fever. Stream is strong. Still on finasteride, vesicare and flomax. No difficulty emptying--states it was checked in the ED yesterday and he emptied completely.      Pt's records from  clinic with Dr. Sanchez reviewed from 12/22/22, noting cytology suspicious for high grade bladder cancer.         Allergies:  Pseudoephedrine-guaifenesin and Panmist dm  [pseudoephedrine-dm-guaifenesin]     Medications:  has a current medication list which  includes the following prescription(s): amlodipine, aspirin, atorvastatin, b complex vitamins, blood-glucose meter, cyanocobalamin, dulaglutide, embrace pro test strips, finasteride, furosemide, glucose, hyoscyamine, lantus solostar u-100 insulin, insulin aspart u-100, isosorbide mononitrate, lidocaine, losartan, metoprolol succinate, miconazole, multivit-minerals/folic acid, nitroglycerin, tamsulosin, trueplus lancets, trueresult blood glucose systm, and vitamin d, and the following Facility-Administered Medications: BCG live (LISHA) 50 mg in sodium chloride 0.9% 50 mL bladder instillation and lidocaine hcl 2%.     Review of Systems:  General: No fever, chills, fatigability, or weight loss.  Skin: No rashes, itching, or changes in color or texture of skin.  Chest: Denies EWING, cyanosis, wheezing, cough, and sputum production.  Abdomen: Appetite fine. No weight loss. Denies diarrhea, abdominal pain, hematemesis, or blood in stool.  Musculoskeletal: No joint stiffness or swelling. Denies back pain.  : As above.  All other review of systems negative.     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), Chronic diastolic heart failure (10/31/2013), Chronic ischemic heart disease (10/31/2013), CKD (chronic kidney disease) stage 3, GFR 30-59 ml/min (11/04/2015), Coronary artery disease, DM (diabetes mellitus) (2013), DM (diabetes mellitus) (2011), Heart valve regurgitation (11/04/2015), Hematuria (06/25/2020), History of alcohol abuse (01/22/2018), History of atherectomy (01/21/2018), History of chronic kidney disease (01/22/2018), History of PTCA (10/31/2013), History of PTCA (03/09/2016), Hyperlipidemia, Hypertension, Insomnia (06/17/2013), Iron  deficiency anemia (10/19/2023), Ischemic cardiomyopathy (10/31/2013), Long term (current) use of antithrombotics/antiplatelets (01/21/2018), Malignant neoplasm of overlapping sites of bladder (06/08/2023), Myocardial infarction, Old MI (myocardial infarction) (1994), Peripheral vascular disease, Polyneuropathy, RBBB (10/31/2013), S/P CABG (coronary artery bypass graft) (10/31/2013), Shortness of breath (10/31/2013), Tobacco dependence, Trouble in sleeping, Type 2 diabetes with peripheral circulatory disorder, controlled, and Wears glasses.     PSH:   has a past surgical history that includes Cardiac surgery (1994); Appendectomy; Coronary artery bypass graft (05/2011); Hernia repair; Cardiac catheterization; PCIOL (Bilateral, OD 02/01/17/OS 02/15/17); Cataract extraction w/  intraocular lens implant (Right, 02/01/2017); Open reduction and internal fixation (ORIF) of injury of hip; turbt (transurethral resection of bladder tumor) (N/A, 6/8/2023); and turbt (transurethral resection of bladder tumor) (N/A, 2/8/2024).     FamHx: family history includes Diabetes in his brother and sister. He was adopted.     SocHx:  reports that he quit smoking about 30 years ago. His smoking use included cigarettes. He started smoking about 70 years ago. He has a 60.0 pack-year smoking history. He quit smokeless tobacco use about 13 years ago. He reports current alcohol use. He reports that he does not use drugs.       Physical Exam:  General: A&Ox3, no apparent distress, no deformities  Neck: No masses, normal thyroid  Lungs: normal inspiration, no use of accessory muscles  Heart: normal pulse, no arrhythmias  Abdomen: Soft, NT, ND, no masses, no hernias, no hepatosplenomegaly  Lymphatic: Neck and groin nodes negative  Labs/Studies:   See HPI     Impression/Plan:   Bladder cancer  BCG instillation, see nursing note   Skin Substitute Paste Text: The defect edges were debeveled with a #15 scalpel blade.  Given the location of the defect, shape of the defect and the proximity to free margins a skin substitute micronized graft was deemed most appropriate.  The entire vial contents were admixed with 0.5ccs of sterile saline, formed into a paste and then evenly spread over the entire wound bed.

## 2024-03-19 NOTE — PROGRESS NOTES
Patient in today for BCG treatment 3/4.BCG and preservative free sodium chloride 0.9% mixed as per instructions.  Using aseptic technique, a sterile fenestrated drape was placed over penis. pt was catheterized using a #14fr red rubber to allow bladder emptying. Private area cleansed with betadine. Using closed system, BCG instilled via gravity directly into bladder.  Pt tolerated procedure well.  Patient instructed to keep BCG solution in bladder for 2 hours.  Pt for safety reasons, pt was given directions to follow for the next 6 hours: (1) sit on the toilet at home to urinate;(2) immediately after urinating,  add 2 ups of undiluted chlorine bleach to the toilet and close the lid. (3) Let the bleach stand for 15 minutes before flushing, to disinfect the toilet; (4) Drink plenty of water to flush away any remaining BCG bacteria, and (5) Disinfect the toilet with 2 cups of undiluted chlorine bleach for 15 minutes each time you urinate for the 6 hours hours after treatment.  Patient was given the opportunity to ask questions and verbalized understanding of instructions.      Pod#2  Doing better  vss  Abd soft  [employer]  Passing gas  Home tomorrow

## 2024-03-22 ENCOUNTER — TELEPHONE (OUTPATIENT)
Dept: UROLOGY | Facility: CLINIC | Age: 84
End: 2024-03-22
Payer: OTHER GOVERNMENT

## 2024-03-22 NOTE — TELEPHONE ENCOUNTER
03/22/2024 0855 - ..Outgoing call placed to patient's daughter per her request, she verified patient's name and date of birth,  she was wanting to f/u on her dad coming to office on yesterday, I informed her that I spoke with him at the [facility] on yesterday and that he verbalized that he passed something during urination that he was not sure what it was and wanted to know if it was related to his BCG, she was notified that I did send Dr. Michael a message regarding his concern and she stated that the medication would not cause a stone that it probably was a scab but if he thinks its a stone that she can order a KUB for him, daughter verbalized understanding and stated that she will let him know and see what he wants to do. No further assistance needed at this time.    Ryan Payne RN

## 2024-03-26 ENCOUNTER — OFFICE VISIT (OUTPATIENT)
Dept: UROLOGY | Facility: CLINIC | Age: 84
End: 2024-03-26
Payer: MEDICARE

## 2024-03-26 VITALS
HEART RATE: 76 BPM | WEIGHT: 163.13 LBS | HEIGHT: 70 IN | DIASTOLIC BLOOD PRESSURE: 82 MMHG | BODY MASS INDEX: 23.35 KG/M2 | SYSTOLIC BLOOD PRESSURE: 128 MMHG | RESPIRATION RATE: 14 BRPM

## 2024-03-26 DIAGNOSIS — C67.0 MALIGNANT NEOPLASM OF TRIGONE OF URINARY BLADDER: Primary | ICD-10-CM

## 2024-03-26 LAB
BILIRUB UR QL STRIP: NEGATIVE
GLUCOSE UR QL STRIP: NEGATIVE
KETONES UR QL STRIP: NEGATIVE
LEUKOCYTE ESTERASE UR QL STRIP: NEGATIVE
PH, POC UA: 6.5
POC BLOOD, URINE: NEGATIVE
POC NITRATES, URINE: NEGATIVE
PROT UR QL STRIP: NEGATIVE
SP GR UR STRIP: 1.01 (ref 1–1.03)
UROBILINOGEN UR STRIP-ACNC: 0.2 (ref 0.3–2.2)

## 2024-03-26 PROCEDURE — 99499 UNLISTED E&M SERVICE: CPT | Mod: HCNC,S$GLB,, | Performed by: NURSE PRACTITIONER

## 2024-03-26 PROCEDURE — 99999 PR PBB SHADOW E&M-EST. PATIENT-LVL IV: CPT | Mod: PBBFAC,HCNC,, | Performed by: NURSE PRACTITIONER

## 2024-03-26 PROCEDURE — 1159F MED LIST DOCD IN RCRD: CPT | Mod: HCNC,CPTII,S$GLB, | Performed by: NURSE PRACTITIONER

## 2024-03-26 PROCEDURE — 81003 URINALYSIS AUTO W/O SCOPE: CPT | Mod: QW,HCNC,S$GLB, | Performed by: NURSE PRACTITIONER

## 2024-03-26 PROCEDURE — 3074F SYST BP LT 130 MM HG: CPT | Mod: HCNC,CPTII,S$GLB, | Performed by: NURSE PRACTITIONER

## 2024-03-26 PROCEDURE — 51720 TREATMENT OF BLADDER LESION: CPT | Mod: HCNC,S$GLB,, | Performed by: NURSE PRACTITIONER

## 2024-03-26 PROCEDURE — 1126F AMNT PAIN NOTED NONE PRSNT: CPT | Mod: HCNC,CPTII,S$GLB, | Performed by: NURSE PRACTITIONER

## 2024-03-26 PROCEDURE — 3079F DIAST BP 80-89 MM HG: CPT | Mod: HCNC,CPTII,S$GLB, | Performed by: NURSE PRACTITIONER

## 2024-03-26 RX ORDER — LIDOCAINE HYDROCHLORIDE 20 MG/ML
JELLY TOPICAL
Status: COMPLETED | OUTPATIENT
Start: 2024-03-26 | End: 2024-03-26

## 2024-03-26 RX ADMIN — LIDOCAINE HYDROCHLORIDE 10 ML: 20 JELLY TOPICAL at 09:03

## 2024-03-26 NOTE — PROGRESS NOTES
.....Patient in today for BCG treatment  #4/6.  POCT dipstick u/a was clear. One vial of BCG and 50ml perservative free sodium chloride 0.9% mixed as per instructions.  Using aseptic technique, a sterile fenestrated drape was placed over penis and perineal area cleansed with betadine.  Pt was catheterized using a #14Fr red rubber catheter to allow bladder emptying.  Using closed system, one vial BCG instilled via gravity directly into bladder.  Pt tolerated well.  Instructed pt to keep BCG solution in bladder for 2 hours.  Pt was given instructions to follow for the next 6 hours, including sitting on his toilet at home and using 2 cups of undiluted chlorine bleach and closing this lid.  Pt was told to allow bleach to stand for 15 minutes before flushing toilet.  He was also told to drink plenty of water to flush any remaining BCG bacteria.  Patient verbalized understanding.

## 2024-03-26 NOTE — PROGRESS NOTES
Chief Complaint:   Bladder cancer     HPI:   Patient is presenting for BCG instillation.  Urine in clinic is negative.4/6 BCG  02/21/2024  Nithin Pino is a 83 y.o. male here for follow up.      2/20/24-Path showing HG Ta urothelial cell carcinoma in all regions of tumor. Upon review with the pt, he only previously received 3 BCG treatments, but didn't get a full 6 treatment induction. No gross hematuria at this point. He was having some incontinence for a few days following TURBT. Now it has resolved.    1/17/24-Pt now cleared for TURBT by cardiology. No gross hematuria. No dysuria. No stranguria. Pt states that the BCG treatment are painful and asks about the natural history of bladder cancer, should he elect against treatment.   9/26/23: pt here for surveillance cysto.   6/23/23-Pt s/p TURBT. 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 No gross hematuria or dysuria. No weakness or fever. States that he is urinating well.   5/15/23- pt here for cysto. CT scan personally reviewed, showing a polypoid tumor at the left bladder wall, enhancing.   4/10/23-81yo male, here today for follow up, last seen in 2021. At that time, here was being treated for BPH with finasteride and tamsulosin and OAB with vesicare. He did have gross hematuria, but refused cystoscopy. He reports that he was seen in the ED at [facility] yesterday with dysuria and gross hematuria. Was prescribed levaquin. States that he hasn't started the levaquin yet. Saw a little blood this am. No fever. Stream is strong. Still on finasteride, vesicare and flomax. No difficulty emptying--states it was checked in the ED yesterday and he emptied completely.      Pt's records from  clinic with Dr. Sanchez reviewed from 12/22/22, noting cytology suspicious for high grade bladder cancer.         Allergies:  Pseudoephedrine-guaifenesin and Panmist dm  [pseudoephedrine-dm-guaifenesin]     Medications:  has a current medication list which  includes the following prescription(s): amlodipine, aspirin, atorvastatin, b complex vitamins, blood-glucose meter, cyanocobalamin, dulaglutide, embrace pro test strips, finasteride, furosemide, glucose, hyoscyamine, lantus solostar u-100 insulin, insulin aspart u-100, isosorbide mononitrate, lidocaine, losartan, metoprolol succinate, miconazole, multivit-minerals/folic acid, nitroglycerin, tamsulosin, trueplus lancets, trueresult blood glucose systm, and vitamin d, and the following Facility-Administered Medications: BCG live (LISHA) 50 mg in sodium chloride 0.9% 50 mL bladder instillation and lidocaine hcl 2%.     Review of Systems:  General: No fever, chills, fatigability, or weight loss.  Skin: No rashes, itching, or changes in color or texture of skin.  Chest: Denies EWING, cyanosis, wheezing, cough, and sputum production.  Abdomen: Appetite fine. No weight loss. Denies diarrhea, abdominal pain, hematemesis, or blood in stool.  Musculoskeletal: No joint stiffness or swelling. Denies back pain.  : As above.  All other review of systems negative.     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), Chronic diastolic heart failure (10/31/2013), Chronic ischemic heart disease (10/31/2013), CKD (chronic kidney disease) stage 3, GFR 30-59 ml/min (11/04/2015), Coronary artery disease, DM (diabetes mellitus) (2013), DM (diabetes mellitus) (2011), Heart valve regurgitation (11/04/2015), Hematuria (06/25/2020), History of alcohol abuse (01/22/2018), History of atherectomy (01/21/2018), History of chronic kidney disease (01/22/2018), History of PTCA (10/31/2013), History of PTCA (03/09/2016), Hyperlipidemia, Hypertension, Insomnia (06/17/2013), Iron  deficiency anemia (10/19/2023), Ischemic cardiomyopathy (10/31/2013), Long term (current) use of antithrombotics/antiplatelets (01/21/2018), Malignant neoplasm of overlapping sites of bladder (06/08/2023), Myocardial infarction, Old MI (myocardial infarction) (1994), Peripheral vascular disease, Polyneuropathy, RBBB (10/31/2013), S/P CABG (coronary artery bypass graft) (10/31/2013), Shortness of breath (10/31/2013), Tobacco dependence, Trouble in sleeping, Type 2 diabetes with peripheral circulatory disorder, controlled, and Wears glasses.     PSH:   has a past surgical history that includes Cardiac surgery (1994); Appendectomy; Coronary artery bypass graft (05/2011); Hernia repair; Cardiac catheterization; PCIOL (Bilateral, OD 02/01/17/OS 02/15/17); Cataract extraction w/  intraocular lens implant (Right, 02/01/2017); Open reduction and internal fixation (ORIF) of injury of hip; turbt (transurethral resection of bladder tumor) (N/A, 6/8/2023); and turbt (transurethral resection of bladder tumor) (N/A, 2/8/2024).     FamHx: family history includes Diabetes in his brother and sister. He was adopted.     SocHx:  reports that he quit smoking about 30 years ago. His smoking use included cigarettes. He started smoking about 70 years ago. He has a 60.0 pack-year smoking history. He quit smokeless tobacco use about 13 years ago. He reports current alcohol use. He reports that he does not use drugs.       Physical Exam:  General: A&Ox3, no apparent distress, no deformities  Neck: No masses, normal thyroid  Lungs: normal inspiration, no use of accessory muscles  Heart: normal pulse, no arrhythmias  Abdomen: Soft, NT, ND, no masses, no hernias, no hepatosplenomegaly  Lymphatic: Neck and groin nodes negative  Labs/Studies:   See HPI     Impression/Plan:   Bladder cancer  BCG instillation, see nursing note

## 2024-04-02 ENCOUNTER — OFFICE VISIT (OUTPATIENT)
Dept: UROLOGY | Facility: CLINIC | Age: 84
End: 2024-04-02
Payer: MEDICARE

## 2024-04-02 VITALS — BODY MASS INDEX: 23.35 KG/M2 | HEIGHT: 70 IN | RESPIRATION RATE: 14 BRPM | WEIGHT: 163.13 LBS

## 2024-04-02 DIAGNOSIS — C67.0 MALIGNANT NEOPLASM OF TRIGONE OF URINARY BLADDER: Primary | ICD-10-CM

## 2024-04-02 LAB
BILIRUB UR QL STRIP: NEGATIVE
GLUCOSE UR QL STRIP: NEGATIVE
KETONES UR QL STRIP: NEGATIVE
LEUKOCYTE ESTERASE UR QL STRIP: NEGATIVE
PH, POC UA: 6
POC BLOOD, URINE: NEGATIVE
POC NITRATES, URINE: NEGATIVE
PROT UR QL STRIP: NEGATIVE
SP GR UR STRIP: 1.02 (ref 1–1.03)
UROBILINOGEN UR STRIP-ACNC: 0.2 (ref 0.3–2.2)

## 2024-04-02 PROCEDURE — 99999 PR PBB SHADOW E&M-EST. PATIENT-LVL IV: CPT | Mod: PBBFAC,HCNC,, | Performed by: NURSE PRACTITIONER

## 2024-04-02 PROCEDURE — 81003 URINALYSIS AUTO W/O SCOPE: CPT | Mod: QW,HCNC,S$GLB, | Performed by: NURSE PRACTITIONER

## 2024-04-02 PROCEDURE — 1159F MED LIST DOCD IN RCRD: CPT | Mod: HCNC,CPTII,S$GLB, | Performed by: NURSE PRACTITIONER

## 2024-04-02 PROCEDURE — 99499 UNLISTED E&M SERVICE: CPT | Mod: HCNC,S$GLB,, | Performed by: NURSE PRACTITIONER

## 2024-04-02 PROCEDURE — 51720 TREATMENT OF BLADDER LESION: CPT | Mod: HCNC,S$GLB,, | Performed by: NURSE PRACTITIONER

## 2024-04-02 PROCEDURE — 1126F AMNT PAIN NOTED NONE PRSNT: CPT | Mod: HCNC,CPTII,S$GLB, | Performed by: NURSE PRACTITIONER

## 2024-04-02 RX ORDER — LIDOCAINE HYDROCHLORIDE 20 MG/ML
JELLY TOPICAL
Status: COMPLETED | OUTPATIENT
Start: 2024-04-02 | End: 2024-04-02

## 2024-04-02 RX ADMIN — LIDOCAINE HYDROCHLORIDE 10 ML: 20 JELLY TOPICAL at 08:04

## 2024-04-02 NOTE — PROGRESS NOTES
Chief Complaint:   Bladder cancer     HPI:   Patient is presenting for BCG instillation.  Urine in clinic is negative.5/6 BCG  02/21/2024  Nithin Pino is a 83 y.o. male here for follow up.      2/20/24-Path showing HG Ta urothelial cell carcinoma in all regions of tumor. Upon review with the pt, he only previously received 3 BCG treatments, but didn't get a full 6 treatment induction. No gross hematuria at this point. He was having some incontinence for a few days following TURBT. Now it has resolved.    1/17/24-Pt now cleared for TURBT by cardiology. No gross hematuria. No dysuria. No stranguria. Pt states that the BCG treatment are painful and asks about the natural history of bladder cancer, should he elect against treatment.   9/26/23: pt here for surveillance cysto.   6/23/23-Pt s/p TURBT. 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 No gross hematuria or dysuria. No weakness or fever. States that he is urinating well.   5/15/23- pt here for cysto. CT scan personally reviewed, showing a polypoid tumor at the left bladder wall, enhancing.   4/10/23-83yo male, here today for follow up, last seen in 2021. At that time, here was being treated for BPH with finasteride and tamsulosin and OAB with vesicare. He did have gross hematuria, but refused cystoscopy. He reports that he was seen in the ED at [facility] yesterday with dysuria and gross hematuria. Was prescribed levaquin. States that he hasn't started the levaquin yet. Saw a little blood this am. No fever. Stream is strong. Still on finasteride, vesicare and flomax. No difficulty emptying--states it was checked in the ED yesterday and he emptied completely.      Pt's records from  clinic with Dr. Sanchez reviewed from 12/22/22, noting cytology suspicious for high grade bladder cancer.         Allergies:  Pseudoephedrine-guaifenesin and Panmist dm  [pseudoephedrine-dm-guaifenesin]     Medications:  has a current medication list  which includes the following prescription(s): amlodipine, aspirin, atorvastatin, b complex vitamins, blood-glucose meter, cyanocobalamin, dulaglutide, embrace pro test strips, finasteride, furosemide, glucose, hyoscyamine, lantus solostar u-100 insulin, insulin aspart u-100, isosorbide mononitrate, lidocaine, losartan, metoprolol succinate, miconazole, multivit-minerals/folic acid, nitroglycerin, tamsulosin, trueplus lancets, trueresult blood glucose systm, and vitamin d, and the following Facility-Administered Medications: BCG live (LISHA) 50 mg in sodium chloride 0.9% 50 mL bladder instillation and lidocaine hcl 2%.     Review of Systems:  General: No fever, chills, fatigability, or weight loss.  Skin: No rashes, itching, or changes in color or texture of skin.  Chest: Denies EWING, cyanosis, wheezing, cough, and sputum production.  Abdomen: Appetite fine. No weight loss. Denies diarrhea, abdominal pain, hematemesis, or blood in stool.  Musculoskeletal: No joint stiffness or swelling. Denies back pain.  : As above.  All other review of systems negative.     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), Chronic diastolic heart failure (10/31/2013), Chronic ischemic heart disease (10/31/2013), CKD (chronic kidney disease) stage 3, GFR 30-59 ml/min (11/04/2015), Coronary artery disease, DM (diabetes mellitus) (2013), DM (diabetes mellitus) (2011), Heart valve regurgitation (11/04/2015), Hematuria (06/25/2020), History of alcohol abuse (01/22/2018), History of atherectomy (01/21/2018), History of chronic kidney disease (01/22/2018), History of PTCA (10/31/2013), History of PTCA (03/09/2016), Hyperlipidemia, Hypertension, Insomnia (06/17/2013),  Iron deficiency anemia (10/19/2023), Ischemic cardiomyopathy (10/31/2013), Long term (current) use of antithrombotics/antiplatelets (01/21/2018), Malignant neoplasm of overlapping sites of bladder (06/08/2023), Myocardial infarction, Old MI (myocardial infarction) (1994), Peripheral vascular disease, Polyneuropathy, RBBB (10/31/2013), S/P CABG (coronary artery bypass graft) (10/31/2013), Shortness of breath (10/31/2013), Tobacco dependence, Trouble in sleeping, Type 2 diabetes with peripheral circulatory disorder, controlled, and Wears glasses.     PSH:   has a past surgical history that includes Cardiac surgery (1994); Appendectomy; Coronary artery bypass graft (05/2011); Hernia repair; Cardiac catheterization; PCIOL (Bilateral, OD 02/01/17/OS 02/15/17); Cataract extraction w/  intraocular lens implant (Right, 02/01/2017); Open reduction and internal fixation (ORIF) of injury of hip; turbt (transurethral resection of bladder tumor) (N/A, 6/8/2023); and turbt (transurethral resection of bladder tumor) (N/A, 2/8/2024).     FamHx: family history includes Diabetes in his brother and sister. He was adopted.     SocHx:  reports that he quit smoking about 30 years ago. His smoking use included cigarettes. He started smoking about 70 years ago. He has a 60.0 pack-year smoking history. He quit smokeless tobacco use about 13 years ago. He reports current alcohol use. He reports that he does not use drugs.       Physical Exam:  General: A&Ox3, no apparent distress, no deformities  Neck: No masses, normal thyroid  Lungs: normal inspiration, no use of accessory muscles  Heart: normal pulse, no arrhythmias  Abdomen: Soft, NT, ND, no masses, no hernias, no hepatosplenomegaly  Lymphatic: Neck and groin nodes negative  Labs/Studies:   See HPI     Impression/Plan:   Bladder cancer  BCG instillation, see nursing note   Spine appears normal, movement of extremities grossly intact.

## 2024-04-02 NOTE — PROGRESS NOTES
....Patient in today for BCG treatment.  #5/6.  One vial of BCG and 50ml perservative free sodium chloride 0.9% mixed as per instructions.  Using aseptic technique, a sterile fenestrated drape was placed over penis.  Pt was catheterized using a #14Fr red rubber catheter to allow bladder emptying.  Private area cleansed with betadine.  Using closed system, one vial of BCG  instilled via gravity directly into bladder.  Pt tolerated well.  Instructed pt to keep BCG solution in bladder for 2 hours.  Pt was given instructions to follow for the next 6 hours, including sitting on his toilet at home and using 2 cups of undiluted chlorine bleach and closing this lid.  Pt was told to allow bleach to stand for 15 minutes before flushing toilet.  He was also told to drink plenty of water to flush any remaining BCG bacteria.  Patient verbalized understanding.

## 2024-04-09 ENCOUNTER — OFFICE VISIT (OUTPATIENT)
Dept: UROLOGY | Facility: CLINIC | Age: 84
End: 2024-04-09
Payer: MEDICARE

## 2024-04-09 VITALS — HEIGHT: 70 IN | WEIGHT: 163.13 LBS | BODY MASS INDEX: 23.35 KG/M2 | RESPIRATION RATE: 14 BRPM

## 2024-04-09 DIAGNOSIS — C67.0 MALIGNANT NEOPLASM OF TRIGONE OF URINARY BLADDER: Primary | ICD-10-CM

## 2024-04-09 PROCEDURE — 99499 UNLISTED E&M SERVICE: CPT | Mod: HCNC,S$GLB,, | Performed by: NURSE PRACTITIONER

## 2024-04-09 PROCEDURE — 51720 TREATMENT OF BLADDER LESION: CPT | Mod: HCNC,S$GLB,, | Performed by: NURSE PRACTITIONER

## 2024-04-09 PROCEDURE — 1159F MED LIST DOCD IN RCRD: CPT | Mod: HCNC,CPTII,S$GLB, | Performed by: NURSE PRACTITIONER

## 2024-04-09 PROCEDURE — 1126F AMNT PAIN NOTED NONE PRSNT: CPT | Mod: HCNC,CPTII,S$GLB, | Performed by: NURSE PRACTITIONER

## 2024-04-09 PROCEDURE — 99999 PR PBB SHADOW E&M-EST. PATIENT-LVL IV: CPT | Mod: PBBFAC,HCNC,, | Performed by: NURSE PRACTITIONER

## 2024-04-09 RX ORDER — LIDOCAINE HYDROCHLORIDE 20 MG/ML
JELLY TOPICAL
Status: COMPLETED | OUTPATIENT
Start: 2024-04-09 | End: 2024-04-09

## 2024-04-09 RX ADMIN — LIDOCAINE HYDROCHLORIDE 10 ML: 20 JELLY TOPICAL at 08:04

## 2024-04-09 NOTE — PROGRESS NOTES
Chief Complaint:   Bladder cancer     HPI:   Patient is presenting for BCG instillation.  Urine in clinic is negative.6/6 BCG  02/21/2024  Nithin Pino is a 83 y.o. male here for follow up.      2/20/24-Path showing HG Ta urothelial cell carcinoma in all regions of tumor. Upon review with the pt, he only previously received 3 BCG treatments, but didn't get a full 6 treatment induction. No gross hematuria at this point. He was having some incontinence for a few days following TURBT. Now it has resolved.    1/17/24-Pt now cleared for TURBT by cardiology. No gross hematuria. No dysuria. No stranguria. Pt states that the BCG treatment are painful and asks about the natural history of bladder cancer, should he elect against treatment.   9/26/23: pt here for surveillance cysto.   6/23/23-Pt s/p TURBT. 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 No gross hematuria or dysuria. No weakness or fever. States that he is urinating well.   5/15/23- pt here for cysto. CT scan personally reviewed, showing a polypoid tumor at the left bladder wall, enhancing.   4/10/23-83yo male, here today for follow up, last seen in 2021. At that time, here was being treated for BPH with finasteride and tamsulosin and OAB with vesicare. He did have gross hematuria, but refused cystoscopy. He reports that he was seen in the ED at [facility] yesterday with dysuria and gross hematuria. Was prescribed levaquin. States that he hasn't started the levaquin yet. Saw a little blood this am. No fever. Stream is strong. Still on finasteride, vesicare and flomax. No difficulty emptying--states it was checked in the ED yesterday and he emptied completely.      Pt's records from  clinic with Dr. Sanchez reviewed from 12/22/22, noting cytology suspicious for high grade bladder cancer.         Allergies:  Pseudoephedrine-guaifenesin and Panmist dm  [pseudoephedrine-dm-guaifenesin]     Medications:  has a current medication list which  includes the following prescription(s): amlodipine, aspirin, atorvastatin, b complex vitamins, blood-glucose meter, cyanocobalamin, dulaglutide, embrace pro test strips, finasteride, furosemide, glucose, hyoscyamine, lantus solostar u-100 insulin, insulin aspart u-100, isosorbide mononitrate, lidocaine, losartan, metoprolol succinate, miconazole, multivit-minerals/folic acid, nitroglycerin, tamsulosin, trueplus lancets, trueresult blood glucose systm, and vitamin d, and the following Facility-Administered Medications: BCG live (LISHA) 50 mg in sodium chloride 0.9% 50 mL bladder instillation and lidocaine hcl 2%.     Review of Systems:  General: No fever, chills, fatigability, or weight loss.  Skin: No rashes, itching, or changes in color or texture of skin.  Chest: Denies EWING, cyanosis, wheezing, cough, and sputum production.  Abdomen: Appetite fine. No weight loss. Denies diarrhea, abdominal pain, hematemesis, or blood in stool.  Musculoskeletal: No joint stiffness or swelling. Denies back pain.  : As above.  All other review of systems negative.     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), Chronic diastolic heart failure (10/31/2013), Chronic ischemic heart disease (10/31/2013), CKD (chronic kidney disease) stage 3, GFR 30-59 ml/min (11/04/2015), Coronary artery disease, DM (diabetes mellitus) (2013), DM (diabetes mellitus) (2011), Heart valve regurgitation (11/04/2015), Hematuria (06/25/2020), History of alcohol abuse (01/22/2018), History of atherectomy (01/21/2018), History of chronic kidney disease (01/22/2018), History of PTCA (10/31/2013), History of PTCA (03/09/2016), Hyperlipidemia, Hypertension, Insomnia (06/17/2013), Iron  deficiency anemia (10/19/2023), Ischemic cardiomyopathy (10/31/2013), Long term (current) use of antithrombotics/antiplatelets (01/21/2018), Malignant neoplasm of overlapping sites of bladder (06/08/2023), Myocardial infarction, Old MI (myocardial infarction) (1994), Peripheral vascular disease, Polyneuropathy, RBBB (10/31/2013), S/P CABG (coronary artery bypass graft) (10/31/2013), Shortness of breath (10/31/2013), Tobacco dependence, Trouble in sleeping, Type 2 diabetes with peripheral circulatory disorder, controlled, and Wears glasses.     PSH:   has a past surgical history that includes Cardiac surgery (1994); Appendectomy; Coronary artery bypass graft (05/2011); Hernia repair; Cardiac catheterization; PCIOL (Bilateral, OD 02/01/17/OS 02/15/17); Cataract extraction w/  intraocular lens implant (Right, 02/01/2017); Open reduction and internal fixation (ORIF) of injury of hip; turbt (transurethral resection of bladder tumor) (N/A, 6/8/2023); and turbt (transurethral resection of bladder tumor) (N/A, 2/8/2024).     FamHx: family history includes Diabetes in his brother and sister. He was adopted.     SocHx:  reports that he quit smoking about 30 years ago. His smoking use included cigarettes. He started smoking about 70 years ago. He has a 60.0 pack-year smoking history. He quit smokeless tobacco use about 13 years ago. He reports current alcohol use. He reports that he does not use drugs.       Physical Exam:  General: A&Ox3, no apparent distress, no deformities  Neck: No masses, normal thyroid  Lungs: normal inspiration, no use of accessory muscles  Heart: normal pulse, no arrhythmias  Abdomen: Soft, NT, ND, no masses, no hernias, no hepatosplenomegaly  Lymphatic: Neck and groin nodes negative  Labs/Studies:   See HPI     Impression/Plan:   Bladder cancer  Patient has completed BCG instillation process, has follow-up with Dr. Michael.  BCG instillation, see nursing note

## 2024-04-09 NOTE — PROGRESS NOTES
......Patient in today for BCG treatment.  #6/6.  One vial of BCG and 50ml perservative free sodium chloride 0.9% mixed as per instructions.  Using aseptic technique, a sterile fenestrated drape was placed over penis.  Pt was catheterized using a #14Fr red rubber catheter to allow bladder emptying.  Using closed system, one vial of BCG instilled via gravity directly into bladder.  Pt tolerated well.  Instructed pt to keep BCG solution in bladder for 2 hours.  Pt was given instructions to follow for the next 6 hours, including sitting on his toilet at home and using 2 cups of undiluted chlorine bleach and closing this lid.  Pt was told to allow bleach to stand for 15 minutes before flushing toilet.  He was also told to drink plenty of water to flush any remaining BCG bacteria.  Patient verbalized understanding.

## 2024-04-18 ENCOUNTER — LAB VISIT (OUTPATIENT)
Dept: LAB | Facility: HOSPITAL | Age: 84
End: 2024-04-18
Attending: NURSE PRACTITIONER
Payer: OTHER GOVERNMENT

## 2024-04-18 DIAGNOSIS — D50.9 IRON DEFICIENCY ANEMIA, UNSPECIFIED IRON DEFICIENCY ANEMIA TYPE: ICD-10-CM

## 2024-04-18 DIAGNOSIS — D63.8 ANEMIA IN OTHER CHRONIC DISEASES CLASSIFIED ELSEWHERE: ICD-10-CM

## 2024-04-18 LAB
ALBUMIN SERPL BCP-MCNC: 3.7 G/DL (ref 3.5–5.2)
ALP SERPL-CCNC: 77 U/L (ref 55–135)
ALT SERPL W/O P-5'-P-CCNC: 15 U/L (ref 10–44)
ANION GAP SERPL CALC-SCNC: 11 MMOL/L (ref 8–16)
AST SERPL-CCNC: 20 U/L (ref 10–40)
BASOPHILS # BLD AUTO: 0.03 K/UL (ref 0–0.2)
BASOPHILS NFR BLD: 0.4 % (ref 0–1.9)
BILIRUB SERPL-MCNC: 0.5 MG/DL (ref 0.1–1)
BUN SERPL-MCNC: 35 MG/DL (ref 8–23)
CALCIUM SERPL-MCNC: 10 MG/DL (ref 8.7–10.5)
CHLORIDE SERPL-SCNC: 104 MMOL/L (ref 95–110)
CO2 SERPL-SCNC: 23 MMOL/L (ref 23–29)
CREAT SERPL-MCNC: 1.5 MG/DL (ref 0.5–1.4)
DIFFERENTIAL METHOD BLD: ABNORMAL
EOSINOPHIL # BLD AUTO: 0.4 K/UL (ref 0–0.5)
EOSINOPHIL NFR BLD: 4.8 % (ref 0–8)
ERYTHROCYTE [DISTWIDTH] IN BLOOD BY AUTOMATED COUNT: 13.5 % (ref 11.5–14.5)
EST. GFR  (NO RACE VARIABLE): 46 ML/MIN/1.73 M^2
GLUCOSE SERPL-MCNC: 280 MG/DL (ref 70–110)
HCT VFR BLD AUTO: 41.3 % (ref 40–54)
HGB BLD-MCNC: 13.2 G/DL (ref 14–18)
IMM GRANULOCYTES # BLD AUTO: 0.02 K/UL (ref 0–0.04)
IMM GRANULOCYTES NFR BLD AUTO: 0.2 % (ref 0–0.5)
LYMPHOCYTES # BLD AUTO: 2.4 K/UL (ref 1–4.8)
LYMPHOCYTES NFR BLD: 29.3 % (ref 18–48)
MCH RBC QN AUTO: 31.4 PG (ref 27–31)
MCHC RBC AUTO-ENTMCNC: 32 G/DL (ref 32–36)
MCV RBC AUTO: 98 FL (ref 82–98)
MONOCYTES # BLD AUTO: 0.6 K/UL (ref 0.3–1)
MONOCYTES NFR BLD: 6.9 % (ref 4–15)
NEUTROPHILS # BLD AUTO: 4.8 K/UL (ref 1.8–7.7)
NEUTROPHILS NFR BLD: 58.4 % (ref 38–73)
NRBC BLD-RTO: 0 /100 WBC
PLATELET # BLD AUTO: 250 K/UL (ref 150–450)
PMV BLD AUTO: 10.7 FL (ref 9.2–12.9)
POTASSIUM SERPL-SCNC: 4.8 MMOL/L (ref 3.5–5.1)
PROT SERPL-MCNC: 6.5 G/DL (ref 6–8.4)
RBC # BLD AUTO: 4.2 M/UL (ref 4.6–6.2)
SODIUM SERPL-SCNC: 138 MMOL/L (ref 136–145)
WBC # BLD AUTO: 8.29 K/UL (ref 3.9–12.7)

## 2024-04-18 PROCEDURE — 82728 ASSAY OF FERRITIN: CPT | Mod: HCNC | Performed by: NURSE PRACTITIONER

## 2024-04-18 PROCEDURE — 80053 COMPREHEN METABOLIC PANEL: CPT | Mod: HCNC | Performed by: NURSE PRACTITIONER

## 2024-04-18 PROCEDURE — 83540 ASSAY OF IRON: CPT | Mod: HCNC | Performed by: NURSE PRACTITIONER

## 2024-04-18 PROCEDURE — 85025 COMPLETE CBC W/AUTO DIFF WBC: CPT | Mod: HCNC | Performed by: NURSE PRACTITIONER

## 2024-04-18 PROCEDURE — 36415 COLL VENOUS BLD VENIPUNCTURE: CPT | Mod: HCNC,PO | Performed by: NURSE PRACTITIONER

## 2024-04-19 LAB
FERRITIN SERPL-MCNC: 295 NG/ML (ref 20–300)
IRON SERPL-MCNC: 70 UG/DL (ref 45–160)
SATURATED IRON: 22 % (ref 20–50)
TOTAL IRON BINDING CAPACITY: 317 UG/DL (ref 250–450)
TRANSFERRIN SERPL-MCNC: 214 MG/DL (ref 200–375)

## 2024-04-22 ENCOUNTER — OFFICE VISIT (OUTPATIENT)
Dept: HEMATOLOGY/ONCOLOGY | Facility: CLINIC | Age: 84
End: 2024-04-22
Payer: MEDICARE

## 2024-04-22 VITALS
OXYGEN SATURATION: 98 % | BODY MASS INDEX: 23.83 KG/M2 | HEART RATE: 87 BPM | TEMPERATURE: 98 F | HEIGHT: 70 IN | SYSTOLIC BLOOD PRESSURE: 128 MMHG | WEIGHT: 166.44 LBS | DIASTOLIC BLOOD PRESSURE: 78 MMHG

## 2024-04-22 DIAGNOSIS — D63.8 ANEMIA IN OTHER CHRONIC DISEASES CLASSIFIED ELSEWHERE: Primary | ICD-10-CM

## 2024-04-22 DIAGNOSIS — C67.8 MALIGNANT NEOPLASM OF OVERLAPPING SITES OF BLADDER: Chronic | ICD-10-CM

## 2024-04-22 DIAGNOSIS — Z86.2 HISTORY OF IRON DEFICIENCY ANEMIA: ICD-10-CM

## 2024-04-22 PROCEDURE — 3074F SYST BP LT 130 MM HG: CPT | Mod: HCNC,CPTII,S$GLB, | Performed by: NURSE PRACTITIONER

## 2024-04-22 PROCEDURE — 1159F MED LIST DOCD IN RCRD: CPT | Mod: HCNC,CPTII,S$GLB, | Performed by: NURSE PRACTITIONER

## 2024-04-22 PROCEDURE — 3078F DIAST BP <80 MM HG: CPT | Mod: HCNC,CPTII,S$GLB, | Performed by: NURSE PRACTITIONER

## 2024-04-22 PROCEDURE — 1160F RVW MEDS BY RX/DR IN RCRD: CPT | Mod: HCNC,CPTII,S$GLB, | Performed by: NURSE PRACTITIONER

## 2024-04-22 PROCEDURE — 99214 OFFICE O/P EST MOD 30 MIN: CPT | Mod: HCNC,S$GLB,, | Performed by: NURSE PRACTITIONER

## 2024-04-22 PROCEDURE — 1126F AMNT PAIN NOTED NONE PRSNT: CPT | Mod: HCNC,CPTII,S$GLB, | Performed by: NURSE PRACTITIONER

## 2024-04-22 PROCEDURE — 3288F FALL RISK ASSESSMENT DOCD: CPT | Mod: HCNC,CPTII,S$GLB, | Performed by: NURSE PRACTITIONER

## 2024-04-22 PROCEDURE — 99999 PR PBB SHADOW E&M-EST. PATIENT-LVL V: CPT | Mod: PBBFAC,HCNC,, | Performed by: NURSE PRACTITIONER

## 2024-04-22 PROCEDURE — 1101F PT FALLS ASSESS-DOCD LE1/YR: CPT | Mod: HCNC,CPTII,S$GLB, | Performed by: NURSE PRACTITIONER

## 2024-04-22 NOTE — PROGRESS NOTES
Subjective:      Patient ID: Nithin Pino is a 83 y.o. male.    Chief Complaint: no compliants    HPI:  82 yo male presents today as a new patient for further evaluation and recommendation of iron deficiency anemia.  Is unable to tolerate oral iron due to constipation.  Currently with normocytic anemia.  Hgb 9.7 g/dL.  Ferritin low on outside records located in media section.       Has  bladder cancer 2022 treated  with surgery followed by Dr. Michaelcompleted 6 BCG induction treatments.   6/23/23-Pt s/p TURBT. 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  Is scheduled for repeat bladder surgery on 10/26/2023.  .       Denies any know bleeding in urine.  Did an occult stool sample with no blood found.  Denies blood noses.  Has a history of B12 deficiency.   Not currently taking B12 supplements.      Denies f/c/ns or unintentional weight loss.  Denies any known abnormal lymphadenopathy.     Former smoker - quit smoking in 1994.  Former skoal - quit 15 years   Former heavy drinker - occasional drinker now - quit about 15-18 years ago.     Complains of increased fatigue.     Interval History:  12/14/2023 Found with JOSE MARIA and received Feraheme infusions x 2 with last dose received on 11/30/2023.  Presents today to evaluate response.  Hgb improved from 9.7 to 11.2.  Noted elevated granulocyte count today.  Slightly low iron with saturated iron of 19%.  Elevated creatinine 1.6, elevated BUN, elevated  with CKD.  States that he is drinking almost a gallon of water a day.  Denies any urinary symptoms.  On novolog sliding scale increased yesterday  Yesterday Lantus  increase from 10-15 units yesterday.  Hopefully this will decrease bg and improved kidney.       Interval History:  2/22/2024  Presents today for f/u iron deficiency anemia.  Received 2 doses of IV venofer 200 mg  with last dose received on 12/14/2023.  Denies any known blood loss. Continues to be followed by urology and will  start treatment for bladder cancer soon. Biopsy of bladder 2024 showed multiple areas high grade papillary urothelial carcinoma, noninvasive.  Has been eating liver daily.  Has no complaints today.    Interval History:  2024  Received venofer 200 mg IV push with last dose received on 3/18/2024.  Has received treatment for bladder cancer - BCG treatments so far has received 6 treatments by Dr. Michael (completed). - last on 2024  Has had recent h/o hematuria. Presents today to assess response of IV iron treatment and to assess need for additional therapy. Currently with slight normocytic anemia Hgb 13.2 and iron is repleated. States that he has to get up 3-4 times at night to urinate.  Noted increased trend in hgb A1C.  States that he is not eating a lot of carbs.  Does not excessively eat sweets.  Is having a repeat cystoscope in 2024. Denies any known abnormal blood loss.      I have reviewed all of the patient's relevant lab work available in the medical record and have utilized this in my evaluation and management recommendations today.     Social History     Socioeconomic History    Marital status:     Number of children: 5   Tobacco Use    Smoking status: Former     Current packs/day: 0.00     Average packs/day: 1.5 packs/day for 40.0 years (60.0 ttl pk-yrs)     Types: Cigarettes     Start date: 1954     Quit date: 1994     Years since quittin.3    Smokeless tobacco: Former     Quit date: 2011    Tobacco comments:     approx date   Substance and Sexual Activity    Alcohol use: Yes     Comment: occasionally; 1-2 cans of beer a month    Drug use: No    Sexual activity: Never     Birth control/protection: None     Social Determinants of Health     Financial Resource Strain: Low Risk  (2024)    Overall Financial Resource Strain (CARDIA)     Difficulty of Paying Living Expenses: Not very hard   Food Insecurity: No Food Insecurity (2024)    Hunger Vital Sign      Worried About Running Out of Food in the Last Year: Never true     Ran Out of Food in the Last Year: Never true   Transportation Needs: No Transportation Needs (2/19/2024)    PRAPARE - Transportation     Lack of Transportation (Medical): No     Lack of Transportation (Non-Medical): No   Physical Activity: Inactive (2/19/2024)    Exercise Vital Sign     Days of Exercise per Week: 0 days     Minutes of Exercise per Session: 0 min   Stress: No Stress Concern Present (2/19/2024)    [employer] - Occupational Stress Questionnaire     Feeling of Stress : Only a little   Social Connections: Socially Isolated (2/19/2024)    Social Connection and Isolation Panel [NHANES]     Frequency of Communication with Friends and Family: Twice a week     Frequency of Social Gatherings with Friends and Family: Twice a week     Attends Synagogue Services: Never     Active Member of Clubs or Organizations: No     Attends Club or Organization Meetings: Never     Marital Status:    Housing Stability: Low Risk  (2/19/2024)    Housing Stability Vital Sign     Unable to Pay for Housing in the Last Year: No     Number of Places Lived in the Last Year: 1     Unstable Housing in the Last Year: No       Family History   Adopted: Yes   Problem Relation Name Age of Onset    Diabetes Brother      Diabetes Sister      Cancer Neg Hx      Heart disease Neg Hx      Kidney disease Neg Hx         Past Surgical History:   Procedure Laterality Date    APPENDECTOMY      CARDIAC CATHETERIZATION      2016    CARDIAC SURGERY  1994    balloon angioplasty    CATARACT EXTRACTION W/  INTRAOCULAR LENS IMPLANT Right 02/01/2017    CORONARY ARTERY BYPASS GRAFT  05/2011    per patient x4    HERNIA REPAIR      OPEN REDUCTION AND INTERNAL FIXATION (ORIF) OF INJURY OF HIP      PCIOL Bilateral OD 02/01/17/OS 02/15/17    DR. ALONZO    TURBT (TRANSURETHRAL RESECTION OF BLADDER TUMOR) N/A 6/8/2023    Procedure: TURBT (TRANSURETHRAL RESECTION  OF BLADDER TUMOR);  Surgeon: Hortencia Michael MD;  Location: [facility] OR;  Service: Urology;  Laterality: N/A;    TURBT (TRANSURETHRAL RESECTION OF BLADDER TUMOR) N/A 2/8/2024    Procedure: TURBT (TRANSURETHRAL RESECTION OF BLADDER TUMOR);  Surgeon: Hortencia Michael MD;  Location: [facility] OR;  Service: Urology;  Laterality: N/A;       Past Medical History:   Diagnosis Date    Abnormal brain MRI 01/21/2018    Chronic microvascular ischemia changes per MRI July 9, 2007 in [facility]    Abnormal ECG 10/31/2013    Abnormal stress test 01/28/2016    Alcohol dependence     States alcohol abuse ended in 1994    AP (angina pectoris) 01/28/2016    Asthma     Bladder cancer     Carotid artery occlusion     Cataract     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    2 - Normal left ventricular systolic function (EF 60-65%).    3 - Normal left ventricular diastolic function.    4 - Mild left atrial enlargement.    5 - Normal right ventricular systolic function .    6 - Trivial to mild aortic regurgitation.    7 - Trivial to mild mitral regurgitation.  10 - Trivial to mild pulmonic regurgitation.     Hematuria 06/25/2020    History of alcohol abuse 01/22/2018    Patient denies drinking to excess since 1994.    History of atherectomy 01/21/2018 2/2016 [facility]    History of chronic kidney disease 01/22/2018    History of CKD 3    History of PTCA 10/31/2013    History of PTCA 03/09/2016    Hyperlipidemia     Hypertension     Insomnia 06/17/2013    Iron deficiency anemia 10/19/2023    Ischemic  cardiomyopathy 10/31/2013    Long term (current) use of antithrombotics/antiplatelets 01/21/2018    Malignant neoplasm of overlapping sites of bladder 06/08/2023    Myocardial infarction     Old MI (myocardial infarction) 1994    Peripheral vascular disease     Polyneuropathy     RBBB 10/31/2013    S/P CABG (coronary artery bypass graft) 10/31/2013    Shortness of breath 10/31/2013    Tobacco dependence     resolved    Trouble in sleeping     Type 2 diabetes with peripheral circulatory disorder, controlled     Wears glasses        Review of Systems   Constitutional:  Positive for fatigue.   HENT:  Positive for hearing loss. Negative for nosebleeds.    Eyes: Negative.    Respiratory: Negative.     Cardiovascular: Negative.    Gastrointestinal:  Negative for anal bleeding and blood in stool.   Endocrine: Negative.    Genitourinary: Negative.  Negative for hematuria.   Musculoskeletal:  Positive for gait problem.   Skin: Negative.    Allergic/Immunologic: Negative.    Neurological:  Positive for numbness.   Hematological:  Bruises/bleeds easily.   Psychiatric/Behavioral: Negative.            Medication List with Changes/Refills   Current Medications    AMLODIPINE (NORVASC) 10 MG TABLET    Take 0.5 tablets (5 mg total) by mouth once daily.    ASPIRIN (ECOTRIN) 81 MG EC TABLET    Take 1 tablet (81 mg total) by mouth once daily.    ATORVASTATIN (LIPITOR) 40 MG TABLET    Take 1 tablet (40 mg total) by mouth every evening.    B COMPLEX VITAMINS TABLET    Take 1 tablet by mouth once daily.    BLOOD-GLUCOSE METER (TRUERESULT BLOOD GLUCOSE SYSTM MISC)    by Misc.(Non-Drug; Combo Route) route.    CYANOCOBALAMIN (VITAMIN B-12) 1000 MCG TABLET    Take 100 mcg by mouth once daily.    DULAGLUTIDE (TRULICITY) 4.5 MG/0.5 ML PEN INJECTOR    Inject 4.5 mg into the skin every 7 days. SUNDAY    EMBRACE PRO TEST STRIPS STRP    CHECK BLOOD SUGAR TWICE DAILY.    FINASTERIDE (PROSCAR) 5 MG TABLET    TAKE 1 TABLET ONE TIME DAILY    FUROSEMIDE  (LASIX) 40 MG TABLET    TAKE 1 PILL IN AM, AND 1/2 PILL IN PM    GLUCOSE 4 GM CHEWABLE TABLET    Take 4 tablets (16 g total) by mouth as needed for Low blood sugar.    HYOSCYAMINE (LEVSIN/SL) 0.125 MG SUBL    Place 2 tablets (0.25 mg total) under the tongue every 4 (four) hours as needed (bladder spasms).    INSULIN (LANTUS SOLOSTAR U-100 INSULIN) GLARGINE 100 UNITS/ML SUBQ PEN    Inject into the skin. 15 units QHS and 18 units QAM    INSULIN ASPART U-100 (NOVOLOG) 100 UNIT/ML INJECTION    Inject 3 Units into the skin every 4 (four) hours as needed (for high blood sugar or carbohydrate intake).    ISOSORBIDE MONONITRATE (IMDUR) 60 MG 24 HR TABLET    Take 1 tablet (60 mg total) by mouth once daily.    LIDOCAINE (LIDODERM) 5 %    APPLY 1 PATCH TOPICALLY EVERY DAY FOR PAIN  WEAR FOR 12 HOURS, THEN REMOVE. DO NOT APPLY NEW PATCH FOR AT LEAST 12 HOURS.    LOSARTAN (COZAAR) 100 MG TABLET    Take 1 tablet (100 mg total) by mouth once daily.    METOPROLOL SUCCINATE (TOPROL-XL) 25 MG 24 HR TABLET    Take 1 tablet (25 mg total) by mouth every evening.    MICONAZOLE (MICOTIN) 2 % CREAM    APPLY SMALL AMOUNT TOPICALLY TWICE A DAY AS NEEDED FOR FUNGAL INFECTION    MULTIVIT-MINERALS/FOLIC ACID (DIABETIC MULTIVITAMIN ORAL)    Take by mouth.    NITROGLYCERIN (NITROSTAT) 0.4 MG SL TABLET    0.4 mg.    TAMSULOSIN (FLOMAX) 0.4 MG CAP    Take 1 capsule (0.4 mg total) by mouth once daily.    TRUEPLUS LANCETS 26 GAUGE MISC    CHECK BLOOD SUGAR TWICE DAILY.    TRUERESULT BLOOD GLUCOSE SYSTM KIT    CHECK BLOOD SUGAR TWICE DAILY.    VITAMIN D (VITAMIN D3) 1000 UNITS TAB    Take 1 tablet (1,000 Units total) by mouth once daily.        Objective:     Vitals:    04/22/24 0713   BP: 128/78   Pulse: 87   Temp: 97.5 °F (36.4 °C)       Physical Exam  Vitals reviewed.   Constitutional:       Appearance: Normal appearance.   HENT:      Head: Normocephalic and atraumatic.      Right Ear: External ear normal. Decreased hearing noted.      Left Ear:  External ear normal. Decreased hearing noted.   Cardiovascular:      Rate and Rhythm: Normal rate and regular rhythm.      Heart sounds: Normal heart sounds, S1 normal and S2 normal.   Pulmonary:      Effort: Pulmonary effort is normal.      Breath sounds: Normal breath sounds.   Abdominal:      General: There is no distension.   Musculoskeletal:         General: Normal range of motion.      Cervical back: Normal range of motion.   Skin:     General: Skin is warm and dry.   Neurological:      General: No focal deficit present.      Mental Status: He is alert and oriented to person, place, and time.      Gait: Gait abnormal.   Psychiatric:         Attention and Perception: Attention and perception normal.         Mood and Affect: Mood and affect normal.         Speech: Speech normal.         Behavior: Behavior normal. Behavior is cooperative.         Thought Content: Thought content normal.         Cognition and Memory: Cognition and memory normal.         Judgment: Judgment normal.         Assessment:     Problem List Items Addressed This Visit          Oncology    Malignant neoplasm of overlapping sites of bladder (Chronic)    Relevant Orders    CBC Auto Differential    Comprehensive Metabolic Panel    CBC Auto Differential    Comprehensive Metabolic Panel    Iron and TIBC    Ferritin    Anemia in other chronic diseases classified elsewhere - Primary    Relevant Orders    CBC Auto Differential    Comprehensive Metabolic Panel    Ferritin    Iron and TIBC    CBC Auto Differential    Comprehensive Metabolic Panel    Iron and TIBC    Ferritin     Other Visit Diagnoses       History of iron deficiency anemia        Relevant Orders    CBC Auto Differential    Comprehensive Metabolic Panel    Ferritin    Iron and TIBC    CBC Auto Differential    Comprehensive Metabolic Panel    Iron and TIBC    Ferritin            Lab Results   Component Value Date    WBC 8.29 04/18/2024    RBC 4.20 (L) 04/18/2024    HGB 13.2 (L)  04/18/2024    HCT 41.3 04/18/2024    MCV 98 04/18/2024    MCH 31.4 (H) 04/18/2024    MCHC 32.0 04/18/2024    RDW 13.5 04/18/2024     04/18/2024    MPV 10.7 04/18/2024    GRAN 4.8 04/18/2024    GRAN 58.4 04/18/2024    LYMPH 2.4 04/18/2024    LYMPH 29.3 04/18/2024    MONO 0.6 04/18/2024    MONO 6.9 04/18/2024    EOS 0.4 04/18/2024    BASO 0.03 04/18/2024    EOSINOPHIL 4.8 04/18/2024    BASOPHIL 0.4 04/18/2024      Lab Results   Component Value Date     04/18/2024    K 4.8 04/18/2024     04/18/2024    CO2 23 04/18/2024    BUN 35 (H) 04/18/2024    CREATININE 1.5 (H) 04/18/2024    CALCIUM 10.0 04/18/2024    ANIONGAP 11 04/18/2024    ESTGFRAFRICA 58.7 (A) 12/29/2021    EGFRNONAA 50.8 (A) 12/29/2021     Lab Results   Component Value Date    ALT 15 04/18/2024    AST 20 04/18/2024    ALKPHOS 77 04/18/2024    BILITOT 0.5 04/18/2024     Lab Results   Component Value Date    IRON 70 04/18/2024    TRANSFERRIN 214 04/18/2024    TIBC 317 04/18/2024    FESATURATED 22 04/18/2024    FERRITIN 295 04/18/2024        Plan:   Anemia in other chronic diseases classified elsewhere  -     CBC Auto Differential; Future; Expected date: 04/22/2024  -     Comprehensive Metabolic Panel; Future; Expected date: 04/22/2024  -     Ferritin; Future; Expected date: 04/22/2024  -     Iron and TIBC; Future; Expected date: 04/22/2024  -     CBC Auto Differential; Future; Expected date: 04/22/2024  -     Comprehensive Metabolic Panel; Future; Expected date: 04/22/2024  -     Iron and TIBC; Future; Expected date: 04/22/2024  -     Ferritin; Future; Expected date: 04/22/2024    Malignant neoplasm of overlapping sites of bladder  -     CBC Auto Differential; Future; Expected date: 04/22/2024  -     Comprehensive Metabolic Panel; Future; Expected date: 04/22/2024  -     CBC Auto Differential; Future; Expected date: 04/22/2024  -     Comprehensive Metabolic Panel; Future; Expected date: 04/22/2024  -     Iron and TIBC; Future; Expected date:  04/22/2024  -     Ferritin; Future; Expected date: 04/22/2024    History of iron deficiency anemia  -     CBC Auto Differential; Future; Expected date: 04/22/2024  -     Comprehensive Metabolic Panel; Future; Expected date: 04/22/2024  -     Ferritin; Future; Expected date: 04/22/2024  -     Iron and TIBC; Future; Expected date: 04/22/2024  -     CBC Auto Differential; Future; Expected date: 04/22/2024  -     Comprehensive Metabolic Panel; Future; Expected date: 04/22/2024  -     Iron and TIBC; Future; Expected date: 04/22/2024  -     Ferritin; Future; Expected date: 04/22/2024          Continue B12 and B complex daily.  Continue eating high iron content foods.  If labs continue to remain stable in 2 months will extend f/u up appointment with labs to 3-4 months.  Patient verbalized understanding.      Med Onc Chart Routing      Follow up with physician    Follow up with ISAIAS 2 months. in person in [place]   Infusion scheduling note   n/a   Injection scheduling note n/a   Labs   Scheduling:  Preferred lab: [facility]  Lab interval:  cbc,cmp, iron studies prior   Imaging   N/a   Pharmacy appointment No pharmacy appointment needed      Other referrals       No additional referrals needed  n/a         Total time spent on encounter 30 minutes    Collaborating Provider:  Dr. Franklin Ross    Thank You,  YOGESH Souza  Benign Hematology

## 2024-05-13 ENCOUNTER — OFFICE VISIT (OUTPATIENT)
Dept: INTERNAL MEDICINE | Facility: CLINIC | Age: 84
End: 2024-05-13
Payer: MEDICARE

## 2024-05-13 ENCOUNTER — HOSPITAL ENCOUNTER (OUTPATIENT)
Dept: RADIOLOGY | Facility: HOSPITAL | Age: 84
Discharge: HOME | End: 2024-05-13
Attending: FAMILY MEDICINE
Payer: MEDICARE

## 2024-05-13 VITALS
TEMPERATURE: 98 F | DIASTOLIC BLOOD PRESSURE: 60 MMHG | WEIGHT: 167.56 LBS | HEIGHT: 70 IN | SYSTOLIC BLOOD PRESSURE: 124 MMHG | HEART RATE: 71 BPM | RESPIRATION RATE: 19 BRPM | OXYGEN SATURATION: 98 % | BODY MASS INDEX: 23.99 KG/M2

## 2024-05-13 DIAGNOSIS — Z79.4 TYPE 2 DIABETES MELLITUS WITH HYPERGLYCEMIA, WITH LONG-TERM CURRENT USE OF INSULIN: Chronic | ICD-10-CM

## 2024-05-13 DIAGNOSIS — I70.0 THORACIC AORTA ATHEROSCLEROSIS: Chronic | ICD-10-CM

## 2024-05-13 DIAGNOSIS — E78.5 HYPERLIPIDEMIA ASSOCIATED WITH TYPE 2 DIABETES MELLITUS: Chronic | ICD-10-CM

## 2024-05-13 DIAGNOSIS — N18.31 STAGE 3A CHRONIC KIDNEY DISEASE: Chronic | ICD-10-CM

## 2024-05-13 DIAGNOSIS — E11.65 TYPE 2 DIABETES MELLITUS WITH HYPERGLYCEMIA, WITH LONG-TERM CURRENT USE OF INSULIN: Chronic | ICD-10-CM

## 2024-05-13 DIAGNOSIS — R39.12 BENIGN PROSTATIC HYPERPLASIA WITH WEAK URINARY STREAM: Chronic | ICD-10-CM

## 2024-05-13 DIAGNOSIS — S42.012A CLOSED ANTERIOR DISPLACED FRACTURE OF STERNAL END OF LEFT CLAVICLE, INITIAL ENCOUNTER: Primary | Chronic | ICD-10-CM

## 2024-05-13 DIAGNOSIS — I50.42 CHRONIC COMBINED SYSTOLIC AND DIASTOLIC CONGESTIVE HEART FAILURE: Chronic | ICD-10-CM

## 2024-05-13 DIAGNOSIS — E11.69 HYPERLIPIDEMIA ASSOCIATED WITH TYPE 2 DIABETES MELLITUS: Chronic | ICD-10-CM

## 2024-05-13 DIAGNOSIS — I25.708 CORONARY ARTERY DISEASE OF BYPASS GRAFT OF NATIVE HEART WITH STABLE ANGINA PECTORIS: Chronic | ICD-10-CM

## 2024-05-13 DIAGNOSIS — N40.1 BENIGN PROSTATIC HYPERPLASIA WITH WEAK URINARY STREAM: Chronic | ICD-10-CM

## 2024-05-13 DIAGNOSIS — I15.2 HYPERTENSION ASSOCIATED WITH DIABETES: Chronic | ICD-10-CM

## 2024-05-13 DIAGNOSIS — E11.59 HYPERTENSION ASSOCIATED WITH DIABETES: Chronic | ICD-10-CM

## 2024-05-13 DIAGNOSIS — E11.51 DIABETES MELLITUS TYPE 2 WITH PERIPHERAL ARTERY DISEASE: Chronic | ICD-10-CM

## 2024-05-13 DIAGNOSIS — I65.23 ASYMPTOMATIC STENOSIS OF BOTH CAROTID ARTERIES WITHOUT INFARCTION: Chronic | ICD-10-CM

## 2024-05-13 DIAGNOSIS — S42.012A CLOSED ANTERIOR DISPLACED FRACTURE OF STERNAL END OF LEFT CLAVICLE, INITIAL ENCOUNTER: Chronic | ICD-10-CM

## 2024-05-13 PROCEDURE — 99214 OFFICE O/P EST MOD 30 MIN: CPT | Mod: S$GLB,,, | Performed by: FAMILY MEDICINE

## 2024-05-13 PROCEDURE — 3288F FALL RISK ASSESSMENT DOCD: CPT | Mod: CPTII,S$GLB,, | Performed by: FAMILY MEDICINE

## 2024-05-13 PROCEDURE — G2211 COMPLEX E/M VISIT ADD ON: HCPCS | Mod: S$GLB,,, | Performed by: FAMILY MEDICINE

## 2024-05-13 PROCEDURE — 99999 PR PBB SHADOW E&M-EST. PATIENT-LVL V: CPT | Mod: PBBFAC,,, | Performed by: FAMILY MEDICINE

## 2024-05-13 PROCEDURE — 1126F AMNT PAIN NOTED NONE PRSNT: CPT | Mod: CPTII,S$GLB,, | Performed by: FAMILY MEDICINE

## 2024-05-13 PROCEDURE — 3074F SYST BP LT 130 MM HG: CPT | Mod: CPTII,S$GLB,, | Performed by: FAMILY MEDICINE

## 2024-05-13 PROCEDURE — 73000 X-RAY EXAM OF COLLAR BONE: CPT | Mod: 26,HCNC,LT, | Performed by: RADIOLOGY

## 2024-05-13 PROCEDURE — 1100F PTFALLS ASSESS-DOCD GE2>/YR: CPT | Mod: CPTII,S$GLB,, | Performed by: FAMILY MEDICINE

## 2024-05-13 PROCEDURE — 73000 X-RAY EXAM OF COLLAR BONE: CPT | Mod: TC,HCNC,LT

## 2024-05-13 PROCEDURE — 3078F DIAST BP <80 MM HG: CPT | Mod: CPTII,S$GLB,, | Performed by: FAMILY MEDICINE

## 2024-05-13 RX ORDER — LOSARTAN POTASSIUM 100 MG/1
100 TABLET ORAL DAILY
Qty: 90 TABLET | Refills: 3 | Status: SHIPPED | OUTPATIENT
Start: 2024-05-13

## 2024-05-13 RX ORDER — TAMSULOSIN HYDROCHLORIDE 0.4 MG/1
0.4 CAPSULE ORAL DAILY
Qty: 90 CAPSULE | Refills: 3 | Status: SHIPPED | OUTPATIENT
Start: 2024-05-13

## 2024-05-13 RX ORDER — METOPROLOL SUCCINATE 25 MG/1
25 TABLET, EXTENDED RELEASE ORAL NIGHTLY
Qty: 90 TABLET | Refills: 3 | Status: SHIPPED | OUTPATIENT
Start: 2024-05-13

## 2024-05-13 RX ORDER — ATORVASTATIN CALCIUM 40 MG/1
40 TABLET, FILM COATED ORAL NIGHTLY
Qty: 90 TABLET | Refills: 3 | Status: SHIPPED | OUTPATIENT
Start: 2024-05-13

## 2024-05-13 RX ORDER — AMLODIPINE BESYLATE 10 MG/1
5 TABLET ORAL DAILY
Qty: 90 TABLET | Refills: 3 | Status: SHIPPED | OUTPATIENT
Start: 2024-05-13

## 2024-05-13 RX ORDER — ISOSORBIDE MONONITRATE 60 MG/1
60 TABLET, EXTENDED RELEASE ORAL DAILY
Qty: 90 TABLET | Refills: 3 | Status: SHIPPED | OUTPATIENT
Start: 2024-05-13

## 2024-05-15 ENCOUNTER — TELEPHONE (OUTPATIENT)
Dept: INTERNAL MEDICINE | Facility: CLINIC | Age: 84
End: 2024-05-15
Payer: OTHER GOVERNMENT

## 2024-05-17 ENCOUNTER — OFFICE VISIT (OUTPATIENT)
Dept: SPORTS MEDICINE | Facility: CLINIC | Age: 84
End: 2024-05-17
Payer: MEDICARE

## 2024-05-17 DIAGNOSIS — S42.012A CLOSED ANTERIOR DISPLACED FRACTURE OF STERNAL END OF LEFT CLAVICLE, INITIAL ENCOUNTER: Chronic | ICD-10-CM

## 2024-05-17 DIAGNOSIS — S42.012A CLOSED ANTERIOR DISPLACED FRACTURE OF STERNAL END OF LEFT CLAVICLE, INITIAL ENCOUNTER: Primary | ICD-10-CM

## 2024-05-17 PROCEDURE — 99999 PR PBB SHADOW E&M-EST. PATIENT-LVL IV: CPT | Mod: PBBFAC,HCNC,, | Performed by: STUDENT IN AN ORGANIZED HEALTH CARE EDUCATION/TRAINING PROGRAM

## 2024-05-17 PROCEDURE — 3288F FALL RISK ASSESSMENT DOCD: CPT | Mod: HCNC,CPTII,S$GLB, | Performed by: STUDENT IN AN ORGANIZED HEALTH CARE EDUCATION/TRAINING PROGRAM

## 2024-05-17 PROCEDURE — 1160F RVW MEDS BY RX/DR IN RCRD: CPT | Mod: HCNC,CPTII,S$GLB, | Performed by: STUDENT IN AN ORGANIZED HEALTH CARE EDUCATION/TRAINING PROGRAM

## 2024-05-17 PROCEDURE — 99499 UNLISTED E&M SERVICE: CPT | Mod: S$GLB,,, | Performed by: STUDENT IN AN ORGANIZED HEALTH CARE EDUCATION/TRAINING PROGRAM

## 2024-05-17 PROCEDURE — 99203 OFFICE O/P NEW LOW 30 MIN: CPT | Mod: HCNC,S$GLB,, | Performed by: STUDENT IN AN ORGANIZED HEALTH CARE EDUCATION/TRAINING PROGRAM

## 2024-05-17 PROCEDURE — 1126F AMNT PAIN NOTED NONE PRSNT: CPT | Mod: HCNC,CPTII,S$GLB, | Performed by: STUDENT IN AN ORGANIZED HEALTH CARE EDUCATION/TRAINING PROGRAM

## 2024-05-17 PROCEDURE — 1159F MED LIST DOCD IN RCRD: CPT | Mod: HCNC,CPTII,S$GLB, | Performed by: STUDENT IN AN ORGANIZED HEALTH CARE EDUCATION/TRAINING PROGRAM

## 2024-05-17 PROCEDURE — 1100F PTFALLS ASSESS-DOCD GE2>/YR: CPT | Mod: HCNC,CPTII,S$GLB, | Performed by: STUDENT IN AN ORGANIZED HEALTH CARE EDUCATION/TRAINING PROGRAM

## 2024-05-17 NOTE — PROGRESS NOTES
Orthopaedics Sports Medicine     Shoulder Initial Visit         5/17/2024    Referring MD: MUKUL Garg MD    Chief Complaint   Patient presents with    Left Shoulder - Pain, Injury         History of Present Illness:   Nithin Pino is a 84 y.o. right-hand dominant male who presents with left shoulder pain and dysfunction.    Onset of the symptoms was approximately 3 weeks ago (prior to May)     Inciting event: Patient suffered a fall but states he does not remember the fall, just waking up in pain. Was transported by ambulance to [facility] and ultimately told he had sustained a clavicle fracture.      Current symptoms include left clavicle pain and deformity. States he has minimal pain today. Only uses sling periodically at this time. ADLs are not significantly limited.      Evaluation to date: X-Ray,     Treatment to date: Rest, activity modification, sling      Past Medical History:   Past Medical History:   Diagnosis Date    Abnormal brain MRI 01/21/2018    Chronic microvascular ischemia changes per MRI July 9, 2007 in [facility]    Abnormal ECG 10/31/2013    Abnormal stress test 01/28/2016    Alcohol dependence     States alcohol abuse ended in 1994    AP (angina pectoris) 01/28/2016    Asthma     Bladder cancer     Carotid artery occlusion     Cataract     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    2 - Normal left ventricular systolic function (EF 60-65%).    3 - Normal left ventricular diastolic function.    4 - Mild left atrial enlargement.    5 - Normal right  ventricular systolic function .    6 - Trivial to mild aortic regurgitation.    7 - Trivial to mild mitral regurgitation.  10 - Trivial to mild pulmonic regurgitation.     Hematuria 06/25/2020    History of alcohol abuse 01/22/2018    Patient denies drinking to excess since 1994.    History of atherectomy 01/21/2018 2/2016 [facility]    History of chronic kidney disease 01/22/2018    History of CKD 3    History of PTCA 10/31/2013    History of PTCA 03/09/2016    Hyperlipidemia     Hypertension     Insomnia 06/17/2013    Iron deficiency anemia 10/19/2023    Ischemic cardiomyopathy 10/31/2013    Long term (current) use of antithrombotics/antiplatelets 01/21/2018    Malignant neoplasm of overlapping sites of bladder 06/08/2023    Myocardial infarction     Old MI (myocardial infarction) 1994    Peripheral vascular disease     Polyneuropathy     RBBB 10/31/2013    S/P CABG (coronary artery bypass graft) 10/31/2013    Shortness of breath 10/31/2013    Tobacco dependence     resolved    Trouble in sleeping     Type 2 diabetes with peripheral circulatory disorder, controlled     Wears glasses        Past Surgical History:   Past Surgical History:   Procedure Laterality Date    APPENDECTOMY      CARDIAC CATHETERIZATION      2016    CARDIAC SURGERY  1994    balloon angioplasty    CATARACT EXTRACTION W/  INTRAOCULAR LENS IMPLANT Right 02/01/2017    CORONARY ARTERY BYPASS GRAFT  05/2011    per patient x4    HERNIA REPAIR      OPEN REDUCTION AND INTERNAL FIXATION (ORIF) OF INJURY OF HIP      PCIOL Bilateral OD 02/01/17/OS 02/15/17    DR. ALONZO    TURBT (TRANSURETHRAL RESECTION OF BLADDER TUMOR) N/A 6/8/2023    Procedure: TURBT (TRANSURETHRAL RESECTION OF BLADDER TUMOR);  Surgeon: Hortencia Michael MD;  Location: [facility] OR;  Service: Urology;  Laterality: N/A;    TURBT (TRANSURETHRAL RESECTION OF BLADDER TUMOR) N/A 2/8/2024    Procedure: TURBT (TRANSURETHRAL RESECTION OF BLADDER TUMOR);  Surgeon: Hortencia Michael MD;  Location:  [facility] OR;  Service: Urology;  Laterality: N/A;       Medications:  Patient's Medications   New Prescriptions    No medications on file   Previous Medications    AMLODIPINE (NORVASC) 10 MG TABLET    Take 0.5 tablets (5 mg total) by mouth once daily.    ASPIRIN (ECOTRIN) 81 MG EC TABLET    Take 1 tablet (81 mg total) by mouth once daily.    ATORVASTATIN (LIPITOR) 40 MG TABLET    Take 1 tablet (40 mg total) by mouth every evening.    B COMPLEX VITAMINS TABLET    Take 1 tablet by mouth once daily.    BLOOD-GLUCOSE METER (TRUERESULT BLOOD GLUCOSE SYSTM MISC)    by Misc.(Non-Drug; Combo Route) route.    CYANOCOBALAMIN (VITAMIN B-12) 1000 MCG TABLET    Take 100 mcg by mouth once daily.    DULAGLUTIDE (TRULICITY) 4.5 MG/0.5 ML PEN INJECTOR    Inject 4.5 mg into the skin every 7 days. SUNDAY    EMBRACE PRO TEST STRIPS STRP    CHECK BLOOD SUGAR TWICE DAILY.    FINASTERIDE (PROSCAR) 5 MG TABLET    TAKE 1 TABLET ONE TIME DAILY    FUROSEMIDE (LASIX) 40 MG TABLET    TAKE 1 PILL IN AM, AND 1/2 PILL IN PM    GLUCOSE 4 GM CHEWABLE TABLET    Take 4 tablets (16 g total) by mouth as needed for Low blood sugar.    HYOSCYAMINE (LEVSIN/SL) 0.125 MG SUBL    Place 2 tablets (0.25 mg total) under the tongue every 4 (four) hours as needed (bladder spasms).    INSULIN (LANTUS SOLOSTAR U-100 INSULIN) GLARGINE 100 UNITS/ML SUBQ PEN    Inject into the skin. 15 units QHS and 18 units QAM    INSULIN ASPART U-100 (NOVOLOG) 100 UNIT/ML INJECTION    Inject 3 Units into the skin every 4 (four) hours as needed (for high blood sugar or carbohydrate intake).    ISOSORBIDE MONONITRATE (IMDUR) 60 MG 24 HR TABLET    Take 1 tablet (60 mg total) by mouth once daily.    LIDOCAINE (LIDODERM) 5 %    APPLY 1 PATCH TOPICALLY EVERY DAY FOR PAIN  WEAR FOR 12 HOURS, THEN REMOVE. DO NOT APPLY NEW PATCH FOR AT LEAST 12 HOURS.    LOSARTAN (COZAAR) 100 MG TABLET    Take 1 tablet (100 mg total) by mouth once daily.    METOPROLOL SUCCINATE (TOPROL-XL) 25 MG 24 HR TABLET     "Take 1 tablet (25 mg total) by mouth every evening.    MICONAZOLE (MICOTIN) 2 % CREAM    APPLY SMALL AMOUNT TOPICALLY TWICE A DAY AS NEEDED FOR FUNGAL INFECTION    MULTIVIT-MINERALS/FOLIC ACID (DIABETIC MULTIVITAMIN ORAL)    Take by mouth.    NITROGLYCERIN (NITROSTAT) 0.4 MG SL TABLET    0.4 mg.    TAMSULOSIN (FLOMAX) 0.4 MG CAP    Take 1 capsule (0.4 mg total) by mouth once daily.    TRUEPLUS LANCETS 26 GAUGE MISC    CHECK BLOOD SUGAR TWICE DAILY.    TRUERESULT BLOOD GLUCOSE SYSTM KIT    CHECK BLOOD SUGAR TWICE DAILY.    VITAMIN D (VITAMIN D3) 1000 UNITS TAB    Take 1 tablet (1,000 Units total) by mouth once daily.   Modified Medications    No medications on file   Discontinued Medications    No medications on file       Allergies:   Review of patient's allergies indicates:   Allergen Reactions    Pseudoephedrine-guaifenesin Shortness Of Breath    Panmist dm  [pseudoephedrine-dm-guaifenesin]      Other reaction(s): Unknown       Social History:   Home town: [place], [place]  Alcohol use: He reports current alcohol use.  Tobacco use: He reports that he quit smoking about 30 years ago. His smoking use included cigarettes. He started smoking about 70 years ago. He has a 60 pack-year smoking history. He quit smokeless tobacco use about 13 years ago.    Review of systems:  History of recent illness, fevers, shakes, or chills: no  History of cardiac problems or chest pain: Yes  History of pulmonary problems or asthma: no  History of diabetes: Yes  History of prior dvt or clotting problems: no  History of sleep apnea: no      Physical Examination:  Estimated body mass index is 24.04 kg/m² as calculated from the following:    Height as of 5/13/24: 5' 10" (1.778 m).    Weight as of 5/13/24: 76 kg (167 lb 8.8 oz).    General  Healthy appearing male in no acute distress  Alert and oriented, normal mood, appropriate affect    Shoulder Examination:  Patient is alert and oriented, no distress. Skin is intact. Neuro is normal with no " focal motor or sensory findings.    Cervical exam is unremarkable. Intact cervical ROM. Negative Spurling's test    Physical Exam:  RIGHT    LEFT    Scap Dyskinesis/Winging (-)    (-)    Tenderness:          Greater Tuberosity             (-)    (-)  Bicipital Groove  (-)    (-)  AC joint   (-)    (-)  Other:         Sternal end of clavicle    ROM:  Tolerates gentle ROM of left shoulder      Neurovascular examination  - Motor grossly intact bilaterally to shoulder abduction, elbow flexion and extension, wrist flexion and extension, and intrinsic hand musculature  - Sensation intact to light touch bilaterally in axillary, median, radial, and ulnar distributions  - Symmetrical radial pulses      Imaging:  XR Results:  Results for orders placed during the hospital encounter of 05/13/24    X-Ray Clavicle Left    Narrative  EXAMINATION:  XR CLAVICLE LEFT    CLINICAL HISTORY:  Anterior displaced fracture of sternal end of left clavicle, initial encounter for closed fracture    TECHNIQUE:  Two-view left clavicle    COMPARISON:  No recent comparison    FINDINGS:  Possible nondisplaced medial left clavicle fracture present.  AC joint tacked.  Lung apices clear.    Impression  As above      Electronically signed by: Scott Cisneros MD  Date:    05/13/2024  Time:    09:35      MRI Results:  No results found for this or any previous visit.      CT Results:  No results found for this or any previous visit.      Physician Read: I agree with the above impression. Minimally displaced fracture of medial clavicle      Impression:  84 y.o. male with left sternal sided clavicle fracture, minimally displaced       Plan:  Discussed diagnosis and treatment options with patient today. He has a minimally displaced fracture of the medial clavicle. He has been wearing a sling for the past 3 weeks.  I would like him to wear the sling for 1 more week for a total of 4 weeks immobilization. He may then discontinue the sling and begin working on  range of motion. He should avoid lifting anything heavier than 2-3 pounds with the left arm for another 3-4 weeks after the sling is removed.  Follow up with me in 4 weeks with XR of the left clavicle           José Mcmahon MD    I, Dalton Odom, acted as a scribe for José Mcmahon MD for the duration of this office visit.

## 2024-05-21 ENCOUNTER — PROCEDURE VISIT (OUTPATIENT)
Dept: UROLOGY | Facility: CLINIC | Age: 84
End: 2024-05-21
Payer: MEDICARE

## 2024-05-21 VITALS
DIASTOLIC BLOOD PRESSURE: 68 MMHG | WEIGHT: 162.25 LBS | HEART RATE: 78 BPM | RESPIRATION RATE: 18 BRPM | SYSTOLIC BLOOD PRESSURE: 127 MMHG | HEIGHT: 70 IN | BODY MASS INDEX: 23.23 KG/M2

## 2024-05-21 DIAGNOSIS — C67.0 MALIGNANT NEOPLASM OF TRIGONE OF URINARY BLADDER: Primary | ICD-10-CM

## 2024-05-21 LAB
BILIRUB UR QL STRIP: NEGATIVE
GLUCOSE UR QL STRIP: POSITIVE
KETONES UR QL STRIP: NEGATIVE
LEUKOCYTE ESTERASE UR QL STRIP: NEGATIVE
PH, POC UA: 5
POC BLOOD, URINE: POSITIVE
POC NITRATES, URINE: NEGATIVE
PROT UR QL STRIP: NEGATIVE
SP GR UR STRIP: 1.02 (ref 1–1.03)
UROBILINOGEN UR STRIP-ACNC: 0.2 (ref 0.3–2.2)

## 2024-05-21 PROCEDURE — 52000 CYSTOURETHROSCOPY: CPT | Mod: S$GLB,,, | Performed by: UROLOGY

## 2024-05-21 PROCEDURE — 81003 URINALYSIS AUTO W/O SCOPE: CPT | Mod: QW,S$GLB,, | Performed by: UROLOGY

## 2024-05-21 RX ORDER — LIDOCAINE HYDROCHLORIDE 20 MG/ML
JELLY TOPICAL
Status: COMPLETED | OUTPATIENT
Start: 2024-05-21 | End: 2024-05-21

## 2024-05-21 RX ORDER — CIPROFLOXACIN 500 MG/1
500 TABLET ORAL
Status: COMPLETED | OUTPATIENT
Start: 2024-05-21 | End: 2024-05-21

## 2024-05-21 RX ADMIN — LIDOCAINE HYDROCHLORIDE 10 ML: 20 JELLY TOPICAL at 02:05

## 2024-05-21 RX ADMIN — CIPROFLOXACIN 500 MG: 500 TABLET ORAL at 02:05

## 2024-05-21 NOTE — PROCEDURES
CC:bladder cancer    History of Present Illness:   Nithin Pino is a 84 y.o. male here for follow up.     5/21/24-Completed BCG induction. Here for cysto.   2/20/24-Path showing HG Ta urothelial cell carcinoma in all regions of tumor. Upon review with the pt, he only previously received 3 BCG treatments, but didn't get a full 6 treatment induction. No gross hematuria at this point. He was having some incontinence for a few days following TURBT. Now it has resolved.    1/17/24-Pt now cleared for TURBT by cardiology. No gross hematuria. No dysuria. No stranguria. Pt states that the BCG treatment are painful and asks about the natural history of bladder cancer, should he elect against treatment.   9/26/23: pt here for surveillance cysto.   6/23/23-Pt s/p TURBT. 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 No gross hematuria or dysuria. No weakness or fever. States that he is urinating well.   5/15/23- pt here for cysto. CT scan personally reviewed, showing a polypoid tumor at the left bladder wall, enhancing.   4/10/23-83yo male, here today for follow up, last seen in 2021. At that time, here was being treated for BPH with finasteride and tamsulosin and OAB with vesicare. He did have gross hematuria, but refused cystoscopy. He reports that he was seen in the ED at [facility] yesterday with dysuria and gross hematuria. Was prescribed levaquin. States that he hasn't started the levaquin yet. Saw a little blood this am. No fever. Stream is strong. Still on finasteride, vesicare and flomax. No difficulty emptying--states it was checked in the ED yesterday and he emptied completely.     Pt's records from [facility] with Dr. Sanchez reviewed from 12/22/22, noting cytology suspicious for high grade bladder cancer.       Review of Systems   Constitutional:  Negative for chills and fever.   Respiratory:  Negative for shortness of breath.    Cardiovascular:  Negative for chest pain.   Gastrointestinal:   Negative for abdominal pain.   Genitourinary:  Negative for flank pain.   Musculoskeletal:  Positive for arthralgias. Negative for back pain.   All other systems reviewed and are negative.        Past Medical History:   Diagnosis Date    Abnormal brain MRI 01/21/2018    Chronic microvascular ischemia changes per MRI July 9, 2007 in [facility]    Abnormal ECG 10/31/2013    Abnormal stress test 01/28/2016    Alcohol dependence     States alcohol abuse ended in 1994    AP (angina pectoris) 01/28/2016    Asthma     Bladder cancer     Carotid artery occlusion     Cataract     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    2 - Normal left ventricular systolic function (EF 60-65%).    3 - Normal left ventricular diastolic function.    4 - Mild left atrial enlargement.    5 - Normal right ventricular systolic function .    6 - Trivial to mild aortic regurgitation.    7 - Trivial to mild mitral regurgitation.  10 - Trivial to mild pulmonic regurgitation.     Hematuria 06/25/2020    History of alcohol abuse 01/22/2018    Patient denies drinking to excess since 1994.    History of atherectomy 01/21/2018 2/2016 [facility]    History of chronic kidney disease 01/22/2018    History of CKD 3    History of PTCA 10/31/2013    History of PTCA 03/09/2016    Hyperlipidemia     Hypertension     Insomnia 06/17/2013    Iron deficiency anemia 10/19/2023    Ischemic cardiomyopathy 10/31/2013    Long term (current) use of antithrombotics/antiplatelets 01/21/2018    Malignant neoplasm of overlapping sites of  bladder 2023    Myocardial infarction     Old MI (myocardial infarction)     Peripheral vascular disease     Polyneuropathy     RBBB 10/31/2013    S/P CABG (coronary artery bypass graft) 10/31/2013    Shortness of breath 10/31/2013    Tobacco dependence     resolved    Trouble in sleeping     Type 2 diabetes with peripheral circulatory disorder, controlled     Wears glasses        Past Surgical History:   Procedure Laterality Date    APPENDECTOMY      CARDIAC CATHETERIZATION      2016    CARDIAC SURGERY  1994    balloon angioplasty    CATARACT EXTRACTION W/  INTRAOCULAR LENS IMPLANT Right 2017    CORONARY ARTERY BYPASS GRAFT  2011    per patient x4    HERNIA REPAIR      OPEN REDUCTION AND INTERNAL FIXATION (ORIF) OF INJURY OF HIP      PCIOL Bilateral OD 17/OS 02/15/17    DR. ALONZO    TURBT (TRANSURETHRAL RESECTION OF BLADDER TUMOR) N/A 2023    Procedure: TURBT (TRANSURETHRAL RESECTION OF BLADDER TUMOR);  Surgeon: Hortencia Michael MD;  Location: [facility] [place] OR;  Service: Urology;  Laterality: N/A;    TURBT (TRANSURETHRAL RESECTION OF BLADDER TUMOR) N/A 2024    Procedure: TURBT (TRANSURETHRAL RESECTION OF BLADDER TUMOR);  Surgeon: Hortencia Michael MD;  Location: [facility] [place] OR;  Service: Urology;  Laterality: N/A;       Family History   Adopted: Yes   Problem Relation Name Age of Onset    Diabetes Brother      Diabetes Sister      Cancer Neg Hx      Heart disease Neg Hx      Kidney disease Neg Hx         Social History     Tobacco Use    Smoking status: Former     Current packs/day: 0.00     Average packs/day: 1.5 packs/day for 40.0 years (60.0 ttl pk-yrs)     Types: Cigarettes     Start date: 1954     Quit date: 1994     Years since quittin.4    Smokeless tobacco: Former     Quit date: 2011    Tobacco comments:     approx date   Substance Use Topics    Alcohol use: Yes     Comment: occasionally; 1-2 cans of beer a month    Drug use: No       Current Outpatient Medications    Medication Sig Dispense Refill    amLODIPine (NORVASC) 10 MG tablet Take 0.5 tablets (5 mg total) by mouth once daily. 90 tablet 3    aspirin (ECOTRIN) 81 MG EC tablet Take 1 tablet (81 mg total) by mouth once daily. 90 tablet 3    atorvastatin (LIPITOR) 40 MG tablet Take 1 tablet (40 mg total) by mouth every evening. 90 tablet 3    b complex vitamins tablet Take 1 tablet by mouth once daily.      BLOOD-GLUCOSE METER (TRUERESULT BLOOD GLUCOSE SYSTM MISC) by Misc.(Non-Drug; Combo Route) route.      cyanocobalamin (VITAMIN B-12) 1000 MCG tablet Take 100 mcg by mouth once daily.      dulaglutide (TRULICITY) 4.5 mg/0.5 mL pen injector Inject 4.5 mg into the skin every 7 days. SUNDAY      EMBRACE PRO TEST STRIPS Strp CHECK BLOOD SUGAR TWICE DAILY. 50 strip 0    finasteride (PROSCAR) 5 mg tablet TAKE 1 TABLET ONE TIME DAILY 90 tablet 3    furosemide (LASIX) 40 MG tablet TAKE 1 PILL IN AM, AND 1/2 PILL IN PM 60 tablet 11    glucose 4 GM chewable tablet Take 4 tablets (16 g total) by mouth as needed for Low blood sugar. 100 tablet 5    hyoscyamine (LEVSIN/SL) 0.125 mg Subl Place 2 tablets (0.25 mg total) under the tongue every 4 (four) hours as needed (bladder spasms). 30 tablet 1    insulin (LANTUS SOLOSTAR U-100 INSULIN) glargine 100 units/mL SubQ pen Inject into the skin. 15 units QHS and 18 units QAM      insulin aspart U-100 (NOVOLOG) 100 unit/mL injection Inject 3 Units into the skin every 4 (four) hours as needed (for high blood sugar or carbohydrate intake).      isosorbide mononitrate (IMDUR) 60 MG 24 hr tablet Take 1 tablet (60 mg total) by mouth once daily. 90 tablet 3    LIDOcaine (LIDODERM) 5 % APPLY 1 PATCH TOPICALLY EVERY DAY FOR PAIN  WEAR FOR 12 HOURS, THEN REMOVE. DO NOT APPLY NEW PATCH FOR AT LEAST 12 HOURS.      losartan (COZAAR) 100 MG tablet Take 1 tablet (100 mg total) by mouth once daily. 90 tablet 3    metoprolol succinate (TOPROL-XL) 25 MG 24 hr tablet Take 1 tablet (25 mg total) by mouth every  evening. 90 tablet 3    miconazole (MICOTIN) 2 % cream APPLY SMALL AMOUNT TOPICALLY TWICE A DAY AS NEEDED FOR FUNGAL INFECTION      multivit-minerals/folic acid (DIABETIC MULTIVITAMIN ORAL) Take by mouth.      nitroGLYCERIN (NITROSTAT) 0.4 MG SL tablet 0.4 mg.      tamsulosin (FLOMAX) 0.4 mg Cap Take 1 capsule (0.4 mg total) by mouth once daily. 90 capsule 3    TRUEPLUS LANCETS 26 gauge Misc CHECK BLOOD SUGAR TWICE DAILY. 100 each 0    TRUERESULT BLOOD GLUCOSE SYSTM kit CHECK BLOOD SUGAR TWICE DAILY. 1 each 0    vitamin D (VITAMIN D3) 1000 units Tab Take 1 tablet (1,000 Units total) by mouth once daily. 90 tablet 3     No current facility-administered medications for this visit.       Review of patient's allergies indicates:   Allergen Reactions    Pseudoephedrine-guaifenesin Shortness Of Breath    Panmist dm  [pseudoephedrine-dm-guaifenesin]      Other reaction(s): Unknown       Physical Exam  Vitals:    05/21/24 1314   BP: 127/68   Pulse: 78   Resp: 18         General: Well-developed, well-nourished in no acute distress  HEENT: Normocephalic, atraumatic, Extraocular movements intact  Neck: supple, trachea midline, no cervical or supraclavicular lymphadenopathy  Respirations: even and unlabored  Extremities: atraumatic, moves all equally, no clubbing, cyanosis or edema  Psych: normal affect  Skin: warm and dry, no lesions  Neuro: Alert and oriented, Cranial nerves II-XII grossly intact    PVR: 29cc    Urinalysis  Moderate blood, trace LE, nit negative      Lab Results   Component Value Date    PSA 0.37 12/22/2022    PSA 0.29 09/16/2020    PSA 0.82 06/09/2015       Procedure: Cystoscopy      Procedure in detail:   Informed consent was obtained. The patient's genitalia was prepped and draped in the usual sterile fashion. Lidocaine jelly was administered per urethra. I utilized the flexible cystoscope and advanced it into the urethral meatus. No urethral strictures were noted. Bladder access was obtained. Systematic  review of the bladder was performed, noting the left UO to be difficult to see. There was some edematous tissue and erythema at the left UO, without definite tumor. The right UO was effluxing clear. The scope was slowly backed out of the urethra, and the prostate was noted to be mildly obstructing. No urethral lesions were identified. The patient tolerated the procedure well. Estimated blood loss was 0cc.     Assessment:   1. Malignant neoplasm of trigone of urinary bladder  POCT Urinalysis, Dipstick, Automated, W/O Scope                Plan:  Malignant neoplasm of trigone of urinary bladder  -     POCT Urinalysis, Dipstick, Automated, W/O Scope    Other orders  -     ciprofloxacin HCl tablet 500 mg  -     LIDOcaine HCl 2% urojet      Discussed TURBT now vs repeat cysto in 3 months. Since he just completed BCG, could be inflammatory. Will repeat cysto in 3 months. Pt [facility]     Follow up in about 3 months (around 8/21/2024) for Cystoscopy.

## 2024-05-22 ENCOUNTER — TELEPHONE (OUTPATIENT)
Dept: UROLOGY | Facility: CLINIC | Age: 84
End: 2024-05-22
Payer: OTHER GOVERNMENT

## 2024-05-22 NOTE — TELEPHONE ENCOUNTER
Called pt's daughter regarding the patient's cystoscopy visit yesterday and the findings. Will do follow up cysto in 3 months.

## 2024-05-22 NOTE — TELEPHONE ENCOUNTER
----- Message from Joe Del Toro MA sent at 5/22/2024  3:04 PM CDT -----  Can you possibly contact daughter reference to information about patient care?  ----- Message -----  From: Hannah Brown  Sent: 5/22/2024   2:04 PM CDT  To: Alec ALCOCER Staff    Patient's daughter called asking for an update on Mr. Pino's care and what the findings were in the visit yesterday.

## 2024-06-05 ENCOUNTER — TELEPHONE (OUTPATIENT)
Dept: INTERNAL MEDICINE | Facility: CLINIC | Age: 84
End: 2024-06-05
Payer: OTHER GOVERNMENT

## 2024-06-05 NOTE — TELEPHONE ENCOUNTER
"TO MY TEAM:  I just routed you a "letter" to print which solicits the information needed for us to complete that form. Please gather that information from his daughter, then provide that info to me and schedule audio only appointment to allow me to complete the rest of the form.    Also, he has uncontrolled diabetes based on his most recent hemoglobin A1c. Schedule him OV with Alina or me to address.    Thank you for your help caring for our patients!  "

## 2024-06-05 NOTE — LETTER
@Vladimir: Please print for daughter to complete.         Please answer the following questions about the Nithin's condition. Thank you for your assistance. Your responses will help in completing his form accurately.      1. How often do the following apply to the patient? (Choices: Never, Seldom, Frequent, Always)       - Oriented: Never / Seldom / Frequent / Always       - Forgetful: Never / Seldom / Frequent / Always       - Depressed: Never / Seldom / Frequent / Always       - Combative: Never / Seldom / Frequent / Always       - Confused: Never / Seldom / Frequent / Always       - Hostile: Never / Seldom / Frequent / Always       - Elopement Risk: Never / Seldom / Frequent / Always        2. For each of the following, is the patient independent, require supervision/assistance, or require total assistance?       - Eating: Independent / Supervision/Assistance / Total Assistance       - Bathing: Independent / Supervision/Assistance / Total Assistance       - Personal Care: Independent / Supervision/Assistance / Total Assistance       - Oral Care: Independent / Supervision/Assistance / Total Assistance       - Ambulation: Independent / Supervision/Assistance / Total Assistance        3. Do any of the following apply to the patient? If so, provide details:       - History of Seizures? Yes / No  Details: __________________________       - Receiving rehab? Yes / No  Details: ______________________________       - Impaired Vision? Yes / No  Details: ______________________________       - Eyeglasses? Yes / No  Details: _________________________________       - Incontinent Bowel? Yes / No  Details: ____________________________       - Incontinent Bladder? Yes / No  Details: __________________________       - Urinary Catheter? Yes / No  Details: ____________________________       - Ostomy Care? Yes / No  Details: ________________________________       - Impaired Hearing? Yes / No  Details: ____________________________        - Hearing Aid? Yes / No  Details: ________________________________       - Dentures: Upper / Lower / Partial  Details: _______________________        Please provide any additional information that may be helpful:

## 2024-06-05 NOTE — TELEPHONE ENCOUNTER
----- Message from Raquel Brie sent at 6/5/2024  2:50 PM CDT -----  Contact: Ai  Type:  Patient Returning Call    Who Called:Ai   Who Left Message for Patient:Sindi Manzano MA  Does the patient know what this is regarding?:missed call   Would the patient rather a call back or a response via [facility]? Call back   Best Call Back Number:[phone]

## 2024-06-05 NOTE — TELEPHONE ENCOUNTER
----- Message from Jahaira Mancuso sent at 6/5/2024 12:12 PM CDT -----  Contact: daughter  Pt daughter Ai is asking for an return call in reference to following up on medical paper work she dropped off on last week ,please call back at [phone] Thr [facility]

## 2024-06-06 ENCOUNTER — OFFICE VISIT (OUTPATIENT)
Dept: INTERNAL MEDICINE | Facility: CLINIC | Age: 84
End: 2024-06-06
Payer: MEDICARE

## 2024-06-06 DIAGNOSIS — C67.8 MALIGNANT NEOPLASM OF OVERLAPPING SITES OF BLADDER: Chronic | ICD-10-CM

## 2024-06-06 DIAGNOSIS — E11.65 TYPE 2 DIABETES MELLITUS WITH HYPERGLYCEMIA, WITH LONG-TERM CURRENT USE OF INSULIN: Primary | ICD-10-CM

## 2024-06-06 DIAGNOSIS — E21.3 HYPERPARATHYROIDISM: Chronic | ICD-10-CM

## 2024-06-06 DIAGNOSIS — E11.51 DIABETES MELLITUS TYPE 2 WITH PERIPHERAL ARTERY DISEASE: Chronic | ICD-10-CM

## 2024-06-06 DIAGNOSIS — I25.5 ISCHEMIC CARDIOMYOPATHY: Chronic | ICD-10-CM

## 2024-06-06 DIAGNOSIS — I50.42 CHRONIC COMBINED SYSTOLIC AND DIASTOLIC CONGESTIVE HEART FAILURE: Chronic | ICD-10-CM

## 2024-06-06 DIAGNOSIS — N18.31 STAGE 3A CHRONIC KIDNEY DISEASE: ICD-10-CM

## 2024-06-06 DIAGNOSIS — I65.23 ASYMPTOMATIC STENOSIS OF BOTH CAROTID ARTERIES WITHOUT INFARCTION: Chronic | ICD-10-CM

## 2024-06-06 DIAGNOSIS — Z79.4 TYPE 2 DIABETES MELLITUS WITH HYPERGLYCEMIA, WITH LONG-TERM CURRENT USE OF INSULIN: Primary | ICD-10-CM

## 2024-06-06 DIAGNOSIS — I15.2 HYPERTENSION ASSOCIATED WITH DIABETES: Chronic | ICD-10-CM

## 2024-06-06 DIAGNOSIS — I70.0 THORACIC AORTA ATHEROSCLEROSIS: Chronic | ICD-10-CM

## 2024-06-06 DIAGNOSIS — E78.5 HYPERLIPIDEMIA ASSOCIATED WITH TYPE 2 DIABETES MELLITUS: Chronic | ICD-10-CM

## 2024-06-06 DIAGNOSIS — I25.708 CORONARY ARTERY DISEASE OF BYPASS GRAFT OF NATIVE HEART WITH STABLE ANGINA PECTORIS: Chronic | ICD-10-CM

## 2024-06-06 DIAGNOSIS — J43.1 PANLOBULAR EMPHYSEMA: Chronic | ICD-10-CM

## 2024-06-06 DIAGNOSIS — H90.3 BILATERAL SENSORINEURAL HEARING LOSS: ICD-10-CM

## 2024-06-06 DIAGNOSIS — E11.59 HYPERTENSION ASSOCIATED WITH DIABETES: Chronic | ICD-10-CM

## 2024-06-06 DIAGNOSIS — E11.69 HYPERLIPIDEMIA ASSOCIATED WITH TYPE 2 DIABETES MELLITUS: Chronic | ICD-10-CM

## 2024-06-06 PROCEDURE — 1159F MED LIST DOCD IN RCRD: CPT | Mod: CPTII,95,, | Performed by: FAMILY MEDICINE

## 2024-06-06 PROCEDURE — 99442 PR PHYSICIAN TELEPHONE EVALUATION 11-20 MIN: CPT | Mod: 95,,, | Performed by: FAMILY MEDICINE

## 2024-06-06 PROCEDURE — 1160F RVW MEDS BY RX/DR IN RCRD: CPT | Mod: CPTII,95,, | Performed by: FAMILY MEDICINE

## 2024-06-06 NOTE — PROGRESS NOTES
" LAST APPOINTMENT WITH ME:  5/13/2024    TELEMEDICINE AUDIO-ONLY VISIT  6/6/24  3:40 PM CDT    CHIEF COMPLAINT: Follow-up, Form    Telephone encounter with the patient. History provided mostly by his daughter, Ai. She is providing information needed for me to complete the [facility] medical information form.    She provided the following information, and the patient either agreed or at least did not dispute the information. The patient is consistently oriented to person and place and reasonably to date and purpose. He is never reported as depressed. He is reported as seldom forgetful, seldom combative, seldom confused, seldom hostile, and seldom an elopement risk. These behaviors typically occur late at night and during the middle of the night when he wakes up and appears to be having sundowning. He is independent with eating, bathing, personal care, oral care, and ambulation. He has no history of seizures. He is not receiving any type of rehab. He wears glasses for impairment of visual acuity. He is continent of bowel and bladder. He does not have a urinary catheter or ostomy. He wears a hearing aid for impaired hearing. He does not have dentures.    She goes on to report that [facility] and had comprehensive labs and a chest x-ray. She says that labs were reported as "good," except that his QuantiFERON-TB Gold tuberculosis test apparently came back equivocal. She says that they did further investigation to learn that one of his treatments for his bladder cancer can cause a false positive QuantiFERON-TB Gold result. This was the reason his primary care physician ordered the chest x-ray, which reportedly showed no evidence to suggest active tuberculosis. He is not having fever, chills, sweats, persistent cough, or other symptoms to suggest tuberculosis.     Form completed and copy scanned to chart.    1. Type 2 diabetes mellitus with hyperglycemia, with long-term " current use of insulin    2. Malignant neoplasm of overlapping sites of bladder    3. Diabetes mellitus type 2 with peripheral artery disease    4. Hyperparathyroidism    5. Stage 3a chronic kidney disease    6. Hypertension associated with diabetes    7. Hyperlipidemia associated with type 2 diabetes mellitus    8. Coronary artery disease of bypass graft of native heart with stable angina pectoris  Overview:  Per patient s/p CABG x4 5/2011      9. Chronic combined systolic and diastolic congestive heart failure    10. Asymptomatic stenosis of both carotid arteries without infarction    11. Ischemic cardiomyopathy    12. Thoracic aorta atherosclerosis  Overview:  CXR 2///11- TOP NORMAL HEART SIZE WITH MILD TORTUOSITY AND   ATHEROSCLEROTIC CALCIFICATION OF THE THORACIC AORTA.      13. Panlobular emphysema    14. Bilateral sensorineural hearing loss    Unless noted herein, any chronic conditions are represented as and appear stable, and no other significant complaints or concerns were reported.    Future Appointments   Date Time Provider Department Center   6/11/2024  9:00 AM Deniz Sadler MD [facility] CARDIO [facility]   6/18/2024  8:00 AM [facility] XR1 [facility] XRAY [facility]   6/18/2024  8:15 AM José Mcmahon MD  SP[facility]ED [facility]   6/25/2024 10:50 AM LABORATORY, [place] [facility] [place]   6/27/2024 10:30 AM Maureen Rivera NP [facility] B[facility] [place]   8/20/2024  1:30 PM Hortencia Michael MD [facility] URO [place]   10/14/2024  8:40 AM MUKUL Garg MD [facility] [place]       TOTAL TIME SPENT WITH PATIENT (MINUTES):  12  (CPT 70924).  TOTAL TIME for this encounter was greater than or equal to 25 minutes.  This time was exclusive of any separately billable procedures for this patient and exclusive of time spent treating any other patients.   Documentation entered by me for this encounter may have been done in part using speech-recognition technology. Although I have made an effort to  "ensure accuracy, "sound like" errors may exist and should be interpreted in context.    Each patient to whom medical services are provided by telemedicine is: (1) informed of the relationship between the physician and patient and the respective role of any other health care provider with respect to management of the patient; and (2) notified that he or she may decline to receive medical services by telemedicine and may withdraw from such care at any time.   "

## 2024-06-06 NOTE — Clinical Note
"Completed form and supporting documents in "OUT" wall file in my office. Scan copy to chart and hold for pick-up by patient's daughter. Thanks."

## 2024-06-06 NOTE — LETTER
"Supplemental Information  Page 1 of 2    PATIENT NAME: Nithin Pino   : 1940  Today's Date: 2024      Supplemental information for [facility] "REQUEST FOR MEDICAL INFORMATION" form completed at the request of the patient and his daughter.    DIAGNOSIS LIST (1 = "primary," 2 = "secondary," 3-14 = "other")    ICD-10-CM   1. Type 2 diabetes mellitus with hyperglycemia, with long-term current use of insulin  E11.65, Z79.4   2. Malignant neoplasm of overlapping sites of bladder  C67.8   3. Diabetes mellitus type 2 with peripheral artery disease  E11.51   4. Hyperparathyroidism  E21.3   5. Stage 3a chronic kidney disease  N18.31   6. Hypertension associated with diabetes  E11.59    I15.2   7. Hyperlipidemia associated with type 2 diabetes mellitus  E11.69, E78.5   8. Coronary artery disease of bypass graft of native heart with stable angina pectoris  I25.708   9. Chronic combined systolic and diastolic congestive heart failure  I50.42   10. Asymptomatic stenosis of both carotid arteries without infarction  I65.23   11. Ischemic cardiomyopathy  I25.5   12. Thoracic aorta atherosclerosis  I70.0   13. Panlobular emphysema  J43.1   14. Bilateral sensorineural hearing loss  H90.3       CURRENT MEDICATION LIST  amLODIPine (NORVASC) 10 MG tablet, Take 0.5 tablets (5 mg total) by mouth once daily.  aspirin (ECOTRIN) 81 MG EC tablet, Take 1 tablet (81 mg total) by mouth once daily.  atorvastatin (LIPITOR) 40 MG tablet, Take 1 tablet (40 mg total) by mouth every evening.  b complex vitamins tablet, Take 1 tablet by mouth once daily.  BLOOD-GLUCOSE METER (TRUERESULT BLOOD GLUCOSE SYSTM MISC), by Misc.(Non-Drug; Combo Route) route.  cyanocobalamin (VITAMIN B-12) 1000 MCG tablet, Take 100 mcg by mouth once daily.  dulaglutide (TRULICITY) 4.5 mg/0.5 mL pen injector, Inject 4.5 mg into the skin every 7 days.   EMBRACE PRO TEST STRIPS Strp, CHECK BLOOD SUGAR TWICE DAILY.  finasteride " (PROSCAR) 5 mg tablet, TAKE 1 TABLET ONE TIME DAILY  furosemide (LASIX) 40 MG tablet, TAKE 1 PILL IN AM, AND 1/2 PILL IN PM      Supplemental Information  Page 2 of 2    PATIENT NAME: Nithin Pino   : 1940  Today's Date: 2024    CURRENT MEDICATION LIST (continued)  glucose 4 GM chewable tablet, Take 4 tablets (16 g total) by mouth as needed for Low blood sugar.   hyoscyamine (LEVSIN/SL) 0.125 mg Subl, Place 2 tablets (0.25 mg total) under the tongue every 4 (four) hours as needed (bladder spasms).  insulin (LANTUS SOLOSTAR U-100 INSULIN) glargine 100 units/mL SubQ pen, Inject into the skin. 15 units QHS and 18 units QAM  insulin aspart U-100 (NOVOLOG) 100 unit/mL injection, Inject 3 Units into the skin every 4 (four) hours as needed (for high blood sugar or carbohydrate intake).  isosorbide mononitrate (IMDUR) 60 MG 24 hr tablet, Take 1 tablet (60 mg total) by mouth once daily.  LIDOcaine (LIDODERM) 5 %, APPLY 1 PATCH TOPICALLY EVERY DAY FOR PAIN  WEAR FOR 12 HOURS, THEN REMOVE. DO NOT APPLY NEW PATCH FOR AT LEAST 12 HOURS.  losartan (COZAAR) 100 MG tablet, Take 1 tablet (100 mg total) by mouth once daily.  metoprolol succinate (TOPROL-XL) 25 MG 24 hr tablet, Take 1 tablet (25 mg total) by mouth every evening.  miconazole (MICOTIN) 2 % cream, APPLY SMALL AMOUNT TOPICALLY TWICE A DAY AS NEEDED FOR FUNGAL INFECTION  multivit-minerals/folic acid (DIABETIC MULTIVITAMIN ORAL), Take by mouth.  nitroGLYCERIN (NITROSTAT) 0.4 MG SL tablet, 0.4 mg.  tamsulosin (FLOMAX) 0.4 mg Cap, Take 1 capsule (0.4 mg total) by mouth once daily.  TRUEPLUS LANCETS 26 gauge Misc, CHECK BLOOD SUGAR TWICE DAILY.  TRUERESULT BLOOD GLUCOSE SYSTM kit, CHECK BLOOD SUGAR TWICE DAILY.  vitamin D (VITAMIN D3) 1000 units Tab, Take 1 tablet (1,000 Units total) by mouth once daily.    ALLERGY LIST  Review of patient's allergies indicates:   Allergen Reactions    Pseudoephedrine-guaifenesin Shortness Of Breath            _________________________________________  L. Nabeel Garg MD, FFAFP [facility]  [address] [place] [address]  PH: [phone]  FX: [phone]

## 2024-06-09 NOTE — PROGRESS NOTES
Orthopaedic Follow-Up Visit    Last Appointment:  5/17/24  Diagnosis: Left sternal sided clavicle fracture, minimally displaced   Prior Procedure: None    Nithin Pino is a 84 y.o. male who is here for f/u evaluation of his left clavicle. The patient was last seen here by me on 5/17/24 at which point he had a minimally displaced fracture of the medial clavicle. He had been wearing a sling for the past 3 weeks. I recommended him to wear the sling for 1 more week for a total of 4 weeks immobilization. He was to then discontinue the sling and begin working on range of motion. Discussed that he should avoid lifting anything heavier than 2-3 pounds with the left arm for another 3-4 weeks after the sling was removed.. The patient returns today reporting that his symptoms have improved since his last visit. He has discontinued use of his sling. He has returned to his normal ADLs and reports no pain or limitations at this time.     To review his history, Nithin Pino is a 84 y.o. right-hand dominant male who presented on 5/17/24 with left shoulder pain and dysfunction that began approximately 3 weeks prior (prior to May) when he suffered a fall but stated he did not remember the fall, just waking up in pain. Was transported by ambulance to [facility] and ultimately told he had sustained a clavicle fracture and was referred to orthopedics. At the time of his visit with me his symptoms included left clavicle deformity. Stated he had minimal pain at that time. Only used sling periodically and his ADLs were not significantly limited.      Patient's medications, allergies, past medical, surgical, social and family histories were reviewed and updated as appropriate.    Review of Systems   All systems reviewed were negative.  Specifically, the patient denies fever, chills, weight loss, chest pain, shortness of breath, or dyspnea on exertion.      Past Medical History:   Diagnosis Date    Abnormal brain MRI 01/21/2018     Chronic microvascular ischemia changes per MRI July 9, 2007 in [facility]    Abnormal ECG 10/31/2013    Abnormal stress test 01/28/2016    Alcohol dependence     States alcohol abuse ended in 1994    AP (angina pectoris) 01/28/2016    Asthma     Bladder cancer     Carotid artery occlusion     Cataract     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    2 - Normal left ventricular systolic function (EF 60-65%).    3 - Normal left ventricular diastolic function.    4 - Mild left atrial enlargement.    5 - Normal right ventricular systolic function .    6 - Trivial to mild aortic regurgitation.    7 - Trivial to mild mitral regurgitation.  10 - Trivial to mild pulmonic regurgitation.     Hematuria 06/25/2020    History of alcohol abuse 01/22/2018    Patient denies drinking to excess since 1994.    History of atherectomy 01/21/2018 2/2016 [facility]    History of chronic kidney disease 01/22/2018    History of CKD 3    History of PTCA 10/31/2013    History of PTCA 03/09/2016    Hyperlipidemia     Hypertension     Insomnia 06/17/2013    Iron deficiency anemia 10/19/2023    Ischemic cardiomyopathy 10/31/2013    Long term (current) use of antithrombotics/antiplatelets 01/21/2018    Malignant neoplasm of overlapping sites of bladder 06/08/2023    Myocardial infarction     Old MI (myocardial infarction) 1994    Peripheral vascular disease     Polyneuropathy     RBBB 10/31/2013    S/P CABG (coronary artery bypass graft) 10/31/2013    Shortness of breath 10/31/2013    Tobacco dependence      resolved    Trouble in sleeping     Type 2 diabetes with peripheral circulatory disorder, controlled     Wears glasses        Past Surgical History:   Procedure Laterality Date    APPENDECTOMY      CARDIAC CATHETERIZATION      2016    CARDIAC SURGERY  1994    balloon angioplasty    CATARACT EXTRACTION W/  INTRAOCULAR LENS IMPLANT Right 02/01/2017    CORONARY ARTERY BYPASS GRAFT  05/2011    per patient x4    HERNIA REPAIR      OPEN REDUCTION AND INTERNAL FIXATION (ORIF) OF INJURY OF HIP      PCIOL Bilateral OD 02/01/17/OS 02/15/17    DR. ALONZO    TURBT (TRANSURETHRAL RESECTION OF BLADDER TUMOR) N/A 6/8/2023    Procedure: TURBT (TRANSURETHRAL RESECTION OF BLADDER TUMOR);  Surgeon: Hortencia Michael MD;  Location: [facility] OR;  Service: Urology;  Laterality: N/A;    TURBT (TRANSURETHRAL RESECTION OF BLADDER TUMOR) N/A 2/8/2024    Procedure: TURBT (TRANSURETHRAL RESECTION OF BLADDER TUMOR);  Surgeon: Hortencia Michael MD;  Location: [facility] OR;  Service: Urology;  Laterality: N/A;       Patient's Medications   New Prescriptions    No medications on file   Previous Medications    AMLODIPINE (NORVASC) 10 MG TABLET    Take 0.5 tablets (5 mg total) by mouth once daily.    ASPIRIN (ECOTRIN) 81 MG EC TABLET    Take 1 tablet (81 mg total) by mouth once daily.    ATORVASTATIN (LIPITOR) 40 MG TABLET    Take 1 tablet (40 mg total) by mouth every evening.    B COMPLEX VITAMINS TABLET    Take 1 tablet by mouth once daily.    BLOOD-GLUCOSE METER (TRUERESULT BLOOD GLUCOSE SYSTM MISC)    by Misc.(Non-Drug; Combo Route) route.    CYANOCOBALAMIN (VITAMIN B-12) 1000 MCG TABLET    Take 100 mcg by mouth once daily.    DULAGLUTIDE (TRULICITY) 4.5 MG/0.5 ML PEN INJECTOR    Inject 4.5 mg into the skin every 7 days. SUNDAY    EMBRACE PRO TEST STRIPS STRP    CHECK BLOOD SUGAR TWICE DAILY.    FINASTERIDE (PROSCAR) 5 MG TABLET    TAKE 1 TABLET ONE TIME DAILY    FUROSEMIDE (LASIX) 40 MG TABLET    TAKE 1 PILL IN AM, AND 1/2 PILL IN PM    GLUCOSE 4 GM  CHEWABLE TABLET    Take 4 tablets (16 g total) by mouth as needed for Low blood sugar.    HYOSCYAMINE (LEVSIN/SL) 0.125 MG SUBL    Place 2 tablets (0.25 mg total) under the tongue every 4 (four) hours as needed (bladder spasms).    INSULIN (LANTUS SOLOSTAR U-100 INSULIN) GLARGINE 100 UNITS/ML SUBQ PEN    Inject into the skin. 15 units QHS and 18 units QAM    INSULIN ASPART U-100 (NOVOLOG) 100 UNIT/ML INJECTION    Inject 3 Units into the skin every 4 (four) hours as needed (for high blood sugar or carbohydrate intake).    ISOSORBIDE MONONITRATE (IMDUR) 60 MG 24 HR TABLET    Take 1 tablet (60 mg total) by mouth once daily.    LIDOCAINE (LIDODERM) 5 %    APPLY 1 PATCH TOPICALLY EVERY DAY FOR PAIN  WEAR FOR 12 HOURS, THEN REMOVE. DO NOT APPLY NEW PATCH FOR AT LEAST 12 HOURS.    LOSARTAN (COZAAR) 100 MG TABLET    Take 1 tablet (100 mg total) by mouth once daily.    METOPROLOL SUCCINATE (TOPROL-XL) 25 MG 24 HR TABLET    Take 1 tablet (25 mg total) by mouth every evening.    MICONAZOLE (MICOTIN) 2 % CREAM    APPLY SMALL AMOUNT TOPICALLY TWICE A DAY AS NEEDED FOR FUNGAL INFECTION    MULTIVIT-MINERALS/FOLIC ACID (DIABETIC MULTIVITAMIN ORAL)    Take by mouth.    NITROGLYCERIN (NITROSTAT) 0.4 MG SL TABLET    0.4 mg.    TAMSULOSIN (FLOMAX) 0.4 MG CAP    Take 1 capsule (0.4 mg total) by mouth once daily.    TRUEPLUS LANCETS 26 GAUGE MISC    CHECK BLOOD SUGAR TWICE DAILY.    TRUERESULT BLOOD GLUCOSE SYSTM KIT    CHECK BLOOD SUGAR TWICE DAILY.    VITAMIN D (VITAMIN D3) 1000 UNITS TAB    Take 1 tablet (1,000 Units total) by mouth once daily.   Modified Medications    No medications on file   Discontinued Medications    No medications on file       Family History   Adopted: Yes   Problem Relation Name Age of Onset    Diabetes Brother      Diabetes Sister      Cancer Neg Hx      Heart disease Neg Hx      Kidney disease Neg Hx         Review of patient's allergies indicates:   Allergen Reactions    Pseudoephedrine-guaifenesin  Shortness Of Breath    Panmist dm  [pseudoephedrine-dm-guaifenesin]      Other reaction(s): Unknown         Objective:      Physical Exam  Patient is alert and oriented, no distress. Skin is intact. Neuro is normal with no focal motor or sensory findings.    Cervical exam is unremarkable. Intact cervical ROM. Negative Spurling's test    Physical Exam:  RIGHT    LEFT    Scap Dyskinesis/Winging (-)    (-)    Tenderness:          Greater Tuberosity  (-)    (-)  Bicipital Groove  (-)    (-)  AC joint   (-)    (-)  Other:     ROM:  Forward Elevation 170    170  Abduction  120    120  ER (at side)  60    60  IR   L5    L5      Prominent non-tender bump over L SC joint      Neurovascular examination  - Motor grossly intact bilaterally to shoulder abduction, elbow flexion and extension, wrist flexion and extension, and intrinsic hand musculature  - Sensation intact to light touch bilaterally in axillary, median, radial, and ulnar distributions  - Symmetrical radial pulses    Imaging:    XR Results:  X-Ray Clavicle Left  Narrative: EXAM:  XR CLAVICLE LEFT    CLINICAL HISTORY:  Left clavicle pain.    FINDINGS:  No fracture is identified.  The acromioclavicular joint is well-aligned.  Mild AC joint degenerative change.  Moderate scattered degenerative change and atherosclerotic disease.  Impression:  No acute radiographic abnormality of the left clavicle.    Finalized on: 6/18/2024 8:25 AM By:  Javy Ross MD  [facility]# [id number]      2024-06-18 08:27:08.105    [facility]        MRI Results:  No results found for this or any previous visit.      CT Results:  No results found for this or any previous visit.      Physician read: I agree with the above impression. No change in alignment of medial clavicle fx. Some bridging callus formation visible.    Assessment/Plan:   Nithin Pino is a 84 y.o. male with left sternal sided clavicle fracture, minimally displaced- routine healing    Plan:    His XR shows evidence of healing. He has full  ROM and no pain and reports no limitations at this time.   I recommend he continue with activities as tolerated, no formal restrictions moving forward.  Follow up with me as needed.           José Mcmahon MD    I, Dalton Odom, acted as a scribe for José Mcmahon MD for the duration of this office visit.

## 2024-06-11 ENCOUNTER — TELEPHONE (OUTPATIENT)
Dept: CARDIOLOGY | Facility: CLINIC | Age: 84
End: 2024-06-11

## 2024-06-11 ENCOUNTER — OFFICE VISIT (OUTPATIENT)
Dept: CARDIOLOGY | Facility: CLINIC | Age: 84
End: 2024-06-11
Payer: MEDICARE

## 2024-06-11 ENCOUNTER — LAB VISIT (OUTPATIENT)
Dept: LAB | Facility: HOSPITAL | Age: 84
End: 2024-06-11
Attending: INTERNAL MEDICINE
Payer: MEDICARE

## 2024-06-11 VITALS
DIASTOLIC BLOOD PRESSURE: 60 MMHG | HEART RATE: 79 BPM | BODY MASS INDEX: 23.89 KG/M2 | HEIGHT: 70 IN | SYSTOLIC BLOOD PRESSURE: 120 MMHG | OXYGEN SATURATION: 98 % | WEIGHT: 166.88 LBS

## 2024-06-11 DIAGNOSIS — I25.708 CORONARY ARTERY DISEASE OF BYPASS GRAFT OF NATIVE HEART WITH STABLE ANGINA PECTORIS: Chronic | ICD-10-CM

## 2024-06-11 DIAGNOSIS — I50.42 CHRONIC COMBINED SYSTOLIC AND DIASTOLIC CONGESTIVE HEART FAILURE: Chronic | ICD-10-CM

## 2024-06-11 DIAGNOSIS — G45.9 TRANSIENT CEREBRAL ISCHEMIA, UNSPECIFIED TYPE: ICD-10-CM

## 2024-06-11 DIAGNOSIS — E78.5 HYPERLIPIDEMIA ASSOCIATED WITH TYPE 2 DIABETES MELLITUS: Chronic | ICD-10-CM

## 2024-06-11 DIAGNOSIS — I20.9 AP (ANGINA PECTORIS): ICD-10-CM

## 2024-06-11 DIAGNOSIS — I70.0 THORACIC AORTA ATHEROSCLEROSIS: Chronic | ICD-10-CM

## 2024-06-11 DIAGNOSIS — I45.10 RBBB: Chronic | ICD-10-CM

## 2024-06-11 DIAGNOSIS — I73.9 CLAUDICATION IN PERIPHERAL VASCULAR DISEASE: Chronic | ICD-10-CM

## 2024-06-11 DIAGNOSIS — Z95.1 S/P CABG (CORONARY ARTERY BYPASS GRAFT): ICD-10-CM

## 2024-06-11 DIAGNOSIS — R94.31 ABNORMAL ECG: Chronic | ICD-10-CM

## 2024-06-11 DIAGNOSIS — I50.42 CHRONIC COMBINED SYSTOLIC AND DIASTOLIC CONGESTIVE HEART FAILURE: Primary | ICD-10-CM

## 2024-06-11 DIAGNOSIS — I65.23 ASYMPTOMATIC STENOSIS OF BOTH CAROTID ARTERIES WITHOUT INFARCTION: Chronic | ICD-10-CM

## 2024-06-11 DIAGNOSIS — R55 SYNCOPE, UNSPECIFIED SYNCOPE TYPE: Primary | ICD-10-CM

## 2024-06-11 DIAGNOSIS — I15.2 HYPERTENSION ASSOCIATED WITH DIABETES: Chronic | ICD-10-CM

## 2024-06-11 DIAGNOSIS — E11.51 DIABETES MELLITUS TYPE 2 WITH PERIPHERAL ARTERY DISEASE: Chronic | ICD-10-CM

## 2024-06-11 DIAGNOSIS — I25.5 ISCHEMIC CARDIOMYOPATHY: Chronic | ICD-10-CM

## 2024-06-11 DIAGNOSIS — R53.1 LEFT-SIDED WEAKNESS: ICD-10-CM

## 2024-06-11 DIAGNOSIS — E11.59 HYPERTENSION ASSOCIATED WITH DIABETES: Chronic | ICD-10-CM

## 2024-06-11 DIAGNOSIS — N18.31 STAGE 3A CHRONIC KIDNEY DISEASE: ICD-10-CM

## 2024-06-11 DIAGNOSIS — I35.1 NONRHEUMATIC AORTIC VALVE INSUFFICIENCY: ICD-10-CM

## 2024-06-11 DIAGNOSIS — E11.69 HYPERLIPIDEMIA ASSOCIATED WITH TYPE 2 DIABETES MELLITUS: Chronic | ICD-10-CM

## 2024-06-11 DIAGNOSIS — R55 SYNCOPE AND COLLAPSE: ICD-10-CM

## 2024-06-11 LAB
ALBUMIN SERPL BCP-MCNC: 4 G/DL (ref 3.5–5.2)
ALP SERPL-CCNC: 91 U/L (ref 55–135)
ALT SERPL W/O P-5'-P-CCNC: 26 U/L (ref 10–44)
ANION GAP SERPL CALC-SCNC: 11 MMOL/L (ref 8–16)
AST SERPL-CCNC: 21 U/L (ref 10–40)
BILIRUB SERPL-MCNC: 0.6 MG/DL (ref 0.1–1)
BNP SERPL-MCNC: 152 PG/ML (ref 0–99)
BUN SERPL-MCNC: 27 MG/DL (ref 8–23)
CALCIUM SERPL-MCNC: 10.8 MG/DL (ref 8.7–10.5)
CHLORIDE SERPL-SCNC: 105 MMOL/L (ref 95–110)
CO2 SERPL-SCNC: 25 MMOL/L (ref 23–29)
CREAT SERPL-MCNC: 1.8 MG/DL (ref 0.5–1.4)
EST. GFR  (NO RACE VARIABLE): 36.4 ML/MIN/1.73 M^2
GLUCOSE SERPL-MCNC: 182 MG/DL (ref 70–110)
POTASSIUM SERPL-SCNC: 5.2 MMOL/L (ref 3.5–5.1)
PROT SERPL-MCNC: 6.6 G/DL (ref 6–8.4)
SODIUM SERPL-SCNC: 141 MMOL/L (ref 136–145)

## 2024-06-11 PROCEDURE — 83880 ASSAY OF NATRIURETIC PEPTIDE: CPT | Performed by: INTERNAL MEDICINE

## 2024-06-11 PROCEDURE — 99215 OFFICE O/P EST HI 40 MIN: CPT | Mod: HCNC,S$GLB,, | Performed by: INTERNAL MEDICINE

## 2024-06-11 PROCEDURE — 99213 OFFICE O/P EST LOW 20 MIN: CPT | Mod: PBBFAC | Performed by: INTERNAL MEDICINE

## 2024-06-11 PROCEDURE — 80053 COMPREHEN METABOLIC PANEL: CPT | Performed by: INTERNAL MEDICINE

## 2024-06-11 PROCEDURE — 36415 COLL VENOUS BLD VENIPUNCTURE: CPT | Performed by: INTERNAL MEDICINE

## 2024-06-11 PROCEDURE — 99999 PR PBB SHADOW E&M-EST. PATIENT-LVL III: CPT | Mod: PBBFAC,HCNC,, | Performed by: INTERNAL MEDICINE

## 2024-06-11 NOTE — TELEPHONE ENCOUNTER
[place] [facility] Staff  Caller: Nithin (Today, 10:10 AM)  Nithin is calling to correct himself on the list of medication that he is taking. He stated that he is on Lasix, and not Plavix. Please give him a call back at [phone]    Spoke to patient's daughter, states that she will ask him what he was calling about and give us a call back

## 2024-06-11 NOTE — TELEPHONE ENCOUNTER
Elizabeth Manzano Staff  Caller: Ai/ Daughter (Today, 10:31 AM)  Ai is calling to speak to the nurse to inform Leonor the patient is not on Plavix    Thanks  LJ    Patient wanted to call and let us know that the patient is not taking plavix he made the mistake and told Dr. Sadler that he was taking it but he is not. Says so if Dr. Sadler wants to prescribe it he can try it.

## 2024-06-11 NOTE — PROGRESS NOTES
Subjective:    Patient ID:  Nithin Pino is a 84 y.o. male who presents for evaluation of Hypertension, Congestive Heart Failure, Coronary Artery Disease, and Peripheral Arterial Disease        HPI: Pt presents for eval.  His current medical conditions include CAD, RBBB, bladder cancer, diastolic CHF, ICM, MR, TR, AI, CRI, carotid artery disease, COPD, HTN, PAD, DM.   Nonsmoker.   His past history is pertinent for following:  S/p PTCA 1994 for AMI.   Stress MPI 5/11 showed evidence of multivessel CAD (had 2 years of angina) which was confirmed on LHC (significant left main/LAD/ramus, diag disease and 100%  RCA). EF 35% on LV gram.   S/p successful CABG 5/11 (LIMA-LAD, SVG-DIAG, SVG-RAMUS). He had post-op a flutter which resolved.   S/p repeat LHC 2/16 for abnl stress MPI. He underwent atherectomy and PCI of left main and ramus with TUCKER x 2 overall. His svg-ramus had occluded. LIMA-LAD was patent, patent SVG-D1,  RCA with left - right collaterals, EF 45%.    Has chronic R SFA .   Pt has declined runoff numerous times for further evaluation of his PAD in past.   Echo 6/18 normal EF.  RENZO 6/18 R LE 0.86, L LE 0.96  Pt admitted late Dec 2020 with Covid pneumonia, cp sxs. Chart reviewed in detail.  Echo 12/20 normal LV function, LVH.  Troponin leak noted.  Cards consult done and troponin thought to be supply/demand ischemia from Covid infection/pneumonia.  Readmitted few days later with recurrent cp, low BP, dehydration and released.  Troponin leak ranged 0.10 to 0.50 range on both hospitalizations.  Stress MPI 3/21 apical, anteroapical scar, inferoapical scar with some residual ischemia, EF 44%.  Carotid u/s 12/20: no significant blockage noted.   ecg 10/29/21 NSR, RBBB, old inferior/anteroseptal infarct.  Echo 8/21: normal EF, DD, mild MR/TR/AI.  RENZO 8/21: R LE 0.73, L LE 0.71  S/p hip fx surgery Jan 2023 at [facility].  Pt readmitted March 2023 with CHF sxs.  EF 35%.  Lasix added.  Meds adjusted.  Had urological  surgery June 2023 without CV complications.  Ecg 10/9/23 NSR, RBBB, chronic septal infarct.   Pt seen Oct 2023 for preop eval for bladder cancer surgery.  Surgery delayed and CV testing done for sxs.  Pt had leg edema, CHF sxs.  Echo Oct 2023: EF 50%, DD, LVH, WMA, mild MR, normal PAP.  B LE venous u/s Oct 2023: no DVT.  Nuclear stress test Nov 2023: no ischemia, anterior/inferior scar, normal EF at rest.  B LE arterial u/s March 2024:  Moderately decreased left RENZO and severely decreased right RENZO; Right ostial SFA is occluded, with distal reconstitution, right PT is occluded, AT flow is blunted; Left ostial SFA is occluded, with reconstitution distally , left PT and AT are occluded.  The AT reconstitutes at the level of the DP.  RENZO March 2024:  R LE 0.61, L LE 0.69  R LE RENZO in 2014 was 0.59  Now here.  Pt last seen 3 months ago.  Few months ago, fell in bathroom (used restroom) then got up, fell and he thinks he passed out.  Had drank some wine at time -- and went to [facility].  Since then no recurrent syncope but left leg weak and he thinks it could have been a stroke.  Was given IVF.  Angina controlled on current med tx.  Occ sharp CP.  CHF is stable.  No concerning dyspnea.  Some occasional palpitations.  No LE edema.  Denies claudication sxs or foot ulcers.  Labs reviewed.      Past Medical History:   Diagnosis Date    Abnormal brain MRI 01/21/2018    Chronic microvascular ischemia changes per MRI July 9, 2007 in [facility]    Abnormal ECG 10/31/2013    Abnormal stress test 01/28/2016    Alcohol dependence     States alcohol abuse ended in 1994    AP (angina pectoris) 01/28/2016    Asthma     Bladder cancer     Carotid artery occlusion     Cataract     Chronic combined systolic and diastolic congestive heart failure 05/24/2021    Chronic diastolic heart failure 10/31/2013    Chronic ischemic heart disease 10/31/2013    CKD (chronic kidney disease) stage 3, GFR 30-59 ml/min 11/04/2015    Coronary artery  disease     DM (diabetes mellitus) 2013    BS doesn't check 03/28/2017     DM (diabetes mellitus) 2011    BS doesn' check  04/03/2019    Heart valve regurgitation 11/04/2015    Echocardiogram 2/15/16   1 - Concentric hypertrophy.    2 - Normal left ventricular systolic function (EF 60-65%).    3 - Normal left ventricular diastolic function.    4 - Mild left atrial enlargement.    5 - Normal right ventricular systolic function .    6 - Trivial to mild aortic regurgitation.    7 - Trivial to mild mitral regurgitation.  10 - Trivial to mild pulmonic regurgitation.     Hematuria 06/25/2020    History of alcohol abuse 01/22/2018    Patient denies drinking to excess since 1994.    History of atherectomy 01/21/2018 2/2016 [facility]    History of chronic kidney disease 01/22/2018    History of CKD 3    History of PTCA 10/31/2013    History of PTCA 03/09/2016    Hyperlipidemia     Hypertension     Insomnia 06/17/2013    Iron deficiency anemia 10/19/2023    Ischemic cardiomyopathy 10/31/2013    Long term (current) use of antithrombotics/antiplatelets 01/21/2018    Malignant neoplasm of overlapping sites of bladder 06/08/2023    Myocardial infarction     Old MI (myocardial infarction) 1994    Peripheral vascular disease     Polyneuropathy     RBBB 10/31/2013    S/P CABG (coronary artery bypass graft) 10/31/2013    Shortness of breath 10/31/2013    Tobacco dependence     resolved    Trouble in sleeping     Type 2 diabetes with peripheral circulatory disorder, controlled     Wears glasses        Current Outpatient Medications:     amLODIPine (NORVASC) 10 MG tablet, Take 0.5 tablets (5 mg total) by mouth once daily., Disp: 90 tablet, Rfl: 3    aspirin (ECOTRIN) 81 MG EC tablet, Take 1 tablet (81 mg total) by mouth once daily., Disp: 90 tablet, Rfl: 3    atorvastatin (LIPITOR) 40 MG tablet, Take 1 tablet (40 mg total) by mouth every evening., Disp: 90 tablet, Rfl: 3    b complex vitamins tablet, Take 1 tablet by mouth once daily.,  Disp: , Rfl:     BLOOD-GLUCOSE METER (TRUERESULT BLOOD GLUCOSE SYSTM MISC), by Misc.(Non-Drug; Combo Route) route., Disp: , Rfl:     cyanocobalamin (VITAMIN B-12) 1000 MCG tablet, Take 100 mcg by mouth once daily., Disp: , Rfl:     dulaglutide (TRULICITY) 4.5 mg/0.5 mL pen injector, Inject 4.5 mg into the skin every 7 days. SUNDAY, Disp: , Rfl:     EMBRACE PRO TEST STRIPS Strp, CHECK BLOOD SUGAR TWICE DAILY., Disp: 50 strip, Rfl: 0    finasteride (PROSCAR) 5 mg tablet, TAKE 1 TABLET ONE TIME DAILY, Disp: 90 tablet, Rfl: 3    furosemide (LASIX) 40 MG tablet, TAKE 1 PILL IN AM, AND 1/2 PILL IN PM, Disp: 60 tablet, Rfl: 11    glucose 4 GM chewable tablet, Take 4 tablets (16 g total) by mouth as needed for Low blood sugar., Disp: 100 tablet, Rfl: 5    hyoscyamine (LEVSIN/SL) 0.125 mg Subl, Place 2 tablets (0.25 mg total) under the tongue every 4 (four) hours as needed (bladder spasms)., Disp: 30 tablet, Rfl: 1    insulin (LANTUS SOLOSTAR U-100 INSULIN) glargine 100 units/mL SubQ pen, Inject into the skin. 15 units QHS and 18 units QAM, Disp: , Rfl:     insulin aspart U-100 (NOVOLOG) 100 unit/mL injection, Inject 3 Units into the skin every 4 (four) hours as needed (for high blood sugar or carbohydrate intake)., Disp: , Rfl:     isosorbide mononitrate (IMDUR) 60 MG 24 hr tablet, Take 1 tablet (60 mg total) by mouth once daily., Disp: 90 tablet, Rfl: 3    LIDOcaine (LIDODERM) 5 %, APPLY 1 PATCH TOPICALLY EVERY DAY FOR PAIN  WEAR FOR 12 HOURS, THEN REMOVE. DO NOT APPLY NEW PATCH FOR AT LEAST 12 HOURS., Disp: , Rfl:     losartan (COZAAR) 100 MG tablet, Take 1 tablet (100 mg total) by mouth once daily., Disp: 90 tablet, Rfl: 3    metoprolol succinate (TOPROL-XL) 25 MG 24 hr tablet, Take 1 tablet (25 mg total) by mouth every evening., Disp: 90 tablet, Rfl: 3    miconazole (MICOTIN) 2 % cream, APPLY SMALL AMOUNT TOPICALLY TWICE A DAY AS NEEDED FOR FUNGAL INFECTION, Disp: , Rfl:     multivit-minerals/folic acid (DIABETIC  "MULTIVITAMIN ORAL), Take by mouth., Disp: , Rfl:     nitroGLYCERIN (NITROSTAT) 0.4 MG SL tablet, 0.4 mg., Disp: , Rfl:     tamsulosin (FLOMAX) 0.4 mg Cap, Take 1 capsule (0.4 mg total) by mouth once daily., Disp: 90 capsule, Rfl: 3    TRUEPLUS LANCETS 26 gauge Misc, CHECK BLOOD SUGAR TWICE DAILY., Disp: 100 each, Rfl: 0    TRUERESULT BLOOD GLUCOSE SYSTM kit, CHECK BLOOD SUGAR TWICE DAILY., Disp: 1 each, Rfl: 0    vitamin D (VITAMIN D3) 1000 units Tab, Take 1 tablet (1,000 Units total) by mouth once daily., Disp: 90 tablet, Rfl: 3      Review of Systems   Constitutional: Positive for malaise/fatigue.   HENT: Negative.     Eyes: Negative.    Cardiovascular:  Positive for chest pain, palpitations and syncope.   Respiratory: Negative.     Endocrine: Negative.    Hematologic/Lymphatic: Negative.    Skin: Negative.    Musculoskeletal:  Positive for arthritis, joint pain, muscle weakness and stiffness.   Gastrointestinal: Negative.    Genitourinary: Negative.    Neurological:  Positive for focal weakness, loss of balance and weakness.   Psychiatric/Behavioral: Negative.     Allergic/Immunologic: Negative.           /60 (BP Location: Left arm, Patient Position: Sitting, BP Method: Large (Manual))   Pulse 79   Ht 5' 10" (1.778 m)   Wt 75.7 kg (166 lb 14.2 oz)   SpO2 98%   BMI 23.95 kg/m²     Wt Readings from Last 3 Encounters:   06/11/24 75.7 kg (166 lb 14.2 oz)   05/21/24 73.6 kg (162 lb 4.1 oz)   05/13/24 76 kg (167 lb 8.8 oz)     Temp Readings from Last 3 Encounters:   05/13/24 98 °F (36.7 °C)   04/22/24 97.5 °F (36.4 °C) (Temporal)   03/18/24 97.6 °F (36.4 °C)     BP Readings from Last 3 Encounters:   06/11/24 120/60   05/21/24 127/68   05/13/24 124/60     Pulse Readings from Last 3 Encounters:   06/11/24 79   05/21/24 78   05/13/24 71       Objective:    Physical Exam  Vitals and nursing note reviewed.   Constitutional:       Appearance: He is well-developed.   HENT:      Head: Normocephalic.   Neck:      " Thyroid: No thyromegaly.      Vascular: No carotid bruit or JVD.   Cardiovascular:      Rate and Rhythm: Normal rate and regular rhythm.      Pulses:           Radial pulses are 2+ on the right side and 2+ on the left side.      Heart sounds: S1 normal and S2 normal. Heart sounds not distant. No midsystolic click and no opening snap. No murmur heard.     No friction rub. No S3 or S4 sounds.   Pulmonary:      Effort: Pulmonary effort is normal.      Breath sounds: Normal breath sounds. No wheezing or rales.   Abdominal:      General: Bowel sounds are normal. There is no distension or abdominal bruit.      Palpations: Abdomen is soft.      Tenderness: There is no abdominal tenderness.   Musculoskeletal:      Cervical back: Neck supple.      Right lower leg: No edema.      Left lower leg: No edema.   Skin:     General: Skin is warm.   Neurological:      Mental Status: He is alert and oriented to person, place, and time.   Psychiatric:         Behavior: Behavior normal.         I have reviewed all pertinent labs and cardiac studies.  Component      Latest Ref Rng 1/9/2024   BNP      0 - 99 pg/mL 91              Component      Latest Ref Rng 11/27/2023   BNP      0 - 99 pg/mL 203 (H)       Legend:  (H) High      Chemistry        Component Value Date/Time     04/18/2024 1134    K 4.8 04/18/2024 1134     04/18/2024 1134    CO2 23 04/18/2024 1134    BUN 35 (H) 04/18/2024 1134    CREATININE 1.5 (H) 04/18/2024 1134     (H) 04/18/2024 1134        Component Value Date/Time    CALCIUM 10.0 04/18/2024 1134    ALKPHOS 77 04/18/2024 1134    AST 20 04/18/2024 1134    ALT 15 04/18/2024 1134    BILITOT 0.5 04/18/2024 1134    ESTGFRAFRICA 58.7 (A) 12/29/2021 0702    EGFRNONAA 50.8 (A) 12/29/2021 0702        Lab Results   Component Value Date    WBC 8.29 04/18/2024    HGB 13.2 (L) 04/18/2024    HCT 41.3 04/18/2024    MCV 98 04/18/2024     04/18/2024       Lab Results   Component Value Date    HGBA1C 8.6 (H)  05/13/2024     Lab Results   Component Value Date    CHOL 123 02/14/2024    CHOL 84 (L) 10/09/2023    CHOL 82 (L) 12/08/2021     Lab Results   Component Value Date    HDL 54 02/14/2024    HDL 41 10/09/2023    HDL 48 12/08/2021     Lab Results   Component Value Date    LDLCALC 52.6 (L) 02/14/2024    LDLCALC 27.8 (L) 10/09/2023    LDLCALC 18.8 (L) 12/08/2021     Lab Results   Component Value Date    TRIG 82 02/14/2024    TRIG 76 10/09/2023    TRIG 76 12/08/2021     Lab Results   Component Value Date    CHOLHDL 43.9 02/14/2024    CHOLHDL 48.8 10/09/2023    CHOLHDL 58.5 (H) 12/08/2021       Results for orders placed during the hospital encounter of 10/25/23    Echo    Interpretation Summary    Left Ventricle: The left ventricle is normal in size. Normal wall thickness. regional wall motion abnormalities present. There is low normal systolic function with a visually estimated ejection fraction of 50 - 55%.    Left Atrium: Left atrium is severely dilated.    Right Ventricle: Normal right ventricular cavity size. Wall thickness is normal. Right ventricle wall motion  is normal. Systolic function is normal.    Right Atrium: Right atrium is dilated.    Mitral Valve: There is mild regurgitation.    Pulmonic Valve: There is mild regurgitation.    Pulmonary Artery: The estimated pulmonary artery systolic pressure is 24 mmHg.    IVC/SVC: Normal venous pressure at 3 mmHg.          Results for orders placed during the hospital encounter of 11/17/23    Nuclear Stress - Cardiology Interpreted    Interpretation Summary    Abnormal myocardial perfusion scan.    There is a moderate to severe intensity, fixed perfusion abnormality consistent with scar in the anterior, inferior and anteroapical wall(s).    No reversible defects noted to suggest coronary ischemia.    The gated perfusion images showed an ejection fraction of 65% at rest. The gated perfusion images showed an ejection fraction of 46% post stress.    The ECG portion of the  study is negative for ischemia.    The patient reported no chest pain during the stress test.    During stress, occasional PVCs are noted.        Assessment:       1. Syncope, unspecified syncope type    2. Left-sided weakness    3. Abnormal ECG    4. AP (angina pectoris)    5. Asymptomatic stenosis of both carotid arteries without infarction    6. Chronic combined systolic and diastolic congestive heart failure    7. Claudication in peripheral vascular disease    8. Coronary artery disease of bypass graft of native heart with stable angina pectoris    9. Hyperlipidemia associated with type 2 diabetes mellitus    10. Diabetes mellitus type 2 with peripheral artery disease    11. Hypertension associated with diabetes    12. RBBB    13. S/P CABG (coronary artery bypass graft)    14. Stage 3a chronic kidney disease    15. Nonrheumatic aortic valve insufficiency    16. Ischemic cardiomyopathy    17. Thoracic aorta atherosclerosis           Plan:       Syncope: may have been vasovagal event or Etoh related.  Schedule 2 week Vital Holter.  CT head to evaluate for possible older CVA.  Pt advised no Etoh consumption.  Update labs today -- CMP, BNP.  Also states he is on Plavix but is not on his med list for long time -- he is advised to call me asap for clarification.  CAD: No ischemia on Nov 2023 stress MPI.  Continue GDMT for chronic multivessel CAD.  CHF: Compensated.  Continue OMT for CHF.  Continue Lasix 40 mg qam, 20 mg pm.  Pt counseled on low salt diet, < 2 g/day.  Fluid restriction 1.5 liter/day.  PAD: Chronic severe B LE PAD for very long time.    Discussed PAD mgt with pt.    Recommend continued medical therapy since pt does not have any lifestyle limiting claudication sxs and/or foot ulcer/sores.  Carotid disease: medical mgt. F/u carotid u/s in future.  Reviewed all tests and above medical conditions with patient in detail and formulated treatment plan.  Continue optimal medical treatment for cardiovascular  conditions.  Cardiac low salt diet advised.  Fall risk precautions discussed.  Maintaining healthy weight and weight loss goals (if needed) were discussed in clinic.  Lipids:Importance of optimal lipid control were discussed in detail as well as possible pharmacologic and lifestyle changes that may be needed.  Continue statin tx.  DM: Optimal HGAIC control advised.  CRI: Monitor.  Abnl ecg: monitor.  Valvular heart disease: monitor w f/u echo in future.    PHONE REVIEW.    F/u 3 months.      I have reviewed all pertinent labs and cardiac studies independently. Plans and recommendations have been formulated under my direct supervision. All questions answered and patient voiced understanding.

## 2024-06-12 NOTE — TELEPHONE ENCOUNTER
Please call pt.  Labs reviewed.  Stay on current dose of Lasix.  Needs repeat CMP, BNP in 4 weeks.  Please schedule.    Dr Sadler

## 2024-06-12 NOTE — TELEPHONE ENCOUNTER
Please call pt.  Labs reviewed.  Stay on current dose of Lasix.  Needs repeat CMP, BNP in 4 weeks.  Please schedule.    Dr Sadler    Spoke to patient verbalized understanding repeat labs scheduled

## 2024-06-13 ENCOUNTER — TELEPHONE (OUTPATIENT)
Dept: CARDIOLOGY | Facility: CLINIC | Age: 84
End: 2024-06-13
Payer: OTHER GOVERNMENT

## 2024-06-13 ENCOUNTER — HOSPITAL ENCOUNTER (OUTPATIENT)
Dept: CARDIOLOGY | Facility: HOSPITAL | Age: 84
Discharge: HOME | End: 2024-06-13
Attending: INTERNAL MEDICINE
Payer: MEDICARE

## 2024-06-13 ENCOUNTER — HOSPITAL ENCOUNTER (OUTPATIENT)
Dept: RADIOLOGY | Facility: HOSPITAL | Age: 84
Discharge: HOME | End: 2024-06-13
Attending: INTERNAL MEDICINE
Payer: MEDICARE

## 2024-06-13 DIAGNOSIS — G45.9 TRANSIENT CEREBRAL ISCHEMIA, UNSPECIFIED TYPE: ICD-10-CM

## 2024-06-13 DIAGNOSIS — I50.42 CHRONIC COMBINED SYSTOLIC AND DIASTOLIC CONGESTIVE HEART FAILURE: Chronic | ICD-10-CM

## 2024-06-13 DIAGNOSIS — R55 SYNCOPE AND COLLAPSE: ICD-10-CM

## 2024-06-13 DIAGNOSIS — I45.10 RBBB: Chronic | ICD-10-CM

## 2024-06-13 PROCEDURE — 70450 CT HEAD/BRAIN W/O DYE: CPT | Mod: 26,,, | Performed by: RADIOLOGY

## 2024-06-13 PROCEDURE — 70450 CT HEAD/BRAIN W/O DYE: CPT | Mod: TC

## 2024-06-13 NOTE — PROGRESS NOTES
Please contact the patient and let them know the head CT did not show a stroke.    F/u next appt.    Dr Sadler

## 2024-06-13 NOTE — TELEPHONE ENCOUNTER
Deniz Sadler, MD [facility] Staff  Please contact the patient and let them know the head CT did not show a stroke.    F/u next appt.    Dr Sadler    No answer lvm to return call

## 2024-06-18 ENCOUNTER — OFFICE VISIT (OUTPATIENT)
Dept: SPORTS MEDICINE | Facility: CLINIC | Age: 84
End: 2024-06-18
Payer: MEDICARE

## 2024-06-18 ENCOUNTER — HOSPITAL ENCOUNTER (OUTPATIENT)
Dept: RADIOLOGY | Facility: HOSPITAL | Age: 84
Discharge: HOME | End: 2024-06-18
Attending: STUDENT IN AN ORGANIZED HEALTH CARE EDUCATION/TRAINING PROGRAM
Payer: MEDICARE

## 2024-06-18 VITALS — RESPIRATION RATE: 17 BRPM | BODY MASS INDEX: 23.89 KG/M2 | WEIGHT: 166.88 LBS | HEIGHT: 70 IN

## 2024-06-18 DIAGNOSIS — S42.012A CLOSED ANTERIOR DISPLACED FRACTURE OF STERNAL END OF LEFT CLAVICLE, INITIAL ENCOUNTER: ICD-10-CM

## 2024-06-18 DIAGNOSIS — S42.012D CLOSED ANTERIOR DISPLACED FRACTURE OF STERNAL END OF LEFT CLAVICLE WITH ROUTINE HEALING, SUBSEQUENT ENCOUNTER: Primary | ICD-10-CM

## 2024-06-18 PROCEDURE — 3288F FALL RISK ASSESSMENT DOCD: CPT | Mod: HCNC,CPTII,S$GLB, | Performed by: STUDENT IN AN ORGANIZED HEALTH CARE EDUCATION/TRAINING PROGRAM

## 2024-06-18 PROCEDURE — 1100F PTFALLS ASSESS-DOCD GE2>/YR: CPT | Mod: HCNC,CPTII,S$GLB, | Performed by: STUDENT IN AN ORGANIZED HEALTH CARE EDUCATION/TRAINING PROGRAM

## 2024-06-18 PROCEDURE — 1160F RVW MEDS BY RX/DR IN RCRD: CPT | Mod: HCNC,CPTII,S$GLB, | Performed by: STUDENT IN AN ORGANIZED HEALTH CARE EDUCATION/TRAINING PROGRAM

## 2024-06-18 PROCEDURE — 99999 PR PBB SHADOW E&M-EST. PATIENT-LVL IV: CPT | Mod: PBBFAC,,, | Performed by: STUDENT IN AN ORGANIZED HEALTH CARE EDUCATION/TRAINING PROGRAM

## 2024-06-18 PROCEDURE — 73000 X-RAY EXAM OF COLLAR BONE: CPT | Mod: 26,LT,, | Performed by: RADIOLOGY

## 2024-06-18 PROCEDURE — 73000 X-RAY EXAM OF COLLAR BONE: CPT | Mod: TC,LT

## 2024-06-18 PROCEDURE — 1126F AMNT PAIN NOTED NONE PRSNT: CPT | Mod: HCNC,CPTII,S$GLB, | Performed by: STUDENT IN AN ORGANIZED HEALTH CARE EDUCATION/TRAINING PROGRAM

## 2024-06-18 PROCEDURE — 1159F MED LIST DOCD IN RCRD: CPT | Mod: HCNC,CPTII,S$GLB, | Performed by: STUDENT IN AN ORGANIZED HEALTH CARE EDUCATION/TRAINING PROGRAM

## 2024-06-18 PROCEDURE — 99213 OFFICE O/P EST LOW 20 MIN: CPT | Mod: HCNC,S$GLB,, | Performed by: STUDENT IN AN ORGANIZED HEALTH CARE EDUCATION/TRAINING PROGRAM

## 2024-06-24 ENCOUNTER — LAB VISIT (OUTPATIENT)
Dept: LAB | Facility: HOSPITAL | Age: 84
End: 2024-06-24
Attending: NURSE PRACTITIONER
Payer: MEDICARE

## 2024-06-24 DIAGNOSIS — Z86.2 HISTORY OF IRON DEFICIENCY ANEMIA: ICD-10-CM

## 2024-06-24 DIAGNOSIS — C67.8 MALIGNANT NEOPLASM OF OVERLAPPING SITES OF BLADDER: Chronic | ICD-10-CM

## 2024-06-24 DIAGNOSIS — D63.8 ANEMIA IN OTHER CHRONIC DISEASES CLASSIFIED ELSEWHERE: ICD-10-CM

## 2024-06-24 LAB
ALBUMIN SERPL BCP-MCNC: 3.9 G/DL (ref 3.5–5.2)
ALP SERPL-CCNC: 81 U/L (ref 55–135)
ALT SERPL W/O P-5'-P-CCNC: 25 U/L (ref 10–44)
ANION GAP SERPL CALC-SCNC: 10 MMOL/L (ref 8–16)
AST SERPL-CCNC: 20 U/L (ref 10–40)
BASOPHILS # BLD AUTO: 0.02 K/UL (ref 0–0.2)
BASOPHILS NFR BLD: 0.2 % (ref 0–1.9)
BILIRUB SERPL-MCNC: 0.6 MG/DL (ref 0.1–1)
BUN SERPL-MCNC: 35 MG/DL (ref 8–23)
CALCIUM SERPL-MCNC: 10.8 MG/DL (ref 8.7–10.5)
CHLORIDE SERPL-SCNC: 104 MMOL/L (ref 95–110)
CO2 SERPL-SCNC: 25 MMOL/L (ref 23–29)
CREAT SERPL-MCNC: 1.8 MG/DL (ref 0.5–1.4)
DIFFERENTIAL METHOD BLD: ABNORMAL
EOSINOPHIL # BLD AUTO: 0.2 K/UL (ref 0–0.5)
EOSINOPHIL NFR BLD: 1.7 % (ref 0–8)
ERYTHROCYTE [DISTWIDTH] IN BLOOD BY AUTOMATED COUNT: 12.2 % (ref 11.5–14.5)
EST. GFR  (NO RACE VARIABLE): 36 ML/MIN/1.73 M^2
FERRITIN SERPL-MCNC: 303 NG/ML (ref 20–300)
GLUCOSE SERPL-MCNC: 278 MG/DL (ref 70–110)
HCT VFR BLD AUTO: 40.3 % (ref 40–54)
HGB BLD-MCNC: 13.1 G/DL (ref 14–18)
IMM GRANULOCYTES # BLD AUTO: 0.02 K/UL (ref 0–0.04)
IMM GRANULOCYTES NFR BLD AUTO: 0.2 % (ref 0–0.5)
IRON SERPL-MCNC: 52 UG/DL (ref 45–160)
LYMPHOCYTES # BLD AUTO: 2.1 K/UL (ref 1–4.8)
LYMPHOCYTES NFR BLD: 23.8 % (ref 18–48)
MCH RBC QN AUTO: 31.6 PG (ref 27–31)
MCHC RBC AUTO-ENTMCNC: 32.5 G/DL (ref 32–36)
MCV RBC AUTO: 97 FL (ref 82–98)
MONOCYTES # BLD AUTO: 0.6 K/UL (ref 0.3–1)
MONOCYTES NFR BLD: 6.6 % (ref 4–15)
NEUTROPHILS # BLD AUTO: 5.9 K/UL (ref 1.8–7.7)
NEUTROPHILS NFR BLD: 67.5 % (ref 38–73)
NRBC BLD-RTO: 0 /100 WBC
PLATELET # BLD AUTO: 222 K/UL (ref 150–450)
PMV BLD AUTO: 11 FL (ref 9.2–12.9)
POTASSIUM SERPL-SCNC: 5.5 MMOL/L (ref 3.5–5.1)
PROT SERPL-MCNC: 6.7 G/DL (ref 6–8.4)
RBC # BLD AUTO: 4.15 M/UL (ref 4.6–6.2)
SATURATED IRON: 16 % (ref 20–50)
SODIUM SERPL-SCNC: 139 MMOL/L (ref 136–145)
TOTAL IRON BINDING CAPACITY: 332 UG/DL (ref 250–450)
TRANSFERRIN SERPL-MCNC: 224 MG/DL (ref 200–375)
WBC # BLD AUTO: 8.7 K/UL (ref 3.9–12.7)

## 2024-06-24 PROCEDURE — 83540 ASSAY OF IRON: CPT | Mod: HCNC | Performed by: NURSE PRACTITIONER

## 2024-06-24 PROCEDURE — 85025 COMPLETE CBC W/AUTO DIFF WBC: CPT | Mod: HCNC | Performed by: NURSE PRACTITIONER

## 2024-06-24 PROCEDURE — 36415 COLL VENOUS BLD VENIPUNCTURE: CPT | Mod: HCNC,PO | Performed by: NURSE PRACTITIONER

## 2024-06-24 PROCEDURE — 82728 ASSAY OF FERRITIN: CPT | Mod: HCNC | Performed by: NURSE PRACTITIONER

## 2024-06-24 PROCEDURE — 80053 COMPREHEN METABOLIC PANEL: CPT | Mod: HCNC | Performed by: NURSE PRACTITIONER

## 2024-06-27 ENCOUNTER — TELEPHONE (OUTPATIENT)
Dept: HEMATOLOGY/ONCOLOGY | Facility: CLINIC | Age: 84
End: 2024-06-27

## 2024-06-27 ENCOUNTER — OFFICE VISIT (OUTPATIENT)
Dept: HEMATOLOGY/ONCOLOGY | Facility: CLINIC | Age: 84
End: 2024-06-27
Payer: MEDICARE

## 2024-06-27 VITALS
BODY MASS INDEX: 23.86 KG/M2 | DIASTOLIC BLOOD PRESSURE: 73 MMHG | HEART RATE: 63 BPM | SYSTOLIC BLOOD PRESSURE: 150 MMHG | WEIGHT: 166.69 LBS | HEIGHT: 70 IN | OXYGEN SATURATION: 98 %

## 2024-06-27 DIAGNOSIS — D63.1 ANEMIA DUE TO STAGE 3 CHRONIC KIDNEY DISEASE, UNSPECIFIED WHETHER STAGE 3A OR 3B CKD: ICD-10-CM

## 2024-06-27 DIAGNOSIS — C67.8 MALIGNANT NEOPLASM OF OVERLAPPING SITES OF BLADDER: Primary | Chronic | ICD-10-CM

## 2024-06-27 DIAGNOSIS — E87.5 HYPERKALEMIA: Primary | ICD-10-CM

## 2024-06-27 DIAGNOSIS — D50.9 IRON DEFICIENCY ANEMIA, UNSPECIFIED IRON DEFICIENCY ANEMIA TYPE: ICD-10-CM

## 2024-06-27 DIAGNOSIS — N18.30 ANEMIA DUE TO STAGE 3 CHRONIC KIDNEY DISEASE, UNSPECIFIED WHETHER STAGE 3A OR 3B CKD: ICD-10-CM

## 2024-06-27 DIAGNOSIS — E83.52 SERUM CALCIUM ELEVATED: ICD-10-CM

## 2024-06-27 DIAGNOSIS — E87.5 HYPERKALEMIA: ICD-10-CM

## 2024-06-27 PROCEDURE — 99999 PR PBB SHADOW E&M-EST. PATIENT-LVL V: CPT | Mod: PBBFAC,,, | Performed by: NURSE PRACTITIONER

## 2024-06-27 PROCEDURE — 99215 OFFICE O/P EST HI 40 MIN: CPT | Mod: S$GLB,,, | Performed by: NURSE PRACTITIONER

## 2024-06-27 PROCEDURE — 99215 OFFICE O/P EST HI 40 MIN: CPT | Mod: PBBFAC,PO | Performed by: NURSE PRACTITIONER

## 2024-06-27 RX ORDER — SODIUM POLYSTYRENE SULFONATE 4.1 MEQ/G
POWDER, FOR SUSPENSION ORAL; RECTAL
Qty: 30 G | Refills: 0 | Status: SHIPPED | OUTPATIENT
Start: 2024-06-27 | End: 2024-06-27

## 2024-06-27 RX ORDER — SODIUM POLYSTYRENE SULFONATE 4.1 MEQ/G
POWDER, FOR SUSPENSION ORAL; RECTAL
Qty: 30 G | Refills: 0 | Status: SHIPPED | OUTPATIENT
Start: 2024-06-27

## 2024-06-27 NOTE — TELEPHONE ENCOUNTER
----- Message from Dulce Maria Waite MA sent at 6/27/2024  1:47 PM CDT -----  Type:  Pharmacy Calling to Clarify an RX    Name of Caller:  Sheila Rojo   Pharmacy Name[facility]  [facility] [address] [address]  Prescription Name:  sodium polystyrene (KAYEXALATE) powder   What do they need to clarify?  sodium polystyrene (KAYEXALATE) powder on back order   Can you be contacted via [facility]?    Best Call Back Number:  [phone]  Additional Information:     Pt came to pick it up but was told isn't in stock

## 2024-06-27 NOTE — TELEPHONE ENCOUNTER
----- Message from Shelley Castro sent at 6/27/2024  1:50 PM CDT -----  Type:  Patient Returning Call    Who Called:kezia  Who Left Message for Patient:Tawanna Vo  Does the patient know what this is regarding?:  Would the patient rather a call back or a response via [facility]? Call back  Best Call Back Number:[phone]  Additional Information:

## 2024-06-27 NOTE — PROGRESS NOTES
Subjective:      Patient ID: Nithin Pino is a 84 y.o. male.    Chief Complaint: no complaints    HPI:  85 yo male presents today as a new patient for further evaluation and recommendation of iron deficiency anemia.  Is unable to tolerate oral iron due to constipation.  Currently with normocytic anemia.  Hgb 9.7 g/dL.  Ferritin low on outside records located in media section.       Has  bladder cancer 2022 treated  with surgery followed by Dr. Michaelcompleted 6 BCG induction treatments.   6/23/23-Pt s/p TURBT. 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  Is scheduled for repeat bladder surgery on 10/26/2023.  .       Denies any know bleeding in urine.  Did an occult stool sample with no blood found.  Denies blood noses.  Has a history of B12 deficiency.   Not currently taking B12 supplements.      Denies f/c/ns or unintentional weight loss.  Denies any known abnormal lymphadenopathy.     Former smoker - quit smoking in 1994.  Former skoal - quit 15 years   Former heavy drinker - occasional drinker now - quit about 15-18 years ago.     Complains of increased fatigue.     Interval History:  12/14/2023 Found with JOSE MARIA and received Feraheme infusions x 2 with last dose received on 11/30/2023.  Presents today to evaluate response.  Hgb improved from 9.7 to 11.2.  Noted elevated granulocyte count today.  Slightly low iron with saturated iron of 19%.  Elevated creatinine 1.6, elevated BUN, elevated  with CKD.  States that he is drinking almost a gallon of water a day.  Denies any urinary symptoms.  On novolog sliding scale increased yesterday  Yesterday Lantus  increase from 10-15 units yesterday.  Hopefully this will decrease bg and improved kidney.       Interval History:  2/22/2024  Presents today for f/u iron deficiency anemia.  Received 2 doses of IV venofer 200 mg  with last dose received on 12/14/2023.  Denies any known blood loss. Continues to be followed by urology and will  start treatment for bladder cancer soon. Biopsy of bladder 2/16/2024 showed multiple areas high grade papillary urothelial carcinoma, noninvasive.  Has been eating liver daily.  Has no complaints today.     Interval History:  4/22/2024  Received venofer 200 mg IV push with last dose received on 3/18/2024.  Has received treatment for bladder cancer - BCG treatments so far has received 6 treatments by Dr. Michael (completed). - last on 4/9/2024  Has had recent h/o hematuria. Presents today to assess response of IV iron treatment and to assess need for additional therapy. Currently with slight normocytic anemia Hgb 13.2 and iron is repleated. States that he has to get up 3-4 times at night to urinate.  Noted increased trend in hgb A1C.  States that he is not eating a lot of carbs.  Does not excessively eat sweets.  Is having a repeat cystoscope in 5/2024. Denies any known abnormal blood loss.     Interval History:  6/27/2024  Continues taking B12 and B complex daily (currently out of it)  Presents today to assess for recurrent iron deficiency anemia.  Hgb currently 13.1 normocytic.  Slight iron deficiency with saturated iron of 13%  Noted elevated potassium and calcium with decreased kidney function, elevated BUN and creatinine.  States that he has diarrhea that comes and goes - describes as being lose but not watery.  States that he has been taking furosemide 40 mg in the morning and 20 mg at night.  Denies excess potassium or calcium intake.  Denies taking TUMS. Denies any new bone pain.  States that he is continuing to be followed by urology and will have a repeat cystoscope next week.  Denies any known abnormal blood loss.      I have reviewed all of the patient's relevant lab work available in the medical record and have utilized this in my evaluation and management recommendations today.      Social History     Socioeconomic History    Marital status:     Number of children: 5   Tobacco Use    Smoking  status: Former     Current packs/day: 0.00     Average packs/day: 1.5 packs/day for 40.0 years (60.0 ttl pk-yrs)     Types: Cigarettes     Start date: 1954     Quit date: 1994     Years since quittin.5    Smokeless tobacco: Former     Quit date: 2011    Tobacco comments:     approx date   Substance and Sexual Activity    Alcohol use: Yes     Comment: occasionally; 1-2 cans of beer a month    Drug use: No    Sexual activity: Never     Birth control/protection: None     Social Determinants of Health     Financial Resource Strain: Low Risk  (2024)    Overall Financial Resource Strain (CARDIA)     Difficulty of Paying Living Expenses: Not very hard   Food Insecurity: No Food Insecurity (2024)    Hunger Vital Sign     Worried About Running Out of Food in the Last Year: Never true     Ran Out of Food in the Last Year: Never true   Transportation Needs: No Transportation Needs (2024)    PRAPARE - Transportation     Lack of Transportation (Medical): No     Lack of Transportation (Non-Medical): No   Physical Activity: Inactive (2024)    Exercise Vital Sign     Days of Exercise per Week: 0 days     Minutes of Exercise per Session: 0 min   Stress: No Stress Concern Present (2024)    [employer] - Occupational Stress Questionnaire     Feeling of Stress : Only a little   Housing Stability: Low Risk  (2024)    Housing Stability Vital Sign     Unable to Pay for Housing in the Last Year: No     Number of Places Lived in the Last Year: 1     Unstable Housing in the Last Year: No       Family History   Adopted: Yes   Problem Relation Name Age of Onset    Diabetes Brother      Diabetes Sister      Cancer Neg Hx      Heart disease Neg Hx      Kidney disease Neg Hx         Past Surgical History:   Procedure Laterality Date    APPENDECTOMY      CARDIAC CATHETERIZATION      2016    CARDIAC SURGERY      balloon angioplasty    CATARACT EXTRACTION W/  INTRAOCULAR  LENS IMPLANT Right 02/01/2017    CORONARY ARTERY BYPASS GRAFT  05/2011    per patient x4    HERNIA REPAIR      OPEN REDUCTION AND INTERNAL FIXATION (ORIF) OF INJURY OF HIP      PCIOL Bilateral OD 02/01/17/OS 02/15/17    DR. ALONZO    TURBT (TRANSURETHRAL RESECTION OF BLADDER TUMOR) N/A 6/8/2023    Procedure: TURBT (TRANSURETHRAL RESECTION OF BLADDER TUMOR);  Surgeon: Hortencia Michael MD;  Location: [facility] OR;  Service: Urology;  Laterality: N/A;    TURBT (TRANSURETHRAL RESECTION OF BLADDER TUMOR) N/A 2/8/2024    Procedure: TURBT (TRANSURETHRAL RESECTION OF BLADDER TUMOR);  Surgeon: Hortencia Michael MD;  Location: [facility] OR;  Service: Urology;  Laterality: N/A;       Past Medical History:   Diagnosis Date    Abnormal brain MRI 01/21/2018    Chronic microvascular ischemia changes per MRI July 9, 2007 in [facility]    Abnormal ECG 10/31/2013    Abnormal stress test 01/28/2016    Alcohol dependence     States alcohol abuse ended in 1994    AP (angina pectoris) 01/28/2016    Asthma     Bladder cancer     Carotid artery occlusion     Cataract     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    2 - Normal left ventricular systolic function (EF 60-65%).    3 - Normal left ventricular diastolic function.    4 - Mild left atrial enlargement.    5 - Normal right ventricular systolic function .    6 - Trivial to mild aortic regurgitation.    7 - Trivial to mild mitral regurgitation.  10 - Trivial to mild pulmonic regurgitation.     Hematuria 06/25/2020    History of alcohol abuse 01/22/2018     Patient denies drinking to excess since 1994.    History of atherectomy 01/21/2018 2/[facility]    History of chronic kidney disease 01/22/2018    History of CKD 3    History of PTCA 10/31/2013    History of PTCA 03/09/2016    Hyperlipidemia     Hypertension     Insomnia 06/17/2013    Iron deficiency anemia 10/19/2023    Ischemic cardiomyopathy 10/31/2013    Long term (current) use of antithrombotics/antiplatelets 01/21/2018    Malignant neoplasm of overlapping sites of bladder 06/08/2023    Myocardial infarction     Old MI (myocardial infarction) 1994    Peripheral vascular disease     Polyneuropathy     RBBB 10/31/2013    S/P CABG (coronary artery bypass graft) 10/31/2013    Shortness of breath 10/31/2013    Tobacco dependence     resolved    Trouble in sleeping     Type 2 diabetes with peripheral circulatory disorder, controlled     Wears glasses        Review of Systems   Constitutional: Negative.    HENT: Negative.     Eyes: Negative.    Respiratory: Negative.     Cardiovascular: Negative.    Gastrointestinal: Negative.    Endocrine: Negative.    Genitourinary: Negative.    Musculoskeletal: Negative.    Skin: Negative.    Allergic/Immunologic: Negative.    Neurological: Negative.    Hematological: Negative.    Psychiatric/Behavioral: Negative.            Medication List with Changes/Refills   Current Medications    AMLODIPINE (NORVASC) 10 MG TABLET    Take 0.5 tablets (5 mg total) by mouth once daily.    ASPIRIN (ECOTRIN) 81 MG EC TABLET    Take 1 tablet (81 mg total) by mouth once daily.    ATORVASTATIN (LIPITOR) 40 MG TABLET    Take 1 tablet (40 mg total) by mouth every evening.    B COMPLEX VITAMINS TABLET    Take 1 tablet by mouth once daily.    BLOOD-GLUCOSE METER (TRUERESULT BLOOD GLUCOSE SYSTM MISC)    by Misc.(Non-Drug; Combo Route) route.    CYANOCOBALAMIN (VITAMIN B-12) 1000 MCG TABLET    Take 100 mcg by mouth once daily.    DULAGLUTIDE (TRULICITY) 4.5 MG/0.5 ML PEN INJECTOR    Inject 4.5 mg into  the skin every 7 days. SUNDAY    EMBRACE PRO TEST STRIPS STRP    CHECK BLOOD SUGAR TWICE DAILY.    FINASTERIDE (PROSCAR) 5 MG TABLET    TAKE 1 TABLET ONE TIME DAILY    FUROSEMIDE (LASIX) 40 MG TABLET    TAKE 1 PILL IN AM, AND 1/2 PILL IN PM    GLUCOSE 4 GM CHEWABLE TABLET    Take 4 tablets (16 g total) by mouth as needed for Low blood sugar.    HYOSCYAMINE (LEVSIN/SL) 0.125 MG SUBL    Place 2 tablets (0.25 mg total) under the tongue every 4 (four) hours as needed (bladder spasms).    INSULIN (LANTUS SOLOSTAR U-100 INSULIN) GLARGINE 100 UNITS/ML SUBQ PEN    Inject into the skin. 15 units QHS and 18 units QAM    INSULIN ASPART U-100 (NOVOLOG) 100 UNIT/ML INJECTION    Inject 3 Units into the skin every 4 (four) hours as needed (for high blood sugar or carbohydrate intake).    ISOSORBIDE MONONITRATE (IMDUR) 60 MG 24 HR TABLET    Take 1 tablet (60 mg total) by mouth once daily.    LIDOCAINE (LIDODERM) 5 %    APPLY 1 PATCH TOPICALLY EVERY DAY FOR PAIN  WEAR FOR 12 HOURS, THEN REMOVE. DO NOT APPLY NEW PATCH FOR AT LEAST 12 HOURS.    LOSARTAN (COZAAR) 100 MG TABLET    Take 1 tablet (100 mg total) by mouth once daily.    METOPROLOL SUCCINATE (TOPROL-XL) 25 MG 24 HR TABLET    Take 1 tablet (25 mg total) by mouth every evening.    MICONAZOLE (MICOTIN) 2 % CREAM    APPLY SMALL AMOUNT TOPICALLY TWICE A DAY AS NEEDED FOR FUNGAL INFECTION    MULTIVIT-MINERALS/FOLIC ACID (DIABETIC MULTIVITAMIN ORAL)    Take by mouth.    NITROGLYCERIN (NITROSTAT) 0.4 MG SL TABLET    0.4 mg.    TAMSULOSIN (FLOMAX) 0.4 MG CAP    Take 1 capsule (0.4 mg total) by mouth once daily.    TRUEPLUS LANCETS 26 GAUGE MISC    CHECK BLOOD SUGAR TWICE DAILY.    TRUERESULT BLOOD GLUCOSE SYSTM KIT    CHECK BLOOD SUGAR TWICE DAILY.    VITAMIN D (VITAMIN D3) 1000 UNITS TAB    Take 1 tablet (1,000 Units total) by mouth once daily.        Objective:   There were no vitals filed for this visit.    Physical Exam  Vitals reviewed.   Constitutional:       Appearance: Normal  appearance.   HENT:      Head: Normocephalic and atraumatic.      Right Ear: External ear normal. Decreased hearing noted.      Left Ear: External ear normal. Decreased hearing noted.   Cardiovascular:      Rate and Rhythm: Normal rate and regular rhythm.      Heart sounds: Normal heart sounds, S1 normal and S2 normal.   Pulmonary:      Effort: Pulmonary effort is normal.      Breath sounds: Normal breath sounds.   Abdominal:      General: There is no distension.   Musculoskeletal:         General: Normal range of motion.      Cervical back: Normal range of motion.   Skin:     General: Skin is warm and dry.   Neurological:      General: No focal deficit present.      Mental Status: He is alert and oriented to person, place, and time.      Gait: Gait abnormal.   Psychiatric:         Attention and Perception: Attention and perception normal.         Mood and Affect: Mood and affect normal.         Speech: Speech normal.         Behavior: Behavior normal. Behavior is cooperative.         Thought Content: Thought content normal.         Cognition and Memory: Cognition and memory normal.         Judgment: Judgment normal.         Assessment:     Problem List Items Addressed This Visit    None      Lab Results   Component Value Date    WBC 8.70 06/24/2024    RBC 4.15 (L) 06/24/2024    HGB 13.1 (L) 06/24/2024    HCT 40.3 06/24/2024    MCV 97 06/24/2024    MCH 31.6 (H) 06/24/2024    MCHC 32.5 06/24/2024    RDW 12.2 06/24/2024     06/24/2024    MPV 11.0 06/24/2024    GRAN 5.9 06/24/2024    GRAN 67.5 06/24/2024    LYMPH 2.1 06/24/2024    LYMPH 23.8 06/24/2024    MONO 0.6 06/24/2024    MONO 6.6 06/24/2024    EOS 0.2 06/24/2024    BASO 0.02 06/24/2024    EOSINOPHIL 1.7 06/24/2024    BASOPHIL 0.2 06/24/2024      Lab Results   Component Value Date     06/24/2024    K 5.5 (H) 06/24/2024     06/24/2024    CO2 25 06/24/2024    BUN 35 (H) 06/24/2024    CREATININE 1.8 (H) 06/24/2024    CALCIUM 10.8 (H) 06/24/2024     ANIONGAP 10 06/24/2024    ESTGFRAFRICA 58.7 (A) 12/29/2021    EGFRNONAA 50.8 (A) 12/29/2021     Lab Results   Component Value Date    ALT 25 06/24/2024    AST 20 06/24/2024    ALKPHOS 81 06/24/2024    BILITOT 0.6 06/24/2024     Lab Results   Component Value Date    IRON 52 06/24/2024    TRANSFERRIN 224 06/24/2024    TIBC 332 06/24/2024    FESATURATED 16 (L) 06/24/2024    FERRITIN 303 (H) 06/24/2024        Plan:   There are no diagnoses linked to this encounter.    Med Onc Chart Routing      Follow up with physician    Follow up with ISAIAS 3 months. in person visit at [place]   Infusion scheduling note   n/a   Injection scheduling note n/a   Labs   Scheduling:  Preferred lab: [facility] [place]  Lab interval:  add calcitrol and ionized calcium to 7/10/2024 labs. cbc, cmp,iron studies prior to next appointment   Imaging   N/a   Pharmacy appointment No pharmacy appointment needed      Other referrals       No additional referrals needed  n/a         Hold night time dose of lasix 20 mg next week due to decreased kidney function, increased dehydration and elevated creatinine.  Recheck kidney function with Dr. Michael's labs on 7/10/2024.  - ionized calcium and assess renal function.  Offered eval with nephrology consult; however patient does not want to do this currently.  Will take 1 dose kayexalate tomorrow and 1 dose on Tuesday to reduce potassium level. He will avoid elevated potassium and calcium rich foods.    Start slowFe daily - he will get otc and we will f/u in 3 months to assess response.    Total time spent on encounter: 58 minutes    Collaborating Provider:  Dr. Franklin Ross    Thank You,  JAYME Souza-HODAN  Ben[facility] Hematology

## 2024-07-01 ENCOUNTER — TELEPHONE (OUTPATIENT)
Dept: HEMATOLOGY/ONCOLOGY | Facility: CLINIC | Age: 84
End: 2024-07-01
Payer: MEDICARE

## 2024-07-01 DIAGNOSIS — E87.5 HYPERKALEMIA: Primary | ICD-10-CM

## 2024-07-01 RX ORDER — SODIUM POLYSTYRENE SULFONATE 4.1 MEQ/G
15 POWDER, FOR SUSPENSION ORAL; RECTAL ONCE
Qty: 15 G | Refills: 0 | Status: SHIPPED | OUTPATIENT
Start: 2024-07-01 | End: 2024-07-01

## 2024-07-01 NOTE — TELEPHONE ENCOUNTER
----- Message from Maranda Aguilar sent at 7/1/2024  8:55 AM CDT -----  [phone] patient stopped in [facility] stating his medication back order, [facility]  does not have can u check [facility]. tc

## 2024-07-01 NOTE — TELEPHONE ENCOUNTER
----- Message from Maureen Rivera NP sent at 7/1/2024  3:09 PM CDT -----  Contact: [facility]   Will have him just take once and then recheck potassium on Monday  ----- Message -----  From: Tawanna Vo MA  Sent: 7/1/2024  12:01 PM CDT  To: Maureen Rivera NP     Hey how do you want him to take this being that he wasn't able to get it on the date you had originally sent the prescription in ?  ----- Message -----  From: Jay Styles  Sent: 7/1/2024  11:37 AM CDT  To: Nicole Reddy Staff    Pharmacy is calling to clarify an RX.    RX name:  sodium polystyrene (KAYEXALATE) powde    What do they need to clarify:  Need specific directions      Comments:     [id number] [address] [address] [address]   Phone: [phone]  Fax: [phone]

## 2024-07-01 NOTE — TELEPHONE ENCOUNTER
Nurse spoke with pt in regards to informing the medication was sent to [facility] on [address] per pts request on 6/27. Pt verbalized understanding. Call ended well

## 2024-07-01 NOTE — TELEPHONE ENCOUNTER
Spoke with the pharmacy to give clarification on a medication ( Kayexalate). Verbalized understanding. Call ended well

## 2024-07-08 ENCOUNTER — TELEPHONE (OUTPATIENT)
Dept: CARDIOLOGY | Facility: CLINIC | Age: 84
End: 2024-07-08
Payer: MEDICARE

## 2024-07-08 NOTE — TELEPHONE ENCOUNTER
Contacted patient;spoke with patient daughter she received and understood results with no questions or concerns.    ----- Message from Deniz Sadler MD sent at 7/5/2024  3:06 PM CDT -----  Please contact the patient and let them know that their Vital holter results were reviewed and do not require any change in treatment.    F/u next scheduled appt.    Dr Sadler

## 2024-07-10 ENCOUNTER — TELEPHONE (OUTPATIENT)
Dept: CARDIOLOGY | Facility: HOSPITAL | Age: 84
End: 2024-07-10
Payer: MEDICARE

## 2024-07-10 ENCOUNTER — LAB VISIT (OUTPATIENT)
Dept: LAB | Facility: HOSPITAL | Age: 84
End: 2024-07-10
Attending: INTERNAL MEDICINE
Payer: MEDICARE

## 2024-07-10 DIAGNOSIS — I50.42 CHRONIC COMBINED SYSTOLIC AND DIASTOLIC CONGESTIVE HEART FAILURE: ICD-10-CM

## 2024-07-10 DIAGNOSIS — I50.42 CHRONIC COMBINED SYSTOLIC AND DIASTOLIC CONGESTIVE HEART FAILURE: Primary | ICD-10-CM

## 2024-07-10 DIAGNOSIS — E83.52 SERUM CALCIUM ELEVATED: ICD-10-CM

## 2024-07-10 LAB
ALBUMIN SERPL BCP-MCNC: 3.5 G/DL (ref 3.5–5.2)
ALP SERPL-CCNC: 87 U/L (ref 55–135)
ALT SERPL W/O P-5'-P-CCNC: 20 U/L (ref 10–44)
ANION GAP SERPL CALC-SCNC: 11 MMOL/L (ref 8–16)
AST SERPL-CCNC: 14 U/L (ref 10–40)
BILIRUB SERPL-MCNC: 0.5 MG/DL (ref 0.1–1)
BNP SERPL-MCNC: 182 PG/ML (ref 0–99)
BUN SERPL-MCNC: 33 MG/DL (ref 8–23)
CA-I BLDV-SCNC: 1.28 MMOL/L (ref 1.06–1.42)
CALCIUM SERPL-MCNC: 10.4 MG/DL (ref 8.7–10.5)
CHLORIDE SERPL-SCNC: 103 MMOL/L (ref 95–110)
CO2 SERPL-SCNC: 24 MMOL/L (ref 23–29)
CREAT SERPL-MCNC: 1.6 MG/DL (ref 0.5–1.4)
EST. GFR  (NO RACE VARIABLE): 42 ML/MIN/1.73 M^2
GLUCOSE SERPL-MCNC: 342 MG/DL (ref 70–110)
POTASSIUM SERPL-SCNC: 5.4 MMOL/L (ref 3.5–5.1)
PROT SERPL-MCNC: 6.4 G/DL (ref 6–8.4)
SODIUM SERPL-SCNC: 138 MMOL/L (ref 136–145)

## 2024-07-10 PROCEDURE — 83880 ASSAY OF NATRIURETIC PEPTIDE: CPT | Mod: HCNC | Performed by: INTERNAL MEDICINE

## 2024-07-10 PROCEDURE — 80053 COMPREHEN METABOLIC PANEL: CPT | Mod: HCNC | Performed by: INTERNAL MEDICINE

## 2024-07-10 PROCEDURE — 82330 ASSAY OF CALCIUM: CPT | Mod: HCNC | Performed by: NURSE PRACTITIONER

## 2024-07-10 PROCEDURE — 82652 VIT D 1 25-DIHYDROXY: CPT | Mod: HCNC | Performed by: NURSE PRACTITIONER

## 2024-07-10 NOTE — TELEPHONE ENCOUNTER
Please call pt.  Labs shows stable renal function.  Potassium mildly elevated.    Make sure he is not taking  potassium pills/supplements or eating a lot of high potassium foods, bananas.    Continue current meds.    Needs CMP, BNP in 2 weeks.    Please schedule.    Dr Sadler

## 2024-07-10 NOTE — TELEPHONE ENCOUNTER
Please call pt.  Labs shows stable renal function.  Potassium mildly elevated.    Make sure he is not taking  potassium pills/supplements or eating a lot of high potassium foods, bananas.    Continue current meds.    Needs CMP, BNP in 2 weeks.    Please schedule.    Dr Sadler    Spoke to patient verbalized understanding

## 2024-07-12 LAB — 1,25(OH)2D3 SERPL-MCNC: 24 PG/ML (ref 20–79)

## 2024-07-24 ENCOUNTER — LAB VISIT (OUTPATIENT)
Dept: LAB | Facility: HOSPITAL | Age: 84
End: 2024-07-24
Attending: INTERNAL MEDICINE
Payer: MEDICARE

## 2024-07-24 DIAGNOSIS — I50.42 CHRONIC COMBINED SYSTOLIC AND DIASTOLIC CONGESTIVE HEART FAILURE: ICD-10-CM

## 2024-07-24 LAB
ALBUMIN SERPL BCP-MCNC: 3.8 G/DL (ref 3.5–5.2)
ALP SERPL-CCNC: 79 U/L (ref 55–135)
ALT SERPL W/O P-5'-P-CCNC: 18 U/L (ref 10–44)
ANION GAP SERPL CALC-SCNC: 9 MMOL/L (ref 8–16)
AST SERPL-CCNC: 18 U/L (ref 10–40)
BILIRUB SERPL-MCNC: 0.5 MG/DL (ref 0.1–1)
BNP SERPL-MCNC: 120 PG/ML (ref 0–99)
BUN SERPL-MCNC: 42 MG/DL (ref 8–23)
CALCIUM SERPL-MCNC: 10.7 MG/DL (ref 8.7–10.5)
CHLORIDE SERPL-SCNC: 104 MMOL/L (ref 95–110)
CO2 SERPL-SCNC: 29 MMOL/L (ref 23–29)
CREAT SERPL-MCNC: 1.6 MG/DL (ref 0.5–1.4)
EST. GFR  (NO RACE VARIABLE): 42 ML/MIN/1.73 M^2
GLUCOSE SERPL-MCNC: 114 MG/DL (ref 70–110)
POTASSIUM SERPL-SCNC: 5 MMOL/L (ref 3.5–5.1)
PROT SERPL-MCNC: 6.6 G/DL (ref 6–8.4)
SODIUM SERPL-SCNC: 142 MMOL/L (ref 136–145)

## 2024-07-24 PROCEDURE — 80053 COMPREHEN METABOLIC PANEL: CPT | Mod: HCNC | Performed by: INTERNAL MEDICINE

## 2024-07-24 PROCEDURE — 36415 COLL VENOUS BLD VENIPUNCTURE: CPT | Mod: HCNC | Performed by: INTERNAL MEDICINE

## 2024-07-24 PROCEDURE — 83880 ASSAY OF NATRIURETIC PEPTIDE: CPT | Mod: HCNC | Performed by: INTERNAL MEDICINE

## 2024-07-25 NOTE — PROGRESS NOTES
Please contact the patient and let them know that their lab results are stable and do not require any change in treatment.

## 2024-07-29 ENCOUNTER — TELEPHONE (OUTPATIENT)
Dept: CARDIOLOGY | Facility: CLINIC | Age: 84
End: 2024-07-29
Payer: MEDICARE

## 2024-07-29 NOTE — TELEPHONE ENCOUNTER
Deniz Sadler MD P Jones Brian P Staff  Please contact the patient and let them know that their lab results are stable and do not require any change in treatment.   Associated Results      Spoke to patient's daughter verbalized understanding, she had additional questions and concerns requesting results from her holter monitor

## 2024-07-29 NOTE — TELEPHONE ENCOUNTER
Please call.  Vital Holter shows normal rhythm overall; no sustained arrhythmias.  Continue with current medical therapy.  No other changes needed.    Deniz Xie., MD MAKENNA MENSAH Staff  Please contact the patient and let them know that their lab results are stable and do not require any change in treatment.   Associated Results        Spoke to patient's daughter verbalized understanding, she had additional questions and concerns requesting results from her holter monitor

## 2024-07-29 NOTE — TELEPHONE ENCOUNTER
Please call.  Vital Holter shows normal rhythm overall; no sustained arrhythmias.  Continue with current medical therapy.  No other changes needed.     Dr Sadler      Verbalized understanding       Please call.  Vital Holter shows normal rhythm overall; no sustained arrhythmias.  Continue with current medical therapy.  No other changes needed.    Deniz Xie., MD MAKENNA MENSAH Staff  Please contact the patient and let them know that their lab results are stable and do not require any change in treatment.   Associated Results        Spoke to patient's daughter verbalized understanding, she had additional questions and concerns requesting results from her holter monitor

## 2024-08-20 ENCOUNTER — PRE-OP/PRE-PROCEDURE ORDERS (OUTPATIENT)
Dept: UROLOGY | Facility: CLINIC | Age: 84
End: 2024-08-20

## 2024-08-20 ENCOUNTER — PROCEDURE VISIT (OUTPATIENT)
Dept: UROLOGY | Facility: CLINIC | Age: 84
End: 2024-08-20
Payer: MEDICARE

## 2024-08-20 VITALS
HEIGHT: 70 IN | RESPIRATION RATE: 18 BRPM | HEART RATE: 94 BPM | SYSTOLIC BLOOD PRESSURE: 130 MMHG | WEIGHT: 168.44 LBS | TEMPERATURE: 98 F | BODY MASS INDEX: 24.11 KG/M2 | DIASTOLIC BLOOD PRESSURE: 66 MMHG

## 2024-08-20 DIAGNOSIS — Z01.818 PRE-OP TESTING: ICD-10-CM

## 2024-08-20 DIAGNOSIS — C67.0 MALIGNANT NEOPLASM OF TRIGONE OF URINARY BLADDER: ICD-10-CM

## 2024-08-20 DIAGNOSIS — C67.0 MALIGNANT NEOPLASM OF TRIGONE OF URINARY BLADDER: Primary | ICD-10-CM

## 2024-08-20 DIAGNOSIS — Z01.818 PRE-OP EVALUATION: ICD-10-CM

## 2024-08-20 DIAGNOSIS — C67.8 MALIGNANT NEOPLASM OF OVERLAPPING SITES OF BLADDER: Primary | ICD-10-CM

## 2024-08-20 LAB
BILIRUB UR QL STRIP: NEGATIVE
GLUCOSE UR QL STRIP: NEGATIVE
KETONES UR QL STRIP: NEGATIVE
LEUKOCYTE ESTERASE UR QL STRIP: NEGATIVE
PH, POC UA: 5
POC BLOOD, URINE: NEGATIVE
POC NITRATES, URINE: NEGATIVE
PROT UR QL STRIP: NEGATIVE
SP GR UR STRIP: 1.01 (ref 1–1.03)
UROBILINOGEN UR STRIP-ACNC: 0.2 (ref 0.3–2.2)

## 2024-08-20 PROCEDURE — 52000 CYSTOURETHROSCOPY: CPT | Mod: S$GLB,,, | Performed by: UROLOGY

## 2024-08-20 PROCEDURE — 87086 URINE CULTURE/COLONY COUNT: CPT | Performed by: UROLOGY

## 2024-08-20 PROCEDURE — 81003 URINALYSIS AUTO W/O SCOPE: CPT | Mod: QW,S$GLB,, | Performed by: UROLOGY

## 2024-08-20 PROCEDURE — 88112 CYTOPATH CELL ENHANCE TECH: CPT | Performed by: PATHOLOGY

## 2024-08-20 RX ORDER — CIPROFLOXACIN 500 MG/1
500 TABLET ORAL
Status: COMPLETED | OUTPATIENT
Start: 2024-08-20 | End: 2024-08-20

## 2024-08-20 RX ORDER — CIPROFLOXACIN 2 MG/ML
400 INJECTION, SOLUTION INTRAVENOUS
OUTPATIENT
Start: 2024-08-20

## 2024-08-20 RX ORDER — LIDOCAINE HYDROCHLORIDE 20 MG/ML
JELLY TOPICAL
Status: COMPLETED | OUTPATIENT
Start: 2024-08-20 | End: 2024-08-20

## 2024-08-20 RX ADMIN — LIDOCAINE HYDROCHLORIDE 10 ML: 20 JELLY TOPICAL at 11:08

## 2024-08-20 RX ADMIN — CIPROFLOXACIN 500 MG: 500 TABLET ORAL at 11:08

## 2024-08-20 NOTE — PROGRESS NOTES
.Patient came in for a cystoscopy, Ciprofloxacin 500 mg tab to be administered orally to help prevent infection. Confirmed patient's allergies, Ciprofloxacin 500 mg tab administered as ordered. Pt tolerated medication administration well. Patient agreed to wait 15 minutes after medication administration in the clinic and to report any adverse reactions.        Ryan Payne RN

## 2024-08-20 NOTE — H&P
CC:bladder cancer    History of Present Illness:   Nithin Pino is a 84 y.o. male here for follow up.     8/20/24-Here for cysto. No gross hematuria.   5/21/24-Completed BCG induction. Here for cysto.   2/20/24-Path showing HG Ta urothelial cell carcinoma in all regions of tumor. Upon review with the pt, he only previously received 3 BCG treatments, but didn't get a full 6 treatment induction. No gross hematuria at this point. He was having some incontinence for a few days following TURBT. Now it has resolved.    1/17/24-Pt now cleared for TURBT by cardiology. No gross hematuria. No dysuria. No stranguria. Pt states that the BCG treatment are painful and asks about the natural history of bladder cancer, should he elect against treatment.   9/26/23: pt here for surveillance cysto.   6/23/23-Pt s/p TURBT. 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 No gross hematuria or dysuria. No weakness or fever. States that he is urinating well.   5/15/23- pt here for cysto. CT scan personally reviewed, showing a polypoid tumor at the left bladder wall, enhancing.   4/10/23-81yo male, here today for follow up, last seen in 2021. At that time, here was being treated for BPH with finasteride and tamsulosin and OAB with vesicare. He did have gross hematuria, but refused cystoscopy. He reports that he was seen in the ED at [facility] yesterday with dysuria and gross hematuria. Was prescribed levaquin. States that he hasn't started the levaquin yet. Saw a little blood this am. No fever. Stream is strong. Still on finasteride, vesicare and flomax. No difficulty emptying--states it was checked in the ED yesterday and he emptied completely.     Pt's records from [facility] with Dr. Sanchez reviewed from 12/22/22, noting cytology suspicious for high grade bladder cancer.       Review of Systems   Constitutional:  Negative for chills and fever.   Respiratory:  Negative for shortness of breath.    Cardiovascular:   Negative for chest pain.   Gastrointestinal:  Negative for abdominal pain.   Genitourinary:  Negative for flank pain.   Musculoskeletal:  Positive for arthralgias. Negative for back pain.   All other systems reviewed and are negative.        Past Medical History:   Diagnosis Date    Abnormal brain MRI 01/21/2018    Chronic microvascular ischemia changes per MRI July 9, 2007 in [facility]    Abnormal ECG 10/31/2013    Abnormal stress test 01/28/2016    Alcohol dependence     States alcohol abuse ended in 1994    AP (angina pectoris) 01/28/2016    Asthma     Bladder cancer     Carotid artery occlusion     Cataract     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    2 - Normal left ventricular systolic function (EF 60-65%).    3 - Normal left ventricular diastolic function.    4 - Mild left atrial enlargement.    5 - Normal right ventricular systolic function .    6 - Trivial to mild aortic regurgitation.    7 - Trivial to mild mitral regurgitation.  10 - Trivial to mild pulmonic regurgitation.     Hematuria 06/25/2020    History of alcohol abuse 01/22/2018    Patient denies drinking to excess since 1994.    History of atherectomy 01/21/2018 2/2016 [facility]    History of chronic kidney disease 01/22/2018    History of CKD 3    History of PTCA 10/31/2013    History of PTCA 03/09/2016    Hyperlipidemia     Hypertension     Insomnia 06/17/2013    Iron deficiency anemia 10/19/2023    Ischemic cardiomyopathy 10/31/2013    Long term (current) use of antithrombotics/antiplatelets 01/21/2018     Malignant neoplasm of overlapping sites of bladder 2023    Myocardial infarction     Old MI (myocardial infarction)     Peripheral vascular disease     Polyneuropathy     RBBB 10/31/2013    S/P CABG (coronary artery bypass graft) 10/31/2013    Shortness of breath 10/31/2013    Tobacco dependence     resolved    Trouble in sleeping     Type 2 diabetes with peripheral circulatory disorder, controlled     Wears glasses        Past Surgical History:   Procedure Laterality Date    APPENDECTOMY      CARDIAC CATHETERIZATION      2016    CARDIAC SURGERY  1994    balloon angioplasty    CATARACT EXTRACTION W/  INTRAOCULAR LENS IMPLANT Right 2017    CORONARY ARTERY BYPASS GRAFT  2011    per patient x4    HERNIA REPAIR      OPEN REDUCTION AND INTERNAL FIXATION (ORIF) OF INJURY OF HIP      PCIOL Bilateral OD 17/OS 02/15/17    DR. ALONZO    TURBT (TRANSURETHRAL RESECTION OF BLADDER TUMOR) N/A 2023    Procedure: TURBT (TRANSURETHRAL RESECTION OF BLADDER TUMOR);  Surgeon: Hortencia Michael MD;  Location: [facility] OR;  Service: Urology;  Laterality: N/A;    TURBT (TRANSURETHRAL RESECTION OF BLADDER TUMOR) N/A 2024    Procedure: TURBT (TRANSURETHRAL RESECTION OF BLADDER TUMOR);  Surgeon: Hortencia Michael MD;  Location: [facility] OR;  Service: Urology;  Laterality: N/A;       Family History   Adopted: Yes   Problem Relation Name Age of Onset    Diabetes Brother      Diabetes Sister      Cancer Neg Hx      Heart disease Neg Hx      Kidney disease Neg Hx         Social History     Tobacco Use    Smoking status: Former     Current packs/day: 0.00     Average packs/day: 1.5 packs/day for 40.0 years (60.0 ttl pk-yrs)     Types: Cigarettes     Start date: 1954     Quit date: 1994     Years since quittin.6    Smokeless tobacco: Former     Quit date: 2011    Tobacco comments:     approx date   Substance Use Topics    Alcohol use: Yes     Comment: occasionally; 1-2 cans of beer a month    Drug use:  No       Current Outpatient Medications   Medication Sig Dispense Refill    amLODIPine (NORVASC) 10 MG tablet Take 0.5 tablets (5 mg total) by mouth once daily. 90 tablet 3    aspirin (ECOTRIN) 81 MG EC tablet Take 1 tablet (81 mg total) by mouth once daily. 90 tablet 3    atorvastatin (LIPITOR) 40 MG tablet Take 1 tablet (40 mg total) by mouth every evening. 90 tablet 3    b complex vitamins tablet Take 1 tablet by mouth once daily.      BLOOD-GLUCOSE METER (TRUERESULT BLOOD GLUCOSE SYSTM MISC) by Misc.(Non-Drug; Combo Route) route.      cyanocobalamin (VITAMIN B-12) 1000 MCG tablet Take 100 mcg by mouth once daily.      dulaglutide (TRULICITY) 4.5 mg/0.5 mL pen injector Inject 4.5 mg into the skin every 7 days. SUNDAY      EMBRACE PRO TEST STRIPS Strp CHECK BLOOD SUGAR TWICE DAILY. 50 strip 0    finasteride (PROSCAR) 5 mg tablet TAKE 1 TABLET ONE TIME DAILY 90 tablet 3    furosemide (LASIX) 40 MG tablet TAKE 1 PILL IN AM, AND 1/2 PILL IN PM 60 tablet 11    glucose 4 GM chewable tablet Take 4 tablets (16 g total) by mouth as needed for Low blood sugar. 100 tablet 5    hyoscyamine (LEVSIN/SL) 0.125 mg Subl Place 2 tablets (0.25 mg total) under the tongue every 4 (four) hours as needed (bladder spasms). 30 tablet 1    insulin (LANTUS SOLOSTAR U-100 INSULIN) glargine 100 units/mL SubQ pen Inject into the skin. 15 units QHS and 18 units QAM      insulin aspart U-100 (NOVOLOG) 100 unit/mL injection Inject 3 Units into the skin every 4 (four) hours as needed (for high blood sugar or carbohydrate intake).      isosorbide mononitrate (IMDUR) 60 MG 24 hr tablet Take 1 tablet (60 mg total) by mouth once daily. 90 tablet 3    LIDOcaine (LIDODERM) 5 % APPLY 1 PATCH TOPICALLY EVERY DAY FOR PAIN  WEAR FOR 12 HOURS, THEN REMOVE. DO NOT APPLY NEW PATCH FOR AT LEAST 12 HOURS.      losartan (COZAAR) 100 MG tablet Take 1 tablet (100 mg total) by mouth once daily. 90 tablet 3    metoprolol succinate (TOPROL-XL) 25 MG 24 hr tablet Take  1 tablet (25 mg total) by mouth every evening. 90 tablet 3    miconazole (MICOTIN) 2 % cream APPLY SMALL AMOUNT TOPICALLY TWICE A DAY AS NEEDED FOR FUNGAL INFECTION      multivit-minerals/folic acid (DIABETIC MULTIVITAMIN ORAL) Take by mouth.      nitroGLYCERIN (NITROSTAT) 0.4 MG SL tablet 0.4 mg.      tamsulosin (FLOMAX) 0.4 mg Cap Take 1 capsule (0.4 mg total) by mouth once daily. 90 capsule 3    TRUEPLUS LANCETS 26 gauge Misc CHECK BLOOD SUGAR TWICE DAILY. 100 each 0    TRUERESULT BLOOD GLUCOSE SYSTM kit CHECK BLOOD SUGAR TWICE DAILY. 1 each 0    vitamin D (VITAMIN D3) 1000 units Tab Take 1 tablet (1,000 Units total) by mouth once daily. 90 tablet 3     No current facility-administered medications for this visit.       Review of patient's allergies indicates:   Allergen Reactions    Pseudoephedrine-guaifenesin Shortness Of Breath    Panmist dm  [pseudoephedrine-dm-guaifenesin]      Other reaction(s): Unknown       Physical Exam  Vitals:    08/20/24 1120   BP: 130/66   Pulse: 94   Resp: 18   Temp: 97.5 °F (36.4 °C)         General: Well-developed, well-nourished in no acute distress  HEENT: Normocephalic, atraumatic, Extraocular movements intact  Neck: supple, trachea midline, no cervical or supraclavicular lymphadenopathy  Respirations: even and unlabored  Extremities: atraumatic, moves all equally, no clubbing, cyanosis or edema  Psych: normal affect  Skin: warm and dry, no lesions  Neuro: Alert and oriented, Cranial nerves II-XII grossly intact    Urinalysis  Negative for blood, LE, nit      Lab Results   Component Value Date    PSA 0.37 12/22/2022    PSA 0.29 09/16/2020    PSA 0.82 06/09/2015     Procedure: Cystoscopy      Procedure in detail:   Informed consent was obtained. The patient's genitalia was prepped and draped in the usual sterile fashion. Lidocaine jelly was administered per urethra. I utilized the flexible cystoscope and advanced it into the urethral meatus. No urethral strictures were noted.  Bladder access was obtained. Systematic review of the bladder was performed, noting a small 1cm papillary tumor at the dome. Bilateral ureteral orifices were visualized and noted to be effluxing clear urine. There was a region of erythema and edema lateral to the left UO. Retroflexion was utilized, noting no significant median lobe. Barbotage cytology was obtained. The scope was slowly backed out of the urethra, and the prostate was noted to be mildly obstructing. No urethral lesions were identified. The patient tolerated the procedure well. Estimated blood loss was 0cc.         Assessment:   1. Malignant neoplasm of trigone of urinary bladder  POCT Urinalysis, Dipstick, Automated, W/O Scope    Cytology, Urine    CULTURE, URINE    CANCELED: POCT Urinalysis, Dipstick, Automated, W/O Scope      2. Malignant neoplasm of overlapping sites of bladder  Place in Outpatient    Case Request Operating Room: TURBT (TRANSURETHRAL RESECTION OF BLADDER TUMOR)    Diet NPO    Place sequential compression device    Vital Signs       3. Malignant neoplasm of bladder        4. Pre-op testing  CBC Auto Differential    Comprehensive Metabolic Panel    X-Ray Chest PA And Lateral    EKG 12-lead    Ambulatory referral/consult to Pre-Admit      5. Pre-op evaluation  CBC Auto Differential    Comprehensive Metabolic Panel    X-Ray Chest PA And Lateral    EKG 12-lead    Ambulatory referral/consult to Pre-Admit              Plan:  Malignant neoplasm of trigone of urinary bladder  -     POCT Urinalysis, Dipstick, Automated, W/O Scope  -     Cancel: POCT Urinalysis, Dipstick, Automated, W/O Scope  -     Cytology, Urine  -     CULTURE, URINE    Malignant neoplasm of overlapping sites of bladder  -     Place in Outpatient; Standing  -     Case Request Operating Room: TURBT (TRANSURETHRAL RESECTION OF BLADDER TUMOR)  -     Diet NPO; Standing  -     Place sequential compression device; Standing  -     Vital Signs ; Standing    Malignant neoplasm of  bladder    Pre-op testing  -     CBC Auto Differential; Future; Expected date: 08/20/2024  -     Comprehensive Metabolic Panel; Future; Expected date: 08/20/2024  -     X-Ray Chest PA And Lateral; Future; Expected date: 08/20/2024  -     EKG 12-lead; Future  -     Ambulatory referral/consult to Pre-Admit; Future; Expected date: 08/27/2024    Pre-op evaluation  -     CBC Auto Differential; Future; Expected date: 08/20/2024  -     Comprehensive Metabolic Panel; Future; Expected date: 08/20/2024  -     X-Ray Chest PA And Lateral; Future; Expected date: 08/20/2024  -     EKG 12-lead; Future  -     Ambulatory referral/consult to Pre-Admit; Future; Expected date: 08/27/2024    Other orders  -     ciprofloxacin HCl tablet 500 mg  -     LIDOcaine HCl 2% urojet  -     IP VTE HIGH RISK PATIENT; Standing  -     ciprofloxacin (CIPRO)400mg/200ml D5W IVPB 400 mg    Discussed TURBT and pt in agreement. Will schedule next week 8/29/24.

## 2024-08-20 NOTE — H&P (VIEW-ONLY)
CC:bladder cancer    History of Present Illness:   Nithin Pino is a 84 y.o. male here for follow up.     8/20/24-Here for cysto. No gross hematuria.   5/21/24-Completed BCG induction. Here for cysto.   2/20/24-Path showing HG Ta urothelial cell carcinoma in all regions of tumor. Upon review with the pt, he only previously received 3 BCG treatments, but didn't get a full 6 treatment induction. No gross hematuria at this point. He was having some incontinence for a few days following TURBT. Now it has resolved.    1/17/24-Pt now cleared for TURBT by cardiology. No gross hematuria. No dysuria. No stranguria. Pt states that the BCG treatment are painful and asks about the natural history of bladder cancer, should he elect against treatment.   9/26/23: pt here for surveillance cysto.   6/23/23-Pt s/p TURBT. 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 No gross hematuria or dysuria. No weakness or fever. States that he is urinating well.   5/15/23- pt here for cysto. CT scan personally reviewed, showing a polypoid tumor at the left bladder wall, enhancing.   4/10/23-83yo male, here today for follow up, last seen in 2021. At that time, here was being treated for BPH with finasteride and tamsulosin and OAB with vesicare. He did have gross hematuria, but refused cystoscopy. He reports that he was seen in the ED at [facility] yesterday with dysuria and gross hematuria. Was prescribed levaquin. States that he hasn't started the levaquin yet. Saw a little blood this am. No fever. Stream is strong. Still on finasteride, vesicare and flomax. No difficulty emptying--states it was checked in the ED yesterday and he emptied completely.     Pt's records from [facility] with Dr. Sanchez reviewed from 12/22/22, noting cytology suspicious for high grade bladder cancer.       Review of Systems   Constitutional:  Negative for chills and fever.   Respiratory:  Negative for shortness of breath.    Cardiovascular:   Negative for chest pain.   Gastrointestinal:  Negative for abdominal pain.   Genitourinary:  Negative for flank pain.   Musculoskeletal:  Positive for arthralgias. Negative for back pain.   All other systems reviewed and are negative.        Past Medical History:   Diagnosis Date    Abnormal brain MRI 01/21/2018    Chronic microvascular ischemia changes per MRI July 9, 2007 in [facility]    Abnormal ECG 10/31/2013    Abnormal stress test 01/28/2016    Alcohol dependence     States alcohol abuse ended in 1994    AP (angina pectoris) 01/28/2016    Asthma     Bladder cancer     Carotid artery occlusion     Cataract     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    2 - Normal left ventricular systolic function (EF 60-65%).    3 - Normal left ventricular diastolic function.    4 - Mild left atrial enlargement.    5 - Normal right ventricular systolic function .    6 - Trivial to mild aortic regurgitation.    7 - Trivial to mild mitral regurgitation.  10 - Trivial to mild pulmonic regurgitation.     Hematuria 06/25/2020    History of alcohol abuse 01/22/2018    Patient denies drinking to excess since 1994.    History of atherectomy 01/21/2018 2/2016 [facility]    History of chronic kidney disease 01/22/2018    History of CKD 3    History of PTCA 10/31/2013    History of PTCA 03/09/2016    Hyperlipidemia     Hypertension     Insomnia 06/17/2013    Iron deficiency anemia 10/19/2023    Ischemic cardiomyopathy 10/31/2013    Long term (current) use of antithrombotics/antiplatelets 01/21/2018     Malignant neoplasm of overlapping sites of bladder 2023    Myocardial infarction     Old MI (myocardial infarction)     Peripheral vascular disease     Polyneuropathy     RBBB 10/31/2013    S/P CABG (coronary artery bypass graft) 10/31/2013    Shortness of breath 10/31/2013    Tobacco dependence     resolved    Trouble in sleeping     Type 2 diabetes with peripheral circulatory disorder, controlled     Wears glasses        Past Surgical History:   Procedure Laterality Date    APPENDECTOMY      CARDIAC CATHETERIZATION      2016    CARDIAC SURGERY  1994    balloon angioplasty    CATARACT EXTRACTION W/  INTRAOCULAR LENS IMPLANT Right 2017    CORONARY ARTERY BYPASS GRAFT  2011    per patient x4    HERNIA REPAIR      OPEN REDUCTION AND INTERNAL FIXATION (ORIF) OF INJURY OF HIP      PCIOL Bilateral OD 17/OS 02/15/17    DR. ALONZO    TURBT (TRANSURETHRAL RESECTION OF BLADDER TUMOR) N/A 2023    Procedure: TURBT (TRANSURETHRAL RESECTION OF BLADDER TUMOR);  Surgeon: Hortencia Michael MD;  Location: [facility] OR;  Service: Urology;  Laterality: N/A;    TURBT (TRANSURETHRAL RESECTION OF BLADDER TUMOR) N/A 2024    Procedure: TURBT (TRANSURETHRAL RESECTION OF BLADDER TUMOR);  Surgeon: Hortencia Michael MD;  Location: [facility] OR;  Service: Urology;  Laterality: N/A;       Family History   Adopted: Yes   Problem Relation Name Age of Onset    Diabetes Brother      Diabetes Sister      Cancer Neg Hx      Heart disease Neg Hx      Kidney disease Neg Hx         Social History     Tobacco Use    Smoking status: Former     Current packs/day: 0.00     Average packs/day: 1.5 packs/day for 40.0 years (60.0 ttl pk-yrs)     Types: Cigarettes     Start date: 1954     Quit date: 1994     Years since quittin.6    Smokeless tobacco: Former     Quit date: 2011    Tobacco comments:     approx date   Substance Use Topics    Alcohol use: Yes     Comment: occasionally; 1-2 cans of beer a month    Drug use:  No       Current Outpatient Medications   Medication Sig Dispense Refill    amLODIPine (NORVASC) 10 MG tablet Take 0.5 tablets (5 mg total) by mouth once daily. 90 tablet 3    aspirin (ECOTRIN) 81 MG EC tablet Take 1 tablet (81 mg total) by mouth once daily. 90 tablet 3    atorvastatin (LIPITOR) 40 MG tablet Take 1 tablet (40 mg total) by mouth every evening. 90 tablet 3    b complex vitamins tablet Take 1 tablet by mouth once daily.      BLOOD-GLUCOSE METER (TRUERESULT BLOOD GLUCOSE SYSTM MISC) by Misc.(Non-Drug; Combo Route) route.      cyanocobalamin (VITAMIN B-12) 1000 MCG tablet Take 100 mcg by mouth once daily.      dulaglutide (TRULICITY) 4.5 mg/0.5 mL pen injector Inject 4.5 mg into the skin every 7 days. SUNDAY      EMBRACE PRO TEST STRIPS Strp CHECK BLOOD SUGAR TWICE DAILY. 50 strip 0    finasteride (PROSCAR) 5 mg tablet TAKE 1 TABLET ONE TIME DAILY 90 tablet 3    furosemide (LASIX) 40 MG tablet TAKE 1 PILL IN AM, AND 1/2 PILL IN PM 60 tablet 11    glucose 4 GM chewable tablet Take 4 tablets (16 g total) by mouth as needed for Low blood sugar. 100 tablet 5    hyoscyamine (LEVSIN/SL) 0.125 mg Subl Place 2 tablets (0.25 mg total) under the tongue every 4 (four) hours as needed (bladder spasms). 30 tablet 1    insulin (LANTUS SOLOSTAR U-100 INSULIN) glargine 100 units/mL SubQ pen Inject into the skin. 15 units QHS and 18 units QAM      insulin aspart U-100 (NOVOLOG) 100 unit/mL injection Inject 3 Units into the skin every 4 (four) hours as needed (for high blood sugar or carbohydrate intake).      isosorbide mononitrate (IMDUR) 60 MG 24 hr tablet Take 1 tablet (60 mg total) by mouth once daily. 90 tablet 3    LIDOcaine (LIDODERM) 5 % APPLY 1 PATCH TOPICALLY EVERY DAY FOR PAIN  WEAR FOR 12 HOURS, THEN REMOVE. DO NOT APPLY NEW PATCH FOR AT LEAST 12 HOURS.      losartan (COZAAR) 100 MG tablet Take 1 tablet (100 mg total) by mouth once daily. 90 tablet 3    metoprolol succinate (TOPROL-XL) 25 MG 24 hr tablet Take  1 tablet (25 mg total) by mouth every evening. 90 tablet 3    miconazole (MICOTIN) 2 % cream APPLY SMALL AMOUNT TOPICALLY TWICE A DAY AS NEEDED FOR FUNGAL INFECTION      multivit-minerals/folic acid (DIABETIC MULTIVITAMIN ORAL) Take by mouth.      nitroGLYCERIN (NITROSTAT) 0.4 MG SL tablet 0.4 mg.      tamsulosin (FLOMAX) 0.4 mg Cap Take 1 capsule (0.4 mg total) by mouth once daily. 90 capsule 3    TRUEPLUS LANCETS 26 gauge Misc CHECK BLOOD SUGAR TWICE DAILY. 100 each 0    TRUERESULT BLOOD GLUCOSE SYSTM kit CHECK BLOOD SUGAR TWICE DAILY. 1 each 0    vitamin D (VITAMIN D3) 1000 units Tab Take 1 tablet (1,000 Units total) by mouth once daily. 90 tablet 3     No current facility-administered medications for this visit.       Review of patient's allergies indicates:   Allergen Reactions    Pseudoephedrine-guaifenesin Shortness Of Breath    Panmist dm  [pseudoephedrine-dm-guaifenesin]      Other reaction(s): Unknown       Physical Exam  Vitals:    08/20/24 1120   BP: 130/66   Pulse: 94   Resp: 18   Temp: 97.5 °F (36.4 °C)         General: Well-developed, well-nourished in no acute distress  HEENT: Normocephalic, atraumatic, Extraocular movements intact  Neck: supple, trachea midline, no cervical or supraclavicular lymphadenopathy  Respirations: even and unlabored  Extremities: atraumatic, moves all equally, no clubbing, cyanosis or edema  Psych: normal affect  Skin: warm and dry, no lesions  Neuro: Alert and oriented, Cranial nerves II-XII grossly intact    Urinalysis  Negative for blood, LE, nit      Lab Results   Component Value Date    PSA 0.37 12/22/2022    PSA 0.29 09/16/2020    PSA 0.82 06/09/2015     Procedure: Cystoscopy      Procedure in detail:   Informed consent was obtained. The patient's genitalia was prepped and draped in the usual sterile fashion. Lidocaine jelly was administered per urethra. I utilized the flexible cystoscope and advanced it into the urethral meatus. No urethral strictures were noted.  Bladder access was obtained. Systematic review of the bladder was performed, noting a small 1cm papillary tumor at the dome. Bilateral ureteral orifices were visualized and noted to be effluxing clear urine. There was a region of erythema and edema lateral to the left UO. Retroflexion was utilized, noting no significant median lobe. Barbotage cytology was obtained. The scope was slowly backed out of the urethra, and the prostate was noted to be mildly obstructing. No urethral lesions were identified. The patient tolerated the procedure well. Estimated blood loss was 0cc.         Assessment:   1. Malignant neoplasm of trigone of urinary bladder  POCT Urinalysis, Dipstick, Automated, W/O Scope    Cytology, Urine    CULTURE, URINE    CANCELED: POCT Urinalysis, Dipstick, Automated, W/O Scope      2. Malignant neoplasm of overlapping sites of bladder  Place in Outpatient    Case Request Operating Room: TURBT (TRANSURETHRAL RESECTION OF BLADDER TUMOR)    Diet NPO    Place sequential compression device    Vital Signs       3. Malignant neoplasm of bladder        4. Pre-op testing  CBC Auto Differential    Comprehensive Metabolic Panel    X-Ray Chest PA And Lateral    EKG 12-lead    Ambulatory referral/consult to Pre-Admit      5. Pre-op evaluation  CBC Auto Differential    Comprehensive Metabolic Panel    X-Ray Chest PA And Lateral    EKG 12-lead    Ambulatory referral/consult to Pre-Admit              Plan:  Malignant neoplasm of trigone of urinary bladder  -     POCT Urinalysis, Dipstick, Automated, W/O Scope  -     Cancel: POCT Urinalysis, Dipstick, Automated, W/O Scope  -     Cytology, Urine  -     CULTURE, URINE    Malignant neoplasm of overlapping sites of bladder  -     Place in Outpatient; Standing  -     Case Request Operating Room: TURBT (TRANSURETHRAL RESECTION OF BLADDER TUMOR)  -     Diet NPO; Standing  -     Place sequential compression device; Standing  -     Vital Signs ; Standing    Malignant neoplasm of  bladder    Pre-op testing  -     CBC Auto Differential; Future; Expected date: 08/20/2024  -     Comprehensive Metabolic Panel; Future; Expected date: 08/20/2024  -     X-Ray Chest PA And Lateral; Future; Expected date: 08/20/2024  -     EKG 12-lead; Future  -     Ambulatory referral/consult to Pre-Admit; Future; Expected date: 08/27/2024    Pre-op evaluation  -     CBC Auto Differential; Future; Expected date: 08/20/2024  -     Comprehensive Metabolic Panel; Future; Expected date: 08/20/2024  -     X-Ray Chest PA And Lateral; Future; Expected date: 08/20/2024  -     EKG 12-lead; Future  -     Ambulatory referral/consult to Pre-Admit; Future; Expected date: 08/27/2024    Other orders  -     ciprofloxacin HCl tablet 500 mg  -     LIDOcaine HCl 2% urojet  -     IP VTE HIGH RISK PATIENT; Standing  -     ciprofloxacin (CIPRO)400mg/200ml D5W IVPB 400 mg    Discussed TURBT and pt in agreement. Will schedule next week 8/29/24.

## 2024-08-21 ENCOUNTER — HOSPITAL ENCOUNTER (OUTPATIENT)
Dept: CARDIOLOGY | Facility: HOSPITAL | Age: 84
Discharge: HOME | End: 2024-08-21
Attending: UROLOGY
Payer: MEDICARE

## 2024-08-21 ENCOUNTER — E-CONSULT (OUTPATIENT)
Dept: CARDIOLOGY | Facility: HOSPITAL | Age: 84
End: 2024-08-21
Payer: MEDICARE

## 2024-08-21 ENCOUNTER — HOSPITAL ENCOUNTER (OUTPATIENT)
Dept: RADIOLOGY | Facility: HOSPITAL | Age: 84
Discharge: HOME | End: 2024-08-21
Attending: UROLOGY
Payer: MEDICARE

## 2024-08-21 DIAGNOSIS — Z01.818 PRE-OP EVALUATION: ICD-10-CM

## 2024-08-21 DIAGNOSIS — Z01.818 PRE-OP TESTING: ICD-10-CM

## 2024-08-21 DIAGNOSIS — Z01.810 PREOP CARDIOVASCULAR EXAM: ICD-10-CM

## 2024-08-21 DIAGNOSIS — I25.708 CORONARY ARTERY DISEASE OF BYPASS GRAFT OF NATIVE HEART WITH STABLE ANGINA PECTORIS: Primary | ICD-10-CM

## 2024-08-21 LAB
OHS QRS DURATION: 138 MS
OHS QTC CALCULATION: 463 MS

## 2024-08-21 PROCEDURE — 71046 X-RAY EXAM CHEST 2 VIEWS: CPT | Mod: TC,HCNC,PN

## 2024-08-21 PROCEDURE — 93005 ELECTROCARDIOGRAM TRACING: CPT | Mod: HCNC,PO

## 2024-08-21 PROCEDURE — 71046 X-RAY EXAM CHEST 2 VIEWS: CPT | Mod: 26,HCNC,, | Performed by: RADIOLOGY

## 2024-08-21 NOTE — CONSULTS
[facility] CARDIOLOGY  Response for E-Consult     Patient Name: Nithin Pino  MRN: [id number]  Primary Care Provider: MUKUL Garg MD   Requesting Provider: Hortencia Michael MD  E-Consult to Cardiology  Consult performed by: Deniz Sadler MD  Consult ordered by: Hortencia Michael MD  Reason for consult: Preop CV assessment          Recommendation: Pt may proceed with urological surgery at moderate CV risk.    Contingency that warrants a repeat eConsult or referral: worsening CV status/symptoms.    Total time of Consultation: 5 minute    I did not speak to the requesting provider verbally about this.     *This eConsult is based on the clinical data available to me and is furnished without benefit of a physical examination. The eConsult will need to be interpreted in light of any clinical issues or changes in patient status not available to me at the time of filing this eConsults. Significant changes in patient condition or level of acuity should result in immediate formal consultation and reevaluation. Please alert me if you have further questions.    Thank you for this eConsult referral.     Deniz Sadler MD  [facility] CARDIOLOGY

## 2024-08-22 ENCOUNTER — OFFICE VISIT (OUTPATIENT)
Dept: INTERNAL MEDICINE | Facility: CLINIC | Age: 84
End: 2024-08-22
Payer: MEDICARE

## 2024-08-22 VITALS
SYSTOLIC BLOOD PRESSURE: 136 MMHG | DIASTOLIC BLOOD PRESSURE: 69 MMHG | OXYGEN SATURATION: 97 % | RESPIRATION RATE: 18 BRPM | TEMPERATURE: 98 F | HEART RATE: 71 BPM

## 2024-08-22 DIAGNOSIS — Z79.4 TYPE 2 DIABETES MELLITUS WITH HYPERGLYCEMIA, WITH LONG-TERM CURRENT USE OF INSULIN: ICD-10-CM

## 2024-08-22 DIAGNOSIS — N40.1 BENIGN PROSTATIC HYPERPLASIA WITH WEAK URINARY STREAM: Chronic | ICD-10-CM

## 2024-08-22 DIAGNOSIS — I50.42 CHRONIC COMBINED SYSTOLIC AND DIASTOLIC CONGESTIVE HEART FAILURE: Chronic | ICD-10-CM

## 2024-08-22 DIAGNOSIS — Z01.818 PRE-OP EVALUATION: ICD-10-CM

## 2024-08-22 DIAGNOSIS — Z01.818 PRE-OP TESTING: ICD-10-CM

## 2024-08-22 DIAGNOSIS — E11.65 TYPE 2 DIABETES MELLITUS WITH HYPERGLYCEMIA, WITH LONG-TERM CURRENT USE OF INSULIN: ICD-10-CM

## 2024-08-22 DIAGNOSIS — C67.8 MALIGNANT NEOPLASM OF OVERLAPPING SITES OF BLADDER: Primary | Chronic | ICD-10-CM

## 2024-08-22 DIAGNOSIS — J43.1 PANLOBULAR EMPHYSEMA: Chronic | ICD-10-CM

## 2024-08-22 DIAGNOSIS — I25.5 ISCHEMIC CARDIOMYOPATHY: Chronic | ICD-10-CM

## 2024-08-22 DIAGNOSIS — H90.3 BILATERAL SENSORINEURAL HEARING LOSS: ICD-10-CM

## 2024-08-22 DIAGNOSIS — R39.12 BENIGN PROSTATIC HYPERPLASIA WITH WEAK URINARY STREAM: Chronic | ICD-10-CM

## 2024-08-22 LAB
BACTERIA UR CULT: NO GROWTH
FINAL PATHOLOGIC DIAGNOSIS: ABNORMAL
Lab: ABNORMAL

## 2024-08-22 PROCEDURE — 99999 PR PBB SHADOW E&M-EST. PATIENT-LVL IV: CPT | Mod: PBBFAC,HCNC,,

## 2024-08-22 NOTE — ASSESSMENT & PLAN NOTE
Chronic, managed with Lantus, Trulicity, Novolog.   HGA1c: 8.6 (5/13/24)    --See medication recommendations as above

## 2024-08-22 NOTE — ASSESSMENT & PLAN NOTE
Planned for TURBT (TRANSURETHRAL RESECTION OF BLADDER TUMOR) with Hortencia Michael MD on 8/29/24.     Known risk factors for perioperative complications: Anemia  Congestive heart failure    Difficulty with intubation is not anticipated.    Cardiac Risk Estimation: Based on the Revised Cardiac Risk index, patient is a Class 3 risk with a 10.1% risk of a major cardiac event in a low risk procedure.    1.) Preoperative workup as follows: ECG, hemoglobin, hematocrit, electrolytes, creatinine, glucose, liver function studies.  2.) Change in medication regimen before surgery: discontinue ASA, NSAIDs 7 days before surgery, hold Metformin 24 hours prior to surgery.  3.) Prophylaxis for cardiac events with perioperative beta-blockers: Continue BB as prescribed.  4.) Invasive hemodynamic monitoring perioperatively: at the discretion of anesthesiologist.  5.) Deep vein thrombosis prophylaxis postoperatively: intermittent pneumatic compression boots and regimen to be chosen by surgical team.  6.) Surveillance for postoperative MI with ECG immediately postoperatively and on postoperati ve days 1 and 2 AND troponin levels 24 hours postoperatively and on day 4 or hospital discharge (whichever comes first): not indicated.  7.) Current medications which may produce withdrawal symptoms if withheld perioperatively: None  8.) Other measures: Careful attention to perioperative glycemic control (POCT Glucose in Pre-Op).  Postoperative hypertension management with IV hydralazine until able to take oral medications.     --Morning of Procedure medications: amlodipine, Imdur, Flomax  --Hold all other medications morning of surgery   --Resume all medications post-operatively  --Hold ASA, NSAIDs and all vitamins/supplements 7 days prior to procedure  --Hold oral diabetes medications morning of surgery   --Hold Trulicity 7 days before surgery, takes on Saturday (hold 8/24)  --Lantus HS- reduce dose to 4U, Hold Novolog AM of surgery

## 2024-08-22 NOTE — DISCHARGE INSTRUCTIONS
Pre op instructions reviewed with patient face to face during Clinic Visit with Provider, verbalized understanding.    To confirm, Surgery is scheduled on 8/29/24. We will call you after 2pm the day before Surgery with your arrival time.    *Please report to the [facility] (1st Floor) located off of [facility] (2nd Entrance/Building on the left, in front of the flag pole).  Address: [address] [place] [address] [place] [address]      !!!INSTRUCTIONS IMPORTANT!!!  DO NOT Eat, Drink, or Smoke after 12 midnight unless instructed otherwise by your Surgeon. OK to brush teeth, no gum, candy or mints!    MORNING OF SURGERY, drink small sip of water with the following medications instructed by Surgery Pre-Admit Provider:  Amlodipine  Isosorbide  Tamsulosin     *Additional Medication Instructions: HOLD TRULICITY INJECTION 7 DAYS BEFORE SURGERY! LANTUS 4 UNITS NIGHT BEFORE SURGERY! HOLD NOVOLOG MORNING OF SURGERY!    Diabetic/Prediabetic Patients: If you take diabetic or weight loss medication, Do NOT take morning of surgery unless instructed by Doctor. Metformin to be stopped 24 hrs prior to surgery. Ozempic/ Mounjaro/ Wegovy/ Trulicity/ Semaglutide or any weight loss injections to be stopped 7 days prior to surgery. Blood sugars will be checked in pre-op by Nurse.    !!!STOP ALL Aspirins, NSAIDS, WEIGHT LOSS INJECTIONS/PILLS, Herbal supplements, & Vitamins 7 DAYS BEFORE SURGERY!!!    ____  Avoid Alcoholic beverages 3 days prior to surgery, as it can thin the blood.  ____  NO Acrylic/fake nails or nail polish worn day of surgery (specifically hand/arm & foot surgeries).  ____  NO powder, lotions, deodorants, oils or cream on body.  ____  Remove all jewelry, piercings, & foreign objects prior to arrival and leave at home.  ____  Remove Dentures, Hearing Aids & Contact Lens prior to surgery.  ____  Bring photo ID and insurance information to hospital (Leave Valuables at Home).  ____  If going home the same day,  arrange for a ride home. You will not be able to drive for 24 hrs if Anesthesia was used.   ____  Males: Stop ED medications (Viagra, Cialis) 24 hrs prior to surgery.  ____  Wear clean, loose fitting clothing to allow for dressings/ bandages.      Bathing Instructions:    -Shower with anti-bacterial Soap (Hibiclens or Dial) the night before surgery & the morning of surgery!   -Do not use Hibiclens soap on your face or genitals.   -Apply clean clothes after shower.  -Do not shave your face or body 2 days prior to surgery unless instructed otherwise by your Surgeon.  -Do not shave pubic hair 7 days prior to surgery (gyn pt's).    [facility] Visitor/Ride Policy:  Only 2 adults allowed in pre op/recovery area during your procedure. You MUST HAVE A RIDE HOME from a responsible adult that you know and trust. Medical Transport, Uber or Lyft can ONLY be used if patient has a responsible adult to accompany them during ride home.       *Signs and symptoms of Infection Before or After Surgery:               !!!If you experience any fever, chills, nausea/ vomiting, foul odor/ excessive drainage from surgical site, flu-like symptoms, new wounds or cuts, PLEASE CALL THE SURGEON OFFICE at [phone] or SEND MESSAGE THROUGH [employer] PORTAL!!!       *If you are running late day of surgery, please call the Surgery Dept @ [phone].    *Billing question, please call:  [phone] or [phone]       Thank you,  -[facility] Pre Admit Dept.  [phone] or [phone]  M-F 7:30 am-4:00 pm (Closed Major Holidays)

## 2024-08-22 NOTE — PRE-PROCEDURE INSTRUCTIONS
Pre op instructions reviewed with patient face to face during Clinic Visit with Provider, verbalized understanding.    To confirm, Surgery is scheduled on 8/29/24. We will call you after 2pm the day before Surgery with your arrival time.    *Please report to the [facility] (1st Floor) located off of [facility] (2nd Entrance/Building on the left, in front of the flag pole).  Address: [address] [place] [address] [place] [address]      !!!INSTRUCTIONS IMPORTANT!!!  DO NOT Eat, Drink, or Smoke after 12 midnight unless instructed otherwise by your Surgeon. OK to brush teeth, no gum, candy or mints!    MORNING OF SURGERY, drink small sip of water with the following medications instructed by Surgery Pre-Admit Provider:  Amlodipine  Isosorbide  Tamsulosin     *Additional Medication Instructions: HOLD TRULICITY INJECTION 7 DAYS BEFORE SURGERY! LANTUS 4 UNITS NIGHT BEFORE SURGERY! HOLD NOVOLOG MORNING OF SURGERY!    Diabetic/Prediabetic Patients: If you take diabetic or weight loss medication, Do NOT take morning of surgery unless instructed by Doctor. Metformin to be stopped 24 hrs prior to surgery. Ozempic/ Mounjaro/ Wegovy/ Trulicity/ Semaglutide or any weight loss injections to be stopped 7 days prior to surgery. Blood sugars will be checked in pre-op by Nurse.    !!!STOP ALL Aspirins, NSAIDS, WEIGHT LOSS INJECTIONS/PILLS, Herbal supplements, & Vitamins 7 DAYS BEFORE SURGERY!!!    ____  Avoid Alcoholic beverages 3 days prior to surgery, as it can thin the blood.  ____  NO Acrylic/fake nails or nail polish worn day of surgery (specifically hand/arm & foot surgeries).  ____  NO powder, lotions, deodorants, oils or cream on body.  ____  Remove all jewelry, piercings, & foreign objects prior to arrival and leave at home.  ____  Remove Dentures, Hearing Aids & Contact Lens prior to surgery.  ____  Bring photo ID and insurance information to hospital (Leave Valuables at Home).  ____  If going home the same day,  arrange for a ride home. You will not be able to drive for 24 hrs if Anesthesia was used.   ____  Males: Stop ED medications (Viagra, Cialis) 24 hrs prior to surgery.  ____  Wear clean, loose fitting clothing to allow for dressings/ bandages.      Bathing Instructions:    -Shower with anti-bacterial Soap (Hibiclens or Dial) the night before surgery & the morning of surgery!   -Do not use Hibiclens soap on your face or genitals.   -Apply clean clothes after shower.  -Do not shave your face or body 2 days prior to surgery unless instructed otherwise by your Surgeon.  -Do not shave pubic hair 7 days prior to surgery (gyn pt's).    [facility] Visitor/Ride Policy:  Only 2 adults allowed in pre op/recovery area during your procedure. You MUST HAVE A RIDE HOME from a responsible adult that you know and trust. Medical Transport, Uber or Lyft can ONLY be used if patient has a responsible adult to accompany them during ride home.       *Signs and symptoms of Infection Before or After Surgery:               !!!If you experience any fever, chills, nausea/ vomiting, foul odor/ excessive drainage from surgical site, flu-like symptoms, new wounds or cuts, PLEASE CALL THE SURGEON OFFICE at [phone] or SEND MESSAGE THROUGH [employer] PORTAL!!!       *If you are running late day of surgery, please call the Surgery Dept @ [phone].    *Billing question, please call:  [phone] or [phone]       Thank you,  -[facility] Pre Admit Dept.  [phone] or [phone]  M-F 7:30 am-4:00 pm (Closed Major Holidays)

## 2024-08-22 NOTE — ASSESSMENT & PLAN NOTE
Followed by cardiology, does have a PRN Nitroglycerin prescription, denies having to use the medication    --See medication recommendations as above

## 2024-08-22 NOTE — PROGRESS NOTES
Preoperative History and Physical                                                              Hospital Medicine                                                                      Chief Complaint: Preoperative evaluation     History of Present Illness:      Nithin Pino is a 84 y.o. male who presents to the office today for a preoperative consultation at the request of Dr. Hortencia Michael who plans on performing TURBT (TRANSURETHRAL RESECTION OF BLADDER TUMOR)  on August 29.     Functional Status:      The patient is able to climb a flight of stairs. The patient is able to ambulate without difficulty. The patient's functional status is not affected by the surgical problem. The patient's functional status is not affected by shortness of breath, chest pain, dyspnea on exertion and fatigue.    MET score greater than 4    Past Medical History:      Past Medical History:   Diagnosis Date    Abnormal brain MRI 01/21/2018    Chronic microvascular ischemia changes per MRI July 9, 2007 in [facility]    Abnormal ECG 10/31/2013    Abnormal stress test 01/28/2016    Alcohol dependence     States alcohol abuse ended in 1994    AP (angina pectoris) 01/28/2016    Asthma     Bladder cancer     Carotid artery occlusion     Cataract     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    2 - Normal left ventricular systolic function (EF 60-65%).    3 - Normal left ventricular diastolic function.    4 - Mild left atrial enlargement.    5 - Normal right  ventricular systolic function .    6 - Trivial to mild aortic regurgitation.    7 - Trivial to mild mitral regurgitation.  10 - Trivial to mild pulmonic regurgitation.     Hematuria 06/25/2020    History of alcohol abuse 01/22/2018    Patient denies drinking to excess since 1994.    History of atherectomy 01/21/2018 2/2016 [facility]    History of chronic kidney disease 01/22/2018    History of CKD 3    History of PTCA 10/31/2013    History of PTCA 03/09/2016    Hyperlipidemia     Hypertension     Insomnia 06/17/2013    Iron deficiency anemia 10/19/2023    Ischemic cardiomyopathy 10/31/2013    Long term (current) use of antithrombotics/antiplatelets 01/21/2018    Malignant neoplasm of overlapping sites of bladder 06/08/2023    Myocardial infarction     Old MI (myocardial infarction) 1994    Peripheral vascular disease     Polyneuropathy     RBBB 10/31/2013    S/P CABG (coronary artery bypass graft) 10/31/2013    Shortness of breath 10/31/2013    Tobacco dependence     resolved    Trouble in sleeping     Type 2 diabetes with peripheral circulatory disorder, controlled     Wears glasses         Past Surgical History:      Past Surgical History:   Procedure Laterality Date    APPENDECTOMY      CARDIAC CATHETERIZATION      2016    CARDIAC SURGERY  1994    balloon angioplasty    CATARACT EXTRACTION W/  INTRAOCULAR LENS IMPLANT Right 02/01/2017    CORONARY ARTERY BYPASS GRAFT  05/2011    per patient x4    HERNIA REPAIR      OPEN REDUCTION AND INTERNAL FIXATION (ORIF) OF INJURY OF HIP      PCIOL Bilateral OD 02/01/17/OS 02/15/17    DR. ALONZO    TURBT (TRANSURETHRAL RESECTION OF BLADDER TUMOR) N/A 6/8/2023    Procedure: TURBT (TRANSURETHRAL RESECTION OF BLADDER TUMOR);  Surgeon: Hortencia Michael MD;  [facility];  Service: Urology;  Laterality: N/A;    TURBT (TRANSURETHRAL RESECTION OF BLADDER TUMOR) N/A 2/8/2024    Procedure: TURBT (TRANSURETHRAL RESECTION OF BLADDER TUMOR);  Surgeon: Hortencia Michael MD;   Location: [facility] OR;  Service: Urology;  Laterality: N/A;        Social History:      Social History     Socioeconomic History    Marital status:     Number of children: 5   Tobacco Use    Smoking status: Former     Current packs/day: 0.00     Average packs/day: 1.5 packs/day for 40.0 years (60.0 ttl pk-yrs)     Types: Cigarettes     Start date: 1954     Quit date: 1994     Years since quittin.6    Smokeless tobacco: Former     Quit date: 2011    Tobacco comments:     approx date   Substance and Sexual Activity    Alcohol use: Not Currently     Comment: occasionally; 1-2 cans of beer a month    Drug use: No    Sexual activity: Never     Birth control/protection: None     Social Determinants of Health     Financial Resource Strain: Low Risk  (2024)    Overall Financial Resource Strain (CARDIA)     Difficulty of Paying Living Expenses: Not very hard   Food Insecurity: No Food Insecurity (2024)    Hunger Vital Sign     Worried About Running Out of Food in the Last Year: Never true     Ran Out of Food in the Last Year: Never true   Transportation Needs: No Transportation Needs (2024)    PRAPARE - Transportation     Lack of Transportation (Medical): No     Lack of Transportation (Non-Medical): No   Physical Activity: Inactive (2024)    Exercise Vital Sign     Days of Exercise per Week: 0 days     Minutes of Exercise per Session: 0 min   Stress: No Stress Concern Present (2024)    [facility] - Occupational Stress Questionnaire     Feeling of Stress : Only a little   Housing Stability: Low Risk  (2024)    Housing Stability Vital Sign     Unable to Pay for Housing in the Last Year: No     Number of Places Lived in the Last Year: 1     Unstable Housing in the Last Year: No        Family History:      Family History   Adopted: Yes   Problem Relation Name Age of Onset    Diabetes Brother      Diabetes Sister      Cancer Neg Hx      Heart disease  Neg Hx      Kidney disease Neg Hx         Allergies:      Review of patient's allergies indicates:   Allergen Reactions    Pseudoephedrine-guaifenesin Shortness Of Breath    Panmist dm  [pseudoephedrine-dm-guaifenesin]      Other reaction(s): Unknown       Medications:      Current Outpatient Medications   Medication Sig    amLODIPine (NORVASC) 10 MG tablet Take 0.5 tablets (5 mg total) by mouth once daily.    atorvastatin (LIPITOR) 40 MG tablet Take 1 tablet (40 mg total) by mouth every evening.    b complex vitamins tablet Take 1 tablet by mouth once daily.    dulaglutide (TRULICITY) 4.5 mg/0.5 mL pen injector Inject 4.5 mg into the skin every 7 days. SUNDAY    finasteride (PROSCAR) 5 mg tablet TAKE 1 TABLET ONE TIME DAILY    furosemide (LASIX) 40 MG tablet TAKE 1 PILL IN AM, AND 1/2 PILL IN PM    hyoscyamine (LEVSIN/SL) 0.125 mg Subl Place 2 tablets (0.25 mg total) under the tongue every 4 (four) hours as needed (bladder spasms).    insulin aspart U-100 (NOVOLOG) 100 unit/mL injection Inject 3 Units into the skin every 4 (four) hours as needed (for high blood sugar or carbohydrate intake).    isosorbide mononitrate (IMDUR) 60 MG 24 hr tablet Take 1 tablet (60 mg total) by mouth once daily.    losartan (COZAAR) 100 MG tablet Take 1 tablet (100 mg total) by mouth once daily.    metoprolol succinate (TOPROL-XL) 25 MG 24 hr tablet Take 1 tablet (25 mg total) by mouth every evening.    nitroGLYCERIN (NITROSTAT) 0.4 MG SL tablet 0.4 mg.    tamsulosin (FLOMAX) 0.4 mg Cap Take 1 capsule (0.4 mg total) by mouth once daily.    vitamin D (VITAMIN D3) 1000 units Tab Take 1 tablet (1,000 Units total) by mouth once daily.    aspirin (ECOTRIN) 81 MG EC tablet Take 1 tablet (81 mg total) by mouth once daily.    BLOOD-GLUCOSE METER (TRUERESULT BLOOD GLUCOSE SYSTM MISC) by Misc.(Non-Drug; Combo Route) route.    cyanocobalamin (VITAMIN B-12) 1000 MCG tablet Take 100 mcg by mouth once daily.    EMBRACE PRO TEST STRIPS Strp CHECK  BLOOD SUGAR TWICE DAILY.    glucose 4 GM chewable tablet Take 4 tablets (16 g total) by mouth as needed for Low blood sugar.    insulin (LANTUS SOLOSTAR U-100 INSULIN) glargine 100 units/mL SubQ pen Inject into the skin. 15 units QHS and 18 units QAM    LIDOcaine (LIDODERM) 5 % APPLY 1 PATCH TOPICALLY EVERY DAY FOR PAIN  WEAR FOR 12 HOURS, THEN REMOVE. DO NOT APPLY NEW PATCH FOR AT LEAST 12 HOURS.    miconazole (MICOTIN) 2 % cream APPLY SMALL AMOUNT TOPICALLY TWICE A DAY AS NEEDED FOR FUNGAL INFECTION    multivit-minerals/folic acid (DIABETIC MULTIVITAMIN ORAL) Take by mouth.    TRUEPLUS LANCETS 26 gauge Misc CHECK BLOOD SUGAR TWICE DAILY.    TRUERESULT BLOOD GLUCOSE SYSTM kit CHECK BLOOD SUGAR TWICE DAILY.     No current facility-administered medications for this visit.       Vitals:      Vitals:    08/22/24 0737   BP: 136/69   Pulse: 71   Resp: 18   Temp: 97.7 °F (36.5 °C)       Review of Systems:        Constitutional: Negative for fever, chills, weight loss, malaise/fatigue and diaphoresis.   HENT: Negative for ear pain, nosebleeds, congestion, sore throat, neck pain, tinnitus and ear discharge.  +hearing loss  Eyes: Negative for blurred vision, double vision, photophobia, pain, discharge and redness.   Respiratory: Negative for cough, hemoptysis, sputum production, shortness of breath, wheezing and stridor.    Cardiovascular: Negative for chest pain, palpitations, orthopnea, claudication, leg swelling and PND.   Gastrointestinal: Negative for heartburn, nausea, vomiting, abdominal pain, diarrhea, constipation, blood in stool and melena.   Genitourinary: Negative for dysuria, urgency, frequency, hematuria and flank pain.   Musculoskeletal: Negative for myalgias, back pain, joint pain and falls. +Neck Pain  Skin: Negative for itching and rash.   Neurological: Negative for dizziness, tingling, tremors, sensory change, speech change, focal weakness, seizures, loss of consciousness, weakness and headaches.    Endo/Heme/Allergies: Negative for environmental allergies and polydipsia. Does not bruise/bleed easily.   Psychiatric/Behavioral: Negative for depression, suicidal ideas, hallucinations, memory loss and substance abuse. The patient is not nervous/anxious and does not have insomnia.    All 14 systems reviewed and negative except as noted above.    Physical Exam:      Constitutional: Appears well-developed, well-nourished and in no acute distress.  Patient is oriented to person, place, and time.   Head: Normocephalic and atraumatic. Mucous membranes moist.  Neck: Neck supple no mass.   Cardiovascular: Normal rate and regular rhythm.  S1 S2 appreciated by ascultation.  Pulmonary/Chest: Effort normal and clear to auscultation bilaterally. No respiratory distress.   Abdomen: Soft. Non-tender and non-distended. Bowel sounds are normal.   Neurological: Patient is alert and oriented to person, place and time. Moves all extremities.  Skin: Warm and dry. No lesions.  Extremities: No clubbing, cyanosis or edema.    Laboratory data:      Reviewed and noted in plan where applicable. Please see chart for full laboratory data.      Lab Results   Component Value Date    WBC 9.07 2024    HGB 14.5 2024    HCT 45.4 2024    MCV 95 2024     2024           Predictors of intubation difficulty:       Morbid obesity? no   Anatomically abnormal facies? no   Prominent incisors? no   Receding mandible? no   Short, thick neck? no   Neck range of motion: normal   Dentition: Edentulous  Based on the Modified Mallampati, patient is a mallampati score: I (soft palate, uvula, fauces, and tonsillar pillars visible)    Cardiographics:      EC/22/24:  Sinus rhythm with 1st degree A-V block   Premature ventricular complexes   Right bundle branch block   Anterolateral infarct (cited on or before 29-OCT-2021)   Abnormal ECG     Echocardiogram: not indicated    Imaging:      Chest x-ray: normal and reviewed by  myself    Assessment and Plan:      Malignant neoplasm of overlapping sites of bladder  Planned for TURBT (TRANSURETHRAL RESECTION OF BLADDER TUMOR) with Hortencia Michael MD on 8/29/24.     Known risk factors for perioperative complications: Anemia  Congestive heart failure    Difficulty with intubation is not anticipated.    Cardiac Risk Estimation: Based on the Revised Cardiac Risk index, patient is a Class 3 risk with a 10.1% risk of a major cardiac event in a low risk procedure.    1.) Preoperative workup as follows: ECG, hemoglobin, hematocrit, electrolytes, creatinine, glucose, liver function studies.  2.) Change in medication regimen before surgery: discontinue ASA, NSAIDs 7 days before surgery, hold Metformin 24 hours prior to surgery.  3.) Prophylaxis for cardiac events with perioperative beta-blockers: Continue BB as prescribed.  4.) Invasive hemodynamic monitoring perioperatively: at the discretion of anesthesiologist.  5.) Deep vein thrombosis prophylaxis postoperatively: intermittent pneumatic compression boots and regimen to be chosen by surgical team.  6.) Surveillance for postoperative MI with ECG immediately postoperatively and on postoperati ve days 1 and 2 AND troponin levels 24 hours postoperatively and on day 4 or hospital discharge (whichever comes first): not indicated.  7.) Current medications which may produce withdrawal symptoms if withheld perioperatively: None  8.) Other measures: Careful attention to perioperative glycemic control (POCT Glucose in Pre-Op).  Postoperative hypertension management with IV hydralazine until able to take oral medications.     --Morning of Procedure medications: amlodipine, Imdur, Flomax  --Hold all other medications morning of surgery   --Resume all medications post-operatively  --Hold ASA, NSAIDs and all vitamins/supplements 7 days prior to procedure  --Hold oral diabetes medications morning of surgery   --Hold Trulicity 7 days before surgery, takes on  "Saturday (hold 8/24)  --Lantus HS- reduce dose to 4U, Hold Novolog AM of surgery    Chronic combined systolic and diastolic congestive heart failure  Patient followed by Cardiology as outpatient. Euvolemic on exam.   Managed with Lasix    E-consult for cardiology dated 8/21/24, recommendations as below:   "Pt may proceed with urological surgery at moderate CV risk"    --See medication recommendations as above     COPD (chronic obstructive pulmonary disease)  Chronic, well controlled. Negative for recent exacerbations.     --See medication recommendations as above   --CXR: no acute findings    Bilateral sensorineural hearing loss  Uses hearing aides    Ischemic cardiomyopathy  Followed by cardiology, does have a PRN Nitroglycerin prescription, denies having to use the medication    --See medication recommendations as above     Type 2 diabetes mellitus with hyperglycemia, with long-term current use of insulin  Chronic, managed with Lantus, Trulicity, Novolog.   HGA1c: 8.6 (5/13/24)    --See medication recommendations as above         Electronically signed by Cristal Tillman NP on 8/22/2024 at 8:09 AM.    Time spent seeing patient( greater than 1/2 spent in direct contact) : 45 minutes      "

## 2024-08-22 NOTE — ASSESSMENT & PLAN NOTE
"Patient followed by Cardiology as outpatient. Euvolemic on exam.   Managed with Lasix    E-consult for cardiology dated 8/21/24, recommendations as below:   "Pt may proceed with urological surgery at moderate CV risk"    --See medication recommendations as above   "

## 2024-08-22 NOTE — ASSESSMENT & PLAN NOTE
Chronic, well controlled. Negative for recent exacerbations.     --See medication recommendations as above   --CXR: no acute findings

## 2024-08-28 ENCOUNTER — TELEPHONE (OUTPATIENT)
Dept: PREADMISSION TESTING | Facility: HOSPITAL | Age: 84
End: 2024-08-28
Payer: MEDICARE

## 2024-08-28 NOTE — TELEPHONE ENCOUNTER
Called and spoke with pt  about the following:     Please arrive to [facility] at 11 am on 8/29/2024 for your scheduled procedure.  Address: [address] [place] [address] [place] [id number] ([address] on left, 1st Floor Lobby)  >>>NO eating or drinking after midnight unless instructed otherwise by your Surgeon<<<    Thank you,  -[facility] Pre [facility]mit [facility].  Mon-Fri 7:30 am - 4 pm [phone]

## 2024-08-29 ENCOUNTER — ANESTHESIA (OUTPATIENT)
Dept: SURGERY | Facility: HOSPITAL | Age: 84
End: 2024-08-29
Payer: MEDICARE

## 2024-08-29 ENCOUNTER — ANESTHESIA EVENT (OUTPATIENT)
Dept: SURGERY | Facility: HOSPITAL | Age: 84
End: 2024-08-29
Payer: MEDICARE

## 2024-08-29 ENCOUNTER — HOSPITAL ENCOUNTER (OUTPATIENT)
Facility: HOSPITAL | Age: 84
Discharge: HOME | End: 2024-08-29
Attending: UROLOGY | Admitting: UROLOGY
Payer: MEDICARE

## 2024-08-29 VITALS
WEIGHT: 167.25 LBS | OXYGEN SATURATION: 98 % | TEMPERATURE: 98 F | SYSTOLIC BLOOD PRESSURE: 131 MMHG | HEART RATE: 57 BPM | DIASTOLIC BLOOD PRESSURE: 78 MMHG | RESPIRATION RATE: 19 BRPM | HEIGHT: 70 IN | BODY MASS INDEX: 23.94 KG/M2

## 2024-08-29 DIAGNOSIS — C67.8 MALIGNANT NEOPLASM OF OVERLAPPING SITES OF BLADDER: Primary | ICD-10-CM

## 2024-08-29 DIAGNOSIS — C67.9 MALIGNANT NEOPLASM OF BLADDER: ICD-10-CM

## 2024-08-29 DIAGNOSIS — C67.9 BLADDER CANCER: ICD-10-CM

## 2024-08-29 LAB — POCT GLUCOSE: 205 MG/DL (ref 70–110)

## 2024-08-29 PROCEDURE — C1758 CATHETER, URETERAL: HCPCS | Mod: HCNC | Performed by: UROLOGY

## 2024-08-29 PROCEDURE — 36000706: Mod: HCNC | Performed by: UROLOGY

## 2024-08-29 PROCEDURE — 63600175 PHARM REV CODE 636 W HCPCS: Mod: HCNC | Performed by: NURSE ANESTHETIST, CERTIFIED REGISTERED

## 2024-08-29 PROCEDURE — 88307 TISSUE EXAM BY PATHOLOGIST: CPT | Mod: 59,HCNC | Performed by: PATHOLOGY

## 2024-08-29 PROCEDURE — 63600175 PHARM REV CODE 636 W HCPCS: Mod: HCNC | Performed by: UROLOGY

## 2024-08-29 PROCEDURE — 37000008 HC ANESTHESIA 1ST 15 MINUTES: Mod: HCNC | Performed by: UROLOGY

## 2024-08-29 PROCEDURE — 36000707: Mod: HCNC | Performed by: UROLOGY

## 2024-08-29 PROCEDURE — 74420 UROGRAPHY RTRGR +-KUB: CPT | Mod: 26,HCNC,, | Performed by: UROLOGY

## 2024-08-29 PROCEDURE — 71000015 HC POSTOP RECOV 1ST HR: Mod: HCNC | Performed by: UROLOGY

## 2024-08-29 PROCEDURE — 25000003 PHARM REV CODE 250: Mod: HCNC | Performed by: NURSE ANESTHETIST, CERTIFIED REGISTERED

## 2024-08-29 PROCEDURE — 71000033 HC RECOVERY, INTIAL HOUR: Mod: HCNC | Performed by: UROLOGY

## 2024-08-29 PROCEDURE — 88307 TISSUE EXAM BY PATHOLOGIST: CPT | Mod: 26,HCNC,, | Performed by: PATHOLOGY

## 2024-08-29 PROCEDURE — 52234 CYSTOSCOPY AND TREATMENT: CPT | Mod: HCNC,,, | Performed by: UROLOGY

## 2024-08-29 PROCEDURE — 37000009 HC ANESTHESIA EA ADD 15 MINS: Mod: HCNC | Performed by: UROLOGY

## 2024-08-29 PROCEDURE — 25500020 PHARM REV CODE 255: Mod: HCNC | Performed by: UROLOGY

## 2024-08-29 RX ORDER — MEPERIDINE HYDROCHLORIDE 25 MG/ML
12.5 INJECTION INTRAMUSCULAR; INTRAVENOUS; SUBCUTANEOUS ONCE AS NEEDED
Status: DISCONTINUED | OUTPATIENT
Start: 2024-08-29 | End: 2024-08-29 | Stop reason: HOSPADM

## 2024-08-29 RX ORDER — LIDOCAINE HYDROCHLORIDE 20 MG/ML
INJECTION INTRAVENOUS
Status: DISCONTINUED | OUTPATIENT
Start: 2024-08-29 | End: 2024-08-29

## 2024-08-29 RX ORDER — ONDANSETRON HYDROCHLORIDE 2 MG/ML
4 INJECTION, SOLUTION INTRAVENOUS DAILY PRN
Status: DISCONTINUED | OUTPATIENT
Start: 2024-08-29 | End: 2024-08-29 | Stop reason: HOSPADM

## 2024-08-29 RX ORDER — CIPROFLOXACIN 2 MG/ML
400 INJECTION, SOLUTION INTRAVENOUS
Status: COMPLETED | OUTPATIENT
Start: 2024-08-29 | End: 2024-08-29

## 2024-08-29 RX ORDER — PHENAZOPYRIDINE HYDROCHLORIDE 200 MG/1
200 TABLET, FILM COATED ORAL 3 TIMES DAILY PRN
Qty: 15 TABLET | Refills: 0 | Status: SHIPPED | OUTPATIENT
Start: 2024-08-29 | End: 2024-09-08

## 2024-08-29 RX ORDER — HYDROMORPHONE HYDROCHLORIDE 2 MG/ML
0.2 INJECTION, SOLUTION INTRAMUSCULAR; INTRAVENOUS; SUBCUTANEOUS EVERY 5 MIN PRN
Status: DISCONTINUED | OUTPATIENT
Start: 2024-08-29 | End: 2024-08-29 | Stop reason: HOSPADM

## 2024-08-29 RX ORDER — OXYCODONE AND ACETAMINOPHEN 5; 325 MG/1; MG/1
1 TABLET ORAL
Status: DISCONTINUED | OUTPATIENT
Start: 2024-08-29 | End: 2024-08-29 | Stop reason: HOSPADM

## 2024-08-29 RX ORDER — ONDANSETRON HYDROCHLORIDE 2 MG/ML
4 INJECTION, SOLUTION INTRAVENOUS ONCE AS NEEDED
Status: DISCONTINUED | OUTPATIENT
Start: 2024-08-29 | End: 2024-08-29 | Stop reason: HOSPADM

## 2024-08-29 RX ORDER — ONDANSETRON HYDROCHLORIDE 2 MG/ML
INJECTION, SOLUTION INTRAVENOUS
Status: DISCONTINUED | OUTPATIENT
Start: 2024-08-29 | End: 2024-08-29

## 2024-08-29 RX ORDER — PROPOFOL 10 MG/ML
VIAL (ML) INTRAVENOUS
Status: DISCONTINUED | OUTPATIENT
Start: 2024-08-29 | End: 2024-08-29

## 2024-08-29 RX ORDER — FENTANYL CITRATE 50 UG/ML
25 INJECTION, SOLUTION INTRAMUSCULAR; INTRAVENOUS EVERY 5 MIN PRN
Status: DISCONTINUED | OUTPATIENT
Start: 2024-08-29 | End: 2024-08-29 | Stop reason: HOSPADM

## 2024-08-29 RX ORDER — CIPROFLOXACIN 2 MG/ML
400 INJECTION, SOLUTION INTRAVENOUS
Status: DISCONTINUED | OUTPATIENT
Start: 2024-08-29 | End: 2024-08-29 | Stop reason: SDUPTHER

## 2024-08-29 RX ADMIN — LIDOCAINE HYDROCHLORIDE 100 MG: 20 INJECTION INTRAVENOUS at 12:08

## 2024-08-29 RX ADMIN — CIPROFLOXACIN 400 MG: 2 INJECTION, SOLUTION INTRAVENOUS at 12:08

## 2024-08-29 RX ADMIN — PROPOFOL 130 MG: 10 INJECTION, EMULSION INTRAVENOUS at 12:08

## 2024-08-29 RX ADMIN — PROPOFOL 70 MG: 10 INJECTION, EMULSION INTRAVENOUS at 12:08

## 2024-08-29 RX ADMIN — ONDANSETRON 4 MG: 2 INJECTION INTRAMUSCULAR; INTRAVENOUS at 12:08

## 2024-08-29 RX ADMIN — SODIUM CHLORIDE, SODIUM LACTATE, POTASSIUM CHLORIDE, AND CALCIUM CHLORIDE: .6; .31; .03; .02 INJECTION, SOLUTION INTRAVENOUS at 12:08

## 2024-08-29 NOTE — ANESTHESIA PROCEDURE NOTES
Intubation    Date/Time: 8/29/2024 12:12 PM    Performed by: Tobias Andrew CRNA  Authorized by: Zak Haynes MD    Intubation:     Induction:  Intravenous    Intubated:  Postinduction    Mask Ventilation:  Not attempted    Attempts:  1    Attempted By:  CRNA    Difficult Airway Encountered?: No      Complications:  None    Airway Device:  Supraglottic airway/LMA    Airway Device Size:  4.0    Style/Cuff Inflation:  Cuffed (inflated to minimal occlusive pressure)    Secured at:  The lips    Placement Verified By:  Capnometry    Complicating Factors:  None    Findings Post-Intubation:  BS equal bilateral

## 2024-08-29 NOTE — DISCHARGE SUMMARY
[facility] - Surgery (Hospital)  Discharge Note  Short Stay    Procedure(s) (LRB):  TURBT (TRANSURETHRAL RESECTION OF BLADDER TUMOR) (N/A)  PYELOGRAM, RETROGRADE (Bilateral)      OUTCOME: Patient tolerated treatment/procedure well without complication and is now ready for discharge.    DISPOSITION: Home or Self Care    FINAL DIAGNOSIS:  Malignant neoplasm of overlapping sites of bladder    FOLLOWUP: In clinic    DISCHARGE INSTRUCTIONS:    Discharge Procedure Orders   Diet diabetic     Notify your health care provider if you experience any of the following:  temperature >100.4     Notify your health care provider if you experience any of the following:  persistent nausea and vomiting or diarrhea     Notify your health care provider if you experience any of the following:  severe uncontrolled pain     No dressing needed     Activity as tolerated        TIME SPENT ON DISCHARGE: 10 minutes

## 2024-08-29 NOTE — OP NOTE
Date of Procedure: 08/29/2024    PREOP DIAGNOSIS:  Bladder cancer.    POSTOP DIAGNOSIS:  Bladder cancer.    PROCEDURES:      1. TURBT -- 1.5cm    2. Bilateral Retrogrades    3. Tumor fulguration    SURGEON:  Hortencia Michael MD    Assistants: None    Specimen:   Dome bladder tumor  Left trigone biopsy    ANESTHESIA:  General     BLOOD LOSS:  None.    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  Bilateral retrogrades were unremarkable.      PROCEDURE IN DETAIL:  Patient was brought to the operative suite and placed under general anesthesia and positioned into the dorsal lithotomy position.  After being sterilely prepped and draped a 21 Malay sheath cystoscope was inserted into a normal urethra.   Bladder was examined, tumor was identified at the dome.  Bilateral ureteral orifices are normal in size, shape, caliber and location.  There was erythema with mild edema lateral to the left UO. Right ureteral orifice was cannulated with a Nipomo catheter, contrast was injected.  There was no evidence of filling defects or other abnormalities, otherwise unremarkable right retrograde nephrogram.  Nipomo was then inserted into the left ureteral orifice.  Contrast was injected demonstrating no evidence of filling defects or other abnormalities, otherwise unremarkable left retrograde nephrogram.  I utilized the cold cup biopsy forceps to  remove the dome bladder tumor in several pieces. This was continued until the tumor was completely dissected from the bladder wall. The region was 1.5cm. I then fulgurated the edges and the base of the tumor.  I also took several biopsies of the erythematous region lateral to the left UO and fulgurated the base and edges. This region was 1.5cm.  At the conclusion hemostasis was meticulously maintained and there was no evidence of residual tumor burden within the bladder. The bladder was drained and the scope removed. Patient was transferred to the  PACU in stable condition.  I will have the patient return in 3 weeks to discuss pathology.    COMPLICATIONS: None

## 2024-08-29 NOTE — ANESTHESIA POSTPROCEDURE EVALUATION
Anesthesia Post Evaluation    Patient: Nithin Pino    Procedure(s) Performed: Procedure(s) (LRB):  TURBT (TRANSURETHRAL RESECTION OF BLADDER TUMOR) (N/A)  PYELOGRAM, RETROGRADE (Bilateral)    Final Anesthesia Type: general      Patient location during evaluation: PACU  Patient participation: Yes- Able to Participate  Level of consciousness: awake and alert, oriented and awake  Post-procedure vital signs: reviewed and stable  Pain management: adequate  Airway patency: patent    PONV status at discharge: No PONV  Anesthetic complications: no      Cardiovascular status: blood pressure returned to baseline  Respiratory status: unassisted  Hydration status: euvolemic  Follow-up not needed.              Vitals Value Taken Time   BP 95/54 08/29/24 1257   Temp 36.7 °C (98 °F) 08/29/24 1255   Pulse 59 08/29/24 1300   Resp 11 08/29/24 1300   SpO2 100 % 08/29/24 1300   Vitals shown include unfiled device data.      No case tracking events are documented in the log.      Pain/Adi Score: No data recorded

## 2024-08-29 NOTE — ANESTHESIA PREPROCEDURE EVALUATION
08/29/2024  Nithin Pino is a 84 y.o., male.      Pre-op Assessment    I have reviewed the Patient Summary Reports.     I have reviewed the Nursing Notes. I have reviewed the NPO Status.      Review of Systems  Anesthesia Hx:  No problems with previous Anesthesia                Hematology/Oncology:  Hematology Normal   Oncology Normal                                   Cardiovascular:     Hypertension  Past MI CAD    Dysrhythmias  Angina CHF                                 Pulmonary:   COPD Asthma  Shortness of breath                  Renal/:  Chronic Renal Disease                Hepatic/GI:  Hepatic/GI Normal                 Neurological:    Neuromuscular Disease,                                   Endocrine:  Diabetes           Dermatological:  Skin Normal    Psych:  Psychiatric Normal                    Physical Exam  General: Well nourished, Cooperative, Alert and Oriented    Airway:  Mallampati: II / II  Mouth Opening: Normal  TM Distance: Normal  Tongue: Normal  Neck ROM: Normal ROM    Dental:  Intact      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 heart with stable angina pectoris    Benign prostatic hyperplasia    COPD (chronic obstructive pulmonary disease)    RBBB    Ischemic cardiomyopathy    Claudication in peripheral vascular disease    Stage 3a chronic kidney disease    Hyperlipidemia associated with type 2 diabetes mellitus    Hyperparathyroidism    Diabetes mellitus type 2 with peripheral artery disease    Thoracic aorta atherosclerosis    Asymptomatic stenosis of both carotid arteries without infarction    Chronic stable angina    CRI (chronic renal insufficiency)    OAB (overactive bladder)    Elevated troponin    Chronic combined systolic and diastolic congestive heart failure    Hematuria, gross     Nonrheumatic aortic valve insufficiency    Bilateral sensorineural hearing loss    Iron deficiency anemia    Preop cardiovascular exam    Anemia in other chronic diseases classified elsewhere    Malignant neoplasm of overlapping sites of bladder    Type 2 diabetes mellitus with hyperglycemia, with long-term current use of insulin    Closed anterior displaced fracture of sternal end of left clavicle    Syncope    Left-sided weakness     Results for orders placed during the hospital encounter of 10/25/23    Echo    Interpretation Summary    Left Ventricle: The left ventricle is normal in size. Normal wall thickness. regional wall motion abnormalities present. There is low normal systolic function with a visually estimated ejection fraction of 50 - 55%.    Left Atrium: Left atrium is severely dilated.    Right Ventricle: Normal right ventricular cavity size. Wall thickness is normal. Right ventricle wall motion  is normal. Systolic function is normal.    Right Atrium: Right atrium is dilated.    Mitral Valve: There is mild regurgitation.    Pulmonic Valve: There is mild regurgitation.    Pulmonary Artery: The estimated pulmonary artery systolic pressure is 24 mmHg.    IVC/SVC: Normal venous pressure at 3 mmHg.      Anesthesia Plan  Type of Anesthesia, risks & benefits discussed:    Anesthesia Type: Gen ETT, MAC  Intra-op Monitoring Plan: Standard ASA Monitors  Post Op Pain Control Plan: multimodal analgesia  Induction:  IV  Airway Plan: Direct  Informed Consent: Informed consent signed with the Patient and all parties understand the risks and agree with anesthesia plan.  All questions answered.   ASA Score: 4  Day of Surgery Review of History & Physical: H&P Update referred to the surgeon/provider.I have interviewed and examined the patient. I have reviewed the patient's H&P dated: There are no significant changes. H&P completed by Anesthesiologist.    Ready For Surgery From Anesthesia Perspective.     .

## 2024-08-29 NOTE — TRANSFER OF CARE
"Anesthesia Transfer of Care Note    Patient: Nithin Pino    Procedure(s) Performed: Procedure(s) (LRB):  TURBT (TRANSURETHRAL RESECTION OF BLADDER TUMOR) (N/A)  PYELOGRAM, RETROGRADE (Bilateral)    Patient location: PACU    Anesthesia Type: general    Transport from OR: Transported from OR on room air with adequate spontaneous ventilation    Post pain: adequate analgesia    Post assessment: no apparent anesthetic complications    Post vital signs: stable    Level of consciousness: awake    Nausea/Vomiting: no nausea/vomiting    Complications: none    Transfer of care protocol was followed      Last vitals: Visit Vitals  /78   Pulse 104   Temp 36.2 °C (97.2 °F) (Temporal)   Resp 19   Ht 5' 10" (1.778 m)   Wt 75.8 kg (167 lb 3.5 oz)   SpO2 100%   BMI 23.99 kg/m²     "

## 2024-08-30 ENCOUNTER — TELEPHONE (OUTPATIENT)
Dept: UROLOGY | Facility: CLINIC | Age: 84
End: 2024-08-30
Payer: MEDICARE

## 2024-08-30 LAB
FINAL PATHOLOGIC DIAGNOSIS: NORMAL
GROSS: NORMAL
Lab: NORMAL

## 2024-08-30 NOTE — TELEPHONE ENCOUNTER
.Outgoing call placed to patient's daughter, she verified patient's name and date of birth, appt scheduled and she was notified, she verbalized she will let the patient know.       ----- Message from Hortencia Michael MD sent at 8/29/2024 12:53 PM CDT -----  Please schedule a 2-3 week post-op

## 2024-09-16 LAB
CHOLEST SERPL-MSCNC: 119 MG/DL (ref 0–200)
HDLC SERPL-MCNC: 49 MG/DL
LDL CHOLESTEROL DIRECT: 1.5 MG/DL (ref 0.34–5.6)
LDLC SERPL CALC-MCNC: 38 MG/DL (ref 0–160)
TRIGL SERPL-MCNC: 161 MG/DL (ref 0–200)

## 2024-09-18 ENCOUNTER — OFFICE VISIT (OUTPATIENT)
Dept: UROLOGY | Facility: CLINIC | Age: 84
End: 2024-09-18
Payer: MEDICARE

## 2024-09-18 VITALS
HEART RATE: 105 BPM | HEIGHT: 70 IN | TEMPERATURE: 98 F | SYSTOLIC BLOOD PRESSURE: 104 MMHG | BODY MASS INDEX: 24.61 KG/M2 | WEIGHT: 171.94 LBS | DIASTOLIC BLOOD PRESSURE: 62 MMHG

## 2024-09-18 DIAGNOSIS — R31.0 HEMATURIA, GROSS: Primary | ICD-10-CM

## 2024-09-18 DIAGNOSIS — C67.0 MALIGNANT NEOPLASM OF TRIGONE OF URINARY BLADDER: ICD-10-CM

## 2024-09-18 LAB
BILIRUB UR QL STRIP: NEGATIVE
GLUCOSE UR QL STRIP: NEGATIVE
KETONES UR QL STRIP: NEGATIVE
LEUKOCYTE ESTERASE UR QL STRIP: POSITIVE
PH, POC UA: 5.5
POC BLOOD, URINE: NEGATIVE
POC NITRATES, URINE: NEGATIVE
POC RESIDUAL URINE VOLUME: 54 ML (ref 0–100)
PROT UR QL STRIP: NEGATIVE
SP GR UR STRIP: 1.01 (ref 1–1.03)
UROBILINOGEN UR STRIP-ACNC: 0.2 (ref 0.3–2.2)

## 2024-09-18 PROCEDURE — 1126F AMNT PAIN NOTED NONE PRSNT: CPT | Mod: HCNC,CPTII,S$GLB, | Performed by: UROLOGY

## 2024-09-18 PROCEDURE — 99213 OFFICE O/P EST LOW 20 MIN: CPT | Mod: HCNC,S$GLB,, | Performed by: UROLOGY

## 2024-09-18 PROCEDURE — 81003 URINALYSIS AUTO W/O SCOPE: CPT | Mod: QW,HCNC,S$GLB, | Performed by: UROLOGY

## 2024-09-18 PROCEDURE — 1160F RVW MEDS BY RX/DR IN RCRD: CPT | Mod: HCNC,CPTII,S$GLB, | Performed by: UROLOGY

## 2024-09-18 PROCEDURE — 99999 PR PBB SHADOW E&M-EST. PATIENT-LVL IV: CPT | Mod: PBBFAC,HCNC,, | Performed by: UROLOGY

## 2024-09-18 PROCEDURE — 1159F MED LIST DOCD IN RCRD: CPT | Mod: HCNC,CPTII,S$GLB, | Performed by: UROLOGY

## 2024-09-18 PROCEDURE — 3074F SYST BP LT 130 MM HG: CPT | Mod: HCNC,CPTII,S$GLB, | Performed by: UROLOGY

## 2024-09-18 PROCEDURE — 51798 US URINE CAPACITY MEASURE: CPT | Mod: HCNC,S$GLB,, | Performed by: UROLOGY

## 2024-09-18 PROCEDURE — 3288F FALL RISK ASSESSMENT DOCD: CPT | Mod: HCNC,CPTII,S$GLB, | Performed by: UROLOGY

## 2024-09-18 PROCEDURE — 1101F PT FALLS ASSESS-DOCD LE1/YR: CPT | Mod: HCNC,CPTII,S$GLB, | Performed by: UROLOGY

## 2024-09-18 PROCEDURE — 3078F DIAST BP <80 MM HG: CPT | Mod: HCNC,CPTII,S$GLB, | Performed by: UROLOGY

## 2024-09-18 NOTE — PROGRESS NOTES
CC:bladder cancer    History of Present Illness:   Nithin Pino is a 84 y.o. male here for follow up.     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 Pt did well following TURBT. He denies gross hematuria or pain.   8/20/24-Here for cysto. No gross hematuria.   5/21/24-Completed BCG induction. Here for cysto.   2/20/24-Path showing HG Ta urothelial cell carcinoma in all regions of tumor. Upon review with the pt, he only previously received 3 BCG treatments, but didn't get a full 6 treatment induction. No gross hematuria at this point. He was having some incontinence for a few days following TURBT. Now it has resolved.    1/17/24-Pt now cleared for TURBT by cardiology. No gross hematuria. No dysuria. No stranguria. Pt states that the BCG treatment are painful and asks about the natural history of bladder cancer, should he elect against treatment.   9/26/23: pt here for surveillance cysto.   6/23/23-Pt s/p TURBT. 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 No gross hematuria or dysuria. No weakness or fever. States that he is urinating well.   5/15/23- pt here for cysto. CT scan personally reviewed, showing a polypoid tumor at the left bladder wall, enhancing.   4/10/23-81yo male, here today for follow up, last seen in 2021. At that time, here was being treated for BPH with finasteride and tamsulosin and OAB with vesicare. He did have gross hematuria, but refused cystoscopy. He reports that he was seen in the ED at [facility] yesterday with dysuria and gross hematuria. Was prescribed levaquin. States that he hasn't started the levaquin yet. Saw a little blood this am. No fever. Stream is strong. Still on finasteride, vesicare and flomax. No difficulty emptying--states it was checked in the ED yesterday and he emptied completely.     Pt's records from [facility] with Dr. Sanchez reviewed from 12/22/22, noting cytology suspicious for high grade bladder cancer.       Review of  Systems   Constitutional:  Negative for chills and fever.   Respiratory:  Negative for shortness of breath.    Cardiovascular:  Negative for chest pain.   Gastrointestinal:  Negative for abdominal pain.   Genitourinary:  Negative for flank pain.   Musculoskeletal:  Positive for arthralgias. Negative for back pain.   All other systems reviewed and are negative.        Past Medical History:   Diagnosis Date    Abnormal brain MRI 01/21/2018    Chronic microvascular ischemia changes per MRI July 9, 2007 in [facility]    Abnormal ECG 10/31/2013    Abnormal stress test 01/28/2016    Alcohol dependence     States alcohol abuse ended in 1994    AP (angina pectoris) 01/28/2016    Asthma     Bladder cancer     Carotid artery occlusion     Cataract     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    2 - Normal left ventricular systolic function (EF 60-65%).    3 - Normal left ventricular diastolic function.    4 - Mild left atrial enlargement.    5 - Normal right ventricular systolic function .    6 - Trivial to mild aortic regurgitation.    7 - Trivial to mild mitral regurgitation.  10 - Trivial to mild pulmonic regurgitation.     Hematuria 06/25/2020    History of alcohol abuse 01/22/2018    Patient denies drinking to excess since 1994.    History of atherectomy 01/21/2018 2/2016 [facility]    History of chronic kidney disease 01/22/2018    History of CKD 3    History of PTCA 10/31/2013    History of PTCA 03/09/2016    Hyperlipidemia     Hypertension     Insomnia 06/17/2013    Iron deficiency  anemia 10/19/2023    Ischemic cardiomyopathy 10/31/2013    Long term (current) use of antithrombotics/antiplatelets 01/21/2018    Malignant neoplasm of overlapping sites of bladder 06/08/2023    Myocardial infarction     Old MI (myocardial infarction) 1994    Peripheral vascular disease     Polyneuropathy     RBBB 10/31/2013    S/P CABG (coronary artery bypass graft) 10/31/2013    Shortness of breath 10/31/2013    Tobacco dependence     resolved    Trouble in sleeping     Type 2 diabetes with peripheral circulatory disorder, controlled     Wears glasses        Past Surgical History:   Procedure Laterality Date    APPENDECTOMY      CARDIAC CATHETERIZATION      2016    CARDIAC SURGERY  1994    balloon angioplasty    CATARACT EXTRACTION W/  INTRAOCULAR LENS IMPLANT Right 02/01/2017    CORONARY ARTERY BYPASS GRAFT  05/2011    per patient x4    HERNIA REPAIR      OPEN REDUCTION AND INTERNAL FIXATION (ORIF) OF INJURY OF HIP      PCIOL Bilateral OD 02/01/17/OS 02/15/17    DR. ALONZO    RETROGRADE PYELOGRAPHY Bilateral 8/29/2024    Procedure: PYELOGRAM, RETROGRADE;  Surgeon: Hortencia Michael MD;  Location: [facility] OR;  Service: Urology;  Laterality: Bilateral;    TURBT (TRANSURETHRAL RESECTION OF BLADDER TUMOR) N/A 6/8/2023    Procedure: TURBT (TRANSURETHRAL RESECTION OF BLADDER TUMOR);  Surgeon: Hortencia Michael MD;  Location: [facility] OR;  Service: Urology;  Laterality: N/A;    TURBT (TRANSURETHRAL RESECTION OF BLADDER TUMOR) N/A 2/8/2024    Procedure: TURBT (TRANSURETHRAL RESECTION OF BLADDER TUMOR);  Surgeon: Hortencia Michael MD;  Location: [facility] OR;  Service: Urology;  Laterality: N/A;    TURBT (TRANSURETHRAL RESECTION OF BLADDER TUMOR) N/A 8/29/2024    Procedure: TURBT (TRANSURETHRAL RESECTION OF BLADDER TUMOR);  Surgeon: Hortencia Michael MD;  Location: [facility] OR;  Service: Urology;  Laterality: N/A;       Family History   Adopted: Yes   Problem Relation Name Age of Onset    Diabetes Brother      Diabetes Sister       Cancer Neg Hx      Heart disease Neg Hx      Kidney disease Neg Hx         Social History     Tobacco Use    Smoking status: Former     Current packs/day: 0.00     Average packs/day: 1.5 packs/day for 40.0 years (60.0 ttl pk-yrs)     Types: Cigarettes     Start date: 1954     Quit date: 1994     Years since quittin.7    Smokeless tobacco: Former     Quit date: 2011    Tobacco comments:     approx date   Substance Use Topics    Alcohol use: Not Currently     Comment: occasionally; 1-2 cans of beer a month    Drug use: No       Current Outpatient Medications   Medication Sig Dispense Refill    amLODIPine (NORVASC) 10 MG tablet Take 0.5 tablets (5 mg total) by mouth once daily. 90 tablet 3    aspirin (ECOTRIN) 81 MG EC tablet Take 1 tablet (81 mg total) by mouth once daily. 90 tablet 3    atorvastatin (LIPITOR) 40 MG tablet Take 1 tablet (40 mg total) by mouth every evening. 90 tablet 3    b complex vitamins tablet Take 1 tablet by mouth once daily.      BLOOD-GLUCOSE METER (TRUERESULT BLOOD GLUCOSE SYSTM MISC) by Misc.(Non-Drug; Combo Route) route.      cyanocobalamin (VITAMIN B-12) 1000 MCG tablet Take 100 mcg by mouth once daily.      dulaglutide (TRULICITY) 4.5 mg/0.5 mL pen injector Inject 4.5 mg into the skin every 7 days.       EMBRACE PRO TEST STRIPS Strp CHECK BLOOD SUGAR TWICE DAILY. 50 strip 0    finasteride (PROSCAR) 5 mg tablet TAKE 1 TABLET ONE TIME DAILY 90 tablet 3    furosemide (LASIX) 40 MG tablet TAKE 1 PILL IN AM, AND 1/2 PILL IN PM 60 tablet 11    glucose 4 GM chewable tablet Take 4 tablets (16 g total) by mouth as needed for Low blood sugar. 100 tablet 5    hyoscyamine (LEVSIN/SL) 0.125 mg Subl Place 2 tablets (0.25 mg total) under the tongue every 4 (four) hours as needed (bladder spasms). 30 tablet 1    insulin (LANTUS SOLOSTAR U-100 INSULIN) glargine 100 units/mL SubQ pen Inject into the skin. 15 units QHS and 18 units QAM      insulin aspart U-100 (NOVOLOG) 100  unit/mL injection Inject 3 Units into the skin every 4 (four) hours as needed (for high blood sugar or carbohydrate intake).      isosorbide mononitrate (IMDUR) 60 MG 24 hr tablet Take 1 tablet (60 mg total) by mouth once daily. 90 tablet 3    LIDOcaine (LIDODERM) 5 % APPLY 1 PATCH TOPICALLY EVERY DAY FOR PAIN  WEAR FOR 12 HOURS, THEN REMOVE. DO NOT APPLY NEW PATCH FOR AT LEAST 12 HOURS.      losartan (COZAAR) 100 MG tablet Take 1 tablet (100 mg total) by mouth once daily. 90 tablet 3    metoprolol succinate (TOPROL-XL) 25 MG 24 hr tablet Take 1 tablet (25 mg total) by mouth every evening. 90 tablet 3    miconazole (MICOTIN) 2 % cream APPLY SMALL AMOUNT TOPICALLY TWICE A DAY AS NEEDED FOR FUNGAL INFECTION      multivit-minerals/folic acid (DIABETIC MULTIVITAMIN ORAL) Take by mouth.      nitroGLYCERIN (NITROSTAT) 0.4 MG SL tablet 0.4 mg.      tamsulosin (FLOMAX) 0.4 mg Cap Take 1 capsule (0.4 mg total) by mouth once daily. 90 capsule 3    TRUEPLUS LANCETS 26 gauge Misc CHECK BLOOD SUGAR TWICE DAILY. 100 each 0    TRUERESULT BLOOD GLUCOSE SYSTM kit CHECK BLOOD SUGAR TWICE DAILY. 1 each 0    vitamin D (VITAMIN D3) 1000 units Tab Take 1 tablet (1,000 Units total) by mouth once daily. 90 tablet 3     No current facility-administered medications for this visit.       Review of patient's allergies indicates:   Allergen Reactions    Pseudoephedrine-guaifenesin Shortness Of Breath    Panmist dm  [pseudoephedrine-dm-guaifenesin]      Other reaction(s): Unknown       Physical Exam  Vitals:    09/18/24 0943   BP: 104/62   Pulse: 105   Temp: 98 °F (36.7 °C)         General: Well-developed, well-nourished in no acute distress  HEENT: Normocephalic, atraumatic, Extraocular movements intact  Neck: supple, trachea midline, no cervical or supraclavicular lymphadenopathy  Respirations: even and unlabored  Extremities: atraumatic, moves all equally, no clubbing, cyanosis or edema  Psych: normal affect  Skin: warm and dry, no  lesions  Neuro: Alert and oriented, Cranial nerves II-XII grossly intact    Urinalysis  Trace LE, otherwise negative      Lab Results   Component Value Date    PSA 0.37 12/22/2022    PSA 0.29 09/16/2020    PSA 0.82 06/09/2015       Assessment:   1. Hematuria, gross  POCT Bladder Scan    POCT Urinalysis, Dipstick, Automated, W/O Scope      2. Malignant neoplasm of trigone of urinary bladder                Plan:  Hematuria, gross  -     POCT Bladder Scan  -     POCT Urinalysis, Dipstick, Automated, W/O Scope    Malignant neoplasm of trigone of urinary bladder      Discussed path report. Discussed that in the event of BCG refractory disease, we should consider intravesical chemo. Pt not interested in any further intravesical treatment but would like to continue with cysto surveillance and treatment if recurrence is found.     Follow up in about 3 months (around 12/18/2024) for Cystoscopy.

## 2024-09-30 ENCOUNTER — LAB VISIT (OUTPATIENT)
Dept: LAB | Facility: HOSPITAL | Age: 84
End: 2024-09-30
Attending: NURSE PRACTITIONER
Payer: MEDICARE

## 2024-09-30 DIAGNOSIS — E83.52 SERUM CALCIUM ELEVATED: ICD-10-CM

## 2024-09-30 DIAGNOSIS — D50.9 IRON DEFICIENCY ANEMIA, UNSPECIFIED IRON DEFICIENCY ANEMIA TYPE: ICD-10-CM

## 2024-09-30 DIAGNOSIS — D63.1 ANEMIA DUE TO STAGE 3 CHRONIC KIDNEY DISEASE, UNSPECIFIED WHETHER STAGE 3A OR 3B CKD: ICD-10-CM

## 2024-09-30 DIAGNOSIS — N18.30 ANEMIA DUE TO STAGE 3 CHRONIC KIDNEY DISEASE, UNSPECIFIED WHETHER STAGE 3A OR 3B CKD: ICD-10-CM

## 2024-09-30 DIAGNOSIS — E87.5 HYPERKALEMIA: ICD-10-CM

## 2024-09-30 LAB
ALBUMIN SERPL BCP-MCNC: 3.9 G/DL (ref 3.5–5.2)
ALP SERPL-CCNC: 95 U/L (ref 55–135)
ALT SERPL W/O P-5'-P-CCNC: 24 U/L (ref 10–44)
ANION GAP SERPL CALC-SCNC: 13 MMOL/L (ref 8–16)
AST SERPL-CCNC: 20 U/L (ref 10–40)
BASOPHILS # BLD AUTO: 0.02 K/UL (ref 0–0.2)
BASOPHILS NFR BLD: 0.3 % (ref 0–1.9)
BILIRUB SERPL-MCNC: 0.4 MG/DL (ref 0.1–1)
BUN SERPL-MCNC: 38 MG/DL (ref 8–23)
CALCIUM SERPL-MCNC: 10.3 MG/DL (ref 8.7–10.5)
CHLORIDE SERPL-SCNC: 107 MMOL/L (ref 95–110)
CO2 SERPL-SCNC: 20 MMOL/L (ref 23–29)
CREAT SERPL-MCNC: 1.5 MG/DL (ref 0.5–1.4)
DIFFERENTIAL METHOD BLD: ABNORMAL
EOSINOPHIL # BLD AUTO: 0.4 K/UL (ref 0–0.5)
EOSINOPHIL NFR BLD: 5.1 % (ref 0–8)
ERYTHROCYTE [DISTWIDTH] IN BLOOD BY AUTOMATED COUNT: 13 % (ref 11.5–14.5)
EST. GFR  (NO RACE VARIABLE): 45 ML/MIN/1.73 M^2
FERRITIN SERPL-MCNC: 197 NG/ML (ref 20–300)
GLUCOSE SERPL-MCNC: 270 MG/DL (ref 70–110)
HCT VFR BLD AUTO: 45.2 % (ref 40–54)
HGB BLD-MCNC: 14.2 G/DL (ref 14–18)
IMM GRANULOCYTES # BLD AUTO: 0.02 K/UL (ref 0–0.04)
IMM GRANULOCYTES NFR BLD AUTO: 0.3 % (ref 0–0.5)
IRON SERPL-MCNC: 79 UG/DL (ref 45–160)
LYMPHOCYTES # BLD AUTO: 2.8 K/UL (ref 1–4.8)
LYMPHOCYTES NFR BLD: 35.7 % (ref 18–48)
MCH RBC QN AUTO: 30.6 PG (ref 27–31)
MCHC RBC AUTO-ENTMCNC: 31.4 G/DL (ref 32–36)
MCV RBC AUTO: 97 FL (ref 82–98)
MONOCYTES # BLD AUTO: 0.5 K/UL (ref 0.3–1)
MONOCYTES NFR BLD: 6.4 % (ref 4–15)
NEUTROPHILS # BLD AUTO: 4.1 K/UL (ref 1.8–7.7)
NEUTROPHILS NFR BLD: 52.2 % (ref 38–73)
NRBC BLD-RTO: 0 /100 WBC
PLATELET # BLD AUTO: 208 K/UL (ref 150–450)
PMV BLD AUTO: 10.9 FL (ref 9.2–12.9)
POTASSIUM SERPL-SCNC: 4.6 MMOL/L (ref 3.5–5.1)
PROT SERPL-MCNC: 6.5 G/DL (ref 6–8.4)
RBC # BLD AUTO: 4.64 M/UL (ref 4.6–6.2)
SATURATED IRON: 24 % (ref 20–50)
SODIUM SERPL-SCNC: 140 MMOL/L (ref 136–145)
TOTAL IRON BINDING CAPACITY: 323 UG/DL (ref 250–450)
TRANSFERRIN SERPL-MCNC: 218 MG/DL (ref 200–375)
WBC # BLD AUTO: 7.82 K/UL (ref 3.9–12.7)

## 2024-09-30 PROCEDURE — 36415 COLL VENOUS BLD VENIPUNCTURE: CPT | Mod: HCNC,PO | Performed by: NURSE PRACTITIONER

## 2024-09-30 PROCEDURE — 83540 ASSAY OF IRON: CPT | Mod: HCNC | Performed by: NURSE PRACTITIONER

## 2024-09-30 PROCEDURE — 85025 COMPLETE CBC W/AUTO DIFF WBC: CPT | Mod: HCNC | Performed by: NURSE PRACTITIONER

## 2024-09-30 PROCEDURE — 82728 ASSAY OF FERRITIN: CPT | Mod: HCNC | Performed by: NURSE PRACTITIONER

## 2024-09-30 PROCEDURE — 80053 COMPREHEN METABOLIC PANEL: CPT | Mod: HCNC | Performed by: NURSE PRACTITIONER

## 2024-10-03 ENCOUNTER — OFFICE VISIT (OUTPATIENT)
Dept: HEMATOLOGY/ONCOLOGY | Facility: CLINIC | Age: 84
End: 2024-10-03
Payer: MEDICARE

## 2024-10-03 DIAGNOSIS — Z83.49 FAMILY HISTORY OF B12 DEFICIENCY: ICD-10-CM

## 2024-10-03 DIAGNOSIS — Z86.2 HISTORY OF IRON DEFICIENCY ANEMIA: ICD-10-CM

## 2024-10-03 DIAGNOSIS — Z85.51 HISTORY OF BLADDER CANCER: Primary | ICD-10-CM

## 2024-10-03 NOTE — PROGRESS NOTES
Established Patient - Audio Only Telehealth Visit     The patient location is: home  The chief complaint leading to consultation is: no complaints  Visit type: Virtual visit with audio only (telephone)  Total time spent with patient: 30       The reason for the audio only service rather than synchronous audio and video virtual visit was related to technical difficulties or patient preference/necessity.     Each patient to whom I provide medical services by telemedicine is:  (1) informed of the relationship between the physician and patient and the respective role of any other health care provider with respect to management of the patient; and (2) notified that they may decline to receive medical services by telemedicine and may withdraw from such care at any time. Patient verbally consented to receive this service via voice-only telephone call.       HPI: 85 yo male presents today as a new patient for further evaluation and recommendation of iron deficiency anemia.  Is unable to tolerate oral iron due to constipation.  Currently with normocytic anemia.  Hgb 9.7 g/dL.  Ferritin low on outside records located in media section.       Has  bladder cancer 2022 treated  with surgery followed by Dr. Michaelcompleted 6 BCG induction treatments.   6/23/23-Pt s/p TURBT. 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  Is scheduled for repeat bladder surgery on 10/26/2023.  .       Denies any know bleeding in urine.  Did an occult stool sample with no blood found.  Denies blood noses.  Has a history of B12 deficiency.   Not currently taking B12 supplements.      Denies f/c/ns or unintentional weight loss.  Denies any known abnormal lymphadenopathy.     Former smoker - quit smoking in 1994.  Former skoal - quit 15 years   Former heavy drinker - occasional drinker now - quit about 15-18 years ago.     Complains of increased fatigue.     Interval History:  12/14/2023 Found with JOSE MARIA and received Feraheme  infusions x 2 with last dose received on 11/30/2023.  Presents today to evaluate response.  Hgb improved from 9.7 to 11.2.  Noted elevated granulocyte count today.  Slightly low iron with saturated iron of 19%.  Elevated creatinine 1.6, elevated BUN, elevated  with CKD.  States that he is drinking almost a gallon of water a day.  Denies any urinary symptoms.  On novolog sliding scale increased yesterday  Yesterday Lantus  increase from 10-15 units yesterday.  Hopefully this will decrease bg and improved kidney.       Interval History:  2/22/2024  Presents today for f/u iron deficiency anemia.  Received 2 doses of IV venofer 200 mg  with last dose received on 12/14/2023.  Denies any known blood loss. Continues to be followed by urology and will start treatment for bladder cancer soon. Biopsy of bladder 2/16/2024 showed multiple areas high grade papillary urothelial carcinoma, noninvasive.  Has been eating liver daily.  Has no complaints today.     Interval History:  4/22/2024  Received venofer 200 mg IV push with last dose received on 3/18/2024.  Has received treatment for bladder cancer - BCG treatments so far has received 6 treatments by Dr. Michael (completed). - last on 4/9/2024  Has had recent h/o hematuria. Presents today to assess response of IV iron treatment and to assess need for additional therapy. Currently with slight normocytic anemia Hgb 13.2 and iron is repleated. States that he has to get up 3-4 times at night to urinate.  Noted increased trend in hgb A1C.  States that he is not eating a lot of carbs.  Does not excessively eat sweets.  Is having a repeat cystoscope in 5/2024. Denies any known abnormal blood loss.      Interval History:  6/27/2024  Continues taking B12 and B complex daily (currently out of it)  Presents today to assess for recurrent iron deficiency anemia.  Hgb currently 13.1 normocytic.  Slight iron deficiency with saturated iron of 13%  Noted elevated potassium and calcium  "with decreased kidney function, elevated BUN and creatinine.  States that he has diarrhea that comes and goes - describes as being lose but not watery.  States that he has been taking furosemide 40 mg in the morning and 20 mg at night.  Denies excess potassium or calcium intake.  Denies taking TUMS. Denies any new bone pain.  States that he is continuing to be followed by urology and will have a repeat cystoscope next week.  Denies any known abnormal blood loss.      Interval History:  10/3/2024 States that he was taking his slowFe daily medication was causing constipation so he started taking SlowFe every other day. States that he stopped taking his B12 vitamin because "my boobs were growing".  He continues to take B Complex vitamin daily.      Assessment and plan:  Reduce slowFe to three times a week and continue B Complex vitamin.  OK to stop B12 vitamin. Currently without anemia or iron deficiency.  Reassess with labs in 3 months.            Med Onc Chart Routing      Follow up with physician    Follow up with ISAIAS 3 months. in person visit in [place]   Infusion scheduling note   n/a   Injection scheduling note n/a   Labs   Scheduling:  Preferred lab: [facility]  Lab interval:  cbc, cmp, iron studies, b12   Imaging   N/a   Pharmacy appointment No pharmacy appointment needed      Other referrals       No additional referrals needed  n/a             This service was not originating from a related E/M service provided within the previous 7 days nor will  to an E/M service or procedure within the next 24 hours or my soonest available appointment.  Prevailing standard of care was able to be met in this audio-only visit.          "

## 2024-10-14 ENCOUNTER — LAB VISIT (OUTPATIENT)
Dept: LAB | Facility: HOSPITAL | Age: 84
End: 2024-10-14
Attending: FAMILY MEDICINE
Payer: MEDICARE

## 2024-10-14 ENCOUNTER — OFFICE VISIT (OUTPATIENT)
Dept: INTERNAL MEDICINE | Facility: CLINIC | Age: 84
End: 2024-10-14
Payer: MEDICARE

## 2024-10-14 DIAGNOSIS — E11.59 HYPERTENSION ASSOCIATED WITH DIABETES: Chronic | ICD-10-CM

## 2024-10-14 DIAGNOSIS — I65.23 ASYMPTOMATIC STENOSIS OF BOTH CAROTID ARTERIES WITHOUT INFARCTION: Chronic | ICD-10-CM

## 2024-10-14 DIAGNOSIS — E11.65 TYPE 2 DIABETES MELLITUS WITH HYPERGLYCEMIA, WITH LONG-TERM CURRENT USE OF INSULIN: Primary | Chronic | ICD-10-CM

## 2024-10-14 DIAGNOSIS — N40.1 BENIGN PROSTATIC HYPERPLASIA WITH WEAK URINARY STREAM: Chronic | ICD-10-CM

## 2024-10-14 DIAGNOSIS — I25.708 CORONARY ARTERY DISEASE OF BYPASS GRAFT OF NATIVE HEART WITH STABLE ANGINA PECTORIS: Chronic | ICD-10-CM

## 2024-10-14 DIAGNOSIS — N18.31 STAGE 3A CHRONIC KIDNEY DISEASE: Chronic | ICD-10-CM

## 2024-10-14 DIAGNOSIS — E78.5 HYPERLIPIDEMIA ASSOCIATED WITH TYPE 2 DIABETES MELLITUS: Chronic | ICD-10-CM

## 2024-10-14 DIAGNOSIS — N25.81 HYPERPARATHYROIDISM, SECONDARY RENAL: Chronic | ICD-10-CM

## 2024-10-14 DIAGNOSIS — I70.0 THORACIC AORTA ATHEROSCLEROSIS: Chronic | ICD-10-CM

## 2024-10-14 DIAGNOSIS — I50.42 CHRONIC COMBINED SYSTOLIC AND DIASTOLIC CONGESTIVE HEART FAILURE: Chronic | ICD-10-CM

## 2024-10-14 DIAGNOSIS — I15.2 HYPERTENSION ASSOCIATED WITH DIABETES: Chronic | ICD-10-CM

## 2024-10-14 DIAGNOSIS — Z91.89 AT RISK FOR KNOWLEDGE DEFICIT: Chronic | ICD-10-CM

## 2024-10-14 DIAGNOSIS — Z79.4 TYPE 2 DIABETES MELLITUS WITH HYPERGLYCEMIA, WITH LONG-TERM CURRENT USE OF INSULIN: ICD-10-CM

## 2024-10-14 DIAGNOSIS — Z99.89 USES ROLLER WALKER: Chronic | ICD-10-CM

## 2024-10-14 DIAGNOSIS — E11.69 HYPERLIPIDEMIA ASSOCIATED WITH TYPE 2 DIABETES MELLITUS: Chronic | ICD-10-CM

## 2024-10-14 DIAGNOSIS — E11.51 DIABETES MELLITUS TYPE 2 WITH PERIPHERAL ARTERY DISEASE: Chronic | ICD-10-CM

## 2024-10-14 DIAGNOSIS — Z97.4 USES HEARING AID: Chronic | ICD-10-CM

## 2024-10-14 DIAGNOSIS — R39.12 BENIGN PROSTATIC HYPERPLASIA WITH WEAK URINARY STREAM: Chronic | ICD-10-CM

## 2024-10-14 DIAGNOSIS — E11.65 TYPE 2 DIABETES MELLITUS WITH HYPERGLYCEMIA, WITH LONG-TERM CURRENT USE OF INSULIN: ICD-10-CM

## 2024-10-14 DIAGNOSIS — Z79.4 TYPE 2 DIABETES MELLITUS WITH HYPERGLYCEMIA, WITH LONG-TERM CURRENT USE OF INSULIN: Primary | Chronic | ICD-10-CM

## 2024-10-14 LAB
25(OH)D3+25(OH)D2 SERPL-MCNC: 35 NG/ML (ref 30–96)
ALBUMIN SERPL BCP-MCNC: 4 G/DL (ref 3.5–5.2)
ANION GAP SERPL CALC-SCNC: 11 MMOL/L (ref 8–16)
BUN SERPL-MCNC: 39 MG/DL (ref 8–23)
CALCIUM SERPL-MCNC: 10 MG/DL (ref 8.7–10.5)
CHLORIDE SERPL-SCNC: 110 MMOL/L (ref 95–110)
CO2 SERPL-SCNC: 21 MMOL/L (ref 23–29)
CREAT SERPL-MCNC: 1.3 MG/DL (ref 0.5–1.4)
EST. GFR  (NO RACE VARIABLE): 53.8 ML/MIN/1.73 M^2
ESTIMATED AVG GLUCOSE: 189 MG/DL (ref 68–131)
GLUCOSE SERPL-MCNC: 160 MG/DL (ref 70–110)
HBA1C MFR BLD: 8.2 % (ref 4–5.6)
PHOSPHATE SERPL-MCNC: 3.4 MG/DL (ref 2.7–4.5)
POTASSIUM SERPL-SCNC: 4 MMOL/L (ref 3.5–5.1)
PTH-INTACT SERPL-MCNC: 143.7 PG/ML (ref 9–77)
SODIUM SERPL-SCNC: 142 MMOL/L (ref 136–145)

## 2024-10-14 PROCEDURE — 83970 ASSAY OF PARATHORMONE: CPT | Mod: HCNC | Performed by: FAMILY MEDICINE

## 2024-10-14 PROCEDURE — 3288F FALL RISK ASSESSMENT DOCD: CPT | Mod: HCNC,CPTII,S$GLB, | Performed by: FAMILY MEDICINE

## 2024-10-14 PROCEDURE — 1159F MED LIST DOCD IN RCRD: CPT | Mod: HCNC,CPTII,S$GLB, | Performed by: FAMILY MEDICINE

## 2024-10-14 PROCEDURE — 80069 RENAL FUNCTION PANEL: CPT | Mod: HCNC | Performed by: FAMILY MEDICINE

## 2024-10-14 PROCEDURE — 99215 OFFICE O/P EST HI 40 MIN: CPT | Mod: HCNC,S$GLB,, | Performed by: FAMILY MEDICINE

## 2024-10-14 PROCEDURE — 99999 PR PBB SHADOW E&M-EST. PATIENT-LVL V: CPT | Mod: PBBFAC,HCNC,, | Performed by: FAMILY MEDICINE

## 2024-10-14 PROCEDURE — 36415 COLL VENOUS BLD VENIPUNCTURE: CPT | Mod: HCNC | Performed by: FAMILY MEDICINE

## 2024-10-14 PROCEDURE — 82306 VITAMIN D 25 HYDROXY: CPT | Mod: HCNC | Performed by: FAMILY MEDICINE

## 2024-10-14 PROCEDURE — 3074F SYST BP LT 130 MM HG: CPT | Mod: HCNC,CPTII,S$GLB, | Performed by: FAMILY MEDICINE

## 2024-10-14 PROCEDURE — G2211 COMPLEX E/M VISIT ADD ON: HCPCS | Mod: HCNC,S$GLB,, | Performed by: FAMILY MEDICINE

## 2024-10-14 PROCEDURE — 3078F DIAST BP <80 MM HG: CPT | Mod: HCNC,CPTII,S$GLB, | Performed by: FAMILY MEDICINE

## 2024-10-14 PROCEDURE — 83036 HEMOGLOBIN GLYCOSYLATED A1C: CPT | Mod: HCNC | Performed by: FAMILY MEDICINE

## 2024-10-14 PROCEDURE — 1160F RVW MEDS BY RX/DR IN RCRD: CPT | Mod: HCNC,CPTII,S$GLB, | Performed by: FAMILY MEDICINE

## 2024-10-14 PROCEDURE — 1126F AMNT PAIN NOTED NONE PRSNT: CPT | Mod: HCNC,CPTII,S$GLB, | Performed by: FAMILY MEDICINE

## 2024-10-14 PROCEDURE — 1101F PT FALLS ASSESS-DOCD LE1/YR: CPT | Mod: HCNC,CPTII,S$GLB, | Performed by: FAMILY MEDICINE

## 2024-10-14 RX ORDER — CHOLECALCIFEROL (VITAMIN D3) 25 MCG
1000 TABLET ORAL DAILY
Qty: 90 TABLET | Refills: 3 | Status: SHIPPED | OUTPATIENT
Start: 2024-10-14

## 2024-10-14 RX ORDER — ASPIRIN 81 MG/1
81 TABLET ORAL DAILY
Qty: 90 TABLET | Refills: 3 | Status: SHIPPED | OUTPATIENT
Start: 2024-10-14 | End: 2025-10-14

## 2024-10-14 RX ORDER — METOPROLOL SUCCINATE 25 MG/1
25 TABLET, EXTENDED RELEASE ORAL NIGHTLY
Qty: 90 TABLET | Refills: 3 | Status: SHIPPED | OUTPATIENT
Start: 2024-10-14

## 2024-10-14 RX ORDER — ISOSORBIDE MONONITRATE 60 MG/1
60 TABLET, EXTENDED RELEASE ORAL DAILY
Qty: 90 TABLET | Refills: 3 | Status: SHIPPED | OUTPATIENT
Start: 2024-10-14

## 2024-10-14 RX ORDER — TAMSULOSIN HYDROCHLORIDE 0.4 MG/1
0.4 CAPSULE ORAL DAILY
Qty: 90 CAPSULE | Refills: 3 | Status: SHIPPED | OUTPATIENT
Start: 2024-10-14

## 2024-10-14 RX ORDER — LOSARTAN POTASSIUM 50 MG/1
50 TABLET ORAL DAILY
Qty: 90 TABLET | Refills: 3 | Status: SHIPPED | OUTPATIENT
Start: 2024-10-14

## 2024-10-14 RX ORDER — ATORVASTATIN CALCIUM 40 MG/1
40 TABLET, FILM COATED ORAL NIGHTLY
Qty: 90 TABLET | Refills: 3 | Status: SHIPPED | OUTPATIENT
Start: 2024-10-14

## 2024-10-14 NOTE — ASSESSMENT & PLAN NOTE
Lab Results   Component Value Date    .4 (H) 02/14/2024    .6 (H) 11/14/2023    PTH 93.0 (H) 09/16/2020    CALCIUM 10.3 09/30/2024    CALCIUM 10.6 (H) 08/21/2024    CALCIUM 10.7 (H) 07/24/2024    CAION 1.28 07/10/2024    ALBUMIN 3.9 09/30/2024    ALBUMIN 3.9 08/21/2024    PHOS 2.8 02/14/2024    PHOS 3.0 12/30/2020    PHOS 3.6 12/29/2020    LTRXDGIL95TM 43 02/14/2024    [id number] 28 (L) 12/28/2020    [id number] 29 (L) 11/30/2020     Lab Results   Component Value Date    EGFRNORACEVR 45 (A) 09/30/2024    EGFRNORACEVR 45.3 (A) 08/21/2024    EGFRNORACEVR 42.0 (A) 07/24/2024    CREATININE 1.5 (H) 09/30/2024    CREATININE 1.5 (H) 08/21/2024    CREATININE 1.6 (H) 07/24/2024     Lab Results   Component Value Date    URTIMEDVOL 1200 02/12/2015    UCD 24 02/12/2015    LABCALC 6.6 02/12/2015    JGKDKIJ47PTH 3 (L) 02/12/2015    CAURMGSPEC 79 02/12/2015    CALCIUMUR 18.9 (H) 01/16/2018    CREATRANDUR 129.0 02/14/2024

## 2024-10-14 NOTE — ASSESSMENT & PLAN NOTE
BP Readings from Last 6 Encounters:   10/14/24 104/60   09/18/24 104/62   08/29/24 131/78   08/22/24 136/69   08/20/24 130/66   06/27/24 (!) 150/73

## 2024-10-14 NOTE — ASSESSMENT & PLAN NOTE
Lab Results   Component Value Date    EGFRNORACEVR 45 (A) 09/30/2024    EGFRNORACEVR 45.3 (A) 08/21/2024    CREATININE 1.5 (H) 09/30/2024    CREATININE 1.5 (H) 08/21/2024     TREATMENT PLAN: Ensure adequate water intake. Avoid/minimize NSAID use. Avoid nephrotoxic drugs as able. Monitor and control BP. Periodic lab monitoring of renal function.

## 2024-10-14 NOTE — LETTER
ATTN: CARE COORDINATION   PATIENT IDENTIFYING INFORMATION:  NITHIN MUKUL HADLEY   [address] [address] YOB: 1940  OUR MRN: [id number]   Home Phone [phone]   Work Phone Not on file.   Mobile [phone]        RECORDS REQUESTED FROM:   NAME OF HOSPITAL PHYSICIAN OR OTHER ENTITY    [facility] PHONE         SPECIFIC RECORDS REQUESTED:  diabetic eye exam reports within the last 12 months and diabetes lab results (A1c, Urine Microalbumin, Lipid Panel, etc.) within the last 12 months    DATES OF SERVICE REQUESTED: 5 years prior to date signed through the present date (unless specified differently above)    AUTHORIZATION:  For the purpose of continuity of care, I, Nithin GILMAN Alvarez, hereby authorize the hospital, physician, or entity named above to release my medical records for the dates of service specified above to: MICKEY Garg MD; [facility]; [address]; [place] [address]; PH: [phone]    REQUESTED METHOD OF DELIVERY: Fax ([phone]) or secure Email ([email or web address])    In authorizing the release of the confidential information identified above, I hereby waive all restrictions or privileges imposed by law and release [facility] and Its affiliates and their staff from any restriction or privilege Imposed by law in connection with the disclosure or release of any professional record, observation or communication. I do understand that the information that is being released may be subject to re-disclosure by the recipient and may no longer be protected. I understand that my treatment, payment, enrollment or eligibility for benefits may not be conditioned on signing this authorization.  This authorization may be revoked in writing at any time, except to the extent that [facility] and its affiliates have already taken action in reliance on it. Letters to revoke this authorization  should be addressed to [facility], Release of Information Department, [address] [place] [id number].     If not previously revoked in writing, this authorization will terminate or  36 months after the date signed below.                   Signature of Patient    Date Signed

## 2024-10-14 NOTE — PROGRESS NOTES
OFFICE VISIT 10/14/24  8:40 AM CDT    CHIEF COMPLAINT: Follow-up    There appears to be duplication of care. [facility], but he is unsure. He agreed to get an A1C done today. His diabetes appears improved based on his report of continuous glucose monitoring but is still not optimally controlled. He is taking his insulin as directed. His stage 3 chronic kidney disease is stable. His congestive heart failure is compensated. BPH symptoms are alleviated with tamsulosin but not completely. He has urology consultation in December. He is due for labs for monitoring his hyperparathyroidism. His coronary artery disease is stable with stable angina, compensated on metoprolol and atorvastatin and aspirin. Hypertension is controlled below the therapeutic goal. It was agreed to reduce his dose of losartan. Other chronic conditions are represented as and appear to be compensated/controlled and stable.       1. Type 2 diabetes mellitus with hyperglycemia, with long-term current use of insulin  -     Hemoglobin A1C; Standing  -     Ambulatory referral/consult to Outpatient Case Management    2. Stage 3a chronic kidney disease  Assessment & Plan:  Lab Results   Component Value Date    EGFRNORACEVR 45 (A) 09/30/2024    EGFRNORACEVR 45.3 (A) 08/21/2024    CREATININE 1.5 (H) 09/30/2024    CREATININE 1.5 (H) 08/21/2024     TREATMENT PLAN: Ensure adequate water intake. Avoid/minimize NSAID use. Avoid nephrotoxic drugs as able. Monitor and control BP. Periodic lab monitoring of renal function.     Orders:  -     losartan (COZAAR) 50 MG tablet; Take 1 tablet (50 mg total) by mouth once daily.  Dispense: 90 tablet; Refill: 3    3. Hypertension associated with diabetes  Assessment & Plan:  BP Readings from Last 6 Encounters:   10/14/24 104/60   09/18/24 104/62   08/29/24 131/78   08/22/24 136/69   08/20/24 130/66   06/27/24 (!) 150/73        Orders:  -     metoprolol  succinate (TOPROL-XL) 25 MG 24 hr tablet; Take 1 tablet (25 mg total) by mouth every evening.  Dispense: 90 tablet; Refill: 3  -     losartan (COZAAR) 50 MG tablet; Take 1 tablet (50 mg total) by mouth once daily.  Dispense: 90 tablet; Refill: 3    4. Hyperlipidemia associated with type 2 diabetes mellitus  -     atorvastatin (LIPITOR) 40 MG tablet; Take 1 tablet (40 mg total) by mouth every evening.  Dispense: 90 tablet; Refill: 3    5. Coronary artery disease of bypass graft of native heart with stable angina pectoris  Overview:  Per patient s/p CABG x4 5/2011    Orders:  -     aspirin (ECOTRIN) 81 MG EC tablet; Take 1 tablet (81 mg total) by mouth once daily.  Dispense: 90 tablet; Refill: 3  -     atorvastatin (LIPITOR) 40 MG tablet; Take 1 tablet (40 mg total) by mouth every evening.  Dispense: 90 tablet; Refill: 3  -     isosorbide mononitrate (IMDUR) 60 MG 24 hr tablet; Take 1 tablet (60 mg total) by mouth once daily.  Dispense: 90 tablet; Refill: 3  -     metoprolol succinate (TOPROL-XL) 25 MG 24 hr tablet; Take 1 tablet (25 mg total) by mouth every evening.  Dispense: 90 tablet; Refill: 3    6. Thoracic aorta atherosclerosis  Overview:  CXR 2///11- TOP NORMAL HEART SIZE WITH MILD TORTUOSITY AND   ATHEROSCLEROTIC CALCIFICATION OF THE THORACIC AORTA.    Orders:  -     atorvastatin (LIPITOR) 40 MG tablet; Take 1 tablet (40 mg total) by mouth every evening.  Dispense: 90 tablet; Refill: 3    7. Asymptomatic stenosis of both carotid arteries without infarction  -     aspirin (ECOTRIN) 81 MG EC tablet; Take 1 tablet (81 mg total) by mouth once daily.  Dispense: 90 tablet; Refill: 3  -     atorvastatin (LIPITOR) 40 MG tablet; Take 1 tablet (40 mg total) by mouth every evening.  Dispense: 90 tablet; Refill: 3    8. Diabetes mellitus type 2 with peripheral artery disease  -     aspirin (ECOTRIN) 81 MG EC tablet; Take 1 tablet (81 mg total) by mouth once daily.  Dispense: 90 tablet; Refill: 3  -     atorvastatin  (LIPITOR) 40 MG tablet; Take 1 tablet (40 mg total) by mouth every evening.  Dispense: 90 tablet; Refill: 3    9. Chronic combined systolic and diastolic congestive heart failure  -     metoprolol succinate (TOPROL-XL) 25 MG 24 hr tablet; Take 1 tablet (25 mg total) by mouth every evening.  Dispense: 90 tablet; Refill: 3  -     losartan (COZAAR) 50 MG tablet; Take 1 tablet (50 mg total) by mouth once daily.  Dispense: 90 tablet; Refill: 3    10. Benign prostatic hyperplasia with weak urinary stream  Overview:  Patient states has urge incontinence    Orders:  -     tamsulosin (FLOMAX) 0.4 mg Cap; Take 1 capsule (0.4 mg total) by mouth once daily.  Dispense: 90 capsule; Refill: 3    11. Hyperparathyroidism, secondary renal  Assessment & Plan:  Lab Results   Component Value Date    .4 (H) 02/14/2024    .6 (H) 11/14/2023    PTH 93.0 (H) 09/16/2020    CALCIUM 10.3 09/30/2024    CALCIUM 10.6 (H) 08/21/2024    CALCIUM 10.7 (H) 07/24/2024    CAION 1.28 07/10/2024    ALBUMIN 3.9 09/30/2024    ALBUMIN 3.9 08/21/2024    PHOS 2.8 02/14/2024    PHOS 3.0 12/30/2020    PHOS 3.6 12/29/2020    THEDIPKJ75FA 43 02/14/2024    ZOBFNPIO23UZ 28 (L) 12/28/2020    HVDNOXZF91TM 29 (L) 11/30/2020     Lab Results   Component Value Date    EGFRNORACEVR 45 (A) 09/30/2024    EGFRNORACEVR 45.3 (A) 08/21/2024    EGFRNORACEVR 42.0 (A) 07/24/2024    CREATININE 1.5 (H) 09/30/2024    CREATININE 1.5 (H) 08/21/2024    CREATININE 1.6 (H) 07/24/2024     Lab Results   Component Value Date    URTIMEDVOL 1200 02/12/2015    UCD 24 02/12/2015    LABCALC 6.6 02/12/2015    [id number] 3 (L) 02/12/2015    CAURMGSPEC 79 02/12/2015    CALCIUMUR 18.9 (H) 01/16/2018    CREATRANDUR 129.0 02/14/2024        Orders:  -     vitamin D (VITAMIN D3) 1000 units Tab; Take 1 tablet (1,000 Units total) by mouth once daily.  Dispense: 90 tablet; Refill: 3  -     PTH, Intact; Standing  -     Renal Function Panel; Standing  -     Vitamin D; Standing    12. Uses roller  "walker    13. Uses hearing aids    14. At risk for knowledge deficit    No other significant complaints or concerns were reported.  Today's visit involved the intricate management of episodic problem(s) and the ongoing care for the patient's serious or complex condition(s) listed above, reflecting the inherent complexity of providing longitudinal, comprehensive evaluation and management as the central hub for the patient's primary care services.      Future Appointments   Date Time Provider Department Center   12/18/2024  9:30 AM Hortencia Michael MD [facility]   1/6/2025 11:40 AM LAB[facility]ATORY, [place] [facility]   1/9/2025 10:00 AM Maureen Rivera, NP [facility]   1/21/2025  9:40 AM Deniz Sadler MD [facility]   4/7/2025 10:05 AM LABORATORY, [facility]   4/14/2025  8:40 AM MUKUL Garg MD [facility]     Review of Systems   Respiratory:  Negative for chest tightness and shortness of breath.    Endocrine: Negative for polydipsia and polyuria.       Vitals:    10/14/24 0800   BP: 104/60   Pulse: 60   Resp: 20   Temp: 97.1 °F (36.2 °C)   TempSrc: Tympanic   SpO2: (!) 92%   Weight: 77.5 kg (170 lb 13.7 oz)   Height: 5' 10" (1.778 m)   Physical Exam  Vitals reviewed.   Constitutional:       General: He is not in acute distress.     Appearance: Normal appearance. He is not ill-appearing or diaphoretic.   Neck:      Vascular: No carotid bruit.   Cardiovascular:      Rate and Rhythm: Normal rate and regular rhythm.   Pulmonary:      Effort: Pulmonary effort is normal.      Breath sounds: Normal breath sounds.   Skin:     General: Skin is warm and dry.   Neurological:      Mental Status: He is alert and oriented to person, place, and time. Mental status is at baseline.   Psychiatric:         Mood and Affect: Mood normal.         Behavior: Behavior normal.         Judgment: Judgment normal.       Documentation entered by me for this " "encounter may have been done in part using speech-recognition technology. Although I have made an effort to ensure accuracy, "sound like" errors may exist and should be interpreted in context.    I spent a total of 40 minutes today evaluating and managing this patient for this encounter.  This includes face to face time and non-face to face time preparing to see the patient (eg, review of tests), obtaining and/or reviewing separately obtained history, documenting clinical information in the electronic or other health record, independently interpreting results and communicating results to the patient/family/caregiver, or care coordinator. This time was spent personally by me on the following activities: review of nurse's notes, pre-charting, review of patient's past medical history, assessing age-appropriate health maintenance needs, review of any interval history, medication reconciliation, reviewing/reconciling/updating problem list, reviewing/reconciling/updating PMFSH, obtaining history from the patient, examination of the patient, review and interpretation of lab results, answering patient's questions about lab results, reviewing my previous chart notes, reviewing consulting specialist's chart notes, requesting outside records, evaluation of the patient's response to treatment, managing and/or ordering prescription medications, ordering labs, educating patient and answering their questions about treatment plan, goals of treatment, and follow-up, and final documentation of the visit.  This time was exclusive of any separately billable procedures for this patient and exclusive of time spent treating any other patient.      WRAP-UP INSTRUCTIONS  Labs now.  Repeat A1c in 5-6 months.  OV with me or Mark soon afterward.  "

## 2024-10-15 VITALS
RESPIRATION RATE: 20 BRPM | OXYGEN SATURATION: 92 % | DIASTOLIC BLOOD PRESSURE: 60 MMHG | BODY MASS INDEX: 24.46 KG/M2 | WEIGHT: 170.88 LBS | TEMPERATURE: 97 F | SYSTOLIC BLOOD PRESSURE: 104 MMHG | HEART RATE: 60 BPM | HEIGHT: 70 IN

## 2024-10-15 PROBLEM — Z91.89 AT RISK FOR KNOWLEDGE DEFICIT: Status: ACTIVE | Noted: 2024-10-15

## 2024-10-15 NOTE — PROGRESS NOTES
Please let him know that his lab results are all fine and most of his abnormal results have improved. His diabetes control is improved, although still not well controlled. Please mail him a copy of his labs and ask him to take his lab results with him [facility] who is managing his diabetes.

## 2024-10-17 ENCOUNTER — PATIENT OUTREACH (OUTPATIENT)
Dept: ADMINISTRATIVE | Facility: HOSPITAL | Age: 84
End: 2024-10-17
Payer: MEDICARE

## 2024-10-17 ENCOUNTER — OUTPATIENT CASE MANAGEMENT (OUTPATIENT)
Dept: ADMINISTRATIVE | Facility: OTHER | Age: 84
End: 2024-10-17
Payer: MEDICARE

## 2024-12-18 ENCOUNTER — PROCEDURE VISIT (OUTPATIENT)
Dept: UROLOGY | Facility: CLINIC | Age: 84
End: 2024-12-18
Payer: MEDICARE

## 2024-12-18 VITALS
RESPIRATION RATE: 18 BRPM | DIASTOLIC BLOOD PRESSURE: 71 MMHG | BODY MASS INDEX: 25.15 KG/M2 | HEART RATE: 73 BPM | WEIGHT: 175.69 LBS | SYSTOLIC BLOOD PRESSURE: 129 MMHG | HEIGHT: 70 IN

## 2024-12-18 DIAGNOSIS — C67.8 MALIGNANT NEOPLASM OF OVERLAPPING SITES OF BLADDER: Primary | ICD-10-CM

## 2024-12-18 DIAGNOSIS — C67.9 BLADDER CANCER: ICD-10-CM

## 2024-12-18 DIAGNOSIS — Z01.818 PRE-OP TESTING: ICD-10-CM

## 2024-12-18 LAB
BILIRUB UR QL STRIP: NEGATIVE
GLUCOSE UR QL STRIP: POSITIVE
KETONES UR QL STRIP: NEGATIVE
LEUKOCYTE ESTERASE UR QL STRIP: NEGATIVE
PH, POC UA: 5
POC BLOOD, URINE: NEGATIVE
POC NITRATES, URINE: NEGATIVE
PROT UR QL STRIP: NEGATIVE
SP GR UR STRIP: 1.01 (ref 1–1.03)
UROBILINOGEN UR STRIP-ACNC: 0.2 (ref 0.3–2.2)

## 2024-12-18 RX ORDER — CIPROFLOXACIN 500 MG/1
500 TABLET ORAL
Status: COMPLETED | OUTPATIENT
Start: 2024-12-18 | End: 2024-12-18

## 2024-12-18 RX ORDER — LIDOCAINE HYDROCHLORIDE 20 MG/ML
JELLY TOPICAL
Status: COMPLETED | OUTPATIENT
Start: 2024-12-18 | End: 2024-12-18

## 2024-12-18 RX ORDER — CIPROFLOXACIN 2 MG/ML
400 INJECTION, SOLUTION INTRAVENOUS
OUTPATIENT
Start: 2024-12-18

## 2024-12-18 RX ADMIN — LIDOCAINE HYDROCHLORIDE 11 ML: 20 JELLY TOPICAL at 09:12

## 2024-12-18 RX ADMIN — CIPROFLOXACIN 500 MG: 500 TABLET ORAL at 09:12

## 2024-12-18 NOTE — H&P
CC:bladder cancer    History of Present Illness:   Nithin Pino is a 84 y.o. male here for follow up.     12/18/24-cysto today.   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 Pt did well following TURBT. He denies gross hematuria or pain.   8/20/24-Here for cysto. No gross hematuria.   5/21/24-Completed BCG induction. Here for cysto.   2/20/24-Path showing HG Ta urothelial cell carcinoma in all regions of tumor. Upon review with the pt, he only previously received 3 BCG treatments, but didn't get a full 6 treatment induction. No gross hematuria at this point. He was having some incontinence for a few days following TURBT. Now it has resolved.    1/17/24-Pt now cleared for TURBT by cardiology. No gross hematuria. No dysuria. No stranguria. Pt states that the BCG treatment are painful and asks about the natural history of bladder cancer, should he elect against treatment.   9/26/23: pt here for surveillance cysto.   6/23/23-Pt s/p TURBT. 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 No gross hematuria or dysuria. No weakness or fever. States that he is urinating well.   5/15/23- pt here for cysto. CT scan personally reviewed, showing a polypoid tumor at the left bladder wall, enhancing.   4/10/23-81yo male, here today for follow up, last seen in 2021. At that time, here was being treated for BPH with finasteride and tamsulosin and OAB with vesicare. He did have gross hematuria, but refused cystoscopy. He reports that he was seen in the ED at [facility] yesterday with dysuria and gross hematuria. Was prescribed levaquin. States that he hasn't started the levaquin yet. Saw a little blood this am. No fever. Stream is strong. Still on finasteride, vesicare and flomax. No difficulty emptying--states it was checked in the ED yesterday and he emptied completely.     Pt's records from [facility] with Dr. Sanchez reviewed from 12/22/22, noting cytology suspicious for high grade bladder  cancer.       Review of Systems   Constitutional:  Negative for chills and fever.   Respiratory:  Negative for shortness of breath.    Cardiovascular:  Negative for chest pain.   Gastrointestinal:  Negative for abdominal pain.   Genitourinary:  Negative for flank pain.   Musculoskeletal:  Positive for arthralgias. Negative for back pain.   All other systems reviewed and are negative.        Past Medical History:   Diagnosis Date    Abnormal brain MRI 01/21/2018    Chronic microvascular ischemia changes per MRI July 9, 2007 in [facility]    Abnormal ECG 10/31/2013    Abnormal stress test 01/28/2016    Alcohol dependence     States alcohol abuse ended in 1994    AP (angina pectoris) 01/28/2016    Asthma     Bladder cancer     Carotid artery occlusion     Cataract     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    2 - Normal left ventricular systolic function (EF 60-65%).    3 - Normal left ventricular diastolic function.    4 - Mild left atrial enlargement.    5 - Normal right ventricular systolic function .    6 - Trivial to mild aortic regurgitation.    7 - Trivial to mild mitral regurgitation.  10 - Trivial to mild pulmonic regurgitation.     Hematuria 06/25/2020    History of alcohol abuse 01/22/2018    Patient denies drinking to excess since 1994.    History of atherectomy 01/21/2018 2/2016 [facility]    History of chronic kidney disease 01/22/2018    History of CKD 3    History of PTCA 10/31/2013    History of PTCA 03/09/2016    Hyperlipidemia     Hypertension     Insomnia  06/17/2013    Iron deficiency anemia 10/19/2023    Ischemic cardiomyopathy 10/31/2013    Long term (current) use of antithrombotics/antiplatelets 01/21/2018    Malignant neoplasm of overlapping sites of bladder 06/08/2023    Myocardial infarction     Old MI (myocardial infarction) 1994    Peripheral vascular disease     Polyneuropathy     RBBB 10/31/2013    S/P CABG (coronary artery bypass graft) 10/31/2013    Shortness of breath 10/31/2013    Tobacco dependence     resolved    Trouble in sleeping     Type 2 diabetes with peripheral circulatory disorder, controlled     Wears glasses        Past Surgical History:   Procedure Laterality Date    APPENDECTOMY      CARDIAC CATHETERIZATION      2016    CARDIAC SURGERY  1994    balloon angioplasty    CATARACT EXTRACTION W/  INTRAOCULAR LENS IMPLANT Right 02/01/2017    CORONARY ARTERY BYPASS GRAFT  05/2011    per patient x4    HERNIA REPAIR      OPEN REDUCTION AND INTERNAL FIXATION (ORIF) OF INJURY OF HIP      PCIOL Bilateral OD 02/01/17/OS 02/15/17    DR. ALONZO    RETROGRADE PYELOGRAPHY Bilateral 8/29/2024    Procedure: PYELOGRAM, RETROGRADE;  Surgeon: Hortencia Michael MD;  Location: [facility] OR;  Service: Urology;  Laterality: Bilateral;    TURBT (TRANSURETHRAL RESECTION OF BLADDER TUMOR) N/A 6/8/2023    Procedure: TURBT (TRANSURETHRAL RESECTION OF BLADDER TUMOR);  Surgeon: Hortencia Michael MD;  Location: [facility] OR;  Service: Urology;  Laterality: N/A;    TURBT (TRANSURETHRAL RESECTION OF BLADDER TUMOR) N/A 2/8/2024    Procedure: TURBT (TRANSURETHRAL RESECTION OF BLADDER TUMOR);  Surgeon: Hortencia Michael MD;  Location: [facility] OR;  Service: Urology;  Laterality: N/A;    TURBT (TRANSURETHRAL RESECTION OF BLADDER TUMOR) N/A 8/29/2024    Procedure: TURBT (TRANSURETHRAL RESECTION OF BLADDER TUMOR);  Surgeon: Hortencia Michael MD;  Location: [facility] OR;  Service: Urology;  Laterality: N/A;       Family History   Adopted: Yes   Problem Relation Name Age of Onset    Diabetes  Brother      Diabetes Sister      Cancer Neg Hx      Heart disease Neg Hx      Kidney disease Neg Hx         Social History     Tobacco Use    Smoking status: Former     Current packs/day: 0.00     Average packs/day: 1.5 packs/day for 40.0 years (60.0 ttl pk-yrs)     Types: Cigarettes     Start date: 1954     Quit date: 1994     Years since quittin.9    Smokeless tobacco: Former     Quit date: 2011    Tobacco comments:     approx date   Substance Use Topics    Alcohol use: Not Currently     Comment: occasionally; 1-2 cans of beer a month    Drug use: No       Current Outpatient Medications   Medication Sig Dispense Refill    amLODIPine (NORVASC) 10 MG tablet Take 0.5 tablets (5 mg total) by mouth once daily. 90 tablet 3    aspirin (ECOTRIN) 81 MG EC tablet Take 1 tablet (81 mg total) by mouth once daily. 90 tablet 3    atorvastatin (LIPITOR) 40 MG tablet Take 1 tablet (40 mg total) by mouth every evening. 90 tablet 3    b complex vitamins tablet Take 1 tablet by mouth once daily.      BLOOD-GLUCOSE METER (TRUERESULT BLOOD GLUCOSE SYSTM MISC) by Misc.(Non-Drug; Combo Route) route.      cyanocobalamin (VITAMIN B-12) 1000 MCG tablet Take 100 mcg by mouth once daily.      dulaglutide (TRULICITY) 4.5 mg/0.5 mL pen injector Inject 4.5 mg into the skin every 7 days.       EMBRACE PRO TEST STRIPS Strp CHECK BLOOD SUGAR TWICE DAILY. 50 strip 0    finasteride (PROSCAR) 5 mg tablet TAKE 1 TABLET ONE TIME DAILY 90 tablet 3    furosemide (LASIX) 40 MG tablet TAKE 1 PILL IN AM, AND 1/2 PILL IN PM 60 tablet 11    glucose 4 GM chewable tablet Take 4 tablets (16 g total) by mouth as needed for Low blood sugar. 100 tablet 5    hyoscyamine (LEVSIN/SL) 0.125 mg Subl Place 2 tablets (0.25 mg total) under the tongue every 4 (four) hours as needed (bladder spasms). 30 tablet 1    insulin (LANTUS SOLOSTAR U-100 INSULIN) glargine 100 units/mL SubQ pen Inject into the skin. 15 units QHS and 18 units QAM      insulin  aspart U-100 (NOVOLOG) 100 unit/mL injection Inject 3 Units into the skin every 4 (four) hours as needed (for high blood sugar or carbohydrate intake).      isosorbide mononitrate (IMDUR) 60 MG 24 hr tablet Take 1 tablet (60 mg total) by mouth once daily. 90 tablet 3    LIDOcaine (LIDODERM) 5 % APPLY 1 PATCH TOPICALLY EVERY DAY FOR PAIN  WEAR FOR 12 HOURS, THEN REMOVE. DO NOT APPLY NEW PATCH FOR AT LEAST 12 HOURS.      losartan (COZAAR) 50 MG tablet Take 1 tablet (50 mg total) by mouth once daily. 90 tablet 3    metoprolol succinate (TOPROL-XL) 25 MG 24 hr tablet Take 1 tablet (25 mg total) by mouth every evening. 90 tablet 3    miconazole (MICOTIN) 2 % cream APPLY SMALL AMOUNT TOPICALLY TWICE A DAY AS NEEDED FOR FUNGAL INFECTION      multivit-minerals/folic acid (DIABETIC MULTIVITAMIN ORAL) Take by mouth.      nitroGLYCERIN (NITROSTAT) 0.4 MG SL tablet 0.4 mg.      tamsulosin (FLOMAX) 0.4 mg Cap Take 1 capsule (0.4 mg total) by mouth once daily. 90 capsule 3    TRUEPLUS LANCETS 26 gauge Misc CHECK BLOOD SUGAR TWICE DAILY. 100 each 0    TRUERESULT BLOOD GLUCOSE SYSTM kit CHECK BLOOD SUGAR TWICE DAILY. 1 each 0    vitamin D (VITAMIN D3) 1000 units Tab Take 1 tablet (1,000 Units total) by mouth once daily. 90 tablet 3     Current Facility-Administered Medications   Medication Dose Route Frequency Provider Last Rate Last Admin    ciprofloxacin HCl tablet 500 mg  500 mg Oral 1 time in Clinic/HOD         LIDOcaine HCl 2% urojet   Mucous Membrane 1 time in Clinic/HOD            Review of patient's allergies indicates:   Allergen Reactions    Pseudoephedrine-guaifenesin Shortness Of Breath    Panmist dm  [pseudoephedrine-dm-guaifenesin]      Other reaction(s): Unknown       Physical Exam  Vitals:    12/18/24 0900   BP: 129/71   Pulse: 73   Resp: 18         General: Well-developed, well-nourished in no acute distress  HEENT: Normocephalic, atraumatic, Extraocular movements intact  Neck: supple, trachea midline, no cervical  or supraclavicular lymphadenopathy  Respirations: even and unlabored  Extremities: atraumatic, moves all equally, no clubbing, cyanosis or edema  Psych: normal affect  Skin: warm and dry, no lesions  Neuro: Alert and oriented, Cranial nerves II-XII grossly intact      Lab Results   Component Value Date    PSA 0.37 12/22/2022    PSA 0.29 09/16/2020    PSA 0.82 06/09/2015     Procedure: Cystoscopy      Procedure in detail:   Informed consent was obtained. The patient's genitalia was prepped and draped in the usual sterile fashion. Lidocaine jelly was administered per urethra. I utilized the flexible cystoscope and advanced it into the urethral meatus. No urethral strictures were noted. Bladder access was obtained. Systematic review of the bladder was performed, noting 3 1cm tumors at the dome/anterior bladder wall. Bilateral ureteral orifices were visualized and noted to be effluxing clear urine. Retroflexion was utilized, noting a small tumor at the anterior bladder neck. The scope was slowly backed out of the urethra, and the prostate was noted to be moderately obstructing. No urethral lesions were identified. The patient tolerated the procedure well. Estimated blood loss was 0cc.     Assessment:   1. Malignant neoplasm of overlapping sites of bladder  CBC Auto Differential    Basic Metabolic Panel    Ambulatory referral/consult to Pre-Admit    Urine culture    Place in Outpatient    Case Request Operating Room: TURBT (TRANSURETHRAL RESECTION OF BLADDER TUMOR), CYSTOSCOPY, WITH RETROGRADE PYELOGRAM    Diet NPO    Place sequential compression device    Ambulatory referral/consult to Hematology / Oncology      2. Pre-op testing        3. Bladder cancer                  Plan:  Malignant neoplasm of overlapping sites of bladder  -     CBC Auto Differential; Future; Expected date: 12/18/2024  -     Basic Metabolic Panel; Future; Expected date: 12/18/2024  -     Ambulatory referral/consult to Pre-Admit; Future; Expected  date: 12/25/2024  -     Urine culture; Future; Expected date: 12/18/2024  -     Place in Outpatient; Standing  -     Case Request Operating Room: TURBT (TRANSURETHRAL RESECTION OF BLADDER TUMOR), CYSTOSCOPY, WITH RETROGRADE PYELOGRAM  -     Diet NPO; Standing  -     Place sequential compression device; Standing  -     Ambulatory referral/consult to Hematology / Oncology; Future; Expected date: 12/25/2024    Pre-op testing    Bladder cancer    Other orders  -     ciprofloxacin HCl tablet 500 mg  -     LIDOcaine HCl 2% urojet  -     IP VTE HIGH RISK PATIENT; Standing  -     ciprofloxacin (CIPRO)400mg/200ml D5W IVPB 400 mg        Discussed recurrent bladder tumors. Pt has not been willing to undergo any further intravesical treatments, but is asking about the option of Keytruda. Will refer to Heme/Onc for their recommendations.   TURBT with Bilateral RPG scheduled 1/16/24.

## 2024-12-19 ENCOUNTER — PATIENT OUTREACH (OUTPATIENT)
Dept: ADMINISTRATIVE | Facility: HOSPITAL | Age: 84
End: 2024-12-19
Payer: MEDICARE

## 2024-12-19 LAB
ALBUMIN: 3.7 (ref 3.5–4.8)
ALP SERPL-CCNC: 68 U/L (ref 38–126)
ALT SERPL-CCNC: 23 U/L (ref 12–63)
AST SERPL-CCNC: 21 U/L (ref 15–41)
BILIRUB SERPL-MCNC: 0.7 MG/DL (ref 0.1–1.3)
BUN BLD-MCNC: 33 MG/DL (ref 7–20)
CALCIUM SERPL-MCNC: 9.5 MG/DL (ref 8.9–10.3)
CHLORIDE SERPL-SCNC: 104 MMOL/L (ref 101–111)
CO2 SERPL-SCNC: 24 MMOL/L (ref 22–32)
CREAT SERPL-MCNC: 1.6 MG/DL (ref 0.6–1.3)
GLUCOSE SERPL-MCNC: 279 MG/DL (ref 70–110)
HBA1C MFR BLD: 9.7 % (ref 4.2–5.8)
POTASSIUM SERPL-SCNC: 4.9 MMOL/L (ref 3.6–5.1)
PROT SNV-MCNC: 6.6 G/L (ref 6.1–7.9)
SODIUM BLD-SCNC: 137 MMOL/L (ref 136–144)

## 2024-12-22 NOTE — PROGRESS NOTES
@MY STAFF: Please schedule him appointment with Mark to review results.    @MARK: Please review results with patient.  Recommendations: Assess compliance. Recommend Diabetes Education if knowledge deficit. Consider SGLT-2i (Jardiance, Invokana, Farxiga). Consider referral to Diabetes Clinic.

## 2024-12-24 ENCOUNTER — OFFICE VISIT (OUTPATIENT)
Dept: INTERNAL MEDICINE | Facility: CLINIC | Age: 84
End: 2024-12-24
Payer: MEDICARE

## 2024-12-24 VITALS
TEMPERATURE: 96 F | HEART RATE: 101 BPM | OXYGEN SATURATION: 96 % | SYSTOLIC BLOOD PRESSURE: 134 MMHG | DIASTOLIC BLOOD PRESSURE: 70 MMHG | BODY MASS INDEX: 25.44 KG/M2 | WEIGHT: 177.69 LBS | HEIGHT: 70 IN

## 2024-12-24 DIAGNOSIS — E11.51 DIABETES MELLITUS TYPE 2 WITH PERIPHERAL ARTERY DISEASE: Primary | Chronic | ICD-10-CM

## 2024-12-24 DIAGNOSIS — E11.59 HYPERTENSION ASSOCIATED WITH DIABETES: Chronic | ICD-10-CM

## 2024-12-24 DIAGNOSIS — I15.2 HYPERTENSION ASSOCIATED WITH DIABETES: Chronic | ICD-10-CM

## 2024-12-24 DIAGNOSIS — Z91.89 AT RISK FOR KNOWLEDGE DEFICIT: ICD-10-CM

## 2024-12-24 PROCEDURE — 99999 PR PBB SHADOW E&M-EST. PATIENT-LVL V: CPT | Mod: PBBFAC,HCNC,, | Performed by: NURSE PRACTITIONER

## 2024-12-24 PROCEDURE — 1101F PT FALLS ASSESS-DOCD LE1/YR: CPT | Mod: HCNC,CPTII,S$GLB, | Performed by: NURSE PRACTITIONER

## 2024-12-24 PROCEDURE — 1160F RVW MEDS BY RX/DR IN RCRD: CPT | Mod: HCNC,CPTII,S$GLB, | Performed by: NURSE PRACTITIONER

## 2024-12-24 PROCEDURE — 3288F FALL RISK ASSESSMENT DOCD: CPT | Mod: HCNC,CPTII,S$GLB, | Performed by: NURSE PRACTITIONER

## 2024-12-24 PROCEDURE — 3078F DIAST BP <80 MM HG: CPT | Mod: HCNC,CPTII,S$GLB, | Performed by: NURSE PRACTITIONER

## 2024-12-24 PROCEDURE — 99214 OFFICE O/P EST MOD 30 MIN: CPT | Mod: HCNC,S$GLB,, | Performed by: NURSE PRACTITIONER

## 2024-12-24 PROCEDURE — 3075F SYST BP GE 130 - 139MM HG: CPT | Mod: HCNC,CPTII,S$GLB, | Performed by: NURSE PRACTITIONER

## 2024-12-24 PROCEDURE — 2023F DILAT RTA XM W/O RTNOPTHY: CPT | Mod: HCNC,CPTII,S$GLB, | Performed by: NURSE PRACTITIONER

## 2024-12-24 PROCEDURE — 1126F AMNT PAIN NOTED NONE PRSNT: CPT | Mod: HCNC,CPTII,S$GLB, | Performed by: NURSE PRACTITIONER

## 2024-12-24 PROCEDURE — 1159F MED LIST DOCD IN RCRD: CPT | Mod: HCNC,CPTII,S$GLB, | Performed by: NURSE PRACTITIONER

## 2024-12-24 PROCEDURE — G2211 COMPLEX E/M VISIT ADD ON: HCPCS | Mod: HCNC,S$GLB,, | Performed by: NURSE PRACTITIONER

## 2024-12-24 NOTE — PROGRESS NOTES
Subjective:      Patient ID: Nithin Pino is a 84 y.o. male.    Chief Complaint: Follow-up    History of Present Illness    CHIEF COMPLAINT:  Nithin presents today for diabetes management.    DIABETES:  He takes Lantus 17 units in the morning and 8 units in the evening, along with Novolog 3 units in the morning and evening at 6 AM and 6 PM daily. His glucose remains poorly controlled with current 14-day average glucose of 271, monthly average of 255, and current post-breakfast glucose of 267. [facility]. He declines medication adjustments or changes to current diabetes management plan at this time.      ROS:  General: -fever, -chills, -fatigue, -weight gain, -weight loss  Eyes: -vision changes, -redness, -discharge  ENT: -ear pain, -nasal congestion, -sore throat  Cardiovascular: -chest pain, -palpitations, -lower extremity edema  Respiratory: -cough, -shortness of breath  Gastrointestinal: -abdominal pain, -nausea, -vomiting, -diarrhea, -constipation, -blood in stool  Genitourinary: -dysuria, -hematuria, -frequency  Musculoskeletal: -joint pain, -muscle pain  Skin: -rash, -lesion  Neurological: -headache, -dizziness, -numbness, -tingling  Psychiatric: -anxiety, -depression, -sleep difficulty          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 heart with stable angina pectoris    Benign prostatic hyperplasia    COPD (chronic obstructive pulmonary disease)    RBBB    Ischemic cardiomyopathy    Claudication in peripheral vascular disease    Stage 3a chronic kidney disease    Hyperlipidemia associated with type 2 diabetes mellitus    Hyperparathyroidism, secondary renal    Diabetes mellitus type 2 with peripheral artery disease    Thoracic aorta atherosclerosis    Asymptomatic stenosis of both carotid arteries without infarction    Chronic stable  angina    CRI (chronic renal insufficiency)    OAB (overactive bladder)    Elevated troponin    Chronic combined systolic and diastolic congestive heart failure    Hematuria, gross    Nonrheumatic aortic valve insufficiency    Bilateral sensorineural hearing loss    Iron deficiency anemia    Preop cardiovascular exam    Anemia in other chronic diseases classified elsewhere    Malignant neoplasm of overlapping sites of bladder    Type 2 diabetes mellitus with hyperglycemia, with long-term current use of insulin    Closed anterior displaced fracture of sternal end of left clavicle    Syncope    Left-sided weakness    Uses roller walker    Uses hearing aids    At risk for knowledge deficit         Current Outpatient Medications:     amLODIPine (NORVASC) 10 MG tablet, Take 0.5 tablets (5 mg total) by mouth once daily., Disp: 90 tablet, Rfl: 3    aspirin (ECOTRIN) 81 MG EC tablet, Take 1 tablet (81 mg total) by mouth once daily., Disp: 90 tablet, Rfl: 3    atorvastatin (LIPITOR) 40 MG tablet, Take 1 tablet (40 mg total) by mouth every evening., Disp: 90 tablet, Rfl: 3    b complex vitamins tablet, Take 1 tablet by mouth once daily., Disp: , Rfl:     cyanocobalamin (VITAMIN B-12) 1000 MCG tablet, Take 100 mcg by mouth once daily., Disp: , Rfl:     dulaglutide (TRULICITY) 4.5 mg/0.5 mL pen injector, Inject 4.5 mg into the skin every 7 days. SUNDAY, Disp: , Rfl:     finasteride (PROSCAR) 5 mg tablet, TAKE 1 TABLET ONE TIME DAILY, Disp: 90 tablet, Rfl: 3    furosemide (LASIX) 40 MG tablet, TAKE 1 PILL IN AM, AND 1/2 PILL IN PM, Disp: 60 tablet, Rfl: 11    glucose 4 GM chewable tablet, Take 4 tablets (16 g total) by mouth as needed for Low blood sugar., Disp: 100 tablet, Rfl: 5    hyoscyamine (LEVSIN/SL) 0.125 mg Subl, Place 2 tablets (0.25 mg total) under the tongue every 4 (four) hours as needed (bladder spasms)., Disp: 30 tablet, Rfl: 1    insulin (LANTUS SOLOSTAR U-100 INSULIN) glargine 100 units/mL SubQ pen, Inject into  "the skin. 15 units QHS and 18 units QAM, Disp: , Rfl:     insulin aspart U-100 (NOVOLOG) 100 unit/mL injection, Inject 3 Units into the skin every 4 (four) hours as needed (for high blood sugar or carbohydrate intake)., Disp: , Rfl:     isosorbide mononitrate (IMDUR) 60 MG 24 hr tablet, Take 1 tablet (60 mg total) by mouth once daily., Disp: 90 tablet, Rfl: 3    LIDOcaine (LIDODERM) 5 %, APPLY 1 PATCH TOPICALLY EVERY DAY FOR PAIN  WEAR FOR 12 HOURS, THEN REMOVE. DO NOT APPLY NEW PATCH FOR AT LEAST 12 HOURS., Disp: , Rfl:     losartan (COZAAR) 50 MG tablet, Take 1 tablet (50 mg total) by mouth once daily., Disp: 90 tablet, Rfl: 3    metoprolol succinate (TOPROL-XL) 25 MG 24 hr tablet, Take 1 tablet (25 mg total) by mouth every evening., Disp: 90 tablet, Rfl: 3    miconazole (MICOTIN) 2 % cream, APPLY SMALL AMOUNT TOPICALLY TWICE A DAY AS NEEDED FOR FUNGAL INFECTION, Disp: , Rfl:     multivit-minerals/folic acid (DIABETIC MULTIVITAMIN ORAL), Take by mouth., Disp: , Rfl:     nitroGLYCERIN (NITROSTAT) 0.4 MG SL tablet, 0.4 mg., Disp: , Rfl:     tamsulosin (FLOMAX) 0.4 mg Cap, Take 1 capsule (0.4 mg total) by mouth once daily., Disp: 90 capsule, Rfl: 3    vitamin D (VITAMIN D3) 1000 units Tab, Take 1 tablet (1,000 Units total) by mouth once daily., Disp: 90 tablet, Rfl: 3    BLOOD-GLUCOSE METER (TRUERESULT BLOOD GLUCOSE SYSTM MISC), by Misc.(Non-Drug; Combo Route) route., Disp: , Rfl:     EMBRACE PRO TEST STRIPS Strp, CHECK BLOOD SUGAR TWICE DAILY., Disp: 50 strip, Rfl: 0    TRUEPLUS LANCETS 26 gauge Misc, CHECK BLOOD SUGAR TWICE DAILY., Disp: 100 each, Rfl: 0    TRUERESULT BLOOD GLUCOSE SYSTM kit, CHECK BLOOD SUGAR TWICE DAILY., Disp: 1 each, Rfl: 0      Objective:   /70 (BP Location: Left arm, Patient Position: Sitting)   Pulse 101   Temp 96.3 °F (35.7 °C) (Tympanic)   Ht 5' 10" (1.778 m)   Wt 80.6 kg (177 lb 11.1 oz)   SpO2 96%   BMI 25.50 kg/m²     Physical Exam             Physical Exam  Vitals and " nursing note reviewed.   Constitutional:       General: He is awake. He is not in acute distress.     Appearance: Normal appearance. He is well-developed and well-groomed. He is not ill-appearing, toxic-appearing or diaphoretic.   HENT:      Head: Normocephalic.   Cardiovascular:      Rate and Rhythm: Normal rate and regular rhythm.      Heart sounds: Normal heart sounds.   Pulmonary:      Effort: Pulmonary effort is normal. No tachypnea, bradypnea, accessory muscle usage, prolonged expiration, respiratory distress or retractions.      Breath sounds: Normal breath sounds. No stridor. No wheezing, rhonchi or rales.   Musculoskeletal:      Cervical back: Normal range of motion.   Skin:     General: Skin is warm and dry.   Neurological:      Mental Status: He is alert.      Gait: Gait abnormal (uses rollator).   Psychiatric:         Attention and Perception: Attention and perception normal.         Mood and Affect: Mood and affect normal.         Speech: Speech normal.         Behavior: Behavior normal. Behavior is cooperative.         Cognition and Memory: Cognition normal.          Assessment/Plan :   1. Diabetes mellitus type 2 with peripheral artery disease    2. Hypertension associated with diabetes    3. At risk for knowledge deficit         Assessment & Plan    Patient's diabetes not well-controlled; average glucose 255-271 over past month  Recommend adding Farxiga or Jardiance to improve glycemic control  Patient declined medication changes, [facility]  Noted patient's refusal to change regimen and need for [facility] to adjust treatment    TYPE 2 DIABETES MELLITUS:  - Recommend starting Farxiga or Jardiance to improve glycemic control, but patient declined.  - Instructed patient to inform office of any treatment changes made by [facility].    FOLLOW-UP:  - Follow up on April 14th as scheduled with Dr. Garg.          No follow-ups on file.    This note was generated with the assistance of  ambient listening technology. Verbal consent was obtained by the patient and accompanying visitor(s) for the recording of patient appointment to facilitate this note. I attest to having reviewed and edited the generated note for accuracy, though some syntax or spelling errors may persist. Please contact the author of this note for any clarification.

## 2024-12-30 ENCOUNTER — TELEPHONE (OUTPATIENT)
Dept: UROLOGY | Facility: CLINIC | Age: 84
End: 2024-12-30
Payer: MEDICARE

## 2024-12-30 NOTE — TELEPHONE ENCOUNTER
Attempt to reach out to pt but was unsuccessful. LVM        ----- Message from Chantel sent at 12/30/2024  9:17 AM CST -----  Name of Who is Calling:NIDHI HADLEY [[id number]]        What is the request in detail:Pt daughter would like a list of the medication the doctor ask the pt to stop taking before pt procedure and how long pt should stop taking medication. Pt daughter requesting a call back today of possible.        Can the clinic reply by [facility]:Call        What Number to Call Back if not in [facility]:Telephone Information:    [employer]          [phone]

## 2024-12-31 ENCOUNTER — TELEPHONE (OUTPATIENT)
Dept: UROLOGY | Facility: CLINIC | Age: 84
End: 2024-12-31
Payer: MEDICARE

## 2024-12-31 NOTE — TELEPHONE ENCOUNTER
.Outgoing call placed to patient, patient verified name and date of birth, patient notified of what medications he needs to hold and the amount of time to be held prior to his surgery, he verbalized understanding and no further assistance needed.

## 2025-01-02 ENCOUNTER — LAB VISIT (OUTPATIENT)
Dept: LAB | Facility: HOSPITAL | Age: 85
End: 2025-01-02
Attending: UROLOGY
Payer: MEDICARE

## 2025-01-02 DIAGNOSIS — C67.8 MALIGNANT NEOPLASM OF OVERLAPPING SITES OF BLADDER: ICD-10-CM

## 2025-01-02 LAB
ANION GAP SERPL CALC-SCNC: 11 MMOL/L (ref 8–16)
BASOPHILS # BLD AUTO: 0.02 K/UL (ref 0–0.2)
BASOPHILS NFR BLD: 0.2 % (ref 0–1.9)
BUN SERPL-MCNC: 32 MG/DL (ref 8–23)
CALCIUM SERPL-MCNC: 10.3 MG/DL (ref 8.7–10.5)
CHLORIDE SERPL-SCNC: 105 MMOL/L (ref 95–110)
CO2 SERPL-SCNC: 24 MMOL/L (ref 23–29)
CREAT SERPL-MCNC: 1.9 MG/DL (ref 0.5–1.4)
DIFFERENTIAL METHOD BLD: ABNORMAL
EOSINOPHIL # BLD AUTO: 0.4 K/UL (ref 0–0.5)
EOSINOPHIL NFR BLD: 4.8 % (ref 0–8)
ERYTHROCYTE [DISTWIDTH] IN BLOOD BY AUTOMATED COUNT: 12.8 % (ref 11.5–14.5)
EST. GFR  (NO RACE VARIABLE): 34.1 ML/MIN/1.73 M^2
GLUCOSE SERPL-MCNC: 356 MG/DL (ref 70–110)
HCT VFR BLD AUTO: 47.5 % (ref 40–54)
HGB BLD-MCNC: 14.8 G/DL (ref 14–18)
IMM GRANULOCYTES # BLD AUTO: 0.02 K/UL (ref 0–0.04)
IMM GRANULOCYTES NFR BLD AUTO: 0.2 % (ref 0–0.5)
LYMPHOCYTES # BLD AUTO: 3.5 K/UL (ref 1–4.8)
LYMPHOCYTES NFR BLD: 38.5 % (ref 18–48)
MCH RBC QN AUTO: 29.7 PG (ref 27–31)
MCHC RBC AUTO-ENTMCNC: 31.2 G/DL (ref 32–36)
MCV RBC AUTO: 95 FL (ref 82–98)
MONOCYTES # BLD AUTO: 0.6 K/UL (ref 0.3–1)
MONOCYTES NFR BLD: 6.5 % (ref 4–15)
NEUTROPHILS # BLD AUTO: 4.5 K/UL (ref 1.8–7.7)
NEUTROPHILS NFR BLD: 49.8 % (ref 38–73)
NRBC BLD-RTO: 0 /100 WBC
PLATELET # BLD AUTO: 191 K/UL (ref 150–450)
PMV BLD AUTO: 11.6 FL (ref 9.2–12.9)
POTASSIUM SERPL-SCNC: 4.5 MMOL/L (ref 3.5–5.1)
RBC # BLD AUTO: 4.98 M/UL (ref 4.6–6.2)
SODIUM SERPL-SCNC: 140 MMOL/L (ref 136–145)
WBC # BLD AUTO: 8.99 K/UL (ref 3.9–12.7)

## 2025-01-02 PROCEDURE — 85025 COMPLETE CBC W/AUTO DIFF WBC: CPT | Mod: HCNC | Performed by: UROLOGY

## 2025-01-02 PROCEDURE — 80048 BASIC METABOLIC PNL TOTAL CA: CPT | Mod: HCNC | Performed by: UROLOGY

## 2025-01-02 PROCEDURE — 36415 COLL VENOUS BLD VENIPUNCTURE: CPT | Mod: HCNC,PO | Performed by: UROLOGY

## 2025-01-06 ENCOUNTER — LAB VISIT (OUTPATIENT)
Dept: LAB | Facility: HOSPITAL | Age: 85
End: 2025-01-06
Attending: NURSE PRACTITIONER
Payer: MEDICARE

## 2025-01-06 DIAGNOSIS — Z86.2 HISTORY OF IRON DEFICIENCY ANEMIA: ICD-10-CM

## 2025-01-06 DIAGNOSIS — E87.5 HYPERKALEMIA: ICD-10-CM

## 2025-01-06 DIAGNOSIS — C67.8 MALIGNANT NEOPLASM OF OVERLAPPING SITES OF BLADDER: Chronic | ICD-10-CM

## 2025-01-06 DIAGNOSIS — D63.8 ANEMIA IN OTHER CHRONIC DISEASES CLASSIFIED ELSEWHERE: ICD-10-CM

## 2025-01-06 LAB
ALBUMIN SERPL BCP-MCNC: 3.5 G/DL (ref 3.5–5.2)
ALP SERPL-CCNC: 86 U/L (ref 40–150)
ALT SERPL W/O P-5'-P-CCNC: 21 U/L (ref 10–44)
ANION GAP SERPL CALC-SCNC: 11 MMOL/L (ref 8–16)
AST SERPL-CCNC: 22 U/L (ref 10–40)
BASOPHILS # BLD AUTO: 0.01 K/UL (ref 0–0.2)
BASOPHILS NFR BLD: 0.1 % (ref 0–1.9)
BILIRUB SERPL-MCNC: 0.5 MG/DL (ref 0.1–1)
BUN SERPL-MCNC: 34 MG/DL (ref 8–23)
CALCIUM SERPL-MCNC: 9.5 MG/DL (ref 8.7–10.5)
CHLORIDE SERPL-SCNC: 108 MMOL/L (ref 95–110)
CO2 SERPL-SCNC: 20 MMOL/L (ref 23–29)
CREAT SERPL-MCNC: 1.6 MG/DL (ref 0.5–1.4)
DIFFERENTIAL METHOD BLD: ABNORMAL
EOSINOPHIL # BLD AUTO: 0.3 K/UL (ref 0–0.5)
EOSINOPHIL NFR BLD: 4.2 % (ref 0–8)
ERYTHROCYTE [DISTWIDTH] IN BLOOD BY AUTOMATED COUNT: 12.7 % (ref 11.5–14.5)
EST. GFR  (NO RACE VARIABLE): 42 ML/MIN/1.73 M^2
FERRITIN SERPL-MCNC: 132 NG/ML (ref 20–300)
GLUCOSE SERPL-MCNC: 233 MG/DL (ref 70–110)
HCT VFR BLD AUTO: 43.1 % (ref 40–54)
HGB BLD-MCNC: 13.7 G/DL (ref 14–18)
IMM GRANULOCYTES # BLD AUTO: 0.02 K/UL (ref 0–0.04)
IMM GRANULOCYTES NFR BLD AUTO: 0.3 % (ref 0–0.5)
IRON SERPL-MCNC: 76 UG/DL (ref 45–160)
LYMPHOCYTES # BLD AUTO: 2.8 K/UL (ref 1–4.8)
LYMPHOCYTES NFR BLD: 37 % (ref 18–48)
MCH RBC QN AUTO: 30.6 PG (ref 27–31)
MCHC RBC AUTO-ENTMCNC: 31.8 G/DL (ref 32–36)
MCV RBC AUTO: 96 FL (ref 82–98)
MONOCYTES # BLD AUTO: 0.5 K/UL (ref 0.3–1)
MONOCYTES NFR BLD: 5.9 % (ref 4–15)
NEUTROPHILS # BLD AUTO: 4 K/UL (ref 1.8–7.7)
NEUTROPHILS NFR BLD: 52.5 % (ref 38–73)
NRBC BLD-RTO: 0 /100 WBC
PLATELET # BLD AUTO: 179 K/UL (ref 150–450)
PMV BLD AUTO: 11.3 FL (ref 9.2–12.9)
POTASSIUM SERPL-SCNC: 4.7 MMOL/L (ref 3.5–5.1)
POTASSIUM SERPL-SCNC: 4.7 MMOL/L (ref 3.5–5.1)
PROT SERPL-MCNC: 6.2 G/DL (ref 6–8.4)
RBC # BLD AUTO: 4.48 M/UL (ref 4.6–6.2)
SATURATED IRON: 26 % (ref 20–50)
SODIUM SERPL-SCNC: 139 MMOL/L (ref 136–145)
TOTAL IRON BINDING CAPACITY: 297 UG/DL (ref 250–450)
TRANSFERRIN SERPL-MCNC: 201 MG/DL (ref 200–375)
WBC # BLD AUTO: 7.62 K/UL (ref 3.9–12.7)

## 2025-01-06 PROCEDURE — 84466 ASSAY OF TRANSFERRIN: CPT | Mod: HCNC | Performed by: NURSE PRACTITIONER

## 2025-01-06 PROCEDURE — 80053 COMPREHEN METABOLIC PANEL: CPT | Mod: HCNC | Performed by: NURSE PRACTITIONER

## 2025-01-06 PROCEDURE — 82728 ASSAY OF FERRITIN: CPT | Mod: HCNC | Performed by: NURSE PRACTITIONER

## 2025-01-06 PROCEDURE — 36415 COLL VENOUS BLD VENIPUNCTURE: CPT | Mod: HCNC,PO | Performed by: NURSE PRACTITIONER

## 2025-01-06 PROCEDURE — 85025 COMPLETE CBC W/AUTO DIFF WBC: CPT | Mod: HCNC | Performed by: NURSE PRACTITIONER

## 2025-01-06 RX ORDER — AMOXICILLIN 500 MG/1
500 CAPSULE ORAL 3 TIMES DAILY
Qty: 20 CAPSULE | Refills: 0 | Status: SHIPPED | OUTPATIENT
Start: 2025-01-06 | End: 2025-01-16

## 2025-01-07 ENCOUNTER — TELEPHONE (OUTPATIENT)
Dept: UROLOGY | Facility: CLINIC | Age: 85
End: 2025-01-07
Payer: MEDICARE

## 2025-01-07 NOTE — TELEPHONE ENCOUNTER
Spoke with patient daughter who was able to provide acceptable patient identifiers prior to start of conversation. Patient daughter notified about urine culture results that showed infection and that antibiotics Amoxicillin have been sent to local pharmacy to be taken 3 times daily for 10-Days. Patient daughter verbalized understanding.

## 2025-01-07 NOTE — TELEPHONE ENCOUNTER
----- Message from Hortencia Michael MD sent at 1/6/2025  8:14 AM CST -----  Notify pt of UTI. Amoxicillin sent to his pharmacy

## 2025-01-08 ENCOUNTER — TELEPHONE (OUTPATIENT)
Dept: UROLOGY | Facility: CLINIC | Age: 85
End: 2025-01-08
Payer: MEDICARE

## 2025-01-08 NOTE — TELEPHONE ENCOUNTER
----- Message from Jahaira sent at 1/8/2025 10:10 AM CST -----  Contact: Nayana Ramirez.Type:  Pharmacy Calling to Clarify an RX    Name of Caller:Blanca   Pharmacy Name:[facility]   Prescription Name:amoxicillin (AMOXIL) 500 MG capsule  What do they need to clarify?:directions   Best Call Back Number:[phone]  Additional Information:

## 2025-01-08 NOTE — TELEPHONE ENCOUNTER
Spoke with patient who was able to provide acceptable patient identifiers prior to start of conversation. Patient notified that I spoke with pharmacy to clarify medication Amoxicillin which was sent in as Take 1 capsule (500 mg total) by mouth 3 (three) times daily. for 10 days - Oral but Dr. Michael only sent in 20 capsules. Contacted pharmacy to fill 10 capsules of Amoxicillin. Patient notified once completed dose of 20 capsules to  10 capsules and start taking. Patient verbalized understanding.

## 2025-01-09 ENCOUNTER — OFFICE VISIT (OUTPATIENT)
Dept: HEMATOLOGY/ONCOLOGY | Facility: CLINIC | Age: 85
End: 2025-01-09
Payer: MEDICARE

## 2025-01-09 ENCOUNTER — TELEPHONE (OUTPATIENT)
Dept: HEMATOLOGY/ONCOLOGY | Facility: CLINIC | Age: 85
End: 2025-01-09
Payer: MEDICARE

## 2025-01-09 ENCOUNTER — DOCUMENTATION ONLY (OUTPATIENT)
Dept: HEMATOLOGY/ONCOLOGY | Facility: CLINIC | Age: 85
End: 2025-01-09
Payer: MEDICARE

## 2025-01-09 ENCOUNTER — LAB VISIT (OUTPATIENT)
Dept: LAB | Facility: HOSPITAL | Age: 85
End: 2025-01-09
Attending: NURSE PRACTITIONER
Payer: MEDICARE

## 2025-01-09 VITALS
HEART RATE: 95 BPM | DIASTOLIC BLOOD PRESSURE: 76 MMHG | BODY MASS INDEX: 25.75 KG/M2 | OXYGEN SATURATION: 99 % | HEIGHT: 70 IN | WEIGHT: 179.88 LBS | SYSTOLIC BLOOD PRESSURE: 153 MMHG

## 2025-01-09 DIAGNOSIS — C67.9 MALIGNANT NEOPLASM OF URINARY BLADDER, UNSPECIFIED SITE: Primary | ICD-10-CM

## 2025-01-09 DIAGNOSIS — D64.9 ANEMIA, UNSPECIFIED TYPE: ICD-10-CM

## 2025-01-09 DIAGNOSIS — Z86.2 HISTORY OF IRON DEFICIENCY ANEMIA: ICD-10-CM

## 2025-01-09 LAB — TSH SERPL DL<=0.005 MIU/L-ACNC: 2.68 UIU/ML (ref 0.4–4)

## 2025-01-09 PROCEDURE — 84443 ASSAY THYROID STIM HORMONE: CPT | Mod: HCNC | Performed by: NURSE PRACTITIONER

## 2025-01-09 PROCEDURE — 99999 PR PBB SHADOW E&M-EST. PATIENT-LVL V: CPT | Mod: PBBFAC,HCNC,, | Performed by: NURSE PRACTITIONER

## 2025-01-09 PROCEDURE — 36415 COLL VENOUS BLD VENIPUNCTURE: CPT | Mod: HCNC,PO | Performed by: NURSE PRACTITIONER

## 2025-01-09 RX ORDER — INSULIN GLARGINE-YFGN 100 [IU]/ML
INJECTION, SOLUTION SUBCUTANEOUS
COMMUNITY
Start: 2024-08-27

## 2025-01-09 NOTE — PROGRESS NOTES
Subjective:      Patient ID: Nithin Pino is a 84 y.o. male.    Chief Complaint: no complaints    HPI:  83 yo male presents today as a new patient for further evaluation and recommendation of iron deficiency anemia.  Is unable to tolerate oral iron due to constipation.  Currently with normocytic anemia.  Hgb 9.7 g/dL.  Ferritin low on outside records located in media section.       Has  bladder cancer 2022 treated  with surgery followed by Dr. Michaelcompleted 6 BCG induction treatments.   6/23/23-Pt s/p TURBT. 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  Is scheduled for repeat bladder surgery on 10/26/2023.  .       Denies any know bleeding in urine.  Did an occult stool sample with no blood found.  Denies blood noses.  Has a history of B12 deficiency.   Not currently taking B12 supplements.      Denies f/c/ns or unintentional weight loss.  Denies any known abnormal lymphadenopathy.     Former smoker - quit smoking in 1994.  Former skoal - quit 15 years   Former heavy drinker - occasional drinker now - quit about 15-18 years ago.     Complains of increased fatigue.     Interval History:  12/14/2023 Found with JOSE MARIA and received Feraheme infusions x 2 with last dose received on 11/30/2023.  Presents today to evaluate response.  Hgb improved from 9.7 to 11.2.  Noted elevated granulocyte count today.  Slightly low iron with saturated iron of 19%.  Elevated creatinine 1.6, elevated BUN, elevated  with CKD.  States that he is drinking almost a gallon of water a day.  Denies any urinary symptoms.  On novolog sliding scale increased yesterday  Yesterday Lantus  increase from 10-15 units yesterday.  Hopefully this will decrease bg and improved kidney.       Interval History:  2/22/2024  Presents today for f/u iron deficiency anemia.  Received 2 doses of IV venofer 200 mg  with last dose received on 12/14/2023.  Denies any known blood loss. Continues to be followed by urology and  "will start treatment for bladder cancer soon. Biopsy of bladder 2/16/2024 showed multiple areas high grade papillary urothelial carcinoma, noninvasive.  Has been eating liver daily.  Has no complaints today.     Interval History:  4/22/2024  Received venofer 200 mg IV push with last dose received on 3/18/2024.  Has received treatment for bladder cancer - BCG treatments so far has received 6 treatments by Dr. Michael (completed). - last on 4/9/2024  Has had recent h/o hematuria. Presents today to assess response of IV iron treatment and to assess need for additional therapy. Currently with slight normocytic anemia Hgb 13.2 and iron is repleated. States that he has to get up 3-4 times at night to urinate.  Noted increased trend in hgb A1C.  States that he is not eating a lot of carbs.  Does not excessively eat sweets.  Is having a repeat cystoscope in 5/2024. Denies any known abnormal blood loss.      Interval History:  6/27/2024  Continues taking B12 and B complex daily (currently out of it)  Presents today to assess for recurrent iron deficiency anemia.  Hgb currently 13.1 normocytic.  Slight iron deficiency with saturated iron of 13%  Noted elevated potassium and calcium with decreased kidney function, elevated BUN and creatinine.  States that he has diarrhea that comes and goes - describes as being lose but not watery.  States that he has been taking furosemide 40 mg in the morning and 20 mg at night.  Denies excess potassium or calcium intake.  Denies taking TUMS. Denies any new bone pain.  States that he is continuing to be followed by urology and will have a repeat cystoscope next week.  Denies any known abnormal blood loss.      Interval History:  10/3/2024 States that he was taking his slowFe daily medication was causing constipation so he started taking SlowFe every other day. States that he stopped taking his B12 vitamin because "my boobs were growing".  He continues to take B Complex vitamin daily.  "     Interval History:  2025 Is scheduled for TURBT on 2025 to remove bladder tumors with Dr. Michael in urology - diagnosed with TaHG urothelial cell carcinoma again .  Denies any known blood in the urine. Currently taking amoxicillin for treatment of UTI.  States that he eats liver 4-5 times per week.  Has stopped taking his oral iron this week in preparation for his TURBT procedure.  Was taking on MWF.  He will restart after his TURBT procedure.  Has been recommended that he see oncology for consideration of Keytruda for treatment of bladder cancer - states that he has not been seen as of yet.  Currently holding B complex vitamin now and will restart after his procedure.  Currently with an iron-deficiency.  Slight anemia with hemoglobin 13.7 g per dL.     I have reviewed all of the patient's relevant lab work available in the medical record and have utilized this in my evaluation and management recommendations today.      Social History     Socioeconomic History    Marital status:     Number of children: 5   Tobacco Use    Smoking status: Former     Current packs/day: 0.00     Average packs/day: 1.5 packs/day for 40.0 years (60.0 ttl pk-yrs)     Types: Cigarettes     Start date: 1954     Quit date: 1994     Years since quittin.0    Smokeless tobacco: Former     Quit date: 2011    Tobacco comments:     approx date   Substance and Sexual Activity    Alcohol use: Not Currently     Comment: occasionally; 1-2 cans of beer a month    Drug use: No    Sexual activity: Never     Birth control/protection: None     Social Drivers of Health     Financial Resource Strain: Low Risk  (2024)    Overall Financial Resource Strain (CARDIA)     Difficulty of Paying Living Expenses: Not very hard   Food Insecurity: No Food Insecurity (2024)    Hunger Vital Sign     Worried About Running Out of Food in the Last Year: Never true     Ran Out of Food in the Last Year: Never true   Transportation  Needs: No Transportation Needs (2/19/2024)    PRAPARE - Transportation     Lack of Transportation (Medical): No     Lack of Transportation (Non-Medical): No   Physical Activity: Inactive (2/19/2024)    Exercise Vital Sign     Days of Exercise per Week: 0 days     Minutes of Exercise per Session: 0 min   Stress: No Stress Concern Present (2/19/2024)    [facility] - Occupational Stress Questionnaire     Feeling of Stress : Only a little   Housing Stability: Low Risk  (2/19/2024)    Housing Stability Vital Sign     Unable to Pay for Housing in the Last Year: No     Number of Places Lived in the Last Year: 1     Unstable Housing in the Last Year: No       Family History   Adopted: Yes   Problem Relation Name Age of Onset    Diabetes Brother      Diabetes Sister      Cancer Neg Hx      Heart disease Neg Hx      Kidney disease Neg Hx         Past Surgical History:   Procedure Laterality Date    APPENDECTOMY      CARDIAC CATHETERIZATION      2016    CARDIAC SURGERY  1994    balloon angioplasty    CATARACT EXTRACTION W/  INTRAOCULAR LENS IMPLANT Right 02/01/2017    CORONARY ARTERY BYPASS GRAFT  05/2011    per patient x4    HERNIA REPAIR      OPEN REDUCTION AND INTERNAL FIXATION (ORIF) OF INJURY OF HIP      PCIOL Bilateral OD 02/01/17/OS 02/15/17    DR. ALONZO    RETROGRADE PYELOGRAPHY Bilateral 8/29/2024    Procedure: PYELOGRAM, RETROGRADE;  Surgeon: Hortencia Michael MD;  Location: [facility];  Service: Urology;  Laterality: Bilateral;    TURBT (TRANSURETHRAL RESECTION OF BLADDER TUMOR) N/A 6/8/2023    Procedure: TURBT (TRANSURETHRAL RESECTION OF BLADDER TUMOR);  Surgeon: Hortencia Michael MD;  Location: [facility];  Service: Urology;  Laterality: N/A;    TURBT (TRANSURETHRAL RESECTION OF BLADDER TUMOR) N/A 2/8/2024    Procedure: TURBT (TRANSURETHRAL RESECTION OF BLADDER TUMOR);  Surgeon: Hortencia Michael MD;  Location: [facility];  Service: Urology;  Laterality: N/A;    TURBT (TRANSURETHRAL  RESECTION OF BLADDER TUMOR) N/A 8/29/2024    Procedure: TURBT (TRANSURETHRAL RESECTION OF BLADDER TUMOR);  Surgeon: Hortencia Michael MD;  Location: [facility];  Service: Urology;  Laterality: N/A;       Past Medical History:   Diagnosis Date    Abnormal brain MRI 01/21/2018    Chronic microvascular ischemia changes per MRI July 9, 2007 in [facility]    Abnormal ECG 10/31/2013    Abnormal stress test 01/28/2016    Alcohol dependence     States alcohol abuse ended in 1994    AP (angina pectoris) 01/28/2016    Asthma     Bladder cancer     Carotid artery occlusion     Cataract     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    2 - Normal left ventricular systolic function (EF 60-65%).    3 - Normal left ventricular diastolic function.    4 - Mild left atrial enlargement.    5 - Normal right ventricular systolic function .    6 - Trivial to mild aortic regurgitation.    7 - Trivial to mild mitral regurgitation.  10 - Trivial to mild pulmonic regurgitation.     Hematuria 06/25/2020    History of alcohol abuse 01/22/2018    Patient denies drinking to excess since 1994.    History of atherectomy 01/21/2018 2/2016 [facility]    History of chronic kidney disease 01/22/2018    History of CKD 3    History of PTCA 10/31/2013    History of PTCA 03/09/2016    Hyperlipidemia     Hypertension     Insomnia 06/17/2013    Iron deficiency anemia 10/19/2023    Ischemic cardiomyopathy 10/31/2013    Long term (current) use of antithrombotics/antiplatelets 01/21/2018    Malignant neoplasm of overlapping  sites of bladder 06/08/2023    Myocardial infarction     Old MI (myocardial infarction) 1994    Peripheral vascular disease     Polyneuropathy     RBBB 10/31/2013    S/P CABG (coronary artery bypass graft) 10/31/2013    Shortness of breath 10/31/2013    Tobacco dependence     resolved    Trouble in sleeping     Type 2 diabetes with peripheral circulatory disorder, controlled     Wears glasses        Review of Systems   Constitutional: Negative.    HENT: Negative.     Eyes: Negative.    Respiratory: Negative.     Cardiovascular: Negative.    Gastrointestinal: Negative.    Endocrine: Negative.    Genitourinary: Negative.    Musculoskeletal: Negative.    Skin: Negative.    Allergic/Immunologic: Negative.    Neurological: Negative.    Hematological: Negative.    Psychiatric/Behavioral: Negative.            Medication List with Changes/Refills   Current Medications    AMLODIPINE (NORVASC) 10 MG TABLET    Take 0.5 tablets (5 mg total) by mouth once daily.    AMOXICILLIN (AMOXIL) 500 MG CAPSULE    Take 1 capsule (500 mg total) by mouth 3 (three) times daily. for 10 days    ASPIRIN (ECOTRIN) 81 MG EC TABLET    Take 1 tablet (81 mg total) by mouth once daily.    ATORVASTATIN (LIPITOR) 40 MG TABLET    Take 1 tablet (40 mg total) by mouth every evening.    B COMPLEX VITAMINS TABLET    Take 1 tablet by mouth once daily.    BLOOD-GLUCOSE METER (TRUERESULT BLOOD GLUCOSE SYSTM MISC)    by Misc.(Non-Drug; Combo Route) route.    CYANOCOBALAMIN (VITAMIN B-12) 1000 MCG TABLET    Take 100 mcg by mouth once daily.    DULAGLUTIDE (TRULICITY) 4.5 MG/0.5 ML PEN INJECTOR    Inject 4.5 mg into the skin every 7 days. SUNDAY    EMBRACE PRO TEST STRIPS STRP    CHECK BLOOD SUGAR TWICE DAILY.    FINASTERIDE (PROSCAR) 5 MG TABLET    TAKE 1 TABLET ONE TIME DAILY    FUROSEMIDE (LASIX) 40 MG TABLET    TAKE 1 PILL IN AM, AND 1/2 PILL IN PM    GLUCOSE 4 GM CHEWABLE TABLET    Take 4 tablets (16 g total) by mouth as needed for Low blood sugar.     HYOSCYAMINE (LEVSIN/SL) 0.125 MG SUBL    Place 2 tablets (0.25 mg total) under the tongue every 4 (four) hours as needed (bladder spasms).    INSULIN (LANTUS SOLOSTAR U-100 INSULIN) GLARGINE 100 UNITS/ML SUBQ PEN    Inject into the skin. 15 units QHS and 18 units QAM    INSULIN ASPART U-100 (NOVOLOG) 100 UNIT/ML INJECTION    Inject 3 Units into the skin every 4 (four) hours as needed (for high blood sugar or carbohydrate intake).    INSULIN GLARGINE-YFGN 100 UNIT/ML (3 ML) INPN    Inject into the skin.    ISOSORBIDE MONONITRATE (IMDUR) 60 MG 24 HR TABLET    Take 1 tablet (60 mg total) by mouth once daily.    LIDOCAINE (LIDODERM) 5 %    APPLY 1 PATCH TOPICALLY EVERY DAY FOR PAIN  WEAR FOR 12 HOURS, THEN REMOVE. DO NOT APPLY NEW PATCH FOR AT LEAST 12 HOURS.    LOSARTAN (COZAAR) 50 MG TABLET    Take 1 tablet (50 mg total) by mouth once daily.    METOPROLOL SUCCINATE (TOPROL-XL) 25 MG 24 HR TABLET    Take 1 tablet (25 mg total) by mouth every evening.    MICONAZOLE (MICOTIN) 2 % CREAM    APPLY SMALL AMOUNT TOPICALLY TWICE A DAY AS NEEDED FOR FUNGAL INFECTION    MULTIVIT-MINERALS/FOLIC ACID (DIABETIC MULTIVITAMIN ORAL)    Take by mouth.    NITROGLYCERIN (NITROSTAT) 0.4 MG SL TABLET    0.4 mg.    TAMSULOSIN (FLOMAX) 0.4 MG CAP    Take 1 capsule (0.4 mg total) by mouth once daily.    TRUEPLUS LANCETS 26 GAUGE MISC    CHECK BLOOD SUGAR TWICE DAILY.    TRUERESULT BLOOD GLUCOSE SYSTM KIT    CHECK BLOOD SUGAR TWICE DAILY.    VITAMIN D (VITAMIN D3) 1000 UNITS TAB    Take 1 tablet (1,000 Units total) by mouth once daily.        Objective:     Vitals:    01/09/25 0954   BP: (!) 153/76   Pulse: 95       Physical Exam  Vitals reviewed.   Constitutional:       Appearance: Normal appearance.   HENT:      Head: Normocephalic and atraumatic.      Right Ear: External ear normal. Decreased hearing noted.      Left Ear: External ear normal. Decreased hearing noted.   Cardiovascular:      Rate and Rhythm: Normal rate and regular rhythm.       Heart sounds: Normal heart sounds, S1 normal and S2 normal.   Pulmonary:      Effort: Pulmonary effort is normal.      Breath sounds: Normal breath sounds.   Abdominal:      General: There is no distension.   Musculoskeletal:         General: Normal range of motion.      Cervical back: Normal range of motion.   Skin:     General: Skin is warm and dry.   Neurological:      General: No focal deficit present.      Mental Status: He is alert and oriented to person, place, and time.      Gait: Gait abnormal.   Psychiatric:         Attention and Perception: Attention and perception normal.         Mood and Affect: Mood and affect normal.         Speech: Speech normal.         Behavior: Behavior normal. Behavior is cooperative.         Thought Content: Thought content normal.         Cognition and Memory: Cognition and memory normal.         Judgment: Judgment normal.         Assessment:     Problem List Items Addressed This Visit    None  Visit Diagnoses       Malignant neoplasm of urinary bladder, unspecified site    -  Primary    Relevant Orders    Ambulatory referral/consult to Oncology    CBC Auto Differential    Comprehensive Metabolic Panel    Anemia, unspecified type        Relevant Orders    TSH    CBC Auto Differential    Comprehensive Metabolic Panel    Ferritin    Iron and TIBC    History of iron deficiency anemia        Relevant Orders    CBC Auto Differential    Comprehensive Metabolic Panel    Ferritin    Iron and TIBC            Lab Results   Component Value Date    WBC 7.62 01/06/2025    RBC 4.48 (L) 01/06/2025    HGB 13.7 (L) 01/06/2025    HCT 43.1 01/06/2025    MCV 96 01/06/2025    MCH 30.6 01/06/2025    MCHC 31.8 (L) 01/06/2025    RDW 12.7 01/06/2025     01/06/2025    MPV 11.3 01/06/2025    GRAN 4.0 01/06/2025    GRAN 52.5 01/06/2025    LYMPH 2.8 01/06/2025    LYMPH 37.0 01/06/2025    MONO 0.5 01/06/2025    MONO 5.9 01/06/2025    EOS 0.3 01/06/2025    BASO 0.01 01/06/2025    EOSINOPHIL 4.2  01/06/2025    BASOPHIL 0.1 01/06/2025      Lab Results   Component Value Date     01/06/2025    K 4.7 01/06/2025    K 4.7 01/06/2025     01/06/2025    CO2 20 (L) 01/06/2025    BUN 34 (H) 01/06/2025    CREATININE 1.6 (H) 01/06/2025    CALCIUM 9.5 01/06/2025    ANIONGAP 11 01/06/2025    ESTGFRAFRICA 58.7 (A) 12/29/2021    EGFRNONAA 50.8 (A) 12/29/2021     Lab Results   Component Value Date    ALT 21 01/06/2025    AST 22 01/06/2025    ALKPHOS 86 01/06/2025    BILITOT 0.5 01/06/2025     Lab Results   Component Value Date    IRON 76 01/06/2025    TRANSFERRIN 201 01/06/2025    TIBC 297 01/06/2025    FESATURATED 26 01/06/2025    FERRITIN 132 01/06/2025        Plan:   Malignant neoplasm of urinary bladder, unspecified site  -     Ambulatory referral/consult to Oncology; Future; Expected date: 01/16/2025  -     CBC Auto Differential; Future; Expected date: 01/09/2025  -     Comprehensive Metabolic Panel; Future; Expected date: 01/09/2025    Anemia, unspecified type  -     TSH; Future; Expected date: 01/09/2025  -     CBC Auto Differential; Future; Expected date: 01/09/2025  -     Comprehensive Metabolic Panel; Future; Expected date: 01/09/2025  -     Ferritin; Future; Expected date: 01/09/2025  -     Iron and TIBC; Future; Expected date: 01/09/2025    History of iron deficiency anemia  -     CBC Auto Differential; Future; Expected date: 01/09/2025  -     Comprehensive Metabolic Panel; Future; Expected date: 01/09/2025  -     Ferritin; Future; Expected date: 01/09/2025  -     Iron and TIBC; Future; Expected date: 01/09/2025    Restart slowFe on Monday Wednesday Friday after procedure on January 16.  Baseline TSH today.  Consult oncology for recommendations-partial treatment with Keytruda for bladder cancer per Dr. Michael recommendations.  Follow-up in 3 months with repeat labs to assess the for possible IV iron therapy.    Collaborating Provider:  Dr. Carmen Plunkett MD    Thank You,  Maureen Rivera NP  Benign  Hematology    Med Onc Chart Routing      Follow up with physician    Follow up with ISAIAS 3 months. in person visit with labs prior   Infusion scheduling note   n/a   Injection scheduling note n/a   Labs   Scheduling:  Preferred lab: [facility]  Lab interval:  tsh today.  cbc, bmp, iron studies   Imaging   N/a   Pharmacy appointment No pharmacy appointment needed      Other referrals       Additional referrals needed  refer oncology for bladder cancer - consideration of Keytruda           Total time spent on encounter: 45 minutes

## 2025-01-14 ENCOUNTER — HOSPITAL ENCOUNTER (OUTPATIENT)
Dept: CARDIOLOGY | Facility: HOSPITAL | Age: 85
Discharge: HOME | End: 2025-01-14
Payer: MEDICARE

## 2025-01-14 ENCOUNTER — TELEPHONE (OUTPATIENT)
Dept: CARDIOLOGY | Facility: CLINIC | Age: 85
End: 2025-01-14
Payer: MEDICARE

## 2025-01-14 ENCOUNTER — OFFICE VISIT (OUTPATIENT)
Dept: CARDIOLOGY | Facility: CLINIC | Age: 85
End: 2025-01-14
Payer: MEDICARE

## 2025-01-14 ENCOUNTER — TELEPHONE (OUTPATIENT)
Dept: UROLOGY | Facility: CLINIC | Age: 85
End: 2025-01-14
Payer: MEDICARE

## 2025-01-14 VITALS
SYSTOLIC BLOOD PRESSURE: 122 MMHG | OXYGEN SATURATION: 97 % | HEIGHT: 70 IN | WEIGHT: 191.13 LBS | HEART RATE: 70 BPM | DIASTOLIC BLOOD PRESSURE: 58 MMHG | BODY MASS INDEX: 27.36 KG/M2

## 2025-01-14 DIAGNOSIS — I50.42 CHRONIC COMBINED SYSTOLIC AND DIASTOLIC CONGESTIVE HEART FAILURE: ICD-10-CM

## 2025-01-14 DIAGNOSIS — I25.708 CORONARY ARTERY DISEASE OF BYPASS GRAFT OF NATIVE HEART WITH STABLE ANGINA PECTORIS: Chronic | ICD-10-CM

## 2025-01-14 DIAGNOSIS — Z01.810 PRE-OPERATIVE CARDIOVASCULAR EXAMINATION: ICD-10-CM

## 2025-01-14 DIAGNOSIS — I15.2 HYPERTENSION ASSOCIATED WITH DIABETES: Primary | Chronic | ICD-10-CM

## 2025-01-14 DIAGNOSIS — Z01.810 PREOP CARDIOVASCULAR EXAM: ICD-10-CM

## 2025-01-14 DIAGNOSIS — Z00.00 ENCOUNTER FOR MEDICARE ANNUAL WELLNESS EXAM: ICD-10-CM

## 2025-01-14 DIAGNOSIS — I25.5 ISCHEMIC CARDIOMYOPATHY: Chronic | ICD-10-CM

## 2025-01-14 DIAGNOSIS — I45.10 RBBB: Chronic | ICD-10-CM

## 2025-01-14 DIAGNOSIS — Z01.810 PRE-OPERATIVE CARDIOVASCULAR EXAMINATION: Primary | ICD-10-CM

## 2025-01-14 DIAGNOSIS — E11.69 HYPERLIPIDEMIA ASSOCIATED WITH TYPE 2 DIABETES MELLITUS: Chronic | ICD-10-CM

## 2025-01-14 DIAGNOSIS — E11.59 HYPERTENSION ASSOCIATED WITH DIABETES: Primary | Chronic | ICD-10-CM

## 2025-01-14 DIAGNOSIS — E78.5 HYPERLIPIDEMIA ASSOCIATED WITH TYPE 2 DIABETES MELLITUS: Chronic | ICD-10-CM

## 2025-01-14 PROCEDURE — 3288F FALL RISK ASSESSMENT DOCD: CPT | Mod: HCNC,CPTII,S$GLB,

## 2025-01-14 PROCEDURE — 1159F MED LIST DOCD IN RCRD: CPT | Mod: HCNC,CPTII,S$GLB,

## 2025-01-14 PROCEDURE — 1160F RVW MEDS BY RX/DR IN RCRD: CPT | Mod: HCNC,CPTII,S$GLB,

## 2025-01-14 PROCEDURE — 93005 ELECTROCARDIOGRAM TRACING: CPT | Mod: HCNC

## 2025-01-14 PROCEDURE — 93010 ELECTROCARDIOGRAM REPORT: CPT | Mod: HCNC,,, | Performed by: INTERNAL MEDICINE

## 2025-01-14 PROCEDURE — 99215 OFFICE O/P EST HI 40 MIN: CPT | Mod: HCNC,S$GLB,,

## 2025-01-14 PROCEDURE — 99999 PR PBB SHADOW E&M-EST. PATIENT-LVL IV: CPT | Mod: PBBFAC,HCNC,,

## 2025-01-14 PROCEDURE — 1101F PT FALLS ASSESS-DOCD LE1/YR: CPT | Mod: HCNC,CPTII,S$GLB,

## 2025-01-14 PROCEDURE — 3074F SYST BP LT 130 MM HG: CPT | Mod: HCNC,CPTII,S$GLB,

## 2025-01-14 PROCEDURE — 1126F AMNT PAIN NOTED NONE PRSNT: CPT | Mod: HCNC,CPTII,S$GLB,

## 2025-01-14 PROCEDURE — 3078F DIAST BP <80 MM HG: CPT | Mod: HCNC,CPTII,S$GLB,

## 2025-01-14 NOTE — TELEPHONE ENCOUNTER
Please contact this pt there is confused about cardiology consults. The daughter is upset and needs to speak directly to the office

## 2025-01-14 NOTE — PROGRESS NOTES
Subjective:   Patient ID:  Nithin Pino is a 84 y.o. male who presents for evaluation of No chief complaint on file.      HPI 85y/o M whose current medical conditions include  CAD CABG x5 in 2011, RBBB, bladder cancer, diastolic CHF, ICM, MR, TR, AI, CRI, carotid artery disease, COPD, HTN, PAD, DM.   Nonsmoker. Previously followed by Dr. Sadler in cardiology clinic here today for CV follow up pre op risk stratification having TURBT with Dr. Michael. Denies any recent chest pains, states he was a little SOB this am unsure if he is taking his medications correctly at home states he is currently holding all his blood pressure medicines as well as his aspirin but reports previously only taking a half of his losartan.  EKG today sinus rhythm first-degree AVB, RBBB (chronic) no acute dynamic changes noted      Echo Oct 2023: EF 50%, DD, LVH, WMA, mild MR, normal PAP.  B LE venous u/s Oct 2023: no DVT.  Nuclear stress test Nov 2023: no ischemia, anterior/inferior scar, normal EF at rest.  B LE arterial u/s March 2024:  Moderately decreased left RENZO and severely decreased right RENZO; Right ostial SFA is occluded, with distal reconstitution, right PT is occluded, AT flow is blunted; Left ostial SFA is occluded, with reconstitution distally , left PT and AT are occluded.  The AT reconstitutes at the level of the DP.  RENZO March 2024:  R LE 0.61, L LE 0.69  R LE RENZO in 2014 was 0.59  Past Medical History:   Diagnosis Date    Abnormal brain MRI 01/21/2018    Chronic microvascular ischemia changes per MRI July 9, 2007 in [facility]    Abnormal ECG 10/31/2013    Abnormal stress test 01/28/2016    Alcohol dependence     States alcohol abuse ended in 1994    AP (angina pectoris) 01/28/2016    Asthma     Bladder cancer     Carotid artery occlusion     Cataract     Chronic combined systolic and diastolic congestive heart failure 05/24/2021    Chronic diastolic heart failure 10/31/2013    Chronic ischemic heart disease 10/31/2013     CKD (chronic kidney disease) stage 3, GFR 30-59 ml/min 11/04/2015    Coronary artery disease     DM (diabetes mellitus) 2013    BS doesn't check 03/28/2017     DM (diabetes mellitus) 2011    BS doesn' check  04/03/2019    Heart valve regurgitation 11/04/2015    Echocardiogram 2/15/16   1 - Concentric hypertrophy.    2 - Normal left ventricular systolic function (EF 60-65%).    3 - Normal left ventricular diastolic function.    4 - Mild left atrial enlargement.    5 - Normal right ventricular systolic function .    6 - Trivial to mild aortic regurgitation.    7 - Trivial to mild mitral regurgitation.  10 - Trivial to mild pulmonic regurgitation.     Hematuria 06/25/2020    History of alcohol abuse 01/22/2018    Patient denies drinking to excess since 1994.    History of atherectomy 01/21/2018 2/2016 [facility]    History of chronic kidney disease 01/22/2018    History of CKD 3    History of PTCA 10/31/2013    History of PTCA 03/09/2016    Hyperlipidemia     Hypertension     Insomnia 06/17/2013    Iron deficiency anemia 10/19/2023    Ischemic cardiomyopathy 10/31/2013    Long term (current) use of antithrombotics/antiplatelets 01/21/2018    Malignant neoplasm of overlapping sites of bladder 06/08/2023    Myocardial infarction     Old MI (myocardial infarction) 1994    Peripheral vascular disease     Polyneuropathy     RBBB 10/31/2013    S/P CABG (coronary artery bypass graft) 10/31/2013    Shortness of breath 10/31/2013    Tobacco dependence     resolved    Trouble in sleeping     Type 2 diabetes with peripheral circulatory disorder, controlled     Wears glasses        Past Surgical History:   Procedure Laterality Date    APPENDECTOMY      CARDIAC CATHETERIZATION      2016    CARDIAC SURGERY  1994    balloon angioplasty    CATARACT EXTRACTION W/  INTRAOCULAR LENS IMPLANT Right 02/01/2017    CORONARY ARTERY BYPASS GRAFT  05/2011    per patient x4    HERNIA REPAIR      OPEN REDUCTION AND INTERNAL FIXATION (ORIF) OF  INJURY OF HIP      PCIOL Bilateral OD 17/OS 02/15/17    DR. ALONZO    RETROGRADE PYELOGRAPHY Bilateral 2024    Procedure: PYELOGRAM, RETROGRADE;  Surgeon: Hortencia Michael MD;  Location: [facility] OR;  Service: Urology;  Laterality: Bilateral;    TURBT (TRANSURETHRAL RESECTION OF BLADDER TUMOR) N/A 2023    Procedure: TURBT (TRANSURETHRAL RESECTION OF BLADDER TUMOR);  Surgeon: Hortencia Michael MD;  Location: [facility] OR;  Service: Urology;  Laterality: N/A;    TURBT (TRANSURETHRAL RESECTION OF BLADDER TUMOR) N/A 2024    Procedure: TURBT (TRANSURETHRAL RESECTION OF BLADDER TUMOR);  Surgeon: Hortencia Michael MD;  Location: [facility] OR;  Service: Urology;  Laterality: N/A;    TURBT (TRANSURETHRAL RESECTION OF BLADDER TUMOR) N/A 2024    Procedure: TURBT (TRANSURETHRAL RESECTION OF BLADDER TUMOR);  Surgeon: Hortencia Michael MD;  Location: [facility] OR;  Service: Urology;  Laterality: N/A;       Social History     Tobacco Use    Smoking status: Former     Current packs/day: 0.00     Average packs/day: 1.5 packs/day for 40.0 years (60.0 ttl pk-yrs)     Types: Cigarettes     Start date: 1954     Quit date: 1994     Years since quittin.0    Smokeless tobacco: Former     Quit date: 2011    Tobacco comments:     approx date   Substance Use Topics    Alcohol use: Not Currently     Comment: occasionally; 1-2 cans of beer a month    Drug use: No       Family History   Adopted: Yes   Problem Relation Name Age of Onset    Diabetes Brother      Diabetes Sister      Cancer Neg Hx      Heart disease Neg Hx      Kidney disease Neg Hx         Current Outpatient Medications on File Prior to Visit   Medication Sig Dispense Refill    amoxicillin (AMOXIL) 500 MG capsule Take 1 capsule (500 mg total) by mouth 3 (three) times daily. for 10 days 20 capsule 0    atorvastatin (LIPITOR) 40 MG tablet Take 1 tablet (40 mg total) by mouth every evening. 90 tablet 3    b complex vitamins tablet Take 1 tablet by mouth  once daily.      BLOOD-GLUCOSE METER (TRUERESULT BLOOD GLUCOSE SYSTM MISC) by Misc.(Non-Drug; Combo Route) route.      EMBRACE PRO TEST STRIPS Strp CHECK BLOOD SUGAR TWICE DAILY. 50 strip 0    finasteride (PROSCAR) 5 mg tablet TAKE 1 TABLET ONE TIME DAILY 90 tablet 3    furosemide (LASIX) 40 MG tablet TAKE 1 PILL IN AM, AND 1/2 PILL IN PM 60 tablet 11    glucose 4 GM chewable tablet Take 4 tablets (16 g total) by mouth as needed for Low blood sugar. 100 tablet 5    hyoscyamine (LEVSIN/SL) 0.125 mg Subl Place 2 tablets (0.25 mg total) under the tongue every 4 (four) hours as needed (bladder spasms). 30 tablet 1    insulin (LANTUS SOLOSTAR U-100 INSULIN) glargine 100 units/mL SubQ pen Inject into the skin. 15 units QHS and 18 units QAM      insulin aspart U-100 (NOVOLOG) 100 unit/mL injection Inject 3 Units into the skin every 4 (four) hours as needed (for high blood sugar or carbohydrate intake).      insulin glargine-yfgn 100 unit/mL (3 mL) InPn Inject into the skin.      isosorbide mononitrate (IMDUR) 60 MG 24 hr tablet Take 1 tablet (60 mg total) by mouth once daily. 90 tablet 3    LIDOcaine (LIDODERM) 5 % APPLY 1 PATCH TOPICALLY EVERY DAY FOR PAIN  WEAR FOR 12 HOURS, THEN REMOVE. DO NOT APPLY NEW PATCH FOR AT LEAST 12 HOURS.      losartan (COZAAR) 50 MG tablet Take 1 tablet (50 mg total) by mouth once daily. 90 tablet 3    metoprolol succinate (TOPROL-XL) 25 MG 24 hr tablet Take 1 tablet (25 mg total) by mouth every evening. 90 tablet 3    multivit-minerals/folic acid (DIABETIC MULTIVITAMIN ORAL) Take by mouth.      nitroGLYCERIN (NITROSTAT) 0.4 MG SL tablet 0.4 mg.      tamsulosin (FLOMAX) 0.4 mg Cap Take 1 capsule (0.4 mg total) by mouth once daily. 90 capsule 3    vitamin D (VITAMIN D3) 1000 units Tab Take 1 tablet (1,000 Units total) by mouth once daily. 90 tablet 3    amLODIPine (NORVASC) 10 MG tablet Take 0.5 tablets (5 mg total) by mouth once daily. (Patient not taking: Reported on 1/14/2025) 90 tablet 3     aspirin (ECOTRIN) 81 MG EC tablet Take 1 tablet (81 mg total) by mouth once daily. (Patient not taking: Reported on 1/14/2025) 90 tablet 3    cyanocobalamin (VITAMIN B-12) 1000 MCG tablet Take 100 mcg by mouth once daily. (Patient not taking: Reported on 1/14/2025)      dulaglutide (TRULICITY) 4.5 mg/0.5 mL pen injector Inject 4.5 mg into the skin every 7 days. SUNDAY (Patient not taking: Reported on 1/14/2025)      miconazole (MICOTIN) 2 % cream APPLY SMALL AMOUNT TOPICALLY TWICE A DAY AS NEEDED FOR FUNGAL INFECTION      TRUEPLUS LANCETS 26 gauge Misc CHECK BLOOD SUGAR TWICE DAILY. 100 each 0    TRUERESULT BLOOD GLUCOSE SYSTM kit CHECK BLOOD SUGAR TWICE DAILY. 1 each 0     No current facility-administered medications on file prior to visit.      Wt Readings from Last 3 Encounters:   01/14/25 86.7 kg (191 lb 2.2 oz)   01/09/25 81.6 kg (179 lb 14.3 oz)   12/24/24 80.6 kg (177 lb 11.1 oz)     Temp Readings from Last 3 Encounters:   12/24/24 96.3 °F (35.7 °C) (Tympanic)   10/14/24 97.1 °F (36.2 °C) (Tympanic)   09/18/24 98 °F (36.7 °C) (Oral)     BP Readings from Last 3 Encounters:   01/14/25 (!) 122/58   01/09/25 (!) 153/76   12/24/24 134/70     Pulse Readings from Last 3 Encounters:   01/14/25 70   01/09/25 95   12/24/24 101        Review of Systems   Constitutional: Negative.   HENT: Negative.     Eyes: Negative.    Cardiovascular: Negative.    Respiratory: Negative.     Skin: Negative.    Musculoskeletal: Negative.    Gastrointestinal: Negative.    Genitourinary: Negative.    Neurological: Negative.    Psychiatric/Behavioral: Negative.         Objective:   Physical Exam  Vitals and nursing note reviewed.   Constitutional:       Appearance: Normal appearance.   HENT:      Head: Normocephalic and atraumatic.   Eyes:      General:         Right eye: No discharge.         Left eye: No discharge.      Pupils: Pupils are equal, round, and reactive to light.   Cardiovascular:      Rate and Rhythm: Normal rate and  regular rhythm.      Heart sounds: S1 normal and S2 normal. No murmur heard.     No friction rub.   Pulmonary:      Effort: Pulmonary effort is normal. No respiratory distress.      Breath sounds: Normal breath sounds. No rales.   Abdominal:      Palpations: Abdomen is soft.      Tenderness: There is no abdominal tenderness.   Musculoskeletal:      Cervical back: Neck supple.      Right lower leg: No edema.      Left lower leg: No edema.   Skin:     General: Skin is warm and dry.   Neurological:      General: No focal deficit present.      Mental Status: He is alert and oriented to person, place, and time.   Psychiatric:         Mood and Affect: Mood normal.         Behavior: Behavior normal.         Thought Content: Thought content normal.         Lab Results   Component Value Date    CHOL 119 09/16/2024    CHOL 123 02/14/2024    CHOL 84 (L) 10/09/2023     Lab Results   Component Value Date    HDL 49 09/16/2024    HDL 54 02/14/2024    HDL 41 10/09/2023     Lab Results   Component Value Date    LDLCALC 38 09/16/2024    LDLCALC 52.6 (L) 02/14/2024    LDLCALC 27.8 (L) 10/09/2023     Lab Results   Component Value Date    TRIG 161 09/16/2024    TRIG 82 02/14/2024    TRIG 76 10/09/2023     Lab Results   Component Value Date    CHOLHDL 43.9 02/14/2024    CHOLHDL 48.8 10/09/2023    CHOLHDL 58.5 (H) 12/08/2021       Chemistry        Component Value Date/Time     01/06/2025 0743    K 4.7 01/06/2025 0743    K 4.7 01/06/2025 0743     01/06/2025 0743    CO2 20 (L) 01/06/2025 0743    BUN 34 (H) 01/06/2025 0743    BUN 33 (H) 09/16/2024 0000    CREATININE 1.6 (H) 01/06/2025 0743     (H) 01/06/2025 0743        Component Value Date/Time    CALCIUM 9.5 01/06/2025 0743    CALCIUM 9.5 09/16/2024 0000    ALKPHOS 86 01/06/2025 0743    AST 22 01/06/2025 0743    AST 21 09/16/2024 0000    ALT 21 01/06/2025 0743    ALT 23 09/16/2024 0000    BILITOT 0.5 01/06/2025 0743    ESTGFRAFRICA 58.7 (A) 12/29/2021 0702    EGFRNONAA  50.8 (A) 12/29/2021 0702          Lab Results   Component Value Date    TSH 2.676 01/09/2025     Lab Results   Component Value Date    INR 1.0 01/29/2016    INR 1.5 (H) 06/07/2011    INR 1.5 (H) 05/20/2011     @RESUFAST(WBC,HGB,HCT,MCV,PLT)  @LABRCNTIP(BNP,BNPTRIAGEBLO)@  CrCl cannot be calculated (Patient's most recent lab result is older than the maximum 7 days allowed.).     Results for orders placed during the hospital encounter of 10/25/23    Echo    Interpretation Summary    Left Ventricle: The left ventricle is normal in size. Normal wall thickness. regional wall motion abnormalities present. There is low normal systolic function with a visually estimated ejection fraction of 50 - 55%.    Left Atrium: Left atrium is severely dilated.    Right Ventricle: Normal right ventricular cavity size. Wall thickness is normal. Right ventricle wall motion  is normal. Systolic function is normal.    Right Atrium: Right atrium is dilated.    Mitral Valve: There is mild regurgitation.    Pulmonic Valve: There is mild regurgitation.    Pulmonary Artery: The estimated pulmonary artery systolic pressure is 24 mmHg.    IVC/SVC: Normal venous pressure at 3 mmHg.     Results for orders placed during the hospital encounter of 11/17/23    Nuclear Stress - Cardiology Interpreted    Interpretation Summary    Abnormal myocardial perfusion scan.    There is a moderate to severe intensity, fixed perfusion abnormality consistent with scar in the anterior, inferior and anteroapical wall(s).    No reversible defects noted to suggest coronary ischemia.    The gated perfusion images showed an ejection fraction of 65% at rest. The gated perfusion images showed an ejection fraction of 46% post stress.    The ECG portion of the study is negative for ischemia.    The patient reported no chest pain during the stress test.    During stress, occasional PVCs are noted.     Assessment:      1. Hypertension associated with diabetes    2. Chronic  combined systolic and diastolic congestive heart failure    3. Coronary artery disease of bypass graft of native heart with stable angina pectoris    4. RBBB    5. Ischemic cardiomyopathy    6. Hyperlipidemia associated with type 2 diabetes mellitus    7. Preop cardiovascular exam        Plan:   Hypertension associated with diabetes    Chronic combined systolic and diastolic congestive heart failure    Coronary artery disease of bypass graft of native heart with stable angina pectoris    RBBB    Ischemic cardiomyopathy    Hyperlipidemia associated with type 2 diabetes mellitus    Preop cardiovascular exam      Amlodipine, Imdur, losartan, metoprolol, low-sodium diet, profile blood pressure- HTN  Aspirin, statin, metoprolol- CAD  Statin, low-fat diet- Hlp  Risk factor modification   Daily exercise as tolerated     Patient is moderate risk for jean-pierre and postop cardiac complications    Return to clinic in 4 months or sooner if    Courtney Guillot, FNP-C [facility], Cardiology

## 2025-01-14 NOTE — TELEPHONE ENCOUNTER
Patient's daughter upset regarding not being able to speak directly with the clinic staff, stated she wanted to make sure her dad's surgery was still scheduled for 1/16/25, she was notified that surgery is still scheduled and that if her father and her sets up his MyChart that they can communicate directly with the clinic staff that way as well, she verbalized understanding and stated she will set it up, encouraged to contact oncologist regarding to questions about patient's treatment from them.

## 2025-01-14 NOTE — TELEPHONE ENCOUNTER
Called mobile number and the vm was full called the 2nd phone   Spoke to daughter appt made with [facility] today  pb      ----- Message from ANAHI Harding sent at 1/14/2025 10:34 AM CST -----  Regarding: FW: surgery  Please get pt in for pre-op clearance - Sx 1/16 Thursday  Thanks  ----- Message -----  From: Rhoda Chavarria RN  Sent: 1/14/2025  10:33 AM CST  To: Meaghan Amaral RN  Subject: RE: surgery                                      I have not seen a recent one, I see the one from August.  ----- Message -----  From: Meaghan Amaral RN  Sent: 1/14/2025  10:30 AM CST  To: Rhoda Chavarria RN  Subject: RE: surgery                                      Looks to be done  ----- Message -----  From: Rhoda Chavarria RN  Sent: 1/14/2025  10:16 AM CST  To: Doroteo MENSAH Staff  Subject: surgery                                          Hello,  Surgery Pre-Admit Dept has placed an E-Consult for this patient's upcoming procedure on 1/16/25. Please reach out to us with any questions or concerns. Thank you.    Surgery Pre-Admit Nurse  [facility] ([facility])  [phone] or [phone]  Mon-Fri 7:30 am- 4 pm

## 2025-01-16 LAB
OHS QRS DURATION: 134 MS
OHS QTC CALCULATION: 448 MS

## 2025-01-24 ENCOUNTER — TELEPHONE (OUTPATIENT)
Dept: HEMATOLOGY/ONCOLOGY | Facility: CLINIC | Age: 85
End: 2025-01-24
Payer: MEDICARE

## 2025-01-24 DIAGNOSIS — C67.9 MALIGNANT NEOPLASM OF BLADDER: ICD-10-CM

## 2025-01-24 DIAGNOSIS — C67.8 MALIGNANT NEOPLASM OF OVERLAPPING SITES OF BLADDER: Primary | ICD-10-CM

## 2025-01-24 RX ORDER — CIPROFLOXACIN 2 MG/ML
400 INJECTION, SOLUTION INTRAVENOUS
OUTPATIENT
Start: 2025-01-24

## 2025-02-07 ENCOUNTER — TELEPHONE (OUTPATIENT)
Dept: HEMATOLOGY/ONCOLOGY | Facility: CLINIC | Age: 85
End: 2025-02-07
Payer: MEDICARE

## 2025-02-14 ENCOUNTER — TELEPHONE (OUTPATIENT)
Dept: UROLOGY | Facility: CLINIC | Age: 85
End: 2025-02-14
Payer: MEDICARE

## 2025-02-14 ENCOUNTER — PRE-OP/PRE-PROCEDURE ORDERS (OUTPATIENT)
Dept: UROLOGY | Facility: CLINIC | Age: 85
End: 2025-02-14
Payer: MEDICARE

## 2025-02-14 DIAGNOSIS — Z01.818 PREOP TESTING: ICD-10-CM

## 2025-02-14 DIAGNOSIS — Z01.818 PRE-OP TESTING: Primary | ICD-10-CM

## 2025-02-14 DIAGNOSIS — Z01.818 PRE-OP EVALUATION: ICD-10-CM

## 2025-02-14 DIAGNOSIS — C67.9 MALIGNANT NEOPLASM OF URINARY BLADDER, UNSPECIFIED SITE: ICD-10-CM

## 2025-02-14 DIAGNOSIS — R31.0 HEMATURIA, GROSS: ICD-10-CM

## 2025-02-14 NOTE — TELEPHONE ENCOUNTER
----- Message from Jacki sent at 2/14/2025 10:05 AM CST -----  Contact: pt  Type: Request a call back    Name of Caller: pt's daughter/Ai  Best Call Back Number: [phone]  Additional Information:  Ai is calling in rgd to needing to know if the pt need lab before his procedure?

## 2025-02-14 NOTE — TELEPHONE ENCOUNTER
Spoke with patient daughter who was able to provide acceptable patient identifiers prior to start of conversation. Patient scheduled for Pre-Op labs on Monday 02/17/2025 [facility]. Patient daughter verbalized understanding

## 2025-02-17 ENCOUNTER — HOSPITAL ENCOUNTER (OUTPATIENT)
Dept: RADIOLOGY | Facility: HOSPITAL | Age: 85
Discharge: HOME | End: 2025-02-17
Attending: UROLOGY
Payer: MEDICARE

## 2025-02-17 DIAGNOSIS — Z01.818 PRE-OP TESTING: ICD-10-CM

## 2025-02-17 DIAGNOSIS — Z01.818 PRE-OP EVALUATION: ICD-10-CM

## 2025-02-17 PROCEDURE — 71046 X-RAY EXAM CHEST 2 VIEWS: CPT | Mod: TC,PN

## 2025-02-18 ENCOUNTER — LAB VISIT (OUTPATIENT)
Dept: LAB | Facility: HOSPITAL | Age: 85
End: 2025-02-18
Attending: UROLOGY
Payer: MEDICARE

## 2025-02-18 ENCOUNTER — TELEPHONE (OUTPATIENT)
Dept: UROLOGY | Facility: CLINIC | Age: 85
End: 2025-02-18
Payer: MEDICARE

## 2025-02-18 DIAGNOSIS — R31.0 HEMATURIA, GROSS: Primary | ICD-10-CM

## 2025-02-18 DIAGNOSIS — R31.0 HEMATURIA, GROSS: ICD-10-CM

## 2025-02-18 PROCEDURE — 87086 URINE CULTURE/COLONY COUNT: CPT | Performed by: UROLOGY

## 2025-02-18 NOTE — TELEPHONE ENCOUNTER
Returned pt call there was no answer       ----- Message from Elicia sent at 2/18/2025  9:33 AM CST -----  Contact: Nithin [phone]  Returning a phone call.Who left a message for the patient:  NurseDo they know what this is regarding:  noWould they like a phone call back or a response via [facility]:  call back

## 2025-02-18 NOTE — TELEPHONE ENCOUNTER
Attempt to reach out to pt's daughter but was unsuccessful. LVM    ----- Message from ANAHI Davis sent at 2/18/2025  2:19 PM CST -----  Contact: Angel @     ----- Message -----  From: Hortencia Michael MD  Sent: 2/18/2025   1:42 PM CST  To: Alec ALCOCER Staff    If he has an active infection, we should postpone the surgery.  ----- Message -----  From: Reilly Gupta MA  Sent: 2/18/2025   1:19 PM CST  To: Hortencia Michael MD    Pt has a boil on his behind and is currently dealing w/ infection and wants to know if they can still do the sx on Thursday.  ----- Message -----  From: Jade Godinez  Sent: 2/18/2025   1:05 PM CST  To: Alec ALCOCER Staff    .1MEDICALADVICE Patient is calling for Medical Advice regarding:Daughter is calling. Patient has an infection. Daughter need to know if surgery should be cancelled How long has patient had these symptoms:Pharmacy name and phone#:[facility] a call back or [facility]:Comments:Please advise patient replies from provider may take up to 48 hours.  ----- Message -----  From: Jade Godinez  Sent: 2/18/2025   1:04 PM CST  To: Alec ALCOCER Staff    .1MEDICALADVICE Patient is calling for Medical Advice regarding:Daughter is calling. Patient has an infection. Daughter need to know if surgery should be cancelled How long has patient had these symptoms:Pharmacy name and phone#:[facility] call back or [facility]:Comments:Please advise patient replies from provider may take up to 48 hours.

## 2025-02-19 LAB — BACTERIA UR CULT: NO GROWTH

## 2025-02-24 ENCOUNTER — TELEPHONE (OUTPATIENT)
Dept: HEMATOLOGY/ONCOLOGY | Facility: CLINIC | Age: 85
End: 2025-02-24
Payer: MEDICARE

## 2025-02-24 NOTE — TELEPHONE ENCOUNTER
Called patient to schedule an appointment with Dr. Anguiano.   Patient has not received his surgery yet and appointment to see the Oncologist was not made.     Daughter stated that she would like for me to call them back at a later date.

## 2025-02-27 ENCOUNTER — TELEPHONE (OUTPATIENT)
Dept: UROLOGY | Facility: CLINIC | Age: 85
End: 2025-02-27
Payer: MEDICARE

## 2025-02-27 NOTE — TELEPHONE ENCOUNTER
----- Message from Hortencia Michael MD sent at 2/27/2025 12:01 PM CST -----  Please see if the pt would like to reschedule his TURBT. He will need to choose a Thursday in April. Then contact the OR to place him back on the schedule from [facility]. I will be out of town on 4/24. Any other Thursday in April should be okay.

## 2025-02-27 NOTE — TELEPHONE ENCOUNTER
Tried contacting patient daughter reference to rescheduling surgery TURBT to a Thursday in April. There was no answer, left voicemail for daughter to return call.

## 2025-03-03 ENCOUNTER — TELEPHONE (OUTPATIENT)
Dept: UROLOGY | Facility: CLINIC | Age: 85
End: 2025-03-03
Payer: MEDICARE

## 2025-03-03 NOTE — TELEPHONE ENCOUNTER
.Outgoing call placed to patient's daughter, she was notified that Dr. Michael is currently out of office but stated that patient can reschedule for any Thursday in April except the 24th, she verbalized understanding and stated that any date is fine with them to just schedule it and let them know. Message will be sent to provider to let her know.     ----- Message from Matheus sent at 3/3/2025  9:33 AM CST -----  Contact: daughter  .Type:  Needs Medical AdviceWho Called: daughterWould the patient rather a call back or a response via [facility]? Call [facility] Call Back Number: [phone] Information: Ai is calling to inform office that pt is now clear for surgery he has no more infection. Also states his clearance documents are on file.

## 2025-03-25 ENCOUNTER — OFFICE VISIT (OUTPATIENT)
Dept: CARDIOLOGY | Facility: CLINIC | Age: 85
End: 2025-03-25
Payer: MEDICARE

## 2025-03-25 ENCOUNTER — TELEPHONE (OUTPATIENT)
Dept: HEMATOLOGY/ONCOLOGY | Facility: CLINIC | Age: 85
End: 2025-03-25
Payer: MEDICARE

## 2025-03-25 VITALS
OXYGEN SATURATION: 98 % | SYSTOLIC BLOOD PRESSURE: 124 MMHG | BODY MASS INDEX: 24.99 KG/M2 | DIASTOLIC BLOOD PRESSURE: 60 MMHG | HEART RATE: 96 BPM | WEIGHT: 174.19 LBS

## 2025-03-25 DIAGNOSIS — I45.10 RBBB: Chronic | ICD-10-CM

## 2025-03-25 DIAGNOSIS — I73.9 CLAUDICATION IN PERIPHERAL VASCULAR DISEASE: Chronic | ICD-10-CM

## 2025-03-25 DIAGNOSIS — I70.0 THORACIC AORTA ATHEROSCLEROSIS: Chronic | ICD-10-CM

## 2025-03-25 DIAGNOSIS — I20.89 CHRONIC STABLE ANGINA: ICD-10-CM

## 2025-03-25 DIAGNOSIS — J43.1 PANLOBULAR EMPHYSEMA: Chronic | ICD-10-CM

## 2025-03-25 DIAGNOSIS — I15.2 HYPERTENSION ASSOCIATED WITH DIABETES: Chronic | ICD-10-CM

## 2025-03-25 DIAGNOSIS — R94.31 ABNORMAL ECG: Chronic | ICD-10-CM

## 2025-03-25 DIAGNOSIS — N18.31 STAGE 3A CHRONIC KIDNEY DISEASE: Chronic | ICD-10-CM

## 2025-03-25 DIAGNOSIS — E78.5 HYPERLIPIDEMIA ASSOCIATED WITH TYPE 2 DIABETES MELLITUS: Chronic | ICD-10-CM

## 2025-03-25 DIAGNOSIS — E11.69 HYPERLIPIDEMIA ASSOCIATED WITH TYPE 2 DIABETES MELLITUS: Chronic | ICD-10-CM

## 2025-03-25 DIAGNOSIS — I35.1 NONRHEUMATIC AORTIC VALVE INSUFFICIENCY: ICD-10-CM

## 2025-03-25 DIAGNOSIS — E11.59 HYPERTENSION ASSOCIATED WITH DIABETES: Chronic | ICD-10-CM

## 2025-03-25 DIAGNOSIS — I50.42 CHRONIC COMBINED SYSTOLIC AND DIASTOLIC CONGESTIVE HEART FAILURE: Primary | Chronic | ICD-10-CM

## 2025-03-25 DIAGNOSIS — I65.23 ASYMPTOMATIC STENOSIS OF BOTH CAROTID ARTERIES WITHOUT INFARCTION: Chronic | ICD-10-CM

## 2025-03-25 DIAGNOSIS — Z01.810 PREOP CARDIOVASCULAR EXAM: ICD-10-CM

## 2025-03-25 DIAGNOSIS — I25.708 CORONARY ARTERY DISEASE OF BYPASS GRAFT OF NATIVE HEART WITH STABLE ANGINA PECTORIS: Chronic | ICD-10-CM

## 2025-03-25 DIAGNOSIS — Z95.1 S/P CABG (CORONARY ARTERY BYPASS GRAFT): ICD-10-CM

## 2025-03-25 DIAGNOSIS — I25.5 ISCHEMIC CARDIOMYOPATHY: Chronic | ICD-10-CM

## 2025-03-25 PROCEDURE — 3074F SYST BP LT 130 MM HG: CPT | Mod: HCNC,CPTII,S$GLB, | Performed by: INTERNAL MEDICINE

## 2025-03-25 PROCEDURE — 3078F DIAST BP <80 MM HG: CPT | Mod: HCNC,CPTII,S$GLB, | Performed by: INTERNAL MEDICINE

## 2025-03-25 PROCEDURE — 99999 PR PBB SHADOW E&M-EST. PATIENT-LVL II: CPT | Mod: PBBFAC,HCNC,, | Performed by: INTERNAL MEDICINE

## 2025-03-25 PROCEDURE — 1100F PTFALLS ASSESS-DOCD GE2>/YR: CPT | Mod: HCNC,CPTII,S$GLB, | Performed by: INTERNAL MEDICINE

## 2025-03-25 PROCEDURE — 1160F RVW MEDS BY RX/DR IN RCRD: CPT | Mod: HCNC,CPTII,S$GLB, | Performed by: INTERNAL MEDICINE

## 2025-03-25 PROCEDURE — 3288F FALL RISK ASSESSMENT DOCD: CPT | Mod: HCNC,CPTII,S$GLB, | Performed by: INTERNAL MEDICINE

## 2025-03-25 PROCEDURE — 99214 OFFICE O/P EST MOD 30 MIN: CPT | Mod: HCNC,S$GLB,, | Performed by: INTERNAL MEDICINE

## 2025-03-25 PROCEDURE — 1126F AMNT PAIN NOTED NONE PRSNT: CPT | Mod: HCNC,CPTII,S$GLB, | Performed by: INTERNAL MEDICINE

## 2025-03-25 PROCEDURE — 1159F MED LIST DOCD IN RCRD: CPT | Mod: HCNC,CPTII,S$GLB, | Performed by: INTERNAL MEDICINE

## 2025-03-25 NOTE — PROGRESS NOTES
Subjective:    Patient ID:  Nithin Pino is a 84 y.o. male who presents for evaluation of Hypertension, Hyperlipidemia, Coronary Artery Disease, and Congestive Heart Failure        HPI: Pt presents for eval.  His current medical conditions include CAD, RBBB, bladder cancer, diastolic CHF, ICM, MR, TR, AI, CRI, carotid artery disease, COPD, HTN, PAD, DM.   Nonsmoker.   His past history is pertinent for following:  S/p PTCA 1994 for AMI.   Stress MPI 5/11 showed evidence of multivessel CAD (had 2 years of angina) which was confirmed on LHC (significant left main/LAD/ramus, diag disease and 100%  RCA). EF 35% on LV gram.   S/p successful CABG 5/11 (LIMA-LAD, SVG-DIAG, SVG-RAMUS). He had post-op a flutter which resolved.   S/p repeat LHC 2/16 for abnl stress MPI. He underwent atherectomy and PCI of left main and ramus with TUCKER x 2 overall. His svg-ramus had occluded. LIMA-LAD was patent, patent SVG-D1,  RCA with left - right collaterals, EF 45%.    Has chronic R SFA .   Pt has declined runoff numerous times for further evaluation of his PAD in past.   Echo 6/18 normal EF.  RENZO 6/18 R LE 0.86, L LE 0.96  Pt admitted late Dec 2020 with Covid pneumonia, cp sxs. Chart reviewed in detail.  Echo 12/20 normal LV function, LVH.  Troponin leak noted.  Cards consult done and troponin thought to be supply/demand ischemia from Covid infection/pneumonia.  Readmitted few days later with recurrent cp, low BP, dehydration and released.  Troponin leak ranged 0.10 to 0.50 range on both hospitalizations.  Stress MPI 3/21 apical, anteroapical scar, inferoapical scar with some residual ischemia, EF 44%.  Carotid u/s 12/20: no significant blockage noted.   ecg 10/29/21 NSR, RBBB, old inferior/anteroseptal infarct.  Echo 8/21: normal EF, DD, mild MR/TR/AI.  RENZO 8/21: R LE 0.73, L LE 0.71  S/p hip fx surgery Jan 2023 at [facility].  Pt readmitted March 2023 with CHF sxs.  EF 35%.  Lasix added.  Meds adjusted.  Had urological surgery [place]  2023 without CV complications.  Ecg 10/9/23 NSR, RBBB, chronic septal infarct.   Pt seen Oct 2023 for preop eval for bladder cancer surgery.  Surgery delayed and CV testing done for sxs.  Pt had leg edema, CHF sxs.  Echo Oct 2023: EF 50%, DD, LVH, WMA, mild MR, normal PAP.  B LE venous u/s Oct 2023: no DVT.  Nuclear stress test Nov 2023: no ischemia, anterior/inferior scar, normal EF at rest.  B LE arterial u/s March 2024:  Moderately decreased left RENZO and severely decreased right RENZO; Right ostial SFA is occluded, with distal reconstitution, right PT is occluded, AT flow is blunted; Left ostial SFA is occluded, with reconstitution distally , left PT and AT are occluded.  The AT reconstitutes at the level of the DP.  RENZO March 2024:  R LE 0.61, L LE 0.69  R LE RENZO in 2014 was 0.59  Now here.  States he needs bladder surgery again for cancer.  Has had surgery last year and did ok heart wise.  Denies CP/angina.  Stable CHF sxs.  No syncope.  BP is stable.  Poses fall risk.  No claudication issues or foot ulcers.  Chart/labs reviewed.  HGAIC above goal; on multiple meds.  Lipids controlled well on statin tx.  CRI stable on labs.  Ecg 1/14/25 personally reviewed; NSR, RBBB, chronic anteroseptal/inferior infarct.          Past Medical History:   Diagnosis Date    Abnormal brain MRI 01/21/2018    Chronic microvascular ischemia changes per MRI July 9, 2007 in [facility]    Abnormal ECG 10/31/2013    Abnormal stress test 01/28/2016    Alcohol dependence     States alcohol abuse ended in 1994    AP (angina pectoris) 01/28/2016    Asthma     Bladder cancer     Carotid artery occlusion     Cataract     Chronic combined systolic and diastolic congestive heart failure 05/24/2021    Chronic diastolic heart failure 10/31/2013    Chronic ischemic heart disease 10/31/2013    CKD (chronic kidney disease) stage 3, GFR 30-59 ml/min 11/04/2015    Coronary artery disease     DM (diabetes mellitus) 2013    BS doesn't check 03/28/2017      DM (diabetes mellitus) 2011    BS doesn' check  04/03/2019    Heart valve regurgitation 11/04/2015    Echocardiogram 2/15/16   1 - Concentric hypertrophy.    2 - Normal left ventricular systolic function (EF 60-65%).    3 - Normal left ventricular diastolic function.    4 - Mild left atrial enlargement.    5 - Normal right ventricular systolic function .    6 - Trivial to mild aortic regurgitation.    7 - Trivial to mild mitral regurgitation.  10 - Trivial to mild pulmonic regurgitation.     Hematuria 06/25/2020    History of alcohol abuse 01/22/2018    Patient denies drinking to excess since 1994.    History of atherectomy 01/21/2018 2/2016 [facility]    History of chronic kidney disease 01/22/2018    History of CKD 3    History of PTCA 10/31/2013    History of PTCA 03/09/2016    Hyperlipidemia     Hypertension     Insomnia 06/17/2013    Iron deficiency anemia 10/19/2023    Ischemic cardiomyopathy 10/31/2013    Long term (current) use of antithrombotics/antiplatelets 01/21/2018    Malignant neoplasm of overlapping sites of bladder 06/08/2023    Myocardial infarction     Old MI (myocardial infarction) 1994    Peripheral vascular disease     Polyneuropathy     RBBB 10/31/2013    S/P CABG (coronary artery bypass graft) 10/31/2013    Shortness of breath 10/31/2013    Tobacco dependence     resolved    Trouble in sleeping     Type 2 diabetes with peripheral circulatory disorder, controlled     Wears glasses        Current Outpatient Medications:     amLODIPine (NORVASC) 10 MG tablet, Take 0.5 tablets (5 mg total) by mouth once daily. (Patient not taking: Reported on 1/14/2025), Disp: 90 tablet, Rfl: 3    aspirin (ECOTRIN) 81 MG EC tablet, Take 1 tablet (81 mg total) by mouth once daily. (Patient not taking: Reported on 1/14/2025), Disp: 90 tablet, Rfl: 3    atorvastatin (LIPITOR) 40 MG tablet, Take 1 tablet (40 mg total) by mouth every evening., Disp: 90 tablet, Rfl: 3    b complex vitamins tablet, Take 1 tablet by mouth  once daily., Disp: , Rfl:     BLOOD-GLUCOSE METER (TRUERESULT BLOOD GLUCOSE SYSTM MISC), by Misc.(Non-Drug; Combo Route) route., Disp: , Rfl:     cyanocobalamin (VITAMIN B-12) 1000 MCG tablet, Take 100 mcg by mouth once daily. (Patient not taking: Reported on 1/14/2025), Disp: , Rfl:     dulaglutide (TRULICITY) 4.5 mg/0.5 mL pen injector, Inject 4.5 mg into the skin every 7 days. SUNDAY (Patient not taking: Reported on 1/14/2025), Disp: , Rfl:     EMBRACE PRO TEST STRIPS Strp, CHECK BLOOD SUGAR TWICE DAILY., Disp: 50 strip, Rfl: 0    finasteride (PROSCAR) 5 mg tablet, TAKE 1 TABLET ONE TIME DAILY, Disp: 90 tablet, Rfl: 3    furosemide (LASIX) 40 MG tablet, TAKE 1 PILL IN AM, AND 1/2 PILL IN PM, Disp: 60 tablet, Rfl: 11    glucose 4 GM chewable tablet, Take 4 tablets (16 g total) by mouth as needed for Low blood sugar., Disp: 100 tablet, Rfl: 5    hyoscyamine (LEVSIN/SL) 0.125 mg Subl, Place 2 tablets (0.25 mg total) under the tongue every 4 (four) hours as needed (bladder spasms)., Disp: 30 tablet, Rfl: 1    insulin (LANTUS SOLOSTAR U-100 INSULIN) glargine 100 units/mL SubQ pen, Inject into the skin. 15 units QHS and 18 units QAM, Disp: , Rfl:     insulin aspart U-100 (NOVOLOG) 100 unit/mL injection, Inject 3 Units into the skin every 4 (four) hours as needed (for high blood sugar or carbohydrate intake)., Disp: , Rfl:     insulin glargine-yfgn 100 unit/mL (3 mL) InPn, Inject into the skin., Disp: , Rfl:     isosorbide mononitrate (IMDUR) 60 MG 24 hr tablet, Take 1 tablet (60 mg total) by mouth once daily., Disp: 90 tablet, Rfl: 3    LIDOcaine (LIDODERM) 5 %, APPLY 1 PATCH TOPICALLY EVERY DAY FOR PAIN  WEAR FOR 12 HOURS, THEN REMOVE. DO NOT APPLY NEW PATCH FOR AT LEAST 12 HOURS., Disp: , Rfl:     losartan (COZAAR) 50 MG tablet, Take 1 tablet (50 mg total) by mouth once daily., Disp: 90 tablet, Rfl: 3    metoprolol succinate (TOPROL-XL) 25 MG 24 hr tablet, Take 1 tablet (25 mg total) by mouth every evening., Disp: 90  tablet, Rfl: 3    miconazole (MICOTIN) 2 % cream, APPLY SMALL AMOUNT TOPICALLY TWICE A DAY AS NEEDED FOR FUNGAL INFECTION, Disp: , Rfl:     multivit-minerals/folic acid (DIABETIC MULTIVITAMIN ORAL), Take by mouth., Disp: , Rfl:     nitroGLYCERIN (NITROSTAT) 0.4 MG SL tablet, 0.4 mg., Disp: , Rfl:     tamsulosin (FLOMAX) 0.4 mg Cap, Take 1 capsule (0.4 mg total) by mouth once daily., Disp: 90 capsule, Rfl: 3    TRUEPLUS LANCETS 26 gauge Misc, CHECK BLOOD SUGAR TWICE DAILY., Disp: 100 each, Rfl: 0    TRUERESULT BLOOD GLUCOSE SYSTM kit, CHECK BLOOD SUGAR TWICE DAILY., Disp: 1 each, Rfl: 0    vitamin D (VITAMIN D3) 1000 units Tab, Take 1 tablet (1,000 Units total) by mouth once daily., Disp: 90 tablet, Rfl: 3      Review of Systems   Constitutional: Positive for decreased appetite and malaise/fatigue.   HENT: Negative.     Eyes: Negative.    Cardiovascular:  Positive for dyspnea on exertion.   Respiratory:  Positive for shortness of breath.    Endocrine: Negative.    Hematologic/Lymphatic: Negative.    Skin: Negative.    Musculoskeletal:  Positive for arthritis, joint pain, muscle weakness and stiffness.   Gastrointestinal: Negative.    Genitourinary: Negative.    Neurological:  Positive for loss of balance and weakness.   Psychiatric/Behavioral: Negative.     Allergic/Immunologic: Negative.           /60 (BP Location: Left arm, Patient Position: Sitting)   Pulse 96   Wt 79 kg (174 lb 2.6 oz)   SpO2 98%   BMI 24.99 kg/m²     Wt Readings from Last 3 Encounters:   03/25/25 79 kg (174 lb 2.6 oz)   01/16/25 79 kg (174 lb 2.6 oz)   01/14/25 86.7 kg (191 lb 2.2 oz)     Temp Readings from Last 3 Encounters:   01/16/25 97.9 °F (36.6 °C) (Temporal)   12/24/24 96.3 °F (35.7 °C) (Tympanic)   10/14/24 97.1 °F (36.2 °C) (Tympanic)     BP Readings from Last 3 Encounters:   03/25/25 124/60   01/16/25 (!) 186/81   01/14/25 (!) 122/58     Pulse Readings from Last 3 Encounters:   03/25/25 96   01/16/25 76   01/14/25 70        Objective:    Physical Exam  Vitals and nursing note reviewed.   Constitutional:       Appearance: He is well-developed.   HENT:      Head: Normocephalic.   Neck:      Thyroid: No thyromegaly.      Vascular: No carotid bruit or JVD.   Cardiovascular:      Rate and Rhythm: Normal rate and regular rhythm.      Pulses:           Radial pulses are 2+ on the right side and 2+ on the left side.      Heart sounds: S1 normal and S2 normal. Heart sounds not distant. No midsystolic click and no opening snap. No murmur heard.     No friction rub. No S3 or S4 sounds.   Pulmonary:      Effort: Pulmonary effort is normal.      Breath sounds: Normal breath sounds. No wheezing or rales.   Abdominal:      General: Bowel sounds are normal. There is no distension or abdominal bruit.      Palpations: Abdomen is soft.      Tenderness: There is no abdominal tenderness.   Musculoskeletal:      Cervical back: Neck supple.      Right lower leg: No edema.      Left lower leg: No edema.   Skin:     General: Skin is warm.   Neurological:      Mental Status: He is alert and oriented to person, place, and time.   Psychiatric:         Behavior: Behavior normal.         I have reviewed all pertinent labs and cardiac studies.    Component      Latest Ref Rng 7/24/2024   BNP      0 - 99 pg/mL 120 (H)       Legend:  (H) High      Component      Latest Ref Rng 1/9/2024   BNP      0 - 99 pg/mL 91              Component      Latest Ref Rng 11/27/2023   BNP      0 - 99 pg/mL 203 (H)       Legend:  (H) High      Chemistry        Component Value Date/Time     02/17/2025 0822    K 3.6 02/17/2025 0822     02/17/2025 0822    CO2 21 (L) 02/17/2025 0822    BUN 42 (H) 02/17/2025 0822    BUN 33 (H) 09/16/2024 0000    CREATININE 1.7 (H) 02/17/2025 0822     (H) 02/17/2025 0822        Component Value Date/Time    CALCIUM 9.4 02/17/2025 0822    CALCIUM 9.5 09/16/2024 0000    ALKPHOS 188 (H) 02/17/2025 0822    AST 18 02/17/2025 0822    AST 21  09/16/2024 0000    ALT 13 02/17/2025 0822    ALT 23 09/16/2024 0000    BILITOT 0.6 02/17/2025 0822    ESTGFRAFRICA 58.7 (A) 12/29/2021 0702    EGFRNONAA 50.8 (A) 12/29/2021 0702        Lab Results   Component Value Date    WBC 10.65 02/17/2025    HGB 13.3 (L) 02/17/2025    HCT 43.3 02/17/2025    MCV 94 02/17/2025     02/17/2025       Lab Results   Component Value Date    HGBA1C 8.2 (H) 10/14/2024     Lab Results   Component Value Date    CHOL 119 09/16/2024    CHOL 123 02/14/2024    CHOL 84 (L) 10/09/2023     Lab Results   Component Value Date    HDL 49 09/16/2024    HDL 54 02/14/2024    HDL 41 10/09/2023     Lab Results   Component Value Date    LDLCALC 38 09/16/2024    LDLCALC 52.6 (L) 02/14/2024    LDLCALC 27.8 (L) 10/09/2023     Lab Results   Component Value Date    TRIG 161 09/16/2024    TRIG 82 02/14/2024    TRIG 76 10/09/2023     Lab Results   Component Value Date    CHOLHDL 43.9 02/14/2024    CHOLHDL 48.8 10/09/2023    CHOLHDL 58.5 (H) 12/08/2021       Results for orders placed during the hospital encounter of 10/25/23    Echo    Interpretation Summary    Left Ventricle: The left ventricle is normal in size. Normal wall thickness. regional wall motion abnormalities present. There is low normal systolic function with a visually estimated ejection fraction of 50 - 55%.    Left Atrium: Left atrium is severely dilated.    Right Ventricle: Normal right ventricular cavity size. Wall thickness is normal. Right ventricle wall motion  is normal. Systolic function is normal.    Right Atrium: Right atrium is dilated.    Mitral Valve: There is mild regurgitation.    Pulmonic Valve: There is mild regurgitation.    Pulmonary Artery: The estimated pulmonary artery systolic pressure is 24 mmHg.    IVC/SVC: Normal venous pressure at 3 mmHg.          Results for orders placed during the hospital encounter of 11/17/23    Nuclear Stress - Cardiology Interpreted    Interpretation Summary    Abnormal myocardial perfusion  scan.    There is a moderate to severe intensity, fixed perfusion abnormality consistent with scar in the anterior, inferior and anteroapical wall(s).    No reversible defects noted to suggest coronary ischemia.    The gated perfusion images showed an ejection fraction of 65% at rest. The gated perfusion images showed an ejection fraction of 46% post stress.    The ECG portion of the study is negative for ischemia.    The patient reported no chest pain during the stress test.    During stress, occasional PVCs are noted.        Assessment:       1. Chronic combined systolic and diastolic congestive heart failure    2. Abnormal ECG    3. Asymptomatic stenosis of both carotid arteries without infarction    4. Coronary artery disease of bypass graft of native heart with stable angina pectoris    5. Panlobular emphysema    6. Claudication in peripheral vascular disease    7. Chronic stable angina    8. Ischemic cardiomyopathy    9. Hypertension associated with diabetes    10. Hyperlipidemia associated with type 2 diabetes mellitus    11. RBBB    12. S/P CABG (coronary artery bypass graft)    13. Stage 3a chronic kidney disease    14. Thoracic aorta atherosclerosis    15. Nonrheumatic aortic valve insufficiency    16. Preop cardiovascular exam           Plan:           Stable CV status on current medical tx.  Preop CV eval: Pt may proceed with Urological surgery at moderate CV risk.  CAD: No ischemia on Nov 2023 stress MPI.  Continue GDMT for chronic multivessel CAD.  CHF: Compensated.  Continue OMT for CHF.  Continue Lasix 40 mg qam, 20 mg extra prn.  Pt counseled on low salt diet, < 2 g/day.  Fluid restriction 1.5 liter/day.  PAD: Chronic severe B LE PAD for very long time.    Discussed PAD mgt with pt.    Recommend continued medical therapy since pt does not have any lifestyle limiting claudication sxs and/or foot ulcer/sores.  Carotid disease: medical mgt. F/u carotid u/s in future.  Reviewed all tests and above  medical conditions with patient in detail and formulated treatment plan.  Continue optimal medical treatment for cardiovascular conditions.  Cardiac low salt diet advised.  Fall risk precautions discussed.  Maintaining healthy weight and weight loss goals (if needed) were discussed in clinic.  Lipids: Continue statin tx.  DM: Optimal HGAIC control advised. Continue current DM meds and f/u w PCP.  CRI: Monitor.  Abnl ecg: monitor.  Valvular heart disease: monitor w f/u echo in future.      F/u Aug 2025 w echo/BNP/CMP labs.      I have reviewed all pertinent labs and cardiac studies independently. Plans and recommendations have been formulated under my direct supervision. All questions answered and patient voiced understanding.

## 2025-03-26 ENCOUNTER — TELEPHONE (OUTPATIENT)
Dept: HEMATOLOGY/ONCOLOGY | Facility: CLINIC | Age: 85
End: 2025-03-26
Payer: MEDICARE

## 2025-03-31 ENCOUNTER — TELEPHONE (OUTPATIENT)
Dept: HEMATOLOGY/ONCOLOGY | Facility: CLINIC | Age: 85
End: 2025-03-31
Payer: MEDICARE

## 2025-03-31 NOTE — TELEPHONE ENCOUNTER
Spoke with patients daughter. She stated that her father still has not had his surgery. I informed her that I will call back at a later date to schedule her father to see the Oncologist.

## 2025-04-07 ENCOUNTER — LAB VISIT (OUTPATIENT)
Dept: LAB | Facility: HOSPITAL | Age: 85
End: 2025-04-07
Attending: FAMILY MEDICINE
Payer: MEDICARE

## 2025-04-07 DIAGNOSIS — E11.65 TYPE 2 DIABETES MELLITUS WITH HYPERGLYCEMIA, WITH LONG-TERM CURRENT USE OF INSULIN: ICD-10-CM

## 2025-04-07 DIAGNOSIS — C67.9 MALIGNANT NEOPLASM OF URINARY BLADDER, UNSPECIFIED SITE: ICD-10-CM

## 2025-04-07 DIAGNOSIS — Z86.2 HISTORY OF IRON DEFICIENCY ANEMIA: ICD-10-CM

## 2025-04-07 DIAGNOSIS — D64.9 ANEMIA, UNSPECIFIED TYPE: ICD-10-CM

## 2025-04-07 DIAGNOSIS — Z79.4 TYPE 2 DIABETES MELLITUS WITH HYPERGLYCEMIA, WITH LONG-TERM CURRENT USE OF INSULIN: ICD-10-CM

## 2025-04-07 LAB
ABSOLUTE EOSINOPHIL (OHS): 0.2 K/UL
ABSOLUTE MONOCYTE (OHS): 0.59 K/UL (ref 0.3–1)
ABSOLUTE NEUTROPHIL COUNT (OHS): 6.88 K/UL (ref 1.8–7.7)
ALBUMIN SERPL BCP-MCNC: 3.7 G/DL (ref 3.5–5.2)
ALP SERPL-CCNC: 213 UNIT/L (ref 40–150)
ALT SERPL W/O P-5'-P-CCNC: 20 UNIT/L (ref 10–44)
ANION GAP (OHS): 10 MMOL/L (ref 8–16)
AST SERPL-CCNC: 14 UNIT/L (ref 11–45)
BASOPHILS # BLD AUTO: 0.01 K/UL
BASOPHILS NFR BLD AUTO: 0.1 %
BILIRUB SERPL-MCNC: 0.5 MG/DL (ref 0.1–1)
BUN SERPL-MCNC: 30 MG/DL (ref 8–23)
CALCIUM SERPL-MCNC: 10.4 MG/DL (ref 8.7–10.5)
CHLORIDE SERPL-SCNC: 107 MMOL/L (ref 95–110)
CO2 SERPL-SCNC: 22 MMOL/L (ref 23–29)
CREAT SERPL-MCNC: 1.4 MG/DL (ref 0.5–1.4)
EAG (OHS): 194 MG/DL (ref 68–131)
ERYTHROCYTE [DISTWIDTH] IN BLOOD BY AUTOMATED COUNT: 13.2 % (ref 11.5–14.5)
FERRITIN SERPL-MCNC: 205.7 NG/ML (ref 20–300)
GFR SERPLBLD CREATININE-BSD FMLA CKD-EPI: 49 ML/MIN/1.73/M2
GLUCOSE SERPL-MCNC: 286 MG/DL (ref 70–110)
HBA1C MFR BLD: 8.4 % (ref 4–5.6)
HCT VFR BLD AUTO: 43.7 % (ref 40–54)
HGB BLD-MCNC: 14.3 GM/DL (ref 14–18)
IMM GRANULOCYTES # BLD AUTO: 0.03 K/UL (ref 0–0.04)
IMM GRANULOCYTES NFR BLD AUTO: 0.3 % (ref 0–0.5)
IRON SATN MFR SERPL: 20 % (ref 20–50)
IRON SERPL-MCNC: 63 UG/DL (ref 45–160)
LYMPHOCYTES # BLD AUTO: 2.44 K/UL (ref 1–4.8)
MCH RBC QN AUTO: 30.2 PG (ref 27–31)
MCHC RBC AUTO-ENTMCNC: 32.7 G/DL (ref 32–36)
MCV RBC AUTO: 92 FL (ref 82–98)
NUCLEATED RBC (/100WBC) (OHS): 0 /100 WBC
PLATELET # BLD AUTO: 262 K/UL (ref 150–450)
PMV BLD AUTO: 10.2 FL (ref 9.2–12.9)
POTASSIUM SERPL-SCNC: 5.5 MMOL/L (ref 3.5–5.1)
PROT SERPL-MCNC: 7 GM/DL (ref 6–8.4)
RBC # BLD AUTO: 4.73 M/UL (ref 4.6–6.2)
RELATIVE EOSINOPHIL (OHS): 2 %
RELATIVE LYMPHOCYTE (OHS): 24 % (ref 18–48)
RELATIVE MONOCYTE (OHS): 5.8 % (ref 4–15)
RELATIVE NEUTROPHIL (OHS): 67.8 % (ref 38–73)
SODIUM SERPL-SCNC: 139 MMOL/L (ref 136–145)
TIBC SERPL-MCNC: 314 UG/DL (ref 250–450)
TRANSFERRIN SERPL-MCNC: 212 MG/DL (ref 200–375)
WBC # BLD AUTO: 10.15 K/UL (ref 3.9–12.7)

## 2025-04-07 PROCEDURE — 83036 HEMOGLOBIN GLYCOSYLATED A1C: CPT | Mod: HCNC

## 2025-04-07 PROCEDURE — 85025 COMPLETE CBC W/AUTO DIFF WBC: CPT | Mod: HCNC

## 2025-04-07 PROCEDURE — 36415 COLL VENOUS BLD VENIPUNCTURE: CPT | Mod: HCNC

## 2025-04-07 PROCEDURE — 80053 COMPREHEN METABOLIC PANEL: CPT | Mod: HCNC

## 2025-04-07 PROCEDURE — 84466 ASSAY OF TRANSFERRIN: CPT | Mod: HCNC

## 2025-04-07 PROCEDURE — 82728 ASSAY OF FERRITIN: CPT | Mod: HCNC

## 2025-04-08 ENCOUNTER — RESULTS FOLLOW-UP (OUTPATIENT)
Dept: INTERNAL MEDICINE | Facility: CLINIC | Age: 85
End: 2025-04-08

## 2025-04-08 NOTE — PROGRESS NOTES
Results to be addressed at upcoming appointment(s) already scheduled.  Future Appointments   Date Time Provider Department [facility]   4/10/2025 10:30 AM Maureen Rivera NP [facility]   4/14/2025  8:40 AM MUKUL Garg MD [facility] IM [facility]   8/5/2025  9:05 AM LABORATORY, [facility] HG LAB [facility]   8/5/2025  9:15 AM CARDIOVASCULAR, [facility] SPECCPR [facility]   8/12/2025 10:40 AM Deniz Sadler MD [facility] CARDIO [facility]

## 2025-04-10 ENCOUNTER — OFFICE VISIT (OUTPATIENT)
Dept: HEMATOLOGY/ONCOLOGY | Facility: CLINIC | Age: 85
End: 2025-04-10
Payer: MEDICARE

## 2025-04-10 DIAGNOSIS — E87.5 HYPERKALEMIA: ICD-10-CM

## 2025-04-10 DIAGNOSIS — E86.0 DEHYDRATION: ICD-10-CM

## 2025-04-10 DIAGNOSIS — E53.8 B12 DEFICIENCY: ICD-10-CM

## 2025-04-10 DIAGNOSIS — C67.9 MALIGNANT NEOPLASM OF URINARY BLADDER, UNSPECIFIED SITE: Primary | ICD-10-CM

## 2025-04-10 DIAGNOSIS — Z86.2 HISTORY OF IRON DEFICIENCY ANEMIA: ICD-10-CM

## 2025-04-10 NOTE — PROGRESS NOTES
Audio Only Telehealth Visit     The patient location is:  Doctor's office  The chief complaint leading to consultation is:  No complaints  Visit type: Virtual visit with audio only (telephone)  Total time spent in medical discussion with patient:  15 minutes  Total time spent on date of the encounter:  30 minutes       The reason for the audio only service rather than synchronous audio and video virtual visit was related to technical difficulties or patient preference/necessity.       Each patient to whom I provide medical services by telemedicine is:  (1) informed of the relationship between the physician and patient and the respective role of any other health care provider with respect to management of the patient; and (2) notified that they may decline to receive medical services by telemedicine and may withdraw from such care at any time. Patient verbally consented to receive this service via voice-only telephone call.       HPI:  83 yo male presents today as a new patient for further evaluation and recommendation of iron deficiency anemia.  Is unable to tolerate oral iron due to constipation.  Currently with normocytic anemia.  Hgb 9.7 g/dL.  Ferritin low on outside records located in media section.       Has  bladder cancer 2022 treated  with surgery followed by Dr. Michaelcompleted 6 BCG induction treatments.   6/23/23-Pt s/p TURBT. 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  Is scheduled for repeat bladder surgery on 10/26/2023.  .       Denies any know bleeding in urine.  Did an occult stool sample with no blood found.  Denies blood noses.  Has a history of B12 deficiency.   Not currently taking B12 supplements.      Denies f/c/ns or unintentional weight loss.  Denies any known abnormal lymphadenopathy.     Former smoker - quit smoking in 1994.  Former skoal - quit 15 years   Former heavy drinker - occasional drinker now - quit about 15-18 years ago.     Complains of  increased fatigue.     Interval History:  12/14/2023 Found with JOSE MARIA and received Feraheme infusions x 2 with last dose received on 11/30/2023.  Presents today to evaluate response.  Hgb improved from 9.7 to 11.2.  Noted elevated granulocyte count today.  Slightly low iron with saturated iron of 19%.  Elevated creatinine 1.6, elevated BUN, elevated  with CKD.  States that he is drinking almost a gallon of water a day.  Denies any urinary symptoms.  On novolog sliding scale increased yesterday  Yesterday Lantus  increase from 10-15 units yesterday.  Hopefully this will decrease bg and improved kidney.       Interval History:  2/22/2024  Presents today for f/u iron deficiency anemia.  Received 2 doses of IV venofer 200 mg  with last dose received on 12/14/2023.  Denies any known blood loss. Continues to be followed by urology and will start treatment for bladder cancer soon. Biopsy of bladder 2/16/2024 showed multiple areas high grade papillary urothelial carcinoma, noninvasive.  Has been eating liver daily.  Has no complaints today.     Interval History:  4/22/2024  Received venofer 200 mg IV push with last dose received on 3/18/2024.  Has received treatment for bladder cancer - BCG treatments so far has received 6 treatments by Dr. Michael (completed). - last on 4/9/2024  Has had recent h/o hematuria. Presents today to assess response of IV iron treatment and to assess need for additional therapy. Currently with slight normocytic anemia Hgb 13.2 and iron is repleated. States that he has to get up 3-4 times at night to urinate.  Noted increased trend in hgb A1C.  States that he is not eating a lot of carbs.  Does not excessively eat sweets.  Is having a repeat cystoscope in 5/2024. Denies any known abnormal blood loss.      Interval History:  6/27/2024  Continues taking B12 and B complex daily (currently out of it)  Presents today to assess for recurrent iron deficiency anemia.  Hgb currently 13.1 normocytic.   "Slight iron deficiency with saturated iron of 13%  Noted elevated potassium and calcium with decreased kidney function, elevated BUN and creatinine.  States that he has diarrhea that comes and goes - describes as being lose but not watery.  States that he has been taking furosemide 40 mg in the morning and 20 mg at night.  Denies excess potassium or calcium intake.  Denies taking TUMS. Denies any new bone pain.  States that he is continuing to be followed by urology and will have a repeat cystoscope next week.  Denies any known abnormal blood loss.      Interval History:  10/3/2024 States that he was taking his slowFe daily medication was causing constipation so he started taking SlowFe every other day. States that he stopped taking his B12 vitamin because "my boobs were growing".  He continues to take B Complex vitamin daily.       Interval History:  1/9/2025 Is scheduled for TURBT on 1/16/2025 to remove bladder tumors with Dr. Michael in urology - diagnosed with TaHG urothelial cell carcinoma again .  Denies any known blood in the urine. Currently taking amoxicillin for treatment of UTI.  States that he eats liver 4-5 times per week.  Has stopped taking his oral iron this week in preparation for his TURBT procedure.  Was taking on MWF.  He will restart after his TURBT procedure.  Has been recommended that he see oncology for consideration of Keytruda for treatment of bladder cancer - states that he has not been seen as of yet.  Currently holding B complex vitamin now and will restart after his procedure.  Currently with an iron-deficiency.  Slight anemia with hemoglobin 13.7 g per dL.    Interval history:  April 10, 2025- since last visit in January of 2025 has restarted SlowFe on Monday Wednesday and Friday.  Tolerating well.  He continues to take his B complex vitamin daily.  He is accompanied by his daughter today.  He has been diagnosed with urothelial cancer of the bladder and is to be scheduled for TURBT " surgical procedure and then consideration of chemo/immunotherapy.  It appears several attempts have been made to get patient scheduled with Dr. Anguiano in oncology; however however patient has had not had the surgical procedure as of yet.  Plans to get patient scheduled with Oncology after surgical procedure to discuss further treatment.         Assessment and plan:  Labs look great currently with no cytopenias.  We will continue slowFe on Monday, Wednesday, Friday.  We will continue B complex vitamin daily.  He is to follow up with surgery to schedule his TURBT procedure for his diagnosed bladder cancer and if she uses we will follow up with Oncology after to discuss further treatment with chemotherapy/immunotherapy.  With hyperkalemia currently.  Also noted he is dehydrated.  Recommend increasing water intake as this will help with dehydration as well as reduce his potassium level.  Patient and daughter verbalized understanding.  We will repeat labs in 6 months with an in person visit after to discuss results.       Med Onc Chart Routing      Follow up with physician    Follow up with ISAIAS 6 months. in person visit in [place] with labs prior   Infusion scheduling note   n/a   Injection scheduling note n/a   Labs   Scheduling:  Preferred lab: [facility]  Lab interval:  cbc, cmp, iron studies   Imaging   N/a   Pharmacy appointment No pharmacy appointment needed      Other referrals       No additional referrals needed  n/a                This service was not originating from a related E/M service provided within the previous 7 days nor will  to an E/M service or procedure within the next 24 hours or my soonest available appointment.  Prevailing standard of care was able to be met in this audio-only visit.

## 2025-04-14 ENCOUNTER — OFFICE VISIT (OUTPATIENT)
Dept: INTERNAL MEDICINE | Facility: CLINIC | Age: 85
End: 2025-04-14
Payer: MEDICARE

## 2025-04-14 VITALS
SYSTOLIC BLOOD PRESSURE: 118 MMHG | OXYGEN SATURATION: 98 % | TEMPERATURE: 98 F | DIASTOLIC BLOOD PRESSURE: 60 MMHG | HEIGHT: 70 IN | BODY MASS INDEX: 25.38 KG/M2 | HEART RATE: 75 BPM | RESPIRATION RATE: 18 BRPM | WEIGHT: 177.25 LBS

## 2025-04-14 DIAGNOSIS — E11.59 HYPERTENSION ASSOCIATED WITH DIABETES: Chronic | ICD-10-CM

## 2025-04-14 DIAGNOSIS — Z55.8: Chronic | ICD-10-CM

## 2025-04-14 DIAGNOSIS — I15.2 HYPERTENSION ASSOCIATED WITH DIABETES: Chronic | ICD-10-CM

## 2025-04-14 DIAGNOSIS — Z99.89 USES ROLLER WALKER: ICD-10-CM

## 2025-04-14 DIAGNOSIS — N25.81 HYPERPARATHYROIDISM, SECONDARY RENAL: Primary | Chronic | ICD-10-CM

## 2025-04-14 DIAGNOSIS — C67.8 MALIGNANT NEOPLASM OF OVERLAPPING SITES OF BLADDER: Chronic | ICD-10-CM

## 2025-04-14 DIAGNOSIS — Z97.8 USES SELF-APPLIED CONTINUOUS GLUCOSE MONITORING DEVICE: ICD-10-CM

## 2025-04-14 DIAGNOSIS — I25.118 ATHEROSCLEROSIS OF NATIVE CORONARY ARTERY OF NATIVE HEART WITH STABLE ANGINA PECTORIS: Chronic | ICD-10-CM

## 2025-04-14 DIAGNOSIS — I70.0 THORACIC AORTA ATHEROSCLEROSIS: Chronic | ICD-10-CM

## 2025-04-14 DIAGNOSIS — E11.65 TYPE 2 DIABETES MELLITUS WITH HYPERGLYCEMIA, WITH LONG-TERM CURRENT USE OF INSULIN: Chronic | ICD-10-CM

## 2025-04-14 DIAGNOSIS — E11.42 ONYCHOMYCOSIS OF MULTIPLE TOENAILS WITH TYPE 2 DIABETES MELLITUS AND PERIPHERAL NEUROPATHY: ICD-10-CM

## 2025-04-14 DIAGNOSIS — N18.32 STAGE 3B CHRONIC KIDNEY DISEASE: ICD-10-CM

## 2025-04-14 DIAGNOSIS — E78.5 HYPERLIPIDEMIA ASSOCIATED WITH TYPE 2 DIABETES MELLITUS: Chronic | ICD-10-CM

## 2025-04-14 DIAGNOSIS — R39.12 BENIGN PROSTATIC HYPERPLASIA WITH WEAK URINARY STREAM: Chronic | ICD-10-CM

## 2025-04-14 DIAGNOSIS — Z74.09 IMPAIRED FUNCTIONAL MOBILITY, BALANCE, GAIT, AND ENDURANCE: ICD-10-CM

## 2025-04-14 DIAGNOSIS — Z79.899 POLYPHARMACY: ICD-10-CM

## 2025-04-14 DIAGNOSIS — N40.1 BENIGN PROSTATIC HYPERPLASIA WITH WEAK URINARY STREAM: Chronic | ICD-10-CM

## 2025-04-14 DIAGNOSIS — B35.1 ONYCHOMYCOSIS OF MULTIPLE TOENAILS WITH TYPE 2 DIABETES MELLITUS AND PERIPHERAL NEUROPATHY: ICD-10-CM

## 2025-04-14 DIAGNOSIS — E11.69 ONYCHOMYCOSIS OF MULTIPLE TOENAILS WITH TYPE 2 DIABETES MELLITUS AND PERIPHERAL NEUROPATHY: ICD-10-CM

## 2025-04-14 DIAGNOSIS — I25.5 ISCHEMIC CARDIOMYOPATHY: Chronic | ICD-10-CM

## 2025-04-14 DIAGNOSIS — Z79.4 TYPE 2 DIABETES MELLITUS WITH DIABETIC PERIPHERAL ANGIOPATHY WITHOUT GANGRENE, WITH LONG-TERM CURRENT USE OF INSULIN: Chronic | ICD-10-CM

## 2025-04-14 DIAGNOSIS — E11.69 HYPERLIPIDEMIA ASSOCIATED WITH TYPE 2 DIABETES MELLITUS: Chronic | ICD-10-CM

## 2025-04-14 DIAGNOSIS — Z79.4 TYPE 2 DIABETES MELLITUS WITH HYPERGLYCEMIA, WITH LONG-TERM CURRENT USE OF INSULIN: Chronic | ICD-10-CM

## 2025-04-14 DIAGNOSIS — E11.51 TYPE 2 DIABETES MELLITUS WITH DIABETIC PERIPHERAL ANGIOPATHY WITHOUT GANGRENE, WITH LONG-TERM CURRENT USE OF INSULIN: Chronic | ICD-10-CM

## 2025-04-14 DIAGNOSIS — I65.23 ASYMPTOMATIC STENOSIS OF BOTH CAROTID ARTERIES WITHOUT INFARCTION: Chronic | ICD-10-CM

## 2025-04-14 DIAGNOSIS — I50.42 CHRONIC COMBINED SYSTOLIC AND DIASTOLIC CONGESTIVE HEART FAILURE: Chronic | ICD-10-CM

## 2025-04-14 DIAGNOSIS — E11.51 DIABETES MELLITUS TYPE 2 WITH PERIPHERAL ARTERY DISEASE: Chronic | ICD-10-CM

## 2025-04-14 PROBLEM — H90.3 BILATERAL SENSORINEURAL HEARING LOSS: Chronic | Status: ACTIVE | Noted: 2020-02-28

## 2025-04-14 PROBLEM — R55 SYNCOPE: Status: RESOLVED | Noted: 2024-06-11 | Resolved: 2025-04-14

## 2025-04-14 PROBLEM — R79.89 ELEVATED TROPONIN: Status: RESOLVED | Noted: 2020-12-28 | Resolved: 2025-04-14

## 2025-04-14 PROBLEM — S42.012A CLOSED ANTERIOR DISPLACED FRACTURE OF STERNAL END OF LEFT CLAVICLE: Chronic | Status: RESOLVED | Noted: 2024-05-13 | Resolved: 2025-04-14

## 2025-04-14 PROBLEM — R31.0 HEMATURIA, GROSS: Status: RESOLVED | Noted: 2021-09-01 | Resolved: 2025-04-14

## 2025-04-14 LAB
ALBUMIN/CREAT UR: 22.6 UG/MG
CREAT UR-MCNC: 31 MG/DL (ref 23–375)
MICROALBUMIN UR-MCNC: 7 UG/ML (ref ?–5000)
PTH-INTACT SERPL-MCNC: 118.6 PG/ML (ref 9–77)

## 2025-04-14 PROCEDURE — 80061 LIPID PANEL: CPT | Mod: HCNC | Performed by: FAMILY MEDICINE

## 2025-04-14 PROCEDURE — 83970 ASSAY OF PARATHORMONE: CPT | Mod: HCNC | Performed by: FAMILY MEDICINE

## 2025-04-14 PROCEDURE — 1160F RVW MEDS BY RX/DR IN RCRD: CPT | Mod: CPTII,HCNC,S$GLB, | Performed by: FAMILY MEDICINE

## 2025-04-14 PROCEDURE — 3288F FALL RISK ASSESSMENT DOCD: CPT | Mod: CPTII,HCNC,S$GLB, | Performed by: FAMILY MEDICINE

## 2025-04-14 PROCEDURE — 82306 VITAMIN D 25 HYDROXY: CPT | Mod: HCNC | Performed by: FAMILY MEDICINE

## 2025-04-14 PROCEDURE — 82570 ASSAY OF URINE CREATININE: CPT | Mod: HCNC | Performed by: FAMILY MEDICINE

## 2025-04-14 PROCEDURE — 1101F PT FALLS ASSESS-DOCD LE1/YR: CPT | Mod: CPTII,HCNC,S$GLB, | Performed by: FAMILY MEDICINE

## 2025-04-14 PROCEDURE — 3078F DIAST BP <80 MM HG: CPT | Mod: CPTII,HCNC,S$GLB, | Performed by: FAMILY MEDICINE

## 2025-04-14 PROCEDURE — 1159F MED LIST DOCD IN RCRD: CPT | Mod: CPTII,HCNC,S$GLB, | Performed by: FAMILY MEDICINE

## 2025-04-14 PROCEDURE — 36415 COLL VENOUS BLD VENIPUNCTURE: CPT | Mod: HCNC | Performed by: FAMILY MEDICINE

## 2025-04-14 PROCEDURE — G2211 COMPLEX E/M VISIT ADD ON: HCPCS | Mod: HCNC,S$GLB,, | Performed by: FAMILY MEDICINE

## 2025-04-14 PROCEDURE — 99999 PR PBB SHADOW E&M-EST. PATIENT-LVL V: CPT | Mod: PBBFAC,HCNC,, | Performed by: FAMILY MEDICINE

## 2025-04-14 PROCEDURE — 99215 OFFICE O/P EST HI 40 MIN: CPT | Mod: HCNC,S$GLB,, | Performed by: FAMILY MEDICINE

## 2025-04-14 PROCEDURE — 1126F AMNT PAIN NOTED NONE PRSNT: CPT | Mod: CPTII,HCNC,S$GLB, | Performed by: FAMILY MEDICINE

## 2025-04-14 PROCEDURE — 84295 ASSAY OF SERUM SODIUM: CPT | Mod: HCNC | Performed by: FAMILY MEDICINE

## 2025-04-14 PROCEDURE — 3074F SYST BP LT 130 MM HG: CPT | Mod: CPTII,HCNC,S$GLB, | Performed by: FAMILY MEDICINE

## 2025-04-14 RX ORDER — AMLODIPINE BESYLATE 10 MG/1
5 TABLET ORAL DAILY
Qty: 90 TABLET | Refills: 3 | Status: SHIPPED | OUTPATIENT
Start: 2025-04-14

## 2025-04-14 RX ORDER — METOPROLOL SUCCINATE 25 MG/1
25 TABLET, EXTENDED RELEASE ORAL NIGHTLY
Qty: 90 TABLET | Refills: 3 | Status: SHIPPED | OUTPATIENT
Start: 2025-04-14

## 2025-04-14 RX ORDER — CHOLECALCIFEROL (VITAMIN D3) 25 MCG
1000 TABLET ORAL DAILY
Qty: 90 TABLET | Refills: 3 | Status: SHIPPED | OUTPATIENT
Start: 2025-04-14

## 2025-04-14 RX ORDER — TAMSULOSIN HYDROCHLORIDE 0.4 MG/1
0.4 CAPSULE ORAL DAILY
Qty: 90 CAPSULE | Refills: 3 | Status: SHIPPED | OUTPATIENT
Start: 2025-04-14

## 2025-04-14 RX ORDER — ISOSORBIDE MONONITRATE 60 MG/1
60 TABLET, EXTENDED RELEASE ORAL DAILY
Qty: 90 TABLET | Refills: 3 | Status: SHIPPED | OUTPATIENT
Start: 2025-04-14

## 2025-04-14 RX ORDER — ALBUTEROL SULFATE 90 UG/1
2 INHALANT RESPIRATORY (INHALATION) EVERY 6 HOURS PRN
COMMUNITY

## 2025-04-14 RX ORDER — ATORVASTATIN CALCIUM 40 MG/1
40 TABLET, FILM COATED ORAL NIGHTLY
Qty: 90 TABLET | Refills: 3 | Status: SHIPPED | OUTPATIENT
Start: 2025-04-14

## 2025-04-14 RX ORDER — LOSARTAN POTASSIUM 50 MG/1
50 TABLET ORAL DAILY
Qty: 90 TABLET | Refills: 3 | Status: SHIPPED | OUTPATIENT
Start: 2025-04-14

## 2025-04-14 RX ORDER — ASPIRIN 81 MG/1
81 TABLET ORAL DAILY
Qty: 90 TABLET | Refills: 3 | Status: SHIPPED | OUTPATIENT
Start: 2025-04-14 | End: 2026-04-14

## 2025-04-14 RX ORDER — NITROGLYCERIN 0.4 MG/1
0.4 TABLET SUBLINGUAL EVERY 5 MIN PRN
Qty: 25 TABLET | Refills: 11 | Status: SHIPPED | OUTPATIENT
Start: 2025-04-14 | End: 2026-04-14

## 2025-04-14 SDOH — SOCIAL DETERMINANTS OF HEALTH (SDOH): OTHER PROBLEMS RELATED TO EDUCATION AND LITERACY: Z55.8

## 2025-04-14 NOTE — ASSESSMENT & PLAN NOTE
S/P TURBT (TRANSURETHRAL RESECTION OF BLADDER TUMOR) by urologist Hortencia Michael MD   PLAN: F/U Urology for surveillance

## 2025-04-14 NOTE — ASSESSMENT & PLAN NOTE
Lab Results   Component Value Date    EGFRNORACEVR 49 (L) 04/07/2025    EGFRNORACEVR 39.0 (A) 02/17/2025    EGFRNORACEVR 42 (A) 01/06/2025    EGFRNORACEVR 34.1 (A) 01/02/2025    CREATININE 1.4 04/07/2025    CREATININE 1.7 (H) 02/17/2025    CREATININE 1.6 (H) 01/06/2025    CREATININE 1.9 (H) 01/02/2025     TREATMENT PLAN: Ensure adequate water intake. Avoid/minimize NSAID use. Avoid nephrotoxic drugs as able. Monitor and control BP. Periodic lab monitoring of renal function.

## 2025-04-14 NOTE — ASSESSMENT & PLAN NOTE
He has limited understanding and recollection of his current medications. We discussed strategies to help him better keep track of and adhere to his current treatment regimen. INSTRUCTIONS: Bring all your current prescriptions (pill bottles, etc.) to your future appointments so we can check to see that your current medication list is accurate.

## 2025-04-14 NOTE — PROGRESS NOTES
"***DeepScribe<<<Awaiting Transcription>>>  Health Maintenance Due   Topic Date Due    RSV Vaccine (Age 60+ and Pregnant patients) (1 - 1-dose 75+ series) Never done    COVID-19 Vaccine (8 - 2024-25 season) 01/27/2025    Foot Exam  02/13/2025    Diabetes Urine Screening  02/14/2025    Lipid Panel  02/14/2025    Diabetic Eye Exam  03/22/2025   ***  OFFICE VISIT 4/14/25  8:40 AM CDT    History of Present Illness    HPI           {LM ROS Optional (Optional):40972::"Except as noted herein, ROS is otherwise negative."}    Assessment & Plan            {There are no diagnoses linked to this encounter. (Refresh or delete this SmartLink)}   {Complex Visit?:84587::"Unless specified otherwise, chronic conditions are represented as and appear to be compensated/controlled and stable.","Today's visit involved the intricate management of episodic problem(s) and the ongoing care for the patient's serious or complex condition(s) listed above, reflecting the inherent complexity of providing longitudinal, comprehensive evaluation and management as the central hub for the patient's primary care services.","Any education and instructions provided to the patient via Patient Portal Message today are incorporated herein by reference."}  Vitals:    04/14/25 0850   BP: 118/60   BP Location: Right arm   Patient Position: Sitting   Pulse: 75   Resp: 18   Temp: 98 °F (36.7 °C)   TempSrc: Tympanic   SpO2: 98%   Weight: 80.4 kg (177 lb 4 oz)   Height: 5' 10" (1.778 m)   Physical Exam***  DIABETIC FOOT EXAM:  Protective Sensation (w/ 10 gram monofilament):  Right: Absent  Left: Absent    Visual Inspection:  Dry Skin -  Bilateral and Inspection of feet reveals onychomycosis of multiple nails without paronychia.    Pedal Pulses:   Right: Absent  Left: Absent    Posterior Tibialis Pulses:   Right:Absent  Left: Absent      This note was generated with the assistance of ambient listening technology. Verbal consent was obtained by the patient and " accompanying visitor(s) for the recording of patient appointment to facilitate this note. I attest to having reviewed and edited the generated note for accuracy, though some syntax or spelling errors may persist. Please contact the author of this note for any clarification.      Some sections of this note may have been produced using ambient-listening and speech-recognition technologies. I have reviewed the content for accuracy, though minor errors in syntax, spelling, or similar-sounding words may be present and should be understood in context. Please contact the author for any clarification.    {LM Wrap-Up Instructions (Optional):00267}  No follow-ups on file.  I spent a total of 45 minutes today evaluating and managing this patient for this encounter.  This includes face to face time and non-face to face time preparing to see the patient (eg, review of tests), obtaining and/or reviewing separately obtained history, documenting clinical information in the electronic or other health record, independently interpreting results and communicating results to the patient/family/caregiver, or care coordinator. This time was spent personally by me on the following activities: review of nurse's notes, pre-charting, review of patient's past medical history, assessing age-appropriate health maintenance needs, review of any interval history, medication reconciliation, reviewing/reconciling/updating problem list, reviewing/reconciling/updating PMFSH, obtaining history from the patient, examination of the patient, review and interpretation of lab results, answering patient's questions about lab results, review and interpretation of imaging test results, review and interpretation of cardiology test results, answering patient's questions about cardiology test results, reviewing my previous chart notes, reviewing consulting specialist's chart notes, evaluation of the patient's response to treatment, managing and/or ordering  prescription medications, medication counseling, discussing strategies to help overcome any barriers to adherence to medication regimen, ordering labs, ordering other treatments, ordering referral to subspecialty provider(s), educating patient and answering their questions about risks and benefits of treatment options, educating patient and answering their questions about treatment plan, goals of treatment, and follow-up, discussing strategies to help overcome any barriers to adherence to treatment plan, communicating instructions to my staff about the treatment plan, and final documentation of the visit.  This time was exclusive of any separately billable procedures for this patient and exclusive of time spent treating any other patient.           [place] and [place] for insulin management.    MEDICAL HISTORY:  He has history of heart surgery in 2011 and surgical placement of emily following a fall.    SOCIAL HISTORY:  He lives alone.    CURRENT MEDICATIONS:  He takes furosemide 40 mg in the morning and additional furosemide 20 mg at night as needed for foot swelling.    DIET:  He reports consuming french toast sandwich with sausage, egg, and cheese for breakfast today.    FOOT CARE:  He gets toenails trimmed every two months at a nail salon. He has fungal infection in toenails.       Except as noted herein, ROS is otherwise negative.    Assessment & Plan    IMPRESSION:   High risk for future falls due to significant loss of sensation in feet from diabetes.   Diabetes poorly controlled based on continuous glucose meter readings showing frequent highs.   Fungal nail infection present, likely chronic and difficult to fully resolve.   Considered financial constraints and preference for [facility], offering [facility] services as options while respecting choice to continue with [facility] if preferred.    PATIENT EDUCATION:   Explained that the moving part felt was not a broken collarbone, but the normal joint where the collarbone connects to the breastbone.   Discussed that shoes alone will not prevent falls; stability and balance improvement are necessary.    ACTION ITEMS/LIFESTYLE:   Nithin to bring all current medication bottles to next appointment for comprehensive review.   Nithin to provide urine specimen for lab testing.   Recommend against using Clorox to soak feet due to dry, scaly skin condition.    MEDICATIONS:   Continued BP and heart medications.    ORDERS:   Ordered labs to check renal function and other pertinent health indicators.   Ordered urine test.    REFERRALS:   Referred to podiatrist for nail care and guidance on appropriate footwear.   Referred to physical therapy for balance and walking assistance to reduce fall risk.   Referred to diabetes specialist  for better management of glucose levels.    FOLLOW UP:   Follow up to discuss lab results and referral outcomes.   Consider scheduling appointments with referred specialists based on cost and insurance coverage.       1. Hyperparathyroidism, secondary renal  -     vitamin D (VITAMIN D3) 1000 units Tab; Take 1 tablet (1,000 Units total) by mouth once daily.  Dispense: 90 tablet; Refill: 3  -     Renal Function Panel  -     PTH, Intact  -     Vitamin D    2. Impaired functional mobility, balance, gait, and endurance  -     Ambulatory Referral/Consult to Physical Therapy; Future; Expected date: 04/21/2025    3. Uses roller walker  -     Ambulatory Referral/Consult to Physical Therapy; Future; Expected date: 04/21/2025    4. Onychomycosis of multiple toenails with type 2 diabetes mellitus and peripheral neuropathy  -     Ambulatory referral/consult to Podiatry; Future; Expected date: 04/21/2025    5. Type 2 diabetes mellitus with diabetic peripheral angiopathy without gangrene, with long-term current use of insulin  Overview:  3/2024 LE Arterial US    Moderately decreased left RENZO and severely decreased right RENZO    Right ostial SFA is occluded, with distal reconstitution, right PT is occluded, AT flow is blunted    Left ostial SFA is occluded, with reconstitution distally , left PT and AT are occluded.  The AT reconstitutes at the level of the DP    Orders:  -     Ambulatory referral/consult to Podiatry; Future; Expected date: 04/21/2025  -     aspirin (ECOTRIN) 81 MG EC tablet; Take 1 tablet (81 mg total) by mouth once daily.  Dispense: 90 tablet; Refill: 3  -     atorvastatin (LIPITOR) 40 MG tablet; Take 1 tablet (40 mg total) by mouth every evening.  Dispense: 90 tablet; Refill: 3  -     Hemoglobin A1C; Standing  -     Comprehensive Metabolic Panel; Standing    6. Chronic combined systolic and diastolic congestive heart failure  -     losartan (COZAAR) 50 MG tablet; Take 1 tablet (50 mg total) by mouth once daily.   Dispense: 90 tablet; Refill: 3  -     metoprolol succinate (TOPROL-XL) 25 MG 24 hr tablet; Take 1 tablet (25 mg total) by mouth every evening.  Dispense: 90 tablet; Refill: 3    7. Atherosclerosis of native coronary artery of native heart with stable angina pectoris  -     nitroGLYCERIN (NITROSTAT) 0.4 MG SL tablet; Place 1 tablet (0.4 mg total) under the tongue every 5 (five) minutes as needed for Chest pain (for chest pain/discomfort). Call 911 if chest pain persists after second dose.  Dispense: 25 tablet; Refill: 11  -     amLODIPine (NORVASC) 10 MG tablet; Take 0.5 tablets (5 mg total) by mouth once daily.  Dispense: 90 tablet; Refill: 3  -     aspirin (ECOTRIN) 81 MG EC tablet; Take 1 tablet (81 mg total) by mouth once daily.  Dispense: 90 tablet; Refill: 3  -     atorvastatin (LIPITOR) 40 MG tablet; Take 1 tablet (40 mg total) by mouth every evening.  Dispense: 90 tablet; Refill: 3  -     isosorbide mononitrate (IMDUR) 60 MG 24 hr tablet; Take 1 tablet (60 mg total) by mouth once daily.  Dispense: 90 tablet; Refill: 3  -     metoprolol succinate (TOPROL-XL) 25 MG 24 hr tablet; Take 1 tablet (25 mg total) by mouth every evening.  Dispense: 90 tablet; Refill: 3    8. Hypertension associated with diabetes  -     Cancel: Comprehensive Metabolic Panel  -     amLODIPine (NORVASC) 10 MG tablet; Take 0.5 tablets (5 mg total) by mouth once daily.  Dispense: 90 tablet; Refill: 3  -     losartan (COZAAR) 50 MG tablet; Take 1 tablet (50 mg total) by mouth once daily.  Dispense: 90 tablet; Refill: 3  -     metoprolol succinate (TOPROL-XL) 25 MG 24 hr tablet; Take 1 tablet (25 mg total) by mouth every evening.  Dispense: 90 tablet; Refill: 3  -     Comprehensive Metabolic Panel; Standing    9. Asymptomatic stenosis of both carotid arteries without infarction  -     aspirin (ECOTRIN) 81 MG EC tablet; Take 1 tablet (81 mg total) by mouth once daily.  Dispense: 90 tablet; Refill: 3  -     atorvastatin (LIPITOR) 40 MG tablet;  Take 1 tablet (40 mg total) by mouth every evening.  Dispense: 90 tablet; Refill: 3    10. Diabetes mellitus type 2 with peripheral artery disease  -     aspirin (ECOTRIN) 81 MG EC tablet; Take 1 tablet (81 mg total) by mouth once daily.  Dispense: 90 tablet; Refill: 3  -     atorvastatin (LIPITOR) 40 MG tablet; Take 1 tablet (40 mg total) by mouth every evening.  Dispense: 90 tablet; Refill: 3    11. Hyperlipidemia associated with type 2 diabetes mellitus  -     Cancel: Comprehensive Metabolic Panel  -     Lipid Panel  -     atorvastatin (LIPITOR) 40 MG tablet; Take 1 tablet (40 mg total) by mouth every evening.  Dispense: 90 tablet; Refill: 3  -     Comprehensive Metabolic Panel; Standing    12. Thoracic aorta atherosclerosis  Overview:  CXR 2///11- TOP NORMAL HEART SIZE WITH MILD TORTUOSITY AND   ATHEROSCLEROTIC CALCIFICATION OF THE THORACIC AORTA.    Assessment & Plan:  Asymptomatic. Clinically stable.     Orders:  -     atorvastatin (LIPITOR) 40 MG tablet; Take 1 tablet (40 mg total) by mouth every evening.  Dispense: 90 tablet; Refill: 3    13. Stage 3b chronic kidney disease  Assessment & Plan:  Lab Results   Component Value Date    EGFRNORACEVR 49 (L) 04/07/2025    EGFRNORACEVR 39.0 (A) 02/17/2025    EGFRNORACEVR 42 (A) 01/06/2025    EGFRNORACEVR 34.1 (A) 01/02/2025    CREATININE 1.4 04/07/2025    CREATININE 1.7 (H) 02/17/2025    CREATININE 1.6 (H) 01/06/2025    CREATININE 1.9 (H) 01/02/2025     TREATMENT PLAN: Ensure adequate water intake. Avoid/minimize NSAID use. Avoid nephrotoxic drugs as able. Monitor and control BP. Periodic lab monitoring of renal function.     Orders:  -     Microalbumin/Creatinine Ratio, Urine  -     losartan (COZAAR) 50 MG tablet; Take 1 tablet (50 mg total) by mouth once daily.  Dispense: 90 tablet; Refill: 3  -     Renal Function Panel  -     Comprehensive Metabolic Panel; Standing    14. Benign prostatic hyperplasia with weak urinary stream  Overview:  Patient states has urge  incontinence    Orders:  -     tamsulosin (FLOMAX) 0.4 mg Cap; Take 1 capsule (0.4 mg total) by mouth once daily.  Dispense: 90 capsule; Refill: 3    15. Uses self-applied continuous glucose monitoring device  -     Ambulatory referral/consult to Diabetes Education; Future; Expected date: 04/21/2025  -     Ambulatory referral/consult to Diabetic Advanced Practice Providers (Medical Management); Future; Expected date: 04/21/2025    16. Ischemic cardiomyopathy  -     nitroGLYCERIN (NITROSTAT) 0.4 MG SL tablet; Place 1 tablet (0.4 mg total) under the tongue every 5 (five) minutes as needed for Chest pain (for chest pain/discomfort). Call 911 if chest pain persists after second dose.  Dispense: 25 tablet; Refill: 11  -     aspirin (ECOTRIN) 81 MG EC tablet; Take 1 tablet (81 mg total) by mouth once daily.  Dispense: 90 tablet; Refill: 3  -     isosorbide mononitrate (IMDUR) 60 MG 24 hr tablet; Take 1 tablet (60 mg total) by mouth once daily.  Dispense: 90 tablet; Refill: 3  -     metoprolol succinate (TOPROL-XL) 25 MG 24 hr tablet; Take 1 tablet (25 mg total) by mouth every evening.  Dispense: 90 tablet; Refill: 3    17. Malignant neoplasm of overlapping sites of bladder  Assessment & Plan:  S/P TURBT (TRANSURETHRAL RESECTION OF BLADDER TUMOR) by urologist Hortencia Michael MD   PLAN: F/U Urology for surveillance      18. Type 2 diabetes mellitus with hyperglycemia, with long-term current use of insulin  -     Microalbumin/Creatinine Ratio, Urine  -     Cancel: Comprehensive Metabolic Panel  -     Lipid Panel  -     Cancel: Hemoglobin A1C  -     Ambulatory referral/consult to Diabetes Education; Future; Expected date: 04/21/2025  -     Ambulatory referral/consult to Diabetic Advanced Practice Providers (Medical Management); Future; Expected date: 04/21/2025  -     Hemoglobin A1C; Standing  -     Comprehensive Metabolic Panel; Standing    19. Polypharmacy  Assessment & Plan:  He has limited understanding and recollection  "of his current medications. We discussed strategies to help him better keep track of and adhere to his current treatment regimen. INSTRUCTIONS: Bring all your current prescriptions (pill bottles, etc.) to your future appointments so we can check to see that your current medication list is accurate.       20. Deficient knowledge of diabetes management  -     Ambulatory referral/consult to Diabetes Education; Future; Expected date: 04/21/2025  -     Ambulatory referral/consult to Diabetic Advanced Practice Providers (Medical Management); Future; Expected date: 04/21/2025       Unless specified otherwise, chronic conditions are represented as and appear to be compensated/controlled and stable.  Today's visit involved the intricate management of episodic problem(s) and the ongoing care for the patient's serious or complex condition(s) listed above, reflecting the inherent complexity of providing longitudinal, comprehensive evaluation and management as the central hub for the patient's primary care services.  Vitals:    04/14/25 0850   BP: 118/60   BP Location: Right arm   Patient Position: Sitting   Pulse: 75   Resp: 18   Temp: 98 °F (36.7 °C)   TempSrc: Tympanic   SpO2: 98%   Weight: 80.4 kg (177 lb 4 oz)   Height: 5' 10" (1.778 m)   Physical Exam  Vitals reviewed.   Constitutional:       General: He is not in acute distress.     Appearance: Normal appearance. He is not ill-appearing or diaphoretic.   Cardiovascular:      Rate and Rhythm: Normal rate and regular rhythm.   Pulmonary:      Effort: Pulmonary effort is normal.      Breath sounds: Normal breath sounds.   Skin:     General: Skin is warm and dry.   Neurological:      General: No focal deficit present.      Mental Status: He is alert and oriented to person, place, and time.      Sensory: Sensory deficit present.   Psychiatric:         Mood and Affect: Mood normal.         Behavior: Behavior normal.         Judgment: Judgment normal.       DIABETIC FOOT " EXAM:  Protective Sensation (w/ 10 gram monofilament):  Right: Absent  Left: Absent    Visual Inspection:  Dry Skin -  Bilateral and Inspection of feet reveals onychomycosis of multiple nails without paronychia.    Pedal Pulses:   Right: Absent  Left: Absent    Posterior Tibialis Pulses:   Right:Absent  Left: Absent      This note was generated with the assistance of ambient listening technology. Verbal consent was obtained by the patient and accompanying visitor(s) for the recording of patient appointment to facilitate this note. I attest to having reviewed and edited the generated note for accuracy, though some syntax or spelling errors may persist. Please contact the author of this note for any clarification.      Some sections of this note may have been produced using ambient-listening and speech-recognition technologies. I have reviewed the content for accuracy, though minor errors in syntax, spelling, or similar-sounding words may be present and should be understood in context. Please contact the author for any clarification.    Follow up in about 4 months (around 8/14/2025).  I spent a total of 45 minutes today evaluating and managing this patient for this encounter.  This includes face to face time and non-face to face time preparing to see the patient (eg, review of tests), obtaining and/or reviewing separately obtained history, documenting clinical information in the electronic or other health record, independently interpreting results and communicating results to the patient/family/caregiver, or care coordinator. This time was spent personally by me on the following activities: review of nurse's notes, pre-charting, review of patient's past medical history, assessing age-appropriate health maintenance needs, review of any interval history, medication reconciliation, reviewing/reconciling/updating problem list, reviewing/reconciling/updating PMFSH, obtaining history from the patient, examination of the  patient, review and interpretation of lab results, answering patient's questions about lab results, review and interpretation of imaging test results, review and interpretation of cardiology test results, answering patient's questions about cardiology test results, reviewing my previous chart notes, reviewing consulting specialist's chart notes, evaluation of the patient's response to treatment, managing and/or ordering prescription medications, medication counseling, discussing strategies to help overcome any barriers to adherence to medication regimen, ordering labs, ordering other treatments, ordering referral to subspecialty provider(s), educating patient and answering their questions about risks and benefits of treatment options, educating patient and answering their questions about treatment plan, goals of treatment, and follow-up, discussing strategies to help overcome any barriers to adherence to treatment plan, communicating instructions to my staff about the treatment plan, and final documentation of the visit.  This time was exclusive of any separately billable procedures for this patient and exclusive of time spent treating any other patient.

## 2025-04-15 LAB
25(OH)D3+25(OH)D2 SERPL-MCNC: 38 NG/ML (ref 30–96)
ALBUMIN SERPL BCP-MCNC: 3.6 G/DL (ref 3.5–5.2)
ANION GAP (OHS): 12 MMOL/L (ref 8–16)
BUN SERPL-MCNC: 30 MG/DL (ref 8–23)
CALCIUM SERPL-MCNC: 9.8 MG/DL (ref 8.7–10.5)
CHLORIDE SERPL-SCNC: 103 MMOL/L (ref 95–110)
CHOLEST SERPL-MCNC: 107 MG/DL (ref 120–199)
CHOLEST/HDLC SERPL: 1.9 {RATIO} (ref 2–5)
CO2 SERPL-SCNC: 20 MMOL/L (ref 23–29)
CREAT SERPL-MCNC: 1.5 MG/DL (ref 0.5–1.4)
GFR SERPLBLD CREATININE-BSD FMLA CKD-EPI: 45 ML/MIN/1.73/M2
GLUCOSE SERPL-MCNC: 295 MG/DL (ref 70–110)
HDLC SERPL-MCNC: 57 MG/DL (ref 40–75)
HDLC SERPL: 53.3 % (ref 20–50)
LDLC SERPL CALC-MCNC: 35 MG/DL (ref 63–159)
NONHDLC SERPL-MCNC: 50 MG/DL
PHOSPHATE SERPL-MCNC: 2.3 MG/DL (ref 2.7–4.5)
POTASSIUM SERPL-SCNC: 4.3 MMOL/L (ref 3.5–5.1)
SODIUM SERPL-SCNC: 135 MMOL/L (ref 136–145)
TRIGL SERPL-MCNC: 75 MG/DL (ref 30–150)

## 2025-04-16 ENCOUNTER — CLINICAL SUPPORT (OUTPATIENT)
Dept: REHABILITATION | Facility: HOSPITAL | Age: 85
End: 2025-04-16
Attending: FAMILY MEDICINE
Payer: MEDICARE

## 2025-04-16 DIAGNOSIS — Z74.09 IMPAIRED FUNCTIONAL MOBILITY, BALANCE, GAIT, AND ENDURANCE: ICD-10-CM

## 2025-04-16 DIAGNOSIS — Z99.89 USES ROLLER WALKER: ICD-10-CM

## 2025-04-16 PROCEDURE — 97163 PT EVAL HIGH COMPLEX 45 MIN: CPT | Mod: HCNC,PN

## 2025-04-17 NOTE — PROGRESS NOTES
Outpatient Rehab  Physical Therapy Evaluation    Patient Name: Nithin Pino  MRN: [id number]  YOB: 1940  Encounter Date: 4/16/2025    Therapy Diagnosis:   Encounter Diagnoses   Name Primary?    Impaired functional mobility, balance, gait, and endurance     Uses roller walker      Physician: MUKUL Garg MD    Physician Orders: Eval and Treat  Medical Diagnosis: Impaired functional mobility, balance, gait, and endurance  Uses roller walker    Visit # / Visits Authorized:  1 / 1  Insurance Authorization Period: 4/14/2025 to 4/14/2026  Date of Evaluation: 4/16/2025  Plan of Care Certification: 4/16/2025 to 7/11/2025     Time In: 0730   Time Out: 0830  Total Time: 60   Total Billable Time:  60 minutes    Intake Outcome Measure for FOTO Survey  Therapist reviewed FOTO scores for Nithin Pino on 4/16/2025.   FOTO report - see Media section or FOTO account episode details.   Intake Score: 40%     Subjective   History of Present Illness  Nithin is a 84 y.o. male who reports to physical therapy with a chief concern of balance and weakness.     History of Present Condition/Illness: Patient reports that in the past 6 months he fell twice. His last fall was Saturday where he was walking on concrete driveway and somehow tripped and fell on his face and right ribcage. His eye is bruised and has much soreness in ribcage. Coughing is still very painful, no broken ribs. His Dr. wanted him to try physical therapy. He reports that he normally feels off balanced, mostly with walking and climbing steps. Two years ago fell and broke left femur and no pain since surgery to fix bone.     Activities of Daily Living  Social history was obtained from Patient.      Previously independent with activities of daily living? Yes  Currently independent with activities of daily living? Yes     Previously independent with instrumental activities of daily living? Yes  Currently independent with instrumental activities of daily  living? Yes     Pain  No Pain Reported: Yes      Living Arrangements  Living Situation  Housing: Home independently  Living Arrangements: Alone  [facility] ramp [facility] get into his trailer.     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 [employer]- 12years.       Past Medical History/Physical Systems Review:   Nithin Pino  has a past medical history of Abnormal brain MRI, Abnormal ECG, Abnormal stress test, Alcohol dependence, AP (angina pectoris), Asthma, Bladder cancer, Carotid artery occlusion, Cataract, Chronic combined systolic and diastolic congestive heart failure, Chronic diastolic heart failure, Chronic ischemic heart disease, CKD (chronic kidney disease) stage 3, GFR 30-59 ml/min, Coronary artery disease, DM (diabetes mellitus), DM (diabetes mellitus), Heart valve regurgitation, Hematuria, History of alcohol abuse, History of atherectomy, History of chronic kidney disease, History of PTCA, History of PTCA, Hyperlipidemia, Hypertension, Insomnia, Iron deficiency anemia, Ischemic cardiomyopathy, Long term (current) use of antithrombotics/antiplatelets, Malignant neoplasm of overlapping sites of bladder, Myocardial infarction, Old MI (myocardial infarction), Peripheral vascular disease, Polyneuropathy, RBBB, S/P CABG (coronary artery bypass graft), Shortness of breath, Simple chronic bronchitis, Tobacco dependence, Trouble in sleeping, Type 2 diabetes with peripheral circulatory disorder, controlled, and Wears glasses.    Nithin Pino  has a past surgical history that includes Cardiac surgery (1994); Appendectomy; Coronary artery bypass graft (05/2011); Hernia repair; Cardiac catheterization; PCIOL (Bilateral, OD 02/01/17/OS 02/15/17); Cataract extraction w/  intraocular lens implant (Right, 02/01/2017); Open reduction and internal fixation (ORIF) of injury of hip; turbt (transurethral resection of bladder tumor) (N/A, 6/8/2023); turbt (transurethral resection  of bladder tumor) (N/A, 2/8/2024); turbt (transurethral resection of bladder tumor) (N/A, 8/29/2024); and Retrograde pyelography (Bilateral, 8/29/2024).    Nithin has a current medication list which includes the following prescription(s): albuterol, amlodipine, aspirin, atorvastatin, b complex vitamins, cyanocobalamin, dulaglutide, finasteride, furosemide, glucose, lantus solostar u-100 insulin, insulin aspart u-100, insulin glargine-yfgn, isosorbide mononitrate, losartan, metoprolol succinate, multivit-minerals/folic acid, nitroglycerin, tamsulosin, and vitamin d.    Review of patient's allergies indicates:   Allergen Reactions    Pseudoephedrine-guaifenesin Shortness Of Breath    Panmist dm  [pseudoephedrine-dm-guaifenesin]      Other reaction(s): Unknown        Objective      Cervical Thoracic Sensation  General Cervical/Thoracic Sensation  Intact: Right and Left  Right Cervical/Thoracic Sensation  Intact: Light Touch, Static Two Point Discrimination, Dynamic Two Point Discrimination, Sharp/Dull Discrimination, Kinesthesia, and Proprioception       Left Cervical/Thoracic Sensation  Intact: Light Touch, Static Two Point Discrimination, Dynamic Two Point Discrimination, Sharp/Dull Discrimination, Kinesthesia, and Proprioception            Lower Extremity Sensation  General Lumbar/Lower Extremity Sensation  Intact: Right and Left  Right Lumbar/Lower Extremity Sensation  Intact: Light Touch, Sharp/Dull Discrimination, Static Two Point Discrimination, Dynamic Two Point Discrimination, Kinesthesia, and Proprioception  Right Lumbar/Lower Extremity Sensation Stocking Glove Pattern: No    Left Lumbar/Lower Extremity Sensation  Intact: Light Touch, Static Two Point Discrimination, Dynamic Two Point Discrimination, Sharp/Dull Discrimination, Kinesthesia, and Proprioception  Left Lumbar/Lower Extremity Sensation Stocking Glove Pattern: No             Shoulder Strength - Planes of Motion   Right Strength Right Pain Left  Strength Left  Pain   Flexion 4   4     Extension           ABduction 4   4     ADduction 4   4     Horizontal ABduction           Horizontal ADduction           Internal Rotation 0° 4   4     Internal Rotation 90°           External Rotation 0°           External Rotation 90° 4   4         Elbow Strength   Right Strength Right Pain Left Strength Left  Pain   Flexion (C6) 4+   4+     Extension (C7) 4+   4+               Hip Strength - Planes of Motion   Right Strength Right Pain Left Strength Left  Pain   Flexion (L2) 4   4     Extension 4   4     ABduction 4   4     ADduction 4   4     Internal Rotation 4   4     External Rotation 4   4         Knee Strength   Right Strength Right Pain Left Strength Left  Pain   Flexion (S2) 4   4     Prone Flexion           Extension (L3) 4   4                   Couch Balance  Couch Balance Scale  1. Sitting to Standing: Able to stand independently using hands  2. Standing Unsupported: Able to stand safely for 2 minutes  3. Sitting with Back Unsupported but Feet Supported on Floor or on a Stool: Able to sit safely and securely for 2 minutes  4. Standing to Sitting: Uses back of legs against chair to control descent  5.  Transfers: Able to transfer safely definite need of hands  6. Standing Unsupported with Eyes Closed: Able to stand 3 seconds  7. Standing Unsupported with Feet Together: Able to place feet together independently and stand 1 minute safely  8. Reach Forward with Outstretched Arm While Standing: Reaches forward but needs supervision  9.  Object from Floor from a Standing Position: Able to  slipper but needs supervision  10. Turning to Look Behind Over Left and Right Shoulders While Standing: Turns sideways only but maintains balance  11. Turn 360 Degrees: Able to turn 360 degrees safely in 4 seconds or less  12. Place Alternate Foot on Step or Stool While Standing Unsupported: Able to stand independently and complete 8 steps in greater than 20 seconds  13.  Standing Unsupported One Foot in Front: Loses balance while stepping or standing  14. Standing on One Leg: Tries to lift leg unable to hold 3 seconds but remains standing independently  Wheeler Balance Score: 36  Wheeler Balance Fall Risk: Medium    Interpretation:  41-56 = Low Fall Risk  21-40 = Medium Fall Risk  0-20 = High Fall Risk            Assessment & Plan   Assessment  Nithin presents with a condition of High complexity.   Presentation of Symptoms: Changing  Will Comorbidities Impact Care: Yes  See PMHx    Functional Limitations: Activity tolerance, Functional mobility, Gait limitations, Increased risk of fall, Maintaining balance, Standing tolerance, Decreased ambulation distance/endurance  Impairments: Abnormal gait, Abnormal or restricted range of motion, Impaired physical strength, Pain with functional activity, Impaired balance, Safety issue    Patient Goal for Therapy (PT): Improved balance  Prognosis: Fair    Plan  From a physical therapy perspective, the patient would benefit from: Skilled Rehab Services    Planned therapy interventions include: Therapeutic exercise, Therapeutic activities, Neuromuscular re-education, Manual therapy, ADLs/IADLs, and Gait training.            Visit Frequency: 2 times Per Week for 12 Weeks.       This plan was discussed with Patient.   Discussion participants: Agreed Upon Plan of Care             Patient's spiritual, cultural, and educational needs considered and patient agreeable to plan of care and goals.           Goals:   Active       Long Term Goals       Patient will demonstrate improved function as indicated by a functional limitation score of 45% on the FOTO.        Start:  04/17/25    Expected End:  07/11/25            Patient will improve bilateral lower extremity strength to greater than 4+/5 MMT for improved ease with stair climbing.        Start:  04/17/25    Expected End:  07/11/25            Patient will improve WHEELER Balance score to greater than or equal to  45 for improved balance.        Start:  04/17/25    Expected End:  07/11/25               Short Term Goals       Patient will demonstrate independence with HEP in order to progress toward functional independence        Start:  04/17/25    Expected End:  07/11/25            Patient will improve bilateral lower extremity strength by 1/2 MMT for improved strength needed for stair climbing.        Start:  04/17/25    Expected End:  07/11/25            Patient will improve WHEELER balance to greater than or equal to 43 for improved balance.        Start:  04/17/25    Expected End:  07/11/25                Chantel Guerrier PT

## 2025-04-19 ENCOUNTER — RESULTS FOLLOW-UP (OUTPATIENT)
Dept: INTERNAL MEDICINE | Facility: CLINIC | Age: 85
End: 2025-04-19

## 2025-04-21 ENCOUNTER — CLINICAL SUPPORT (OUTPATIENT)
Dept: REHABILITATION | Facility: HOSPITAL | Age: 85
End: 2025-04-21
Payer: MEDICARE

## 2025-04-21 DIAGNOSIS — R53.1 LEFT-SIDED WEAKNESS: Primary | ICD-10-CM

## 2025-04-21 PROCEDURE — 97112 NEUROMUSCULAR REEDUCATION: CPT | Mod: HCNC,PN

## 2025-04-21 PROCEDURE — 97110 THERAPEUTIC EXERCISES: CPT | Mod: HCNC,PN

## 2025-04-21 NOTE — PROGRESS NOTES
"Outpatient Rehab  Physical Therapy Visit    Patient Name: Nithin Pino  MRN: [id number]  YOB: 1940  Encounter Date: 4/21/2025    Therapy Diagnosis:   Encounter Diagnosis   Name Primary?    Left-sided weakness Yes     Physician: MUKUL Garg MD    Physician Orders: Eval and Treat  Medical Diagnosis: Impaired functional mobility, balance, gait, and endurance  Uses roller walker    Visit # / Visits Authorized:  1 / 15  Insurance Authorization Period: 4/15/2025 to 5/28/2025  Date of Evaluation: 4/16/2025  Plan of Care Certification: 4/16/2025 to 7/11/2025     PT/PTA: PT   Number of PTA visits since last PT visit:0  Time In: 0730   Time Out: 0825  Total Time: 55   Total Billable Time:  55 minutes (billed 30 min 1:1)      Subjective   First follow-up.       Objective          Treatment:  Therapeutic Exercise: 25 min  Nustep x 8 minutes  Seated LAQ 3# 2x10  Seated marches 3# 2x10  Seated hip adduction squeeze 30x  Seated hip abduction, GTB 30x  Seated alternating DF (min assist) 2x10    Balance/Neuromuscular Re-Education: 15 min  Standing marches on foam  Hip 3-way, YTB 2x10  Lateral walking, YTB x 5laps    Therapeutic Activity: 15 min  Sit to stand (foam) 2x10  Step ups (4" box) 2x10  Lateral step ups (4" box) 2x10        Assessment & Plan   Assessment: Patient presents to his first follow-up after initial evaluation where POC was initated. Interventions focused on BLE strengthening with good tolerance. Much weakness noted in left hip seen with trendelenburg during gait and with left single leg stance. Will continue to benefit from further strengthening for increased ease with standing tolerance and balance. Pt only able to come once a week due to financial burden of therapy and HEP will be provided at next session.     Patient will continue to benefit from skilled outpatient physical therapy to address the deficits listed in the problem list box on initial evaluation, provide pt/family education and " to maximize pt's level of independence in the home and community environment.     Patient's spiritual, cultural, and educational needs considered and patient agreeable to plan of care and goals.       Plan: Continue with POC and progress as tolerated.    Goals:   Active       Long Term Goals       Patient will demonstrate improved function as indicated by a functional limitation score of 45% on the FOTO.  (Progressing)       Start:  04/17/25    Expected End:  07/11/25            Patient will improve bilateral lower extremity strength to greater than 4+/5 MMT for improved ease with stair climbing.  (Progressing)       Start:  04/17/25    Expected End:  07/11/25            Patient will improve WHEELER Balance score to greater than or equal to 45 for improved balance.  (Progressing)       Start:  04/17/25    Expected End:  07/11/25               Short Term Goals       Patient will demonstrate independence with HEP in order to progress toward functional independence  (Progressing)       Start:  04/17/25    Expected End:  07/11/25            Patient will improve bilateral lower extremity strength by 1/2 MMT for improved strength needed for stair climbing.  (Progressing)       Start:  04/17/25    Expected End:  07/11/25            Patient will improve WHEELER balance to greater than or equal to 43 for improved balance.  (Progressing)       Start:  04/17/25    Expected End:  07/11/25                Chantel Guerrier PT  04/21/2025

## 2025-04-23 ENCOUNTER — CLINICAL SUPPORT (OUTPATIENT)
Dept: REHABILITATION | Facility: HOSPITAL | Age: 85
End: 2025-04-23
Payer: MEDICARE

## 2025-04-23 DIAGNOSIS — R53.1 LEFT-SIDED WEAKNESS: Primary | ICD-10-CM

## 2025-04-23 PROCEDURE — 97110 THERAPEUTIC EXERCISES: CPT | Mod: HCNC,PN

## 2025-04-23 PROCEDURE — 97112 NEUROMUSCULAR REEDUCATION: CPT | Mod: HCNC,PN

## 2025-04-24 NOTE — PROGRESS NOTES
Outpatient Rehab  Physical Therapy Visit    Patient Name: Nithin Pino  MRN: [id number]  YOB: 1940  Encounter Date: 4/23/2025    Therapy Diagnosis:   Encounter Diagnosis   Name Primary?    Left-sided weakness Yes       Physician: MUKUL Garg MD    Physician Orders: Eval and Treat  Medical Diagnosis: Impaired functional mobility, balance, gait, and endurance  Uses roller walker    Visit # / Visits Authorized:  2 / 15  Insurance Authorization Period: 4/15/2025 to 5/28/2025  Date of Evaluation: 4/16/2025  Plan of Care Certification: 4/16/2025 to 7/11/2025     PT/PTA: PT   Number of PTA visits since last PT visit:0  Time In: 1320   Time Out: 1410  Total Time: 50   Total Billable Time:  50 minutes (billed 30 min 1:1)      Subjective   Feeling good overall. Financially, PT will become a burden twice a week but can do once a week..       Objective          Treatment:  Therapeutic Exercise: 25 min  Nustep x 8 minutes  Seated LAQ 3# 2x10  Seated marches 3# 2x10  Seated hip adduction squeeze 30x  Seated hip abduction, GTB 30x  Seated alternating DF (min assist) 2x10    Balance/Neuromuscular Re-Education: 15 min  Standing marches on foam  Hip 3-way, YTB 2x10  Lateral walking, YTB x 5laps  Obstacle course x 2 trials    Therapeutic Activity: 10 min  Sit to stand (foam) 2x10  Step ups (stairs) 2x10  Lateral step ups (stairs) 2x10    Assessment & Plan   Assessment: Patient proviced HEP to help with strengthening exercises at home. He verbalized independent understanding of all exercises. Interventions focused on BLE strengthening with good tolerance. Much weakness noted in left hip seen with trendelenburg during gait and with left single leg stance. Added in obstacle course for improved balance, safety awarenss and functional mobility. much difficutly with lateral step overs and maintaining balance with weight shifting laterally. Will continue to benefit from further strengthening for increased ease with  standing tolerance and balance.     Patient will continue to benefit from skilled outpatient physical therapy to address the deficits listed in the problem list box on initial evaluation, provide pt/family education and to maximize pt's level of independence in the home and community environment.     Patient's spiritual, cultural, and educational needs considered and patient agreeable to plan of care and goals.       Plan: Continue with POC and progress as tolerated.    Goals:   Active       Long Term Goals       Patient will demonstrate improved function as indicated by a functional limitation score of 45% on the FOTO.  (Progressing)       Start:  04/17/25    Expected End:  07/11/25            Patient will improve bilateral lower extremity strength to greater than 4+/5 MMT for improved ease with stair climbing.  (Progressing)       Start:  04/17/25    Expected End:  07/11/25            Patient will improve WHEELER Balance score to greater than or equal to 45 for improved balance.  (Progressing)       Start:  04/17/25    Expected End:  07/11/25               Short Term Goals       Patient will demonstrate independence with HEP in order to progress toward functional independence  (Progressing)       Start:  04/17/25    Expected End:  07/11/25            Patient will improve bilateral lower extremity strength by 1/2 MMT for improved strength needed for stair climbing.  (Progressing)       Start:  04/17/25    Expected End:  07/11/25            Patient will improve WHEELER balance to greater than or equal to 43 for improved balance.  (Progressing)       Start:  04/17/25    Expected End:  07/11/25                Chantel Guerrier PT  04/24/2025

## 2025-04-28 DIAGNOSIS — C67.8 MALIGNANT NEOPLASM OF OVERLAPPING SITES OF BLADDER: Primary | ICD-10-CM

## 2025-04-28 DIAGNOSIS — C67.9 BLADDER CANCER: ICD-10-CM

## 2025-04-28 RX ORDER — CIPROFLOXACIN 2 MG/ML
400 INJECTION, SOLUTION INTRAVENOUS
OUTPATIENT
Start: 2025-04-28

## 2025-05-02 ENCOUNTER — CLINICAL SUPPORT (OUTPATIENT)
Dept: REHABILITATION | Facility: HOSPITAL | Age: 85
End: 2025-05-02
Payer: MEDICARE

## 2025-05-02 DIAGNOSIS — R53.1 LEFT-SIDED WEAKNESS: Primary | ICD-10-CM

## 2025-05-02 PROCEDURE — 97530 THERAPEUTIC ACTIVITIES: CPT | Mod: PN

## 2025-05-02 PROCEDURE — 97112 NEUROMUSCULAR REEDUCATION: CPT | Mod: PN

## 2025-05-02 PROCEDURE — 97110 THERAPEUTIC EXERCISES: CPT | Mod: PN

## 2025-05-02 NOTE — PROGRESS NOTES
Outpatient Rehab  Physical Therapy Visit    Patient Name: Nithin Pino  MRN: [id number]  YOB: 1940  Encounter Date: 5/2/2025    Therapy Diagnosis:   Encounter Diagnosis   Name Primary?    Left-sided weakness Yes       Physician: MUKUL Garg MD    Physician Orders: Eval and Treat  Medical Diagnosis: Impaired functional mobility, balance, gait, and endurance  Uses roller walker    Visit # / Visits Authorized:  3 / 15  Insurance Authorization Period: 4/15/2025 to 5/28/2025  Date of Evaluation: 4/16/2025  Plan of Care Certification: 4/16/2025 to 7/11/2025     PT/PTA: PT   Number of PTA visits since last PT visit:0  Time In: 1050   Time Out: 1150  Total Time: 60   Total Billable Time:  50 minutes (billed 30 min 1:1)      Subjective   feeling good.       Objective          Treatment:  Therapeutic Exercise: 15 min  Nustep x 8 minutes  Seated LAQ 3# 2x10  Seated marches 3# 2x10  Seated hip adduction squeeze 30x  Seated hip abduction, GTB 30x  Seated alternating DF (min assist) 2x10    Balance/Neuromuscular Re-Education: 25 min  Standing marches on foam  Hip 3-way, YTB 2x10  Lateral walking, YTB x 5laps  [facility] walking x 5laps  Lateral cone walking x5laps  Obstacle course x 2 trials    Therapeutic Activity: 10 min  Sit to stand (foam) 2x10  Step ups (stairs) 2x10  Lateral step ups (stairs) 2x10    Assessment & Plan   Assessment: Much weakness noted in left hip during standing exercises. Overall with better activity tolerance and balance today. Still has posterior trunk lean to compensate for weakness in hips during step ups and lateral step ups. Will continue to benefit from further strengthening for increased ease with standing tolerance and balance.     Patient will continue to benefit from skilled outpatient physical therapy to address the deficits listed in the problem list box on initial evaluation, provide pt/family education and to maximize pt's level of independence in the home and community  environment.     Patient's spiritual, cultural, and educational needs considered and patient agreeable to plan of care and goals.       Plan: Continue with POC and progress as tolerated.    Goals:   Active       Long Term Goals       Patient will demonstrate improved function as indicated by a functional limitation score of 45% on the FOTO.  (Progressing)       Start:  04/17/25    Expected End:  07/11/25            Patient will improve bilateral lower extremity strength to greater than 4+/5 MMT for improved ease with stair climbing.  (Progressing)       Start:  04/17/25    Expected End:  07/11/25            Patient will improve WHEELER Balance score to greater than or equal to 45 for improved balance.  (Progressing)       Start:  04/17/25    Expected End:  07/11/25               Short Term Goals       Patient will demonstrate independence with HEP in order to progress toward functional independence  (Progressing)       Start:  04/17/25    Expected End:  07/11/25            Patient will improve bilateral lower extremity strength by 1/2 MMT for improved strength needed for stair climbing.  (Progressing)       Start:  04/17/25    Expected End:  07/11/25            Patient will improve WHEELER balance to greater than or equal to 43 for improved balance.  (Progressing)       Start:  04/17/25    Expected End:  07/11/25                Chantel Guerrier, PT  05/02/2025

## 2025-05-08 ENCOUNTER — TELEPHONE (OUTPATIENT)
Dept: UROLOGY | Facility: CLINIC | Age: 85
End: 2025-05-08

## 2025-05-08 NOTE — TELEPHONE ENCOUNTER
----- Message from Jahaira sent at 5/8/2025  9:20 AM CDT -----  Contact: Jahaira York  Good morning, [facility] Michael for prostate cancer, the first available coming up for me is in July, can someone contact the patient with a sooner appt please, thank you

## 2025-05-09 ENCOUNTER — OFFICE VISIT (OUTPATIENT)
Dept: PODIATRY | Facility: CLINIC | Age: 85
End: 2025-05-09
Payer: MEDICARE

## 2025-05-09 ENCOUNTER — CLINICAL SUPPORT (OUTPATIENT)
Dept: REHABILITATION | Facility: HOSPITAL | Age: 85
End: 2025-05-09
Payer: MEDICARE

## 2025-05-09 VITALS — HEIGHT: 70 IN | WEIGHT: 177.25 LBS | BODY MASS INDEX: 25.38 KG/M2

## 2025-05-09 DIAGNOSIS — E11.42 ONYCHOMYCOSIS OF MULTIPLE TOENAILS WITH TYPE 2 DIABETES MELLITUS AND PERIPHERAL NEUROPATHY: ICD-10-CM

## 2025-05-09 DIAGNOSIS — B35.1 ONYCHOMYCOSIS OF MULTIPLE TOENAILS WITH TYPE 2 DIABETES MELLITUS AND PERIPHERAL NEUROPATHY: ICD-10-CM

## 2025-05-09 DIAGNOSIS — R53.1 LEFT-SIDED WEAKNESS: Primary | ICD-10-CM

## 2025-05-09 DIAGNOSIS — Z79.4 TYPE 2 DIABETES MELLITUS WITH DIABETIC PERIPHERAL ANGIOPATHY WITHOUT GANGRENE, WITH LONG-TERM CURRENT USE OF INSULIN: Chronic | ICD-10-CM

## 2025-05-09 DIAGNOSIS — E11.51 TYPE 2 DIABETES MELLITUS WITH DIABETIC PERIPHERAL ANGIOPATHY WITHOUT GANGRENE, WITH LONG-TERM CURRENT USE OF INSULIN: Chronic | ICD-10-CM

## 2025-05-09 DIAGNOSIS — E11.69 ONYCHOMYCOSIS OF MULTIPLE TOENAILS WITH TYPE 2 DIABETES MELLITUS AND PERIPHERAL NEUROPATHY: ICD-10-CM

## 2025-05-09 PROCEDURE — 97112 NEUROMUSCULAR REEDUCATION: CPT | Mod: HCNC,PN

## 2025-05-09 PROCEDURE — 99999 PR PBB SHADOW E&M-EST. PATIENT-LVL IV: CPT | Mod: PBBFAC,HCNC,, | Performed by: PODIATRIST

## 2025-05-09 NOTE — PROGRESS NOTES
Subjective:       Patient ID: Nithin Pino is a 84 y.o. male.    Chief Complaint: Nail Care (DNC. 0/10 pain. Pt wears tennis shoes and socks. Pt last saw PCP Dr Nabeel Garg 4/14/2025)      HPI: Nithin Pino presents to the office today, under referral by MUKUL Garg MD, for his annual diabetic foot assessment and risk evaluation.  Patient is a DMII. This patient last saw his/her internal/family medicine physician on 4/14/2025.  States 0/10 pain to the right left foot. Has no complaints today. Denies any recent trauma or injury.  Denies any recent falls.    Hemoglobin A1C   Date Value Ref Range Status   10/14/2024 8.2 (H) 4.0 - 5.6 % Final     Comment:     ADA Screening Guidelines:  5.7-6.4%  Consistent with prediabetes  >or=6.5%  Consistent with diabetes    High levels of fetal hemoglobin interfere with the HbA1C  assay. Heterozygous hemoglobin variants (HbS, HgC, etc)do  not significantly interfere with this assay.   However, presence of multiple variants may affect accuracy.     09/16/2024 9.7 (H) 4.2 - 5.8 % Final   05/13/2024 8.6 (H) 4.0 - 5.6 % Final     Comment:     ADA Screening Guidelines:  5.7-6.4%  Consistent with prediabetes  >or=6.5%  Consistent with diabetes    High levels of fetal hemoglobin interfere with the HbA1C  assay. Heterozygous hemoglobin variants (HbS, HgC, etc)do  not significantly interfere with this assay.   However, presence of multiple variants may affect accuracy.       Hemoglobin A1c   Date Value Ref Range Status   04/07/2025 8.4 (H) 4.0 - 5.6 % Final     Comment:     ADA Screening Guidelines:  5.7-6.4%  Consistent with prediabetes  >=6.5%  Consistent with diabetes    High levels of fetal hemoglobin interfere with the HbA1C  assay. Heterozygous hemoglobin variants (HbS, HgC, etc)do  not significantly interfere with this assay.   However, presence of multiple variants may affect accuracy.   .    Review of patient's allergies indicates:   Allergen Reactions     Pseudoephedrine-guaifenesin Shortness Of Breath    Panmist dm  [pseudoephedrine-dm-guaifenesin]      Other reaction(s): Unknown       Past Medical History:   Diagnosis Date    Abnormal brain MRI 01/21/2018    Chronic microvascular ischemia changes per MRI July 9, 2007 in [facility]    Abnormal ECG 10/31/2013    Abnormal stress test 01/28/2016    Alcohol dependence     States alcohol abuse ended in 1994    AP (angina pectoris) 01/28/2016    Asthma     Bladder cancer     Carotid artery occlusion     Cataract     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    2 - Normal left ventricular systolic function (EF 60-65%).    3 - Normal left ventricular diastolic function.    4 - Mild left atrial enlargement.    5 - Normal right ventricular systolic function .    6 - Trivial to mild aortic regurgitation.    7 - Trivial to mild mitral regurgitation.  10 - Trivial to mild pulmonic regurgitation.     Hematuria 06/25/2020    History of alcohol abuse 01/22/2018    Patient denies drinking to excess since 1994.    History of atherectomy 01/21/2018 2/2016 [facility]    History of chronic kidney disease 01/22/2018    History of CKD 3    History of PTCA 10/31/2013    History of PTCA 03/09/2016    Hyperlipidemia     Hypertension     Insomnia 06/17/2013    Iron deficiency anemia 10/19/2023    Ischemic cardiomyopathy 10/31/2013    Long term (current) use of antithrombotics/antiplatelets 01/21/2018    Malignant neoplasm of overlapping sites of bladder 06/08/2023    Myocardial infarction     Old MI  (myocardial infarction)     Peripheral vascular disease     Polyneuropathy     RBBB 10/31/2013    S/P CABG (coronary artery bypass graft) 10/31/2013    Shortness of breath 10/31/2013    Simple chronic bronchitis 2013    Tobacco dependence     resolved    Trouble in sleeping     Type 2 diabetes with peripheral circulatory disorder, controlled     Wears glasses        Family History   Adopted: Yes   Problem Relation Name Age of Onset    Diabetes Brother      Diabetes Sister      Cancer Neg Hx      Heart disease Neg Hx      Kidney disease Neg Hx         Social History     Socioeconomic History    Marital status:     Number of children: 5   Tobacco Use    Smoking status: Former     Current packs/day: 0.00     Average packs/day: 1.5 packs/day for 40.0 years (60.0 ttl pk-yrs)     Types: Cigarettes     Start date: 1954     Quit date: 1994     Years since quittin.3    Smokeless tobacco: Former     Quit date: 2011    Tobacco comments:     approx date   Substance and Sexual Activity    Alcohol use: Not Currently     Comment: occasionally; 1-2 cans of beer a month    Drug use: No    Sexual activity: Never     Birth control/protection: None     Social Drivers of Health     Financial Resource Strain: Low Risk  (2024)    Overall Financial Resource Strain (CARDIA)     Difficulty of Paying Living Expenses: Not very hard   Food Insecurity: No Food Insecurity (2024)    Hunger Vital Sign     Worried About Running Out of Food in the Last Year: Never true     Ran Out of Food in the Last Year: Never true   Transportation Needs: No Transportation Needs (2024)    PRAPARE - Transportation     Lack of Transportation (Medical): No     Lack of Transportation (Non-Medical): No   Physical Activity: Inactive (2024)    Exercise Vital Sign     Days of Exercise per Week: 0 days     Minutes of Exercise per Session: 0 min   Stress: No Stress Concern Present (2024)    [employer]  "Occupational Health - Occupational Stress Questionnaire     Feeling of Stress : Only a little   Housing Stability: Low Risk  (2/19/2024)    Housing Stability Vital Sign     Unable to Pay for Housing in the Last Year: No     Number of Places Lived in the Last Year: 1     Unstable Housing in the Last Year: No       Past Surgical History:   Procedure Laterality Date    APPENDECTOMY      CARDIAC CATHETERIZATION      2016    CARDIAC SURGERY  1994    balloon angioplasty    CATARACT EXTRACTION W/  INTRAOCULAR LENS IMPLANT Right 02/01/2017    CORONARY ARTERY BYPASS GRAFT  05/2011    per patient x4    HERNIA REPAIR      OPEN REDUCTION AND INTERNAL FIXATION (ORIF) OF INJURY OF HIP      PCIOL Bilateral OD 02/01/17/OS 02/15/17    DR. ALONZO    RETROGRADE PYELOGRAPHY Bilateral 8/29/2024    Procedure: PYELOGRAM, RETROGRADE;  Surgeon: Hortencia Michael MD;  Location: [facility] OR;  Service: Urology;  Laterality: Bilateral;    TURBT (TRANSURETHRAL RESECTION OF BLADDER TUMOR) N/A 6/8/2023    Procedure: TURBT (TRANSURETHRAL RESECTION OF BLADDER TUMOR);  Surgeon: Hortencia Michael MD;  Location: [facility] OR;  Service: Urology;  Laterality: N/A;    TURBT (TRANSURETHRAL RESECTION OF BLADDER TUMOR) N/A 2/8/2024    Procedure: TURBT (TRANSURETHRAL RESECTION OF BLADDER TUMOR);  Surgeon: Hortencia Michael MD;  Location: [facility] OR;  Service: Urology;  Laterality: N/A;    TURBT (TRANSURETHRAL RESECTION OF BLADDER TUMOR) N/A 8/29/2024    Procedure: TURBT (TRANSURETHRAL RESECTION OF BLADDER TUMOR);  Surgeon: Hortencia Michael MD;  Location: [facility] OR;  Service: Urology;  Laterality: N/A;       Review of Systems      Objective:   Ht 5' 10" (1.778 m)   Wt 80.4 kg (177 lb 4 oz)   BMI 25.43 kg/m²     Physical Exam  LOWER EXTREMITY PHYSICAL EXAMINATION    ORTHOPEDIC:  No gross or structural anatomic deformity present to the right foot or left foot.  Intrinsic and extrinsic musculature of the lower extremities appear to be functioning appropriately and " intact.  Equinus contracture is noted.    VASCULAR:  The right dorsalis pedis pulse 2/4 and the right posterior tibial pulse 1/4.  The left dorsalis pedis pulse 2/4 and posterior tibial pulse on the left is 1/4.  Capillary refill is intact.  Pedal hair growth decreased.   NEUROLOGY:  Protective sensation is not intact to the right left foot.  Vibratory sensation is diminished.  Proprioception is intact.  Sharp/dull is reduced.    DERMATOLOGY:  Skin is supple, moist, intact.  There is no signs of callusing, ulcerations, other lesions identified to the dorsal or plantar aspect of the right or left foot.  The R1, 2, 5 and left L1,2, 5 are thickened, discolored dystrophic.  There is subungual debris.  Nail plates have area of dark discoloration.  The remaining nails 3-4 on the right foot and the left foot are elongated but of normal color, thickness, and texture.   There is no signs of ingrowing into the medial or lateral borders.  There is no evidence of wounds or skin breakdown.  No edema or erythema.  No obvious lacerations or fissuring.  Interdigital spaces are clean, dry, intact.  No rashes or scars appreciated.    Assessment:     1. Type 2 diabetes mellitus with diabetic peripheral angiopathy without gangrene, with long-term current use of insulin    2. Onychomycosis of multiple toenails with type 2 diabetes mellitus and peripheral neuropathy        Plan:     Type 2 diabetes mellitus with diabetic peripheral angiopathy without gangrene, with long-term current use of insulin  -     Ambulatory referral/consult to Podiatry    Onychomycosis of multiple toenails with type 2 diabetes mellitus and peripheral neuropathy  -     Ambulatory referral/consult to Podiatry       I counseled the patient on his/her Diabetic Mellitus regarding today's clinical examination and objection findings. We did also discuss recent medication changes, pertinent labs and imaging evaluations and other medical consultation notes and progress  notes. Greater than 50% of this visit was spent on counseling and coordination of care. Greater than 20 minutes of this appt. was spent on education about the diabetic foot, in relation to PVD and/or neuropathy, and the prevention of limb loss.     Shoe gear is inspected and wear and proper fit/type. Patient is reminded of the importance of good nutrition and blood sugar control to help prevent podiatric complications of diabetes. Patient instructed on proper foot hygeine. We discussed wearing proper shoe gear, daily foot inspections, never walking without protective shoe gear, never putting sharp instruments to feet.  Patient  will continue to monitor the areas daily, inspect feet, wear protective shoe gear when ambulatory, moisturizer to maintain skin integrity.     Patient's DMI/DMII is managed by Internal/Family Medicine Physician and/or Endocrinology Advanced Practice Provider.    Dystrophic nail plates, as outlined above (R#1,2,5  ; L#1,2,5 ), are sharply debrided with double action nail nipper, and/or with the assistance of a mechanical rotary leon, with removal of all offending nail and nail border(s), for reduction of pains. Nails are reduced in terms of length, width and girth with removal of subungual debris to facilitate pain free weight bearing and ambulation. The elongated nails as outlined in the objective portion of this note, were trimmed to appropriate length, with a double action nail nipper, for alleviation/reduction of pains as well. Follow up in approx. 3-4 months.

## 2025-05-09 NOTE — PROGRESS NOTES
Outpatient Rehab  Physical Therapy Visit    Patient Name: Nithin Pino  MRN: [id number]  YOB: 1940  Encounter Date: 5/9/2025    Therapy Diagnosis:   Encounter Diagnosis   Name Primary?    Left-sided weakness Yes       Physician: MUKUL Garg MD    Physician Orders: Eval and Treat  Medical Diagnosis: Impaired functional mobility, balance, gait, and endurance  Uses roller walker    Visit # / Visits Authorized:  4 / 15  Insurance Authorization Period: 4/15/2025 to 5/28/2025  Date of Evaluation: 4/16/2025  Plan of Care Certification: 4/16/2025 to 7/11/2025     PT/PTA: PT   Number of PTA visits since last PT visit:0  Time In: 1050   Time Out: 1150  Total Time: 60   Total Billable Time:  60 minutes (billed 30 min 1:1)      Subjective   no new complaints, feeling good..       Objective          Treatment:  Therapeutic Exercise: 15 min  Nustep x 8 minutes  Seated LAQ 3# 2x10  Seated marches 3# 2x10  Seated hip adduction squeeze 30x  Seated hip abduction, GTB 30x  Seated alternating DF (min assist) 2x10    Balance/Neuromuscular Re-Education: 30 min  Standing marches on foam 3x10  Hip 3-way, YTB 2x10  Lateral walking, YTB x 5laps  Cone walking x 5laps  Lateral cone walking x5laps  Lateral stepping, YTB x3laps  Lateral stepping with cone taps x 3laps  Obstacle course x 2 trials    Therapeutic Activity: 15 min  Sit to stand (foam) 3x10  Step ups (stairs) 2x10  Lateral step ups (stairs) 2x10    Assessment & Plan   Assessment: Much weakness noted in left hip during standing exercises. Overall with better activity tolerance and balance today. Still has posterior trunk lean to compensate for weakness in hips during step ups and lateral step ups. Will continue to benefit from further strengthening for increased ease with standing tolerance and balance.     Patient will continue to benefit from skilled outpatient physical therapy to address the deficits listed in the problem list box on initial evaluation, provide  pt/family education and to maximize pt's level of independence in the home and community environment.     Patient's spiritual, cultural, and educational needs considered and patient agreeable to plan of care and goals.       Plan: Continue with POC and progress as tolerated.    Goals:   Active       Long Term Goals       Patient will demonstrate improved function as indicated by a functional limitation score of 45% on the FOTO.  (Progressing)       Start:  04/17/25    Expected End:  07/11/25            Patient will improve bilateral lower extremity strength to greater than 4+/5 MMT for improved ease with stair climbing.  (Progressing)       Start:  04/17/25    Expected End:  07/11/25            Patient will improve WHEELER Balance score to greater than or equal to 45 for improved balance.  (Progressing)       Start:  04/17/25    Expected End:  07/11/25               Short Term Goals       Patient will demonstrate independence with HEP in order to progress toward functional independence  (Progressing)       Start:  04/17/25    Expected End:  07/11/25            Patient will improve bilateral lower extremity strength by 1/2 MMT for improved strength needed for stair climbing.  (Progressing)       Start:  04/17/25    Expected End:  07/11/25            Patient will improve WHEELER balance to greater than or equal to 43 for improved balance.  (Progressing)       Start:  04/17/25    Expected End:  07/11/25                Chantel Guerrier PT  05/09/2025

## 2025-05-12 ENCOUNTER — OFFICE VISIT (OUTPATIENT)
Dept: INTERNAL MEDICINE | Facility: CLINIC | Age: 85
End: 2025-05-12
Payer: MEDICARE

## 2025-05-12 VITALS
OXYGEN SATURATION: 96 % | SYSTOLIC BLOOD PRESSURE: 133 MMHG | TEMPERATURE: 98 F | HEART RATE: 78 BPM | DIASTOLIC BLOOD PRESSURE: 62 MMHG

## 2025-05-12 DIAGNOSIS — I15.2 HYPERTENSION ASSOCIATED WITH DIABETES: Chronic | ICD-10-CM

## 2025-05-12 DIAGNOSIS — N40.1 BENIGN PROSTATIC HYPERPLASIA WITH WEAK URINARY STREAM: Chronic | ICD-10-CM

## 2025-05-12 DIAGNOSIS — C67.8 MALIGNANT NEOPLASM OF OVERLAPPING SITES OF BLADDER: Primary | Chronic | ICD-10-CM

## 2025-05-12 DIAGNOSIS — I50.42 CHRONIC COMBINED SYSTOLIC AND DIASTOLIC CONGESTIVE HEART FAILURE: Chronic | ICD-10-CM

## 2025-05-12 DIAGNOSIS — R39.12 BENIGN PROSTATIC HYPERPLASIA WITH WEAK URINARY STREAM: Chronic | ICD-10-CM

## 2025-05-12 DIAGNOSIS — I25.118 ATHEROSCLEROSIS OF NATIVE CORONARY ARTERY OF NATIVE HEART WITH STABLE ANGINA PECTORIS: Chronic | ICD-10-CM

## 2025-05-12 DIAGNOSIS — Z01.818 PREOP TESTING: ICD-10-CM

## 2025-05-12 DIAGNOSIS — N18.32 STAGE 3B CHRONIC KIDNEY DISEASE: ICD-10-CM

## 2025-05-12 DIAGNOSIS — I25.708 CORONARY ARTERY DISEASE OF BYPASS GRAFT OF NATIVE HEART WITH STABLE ANGINA PECTORIS: Chronic | ICD-10-CM

## 2025-05-12 DIAGNOSIS — E11.59 HYPERTENSION ASSOCIATED WITH DIABETES: Chronic | ICD-10-CM

## 2025-05-12 DIAGNOSIS — I25.5 ISCHEMIC CARDIOMYOPATHY: Chronic | ICD-10-CM

## 2025-05-12 DIAGNOSIS — E11.65 TYPE 2 DIABETES MELLITUS WITH HYPERGLYCEMIA, WITH LONG-TERM CURRENT USE OF INSULIN: Chronic | ICD-10-CM

## 2025-05-12 DIAGNOSIS — Z79.4 TYPE 2 DIABETES MELLITUS WITH HYPERGLYCEMIA, WITH LONG-TERM CURRENT USE OF INSULIN: Chronic | ICD-10-CM

## 2025-05-12 PROCEDURE — 99999 PR PBB SHADOW E&M-EST. PATIENT-LVL III: CPT | Mod: PBBFAC,HCNC,,

## 2025-05-12 RX ORDER — METOPROLOL TARTRATE 50 MG/1
50 TABLET ORAL 2 TIMES DAILY
COMMUNITY

## 2025-05-12 RX ORDER — AMLODIPINE BESYLATE 10 MG/1
10 TABLET ORAL DAILY
Qty: 90 TABLET | Refills: 3 | Status: SHIPPED | OUTPATIENT
Start: 2025-05-12

## 2025-05-12 RX ORDER — SOLIFENACIN SUCCINATE 5 MG/1
5 TABLET, FILM COATED ORAL DAILY
COMMUNITY

## 2025-05-12 NOTE — ASSESSMENT & PLAN NOTE
Planned for TURBT with Dr. Hortencia Michael on 5/15/25.     Known risk factors for perioperative complications: Anemia  Coronary disease  Congestive heart failure  Renal dysfunction    Difficulty with intubation is not anticipated.    Cardiac Risk Estimation: Based on the Revised Cardiac Risk index, patient is a Class 3 risk with a 15% risk of a major cardiac event in a low risk procedure.    1.) Preoperative workup as follows: chest x-ray, ECG, hemoglobin, hematocrit, electrolytes, creatinine.  2.) Change in medication regimen before surgery: discontinue ASA, NSAIDs 7 days before surgery, hold Metformin 24 hours prior to surgery.  3.) Prophylaxis for cardiac events with perioperative beta-blockers: continue BB as prescribed.  4.) Invasive hemodynamic monitoring perioperatively: at the discretion of anesthesiologist.  5.) Deep vein thrombosis prophylaxis postoperatively: intermittent pneumatic compression boots and regimen to be chosen by surgical team.  6.) Surveillance for postoperative MI with ECG immediately postoperatively and on postoperati ve days 1 and 2 AND troponin levels 24 hours postoperatively and on day 4 or hospital discharge (whichever comes first): not indicated.  7.) Current medications which may produce withdrawal symptoms if withheld perioperatively: None  8.) Other measures: Careful attention to perioperative glycemic control (POCT glucose in pre-op, SSI if required).     --Morning of Procedure medications: amlodipine, isosorbide, metoprolol, tamsulosin, solifenacin  --Hold all other medications morning of surgery   --Resume all medications post-operatively  --Hold ASA, NSAIDs and all vitamins/supplements 7 days prior to procedure  --Hold Trulicity (dulaglutide) 7 days prior to surgery   --AM glucose average: 100-120  --Reduce Night dose of Insulin Glargine by 50% to 8 units  --Hold AM insulin

## 2025-05-12 NOTE — PRE-PROCEDURE INSTRUCTIONS
Pre op instructions reviewed with patient during Clinic Visit with Provider.    To confirm, Surgery is scheduled on THURSDAY MAY 15. We will call you late afternoon the business day prior to surgery with your arrival time.    *Please report to the [facility] Lobby (1st Floor) located off of [facility] (2nd Entrance/Building on the left, in front of the flag pole). Address: [address] [place] [address] [place] [id number]        INSTRUCTIONS IMPORTANT!!!  DO NOT Eat, Drink, or Smoke after 12 midnight unless instructed otherwise by your Surgeon. OK to brush teeth, no gum, candy or mints!    Do not eat after 12 midnight, Do not smoke or use chewing tobacco after 12 midnight!  OK to brush teeth, but no gum, candy, or mints!      MORNING OF SURGERY, drink small sip of water with the following medications instructed by Pre-Admit Provider:  amlodipine, isosorbide, metoprolol, tamsulosin, solifenacin     *Additional Medication Instructions: Reduce Night dose of Insulin Glargine by 50% to 8 units --Hold AM insulin     *ADHD Medication: Stop taking 48 hrs prior to surgery, as this can affect the anesthesia used. Bariatric surgeries must HOLD 7 Days prior to surgery!    Diabetic Patients: If you take diabetic or weight loss medication, Do NOT take morning of surgery unless instructed by Doctor. Metformin to be stopped 24 hrs prior to surgery.     DO NOT take long-acting insulin the evening before surgery. Blood sugars will be checked in pre-op by Nurse.    If you take Ozempic/ Mounjaro / Wegovy / Trulicity / Semaglutide, any weight loss injections OR PHENTERMINE --->>> PLEASE LET US KNOW IMMEDIATELY, as these medications need to be stopped 7 days prior to surgery!    *Patients should HOLD all vitamins, herbal supplements, weight loss medication, aspirin products & NSAIDS 7 days prior to surgery, as these can thin the blood. Ok to take Tylenol.    ____  Avoid Alcoholic beverages 3 days prior to surgery, as it can thin the  blood.  ____  NO Acrylic/fake nails or nail polish worn day of surgery (specifically hand/arm & foot surgeries).  ____  NO powder, lotions, deodorants, oils or cream on body.  ____  Remove all jewelry, piercings, & foreign objects prior to arrival and leave at home.  ____  Remove Dentures, Hearing Aids & Contact Lens prior to surgery.  ____  Bring photo ID and insurance information to hospital (Leave Valuables at Home).  ____  If going home the same day, arrange for a ride home. You will not be able to drive for 24 hrs if Anesthesia was used.   ____  Females (ages 11-60): may need to give a urine sample the morning of surgery; please see Pre op Nurse prior to using the restroom.  ____  Males: Stop ED medications (Viagra, Cialis) 24 hrs prior to surgery.  ____  Wear clean, loose fitting clothing to allow for dressings/ bandages.    Bathing Instructions:    -Shower with anti-bacterial Soap (ex: Hibiclens or Dial) the night before surgery and the morning of.   -Do not use Hibiclens on your face or genitals.   -Apply clean clothes after shower.  -Do not shave your face morning of surgery   -Do not shave your body 3 days prior to surgery unless instructed otherwise by your Surgeon.    [facility]/Ride Policy:  Only 2 adults allowed in pre op/recovery area during your procedure. You MUST HAVE A RIDE HOME from a responsible adult that you know and trust. Medical Transport, Uber or Lyft can ONLY be used if patient has a responsible adult to accompany them during ride home.       *Signs and symptoms of Infection Before or After Surgery:               !!!If you experience any fever, chills, nausea/ vomiting, foul odor/ excessive drainage from surgical site, flu-like symptoms, new wounds or cuts, PLEASE CALL THE SURGEON OFFICE at [phone] or SEND MESSAGE THROUGH [facility]!!!     *If you are running late day of surgery, please call the Surgery Dept @ [phone].    *Billing question, please  call  [phone]     Thank you,  -[facility] Pre Admit Dept.  [phone]   or [phone]  M-F 7:30 am-4:00 pm (Closed Major Holidays)

## 2025-05-12 NOTE — PROGRESS NOTES
Preoperative History and Physical                                                              Hospital Medicine                                                                      Chief Complaint: Preoperative evaluation     History of Present Illness:      Nithin Pino is a 85 y.o. male who presents to the office today for a preoperative consultation at the request of Dr. Hortencia Michael who plans on performing TURBT on May 15.     Functional Status:      The patient is not able to climb a flight of stairs. The patient is able to ambulate with a cane without difficulty. The patient's functional status is not affected by the surgical problem. The patient's functional status is not affected by shortness of breath, chest pain, dyspnea on exertion and fatigue.    MET score greater than 4    Past Medical History:      Past Medical History:   Diagnosis Date    Abnormal brain MRI 01/21/2018    Chronic microvascular ischemia changes per MRI July 9, 2007 in [facility]    Abnormal ECG 10/31/2013    Abnormal stress test 01/28/2016    Alcohol dependence     States alcohol abuse ended in 1994    AP (angina pectoris) 01/28/2016    Asthma     Bladder cancer     Carotid artery occlusion     Cataract     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    2 - Normal left ventricular systolic function (EF 60-65%).    3 - Normal left ventricular diastolic function.    4 - Mild left atrial enlargement.    5 - Normal right ventricular systolic function .     6 - Trivial to mild aortic regurgitation.    7 - Trivial to mild mitral regurgitation.  10 - Trivial to mild pulmonic regurgitation.     Hematuria 06/25/2020    History of alcohol abuse 01/22/2018    Patient denies drinking to excess since 1994.    History of atherectomy 01/21/2018 2/2016 [facility]    History of chronic kidney disease 01/22/2018    History of CKD 3    History of PTCA 10/31/2013    History of PTCA 03/09/2016    Hyperlipidemia     Hypertension     Insomnia 06/17/2013    Iron deficiency anemia 10/19/2023    Ischemic cardiomyopathy 10/31/2013    Long term (current) use of antithrombotics/antiplatelets 01/21/2018    Malignant neoplasm of overlapping sites of bladder 06/08/2023    Myocardial infarction     Old MI (myocardial infarction) 1994    Peripheral vascular disease     Polyneuropathy     RBBB 10/31/2013    S/P CABG (coronary artery bypass graft) 10/31/2013    Shortness of breath 10/31/2013    Simple chronic bronchitis 6/17/2013    Tobacco dependence     resolved    Trouble in sleeping     Type 2 diabetes with peripheral circulatory disorder, controlled     Wears glasses         Past Surgical History:      Past Surgical History:   Procedure Laterality Date    APPENDECTOMY      CARDIAC CATHETERIZATION      2016    CARDIAC SURGERY  1994    balloon angioplasty    CATARACT EXTRACTION W/  INTRAOCULAR LENS IMPLANT Right 02/01/2017    CORONARY ARTERY BYPASS GRAFT  05/2011    per patient x4    HERNIA REPAIR      OPEN REDUCTION AND INTERNAL FIXATION (ORIF) OF INJURY OF HIP      PCIOL Bilateral OD 02/01/17/OS 02/15/17    DR. ALONZO    RETROGRADE PYELOGRAPHY Bilateral 8/29/2024    Procedure: PYELOGRAM, RETROGRADE;  Surgeon: Hortencia Michael MD;  Location: [facility] OR;  Service: Urology;  Laterality: Bilateral;    TURBT (TRANSURETHRAL RESECTION OF BLADDER TUMOR) N/A 6/8/2023    Procedure: TURBT (TRANSURETHRAL RESECTION OF BLADDER TUMOR);  Surgeon: Hortencia Michael MD;  Location: [facility] OR;  Service: Urology;   Laterality: N/A;    TURBT (TRANSURETHRAL RESECTION OF BLADDER TUMOR) N/A 2024    Procedure: TURBT (TRANSURETHRAL RESECTION OF BLADDER TUMOR);  Surgeon: Hortencia Michael MD;  Location: [facility] OR;  Service: Urology;  Laterality: N/A;    TURBT (TRANSURETHRAL RESECTION OF BLADDER TUMOR) N/A 2024    Procedure: TURBT (TRANSURETHRAL RESECTION OF BLADDER TUMOR);  Surgeon: Hortencia Michael MD;  Location: [facility] OR;  Service: Urology;  Laterality: N/A;        Social History:      Social History     Socioeconomic History    Marital status:     Number of children: 5   Tobacco Use    Smoking status: Former     Current packs/day: 0.00     Average packs/day: 1.5 packs/day for 40.0 years (60.0 ttl pk-yrs)     Types: Cigarettes     Start date: 1954     Quit date: 1994     Years since quittin.3    Smokeless tobacco: Former     Quit date: 2011    Tobacco comments:     approx date   Substance and Sexual Activity    Alcohol use: Not Currently     Comment: occasionally; 1-2 cans of beer a month    Drug use: No    Sexual activity: Never     Birth control/protection: None     Social Drivers of Health     Financial Resource Strain: Low Risk  (2024)    Overall Financial Resource Strain (CARDIA)     Difficulty of Paying Living Expenses: Not very hard   Food Insecurity: No Food Insecurity (2024)    Hunger Vital Sign     Worried About Running Out of Food in the Last Year: Never true     Ran Out of Food in the Last Year: Never true   Transportation Needs: No Transportation Needs (2024)    PRAPARE - Transportation     Lack of Transportation (Medical): No     Lack of Transportation (Non-Medical): No   Physical Activity: Inactive (2024)    Exercise Vital Sign     Days of Exercise per Week: 0 days     Minutes of Exercise per Session: 0 min   Stress: No Stress Concern Present (2024)    [employer] - Occupational Stress Questionnaire     Feeling of Stress : Only a  little   Housing Stability: Low Risk  (2/19/2024)    Housing Stability Vital Sign     Unable to Pay for Housing in the Last Year: No     Number of Places Lived in the Last Year: 1     Unstable Housing in the Last Year: No        Family History:      Family History   Adopted: Yes   Problem Relation Name Age of Onset    Diabetes Brother      Diabetes Sister      Cancer Neg Hx      Heart disease Neg Hx      Kidney disease Neg Hx         Allergies:      Review of patient's allergies indicates:   Allergen Reactions    Pseudoephedrine-guaifenesin Shortness Of Breath    Panmist dm  [pseudoephedrine-dm-guaifenesin]      Other reaction(s): Unknown       Medications:      Current Outpatient Medications   Medication Sig    metoprolol tartrate (LOPRESSOR) 50 MG tablet Take 50 mg by mouth 2 (two) times daily.    solifenacin (VESICARE) 5 MG tablet Take 5 mg by mouth once daily.    albuterol (PROVENTIL/VENTOLIN HFA) 90 mcg/actuation inhaler Inhale 2 puffs into the lungs every 6 (six) hours as needed for Wheezing. Rescue    amLODIPine (NORVASC) 10 MG tablet Take 1 tablet (10 mg total) by mouth once daily.    aspirin (ECOTRIN) 81 MG EC tablet Take 1 tablet (81 mg total) by mouth once daily.    atorvastatin (LIPITOR) 40 MG tablet Take 1 tablet (40 mg total) by mouth every evening.    b complex vitamins tablet Take 1 tablet by mouth once daily.    cyanocobalamin (VITAMIN B-12) 1000 MCG tablet Take 100 mcg by mouth once daily.    dulaglutide (TRULICITY) 4.5 mg/0.5 mL pen injector Inject 4.5 mg into the skin every 7 days. SUNDAY    finasteride (PROSCAR) 5 mg tablet TAKE 1 TABLET ONE TIME DAILY    furosemide (LASIX) 40 MG tablet TAKE 1 PILL IN AM, AND 1/2 PILL IN PM    glucose 4 GM chewable tablet Take 4 tablets (16 g total) by mouth as needed for Low blood sugar.    insulin (LANTUS SOLOSTAR U-100 INSULIN) glargine 100 units/mL SubQ pen Inject into the skin. 15 units QHS and 18 units QAM    insulin aspart U-100 (NOVOLOG) 100 unit/mL  injection Inject 3 Units into the skin every 4 (four) hours as needed (for high blood sugar or carbohydrate intake).    insulin glargine-yfgn 100 unit/mL (3 mL) InPn Inject into the skin.    isosorbide mononitrate (IMDUR) 60 MG 24 hr tablet Take 1 tablet (60 mg total) by mouth once daily.    losartan (COZAAR) 50 MG tablet Take 1 tablet (50 mg total) by mouth once daily.    metoprolol succinate (TOPROL-XL) 25 MG 24 hr tablet Take 1 tablet (25 mg total) by mouth every evening.    multivit-minerals/folic acid (DIABETIC MULTIVITAMIN ORAL) Take by mouth.    nitroGLYCERIN (NITROSTAT) 0.4 MG SL tablet Place 1 tablet (0.4 mg total) under the tongue every 5 (five) minutes as needed for Chest pain (for chest pain/discomfort). Call 911 if chest pain persists after second dose.    tamsulosin (FLOMAX) 0.4 mg Cap Take 1 capsule (0.4 mg total) by mouth once daily.    vitamin D (VITAMIN D3) 1000 units Tab Take 1 tablet (1,000 Units total) by mouth once daily.     No current facility-administered medications for this visit.       Vitals:      Vitals:    05/12/25 1020   BP: 133/62   Pulse: 78   Temp: 98.1 °F (36.7 °C)       Review of Systems:        Constitutional: Negative for fever, chills, weight loss, malaise/fatigue and diaphoresis. +Frequent falls  HENT: Negative for hearing loss, ear pain, nosebleeds, congestion, sore throat, neck pain, tinnitus and ear discharge.    Eyes: Negative for blurred vision, double vision, photophobia, pain, discharge and redness.   Respiratory: Negative for cough, hemoptysis, sputum production, shortness of breath, wheezing and stridor.    Cardiovascular: Negative for chest pain, palpitations, orthopnea, claudication, leg swelling and PND.   Gastrointestinal: Negative for heartburn, nausea, vomiting, abdominal pain, diarrhea, constipation, blood in stool and melena.   Genitourinary: Negative for dysuria, urgency, frequency, hematuria and flank pain.   Musculoskeletal: Negative for myalgias, back  pain, joint pain and falls.   Skin: Negative for itching and rash.   Neurological: Negative for dizziness, tingling, tremors, sensory change, speech change, focal weakness, seizures, loss of consciousness, weakness and headaches.   Endo/Heme/Allergies: Negative for environmental allergies and polydipsia. Does not bruise/bleed easily.   Psychiatric/Behavioral: Negative for depression, suicidal ideas, hallucinations, memory loss and substance abuse. The patient is not nervous/anxious and does not have insomnia.    All 14 systems reviewed and negative except as noted above.    Physical Exam:      Constitutional: Appears well-developed, well-nourished and in no acute distress.  Patient is oriented to person, place, and time. Rolling walker.   Head: Normocephalic and atraumatic. Mucous membranes moist.  Neck: Neck supple no mass.   Cardiovascular: Normal rate and regular rhythm.  S1 S2 appreciated by ascultation.  Pulmonary/Chest: Effort normal and clear to auscultation bilaterally. No respiratory distress.   Abdomen: Soft. Non-tender and non-distended. Bowel sounds are normal.   Neurological: Patient is alert and oriented to person, place and time. Moves all extremities.  Skin: Warm and dry. No lesions.  Extremities: No clubbing, cyanosis or edema.    Laboratory data:      Reviewed and noted in plan where applicable. Please see chart for full laboratory data.      Lab Results   Component Value Date    WBC 10.15 04/07/2025    HGB 14.3 04/07/2025    HCT 43.7 04/07/2025    MCV 92 04/07/2025     04/07/2025           Predictors of intubation difficulty:       Morbid obesity? no   Anatomically abnormal facies? no   Prominent incisors? no   Receding mandible? no   Short, thick neck? no   Neck range of motion: normal   Dentition: Edentulous  Based on the Modified Mallampati, patient is a mallampati score: I (soft palate, uvula, fauces, and tonsillar pillars visible)    Cardiographics:      ECG: Sinus rhythm,  RBBB  Echocardiogram: not indicated    Imaging:      Chest x-ray: not indicated    Assessment and Plan:      Malignant neoplasm of overlapping sites of bladder  Planned for TURBT with Dr. Hortencia Michael on 5/15/25.     Known risk factors for perioperative complications: Anemia  Coronary disease  Congestive heart failure  Renal dysfunction    Difficulty with intubation is not anticipated.    Cardiac Risk Estimation: Based on the Revised Cardiac Risk index, patient is a Class 3 risk with a 15% risk of a major cardiac event in a low risk procedure.    1.) Preoperative workup as follows: chest x-ray, ECG, hemoglobin, hematocrit, electrolytes, creatinine.  2.) Change in medication regimen before surgery: discontinue ASA, NSAIDs 7 days before surgery, hold Metformin 24 hours prior to surgery.  3.) Prophylaxis for cardiac events with perioperative beta-blockers: continue BB as prescribed.  4.) Invasive hemodynamic monitoring perioperatively: at the discretion of anesthesiologist.  5.) Deep vein thrombosis prophylaxis postoperatively: intermittent pneumatic compression boots and regimen to be chosen by surgical team.  6.) Surveillance for postoperative MI with ECG immediately postoperatively and on postoperati ve days 1 and 2 AND troponin levels 24 hours postoperatively and on day 4 or hospital discharge (whichever comes first): not indicated.  7.) Current medications which may produce withdrawal symptoms if withheld perioperatively: None  8.) Other measures: Careful attention to perioperative glycemic control (POCT glucose in pre-op, SSI if required).     --Morning of Procedure medications: amlodipine, isosorbide, metoprolol, tamsulosin, solifenacin  --Hold all other medications morning of surgery   --Resume all medications post-operatively  --Hold ASA, NSAIDs and all vitamins/supplements 7 days prior to procedure  --Hold Trulicity (dulaglutide) 7 days prior to surgery   --AM glucose average: 100-120  --Reduce Night dose  of Insulin Glargine by 50% to 8 units  --Hold AM insulin    Coronary artery disease of bypass graft of native heart with stable angina pectoris  Followed by Cardiology as outpatient.     Evaluated by cardiology on 3/25/25, recommendations as below:    Stable CV status on current medical tx.  Preop CV eval: Pt may proceed with Urological surgery at moderate CV risk.    Type 2 diabetes mellitus with hyperglycemia, with long-term current use of insulin  Patient followed by PCP, last HgA1c: 4/7/25, 8.4.      --AM glucose average: 100-120  --Reduce Night dose of Insulin Glargine by approx. 50% to 8 units  --Hold AM insulin  --Recommend POCT glucose in pre-op, SSI if required    Stage 3b chronic kidney disease  Chronic, stable    --Avoid nephrotoxic medications    Benign prostatic hyperplasia  Followed by urology    --See medication recommendations as above     Ischemic cardiomyopathy  Followed by cardiology, does have a PRN Nitroglycerin prescription, denies having to use the medication     --See medication recommendations as above         Electronically signed by Cristal Tillman NP on 5/12/2025 at 9:21 AM.    Time spent seeing patient( greater than 1/2 spent in direct contact) : 45 Minutes

## 2025-05-12 NOTE — ASSESSMENT & PLAN NOTE
Followed by Cardiology as outpatient.     Evaluated by cardiology on 3/25/25, recommendations as below:    Stable CV status on current medical tx.  Preop CV eval: Pt may proceed with Urological surgery at moderate CV risk.

## 2025-05-12 NOTE — ASSESSMENT & PLAN NOTE
Patient followed by PCP, last HgA1c: 4/7/25, 8.4.      --AM glucose average: 100-120  --Reduce Night dose of Insulin Glargine by approx. 50% to 8 units  --Hold AM insulin  --Recommend POCT glucose in pre-op, SSI if required

## 2025-05-14 ENCOUNTER — TELEPHONE (OUTPATIENT)
Dept: PREADMISSION TESTING | Facility: HOSPITAL | Age: 85
End: 2025-05-14
Payer: MEDICARE

## 2025-05-14 NOTE — TELEPHONE ENCOUNTER
Called and spoke with patient about the following:     Please arrive to [facility] at 11:00am on 5/15/25 for your scheduled procedure.  Address: [address] [place] [address] [place] [address] (2nd Building on left, 1st Floor Lobby)    !!!NO FOOD after midnight! You may have clear liquids up to 3 hrs before your arrival to the Hospital!!!  Clear liquids include Gatorade, water, soda, black coffee or tea (no milk or creamer), and clear juices.  Clear liquids do NOT include anything with pulp or food particles (Chicken broth, ice cream, yogurt, Jello, etc.)    Thank you,  -[facility] Pre Admit Dept.  Mon-Fri 7:30 am - 4 pm [phone]

## 2025-05-15 ENCOUNTER — ANESTHESIA EVENT (OUTPATIENT)
Dept: SURGERY | Facility: HOSPITAL | Age: 85
End: 2025-05-15
Payer: MEDICARE

## 2025-05-15 ENCOUNTER — ANESTHESIA (OUTPATIENT)
Dept: SURGERY | Facility: HOSPITAL | Age: 85
End: 2025-05-15
Payer: MEDICARE

## 2025-05-15 ENCOUNTER — HOSPITAL ENCOUNTER (OUTPATIENT)
Facility: HOSPITAL | Age: 85
Discharge: HOME | End: 2025-05-15
Attending: UROLOGY | Admitting: UROLOGY
Payer: MEDICARE

## 2025-05-15 VITALS
BODY MASS INDEX: 26.04 KG/M2 | RESPIRATION RATE: 19 BRPM | DIASTOLIC BLOOD PRESSURE: 60 MMHG | TEMPERATURE: 98 F | HEART RATE: 84 BPM | OXYGEN SATURATION: 96 % | WEIGHT: 181.88 LBS | SYSTOLIC BLOOD PRESSURE: 122 MMHG | HEIGHT: 70 IN

## 2025-05-15 DIAGNOSIS — C67.9 BLADDER CANCER: ICD-10-CM

## 2025-05-15 DIAGNOSIS — C67.8 MALIGNANT NEOPLASM OF OVERLAPPING SITES OF BLADDER: Primary | ICD-10-CM

## 2025-05-15 DIAGNOSIS — Z01.818 PREOP TESTING: ICD-10-CM

## 2025-05-15 DIAGNOSIS — J41.0 SIMPLE CHRONIC BRONCHITIS: Chronic | ICD-10-CM

## 2025-05-15 DIAGNOSIS — C67.9 MALIGNANT NEOPLASM OF BLADDER: ICD-10-CM

## 2025-05-15 LAB — POCT GLUCOSE: 85 MG/DL (ref 70–110)

## 2025-05-15 PROCEDURE — 37000008 HC ANESTHESIA 1ST 15 MINUTES: Mod: HCNC | Performed by: UROLOGY

## 2025-05-15 PROCEDURE — 37000009 HC ANESTHESIA EA ADD 15 MINS: Mod: HCNC | Performed by: UROLOGY

## 2025-05-15 PROCEDURE — 74420 UROGRAPHY RTRGR +-KUB: CPT | Mod: 26,,, | Performed by: UROLOGY

## 2025-05-15 PROCEDURE — 63600175 PHARM REV CODE 636 W HCPCS: Mod: HCNC | Performed by: ANESTHESIOLOGY

## 2025-05-15 PROCEDURE — 25000003 PHARM REV CODE 250: Mod: HCNC | Performed by: NURSE ANESTHETIST, CERTIFIED REGISTERED

## 2025-05-15 PROCEDURE — 52235 CYSTOSCOPY AND TREATMENT: CPT | Mod: ,,, | Performed by: UROLOGY

## 2025-05-15 PROCEDURE — 63600175 PHARM REV CODE 636 W HCPCS: Mod: HCNC | Performed by: NURSE ANESTHETIST, CERTIFIED REGISTERED

## 2025-05-15 PROCEDURE — 36000706: Mod: HCNC | Performed by: UROLOGY

## 2025-05-15 PROCEDURE — 71000015 HC POSTOP RECOV 1ST HR: Mod: HCNC | Performed by: UROLOGY

## 2025-05-15 PROCEDURE — 36000707: Mod: HCNC | Performed by: UROLOGY

## 2025-05-15 PROCEDURE — 27201423 OPTIME MED/SURG SUP & DEVICES STERILE SUPPLY: Mod: HCNC | Performed by: UROLOGY

## 2025-05-15 PROCEDURE — 71000039 HC RECOVERY, EACH ADD'L HOUR: Mod: HCNC | Performed by: UROLOGY

## 2025-05-15 PROCEDURE — 71000033 HC RECOVERY, INTIAL HOUR: Mod: HCNC | Performed by: UROLOGY

## 2025-05-15 PROCEDURE — C1758 CATHETER, URETERAL: HCPCS | Mod: HCNC | Performed by: UROLOGY

## 2025-05-15 PROCEDURE — 63600175 PHARM REV CODE 636 W HCPCS: Mod: HCNC | Performed by: UROLOGY

## 2025-05-15 PROCEDURE — 88307 TISSUE EXAM BY PATHOLOGIST: CPT | Mod: TC,HCNC | Performed by: UROLOGY

## 2025-05-15 RX ORDER — SUCCINYLCHOLINE CHLORIDE 20 MG/ML
INJECTION INTRAMUSCULAR; INTRAVENOUS
Status: DISCONTINUED | OUTPATIENT
Start: 2025-05-15 | End: 2025-05-15

## 2025-05-15 RX ORDER — FENTANYL CITRATE 50 UG/ML
25 INJECTION, SOLUTION INTRAMUSCULAR; INTRAVENOUS EVERY 5 MIN PRN
Status: DISCONTINUED | OUTPATIENT
Start: 2025-05-15 | End: 2025-05-15 | Stop reason: HOSPADM

## 2025-05-15 RX ORDER — ONDANSETRON HYDROCHLORIDE 2 MG/ML
4 INJECTION, SOLUTION INTRAVENOUS ONCE AS NEEDED
Status: DISCONTINUED | OUTPATIENT
Start: 2025-05-15 | End: 2025-05-15 | Stop reason: HOSPADM

## 2025-05-15 RX ORDER — ONDANSETRON HYDROCHLORIDE 2 MG/ML
4 INJECTION, SOLUTION INTRAVENOUS DAILY PRN
Status: DISCONTINUED | OUTPATIENT
Start: 2025-05-15 | End: 2025-05-15 | Stop reason: HOSPADM

## 2025-05-15 RX ORDER — CIPROFLOXACIN 2 MG/ML
400 INJECTION, SOLUTION INTRAVENOUS
Status: COMPLETED | OUTPATIENT
Start: 2025-05-15 | End: 2025-05-15

## 2025-05-15 RX ORDER — OXYCODONE AND ACETAMINOPHEN 5; 325 MG/1; MG/1
1 TABLET ORAL
Status: DISCONTINUED | OUTPATIENT
Start: 2025-05-15 | End: 2025-05-15 | Stop reason: HOSPADM

## 2025-05-15 RX ORDER — PHENYLEPHRINE HYDROCHLORIDE 10 MG/ML
INJECTION INTRAVENOUS
Status: DISCONTINUED | OUTPATIENT
Start: 2025-05-15 | End: 2025-05-15

## 2025-05-15 RX ORDER — LIDOCAINE HYDROCHLORIDE 20 MG/ML
INJECTION, SOLUTION EPIDURAL; INFILTRATION; INTRACAUDAL; PERINEURAL
Status: DISCONTINUED | OUTPATIENT
Start: 2025-05-15 | End: 2025-05-15

## 2025-05-15 RX ORDER — ROCURONIUM BROMIDE 10 MG/ML
INJECTION, SOLUTION INTRAVENOUS
Status: DISCONTINUED | OUTPATIENT
Start: 2025-05-15 | End: 2025-05-15

## 2025-05-15 RX ORDER — PROPOFOL 10 MG/ML
VIAL (ML) INTRAVENOUS
Status: DISCONTINUED | OUTPATIENT
Start: 2025-05-15 | End: 2025-05-15

## 2025-05-15 RX ORDER — ACETAMINOPHEN 10 MG/ML
1000 INJECTION, SOLUTION INTRAVENOUS ONCE
Status: COMPLETED | OUTPATIENT
Start: 2025-05-15 | End: 2025-05-15

## 2025-05-15 RX ORDER — ONDANSETRON HYDROCHLORIDE 2 MG/ML
INJECTION, SOLUTION INTRAVENOUS
Status: DISCONTINUED | OUTPATIENT
Start: 2025-05-15 | End: 2025-05-15

## 2025-05-15 RX ADMIN — ROCURONIUM BROMIDE 10 MG: 10 INJECTION, SOLUTION INTRAVENOUS at 02:05

## 2025-05-15 RX ADMIN — SUCCINYLCHOLINE CHLORIDE 100 MG: 20 INJECTION, SOLUTION INTRAMUSCULAR; INTRAVENOUS; PARENTERAL at 01:05

## 2025-05-15 RX ADMIN — PROPOFOL 30 MG: 10 INJECTION, EMULSION INTRAVENOUS at 01:05

## 2025-05-15 RX ADMIN — PHENYLEPHRINE HYDROCHLORIDE 200 MCG: 10 INJECTION INTRAVENOUS at 01:05

## 2025-05-15 RX ADMIN — ONDANSETRON 4 MG: 2 INJECTION INTRAMUSCULAR; INTRAVENOUS at 02:05

## 2025-05-15 RX ADMIN — ROCURONIUM BROMIDE 30 MG: 10 INJECTION, SOLUTION INTRAVENOUS at 01:05

## 2025-05-15 RX ADMIN — PHENYLEPHRINE HYDROCHLORIDE 400 MCG: 10 INJECTION INTRAVENOUS at 01:05

## 2025-05-15 RX ADMIN — PROPOFOL 150 MG: 10 INJECTION, EMULSION INTRAVENOUS at 01:05

## 2025-05-15 RX ADMIN — PROPOFOL 50 MG: 10 INJECTION, EMULSION INTRAVENOUS at 01:05

## 2025-05-15 RX ADMIN — ACETAMINOPHEN 1000 MG: 10 INJECTION, SOLUTION INTRAVENOUS at 03:05

## 2025-05-15 RX ADMIN — PHENYLEPHRINE HYDROCHLORIDE 100 MCG: 10 INJECTION INTRAVENOUS at 01:05

## 2025-05-15 RX ADMIN — SODIUM CHLORIDE, SODIUM LACTATE, POTASSIUM CHLORIDE, AND CALCIUM CHLORIDE: .6; .31; .03; .02 INJECTION, SOLUTION INTRAVENOUS at 12:05

## 2025-05-15 RX ADMIN — SODIUM CHLORIDE, SODIUM LACTATE, POTASSIUM CHLORIDE, AND CALCIUM CHLORIDE: .6; .31; .03; .02 INJECTION, SOLUTION INTRAVENOUS at 02:05

## 2025-05-15 RX ADMIN — PHENYLEPHRINE HYDROCHLORIDE 300 MCG: 10 INJECTION INTRAVENOUS at 01:05

## 2025-05-15 RX ADMIN — CIPROFLOXACIN 400 MG: 2 INJECTION, SOLUTION INTRAVENOUS at 01:05

## 2025-05-15 RX ADMIN — PHENYLEPHRINE HYDROCHLORIDE 100 MCG: 10 INJECTION INTRAVENOUS at 02:05

## 2025-05-15 RX ADMIN — LIDOCAINE HYDROCHLORIDE 100 MG: 20 INJECTION, SOLUTION EPIDURAL; INFILTRATION; INTRACAUDAL; PERINEURAL at 01:05

## 2025-05-15 RX ADMIN — SUGAMMADEX 200 MG: 100 INJECTION, SOLUTION INTRAVENOUS at 02:05

## 2025-05-15 NOTE — DISCHARGE SUMMARY
[facility] (Hospital)  Discharge Note  Short Stay    Procedure(s) (LRB):  TURBT (TRANSURETHRAL RESECTION OF BLADDER TUMOR) (N/A)  CYSTOSCOPY, WITH RETROGRADE PYELOGRAM (Bilateral)      OUTCOME: Patient tolerated treatment/procedure well without complication and is now ready for discharge.    DISPOSITION: Home or Self Care    FINAL DIAGNOSIS:  Malignant neoplasm of overlapping sites of bladder    FOLLOWUP: In clinic    DISCHARGE INSTRUCTIONS:    Discharge Procedure Orders   Ambulatory referral/consult to Pre-Admit   Standing Status: Future   Referral Priority: Routine Referral Type: Consultation   Referral Reason: Specialty Services Required   Requested Specialty: Internal Medicine   Number of Visits Requested: 1     Diet Adult Regular     Notify your health care provider if you experience any of the following:  temperature >100.4     Notify your health care provider if you experience any of the following:  persistent nausea and vomiting or diarrhea     Notify your health care provider if you experience any of the following:  severe uncontrolled pain     No dressing needed     Activity as tolerated        TIME SPENT ON DISCHARGE: 10 minutes

## 2025-05-15 NOTE — H&P
CC:bladder cancer    History of Present Illness:   Nithin Pino is a 85 y.o. male here for follow up.     5/15/25-TURBT with bilateral retrograde pyelograms today. No recent dysuria or gross hematuria.   12/18/24-cysto today.   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 Pt did well following TURBT. He denies gross hematuria or pain.   8/20/24-Here for cysto. No gross hematuria.   5/21/24-Completed BCG induction. Here for cysto.   2/20/24-Path showing HG Ta urothelial cell carcinoma in all regions of tumor. Upon review with the pt, he only previously received 3 BCG treatments, but didn't get a full 6 treatment induction. No gross hematuria at this point. He was having some incontinence for a few days following TURBT. Now it has resolved.    1/17/24-Pt now cleared for TURBT by cardiology. No gross hematuria. No dysuria. No stranguria. Pt states that the BCG treatment are painful and asks about the natural history of bladder cancer, should he elect against treatment.   9/26/23: pt here for surveillance cysto.   6/23/23-Pt s/p TURBT. 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 No gross hematuria or dysuria. No weakness or fever. States that he is urinating well.   5/15/23- pt here for cysto. CT scan personally reviewed, showing a polypoid tumor at the left bladder wall, enhancing.   4/10/23-81yo male, here today for follow up, last seen in 2021. At that time, here was being treated for BPH with finasteride and tamsulosin and OAB with vesicare. He did have gross hematuria, but refused cystoscopy. He reports that he was seen in the ED at [facility] yesterday with dysuria and gross hematuria. Was prescribed levaquin. States that he hasn't started the levaquin yet. Saw a little blood this am. No fever. Stream is strong. Still on finasteride, vesicare and flomax. No difficulty emptying--states it was checked in the ED yesterday and he emptied completely.     Pt's records from   clinic with Dr. Sanchez reviewed from 12/22/22, noting cytology suspicious for high grade bladder cancer.       Review of Systems   Constitutional:  Negative for chills and fever.   Respiratory:  Negative for shortness of breath.    Cardiovascular:  Negative for chest pain.   Gastrointestinal:  Negative for abdominal pain.   Genitourinary:  Negative for flank pain.   Musculoskeletal:  Positive for arthralgias. Negative for back pain.   All other systems reviewed and are negative.        Past Medical History:   Diagnosis Date    Abnormal brain MRI 01/21/2018    Chronic microvascular ischemia changes per MRI July 9, 2007 in [facility]    Abnormal ECG 10/31/2013    Abnormal stress test 01/28/2016    Alcohol dependence     States alcohol abuse ended in 1994    AP (angina pectoris) 01/28/2016    Asthma     Bladder cancer     Carotid artery occlusion     Cataract     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    2 - Normal left ventricular systolic function (EF 60-65%).    3 - Normal left ventricular diastolic function.    4 - Mild left atrial enlargement.    5 - Normal right ventricular systolic function .    6 - Trivial to mild aortic regurgitation.    7 - Trivial to mild mitral regurgitation.  10 - Trivial to mild pulmonic regurgitation.     Hematuria 06/25/2020    History of alcohol abuse 01/22/2018    Patient denies drinking to excess since 1994.    History of atherectomy 01/21/2018 2/2016 [facility]    History of chronic kidney disease 01/22/2018    History of CKD 3    History of  PTCA 10/31/2013    History of PTCA 03/09/2016    Hyperlipidemia     Hypertension     Insomnia 06/17/2013    Iron deficiency anemia 10/19/2023    Ischemic cardiomyopathy 10/31/2013    Long term (current) use of antithrombotics/antiplatelets 01/21/2018    Malignant neoplasm of overlapping sites of bladder 06/08/2023    Myocardial infarction     Old MI (myocardial infarction) 1994    Peripheral vascular disease     Polyneuropathy     RBBB 10/31/2013    S/P CABG (coronary artery bypass graft) 10/31/2013    Shortness of breath 10/31/2013    Simple chronic bronchitis 6/17/2013    Tobacco dependence     resolved    Trouble in sleeping     Type 2 diabetes with peripheral circulatory disorder, controlled     Wears glasses        Past Surgical History:   Procedure Laterality Date    APPENDECTOMY      CARDIAC CATHETERIZATION      2016    CARDIAC SURGERY  1994    balloon angioplasty    CATARACT EXTRACTION W/  INTRAOCULAR LENS IMPLANT Right 02/01/2017    CORONARY ARTERY BYPASS GRAFT  05/2011    per patient x4    HERNIA REPAIR      OPEN REDUCTION AND INTERNAL FIXATION (ORIF) OF INJURY OF HIP      PCIOL Bilateral OD 02/01/17/OS 02/15/17    DR. ALONZO    RETROGRADE PYELOGRAPHY Bilateral 8/29/2024    Procedure: PYELOGRAM, RETROGRADE;  Surgeon: Hortencia Michael MD;  Location: [facility] [place];  Service: Urology;  Laterality: Bilateral;    TURBT (TRANSURETHRAL RESECTION OF BLADDER TUMOR) N/A 6/8/2023    Procedure: TURBT (TRANSURETHRAL RESECTION OF BLADDER TUMOR);  Surgeon: Hortencia Michael MD;  Location: [facility];  Service: Urology;  Laterality: N/A;    TURBT (TRANSURETHRAL RESECTION OF BLADDER TUMOR) N/A 2/8/2024    Procedure: TURBT (TRANSURETHRAL RESECTION OF BLADDER TUMOR);  Surgeon: Hortencia Michael MD;  Location: [facility];  Service: Urology;  Laterality: N/A;    TURBT (TRANSURETHRAL RESECTION OF BLADDER TUMOR) N/A 8/29/2024    Procedure: TURBT (TRANSURETHRAL RESECTION OF BLADDER TUMOR);  Surgeon: Hortencia Michael MD;  Location:  [facility] [place] OR;  Service: Urology;  Laterality: N/A;       Family History   Adopted: Yes   Problem Relation Name Age of Onset    Diabetes Brother      Diabetes Sister      Cancer Neg Hx      Heart disease Neg Hx      Kidney disease Neg Hx         Social History     Tobacco Use    Smoking status: Former     Current packs/day: 0.00     Average packs/day: 1.5 packs/day for 40.0 years (60.0 ttl pk-yrs)     Types: Cigarettes     Start date: 1954     Quit date: 1994     Years since quittin.3    Smokeless tobacco: Former     Quit date: 2011    Tobacco comments:     approx date   Substance Use Topics    Alcohol use: Not Currently     Comment: occasionally; 1-2 cans of beer a month    Drug use: No       Current Facility-Administered Medications   Medication Dose Route Frequency Provider Last Rate Last Admin    ciprofloxacin (CIPRO)400mg/200ml D5W IVPB 400 mg  400 mg Intravenous On Call Procedure Hortencia Michael MD           Review of patient's allergies indicates:   Allergen Reactions    Pseudoephedrine-guaifenesin Shortness Of Breath    Panmist dm  [pseudoephedrine-dm-guaifenesin]      Other reaction(s): Unknown       Physical Exam  Vitals:    05/15/25 1201   BP: (!) 144/89   Pulse: 80   Resp: 16   Temp: 97.9 °F (36.6 °C)         General: Well-developed, well-nourished in no acute distress  HEENT: Normocephalic, atraumatic, Extraocular movements intact  Neck: supple, trachea midline, no cervical or supraclavicular lymphadenopathy  Respirations: even and unlabored  Extremities: atraumatic, moves all equally, no clubbing, cyanosis or edema  Psych: normal affect  Skin: warm and dry, no lesions  Neuro: Alert and oriented, Cranial nerves II-XII grossly intact      Lab Results   Component Value Date    PSA 0.37 2022    PSA 0.29 2020    PSA 0.82 2015         Assessment:   1. Malignant neoplasm of overlapping sites of bladder  CBC Auto Differential    Basic Metabolic Panel    Ambulatory  referral/consult to Pre-Admit    Urine culture    Place in Outpatient    Case Request Operating Room: TURBT (TRANSURETHRAL RESECTION OF BLADDER TUMOR), CYSTOSCOPY, WITH RETROGRADE PYELOGRAM    Diet NPO    Place sequential compression device    Ambulatory referral/consult to Hematology / Oncology      2. Pre-op testing        3. Bladder cancer                  Plan:  Preop testing  -     Ambulatory referral/consult to Pre-Admit; Future; Expected date: 05/15/2025    Malignant neoplasm of overlapping sites of bladder  -     Place in Outpatient; Standing  -     Cancel: Diet NPO; Standing  -     Place sequential compression device; Standing  -     Place in Outpatient; Standing  -     Diet NPO; Standing  -     Place sequential compression device; Standing    Malignant neoplasm of bladder    Bladder cancer    Other orders  -     Admit to Phase 1 PACU, transfer to Phase 2 per protocol when indicated ; Standing  -     Vital signs; Standing  -     fentaNYL 50 mcg/mL injection 25 mcg; Refill: 0  -     oxyCODONE-acetaminophen 5-325 mg per tablet 1 tablet; Refill: 0  -     ondansetron injection 4 mg  -     ondansetron injection 4 mg  -     Intake and output Per protocol; Standing  -     Apply warming blanket; Standing  -     Notify Physician - Potential Need of Opioid Reversal; Standing  -     maintenance fluids per service; Standing  -     Oxygen Continuous; Standing  -     Pulse Oximetry Continuous; Standing  -     Cancel: IP VTE HIGH RISK PATIENT; Standing  -     IP VTE HIGH RISK PATIENT; Standing  -     ciprofloxacin (CIPRO)400mg/200ml D5W IVPB 400 mg  -     POCT glucose; Standing        TURBT with bilateral retrograde pyelograms today. Risks/benefits discussed. Pt unwilling to have intravesical chemo.

## 2025-05-15 NOTE — OP NOTE
Date of Procedure: 05/15/2025    PREOP DIAGNOSIS:  Bladder cancer.    POSTOP DIAGNOSIS:  Bladder cancer.    PROCEDURES:      1. TURBT -- 3cm.    2. Bilateral Retrogrades    3. Tumor fulguration    SURGEON:  Hortencia Michael MD    Assistants: None    Specimen: Bladder tumor    ANESTHESIA:  General endotracheal.    BLOOD LOSS:  None.    FINDINGS:  3cm region of papillary tumor at the dome/anterior bladder wall and 1cm papillary tumor at the bladder neck, all completely resected.  Bilateral retrogrades were unremarkable.      PROCEDURE IN DETAIL:  Patient was brought to the operative suite and placed under general anesthesia and positioned into the dorsal lithotomy position.  After being sterilely prepped and draped a 21 [place] of [place] sheath cystoscope was inserted into a normal urethra.   Bladder was examined, tumor was identified at the dome/anterior bladder wall. There was an approximately 3cm region of tumor at the dome/anterior bladder wall. This was likely multiple smaller tumors that were seen on his last cystoscopy that had now coalesced.  Bilateral ureteral orifices are normal in size, shape, caliber and location.  Right ureteral orifice was cannulated with a Musella catheter, contrast was injected.  There was no evidence of filling defects or other abnormalities, otherwise unremarkable right retrograde nephrogram.  Musella was then inserted into the left ureteral orifice.  Contrast was injected demonstrating no evidence of filling defects or other abnormalities, otherwise unremarkable left retrograde nephrogram. Flexible cystoscope was utilized to visualize the bladder neck and the tumor in that location. I utilized a combination of the cold cup biopsy forceps, loop resection and bugbee electrocauterization for tumor resection. Dissection was continued until the tumor was completely dissected from the bladder wall.  I then fulgurated the edges and the base of the tumor.  Muscle fibers were visible.  At the conclusion  hemostasis was meticulously maintained and there was no evidence of residual tumor burden within the bladder.  Patient was transferred to the PACU in stable condition.    I will have the patient return in 3 weeks to discuss pathology.    COMPLICATIONS: None

## 2025-05-16 ENCOUNTER — TELEPHONE (OUTPATIENT)
Dept: UROLOGY | Facility: CLINIC | Age: 85
End: 2025-05-16
Payer: MEDICARE

## 2025-05-16 NOTE — TELEPHONE ENCOUNTER
.Outgoing call placed to patient's daughter, she was notified of below, verbalized understanding and appt confirmed with her, she stated she will make sure he get it. No further assistance needed.       ----- Message from Hortencia Michael MD sent at 5/15/2025  3:26 PM CDT -----  Please schedule a 2-3 week post-op

## 2025-05-19 LAB
ESTROGEN SERPL-MCNC: NORMAL PG/ML
INSULIN SERPL-ACNC: NORMAL U[IU]/ML
LAB AP CLINICAL INFORMATION: NORMAL
LAB AP GROSS DESCRIPTION: NORMAL
LAB AP PERFORMING LOCATION(S): NORMAL
LAB AP REPORT FOOTNOTES: NORMAL

## 2025-05-22 ENCOUNTER — TELEPHONE (OUTPATIENT)
Dept: DIABETES | Facility: CLINIC | Age: 85
End: 2025-05-22
Payer: MEDICARE

## 2025-05-23 ENCOUNTER — CLINICAL SUPPORT (OUTPATIENT)
Dept: REHABILITATION | Facility: HOSPITAL | Age: 85
End: 2025-05-23
Payer: MEDICARE

## 2025-05-23 DIAGNOSIS — R53.1 LEFT-SIDED WEAKNESS: Primary | ICD-10-CM

## 2025-05-23 PROCEDURE — 97110 THERAPEUTIC EXERCISES: CPT | Mod: HCNC,PN

## 2025-05-23 PROCEDURE — 97112 NEUROMUSCULAR REEDUCATION: CPT | Mod: HCNC,PN

## 2025-05-23 PROCEDURE — 97530 THERAPEUTIC ACTIVITIES: CPT | Mod: HCNC,PN

## 2025-05-23 NOTE — PROGRESS NOTES
Outpatient Rehab  Physical Therapy Visit    Patient Name: Nithin Pino  MRN: [id number]  YOB: 1940  Encounter Date: 5/23/2025    Therapy Diagnosis:   Encounter Diagnosis   Name Primary?    Left-sided weakness Yes     Physician: MUKUL Garg MD    Physician Orders: Eval and Treat  Medical Diagnosis: Impaired functional mobility, balance, gait, and endurance  Uses roller walker    Visit # / Visits Authorized:  5 / 15  Insurance Authorization Period: 4/15/2025 to 5/28/2025  Date of Evaluation: 4/16/2025  Plan of Care Certification: 4/16/2025 to 7/11/2025     PT/PTA: PT   Number of PTA visits since last PT visit:0  Time In: 1050   Time Out: 1145  Total Time: 55   Total Billable Time:  55 minutes      Subjective   Overall is doing better. Only has difficutly in the mornings when he is first getting out of the bed..       Objective    Last FOTO updated on 5/23/2025      Treatment:  Therapeutic Exercise: 10 min  Nustep x 8 minutes  Seated LAQ 3# 2x10  Seated marches 3# 2x10  Seated hip adduction squeeze 30x  Seated hip abduction, GTB 30x  Seated alternating DF (min assist) 2x10    Balance/Neuromuscular Re-Education: 30 min  Standing marches on foam 3x10  Hip 3-way, YTB 2x10  Lateral walking, YTB x 5laps  Cone walking x 5laps  Lateral cone walking x5laps  Lateral stepping, YTB x3laps  Lateral stepping with cone taps x 3laps  Obstacle course x 2 trials    Therapeutic Activity: 15 min  Sit to stand (foam) 3x10  Step ups (stairs) 2x10  Lateral step ups (stairs) 2x10    Assessment & Plan   Assessment: He has improvd strength and overall safety awareness. He still has left hip weakness noted with trendelenburg gait and left single leg activities. Much weakness noted in left hip during standing exercises. Has improved activity tolerance. He was discahrged from physical therapy today.  Evaluation/Treatment Tolerance: Patient tolerated treatment well  Patient will continue to benefit from skilled outpatient  physical therapy to address the deficits listed in the problem list box on initial evaluation, provide pt/family education and to maximize pt's level of independence in the home and community environment.     Patient's spiritual, cultural, and educational needs considered and patient agreeable to plan of care and goals.       Plan: Patient was discahrged from physical therapy on 5/23/2025.    Goals:   Active       Long Term Goals       Patient will demonstrate improved function as indicated by a functional limitation score of 45% on the FOTO.  (Met)       Start:  04/17/25    Expected End:  07/11/25    Resolved:  05/23/25         Patient will improve bilateral lower extremity strength to greater than 4+/5 MMT for improved ease with stair climbing.  (Met)       Start:  04/17/25    Expected End:  07/11/25    Resolved:  05/23/25         Patient will improve WHEELER Balance score to greater than or equal to 45 for improved balance.  (Progressing)       Start:  04/17/25    Expected End:  07/11/25              Resolved       Short Term Goals       Patient will demonstrate independence with HEP in order to progress toward functional independence  (Met)       Start:  04/17/25    Expected End:  07/11/25    Resolved:  05/23/25         Patient will improve bilateral lower extremity strength by 1/2 MMT for improved strength needed for stair climbing.  (Met)       Start:  04/17/25    Expected End:  07/11/25    Resolved:  05/23/25         Patient will improve WHEELER balance to greater than or equal to 43 for improved balance.  (Met)       Start:  04/17/25    Expected End:  07/11/25    Resolved:  05/23/25             Chantel Guerrier, PT  05/23/2025

## 2025-05-27 ENCOUNTER — TELEPHONE (OUTPATIENT)
Dept: DIABETES | Facility: CLINIC | Age: 85
End: 2025-05-27
Payer: MEDICARE

## 2025-05-30 ENCOUNTER — OFFICE VISIT (OUTPATIENT)
Dept: UROLOGY | Facility: CLINIC | Age: 85
End: 2025-05-30
Payer: MEDICARE

## 2025-05-30 VITALS
HEIGHT: 70 IN | DIASTOLIC BLOOD PRESSURE: 61 MMHG | HEART RATE: 45 BPM | SYSTOLIC BLOOD PRESSURE: 118 MMHG | RESPIRATION RATE: 18 BRPM | BODY MASS INDEX: 26.16 KG/M2 | TEMPERATURE: 98 F | WEIGHT: 182.75 LBS

## 2025-05-30 DIAGNOSIS — R31.0 HEMATURIA, GROSS: ICD-10-CM

## 2025-05-30 DIAGNOSIS — C67.8 MALIGNANT NEOPLASM OF OVERLAPPING SITES OF BLADDER: Primary | ICD-10-CM

## 2025-05-30 LAB
BILIRUBIN, UA POC OHS: NEGATIVE
BLOOD, UA POC OHS: ABNORMAL
CLARITY, UA POC OHS: CLEAR
COLOR, UA POC OHS: YELLOW
GLUCOSE, UA POC OHS: NEGATIVE
KETONES, UA POC OHS: NEGATIVE
LEUKOCYTES, UA POC OHS: ABNORMAL
NITRITE, UA POC OHS: NEGATIVE
PH, UA POC OHS: 5.5
PROTEIN, UA POC OHS: NEGATIVE
SPECIFIC GRAVITY, UA POC OHS: 1.01
UROBILINOGEN, UA POC OHS: 0.2

## 2025-05-30 PROCEDURE — 99999 PR PBB SHADOW E&M-EST. PATIENT-LVL IV: CPT | Mod: PBBFAC,HCNC,, | Performed by: UROLOGY

## 2025-05-30 NOTE — PROGRESS NOTES
CC:bladder cancer    History of Present Illness:   Nithin Pino is a 85 y.o. male here for follow up.     5/15/25-s/p TURBT 2 weeks ago. Path with LGTa urothelial cell carcinoma. No longer having dysuria or gross hematuria.   12/18/24-cysto today.   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 Pt did well following TURBT. He denies gross hematuria or pain.   8/20/24-Here for cysto. No gross hematuria.   5/21/24-Completed BCG induction. Here for cysto.   2/20/24-Path showing HG Ta urothelial cell carcinoma in all regions of tumor. Upon review with the pt, he only previously received 3 BCG treatments, but didn't get a full 6 treatment induction. No gross hematuria at this point. He was having some incontinence for a few days following TURBT. Now it has resolved.    1/17/24-Pt now cleared for TURBT by cardiology. No gross hematuria. No dysuria. No stranguria. Pt states that the BCG treatment are painful and asks about the natural history of bladder cancer, should he elect against treatment.   9/26/23: pt here for surveillance cysto.   6/23/23-Pt s/p TURBT. 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 No gross hematuria or dysuria. No weakness or fever. States that he is urinating well.   5/15/23- pt here for cysto. CT scan personally reviewed, showing a polypoid tumor at the left bladder wall, enhancing.   4/10/23-83yo male, here today for follow up, last seen in 2021. At that time, here was being treated for BPH with finasteride and tamsulosin and OAB with vesicare. He did have gross hematuria, but refused cystoscopy. He reports that he was seen in the ED at [facility] yesterday with dysuria and gross hematuria. Was prescribed levaquin. States that he hasn't started the levaquin yet. Saw a little blood this am. No fever. Stream is strong. Still on finasteride, vesicare and flomax. No difficulty emptying--states it was checked in the ED yesterday and he emptied completely.     Pt's  records from  clinic with Dr. Sanchez reviewed from 12/22/22, noting cytology suspicious for high grade bladder cancer.       Review of Systems   Constitutional:  Negative for chills and fever.   Respiratory:  Negative for shortness of breath.    Cardiovascular:  Negative for chest pain.   Gastrointestinal:  Negative for abdominal pain.   Genitourinary:  Negative for flank pain.   Musculoskeletal:  Positive for arthralgias. Negative for back pain.   All other systems reviewed and are negative.        Past Medical History:   Diagnosis Date    Abnormal brain MRI 01/21/2018    Chronic microvascular ischemia changes per MRI July 9, 2007 in [facility]    Abnormal ECG 10/31/2013    Abnormal stress test 01/28/2016    Alcohol dependence     States alcohol abuse ended in 1994    AP (angina pectoris) 01/28/2016    Asthma     Bladder cancer     Carotid artery occlusion     Cataract     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    2 - Normal left ventricular systolic function (EF 60-65%).    3 - Normal left ventricular diastolic function.    4 - Mild left atrial enlargement.    5 - Normal right ventricular systolic function .    6 - Trivial to mild aortic regurgitation.    7 - Trivial to mild mitral regurgitation.  10 - Trivial to mild pulmonic regurgitation.     Hematuria 06/25/2020    History of alcohol abuse 01/22/2018    Patient denies drinking to excess since 1994.    History of atherectomy 01/21/2018 2/2016 [facility]    History of chronic kidney disease 01/22/2018    History of CKD  3    History of PTCA 10/31/2013    History of PTCA 03/09/2016    Hyperlipidemia     Hypertension     Insomnia 06/17/2013    Iron deficiency anemia 10/19/2023    Ischemic cardiomyopathy 10/31/2013    Long term (current) use of antithrombotics/antiplatelets 01/21/2018    Malignant neoplasm of overlapping sites of bladder 06/08/2023    Myocardial infarction     Old MI (myocardial infarction) 1994    Peripheral vascular disease     Polyneuropathy     RBBB 10/31/2013    S/P CABG (coronary artery bypass graft) 10/31/2013    Shortness of breath 10/31/2013    Simple chronic bronchitis 6/17/2013    Tobacco dependence     resolved    Trouble in sleeping     Type 2 diabetes with peripheral circulatory disorder, controlled     Wears glasses        Past Surgical History:   Procedure Laterality Date    APPENDECTOMY      CARDIAC CATHETERIZATION      2016    CARDIAC SURGERY  1994    balloon angioplasty    CATARACT EXTRACTION W/  INTRAOCULAR LENS IMPLANT Right 02/01/2017    CORONARY ARTERY BYPASS GRAFT  05/2011    per patient x4    CYSTOSCOPY W/ RETROGRADES Bilateral 5/15/2025    Procedure: CYSTOSCOPY, WITH RETROGRADE PYELOGRAM;  Surgeon: Hortencia Michael MD;  Location: [facility] OR;  Service: Urology;  Laterality: Bilateral;    HERNIA REPAIR      OPEN REDUCTION AND INTERNAL FIXATION (ORIF) OF INJURY OF HIP      PCIOL Bilateral OD 02/01/17/OS 02/15/17    DR. ALONZO    RETROGRADE PYELOGRAPHY Bilateral 8/29/2024    Procedure: PYELOGRAM, RETROGRADE;  Surgeon: Hortencia Michael MD;  Location: [facility] OR;  Service: Urology;  Laterality: Bilateral;    TURBT (TRANSURETHRAL RESECTION OF BLADDER TUMOR) N/A 6/8/2023    Procedure: TURBT (TRANSURETHRAL RESECTION OF BLADDER TUMOR);  Surgeon: Hortencia Michael MD;  Location: [facility] OR;  Service: Urology;  Laterality: N/A;    TURBT (TRANSURETHRAL RESECTION OF BLADDER TUMOR) N/A 2/8/2024    Procedure: TURBT (TRANSURETHRAL RESECTION OF BLADDER TUMOR);  Surgeon: Hortencia Michael MD;  Location: [facility]  OR;  Service: Urology;  Laterality: N/A;    TURBT (TRANSURETHRAL RESECTION OF BLADDER TUMOR) N/A 2024    Procedure: TURBT (TRANSURETHRAL RESECTION OF BLADDER TUMOR);  Surgeon: Hortencia Michael MD;  Location: [facility] OR;  Service: Urology;  Laterality: N/A;    TURBT (TRANSURETHRAL RESECTION OF BLADDER TUMOR) N/A 5/15/2025    Procedure: TURBT (TRANSURETHRAL RESECTION OF BLADDER TUMOR);  Surgeon: Hortencia Michael MD;  Location: [facility] OR;  Service: Urology;  Laterality: N/A;       Family History   Adopted: Yes   Problem Relation Name Age of Onset    Diabetes Brother      Diabetes Sister      Cancer Neg Hx      Heart disease Neg Hx      Kidney disease Neg Hx         Social History     Tobacco Use    Smoking status: Former     Current packs/day: 0.00     Average packs/day: 1.5 packs/day for 40.0 years (60.0 ttl pk-yrs)     Types: Cigarettes     Start date: 1954     Quit date: 1994     Years since quittin.4    Smokeless tobacco: Former     Quit date: 2011    Tobacco comments:     approx date   Substance Use Topics    Alcohol use: Not Currently     Comment: occasionally; 1-2 cans of beer a month    Drug use: No       Current Outpatient Medications   Medication Sig Dispense Refill    albuterol (PROVENTIL/VENTOLIN HFA) 90 mcg/actuation inhaler Inhale 2 puffs into the lungs every 6 (six) hours as needed for Wheezing. Rescue      amLODIPine (NORVASC) 10 MG tablet Take 1 tablet (10 mg total) by mouth once daily. 90 tablet 3    aspirin (ECOTRIN) 81 MG EC tablet Take 1 tablet (81 mg total) by mouth once daily. 90 tablet 3    atorvastatin (LIPITOR) 40 MG tablet Take 1 tablet (40 mg total) by mouth every evening. 90 tablet 3    b complex vitamins tablet Take 1 tablet by mouth once daily.      cyanocobalamin (VITAMIN B-12) 1000 MCG tablet Take 100 mcg by mouth once daily.      dulaglutide (TRULICITY) 4.5 mg/0.5 mL pen injector Inject 4.5 mg into the skin every 7 days.       finasteride (PROSCAR) 5 mg  tablet TAKE 1 TABLET ONE TIME DAILY 90 tablet 3    furosemide (LASIX) 40 MG tablet TAKE 1 PILL IN AM, AND 1/2 PILL IN PM 60 tablet 11    glucose 4 GM chewable tablet Take 4 tablets (16 g total) by mouth as needed for Low blood sugar. 100 tablet 5    insulin (LANTUS SOLOSTAR U-100 INSULIN) glargine 100 units/mL SubQ pen Inject into the skin. 15 units QHS and 18 units QAM      insulin aspart U-100 (NOVOLOG) 100 unit/mL injection Inject 3 Units into the skin every 4 (four) hours as needed (for high blood sugar or carbohydrate intake).      insulin glargine-yfgn 100 unit/mL (3 mL) InPn Inject into the skin.      isosorbide mononitrate (IMDUR) 60 MG 24 hr tablet Take 1 tablet (60 mg total) by mouth once daily. 90 tablet 3    losartan (COZAAR) 50 MG tablet Take 1 tablet (50 mg total) by mouth once daily. 90 tablet 3    metoprolol succinate (TOPROL-XL) 25 MG 24 hr tablet Take 1 tablet (25 mg total) by mouth every evening. 90 tablet 3    metoprolol tartrate (LOPRESSOR) 50 MG tablet Take 50 mg by mouth 2 (two) times daily.      multivit-minerals/folic acid (DIABETIC MULTIVITAMIN ORAL) Take by mouth.      nitroGLYCERIN (NITROSTAT) 0.4 MG SL tablet Place 1 tablet (0.4 mg total) under the tongue every 5 (five) minutes as needed for Chest pain (for chest pain/discomfort). Call 911 if chest pain persists after second dose. 25 tablet 11    solifenacin (VESICARE) 5 MG tablet Take 5 mg by mouth once daily.      tamsulosin (FLOMAX) 0.4 mg Cap Take 1 capsule (0.4 mg total) by mouth once daily. 90 capsule 3    vitamin D (VITAMIN D3) 1000 units Tab Take 1 tablet (1,000 Units total) by mouth once daily. 90 tablet 3     No current facility-administered medications for this visit.       Review of patient's allergies indicates:   Allergen Reactions    Pseudoephedrine-guaifenesin Shortness Of Breath    Panmist dm  [pseudoephedrine-dm-guaifenesin]      Other reaction(s): Unknown       Physical Exam  Vitals:    05/30/25 1043   BP: 118/61    Pulse: (!) 45   Resp: 18   Temp: 97.7 °F (36.5 °C)         General: Well-developed, well-nourished in no acute distress  HEENT: Normocephalic, atraumatic, Extraocular movements intact  Neck: supple, trachea midline, no cervical or supraclavicular lymphadenopathy  Respirations: even and unlabored  Extremities: atraumatic, moves all equally, no clubbing, cyanosis or edema  Psych: normal affect  Skin: warm and dry, no lesions  Neuro: Alert and oriented, Cranial nerves II-XII grossly intact    UA: small blood, small LE    Lab Results   Component Value Date    PSA 0.37 12/22/2022    PSA 0.29 09/16/2020    PSA 0.82 06/09/2015         Assessment:   1. Malignant neoplasm of overlapping sites of bladder        2. Hematuria, gross  POCT Urinalysis(Instrument)                  Plan:  Malignant neoplasm of overlapping sites of bladder    Hematuria, gross  -     POCT Urinalysis(Instrument)      Discussed pathology report  Would hold off on further intravesical therapy at this point    Follow up in about 3 months (around 8/30/2025) for Cystoscopy.

## 2025-06-02 ENCOUNTER — TELEPHONE (OUTPATIENT)
Dept: HEMATOLOGY/ONCOLOGY | Facility: CLINIC | Age: 85
End: 2025-06-02
Payer: MEDICARE

## 2025-06-03 ENCOUNTER — TELEPHONE (OUTPATIENT)
Dept: HEMATOLOGY/ONCOLOGY | Facility: CLINIC | Age: 85
End: 2025-06-03
Payer: MEDICARE

## 2025-07-21 ENCOUNTER — HOSPITAL ENCOUNTER (INPATIENT)
Facility: HOSPITAL | Age: 85
LOS: 3 days | Discharge: HOME | DRG: 281 | End: 2025-07-24
Attending: EMERGENCY MEDICINE | Admitting: HOSPITALIST
Payer: MEDICARE

## 2025-07-21 DIAGNOSIS — I21.4 NSTEMI (NON-ST ELEVATED MYOCARDIAL INFARCTION): Primary | ICD-10-CM

## 2025-07-21 DIAGNOSIS — I25.10 CAD, MULTIPLE VESSEL: ICD-10-CM

## 2025-07-21 DIAGNOSIS — I21.4 NON-ST ELEVATION MYOCARDIAL INFARCTION (NSTEMI): ICD-10-CM

## 2025-07-21 DIAGNOSIS — N18.9 CHRONIC KIDNEY DISEASE, UNSPECIFIED CKD STAGE: ICD-10-CM

## 2025-07-21 DIAGNOSIS — R07.9 CHEST PAIN: ICD-10-CM

## 2025-07-21 DIAGNOSIS — Z95.1 HX OF CABG: ICD-10-CM

## 2025-07-21 DIAGNOSIS — I21.4 NSTEMI (NON-ST ELEVATION MYOCARDIAL INFARCTION): ICD-10-CM

## 2025-07-21 LAB
ABORH RETYPE: NORMAL
ABSOLUTE EOSINOPHIL (OHS): 0.05 K/UL
ABSOLUTE MONOCYTE (OHS): 0.92 K/UL (ref 0.3–1)
ABSOLUTE NEUTROPHIL COUNT (OHS): 5.52 K/UL (ref 1.8–7.7)
ALBUMIN SERPL BCP-MCNC: 3.2 G/DL (ref 3.5–5.2)
ALP SERPL-CCNC: 79 UNIT/L (ref 40–150)
ALT SERPL W/O P-5'-P-CCNC: 17 UNIT/L (ref 10–44)
ANION GAP (OHS): 10 MMOL/L (ref 8–16)
APTT PPP: 26.5 SECONDS (ref 21–32)
AST SERPL-CCNC: 23 UNIT/L (ref 11–45)
BASOPHILS # BLD AUTO: 0.01 K/UL
BASOPHILS NFR BLD AUTO: 0.1 %
BILIRUB SERPL-MCNC: 1.2 MG/DL (ref 0.1–1)
BNP SERPL-MCNC: 160 PG/ML (ref 0–99)
BUN SERPL-MCNC: 39 MG/DL (ref 8–23)
CALCIUM SERPL-MCNC: 9.9 MG/DL (ref 8.7–10.5)
CHLORIDE SERPL-SCNC: 106 MMOL/L (ref 95–110)
CO2 SERPL-SCNC: 20 MMOL/L (ref 23–29)
CREAT SERPL-MCNC: 2.1 MG/DL (ref 0.5–1.4)
ERYTHROCYTE [DISTWIDTH] IN BLOOD BY AUTOMATED COUNT: 13.1 % (ref 11.5–14.5)
GFR SERPLBLD CREATININE-BSD FMLA CKD-EPI: 30 ML/MIN/1.73/M2
GLUCOSE SERPL-MCNC: 273 MG/DL (ref 70–110)
HCT VFR BLD AUTO: 39.3 % (ref 40–54)
HGB BLD-MCNC: 12.9 GM/DL (ref 14–18)
IMM GRANULOCYTES # BLD AUTO: 0.03 K/UL (ref 0–0.04)
IMM GRANULOCYTES NFR BLD AUTO: 0.4 % (ref 0–0.5)
INDIRECT COOMBS: NORMAL
INR PPP: 1 (ref 0.8–1.2)
LYMPHOCYTES # BLD AUTO: 1.15 K/UL (ref 1–4.8)
MCH RBC QN AUTO: 29.7 PG (ref 27–31)
MCHC RBC AUTO-ENTMCNC: 32.8 G/DL (ref 32–36)
MCV RBC AUTO: 91 FL (ref 82–98)
NUCLEATED RBC (/100WBC) (OHS): 0 /100 WBC
PLATELET # BLD AUTO: 164 K/UL (ref 150–450)
PMV BLD AUTO: 10.4 FL (ref 9.2–12.9)
POTASSIUM SERPL-SCNC: 3.9 MMOL/L (ref 3.5–5.1)
PROT SERPL-MCNC: 6.6 GM/DL (ref 6–8.4)
PROTHROMBIN TIME: 11.4 SECONDS (ref 9–12.5)
RBC # BLD AUTO: 4.34 M/UL (ref 4.6–6.2)
RELATIVE EOSINOPHIL (OHS): 0.7 %
RELATIVE LYMPHOCYTE (OHS): 15 % (ref 18–48)
RELATIVE MONOCYTE (OHS): 12 % (ref 4–15)
RELATIVE NEUTROPHIL (OHS): 71.8 % (ref 38–73)
RH BLD: NORMAL
SODIUM SERPL-SCNC: 136 MMOL/L (ref 136–145)
SPECIMEN OUTDATE: NORMAL
TROPONIN I SERPL DL<=0.01 NG/ML-MCNC: 0.1 NG/ML
TROPONIN I SERPL DL<=0.01 NG/ML-MCNC: 0.11 NG/ML
WBC # BLD AUTO: 7.68 K/UL (ref 3.9–12.7)

## 2025-07-21 PROCEDURE — 36415 COLL VENOUS BLD VENIPUNCTURE: CPT | Mod: HCNC | Performed by: HOSPITALIST

## 2025-07-21 PROCEDURE — 99285 EMERGENCY DEPT VISIT HI MDM: CPT | Mod: 25,HCNC

## 2025-07-21 PROCEDURE — 93005 ELECTROCARDIOGRAM TRACING: CPT | Mod: HCNC

## 2025-07-21 PROCEDURE — 86901 BLOOD TYPING SEROLOGIC RH(D): CPT | Mod: HCNC | Performed by: HOSPITALIST

## 2025-07-21 PROCEDURE — 85610 PROTHROMBIN TIME: CPT | Mod: HCNC | Performed by: EMERGENCY MEDICINE

## 2025-07-21 PROCEDURE — 63600175 PHARM REV CODE 636 W HCPCS: Mod: HCNC | Performed by: EMERGENCY MEDICINE

## 2025-07-21 PROCEDURE — 85025 COMPLETE CBC W/AUTO DIFF WBC: CPT | Mod: HCNC

## 2025-07-21 PROCEDURE — 85730 THROMBOPLASTIN TIME PARTIAL: CPT | Mod: HCNC | Performed by: EMERGENCY MEDICINE

## 2025-07-21 PROCEDURE — 84484 ASSAY OF TROPONIN QUANT: CPT | Mod: HCNC

## 2025-07-21 PROCEDURE — 80053 COMPREHEN METABOLIC PANEL: CPT | Mod: HCNC

## 2025-07-21 PROCEDURE — 21400001 HC TELEMETRY ROOM: Mod: HCNC

## 2025-07-21 PROCEDURE — 84484 ASSAY OF TROPONIN QUANT: CPT | Mod: HCNC | Performed by: HOSPITALIST

## 2025-07-21 PROCEDURE — 83880 ASSAY OF NATRIURETIC PEPTIDE: CPT | Mod: HCNC

## 2025-07-21 PROCEDURE — 80061 LIPID PANEL: CPT | Mod: HCNC | Performed by: HOSPITALIST

## 2025-07-21 RX ORDER — NITROGLYCERIN 0.4 MG/1
0.4 TABLET SUBLINGUAL EVERY 5 MIN PRN
Status: DISCONTINUED | OUTPATIENT
Start: 2025-07-21 | End: 2025-07-24 | Stop reason: HOSPADM

## 2025-07-21 RX ORDER — ACETAMINOPHEN 325 MG/1
650 TABLET ORAL EVERY 8 HOURS PRN
Status: DISCONTINUED | OUTPATIENT
Start: 2025-07-21 | End: 2025-07-24 | Stop reason: HOSPADM

## 2025-07-21 RX ORDER — POLYETHYLENE GLYCOL 3350 17 G/17G
17 POWDER, FOR SOLUTION ORAL DAILY PRN
Status: DISCONTINUED | OUTPATIENT
Start: 2025-07-21 | End: 2025-07-24 | Stop reason: HOSPADM

## 2025-07-21 RX ORDER — HYDROCODONE BITARTRATE AND ACETAMINOPHEN 5; 325 MG/1; MG/1
1 TABLET ORAL EVERY 6 HOURS PRN
Refills: 0 | Status: DISCONTINUED | OUTPATIENT
Start: 2025-07-21 | End: 2025-07-24 | Stop reason: HOSPADM

## 2025-07-21 RX ORDER — HEPARIN SODIUM,PORCINE/D5W 25000/250
0-40 INTRAVENOUS SOLUTION INTRAVENOUS CONTINUOUS
Status: DISCONTINUED | OUTPATIENT
Start: 2025-07-21 | End: 2025-07-23

## 2025-07-21 RX ORDER — ACETAMINOPHEN 325 MG/1
650 TABLET ORAL EVERY 4 HOURS PRN
Status: DISCONTINUED | OUTPATIENT
Start: 2025-07-21 | End: 2025-07-24 | Stop reason: HOSPADM

## 2025-07-21 RX ORDER — MORPHINE SULFATE 4 MG/ML
2 INJECTION, SOLUTION INTRAMUSCULAR; INTRAVENOUS EVERY 4 HOURS PRN
Status: DISCONTINUED | OUTPATIENT
Start: 2025-07-21 | End: 2025-07-24 | Stop reason: HOSPADM

## 2025-07-21 RX ORDER — ONDANSETRON 8 MG/1
8 TABLET, ORALLY DISINTEGRATING ORAL EVERY 8 HOURS PRN
Status: DISCONTINUED | OUTPATIENT
Start: 2025-07-21 | End: 2025-07-24 | Stop reason: HOSPADM

## 2025-07-21 RX ORDER — NAPROXEN SODIUM 220 MG/1
81 TABLET, FILM COATED ORAL DAILY
Status: DISCONTINUED | OUTPATIENT
Start: 2025-07-22 | End: 2025-07-22

## 2025-07-21 RX ORDER — PROMETHAZINE HYDROCHLORIDE 25 MG/1
25 TABLET ORAL EVERY 6 HOURS PRN
Status: DISCONTINUED | OUTPATIENT
Start: 2025-07-21 | End: 2025-07-24 | Stop reason: HOSPADM

## 2025-07-21 RX ADMIN — HEPARIN SODIUM 12 UNITS/KG/HR: 10000 INJECTION, SOLUTION INTRAVENOUS at 09:07

## 2025-07-21 RX ADMIN — HEPARIN SODIUM 12 UNITS/KG/HR: 10000 INJECTION, SOLUTION INTRAVENOUS at 10:07

## 2025-07-21 NOTE — Clinical Note
75 ml of contrast were injected throughout the case. Render In Strict Bullet Format?: No Detail Level: Zone Initiate Treatment: Elidel 1%\\nDerma smoothe oil as directed prn. Avoid long terms use. Flares only

## 2025-07-21 NOTE — Clinical Note
The catheter was repositioned into the SVG to D1. An angiography was performed of the SVG to D1. Multiple views were taken.

## 2025-07-21 NOTE — Clinical Note
The left ventricle was injected. The injected rate was 4 mL/sec. The total injected volume was 8 mL.

## 2025-07-21 NOTE — ED PROVIDER NOTES
SCRIBE #1 NOTE: I, Katherine Rivas, am scribing for, and in the presence of, Jane Epps DO. I have scribed the entire note.       History     Chief Complaint   Patient presents with    Chest Pain     Per [facility], patient c/o chest pain while driving, rating 10/10, history of cardiac; patient received Nitro x 2 and and  mg prior to arrival to ED, which did relieve chest pain     Review of patient's allergies indicates:   Allergen Reactions    Pseudoephedrine-guaifenesin Shortness Of Breath    Panmist dm  [pseudoephedrine-dm-guaifenesin]      Other reaction(s): Unknown         History of Present Illness     HPI    7/21/2025, 6:28 PM  History obtained from the daughter and patient      History of Present Illness: Nithin Pino is a 85 y.o. male patient with a PMHx of asthma, PTCA, MI, CKD, AP, insomnia, CAD, PVD, DM, Chronic diastolic heart failure, bladder cancer, and iron deficiency anemia who presents to the Emergency Department for evaluation of chest pain which onset while driving to the store around 2:00 pm. Symptoms are constant and moderate in severity. No mitigating or exacerbating factors reported. Per pt's daughter, pt sprayed his home with [employer] home defense insect killer on 7/18/25 and has been sick since. Pt had experiences, SOB, cough, congestion, and facial swelling at that time. Patient denies any SOB, N/V/D, dizziness, diaphoresis, and lightheadedness at this time. Prior Tx includes 4 Nitro and 324 mg ASA before arrival with relief. Pt is not experiencing CP currently.. No further complaints or concerns at this time.       Arrival mode: Ambulance Service    PCP: MUKUL Garg MD        Past Medical History:  Past Medical History:   Diagnosis Date    Abnormal brain MRI 01/21/2018    Chronic microvascular ischemia changes per MRI July 9, 2007 in [facility]    Abnormal ECG 10/31/2013    Abnormal stress test 01/28/2016    Alcohol dependence     States alcohol abuse ended in 1994    AP  (angina pectoris) 01/28/2016    Asthma     Bladder cancer     Carotid artery occlusion     Cataract     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    2 - Normal left ventricular systolic function (EF 60-65%).    3 - Normal left ventricular diastolic function.    4 - Mild left atrial enlargement.    5 - Normal right ventricular systolic function .    6 - Trivial to mild aortic regurgitation.    7 - Trivial to mild mitral regurgitation.  10 - Trivial to mild pulmonic regurgitation.     Hematuria 06/25/2020    History of alcohol abuse 01/22/2018    Patient denies drinking to excess since 1994.    History of atherectomy 01/21/2018 2/2016 [facility]    History of chronic kidney disease 01/22/2018    History of CKD 3    History of PTCA 10/31/2013    History of PTCA 03/09/2016    Hyperlipidemia     Hypertension     Insomnia 06/17/2013    Iron deficiency anemia 10/19/2023    Ischemic cardiomyopathy 10/31/2013    Long term (current) use of antithrombotics/antiplatelets 01/21/2018    Malignant neoplasm of overlapping sites of bladder 06/08/2023    Myocardial infarction     Old MI (myocardial infarction) 1994    Peripheral vascular disease     Polyneuropathy     RBBB 10/31/2013    S/P CABG (coronary artery bypass graft) 10/31/2013    Shortness of breath 10/31/2013    Simple chronic bronchitis 6/17/2013    Tobacco dependence     resolved    Trouble in sleeping     Type 2 diabetes with peripheral circulatory disorder, controlled     Wears glasses        Past Surgical History:  Past Surgical History:    Procedure Laterality Date    APPENDECTOMY      CARDIAC CATHETERIZATION      2016    CARDIAC SURGERY  1994    balloon angioplasty    CATARACT EXTRACTION W/  INTRAOCULAR LENS IMPLANT Right 02/01/2017    CORONARY ARTERY BYPASS GRAFT  05/2011    per patient x4    CYSTOSCOPY W/ RETROGRADES Bilateral 5/15/2025    Procedure: CYSTOSCOPY, WITH RETROGRADE PYELOGRAM;  Surgeon: Hortencia Michael MD;  Location: [facility] OR;  Service: Urology;  Laterality: Bilateral;    HERNIA REPAIR      OPEN REDUCTION AND INTERNAL FIXATION (ORIF) OF INJURY OF HIP      PCIOL Bilateral OD 02/01/17/OS 02/15/17    DR. ALONZO    RETROGRADE PYELOGRAPHY Bilateral 8/29/2024    Procedure: PYELOGRAM, RETROGRADE;  Surgeon: Hortencia Michael MD;  Location: [facility] OR;  Service: Urology;  Laterality: Bilateral;    TURBT (TRANSURETHRAL RESECTION OF BLADDER TUMOR) N/A 6/8/2023    Procedure: TURBT (TRANSURETHRAL RESECTION OF BLADDER TUMOR);  Surgeon: Hortencia Michael MD;  Location: [facility] OR;  Service: Urology;  Laterality: N/A;    TURBT (TRANSURETHRAL RESECTION OF BLADDER TUMOR) N/A 2/8/2024    Procedure: TURBT (TRANSURETHRAL RESECTION OF BLADDER TUMOR);  Surgeon: Hortencia Michael MD;  Location: [facility] OR;  Service: Urology;  Laterality: N/A;    TURBT (TRANSURETHRAL RESECTION OF BLADDER TUMOR) N/A 8/29/2024    Procedure: TURBT (TRANSURETHRAL RESECTION OF BLADDER TUMOR);  Surgeon: Hortencia Michael MD;  Location: [facility] OR;  Service: Urology;  Laterality: N/A;    TURBT (TRANSURETHRAL RESECTION OF BLADDER TUMOR) N/A 5/15/2025    Procedure: TURBT (TRANSURETHRAL RESECTION OF BLADDER TUMOR);  Surgeon: Hortencia Michael MD;  Location: [facility] OR;  Service: Urology;  Laterality: N/A;         Family History:  Family History   Adopted: Yes   Problem Relation Name Age of Onset    Diabetes Brother      Diabetes Sister      Cancer Neg Hx      Heart disease Neg Hx      Kidney disease Neg Hx         Social History:  Social History     Tobacco Use    Smoking status: Former      Current packs/day: 0.00     Average packs/day: 1.5 packs/day for 40.0 years (60.0 ttl pk-yrs)     Types: Cigarettes     Start date: 1954     Quit date: 1994     Years since quittin.5    Smokeless tobacco: Former     Quit date: 2011    Tobacco comments:     approx date   Substance and Sexual Activity    Alcohol use: Not Currently     Comment: occasionally; 1-2 cans of beer a month    Drug use: No    Sexual activity: Never     Birth control/protection: None        Review of Systems     Review of Systems   Constitutional:  Negative for diaphoresis.   HENT:  Positive for congestion and facial swelling.    Respiratory:  Positive for cough. Negative for shortness of breath.    Gastrointestinal:  Negative for diarrhea, nausea and vomiting.   Neurological:  Negative for dizziness and light-headedness.        Physical Exam     Initial Vitals [25 1603]   BP Pulse Resp Temp SpO2   100/60 105 18 97.9 °F (36.6 °C) 98 %      MAP       --          Physical Exam  Nursing Notes and Vital Signs Reviewed.  Constitutional: Patient is in no acute distress. Well-developed and well-nourished.  Head: Atraumatic. Normocephalic.  Eyes: PERRL. EOM intact. Conjunctivae are not pale. No scleral icterus.  ENT: Mucous membranes are moist. Oropharynx is clear and symmetric  Neck: Supple. Full ROM. No JVD.  Cardiovascular: tachycardic. Regular rhythm.   Pulmonary/Chest: No respiratory distress. Clear to auscultation bilaterally. No wheezing or rales.  Abdominal: Soft and non-distended.  There is no tenderness.  No rebound, guarding, or rigidity.   Musculoskeletal: Moves all extremities. No obvious deformities. No edema. No calf tenderness  Skin: Warm and dry.  Neurological:  Alert, awake, and appropriate.  Normal speech.  No acute focal neurological deficits are appreciated.  Psychiatric: Normal affect. Good eye contact. Appropriate in content.     ED Course   Critical Care    Date/Time: 2025 8:11 PM    Performed by:  "Jane Epps DO  Authorized by: Jane Epps DO  Direct patient critical care time: 15 minutes  Additional history critical care time: 5 minutes  Ordering / reviewing critical care time: 10 minutes  Documentation critical care time: 10 minutes  Consulting other physicians critical care time: 5 minutes  Total critical care time (exclusive of procedural time) : 45 minutes  Critical care time was exclusive of separately billable procedures and treating other patients and teaching time.  Critical care was necessary to treat or prevent imminent or life-threatening deterioration of the following conditions: cardiac failure.  Critical care was time spent personally by me on the following activities: development of treatment plan with patient or surrogate, review of old charts, re-evaluation of patient's condition, pulse oximetry, ordering and review of radiographic studies, ordering and review of laboratory studies, ordering and performing treatments and interventions, obtaining history from patient or surrogate, examination of patient, evaluation of patient's response to treatment, interpretation of cardiac output measurements and discussions with consultants.        ED Vital Signs:  Vitals:    07/21/25 1603   BP: 100/60   Pulse: 105   Resp: 18   Temp: 97.9 °F (36.6 °C)   TempSrc: Oral   SpO2: 98%   Weight: 83 kg (183 lb)   Height: 5' 10" (1.778 m)       Abnormal Lab Results:  Labs Reviewed   COMPREHENSIVE METABOLIC PANEL - Abnormal       Result Value    Sodium 136      Potassium 3.9      Chloride 106      CO2 20 (*)     Glucose 273 (*)     BUN 39 (*)     Creatinine 2.1 (*)     Calcium 9.9      Protein Total 6.6      Albumin 3.2 (*)     Bilirubin Total 1.2 (*)     ALP 79      AST 23      ALT 17      Anion Gap 10      eGFR 30 (*)    TROPONIN I - Abnormal    Troponin-I 0.109 (*)    TROPONIN I - Abnormal    Troponin-I 0.101 (*)    B-TYPE NATRIURETIC PEPTIDE - Abnormal     (*)    CBC WITH DIFFERENTIAL - " Abnormal    WBC 7.68      RBC 4.34 (*)     HGB 12.9 (*)     HCT 39.3 (*)     MCV 91      MCH 29.7      MCHC 32.8      RDW 13.1      Platelet Count 164      MPV 10.4      Nucleated RBC 0      Neut % 71.8      Lymph % 15.0 (*)     Mono % 12.0      Eos % 0.7      Basophil % 0.1      Imm Grans % 0.4      Neut # 5.52      Lymph # 1.15      Mono # 0.92      Eos # 0.05      Baso # 0.01      Imm Grans # 0.03     APTT - Normal    PTT 26.5     PROTIME-INR - Normal    PT 11.4      INR 1.0     CBC W/ AUTO DIFFERENTIAL    Narrative:     The following orders were created for panel order CBC auto differential.  Procedure                               Abnormality         Status                     ---------                               -----------         ------                     CBC with Differential[[id number]]       Abnormal            Final result                 Please view results for these tests on the individual orders.   LIPID PANEL   HEMOGLOBIN A1C   TYPE & SCREEN        All Lab Results:  Results for orders placed or performed during the hospital encounter of 07/21/25   Comprehensive metabolic panel    Collection Time: 07/21/25  5:08 PM   Result Value Ref Range    Sodium 136 136 - 145 mmol/L    Potassium 3.9 3.5 - 5.1 mmol/L    Chloride 106 95 - 110 mmol/L    CO2 20 (L) 23 - 29 mmol/L    Glucose 273 (H) 70 - 110 mg/dL    BUN 39 (H) 8 - 23 mg/dL    Creatinine 2.1 (H) 0.5 - 1.4 mg/dL    Calcium 9.9 8.7 - 10.5 mg/dL    Protein Total 6.6 6.0 - 8.4 gm/dL    Albumin 3.2 (L) 3.5 - 5.2 g/dL    Bilirubin Total 1.2 (H) 0.1 - 1.0 mg/dL    ALP 79 40 - 150 unit/L    AST 23 11 - 45 unit/L    ALT 17 10 - 44 unit/L    Anion Gap 10 8 - 16 mmol/L    eGFR 30 (L) >60 mL/min/1.73/m2   Troponin I #1    Collection Time: 07/21/25  5:08 PM   Result Value Ref Range    Troponin-I 0.109 (H) <=0.026 ng/mL   BNP    Collection Time: 07/21/25  5:08 PM   Result Value Ref Range     (H) 0 - 99 pg/mL   CBC with Differential    Collection Time:  07/21/25  5:08 PM   Result Value Ref Range    WBC 7.68 3.90 - 12.70 K/uL    RBC 4.34 (L) 4.60 - 6.20 M/uL    HGB 12.9 (L) 14.0 - 18.0 gm/dL    HCT 39.3 (L) 40.0 - 54.0 %    MCV 91 82 - 98 fL    MCH 29.7 27.0 - 31.0 pg    MCHC 32.8 32.0 - 36.0 g/dL    RDW 13.1 11.5 - 14.5 %    Platelet Count 164 150 - 450 K/uL    MPV 10.4 9.2 - 12.9 fL    Nucleated RBC 0 <=0 /100 WBC    Neut % 71.8 38 - 73 %    Lymph % 15.0 (L) 18 - 48 %    Mono % 12.0 4 - 15 %    Eos % 0.7 <=8 %    Basophil % 0.1 <=1.9 %    Imm Grans % 0.4 0.0 - 0.5 %    Neut # 5.52 1.8 - 7.7 K/uL    Lymph # 1.15 1 - 4.8 K/uL    Mono # 0.92 0.3 - 1 K/uL    Eos # 0.05 <=0.5 K/uL    Baso # 0.01 <=0.2 K/uL    Imm Grans # 0.03 0.00 - 0.04 K/uL   Troponin I #2    Collection Time: 07/21/25  7:36 PM   Result Value Ref Range    Troponin-I 0.101 (H) <=0.026 ng/mL   APTT    Collection Time: 07/21/25  8:34 PM   Result Value Ref Range    PTT 26.5 21.0 - 32.0 seconds   Protime-INR    Collection Time: 07/21/25  8:34 PM   Result Value Ref Range    PT 11.4 9.0 - 12.5 seconds    INR 1.0 0.8 - 1.2     *Note: Due to a large number of results and/or encounters for the requested time period, some results have not been displayed. A complete set of results can be found in Results Review.         Imaging Results:  Imaging Results              X-Ray Chest AP Portable (Final result)  Result time 07/21/25 18:08:52      Final result by Javy Ross MD (07/21/25 18:08:52)                   Impression:     No acute cardiopulmonary abnormality.    Finalized on: 7/21/2025 6:08 PM By:  Javy Ross MD  [facility]# [id number]      2025-07-21 18:11:02.129     [facility]               Narrative:    EXAM:  XR CHEST AP PORTABLE    CLINICAL HISTORY: Chest pain    COMPARISON: 02/17/2025    FINDINGS: No confluent airspace opacity or consolidation.  There is no evidence of pleural effusion, pneumothorax, or other acute pulmonary disease.  The cardiomediastinal silhouette is within normal limits.  No acute  osseous abnormality is evident.  Sternotomy wires are intact.  Moderate scattered degenerative change and atherosclerotic disease.                                           The EKG was ordered, reviewed, and independently interpreted by the ED provider.  Interpretation time: 4:16 PM  Rate: 97 BPM  Rhythm: sinus rhythm with occasional premature ventricular complexes  Interpretation: right bundle branch lisa. T WA, consider inferior ischemia. Anteroseptal infarct, age undetermined. No STEMI.           The Emergency Provider reviewed the vital signs and test results, which are outlined above.     ED Discussion     8:16 PM: Discussed pt's case with Dr. Kingston (cardiology) who recommends to admit pt for ACS and to start heparin drip, continue home meds, and keep NPO past midnight. He will discuss ischemic eval based on labs/symptoms and will see as consult.    8:25 PM: Discussed case with Dr. Luis MD ([facility]). Dr. Smith agrees with current care and management of pt and accepts admission.   Admitting Service: [facility]  Admitting Physician: Dr. Smith  Admit to: Inpatient med/tele    8:25 PM: Re-evaluated pt.  I have discussed test results, shared treatment plan, and the need for admission with patient/family/caretaker at bedside. Pt and/or family/caretaker express understanding at this time and agree with all information. All questions answered. Pt/caretaker/family member(s) have no further questions or concerns at this time. Pt is ready for admit.        ED Course as of 07/21/25 2113 Mon Jul 21, 2025 1747 Nov 2023 stress test  ·  Abnormal myocardial perfusion scan.  ·  There is a moderate to severe intensity, fixed perfusion abnormality consistent with scar in the anterior, inferior and anteroapical wall(s).  ·  No reversible defects noted to suggest coronary ischemia.  ·  The gated perfusion images showed an ejection fraction of 65% at rest. The gated perfusion images showed an ejection  fraction of 46% post stress.  ·  The ECG portion of the study is negative for ischemia.  ·  The patient reported no chest pain during the stress test.  ·  During stress, occasional PVCs are noted.   [NF]   1747 Oct 2023 echo  ·  Left Ventricle: The left ventricle is normal in size. Normal wall thickness. regional wall motion abnormalities present. There is low normal systolic function with a visually estimated ejection fraction of 50 - 55%.  ·  Left Atrium: Left atrium is severely dilated.  ·  Right Ventricle: Normal right ventricular cavity size. Wall thickness is normal. Right ventricle wall motion  is normal. Systolic function is normal.  ·  Right Atrium: Right atrium is dilated.  ·  Mitral Valve: There is mild regurgitation.  ·  Pulmonic Valve: There is mild regurgitation.  ·  Pulmonary Artery: The estimated pulmonary artery systolic pressure is 24 mmHg.  ·  IVC/SVC: Normal venous pressure at 3 mmHg.   [NF]   1750 Troponin I(!): 0.109 [NF]   1750 BUN(!): 39 [NF]   1750 Creatinine(!): 2.1 [NF]   1757 EKG reviewed.  Sinus rhythm with the occasional PVCs.  Rate 97.  Right bundle-branch block, .  .  T-wave inversions in the inferior leads.  Q-waves in the anteroseptal leads.  No ST-elevation. [NF]   2009 85-year-old male with a history of coronary artery disease, hyperlipidemia, CKD, hypertension, diabetes, and iron-deficiency anemia presents with nonexertional chest pain that started today at 2:00 p.m..  Relieved after receiving nitro x4.  EKG shows normal sinus with PVCs, right bundle-branch block,  T-wave inversions in the inferior leads and Q-waves in the anteroseptal leads with no ST elevation.  Troponin elevated but flat.  CMP shows slightly worsened CKD.  No significant anemia.  Chest x-ray is unremarkable.  Cardiologist Dr. Sadler. [NF]      ED Course User Index  [NF] Jane Epps, DO     Medical Decision Making  Ddx: Heart attack, angina, pneumonia, pneumonitis, pneumothorax, pleural  effusion, GERD, gastritis, musculoskeletal chest pain, costochondritis       Amount and/or Complexity of Data Reviewed  Labs: ordered. Decision-making details documented in ED Course.  Radiology: ordered. Decision-making details documented in ED Course.  ECG/medicine tests: ordered and independent interpretation performed. Decision-making details documented in ED Course.    Risk  Prescription drug management.  Decision regarding hospitalization.    Critical Care  Total time providing critical care: 45 minutes                ED Medication(s):  Medications   heparin 25,000 units in dextrose 5% (100 units/ml) IV bolus from bag LOW INTENSITY nomogram - [facility] (has no administration in time range)   heparin 25,000 units in dextrose 5% 250 mL (100 units/mL) infusion LOW INTENSITY nomogram - [facility] (has no administration in time range)   heparin 25,000 units in dextrose 5% (100 units/ml) IV bolus from bag LOW INTENSITY nomogram - [facility] (has no administration in time range)   heparin 25,000 units in dextrose 5% (100 units/ml) IV bolus from bag LOW INTENSITY nomogram - [facility] (has no administration in time range)   aspirin chewable tablet 81 mg (has no administration in time range)   nitroGLYCERIN SL tablet 0.4 mg (has no administration in time range)   ondansetron disintegrating tablet 8 mg (has no administration in time range)   promethazine tablet 25 mg (has no administration in time range)   polyethylene glycol packet 17 g (has no administration in time range)   acetaminophen tablet 650 mg (has no administration in time range)   acetaminophen tablet 650 mg (has no administration in time range)   HYDROcodone-acetaminophen 5-325 mg per tablet 1 tablet (has no administration in time range)   morphine injection 2 mg (has no administration in time range)       New Prescriptions    No medications on file               Scribe Attestation:   Scribe #1: I performed the above scribed service and the documentation accurately describes the  services I performed. I attest to the accuracy of the note.     Attending:   Physician Attestation Statement for Scribe #1: I, Jane Epps DO, personally performed the services described in this documentation, as scribed by Katherine Rivas, in my presence, and it is both accurate and complete.           Clinical Impression       ICD-10-CM ICD-9-CM   1. NSTEMI (non-ST elevated myocardial infarction)  I21.4 410.70   2. Chest pain  R07.9 786.50   3. Non-ST elevation myocardial infarction (NSTEMI)  I21.4 410.70   4. NSTEMI (non-ST elevation myocardial infarction)  I21.4 410.70   5. Chronic kidney disease, unspecified CKD stage  N18.9 585.9       Disposition:   Disposition: Admitted  Condition: Stable         Jane Epps DO  07/21/25 2309

## 2025-07-21 NOTE — FIRST PROVIDER EVALUATION
"Medical screening examination initiated.  I have conducted a focused provider triage encounter, findings are as follows:    Brief history of present illness:  Patient with a previous medical history of diabetes, CABG, RBBB, polyneuropathy, peripheral vascular disease, MI, bladder cancer, long-term use of anti thrombolytics, iron-deficiency anemia, hypertension, hyperlipidemia, chronic kidney disease, heart failure, currently presents to the emergency department for chest pain that he describes as pressure that started while he was driving.  Patient reports he took a sublingual nitro with 6/10 pain afterwards.  Patient called 911 and was given 2 sprays sublingual, 500 L of normal saline, and a full dose of aspirin.  Patient reports complete resolve in chest pain on arrival to emergency department.    Vitals:    07/21/25 1603   BP: 100/60   Pulse: 105   Resp: 18   Temp: 97.9 °F (36.6 °C)   TempSrc: Oral   SpO2: 98%   Weight: 83 kg (183 lb)   Height: 5' 10" (1.778 m)       Pertinent physical exam:  Normotensive, afebrile, GCS of 15, respirations equal and unlabored, wheezing to right upper lobe noted.     Brief workup plan:  EKG in progress, cardiac monitoring, cardiac workup    Preliminary workup initiated; this workup will be continued and followed by the physician or advanced practice provider that is assigned to the patient when roomed.  "

## 2025-07-21 NOTE — Clinical Note
The catheter was repositioned into the SVG to RAMUS. An angiography was performed of the SVG to RAMUS.

## 2025-07-22 PROBLEM — N17.9 ACUTE KIDNEY INJURY SUPERIMPOSED ON CHRONIC KIDNEY DISEASE: Status: ACTIVE | Noted: 2025-07-22

## 2025-07-22 PROBLEM — N18.9 ACUTE KIDNEY INJURY SUPERIMPOSED ON CHRONIC KIDNEY DISEASE: Status: ACTIVE | Noted: 2025-07-22

## 2025-07-22 LAB
ABSOLUTE EOSINOPHIL (OHS): 0.24 K/UL
ABSOLUTE MONOCYTE (OHS): 0.73 K/UL (ref 0.3–1)
ABSOLUTE NEUTROPHIL COUNT (OHS): 3.18 K/UL (ref 1.8–7.7)
ANION GAP (OHS): 12 MMOL/L (ref 8–16)
AORTIC ROOT ANNULUS: 3.7 CM
AORTIC SIZE INDEX: 1.8 CM/M2
APTT PPP: 130.7 SECONDS (ref 21–32)
APTT PPP: 31 SECONDS (ref 21–32)
APTT PPP: 49.4 SECONDS (ref 21–32)
APTT PPP: 75.2 SECONDS (ref 21–32)
ASCENDING AORTA: 3.6 CM
AV INDEX (PROSTH): 0.67
AV MEAN GRADIENT: 4 MMHG
AV PEAK GRADIENT: 7 MMHG
AV VALVE AREA BY VELOCITY RATIO: 2.6 CM²
AV VALVE AREA: 2.8 CM²
AV VELOCITY RATIO: 0.62
BASOPHILS # BLD AUTO: 0.02 K/UL
BASOPHILS NFR BLD AUTO: 0.3 %
BSA FOR ECHO PROCEDURE: 2.02 M2
BUN SERPL-MCNC: 34 MG/DL (ref 8–23)
CALCIUM SERPL-MCNC: 10.6 MG/DL (ref 8.7–10.5)
CATH EF QUANTITATIVE: 40 %
CHLORIDE SERPL-SCNC: 107 MMOL/L (ref 95–110)
CHOLEST SERPL-MCNC: 93 MG/DL (ref 120–199)
CHOLEST/HDLC SERPL: 2.4 {RATIO} (ref 2–5)
CO2 SERPL-SCNC: 21 MMOL/L (ref 23–29)
CREAT SERPL-MCNC: 1.6 MG/DL (ref 0.5–1.4)
CV ECHO LV RWT: 0.46 CM
DOP CALC AO PEAK VEL: 1.3 M/S
DOP CALC AO VTI: 16.5 CM
DOP CALC LVOT AREA: 4.2 CM2
DOP CALC LVOT DIAMETER: 2.3 CM
DOP CALC LVOT PEAK VEL: 0.8 M/S
DOP CALC LVOT STROKE VOLUME: 46.1 CM3
DOP CALC RVOT PEAK VEL: 0.77 M/S
DOP CALC RVOT VTI: 10.8 CM
DOP CALCLVOT PEAK VEL VTI: 11.1 CM
E WAVE DECELERATION TIME: 162 MSEC
E/A RATIO: 0.59
E/E' RATIO: 4 M/S
EAG (OHS): 180 MG/DL (ref 68–131)
ECHO LV POSTERIOR WALL: 1.2 CM (ref 0.6–1.1)
ERYTHROCYTE [DISTWIDTH] IN BLOOD BY AUTOMATED COUNT: 13 % (ref 11.5–14.5)
FRACTIONAL SHORTENING: 21.2 % (ref 28–44)
GFR SERPLBLD CREATININE-BSD FMLA CKD-EPI: 42 ML/MIN/1.73/M2
GLUCOSE SERPL-MCNC: 130 MG/DL (ref 70–110)
HBA1C MFR BLD: 7.9 % (ref 4–5.6)
HCT VFR BLD AUTO: 40.2 % (ref 40–54)
HDLC SERPL-MCNC: 38 MG/DL (ref 40–75)
HDLC SERPL: 40.9 % (ref 20–50)
HGB BLD-MCNC: 13.4 GM/DL (ref 14–18)
IMM GRANULOCYTES # BLD AUTO: 0.01 K/UL (ref 0–0.04)
IMM GRANULOCYTES NFR BLD AUTO: 0.2 % (ref 0–0.5)
INTERVENTRICULAR SEPTUM: 1.4 CM (ref 0.6–1.1)
IVRT: 66 MSEC
LA MAJOR: 4.8 CM
LA MINOR: 5.3 CM
LA WIDTH: 3.7 CM
LDLC SERPL CALC-MCNC: 41.6 MG/DL (ref 63–159)
LEFT ATRIUM SIZE: 4 CM
LEFT ATRIUM VOLUME INDEX: 32 ML/M2
LEFT ATRIUM VOLUME: 63 CM3
LEFT INTERNAL DIMENSION IN SYSTOLE: 4.1 CM (ref 2.1–4)
LEFT VENTRICLE DIASTOLIC VOLUME INDEX: 64.18 ML/M2
LEFT VENTRICLE DIASTOLIC VOLUME: 129 ML
LEFT VENTRICLE MASS INDEX: 138.5 G/M2
LEFT VENTRICLE SYSTOLIC VOLUME INDEX: 35.8 ML/M2
LEFT VENTRICLE SYSTOLIC VOLUME: 72 ML
LEFT VENTRICULAR INTERNAL DIMENSION IN DIASTOLE: 5.2 CM (ref 3.5–6)
LEFT VENTRICULAR MASS: 278.4 G
LV LATERAL E/E' RATIO: 4.4 M/S
LV SEPTAL E/E' RATIO: 4.4 M/S
LVED V (TEICH): 128.6 ML
LVES V (TEICH): 71.93 ML
LVOT MG: 1.7 MMHG
LVOT MV: 0.63 CM/S
LYMPHOCYTES # BLD AUTO: 1.95 K/UL (ref 1–4.8)
Lab: 2 CM/M
MCH RBC QN AUTO: 30.4 PG (ref 27–31)
MCHC RBC AUTO-ENTMCNC: 33.3 G/DL (ref 32–36)
MCV RBC AUTO: 91 FL (ref 82–98)
MV PEAK A VEL: 0.74 M/S
MV PEAK E VEL: 0.44 M/S
MV STENOSIS PRESSURE HALF TIME: 46.99 MS
MV VALVE AREA P 1/2 METHOD: 4.68 CM2
NONHDLC SERPL-MCNC: 55 MG/DL
NUCLEATED RBC (/100WBC) (OHS): 0 /100 WBC
OHS CV CPX PATIENT HEIGHT IN: 70
OHS CV CPX PATIENT HEIGHT IN: 70
OHS QRS DURATION: 144 MS
OHS QTC CALCULATION: 505 MS
PISA MRMAX VEL: 3.37 M/S
PISA TR MAX VEL: 2.3 M/S
PLATELET # BLD AUTO: 167 K/UL (ref 150–450)
PMV BLD AUTO: 10.5 FL (ref 9.2–12.9)
POCT GLUCOSE: 123 MG/DL (ref 70–110)
POCT GLUCOSE: 70 MG/DL (ref 70–110)
POCT GLUCOSE: 88 MG/DL (ref 70–110)
POCT GLUCOSE: 92 MG/DL (ref 70–110)
POTASSIUM SERPL-SCNC: 4.4 MMOL/L (ref 3.5–5.1)
PV MEAN GRADIENT: 2 MMHG
RA MAJOR: 4.22 CM
RA PRESSURE ESTIMATED: 3 MMHG
RA WIDTH: 2.5 CM
RBC # BLD AUTO: 4.41 M/UL (ref 4.6–6.2)
RELATIVE EOSINOPHIL (OHS): 3.9 %
RELATIVE LYMPHOCYTE (OHS): 31.8 % (ref 18–48)
RELATIVE MONOCYTE (OHS): 11.9 % (ref 4–15)
RELATIVE NEUTROPHIL (OHS): 51.9 % (ref 38–73)
RV TB RVSP: 5 MMHG
SODIUM SERPL-SCNC: 140 MMOL/L (ref 136–145)
STJ: 3.7 CM
TDI LATERAL: 0.1 M/S
TDI SEPTAL: 0.1 M/S
TDI: 0.1 M/S
TR MAX PG: 20 MMHG
TRICUSPID ANNULAR PLANE SYSTOLIC EXCURSION: 1.3 CM
TRIGL SERPL-MCNC: 67 MG/DL (ref 30–150)
TROPONIN I SERPL DL<=0.01 NG/ML-MCNC: 0.1 NG/ML
TROPONIN I SERPL DL<=0.01 NG/ML-MCNC: 0.11 NG/ML
TROPONIN I SERPL DL<=0.01 NG/ML-MCNC: 0.12 NG/ML
TROPONIN I SERPL DL<=0.01 NG/ML-MCNC: 0.12 NG/ML
TV REST PULMONARY ARTERY PRESSURE: 24 MMHG
WBC # BLD AUTO: 6.13 K/UL (ref 3.9–12.7)
Z-SCORE OF LEFT VENTRICULAR DIMENSION IN END DIASTOLE: -1.25
Z-SCORE OF LEFT VENTRICULAR DIMENSION IN END SYSTOLE: 1

## 2025-07-22 PROCEDURE — 99152 MOD SED SAME PHYS/QHP 5/>YRS: CPT | Mod: HCNC | Performed by: INTERNAL MEDICINE

## 2025-07-22 PROCEDURE — 85730 THROMBOPLASTIN TIME PARTIAL: CPT | Mod: HCNC | Performed by: HOSPITALIST

## 2025-07-22 PROCEDURE — 21400001 HC TELEMETRY ROOM: Mod: HCNC

## 2025-07-22 PROCEDURE — 99152 MOD SED SAME PHYS/QHP 5/>YRS: CPT | Mod: HCNC,,, | Performed by: INTERNAL MEDICINE

## 2025-07-22 PROCEDURE — 25000003 PHARM REV CODE 250: Mod: HCNC | Performed by: NURSE PRACTITIONER

## 2025-07-22 PROCEDURE — 85730 THROMBOPLASTIN TIME PARTIAL: CPT | Mod: HCNC | Performed by: EMERGENCY MEDICINE

## 2025-07-22 PROCEDURE — 93005 ELECTROCARDIOGRAM TRACING: CPT | Mod: HCNC

## 2025-07-22 PROCEDURE — 84484 ASSAY OF TROPONIN QUANT: CPT | Mod: HCNC | Performed by: HOSPITALIST

## 2025-07-22 PROCEDURE — 93459 L HRT ART/GRFT ANGIO: CPT | Mod: 26,HCNC,, | Performed by: INTERNAL MEDICINE

## 2025-07-22 PROCEDURE — 25500020 PHARM REV CODE 255: Mod: HCNC | Performed by: INTERNAL MEDICINE

## 2025-07-22 PROCEDURE — 25000003 PHARM REV CODE 250: Mod: HCNC | Performed by: HOSPITALIST

## 2025-07-22 PROCEDURE — 85025 COMPLETE CBC W/AUTO DIFF WBC: CPT | Mod: HCNC | Performed by: EMERGENCY MEDICINE

## 2025-07-22 PROCEDURE — 63600175 PHARM REV CODE 636 W HCPCS: Mod: HCNC | Performed by: HOSPITALIST

## 2025-07-22 PROCEDURE — 99233 SBSQ HOSP IP/OBS HIGH 50: CPT | Mod: HCNC,25,, | Performed by: INTERNAL MEDICINE

## 2025-07-22 PROCEDURE — 25000003 PHARM REV CODE 250: Mod: HCNC | Performed by: INTERNAL MEDICINE

## 2025-07-22 PROCEDURE — 93459 L HRT ART/GRFT ANGIO: CPT | Mod: HCNC | Performed by: INTERNAL MEDICINE

## 2025-07-22 PROCEDURE — 82374 ASSAY BLOOD CARBON DIOXIDE: CPT | Mod: HCNC | Performed by: NURSE PRACTITIONER

## 2025-07-22 PROCEDURE — B2111ZZ FLUOROSCOPY OF MULTIPLE CORONARY ARTERIES USING LOW OSMOLAR CONTRAST: ICD-10-PCS | Performed by: INTERNAL MEDICINE

## 2025-07-22 PROCEDURE — B2151ZZ FLUOROSCOPY OF LEFT HEART USING LOW OSMOLAR CONTRAST: ICD-10-PCS | Performed by: INTERNAL MEDICINE

## 2025-07-22 PROCEDURE — C1894 INTRO/SHEATH, NON-LASER: HCPCS | Mod: HCNC | Performed by: INTERNAL MEDICINE

## 2025-07-22 PROCEDURE — 25000003 PHARM REV CODE 250: Mod: HCNC | Performed by: PHYSICIAN ASSISTANT

## 2025-07-22 PROCEDURE — 99153 MOD SED SAME PHYS/QHP EA: CPT | Mod: HCNC | Performed by: INTERNAL MEDICINE

## 2025-07-22 PROCEDURE — 25000242 PHARM REV CODE 250 ALT 637 W/ HCPCS: Mod: HCNC | Performed by: HOSPITALIST

## 2025-07-22 PROCEDURE — 84484 ASSAY OF TROPONIN QUANT: CPT | Mod: HCNC | Performed by: INTERNAL MEDICINE

## 2025-07-22 PROCEDURE — 93010 ELECTROCARDIOGRAM REPORT: CPT | Mod: HCNC,,, | Performed by: INTERNAL MEDICINE

## 2025-07-22 PROCEDURE — C1769 GUIDE WIRE: HCPCS | Mod: HCNC | Performed by: INTERNAL MEDICINE

## 2025-07-22 PROCEDURE — 36415 COLL VENOUS BLD VENIPUNCTURE: CPT | Mod: HCNC | Performed by: HOSPITALIST

## 2025-07-22 PROCEDURE — 83036 HEMOGLOBIN GLYCOSYLATED A1C: CPT | Mod: HCNC | Performed by: HOSPITALIST

## 2025-07-22 PROCEDURE — 63600175 PHARM REV CODE 636 W HCPCS: Mod: HCNC | Performed by: INTERNAL MEDICINE

## 2025-07-22 PROCEDURE — 4A023N7 MEASUREMENT OF CARDIAC SAMPLING AND PRESSURE, LEFT HEART, PERCUTANEOUS APPROACH: ICD-10-PCS | Performed by: INTERNAL MEDICINE

## 2025-07-22 RX ORDER — FENTANYL CITRATE 50 UG/ML
INJECTION, SOLUTION INTRAMUSCULAR; INTRAVENOUS
Status: DISCONTINUED | OUTPATIENT
Start: 2025-07-22 | End: 2025-07-22 | Stop reason: HOSPADM

## 2025-07-22 RX ORDER — MIDAZOLAM HYDROCHLORIDE 1 MG/ML
INJECTION INTRAMUSCULAR; INTRAVENOUS
Status: DISCONTINUED | OUTPATIENT
Start: 2025-07-22 | End: 2025-07-22 | Stop reason: HOSPADM

## 2025-07-22 RX ORDER — INSULIN GLARGINE 100 [IU]/ML
10 INJECTION, SOLUTION SUBCUTANEOUS 2 TIMES DAILY
Status: DISCONTINUED | OUTPATIENT
Start: 2025-07-22 | End: 2025-07-23

## 2025-07-22 RX ORDER — GLUCAGON 1 MG
1 KIT INJECTION
Status: DISCONTINUED | OUTPATIENT
Start: 2025-07-22 | End: 2025-07-24 | Stop reason: HOSPADM

## 2025-07-22 RX ORDER — AMLODIPINE BESYLATE 10 MG/1
10 TABLET ORAL DAILY
Status: DISCONTINUED | OUTPATIENT
Start: 2025-07-22 | End: 2025-07-22

## 2025-07-22 RX ORDER — IBUPROFEN 200 MG
16 TABLET ORAL
Status: DISCONTINUED | OUTPATIENT
Start: 2025-07-22 | End: 2025-07-24 | Stop reason: HOSPADM

## 2025-07-22 RX ORDER — DIPHENHYDRAMINE HYDROCHLORIDE 50 MG/ML
INJECTION, SOLUTION INTRAMUSCULAR; INTRAVENOUS
Status: DISCONTINUED | OUTPATIENT
Start: 2025-07-22 | End: 2025-07-22 | Stop reason: HOSPADM

## 2025-07-22 RX ORDER — IBUPROFEN 200 MG
24 TABLET ORAL
Status: DISCONTINUED | OUTPATIENT
Start: 2025-07-22 | End: 2025-07-24 | Stop reason: HOSPADM

## 2025-07-22 RX ORDER — SODIUM CHLORIDE 9 MG/ML
INJECTION, SOLUTION INTRAVENOUS CONTINUOUS
Status: ACTIVE | OUTPATIENT
Start: 2025-07-22 | End: 2025-07-23

## 2025-07-22 RX ORDER — INSULIN ASPART 100 [IU]/ML
0-5 INJECTION, SOLUTION INTRAVENOUS; SUBCUTANEOUS
Status: DISCONTINUED | OUTPATIENT
Start: 2025-07-22 | End: 2025-07-24 | Stop reason: HOSPADM

## 2025-07-22 RX ORDER — HEPARIN SODIUM 1000 [USP'U]/ML
INJECTION, SOLUTION INTRAVENOUS; SUBCUTANEOUS
Status: DISCONTINUED | OUTPATIENT
Start: 2025-07-22 | End: 2025-07-22 | Stop reason: HOSPADM

## 2025-07-22 RX ORDER — NITROGLYCERIN 5 MG/ML
INJECTION, SOLUTION INTRAVENOUS
Status: DISCONTINUED | OUTPATIENT
Start: 2025-07-22 | End: 2025-07-22 | Stop reason: HOSPADM

## 2025-07-22 RX ORDER — LIDOCAINE HYDROCHLORIDE 20 MG/ML
INJECTION, SOLUTION EPIDURAL; INFILTRATION; INTRACAUDAL; PERINEURAL
Status: DISCONTINUED | OUTPATIENT
Start: 2025-07-22 | End: 2025-07-22 | Stop reason: HOSPADM

## 2025-07-22 RX ORDER — METOPROLOL SUCCINATE 25 MG/1
25 TABLET, EXTENDED RELEASE ORAL NIGHTLY
Status: DISCONTINUED | OUTPATIENT
Start: 2025-07-22 | End: 2025-07-22

## 2025-07-22 RX ORDER — ONDANSETRON 8 MG/1
8 TABLET, ORALLY DISINTEGRATING ORAL EVERY 8 HOURS PRN
Status: DISCONTINUED | OUTPATIENT
Start: 2025-07-22 | End: 2025-07-24 | Stop reason: HOSPADM

## 2025-07-22 RX ORDER — FINASTERIDE 5 MG/1
5 TABLET, FILM COATED ORAL DAILY
Status: DISCONTINUED | OUTPATIENT
Start: 2025-07-22 | End: 2025-07-24 | Stop reason: HOSPADM

## 2025-07-22 RX ORDER — ASPIRIN 81 MG/1
81 TABLET ORAL DAILY
Status: DISCONTINUED | OUTPATIENT
Start: 2025-07-22 | End: 2025-07-24 | Stop reason: HOSPADM

## 2025-07-22 RX ORDER — METOPROLOL TARTRATE 50 MG/1
50 TABLET ORAL 2 TIMES DAILY
Status: DISCONTINUED | OUTPATIENT
Start: 2025-07-22 | End: 2025-07-24 | Stop reason: HOSPADM

## 2025-07-22 RX ORDER — SODIUM CHLORIDE 9 MG/ML
INJECTION, SOLUTION INTRAVENOUS CONTINUOUS
Status: DISCONTINUED | OUTPATIENT
Start: 2025-07-22 | End: 2025-07-23

## 2025-07-22 RX ORDER — ATORVASTATIN CALCIUM 40 MG/1
40 TABLET, FILM COATED ORAL NIGHTLY
Status: DISCONTINUED | OUTPATIENT
Start: 2025-07-22 | End: 2025-07-24 | Stop reason: HOSPADM

## 2025-07-22 RX ORDER — VERAPAMIL HYDROCHLORIDE 2.5 MG/ML
INJECTION INTRAVENOUS
Status: DISCONTINUED | OUTPATIENT
Start: 2025-07-22 | End: 2025-07-22 | Stop reason: HOSPADM

## 2025-07-22 RX ORDER — GUAIFENESIN 600 MG/1
600 TABLET, EXTENDED RELEASE ORAL 2 TIMES DAILY
Status: DISCONTINUED | OUTPATIENT
Start: 2025-07-22 | End: 2025-07-24 | Stop reason: HOSPADM

## 2025-07-22 RX ORDER — ACETAMINOPHEN 325 MG/1
650 TABLET ORAL EVERY 4 HOURS PRN
Status: DISCONTINUED | OUTPATIENT
Start: 2025-07-22 | End: 2025-07-24 | Stop reason: HOSPADM

## 2025-07-22 RX ORDER — ISOSORBIDE MONONITRATE 60 MG/1
60 TABLET, EXTENDED RELEASE ORAL DAILY
Status: DISCONTINUED | OUTPATIENT
Start: 2025-07-22 | End: 2025-07-24 | Stop reason: HOSPADM

## 2025-07-22 RX ORDER — TAMSULOSIN HYDROCHLORIDE 0.4 MG/1
0.4 CAPSULE ORAL DAILY
Status: DISCONTINUED | OUTPATIENT
Start: 2025-07-22 | End: 2025-07-24 | Stop reason: HOSPADM

## 2025-07-22 RX ADMIN — SODIUM CHLORIDE: 9 INJECTION, SOLUTION INTRAVENOUS at 12:07

## 2025-07-22 RX ADMIN — ATORVASTATIN CALCIUM 40 MG: 40 TABLET, FILM COATED ORAL at 10:07

## 2025-07-22 RX ADMIN — INSULIN GLARGINE 10 UNITS: 100 INJECTION, SOLUTION SUBCUTANEOUS at 10:07

## 2025-07-22 RX ADMIN — FINASTERIDE 5 MG: 5 TABLET, FILM COATED ORAL at 09:07

## 2025-07-22 RX ADMIN — TAMSULOSIN HYDROCHLORIDE 0.4 MG: 0.4 CAPSULE ORAL at 09:07

## 2025-07-22 RX ADMIN — ISOSORBIDE MONONITRATE 60 MG: 60 TABLET, EXTENDED RELEASE ORAL at 09:07

## 2025-07-22 RX ADMIN — SODIUM CHLORIDE: 9 INJECTION, SOLUTION INTRAVENOUS at 06:07

## 2025-07-22 RX ADMIN — GUAIFENESIN 600 MG: 600 TABLET, EXTENDED RELEASE ORAL at 10:07

## 2025-07-22 RX ADMIN — AMLODIPINE BESYLATE 10 MG: 10 TABLET ORAL at 09:07

## 2025-07-22 RX ADMIN — ASPIRIN 81 MG: 81 TABLET, COATED ORAL at 09:07

## 2025-07-22 RX ADMIN — METOPROLOL TARTRATE 50 MG: 50 TABLET, FILM COATED ORAL at 09:07

## 2025-07-22 RX ADMIN — SODIUM CHLORIDE: 9 INJECTION, SOLUTION INTRAVENOUS at 08:07

## 2025-07-22 RX ADMIN — NITROGLYCERIN 0.4 MG: 0.4 TABLET SUBLINGUAL at 03:07

## 2025-07-22 RX ADMIN — INSULIN GLARGINE 10 UNITS: 100 INJECTION, SOLUTION SUBCUTANEOUS at 09:07

## 2025-07-22 NOTE — SUBJECTIVE & OBJECTIVE
Past Medical History:   Diagnosis Date    Abnormal brain MRI 01/21/2018    Chronic microvascular ischemia changes per MRI July 9, 2007 in [facility]    Abnormal ECG 10/31/2013    Abnormal stress test 01/28/2016    Alcohol dependence     States alcohol abuse ended in 1994    AP (angina pectoris) 01/28/2016    Asthma     Bladder cancer     Carotid artery occlusion     Cataract     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    2 - Normal left ventricular systolic function (EF 60-65%).    3 - Normal left ventricular diastolic function.    4 - Mild left atrial enlargement.    5 - Normal right ventricular systolic function .    6 - Trivial to mild aortic regurgitation.    7 - Trivial to mild mitral regurgitation.  10 - Trivial to mild pulmonic regurgitation.     Hematuria 06/25/2020    History of alcohol abuse 01/22/2018    Patient denies drinking to excess since 1994.    History of atherectomy 01/21/2018 2/2016 [facility]    History of chronic kidney disease 01/22/2018    History of CKD 3    History of PTCA 10/31/2013    History of PTCA 03/09/2016    Hyperlipidemia     Hypertension     Insomnia 06/17/2013    Iron deficiency anemia 10/19/2023    Ischemic cardiomyopathy 10/31/2013    Long term (current) use of antithrombotics/antiplatelets 01/21/2018    Malignant neoplasm of overlapping sites of bladder 06/08/2023    Myocardial infarction     Old MI (myocardial infarction) 1994    Peripheral vascular disease     Polyneuropathy     RBBB 10/31/2013    S/P CABG (coronary artery bypass graft)  10/31/2013    Shortness of breath 10/31/2013    Simple chronic bronchitis 6/17/2013    Tobacco dependence     resolved    Trouble in sleeping     Type 2 diabetes with peripheral circulatory disorder, controlled     Wears glasses        Past Surgical History:   Procedure Laterality Date    APPENDECTOMY      CARDIAC CATHETERIZATION      2016    CARDIAC SURGERY  1994    balloon angioplasty    CATARACT EXTRACTION W/  INTRAOCULAR LENS IMPLANT Right 02/01/2017    CORONARY ARTERY BYPASS GRAFT  05/2011    per patient x4    CYSTOSCOPY W/ RETROGRADES Bilateral 5/15/2025    Procedure: CYSTOSCOPY, WITH RETROGRADE PYELOGRAM;  Surgeon: Hortencia Michael MD;  Location: [facility] OR;  Service: Urology;  Laterality: Bilateral;    HERNIA REPAIR      OPEN REDUCTION AND INTERNAL FIXATION (ORIF) OF INJURY OF HIP      PCIOL Bilateral OD 02/01/17/OS 02/15/17    DR. ALONZO    RETROGRADE PYELOGRAPHY Bilateral 8/29/2024    Procedure: PYELOGRAM, RETROGRADE;  Surgeon: Hortencia Michael MD;  Location: [facility] OR;  Service: Urology;  Laterality: Bilateral;    TURBT (TRANSURETHRAL RESECTION OF BLADDER TUMOR) N/A 6/8/2023    Procedure: TURBT (TRANSURETHRAL RESECTION OF BLADDER TUMOR);  Surgeon: Hortencia Michael MD;  Location: [facility] OR;  Service: Urology;  Laterality: N/A;    TURBT (TRANSURETHRAL RESECTION OF BLADDER TUMOR) N/A 2/8/2024    Procedure: TURBT (TRANSURETHRAL RESECTION OF BLADDER TUMOR);  Surgeon: Hortencia Michael MD;  Location: [facility] OR;  Service: Urology;  Laterality: N/A;    TURBT (TRANSURETHRAL RESECTION OF BLADDER TUMOR) N/A 8/29/2024    Procedure: TURBT (TRANSURETHRAL RESECTION OF BLADDER TUMOR);  Surgeon: Hortencia Michael MD;  Location: [facility] OR;  Service: Urology;  Laterality: N/A;    TURBT (TRANSURETHRAL RESECTION OF BLADDER TUMOR) N/A 5/15/2025    Procedure: TURBT (TRANSURETHRAL RESECTION OF BLADDER TUMOR);  Surgeon: Hortencia Michael MD;  Location: [facility] OR;  Service: Urology;  Laterality: N/A;       Review of patient's  allergies indicates:   Allergen Reactions    Pseudoephedrine-guaifenesin Shortness Of Breath    Panmist dm  [pseudoephedrine-dm-guaifenesin]      Other reaction(s): Unknown       No current facility-administered medications on file prior to encounter.     Current Outpatient Medications on File Prior to Encounter   Medication Sig    albuterol (PROVENTIL/VENTOLIN HFA) 90 mcg/actuation inhaler Inhale 2 puffs into the lungs every 6 (six) hours as needed for Wheezing. Rescue    amLODIPine (NORVASC) 10 MG tablet Take 1 tablet (10 mg total) by mouth once daily.    aspirin (ECOTRIN) 81 MG EC tablet Take 1 tablet (81 mg total) by mouth once daily.    atorvastatin (LIPITOR) 40 MG tablet Take 1 tablet (40 mg total) by mouth every evening.    b complex vitamins tablet Take 1 tablet by mouth once daily.    cyanocobalamin (VITAMIN B-12) 1000 MCG tablet Take 100 mcg by mouth once daily.    dulaglutide (TRULICITY) 4.5 mg/0.5 mL pen injector Inject 4.5 mg into the skin every 7 days. SUNDAY    finasteride (PROSCAR) 5 mg tablet TAKE 1 TABLET ONE TIME DAILY    furosemide (LASIX) 40 MG tablet TAKE 1 PILL IN AM, AND 1/2 PILL IN PM    glucose 4 GM chewable tablet Take 4 tablets (16 g total) by mouth as needed for Low blood sugar.    insulin (LANTUS SOLOSTAR U-100 INSULIN) glargine 100 units/mL SubQ pen Inject into the skin. 15 units QHS and 18 units QAM    insulin aspart U-100 (NOVOLOG) 100 unit/mL injection Inject 3 Units into the skin every 4 (four) hours as needed (for high blood sugar or carbohydrate intake).    insulin glargine-yfgn 100 unit/mL (3 mL) InPn Inject into the skin.    isosorbide mononitrate (IMDUR) 60 MG 24 hr tablet Take 1 tablet (60 mg total) by mouth once daily.    losartan (COZAAR) 50 MG tablet Take 1 tablet (50 mg total) by mouth once daily.    metoprolol succinate (TOPROL-XL) 25 MG 24 hr tablet Take 1 tablet (25 mg total) by mouth every evening.    metoprolol tartrate (LOPRESSOR) 50 MG tablet Take 50 mg by mouth 2  (two) times daily.    multivit-minerals/folic acid (DIABETIC MULTIVITAMIN ORAL) Take by mouth.    nitroGLYCERIN (NITROSTAT) 0.4 MG SL tablet Place 1 tablet (0.4 mg total) under the tongue every 5 (five) minutes as needed for Chest pain (for chest pain/discomfort). Call 911 if chest pain persists after second dose.    solifenacin (VESICARE) 5 MG tablet Take 5 mg by mouth once daily.    tamsulosin (FLOMAX) 0.4 mg Cap Take 1 capsule (0.4 mg total) by mouth once daily.    vitamin D (VITAMIN D3) 1000 units Tab Take 1 tablet (1,000 Units total) by mouth once daily.     Family History       Problem Relation (Age of Onset)    Diabetes Brother, Sister          Tobacco Use    Smoking status: Former     Current packs/day: 0.00     Average packs/day: 1.5 packs/day for 40.0 years (60.0 ttl pk-yrs)     Types: Cigarettes     Start date: 1954     Quit date: 1994     Years since quittin.5    Smokeless tobacco: Former     Quit date: 2011    Tobacco comments:     approx date   Substance and Sexual Activity    Alcohol use: Not Currently     Comment: occasionally; 1-2 cans of beer a month    Drug use: No    Sexual activity: Never     Birth control/protection: None     Review of Systems   Constitutional: Positive for malaise/fatigue.   HENT: Negative.     Cardiovascular:  Positive for chest pain and dyspnea on exertion.   Respiratory:  Positive for cough (non-productive).    Hematologic/Lymphatic: Negative.    Musculoskeletal:  Positive for arthritis and joint pain.   Gastrointestinal: Negative.    Genitourinary: Negative.    Neurological:  Positive for weakness.   Psychiatric/Behavioral: Negative.     Allergic/Immunologic: Negative.      Objective:     Vital Signs (Most Recent):  Temp: 98.6 °F (37 °C) (25)  Pulse: 108 (25)  Resp: 20 (25)  BP: (!) 146/70 (25)  SpO2: (!) 93 % (25) Vital Signs (24h Range):  Temp:  [97.5 °F (36.4 °C)-98.6 °F (37 °C)] 98.6 °F (37  "°C)  Pulse:  [] 108  Resp:  [16-20] 20  SpO2:  [93 %-99 %] 93 %  BP: (100-151)/(60-70) 146/70     Weight: 83 kg (182 lb 15.7 oz)  Body mass index is 26.26 kg/m².    SpO2: (!) 93 %         Intake/Output Summary (Last 24 hours) at 7/22/2025 0906  Last data filed at 7/22/2025 0511  Gross per 24 hour   Intake --   Output 100 ml   Net -100 ml       Lines/Drains/Airways       Peripheral Intravenous Line  Duration             Peripheral IV 07/21/25 1704 20 G Anterior;Right Forearm <1 day                     Physical Exam  Vitals and nursing note reviewed.   Constitutional:       Appearance: Normal appearance.   HENT:      Head: Normocephalic and atraumatic.   Eyes:      Pupils: Pupils are equal, round, and reactive to light.   Cardiovascular:      Rate and Rhythm: Normal rate and regular rhythm.      Heart sounds: S1 normal and S2 normal. No murmur heard.  Pulmonary:      Effort: Pulmonary effort is normal.      Breath sounds: Normal breath sounds.   Abdominal:      Palpations: Abdomen is soft.   Musculoskeletal:      Right lower leg: No edema.      Left lower leg: No edema.   Skin:     General: Skin is warm and dry.   Neurological:      General: No focal deficit present.      Mental Status: He is oriented to person, place, and time.   Psychiatric:         Mood and Affect: Mood normal.         Behavior: Behavior normal.         Thought Content: Thought content normal.          Significant Labs: CMP   Recent Labs   Lab 07/21/25  1708      K 3.9      CO2 20*   *   BUN 39*   CREATININE 2.1*   CALCIUM 9.9   PROT 6.6   ALBUMIN 3.2*   BILITOT 1.2*   ALKPHOS 79   AST 23   ALT 17   ANIONGAP 10   , CBC   Recent Labs   Lab 07/21/25  1708 07/22/25  0410   WBC 7.68 6.13   HGB 12.9* 13.4*   HCT 39.3* 40.2    167   , Troponin No results for input(s): "TROPONINIHS" in the last 48 hours., and All pertinent lab results from the last 24 hours have been reviewed.    Significant Imaging: Echocardiogram: " Transthoracic echo (TTE) complete (Cupid Only): Repeat TTE pending  Results for orders placed or performed during the hospital encounter of 10/25/23   Echo   Result Value Ref Range    BSA 1.89 m2    LVOT stroke volume 82.96 cm3    LVIDd 4.88 3.5 - 6.0 cm    LV Systolic Volume 67.15 mL    LV Systolic Volume Index 35.5 mL/m2    LVIDs 3.93 2.1 - 4.0 cm    LV Diastolic Volume 111.55 mL    LV Diastolic Volume Index 59.02 mL/m2    IVS 1.43 (A) 0.6 - 1.1 cm    LVOT diameter 2.57 cm    LVOT area 5.2 cm2    FS 19 (A) 28 - 44 %    Left Ventricle Relative Wall Thickness 0.53 cm    PW 1.29 (A) 0.6 - 1.1 cm    LV mass 269.14 g    LV Mass Index 142 g/m2    MV Peak E Bran 0.64 m/s    TDI LATERAL 0.09 m/s    TDI SEPTAL 0.06 m/s    E/E' ratio 8.53 m/s    MV Peak A Bran 0.88 m/s    TR Max Bran 2.28 m/s    E/A ratio 0.73     IVRT 77.07 msec    E wave deceleration time 206.92 msec    LV SEPTAL E/E' RATIO 10.67 m/s    LV LATERAL E/E' RATIO 7.11 m/s    PV Peak S Bran 0.35 m/s    PV Peak D Bran 0.33 m/s    Pulm vein S/D ratio 1.06     LVOT peak bran 0.66 m/s    Left Ventricular Outflow Tract Mean Velocity 0.46 cm/s    Left Ventricular Outflow Tract Mean Gradient 0.95 mmHg    LA size 5.04 cm    Left Atrium Minor Axis 5.98 cm    Left Atrium Major Axis 5.97 cm    LA Vol (MOD) 72.16 cm3    MARILOU (MOD) 38.2 mL/m2    RVDD 3.97 cm    RV S' 9.61 cm/s    RVOT peak VTI 9.7 cm    TAPSE 2.02 cm    RA Major Axis 5.75 cm    RA Width 4.15 cm    AV regurgitation pressure 1/2 time [id number] ms    AR Max Bran 2.87 m/s    AV mean gradient 3 mmHg    AV peak gradient 5 mmHg    Ao peak bran 1.13 m/s    Ao VTI 27.60 cm    LVOT peak VTI 16.00 cm    AV valve area 3.01 cm²    AV Velocity Ratio 0.58     AV index (prosthetic) 0.58     MANISH by Velocity Ratio 3.03 cm²    MV stenosis pressure 1/2 time 60.01 ms    MV valve area p 1/2 method 3.67 cm2    Triscuspid Valve Regurgitation Peak Gradient 21 mmHg    PV mean gradient 1 mmHg    PV PEAK VELOCITY 0.83 m/s    PV peak  gradient 3 mmHg    Pulmonary Valve Mean Velocity 0.54 m/s    RVOT peak fredrick 0.46 m/s    Ao root annulus 4.05 cm    Sinus 4.04 cm    STJ 3.13 cm    Ascending aorta 3.51 cm    IVC diameter 1.02 cm    Mean e' 0.08 m/s    ZLVIDS 1.48     ZLVIDD -0.81     MARILOU 59.6 mL/m2    LA Vol 112.63 cm3    LA WIDTH 4.4 cm    TASV 10.0 cm/s    TV resting pulmonary artery pressure 24 mmHg    RV TB RVSP 5 mmHg    Est. RA pres 3 mmHg    Narrative      Left Ventricle: The left ventricle is normal in size. Normal wall   thickness. regional wall motion abnormalities present. There is low normal   systolic function with a visually estimated ejection fraction of 50 - 55%.    Left Atrium: Left atrium is severely dilated.    Right Ventricle: Normal right ventricular cavity size. Wall thickness   is normal. Right ventricle wall motion  is normal. Systolic function is   normal.    Right Atrium: Right atrium is dilated.    Mitral Valve: There is mild regurgitation.    Pulmonic Valve: There is mild regurgitation.    Pulmonary Artery: The estimated pulmonary artery systolic pressure is   24 mmHg.    IVC/SVC: Normal venous pressure at 3 mmHg.      and EKG: Reviewed

## 2025-07-22 NOTE — ASSESSMENT & PLAN NOTE
Baseline creatinine is 1.4-1.7. Most recent creatinine and eGFR are listed below.  Recent Labs     07/21/25  1708   CREATININE 2.1*   EGFRNORACEVR 30*     Plan  -LJ is currently undergoing medical management  -Avoid nephrotoxins and renally dose meds for GFR listed above  -Monitor urine output, serial BMP, and adjust therapy as needed

## 2025-07-22 NOTE — PROGRESS NOTES
[facility] ([facility] Medicine  Progress Note    Patient Name: Nithin Pino  MRN: [id number]  Patient Class: IP- Inpatient   Admission Date: 7/21/2025  Length of Stay: 1 days  Attending Physician: Kenroy Bello MD  Primary Care Provider: MUKUL Garg MD    Subjective     Principal Problem:NSTEMI (non-ST elevated myocardial infarction)      HPI:  Nithin Pino is a 85 y.o. male with a PMH  has a past medical history of Abnormal brain MRI (01/21/2018), Abnormal ECG (10/31/2013), Abnormal stress test (01/28/2016), Alcohol dependence, AP (angina pectoris) (01/28/2016), Asthma, Bladder cancer, Carotid artery occlusion, Cataract, Chronic combined systolic and diastolic congestive heart failure (05/24/2021), Chronic diastolic heart failure (10/31/2013), Chronic ischemic heart disease (10/31/2013), CKD (chronic kidney disease) stage 3, GFR 30-59 ml/min (11/04/2015), Coronary artery disease, DM (diabetes mellitus) (2013), DM (diabetes mellitus) (2011), Heart valve regurgitation (11/04/2015), Hematuria (06/25/2020), History of alcohol abuse (01/22/2018), History of atherectomy (01/21/2018), History of chronic kidney disease (01/22/2018), History of PTCA (10/31/2013), History of PTCA (03/09/2016), Hyperlipidemia, Hypertension, Insomnia (06/17/2013), Iron deficiency anemia (10/19/2023), Ischemic cardiomyopathy (10/31/2013), Long term (current) use of antithrombotics/antiplatelets (01/21/2018), Malignant neoplasm of overlapping sites of bladder (06/08/2023), Myocardial infarction, Old MI (myocardial infarction) (1994), Peripheral vascular disease, Polyneuropathy, RBBB (10/31/2013), S/P CABG (coronary artery bypass graft) (10/31/2013), Shortness of breath (10/31/2013), Simple chronic bronchitis (6/17/2013), Tobacco dependence, Trouble in sleeping, Type 2 diabetes with peripheral circulatory disorder, controlled, and Wears glasses. who presented to the ED for further evaluation of acute onset chest pain  which began earlier today while driving to the store.  Patient has significant cardiac history including CAD s/p stent placement and CABG currently followed by Dr. Sadler from cardiology outpatient.  Patient described endorsing substernal chest pressure which began acutely while driving to the store around 2:00 p.m. Pain was somewhat relieved by taking two sublingual nitros and completely resolved upon administration of nitro spray upon EMS arrival.  He reported no known alleviating or aggravating factors noted in his currently chest pain-free at time of bedside assessment.  Patient also reports endorsing nonproductive cough with daughter reported to ED staff that he has been experiencing shortness a breath and congestion after spring his home with ortho insect killer back on 07/18/25 and has been feeling ill since.  Patient denied endorsing any lightheadedness, dizziness, headache, visual changes, fever, chills, sweats, nausea, vomiting, abdominal pain, dysuria, hematuria, melena, hematochezia, diarrhea, or onset neurological deficits.  Prior to onset of symptoms, patient reported being in his usual state of health with no other concerns or complaints.  Initial workup in the ED revealed patient to be afebrile without leukocytosis, hemodynamically stable, creatinine/GFR 2.1/30, blood glucose 273, , troponin 0.101.  Chest x-ray negative for acute findings.  EKG reveals sinus rhythm with occasional PVCs, T-wave abnormalities, in age indeterminate infarcts.  Troponin initially elevated at 0.109 with repeat 0.101 and 0.108.  Cardiology consulted by ED staff and recommended patient be initiated on heparin and NSTEMI pathway inpatient will be evaluated in the morning regarding further ischemic workup.  Patient admitted to Hospital Medicine inpatient for continued medical management.    PCP: MUKUL Garg      Overview/Hospital Course:  Nithin Pino is a 85 year old male who was admitted for NSTEMI.  Echocardiogram showed  EF of 40-50% with mild global hypokinesis and regional wall motion abnormalities. He was started on heparin infusion and seen by Cardiology who plans for [facility] today.     Interval History: Plans for [facility] today.    Review of Systems   Constitutional:  Negative for chills, diaphoresis, fatigue and fever.   HENT:  Negative for drooling, ear pain, rhinorrhea and sore throat.    Eyes: Negative.    Respiratory:  Negative for cough, shortness of breath and wheezing.    Cardiovascular:  Positive for chest pain. Negative for palpitations and leg swelling.   Gastrointestinal:  Negative for abdominal pain, constipation, diarrhea and nausea.   Endocrine: Negative.    Genitourinary:  Negative for dysuria, hematuria and urgency.   Musculoskeletal: Negative.    Skin:  Negative for color change and wound.   Allergic/Immunologic: Negative.    Neurological:  Negative for dizziness, syncope and speech difficulty.   Hematological: Negative.    Psychiatric/Behavioral: Negative.       Objective:     Vital Signs (Most Recent):  Temp: 98 °F (36.7 °C) (07/22/25 1545)  Pulse: 108 (07/22/25 1545)  Resp: 18 (07/22/25 1545)  BP: (!) 98/50 (07/22/25 1545)  SpO2: 96 % (07/22/25 1545) Vital Signs (24h Range):  Temp:  [97.5 °F (36.4 °C)-98.6 °F (37 °C)] 98 °F (36.7 °C)  Pulse:  [] 108  Resp:  [16-20] 18  SpO2:  [93 %-99 %] 96 %  BP: ()/(50-70) 98/50     Weight: 83 kg (182 lb 15.7 oz)  Body mass index is 26.26 kg/m².    Intake/Output Summary (Last 24 hours) at 7/22/2025 7721  Last data filed at 7/22/2025 1000  Gross per 24 hour   Intake --   Output 500 ml   Net -500 ml         Physical Exam          Significant Labs: All pertinent labs within the past 24 hours have been reviewed.  CBC:   Recent Labs   Lab 07/21/25  1708 07/22/25  0410   WBC 7.68 6.13   HGB 12.9* 13.4*   HCT 39.3* 40.2    167     CMP:   Recent Labs   Lab 07/21/25  1708 07/22/25  0907    140   K 3.9 4.4    107   CO2 20* 21*   *  130*   BUN 39* 34*   CREATININE 2.1* 1.6*   CALCIUM 9.9 10.6*   PROT 6.6  --    ALBUMIN 3.2*  --    BILITOT 1.2*  --    ALKPHOS 79  --    AST 23  --    ALT 17  --    ANIONGAP 10 12       Significant Imaging:        Left Ventricle: The left ventricle is normal in size. Moderately increased wall thickness. There is concentric hypertrophy. Mild global hypokinesis and regional wall motion abnormalities present. Septal motion is abnormal. There is mildly reduced systolic function with a visually estimated ejection fraction of 40 - 50%. Grade I diastolic dysfunction.    Right Ventricle: The right ventricle is normal in sizeWall thickness is normal. . Right ventricle wall motion has global hypokinesis. Systolic function is moderately reduced.    Left Atrium: The left atrium is mildly dilated.    Aortic Valve: There is moderate aortic valve sclerosis. Mildly calcified cusps. There is mild annular calcification present.    Mitral Valve: There is mild regurgitation.    Tricuspid Valve: There is mild regurgitation.    Pulmonary Artery: The estimated pulmonary artery systolic pressure is 24 mmHg.    IVC/SVC: Normal venous pressure at 3 mmHg.      Assessment & Plan  Chest pain  See plan above    NSTEMI (non-ST elevated myocardial infarction)  Patient presents with NSTEMI. Chest pain is currently controlled. ASCENCION score is 6. Patient is currently on NSTEMI Pathway.    EKG reviewed. Troponins reviewed and results noted-   Recent Labs   Lab 07/22/25  0907   TROPONINI 0.117*     Lipid panel reviewed and shows-     Lab Results   Component Value Date    LDLCALC 41.6 (L) 07/21/2025     Lab Results   Component Value Date    TRIG 67 07/21/2025       Medical management includes; Beta Blocker, Anticoagulation, High Intensity Stain, and Nitrate Echo has not been performed. Latest ECHO results are as follows- Results for orders placed during the hospital encounter of 10/25/23    Echo    Interpretation Summary    Left Ventricle: The left  ventricle is normal in size. Normal wall thickness. regional wall motion abnormalities present. There is low normal systolic function with a visually estimated ejection fraction of 50 - 55%.    Left Atrium: Left atrium is severely dilated.    Right Ventricle: Normal right ventricular cavity size. Wall thickness is normal. Right ventricle wall motion  is normal. Systolic function is normal.    Right Atrium: Right atrium is dilated.    Mitral Valve: There is mild regurgitation.    Pulmonic Valve: There is mild regurgitation.    Pulmonary Artery: The estimated pulmonary artery systolic pressure is 24 mmHg.    IVC/SVC: Normal venous pressure at 3 mmHg.    Consult for cardiac rehab is ordered. Patient counseled on lifestyle modifications- begin progressive daily aerobic exercise program, follow a low fat, low cholesterol diet, reduce salt in diet and cooking, reduce exposure to stress, improve dietary compliance, use calcium 1 gram daily with Vit D, continue current medications, continue current healthy lifestyle patterns, and return for routine annual checkups. Cardiology is consulted. Plan of care pending with cardiology team. Continue to monitor patient closely and adjust therapy as needed.      7/22/25  Echocardiogram shows reduce EF with WMA. Plans for [facility] Today. Continue heparin infusion  Coronary artery disease of bypass graft of native heart with stable angina pectoris  Patient with known CAD s/p stent placement and CABG, which is controlled Will continue home medications and monitor for S/Sx of angina/ACS. Continue to monitor on telemetry.  Patient currently on NSTEMI pathway and awaiting further evaluation/recommendations from Cardiology.     7/22/25  Plans as above  Claudication in peripheral vascular disease  Chronic.  Currently asymptomatic.  Plan:  -continue home medications  -continued treatment of NSTEMI as noted above    Acute kidney injury superimposed on chronic kidney disease  Baseline creatinine is  1.4-1.7. Most recent creatinine and eGFR are listed below.  Recent Labs     07/21/25  1708 07/22/25  0907   CREATININE 2.1* 1.6*   EGFRNORACEVR 30* 42*     Plan  -LJ is currently undergoing medical management  -Avoid nephrotoxins and renally dose meds for GFR listed above  -Monitor urine output, serial BMP, and adjust therapy as needed    7/22/25  Improved with gentle hydration  Type 2 diabetes mellitus with hyperglycemia, with long-term current use of insulin  Patient's FSGs are uncontrolled due to hyperglycemia on current medication regimen.  Last A1c reviewed-   Lab Results   Component Value Date    HGBA1C 7.9 (H) 07/22/2025     Most recent fingerstick glucose reviewed-   Recent Labs   Lab 07/22/25  0944 07/22/25  1214 07/22/25  1624   POCTGLUCOSE 123* 92 88     Current correctional scale  Low  Titrate as needed anti-hyperglycemic dose as follows-   Antihyperglycemics (From admission, onward)      Start     Stop Route Frequency Ordered    07/22/25 0900  insulin glargine U-100 (Lantus) pen 10 Units         -- SubQ 2 times daily 07/22/25 0433    07/22/25 0533  insulin aspart U-100 pen 0-5 Units         -- SubQ Before meals & nightly PRN 07/22/25 0433     Plan:  -SSI  -A1c  -Accu-checks  -Hold oral hypoglycemics while patient is in the hospital  -Continue home long-acting insulin at 20% decrease, titrate up as needed  -Hypoglycemic protocol      Hypertension associated with diabetes  Chronic, controlled.  Latest blood pressure and vitals reviewed-   Temp:  [97.5 °F (36.4 °C)-98.6 °F (37 °C)]   Pulse:  []   Resp:  [16-20]   BP: ()/(50-70)   SpO2:  [93 %-99 %] .   Home meds for hypertension were reviewed and noted below.   Hypertension Medications              amLODIPine (NORVASC) 10 MG tablet Take 1 tablet (10 mg total) by mouth once daily.    furosemide (LASIX) 40 MG tablet TAKE 1 PILL IN AM, AND 1/2 PILL IN PM    isosorbide mononitrate (IMDUR) 60 MG 24 hr tablet Take 1 tablet (60 mg total) by mouth  once daily.    losartan (COZAAR) 50 MG tablet Take 1 tablet (50 mg total) by mouth once daily.    metoprolol succinate (TOPROL-XL) 25 MG 24 hr tablet Take 1 tablet (25 mg total) by mouth every evening.    metoprolol tartrate (LOPRESSOR) 50 MG tablet Take 50 mg by mouth 2 (two) times daily.    nitroGLYCERIN (NITROSTAT) 0.4 MG SL tablet Place 1 tablet (0.4 mg total) under the tongue every 5 (five) minutes as needed for Chest pain (for chest pain/discomfort). Call 911 if chest pain persists after second dose.     While in the hospital, will manage blood pressure as follows; Continue home antihypertensive regimen, holding losartan and lasix in setting of LJ on CKD.    Will utilize p.r.n. blood pressure medication only if patient's blood pressure greater than  180/110 and he develops symptoms such as worsening chest pain or shortness of breath.    Chronic combined systolic and diastolic congestive heart failure  Patient has Combined Systolic and Diastolic heart failure that is Chronic. On presentation their CHF was well compensated. Most recent BNP and echo results are listed below.  Recent Labs     07/21/25  1708   *     Latest ECHO  Results for orders placed during the hospital encounter of 10/25/23    Echo    Interpretation Summary    Left Ventricle: The left ventricle is normal in size. Normal wall thickness. regional wall motion abnormalities present. There is low normal systolic function with a visually estimated ejection fraction of 50 - 55%.    Left Atrium: Left atrium is severely dilated.    Right Ventricle: Normal right ventricular cavity size. Wall thickness is normal. Right ventricle wall motion  is normal. Systolic function is normal.    Right Atrium: Right atrium is dilated.    Mitral Valve: There is mild regurgitation.    Pulmonic Valve: There is mild regurgitation.    Pulmonary Artery: The estimated pulmonary artery systolic pressure is 24 mmHg.    IVC/SVC: Normal venous pressure at 3  mmHg.    Current Heart Failure Medications  nitroGLYCERIN injection, As needed (PRN),     Plan  -Monitor strict I&Os and daily weights.    -Place on telemetry  -Low sodium diet  -Place on fluid restriction of 2 L.   -Cardiology has been consulted  -The patient's volume status is at their baseline  -Continue home medications    Iron deficiency anemia  Anemia is likely due to Iron deficiency. Most recent hemoglobin and hematocrit are listed below.  Recent Labs     07/21/25  1708 07/22/25  0410   HGB 12.9* 13.4*   HCT 39.3* 40.2     Plan  -Monitor serial CBC: Daily  -Transfuse PRBC if patient becomes hemodynamically unstable, symptomatic or H/H drops below 7/21.  -Patient has not received any PRBC transfusions to date  -Patient's anemia is currently stable    Hyperlipidemia associated with type 2 diabetes mellitus  Patient is chronically on statin.will continue for now. Last Lipid Panel:   Lab Results   Component Value Date    CHOL 93 (L) 07/21/2025    HDL 38 (L) 07/21/2025    LDLCALC 41.6 (L) 07/21/2025    TRIG 67 07/21/2025    CHOLHDL 40.9 07/21/2025     Plan:  -Continue home medication  -low fat/low calorie diet    Benign prostatic hyperplasia  Chronic.  Plan:  -continue home medication    RBBB      VTE Risk Mitigation (From admission, onward)           Ordered     heparin (porcine) injection  As needed (PRN)         07/22/25 1743     heparin 25,000 units in dextrose 5% (100 units/ml) IV bolus from bag LOW INTENSITY nomogram - [facility]  As needed (PRN)        Question:  Heparin Infusion Adjustment (DO NOT MODIFY ANSWER)  Answer:  [email or web address]\heparin LOW INTENSITY nomogram for [facility] [id number]    07/21/25 2023     heparin 25,000 units in dextrose 5% (100 units/ml) IV bolus from bag LOW INTENSITY nomogram - [facility]  As needed (PRN)        Question:  Heparin Infusion Adjustment (DO NOT MODIFY ANSWER)  Answer:  [email or web address]\[email or web address]\heparin LOW INTENSITY  nomogram for [facility] [id number]    07/21/25 2023     heparin 25,000 units in dextrose 5% 250 mL (100 units/mL) infusion LOW INTENSITY nomogram - [facility]  Continuous        Question:  Begin at (units/kg/hr)  Answer:  12    07/21/25 2023     Reason for No Pharmacological VTE Prophylaxis  Once        Question:  Reasons:  Answer:  Physician Provided (leave comment)  Comment:  currently on heparin drip    07/21/25 2030     IP VTE HIGH RISK PATIENT  Once         07/21/25 2030     Place sequential compression device  Until discontinued         07/21/25 2030                    Discharge Planning   RAMSES: 7/25/2025     Code Status: Full Code   Medical Readiness for Discharge Date:   Discharge Plan A: Home with family, Home Health                Lm Conde NP  Department of Hospital Medicine   [facility] ([facility])

## 2025-07-22 NOTE — ASSESSMENT & PLAN NOTE
Patient presents with NSTEMI. Chest pain is currently controlled. ASCENCION score is 6. Patient is currently on NSTEMI Pathway.    EKG reviewed. Troponins reviewed and results noted-   Recent Labs   Lab 07/22/25  0131   TROPONINI 0.105*     Lipid panel reviewed and shows-     Lab Results   Component Value Date    LDLCALC 41.6 (L) 07/21/2025     Lab Results   Component Value Date    TRIG 67 07/21/2025       Medical management includes; Beta Blocker, Anticoagulation, High Intensity Stain, and Nitrate Echo has not been performed. Latest ECHO results are as follows- Results for orders placed during the hospital encounter of 10/25/23    Echo    Interpretation Summary    Left Ventricle: The left ventricle is normal in size. Normal wall thickness. regional wall motion abnormalities present. There is low normal systolic function with a visually estimated ejection fraction of 50 - 55%.    Left Atrium: Left atrium is severely dilated.    Right Ventricle: Normal right ventricular cavity size. Wall thickness is normal. Right ventricle wall motion  is normal. Systolic function is normal.    Right Atrium: Right atrium is dilated.    Mitral Valve: There is mild regurgitation.    Pulmonic Valve: There is mild regurgitation.    Pulmonary Artery: The estimated pulmonary artery systolic pressure is 24 mmHg.    IVC/SVC: Normal venous pressure at 3 mmHg.    Consult for cardiac rehab is ordered. Patient counseled on lifestyle modifications- begin progressive daily aerobic exercise program, follow a low fat, low cholesterol diet, reduce salt in diet and cooking, reduce exposure to stress, improve dietary compliance, use calcium 1 gram daily with Vit D, continue current medications, continue current healthy lifestyle patterns, and return for routine annual checkups. Cardiology is consulted. Plan of care pending with cardiology team. Continue to monitor patient closely and adjust therapy as needed.

## 2025-07-22 NOTE — H&P
[facility] Emergency Dept.  [facility] Medicine  History & Physical    Patient Name: Nithin Pino  MRN: [id number]  Patient Class: IP- Inpatient  Admission Date: 7/21/2025  Attending Physician: No att. providers found   Primary Care Provider: MUKUL Garg MD         Patient information was obtained from patient, past medical records, and ER records.     Subjective:     Principal Problem:Chest pain    Chief Complaint:   Chief Complaint   Patient presents with    Chest Pain     Per [facility], patient c/o chest pain while driving, rating 10/10, history of cardiac; patient received Nitro x 2 and and  mg prior to arrival to ED, which did relieve chest pain        HPI: Nithin Pino is a 85 y.o. male with a PMH  has a past medical history of Abnormal brain MRI (01/21/2018), Abnormal ECG (10/31/2013), Abnormal stress test (01/28/2016), Alcohol dependence, AP (angina pectoris) (01/28/2016), Asthma, Bladder cancer, Carotid artery occlusion, Cataract, Chronic combined systolic and diastolic congestive heart failure (05/24/2021), Chronic diastolic heart failure (10/31/2013), Chronic ischemic heart disease (10/31/2013), CKD (chronic kidney disease) stage 3, GFR 30-59 ml/min (11/04/2015), Coronary artery disease, DM (diabetes mellitus) (2013), DM (diabetes mellitus) (2011), Heart valve regurgitation (11/04/2015), Hematuria (06/25/2020), History of alcohol abuse (01/22/2018), History of atherectomy (01/21/2018), History of chronic kidney disease (01/22/2018), History of PTCA (10/31/2013), History of PTCA (03/09/2016), Hyperlipidemia, Hypertension, Insomnia (06/17/2013), Iron deficiency anemia (10/19/2023), Ischemic cardiomyopathy (10/31/2013), Long term (current) use of antithrombotics/antiplatelets (01/21/2018), Malignant neoplasm of overlapping sites of bladder (06/08/2023), Myocardial infarction, Old MI (myocardial infarction) (1994), Peripheral vascular disease, Polyneuropathy, RBBB (10/31/2013), S/P CABG (coronary  artery bypass graft) (10/31/2013), Shortness of breath (10/31/2013), Simple chronic bronchitis (6/17/2013), Tobacco dependence, Trouble in sleeping, Type 2 diabetes with peripheral circulatory disorder, controlled, and Wears glasses. who presented to the ED for further evaluation of acute onset chest pain which began earlier today while driving to the store.  Patient has significant cardiac history including CAD s/p stent placement and CABG currently followed by Dr. Sadler from cardiology outpatient.  Patient described endorsing substernal chest pressure which began acutely while driving to the store around 2:00 p.m. Pain was somewhat relieved by taking two sublingual nitros and completely resolved upon administration of nitro spray upon EMS arrival.  He reported no known alleviating or aggravating factors noted in his currently chest pain-free at time of bedside assessment.  Patient also reports endorsing nonproductive cough with daughter reported to ED staff that he has been experiencing shortness a breath and congestion after spring his home with ortho insect killer back on 07/18/25 and has been feeling ill since.  Patient denied endorsing any lightheadedness, dizziness, headache, visual changes, fever, chills, sweats, nausea, vomiting, abdominal pain, dysuria, hematuria, melena, hematochezia, diarrhea, or onset neurological deficits.  Prior to onset of symptoms, patient reported being in his usual state of health with no other concerns or complaints.  Initial workup in the ED revealed patient to be afebrile without leukocytosis, hemodynamically stable, creatinine/GFR 2.1/30, blood glucose 273, , troponin 0.101.  Chest x-ray negative for acute findings.  EKG reveals sinus rhythm with occasional PVCs, T-wave abnormalities, in age indeterminate infarcts.  Troponin initially elevated at 0.109 with repeat 0.101 and 0.108.  Cardiology consulted by ED staff and recommended patient be initiated on heparin and  NSTEMI pathway inpatient will be evaluated in the morning regarding further ischemic workup.  Patient admitted to Hospital Medicine inpatient for continued medical management.    PCP: MUKUL Garg      Past Medical History:   Diagnosis Date    Abnormal brain MRI 01/21/2018    Chronic microvascular ischemia changes per MRI July 9, 2007 in [facility] images    Abnormal ECG 10/31/2013    Abnormal stress test 01/28/2016    Alcohol dependence     States alcohol abuse ended in 1994    AP (angina pectoris) 01/28/2016    Asthma     Bladder cancer     Carotid artery occlusion     Cataract     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    2 - Normal left ventricular systolic function (EF 60-65%).    3 - Normal left ventricular diastolic function.    4 - Mild left atrial enlargement.    5 - Normal right ventricular systolic function .    6 - Trivial to mild aortic regurgitation.    7 - Trivial to mild mitral regurgitation.  10 - Trivial to mild pulmonic regurgitation.     Hematuria 06/25/2020    History of alcohol abuse 01/22/2018    Patient denies drinking to excess since 1994.    History of atherectomy 01/21/2018 2/2016 [facility]    History of chronic kidney disease 01/22/2018    History of CKD 3    History of PTCA 10/31/2013    History of PTCA 03/09/2016    Hyperlipidemia     Hypertension     Insomnia 06/17/2013    Iron deficiency anemia 10/19/2023    Ischemic cardiomyopathy 10/31/2013    Long term (current) use of antithrombotics/antiplatelets 01/21/2018    Malignant neoplasm of overlapping  sites of bladder 06/08/2023    Myocardial infarction     Old MI (myocardial infarction) 1994    Peripheral vascular disease     Polyneuropathy     RBBB 10/31/2013    S/P CABG (coronary artery bypass graft) 10/31/2013    Shortness of breath 10/31/2013    Simple chronic bronchitis 6/17/2013    Tobacco dependence     resolved    Trouble in sleeping     Type 2 diabetes with peripheral circulatory disorder, controlled     Wears glasses        Past Surgical History:   Procedure Laterality Date    APPENDECTOMY      CARDIAC CATHETERIZATION      2016    CARDIAC SURGERY  1994    balloon angioplasty    CATARACT EXTRACTION W/  INTRAOCULAR LENS IMPLANT Right 02/01/2017    CORONARY ARTERY BYPASS GRAFT  05/2011    per patient x4    CYSTOSCOPY W/ RETROGRADES Bilateral 5/15/2025    Procedure: CYSTOSCOPY, WITH RETROGRADE PYELOGRAM;  Surgeon: Hortencia Michael MD;  Location: [facility] OR;  Service: Urology;  Laterality: Bilateral;    HERNIA REPAIR      OPEN REDUCTION AND INTERNAL FIXATION (ORIF) OF INJURY OF HIP      PCIOL Bilateral OD 02/01/17/OS 02/15/17    DR. ALONZO    RETROGRADE PYELOGRAPHY Bilateral 8/29/2024    Procedure: PYELOGRAM, RETROGRADE;  Surgeon: Hortencia Michael MD;  Location: [facility] OR;  Service: Urology;  Laterality: Bilateral;    TURBT (TRANSURETHRAL RESECTION OF BLADDER TUMOR) N/A 6/8/2023    Procedure: TURBT (TRANSURETHRAL RESECTION OF BLADDER TUMOR);  Surgeon: Hortencia Michael MD;  Location: [facility] OR;  Service: Urology;  Laterality: N/A;    TURBT (TRANSURETHRAL RESECTION OF BLADDER TUMOR) N/A 2/8/2024    Procedure: TURBT (TRANSURETHRAL RESECTION OF BLADDER TUMOR);  Surgeon: Hortencia Michael MD;  Location: [facility] OR;  Service: Urology;  Laterality: N/A;    TURBT (TRANSURETHRAL RESECTION OF BLADDER TUMOR) N/A 8/29/2024    Procedure: TURBT (TRANSURETHRAL RESECTION OF BLADDER TUMOR);  Surgeon: Hortencia Michael MD;  Location: [facility] OR;  Service: Urology;  Laterality: N/A;    TURBT (TRANSURETHRAL RESECTION OF  BLADDER TUMOR) N/A 5/15/2025    Procedure: TURBT (TRANSURETHRAL RESECTION OF BLADDER TUMOR);  Surgeon: Hortencia Michael MD;  Location: [facility];  Service: Urology;  Laterality: N/A;       Review of patient's allergies indicates:   Allergen Reactions    Pseudoephedrine-guaifenesin Shortness Of Breath    Panmist dm  [pseudoephedrine-dm-guaifenesin]      Other reaction(s): Unknown       No current facility-administered medications on file prior to encounter.     Current Outpatient Medications on File Prior to Encounter   Medication Sig    albuterol (PROVENTIL/VENTOLIN HFA) 90 mcg/actuation inhaler Inhale 2 puffs into the lungs every 6 (six) hours as needed for Wheezing. Rescue    amLODIPine (NORVASC) 10 MG tablet Take 1 tablet (10 mg total) by mouth once daily.    aspirin (ECOTRIN) 81 MG EC tablet Take 1 tablet (81 mg total) by mouth once daily.    atorvastatin (LIPITOR) 40 MG tablet Take 1 tablet (40 mg total) by mouth every evening.    b complex vitamins tablet Take 1 tablet by mouth once daily.    cyanocobalamin (VITAMIN B-12) 1000 MCG tablet Take 100 mcg by mouth once daily.    dulaglutide (TRULICITY) 4.5 mg/0.5 mL pen injector Inject 4.5 mg into the skin every 7 days. SUNDAY    finasteride (PROSCAR) 5 mg tablet TAKE 1 TABLET ONE TIME DAILY    furosemide (LASIX) 40 MG tablet TAKE 1 PILL IN AM, AND 1/2 PILL IN PM    glucose 4 GM chewable tablet Take 4 tablets (16 g total) by mouth as needed for Low blood sugar.    insulin (LANTUS SOLOSTAR U-100 INSULIN) glargine 100 units/mL SubQ pen Inject into the skin. 15 units QHS and 18 units QAM    insulin aspart U-100 (NOVOLOG) 100 unit/mL injection Inject 3 Units into the skin every 4 (four) hours as needed (for high blood sugar or carbohydrate intake).    insulin glargine-yfgn 100 unit/mL (3 mL) InPn Inject into the skin.    isosorbide mononitrate (IMDUR) 60 MG 24 hr tablet Take 1 tablet (60 mg total) by mouth once daily.    losartan (COZAAR) 50 MG tablet Take 1 tablet  (50 mg total) by mouth once daily.    metoprolol succinate (TOPROL-XL) 25 MG 24 hr tablet Take 1 tablet (25 mg total) by mouth every evening.    metoprolol tartrate (LOPRESSOR) 50 MG tablet Take 50 mg by mouth 2 (two) times daily.    multivit-minerals/folic acid (DIABETIC MULTIVITAMIN ORAL) Take by mouth.    nitroGLYCERIN (NITROSTAT) 0.4 MG SL tablet Place 1 tablet (0.4 mg total) under the tongue every 5 (five) minutes as needed for Chest pain (for chest pain/discomfort). Call 911 if chest pain persists after second dose.    solifenacin (VESICARE) 5 MG tablet Take 5 mg by mouth once daily.    tamsulosin (FLOMAX) 0.4 mg Cap Take 1 capsule (0.4 mg total) by mouth once daily.    vitamin D (VITAMIN D3) 1000 units Tab Take 1 tablet (1,000 Units total) by mouth once daily.     Family History       Problem Relation (Age of Onset)    Diabetes Brother, Sister          Tobacco Use    Smoking status: Former     Current packs/day: 0.00     Average packs/day: 1.5 packs/day for 40.0 years (60.0 ttl pk-yrs)     Types: Cigarettes     Start date: 1954     Quit date: 1994     Years since quittin.5    Smokeless tobacco: Former     Quit date: 2011    Tobacco comments:     approx date   Substance and Sexual Activity    Alcohol use: Not Currently     Comment: occasionally; 1-2 cans of beer a month    Drug use: No    Sexual activity: Never     Birth control/protection: None     Review of Systems   All other systems reviewed and are negative.    Objective:     Vital Signs (Most Recent):  Temp: 97.9 °F (36.6 °C) (25 1603)  Pulse: 105 (25 1603)  Resp: 18 (25 1603)  BP: 100/60 (25 1603)  SpO2: 98 % (25 1603) Vital Signs (24h Range):  Temp:  [97.9 °F (36.6 °C)] 97.9 °F (36.6 °C)  Pulse:  [105] 105  Resp:  [18] 18  SpO2:  [98 %] 98 %  BP: (100)/(60) 100/60     Weight: 83 kg (183 lb)  Body mass index is 26.26 kg/m².     Physical Exam  Vitals reviewed.   Constitutional:       General: He is not in  acute distress.     Appearance: Normal appearance. He is normal weight. He is not ill-appearing, toxic-appearing or diaphoretic.   HENT:      Head: Normocephalic and atraumatic.      Right Ear: External ear normal.      Left Ear: External ear normal.      Nose: Nose normal. No congestion or rhinorrhea.      Mouth/Throat:      Mouth: Mucous membranes are moist.      Pharynx: Oropharynx is clear. No oropharyngeal exudate or posterior oropharyngeal erythema.   Eyes:      General: No scleral icterus.     Extraocular Movements: Extraocular movements intact.      Conjunctiva/sclera: Conjunctivae normal.      Pupils: Pupils are equal, round, and reactive to light.   Neck:      Vascular: No carotid bruit.   Cardiovascular:      Rate and Rhythm: Regular rhythm. Tachycardia present.      Pulses: Normal pulses.      Heart sounds: Normal heart sounds. No murmur heard.     No friction rub. No gallop.      Comments: Well healed sternotomy scar noted  Pulmonary:      Effort: Pulmonary effort is normal. No respiratory distress.      Breath sounds: Normal breath sounds. No stridor. No wheezing, rhonchi or rales.   Chest:      Chest wall: No tenderness.   Abdominal:      General: Abdomen is flat. Bowel sounds are normal. There is no distension.      Palpations: Abdomen is soft. There is no mass.      Tenderness: There is no abdominal tenderness. There is no right CVA tenderness, left CVA tenderness, guarding or rebound.      Hernia: No hernia is present.   Musculoskeletal:         General: No swelling, tenderness, deformity or signs of injury. Normal range of motion.      Cervical back: Normal range of motion and neck supple. No rigidity or tenderness.      Right lower leg: No edema.      Left lower leg: No edema.   Lymphadenopathy:      Cervical: No cervical adenopathy.   Skin:     General: Skin is warm and dry.      Capillary Refill: Capillary refill takes less than 2 seconds.      Coloration: Skin is not jaundiced or pale.       Findings: No bruising, erythema, lesion or rash.   Neurological:      General: No focal deficit present.      Mental Status: He is alert and oriented to person, place, and time. Mental status is at baseline.      Cranial Nerves: No cranial nerve deficit.      Sensory: No sensory deficit.      Motor: No weakness.      Coordination: Coordination normal.   Psychiatric:         Mood and Affect: Mood normal.         Behavior: Behavior normal.         Thought Content: Thought content normal.         Judgment: Judgment normal.              CRANIAL NERVES     CN III, IV, VI   Pupils are equal, round, and reactive to light.       Significant Labs: All pertinent labs within the past 24 hours have been reviewed.    Significant Imaging: I have reviewed all pertinent imaging results/findings within the past 24 hours.    LABS:  Recent Results (from the past 24 hours)   Comprehensive metabolic panel    Collection Time: 07/21/25  5:08 PM   Result Value Ref Range    Sodium 136 136 - 145 mmol/L    Potassium 3.9 3.5 - 5.1 mmol/L    Chloride 106 95 - 110 mmol/L    CO2 20 (L) 23 - 29 mmol/L    Glucose 273 (H) 70 - 110 mg/dL    BUN 39 (H) 8 - 23 mg/dL    Creatinine 2.1 (H) 0.5 - 1.4 mg/dL    Calcium 9.9 8.7 - 10.5 mg/dL    Protein Total 6.6 6.0 - 8.4 gm/dL    Albumin 3.2 (L) 3.5 - 5.2 g/dL    Bilirubin Total 1.2 (H) 0.1 - 1.0 mg/dL    ALP 79 40 - 150 unit/L    AST 23 11 - 45 unit/L    ALT 17 10 - 44 unit/L    Anion Gap 10 8 - 16 mmol/L    eGFR 30 (L) >60 mL/min/1.73/m2   Troponin I #1    Collection Time: 07/21/25  5:08 PM   Result Value Ref Range    Troponin-I 0.109 (H) <=0.026 ng/mL   BNP    Collection Time: 07/21/25  5:08 PM   Result Value Ref Range     (H) 0 - 99 pg/mL   CBC with Differential    Collection Time: 07/21/25  5:08 PM   Result Value Ref Range    WBC 7.68 3.90 - 12.70 K/uL    RBC 4.34 (L) 4.60 - 6.20 M/uL    HGB 12.9 (L) 14.0 - 18.0 gm/dL    HCT 39.3 (L) 40.0 - 54.0 %    MCV 91 82 - 98 fL    MCH 29.7 27.0 - 31.0 pg     MCHC 32.8 32.0 - 36.0 g/dL    RDW 13.1 11.5 - 14.5 %    Platelet Count 164 150 - 450 K/uL    MPV 10.4 9.2 - 12.9 fL    Nucleated RBC 0 <=0 /100 WBC    Neut % 71.8 38 - 73 %    Lymph % 15.0 (L) 18 - 48 %    Mono % 12.0 4 - 15 %    Eos % 0.7 <=8 %    Basophil % 0.1 <=1.9 %    Imm Grans % 0.4 0.0 - 0.5 %    Neut # 5.52 1.8 - 7.7 K/uL    Lymph # 1.15 1 - 4.8 K/uL    Mono # 0.92 0.3 - 1 K/uL    Eos # 0.05 <=0.5 K/uL    Baso # 0.01 <=0.2 K/uL    Imm Grans # 0.03 0.00 - 0.04 K/uL   Troponin I #2    Collection Time: 07/21/25  7:36 PM   Result Value Ref Range    Troponin-I 0.101 (H) <=0.026 ng/mL       RADIOLOGY  X-Ray Chest AP Portable  Result Date: 7/21/2025  EXAM:  XR CHEST AP PORTABLE CLINICAL HISTORY: Chest pain COMPARISON: 02/17/2025 FINDINGS: No confluent airspace opacity or consolidation.  There is no evidence of pleural effusion, pneumothorax, or other acute pulmonary disease.  The cardiomediastinal silhouette is within normal limits.  No acute osseous abnormality is evident.  Sternotomy wires are intact.  Moderate scattered degenerative change and atherosclerotic disease.      No acute cardiopulmonary abnormality. Finalized on: 7/21/2025 6:08 PM By:  Javy Ross MD [facility]# [id number]      2025-07-21 18:11:02.129     [facility]      EKG    MICROBIOLOGY    [facility]    Assessment/Plan:     Assessment & Plan  Chest pain    NSTEMI (non-ST elevated myocardial infarction)  Patient presents with NSTEMI. Chest pain is currently controlled. ASCENCION score is 6. Patient is currently on NSTEMI Pathway.    EKG reviewed. Troponins reviewed and results noted-   Recent Labs   Lab 07/22/25  0131   TROPONINI 0.105*     Lipid panel reviewed and shows-     Lab Results   Component Value Date    LDLCALC 41.6 (L) 07/21/2025     Lab Results   Component Value Date    TRIG 67 07/21/2025       Medical management includes; Beta Blocker, Anticoagulation, High Intensity Stain, and Nitrate Echo has not been performed. Latest ECHO results are as follows-  Results for orders placed during the hospital encounter of 10/25/23    Echo    Interpretation Summary    Left Ventricle: The left ventricle is normal in size. Normal wall thickness. regional wall motion abnormalities present. There is low normal systolic function with a visually estimated ejection fraction of 50 - 55%.    Left Atrium: Left atrium is severely dilated.    Right Ventricle: Normal right ventricular cavity size. Wall thickness is normal. Right ventricle wall motion  is normal. Systolic function is normal.    Right Atrium: Right atrium is dilated.    Mitral Valve: There is mild regurgitation.    Pulmonic Valve: There is mild regurgitation.    Pulmonary Artery: The estimated pulmonary artery systolic pressure is 24 mmHg.    IVC/SVC: Normal venous pressure at 3 mmHg.    Consult for cardiac rehab is ordered. Patient counseled on lifestyle modifications- begin progressive daily aerobic exercise program, follow a low fat, low cholesterol diet, reduce salt in diet and cooking, reduce exposure to stress, improve dietary compliance, use calcium 1 gram daily with Vit D, continue current medications, continue current healthy lifestyle patterns, and return for routine annual checkups. Cardiology is consulted. Plan of care pending with cardiology team. Continue to monitor patient closely and adjust therapy as needed.    Coronary artery disease of bypass graft of native heart with stable angina pectoris  Patient with known CAD s/p stent placement and CABG, which is controlled Will continue home medications and monitor for S/Sx of angina/ACS. Continue to monitor on telemetry.  Patient currently on NSTEMI pathway and awaiting further evaluation/recommendations from Cardiology.     Claudication in peripheral vascular disease  Chronic.  Currently asymptomatic.  Plan:  -continue home medications  -continued treatment of NSTEMI as noted above    Acute kidney injury superimposed on chronic kidney disease  Baseline creatinine  "is 1.4-1.7. Most recent creatinine and eGFR are listed below.  Recent Labs     07/21/25  1708   CREATININE 2.1*   EGFRNORACEVR 30*     Plan  -LJ is currently undergoing medical management  -Avoid nephrotoxins and renally dose meds for GFR listed above  -Monitor urine output, serial BMP, and adjust therapy as needed    Type 2 diabetes mellitus with hyperglycemia, with long-term current use of insulin  Patient's FSGs are uncontrolled due to hyperglycemia on current medication regimen.  Last A1c reviewed-   Lab Results   Component Value Date    HGBA1C 8.4 (H) 04/07/2025     Most recent fingerstick glucose reviewed- No results for input(s): "POCTGLUCOSE" in the last 24 hours.  Current correctional scale  Low  Titrate as needed anti-hyperglycemic dose as follows-   Antihyperglycemics (From admission, onward)      Start     Stop Route Frequency Ordered    07/22/25 0900  insulin glargine U-100 (Lantus) pen 10 Units         -- SubQ 2 times daily 07/22/25 0433    07/22/25 0533  insulin aspart U-100 pen 0-5 Units         -- SubQ Before meals & nightly PRN 07/22/25 0433     Plan:  -SSI  -A1c  -Accu-checks  -Hold oral hypoglycemics while patient is in the hospital  -Continue home long-acting insulin at 20% decrease, titrate up as needed  -Hypoglycemic protocol      Hypertension associated with diabetes  Chronic, controlled.  Latest blood pressure and vitals reviewed-   Temp:  [97.5 °F (36.4 °C)-97.9 °F (36.6 °C)]   Pulse:  []   Resp:  [16-19]   BP: (100-151)/(60-70)   SpO2:  [96 %-99 %] .   Home meds for hypertension were reviewed and noted below.   Hypertension Medications              amLODIPine (NORVASC) 10 MG tablet Take 1 tablet (10 mg total) by mouth once daily.    furosemide (LASIX) 40 MG tablet TAKE 1 PILL IN AM, AND 1/2 PILL IN PM    isosorbide mononitrate (IMDUR) 60 MG 24 hr tablet Take 1 tablet (60 mg total) by mouth once daily.    losartan (COZAAR) 50 MG tablet Take 1 tablet (50 mg total) by mouth once daily. "    metoprolol succinate (TOPROL-XL) 25 MG 24 hr tablet Take 1 tablet (25 mg total) by mouth every evening.    metoprolol tartrate (LOPRESSOR) 50 MG tablet Take 50 mg by mouth 2 (two) times daily.    nitroGLYCERIN (NITROSTAT) 0.4 MG SL tablet Place 1 tablet (0.4 mg total) under the tongue every 5 (five) minutes as needed for Chest pain (for chest pain/discomfort). Call 911 if chest pain persists after second dose.     While in the hospital, will manage blood pressure as follows; Continue home antihypertensive regimen, holding losartan and lasix in setting of LJ on CKD.    Will utilize p.r.n. blood pressure medication only if patient's blood pressure greater than  180/110 and he develops symptoms such as worsening chest pain or shortness of breath.    Chronic combined systolic and diastolic congestive heart failure  Patient has Combined Systolic and Diastolic heart failure that is Chronic. On presentation their CHF was well compensated. Most recent BNP and echo results are listed below.  Recent Labs     07/21/25  1708   *     Latest ECHO  Results for orders placed during the hospital encounter of 10/25/23    Echo    Interpretation Summary    Left Ventricle: The left ventricle is normal in size. Normal wall thickness. regional wall motion abnormalities present. There is low normal systolic function with a visually estimated ejection fraction of 50 - 55%.    Left Atrium: Left atrium is severely dilated.    Right Ventricle: Normal right ventricular cavity size. Wall thickness is normal. Right ventricle wall motion  is normal. Systolic function is normal.    Right Atrium: Right atrium is dilated.    Mitral Valve: There is mild regurgitation.    Pulmonic Valve: There is mild regurgitation.    Pulmonary Artery: The estimated pulmonary artery systolic pressure is 24 mmHg.    IVC/SVC: Normal venous pressure at 3 mmHg.    Current Heart Failure Medications  metoprolol succinate (TOPROL-XL) 24 hr tablet 25 mg, Nightly,  Oral    Plan  -Monitor strict I&Os and daily weights.    -Place on telemetry  -Low sodium diet  -Place on fluid restriction of 2 L.   -Cardiology has been consulted  -The patient's volume status is at their baseline  -Continue home medications    Iron deficiency anemia  Anemia is likely due to Iron deficiency. Most recent hemoglobin and hematocrit are listed below.  Recent Labs     07/21/25  1708   HGB 12.9*   HCT 39.3*     Plan  -Monitor serial CBC: Daily  -Transfuse PRBC if patient becomes hemodynamically unstable, symptomatic or H/H drops below 7/21.  -Patient has not received any PRBC transfusions to date  -Patient's anemia is currently stable    Hyperlipidemia associated with type 2 diabetes mellitus  Patient is chronically on statin.will continue for now. Last Lipid Panel:   Lab Results   Component Value Date    CHOL 93 (L) 07/21/2025    HDL 38 (L) 07/21/2025    LDLCALC 41.6 (L) 07/21/2025    TRIG 67 07/21/2025    CHOLHDL 40.9 07/21/2025     Plan:  -Continue home medication  -low fat/low calorie diet    Benign prostatic hyperplasia  Chronic.  Plan:  -continue home medication      VTE Risk Mitigation (From admission, onward)           Ordered     heparin 25,000 units in dextrose 5% (100 units/ml) IV bolus from bag LOW INTENSITY nomogram - [facility]  As needed (PRN)        Question:  Heparin Infusion Adjustment (DO NOT MODIFY ANSWER)  Answer:  [email or web address]\epic\Images\Pharmacy\HeparinInfusions\heparin LOW INTENSITY nomogram for [facility] [id number]    07/21/25 2023     heparin 25,000 units in dextrose 5% (100 units/ml) IV bolus from bag LOW INTENSITY nomogram - [facility]  As needed (PRN)        Question:  Heparin Infusion Adjustment (DO NOT MODIFY ANSWER)  Answer:  [email or web address]\epic\Images\Pharmacy\HeparinInfusions\heparin LOW INTENSITY nomogram for [facility] [id number].pdf    07/21/25 2023     heparin 25,000 units in dextrose 5% 250 mL (100 units/mL) infusion LOW INTENSITY nomogram - [facility]  Continuous        Question:  Begin at  (units/kg/hr)  Answer:  12    07/21/25 2023     Reason for No Pharmacological VTE Prophylaxis  Once        Question:  Reasons:  Answer:  Physician Provided (leave comment)  Comment:  currently on heparin drip    07/21/25 2030     IP VTE HIGH RISK PATIENT  Once         07/21/25 2030     Place sequential compression device  Until discontinued         07/21/25 2030                  //Core Measures   -DVT proph: SCDs, heparin drip  -Code status: Full    -Surrogate: daughter       Components of this note were documented using a voice recognition system and are subject to errors not corrected at the time the document was proof read. Please contact the author for any clarifications.        eRx Smith MD  Department of Hospital Medicine  [facility] - Emergency Dept.

## 2025-07-22 NOTE — ASSESSMENT & PLAN NOTE
Anemia is likely due to Iron deficiency. Most recent hemoglobin and hematocrit are listed below.  Recent Labs     07/21/25  1708   HGB 12.9*   HCT 39.3*     Plan  -Monitor serial CBC: Daily  -Transfuse PRBC if patient becomes hemodynamically unstable, symptomatic or H/H drops below 7/21.  -Patient has not received any PRBC transfusions to date  -Patient's anemia is currently stable

## 2025-07-22 NOTE — ASSESSMENT & PLAN NOTE
Patient with known CAD s/p stent placement and CABG, which is controlled Will continue home medications and monitor for S/Sx of angina/ACS. Continue to monitor on telemetry.  Patient currently on NSTEMI pathway and awaiting further evaluation/recommendations from Cardiology.     7/22/25  Plans as above

## 2025-07-22 NOTE — INTERVAL H&P NOTE
The patient has been examined and the H&P has been reviewed:    I concur with the findings and no changes have occurred since H&P was written.    Procedure risks, benefits and alternative options discussed and understood by patient/family.          Active Hospital Problems    Diagnosis  POA    *Chest pain [R07.9]  Yes    Acute kidney injury superimposed on chronic kidney disease [N17.9, N18.9]  Yes    NSTEMI (non-ST elevated myocardial infarction) [I21.4]  Yes    Type 2 diabetes mellitus with hyperglycemia, with long-term current use of insulin [E11.65, Z79.4]  Not Applicable     Chronic    Iron deficiency anemia [D50.9]  Yes     Chronic    Chronic combined systolic and diastolic congestive heart failure [I50.42]  Yes     Chronic    Hyperlipidemia associated with type 2 diabetes mellitus [E11.69, E78.5]  Yes     Chronic    Claudication in peripheral vascular disease [I73.9]  Yes     Chronic     US doppler arterial legs 7/6/2015  RENZO 7/6/2015      RBBB [I45.10]  Yes     Chronic     EKG 5/8/2014      Benign prostatic hyperplasia [N40.0]  Yes     Chronic     Patient states has urge incontinence      Coronary artery disease of bypass graft of native heart with stable angina pectoris [I25.708]  Yes     Chronic     Per patient s/p CABG x4 5/2011      Hypertension associated with diabetes [E11.59, I15.2]  Yes     Chronic      Resolved Hospital Problems   No resolved problems to display.

## 2025-07-22 NOTE — ASSESSMENT & PLAN NOTE
-Patient with history of CAD s/p remote CABG and PCI who presents with substernal CP relieved by nitro  -Troponin elevated but flat, 0.109>0.101>0.105>0.108>0.116  -Stable during exam CP free  -Continue ASA, statin, CCB, BB, Imdur, heparin gtt  -TTE with EF of 45-50%, +WMA  -Dr. Kingston discussed risks/benefits of LHC, patient agreeable with proceeding

## 2025-07-22 NOTE — SUBJECTIVE & OBJECTIVE
Past Medical History:   Diagnosis Date    Abnormal brain MRI 01/21/2018    Chronic microvascular ischemia changes per MRI July 9, 2007 in [facility]    Abnormal ECG 10/31/2013    Abnormal stress test 01/28/2016    Alcohol dependence     States alcohol abuse ended in 1994    AP (angina pectoris) 01/28/2016    Asthma     Bladder cancer     Carotid artery occlusion     Cataract     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    2 - Normal left ventricular systolic function (EF 60-65%).    3 - Normal left ventricular diastolic function.    4 - Mild left atrial enlargement.    5 - Normal right ventricular systolic function .    6 - Trivial to mild aortic regurgitation.    7 - Trivial to mild mitral regurgitation.  10 - Trivial to mild pulmonic regurgitation.     Hematuria 06/25/2020    History of alcohol abuse 01/22/2018    Patient denies drinking to excess since 1994.    History of atherectomy 01/21/2018 2/2016 [facility]    History of chronic kidney disease 01/22/2018    History of CKD 3    History of PTCA 10/31/2013    History of PTCA 03/09/2016    Hyperlipidemia     Hypertension     Insomnia 06/17/2013    Iron deficiency anemia 10/19/2023    Ischemic cardiomyopathy 10/31/2013    Long term (current) use of antithrombotics/antiplatelets 01/21/2018    Malignant neoplasm of overlapping sites of bladder 06/08/2023    Myocardial infarction     Old MI (myocardial infarction) 1994    Peripheral vascular disease     Polyneuropathy     RBBB 10/31/2013    S/P CABG (coronary artery bypass graft)  10/31/2013    Shortness of breath 10/31/2013    Simple chronic bronchitis 6/17/2013    Tobacco dependence     resolved    Trouble in sleeping     Type 2 diabetes with peripheral circulatory disorder, controlled     Wears glasses        Past Surgical History:   Procedure Laterality Date    APPENDECTOMY      CARDIAC CATHETERIZATION      2016    CARDIAC SURGERY  1994    balloon angioplasty    CATARACT EXTRACTION W/  INTRAOCULAR LENS IMPLANT Right 02/01/2017    CORONARY ARTERY BYPASS GRAFT  05/2011    per patient x4    CYSTOSCOPY W/ RETROGRADES Bilateral 5/15/2025    Procedure: CYSTOSCOPY, WITH RETROGRADE PYELOGRAM;  Surgeon: Hortencia Michael MD;  Location: [facility] OR;  Service: Urology;  Laterality: Bilateral;    HERNIA REPAIR      OPEN REDUCTION AND INTERNAL FIXATION (ORIF) OF INJURY OF HIP      PCIOL Bilateral OD 02/01/17/OS 02/15/17    DR. ALONZO    RETROGRADE PYELOGRAPHY Bilateral 8/29/2024    Procedure: PYELOGRAM, RETROGRADE;  Surgeon: Hortencia Michael MD;  Location: [facility] OR;  Service: Urology;  Laterality: Bilateral;    TURBT (TRANSURETHRAL RESECTION OF BLADDER TUMOR) N/A 6/8/2023    Procedure: TURBT (TRANSURETHRAL RESECTION OF BLADDER TUMOR);  Surgeon: Hortencia Michael MD;  Location: [facility] OR;  Service: Urology;  Laterality: N/A;    TURBT (TRANSURETHRAL RESECTION OF BLADDER TUMOR) N/A 2/8/2024    Procedure: TURBT (TRANSURETHRAL RESECTION OF BLADDER TUMOR);  Surgeon: Hortencia Michael MD;  Location: [facility] OR;  Service: Urology;  Laterality: N/A;    TURBT (TRANSURETHRAL RESECTION OF BLADDER TUMOR) N/A 8/29/2024    Procedure: TURBT (TRANSURETHRAL RESECTION OF BLADDER TUMOR);  Surgeon: Hortencia Michael MD;  Location: [facility] OR;  Service: Urology;  Laterality: N/A;    TURBT (TRANSURETHRAL RESECTION OF BLADDER TUMOR) N/A 5/15/2025    Procedure: TURBT (TRANSURETHRAL RESECTION OF BLADDER TUMOR);  Surgeon: Hortencia Michael MD;  Location: [facility] OR;  Service: Urology;  Laterality: N/A;       Review of patient's  allergies indicates:   Allergen Reactions    Pseudoephedrine-guaifenesin Shortness Of Breath    Panmist dm  [pseudoephedrine-dm-guaifenesin]      Other reaction(s): Unknown       No current facility-administered medications on file prior to encounter.     Current Outpatient Medications on File Prior to Encounter   Medication Sig    albuterol (PROVENTIL/VENTOLIN HFA) 90 mcg/actuation inhaler Inhale 2 puffs into the lungs every 6 (six) hours as needed for Wheezing. Rescue    amLODIPine (NORVASC) 10 MG tablet Take 1 tablet (10 mg total) by mouth once daily.    aspirin (ECOTRIN) 81 MG EC tablet Take 1 tablet (81 mg total) by mouth once daily.    atorvastatin (LIPITOR) 40 MG tablet Take 1 tablet (40 mg total) by mouth every evening.    b complex vitamins tablet Take 1 tablet by mouth once daily.    cyanocobalamin (VITAMIN B-12) 1000 MCG tablet Take 100 mcg by mouth once daily.    dulaglutide (TRULICITY) 4.5 mg/0.5 mL pen injector Inject 4.5 mg into the skin every 7 days. SUNDAY    finasteride (PROSCAR) 5 mg tablet TAKE 1 TABLET ONE TIME DAILY    furosemide (LASIX) 40 MG tablet TAKE 1 PILL IN AM, AND 1/2 PILL IN PM    glucose 4 GM chewable tablet Take 4 tablets (16 g total) by mouth as needed for Low blood sugar.    insulin (LANTUS SOLOSTAR U-100 INSULIN) glargine 100 units/mL SubQ pen Inject into the skin. 15 units QHS and 18 units QAM    insulin aspart U-100 (NOVOLOG) 100 unit/mL injection Inject 3 Units into the skin every 4 (four) hours as needed (for high blood sugar or carbohydrate intake).    insulin glargine-yfgn 100 unit/mL (3 mL) InPn Inject into the skin.    isosorbide mononitrate (IMDUR) 60 MG 24 hr tablet Take 1 tablet (60 mg total) by mouth once daily.    losartan (COZAAR) 50 MG tablet Take 1 tablet (50 mg total) by mouth once daily.    metoprolol succinate (TOPROL-XL) 25 MG 24 hr tablet Take 1 tablet (25 mg total) by mouth every evening.    metoprolol tartrate (LOPRESSOR) 50 MG tablet Take 50 mg by mouth 2  (two) times daily.    multivit-minerals/folic acid (DIABETIC MULTIVITAMIN ORAL) Take by mouth.    nitroGLYCERIN (NITROSTAT) 0.4 MG SL tablet Place 1 tablet (0.4 mg total) under the tongue every 5 (five) minutes as needed for Chest pain (for chest pain/discomfort). Call 911 if chest pain persists after second dose.    solifenacin (VESICARE) 5 MG tablet Take 5 mg by mouth once daily.    tamsulosin (FLOMAX) 0.4 mg Cap Take 1 capsule (0.4 mg total) by mouth once daily.    vitamin D (VITAMIN D3) 1000 units Tab Take 1 tablet (1,000 Units total) by mouth once daily.     Family History       Problem Relation (Age of Onset)    Diabetes Brother, Sister          Tobacco Use    Smoking status: Former     Current packs/day: 0.00     Average packs/day: 1.5 packs/day for 40.0 years (60.0 ttl pk-yrs)     Types: Cigarettes     Start date: 1954     Quit date: 1994     Years since quittin.5    Smokeless tobacco: Former     Quit date: 2011    Tobacco comments:     approx date   Substance and Sexual Activity    Alcohol use: Not Currently     Comment: occasionally; 1-2 cans of beer a month    Drug use: No    Sexual activity: Never     Birth control/protection: None     Review of Systems   All other systems reviewed and are negative.    Objective:     Vital Signs (Most Recent):  Temp: 97.9 °F (36.6 °C) (25 1603)  Pulse: 105 (25 1603)  Resp: 18 (25 1603)  BP: 100/60 (25 1603)  SpO2: 98 % (25 1603) Vital Signs (24h Range):  Temp:  [97.9 °F (36.6 °C)] 97.9 °F (36.6 °C)  Pulse:  [105] 105  Resp:  [18] 18  SpO2:  [98 %] 98 %  BP: (100)/(60) 100/60     Weight: 83 kg (183 lb)  Body mass index is 26.26 kg/m².     Physical Exam  Vitals reviewed.   Constitutional:       General: He is not in acute distress.     Appearance: Normal appearance. He is normal weight. He is not ill-appearing, toxic-appearing or diaphoretic.   HENT:      Head: Normocephalic and atraumatic.      Right Ear: External ear normal.       Left Ear: External ear normal.      Nose: Nose normal. No congestion or rhinorrhea.      Mouth/Throat:      Mouth: Mucous membranes are moist.      Pharynx: Oropharynx is clear. No oropharyngeal exudate or posterior oropharyngeal erythema.   Eyes:      General: No scleral icterus.     Extraocular Movements: Extraocular movements intact.      Conjunctiva/sclera: Conjunctivae normal.      Pupils: Pupils are equal, round, and reactive to light.   Neck:      Vascular: No carotid bruit.   Cardiovascular:      Rate and Rhythm: Regular rhythm. Tachycardia present.      Pulses: Normal pulses.      Heart sounds: Normal heart sounds. No murmur heard.     No friction rub. No gallop.      Comments: Well healed sternotomy scar noted  Pulmonary:      Effort: Pulmonary effort is normal. No respiratory distress.      Breath sounds: Normal breath sounds. No stridor. No wheezing, rhonchi or rales.   Chest:      Chest wall: No tenderness.   Abdominal:      General: Abdomen is flat. Bowel sounds are normal. There is no distension.      Palpations: Abdomen is soft. There is no mass.      Tenderness: There is no abdominal tenderness. There is no right CVA tenderness, left CVA tenderness, guarding or rebound.      Hernia: No hernia is present.   Musculoskeletal:         General: No swelling, tenderness, deformity or signs of injury. Normal range of motion.      Cervical back: Normal range of motion and neck supple. No rigidity or tenderness.      Right lower leg: No edema.      Left lower leg: No edema.   Lymphadenopathy:      Cervical: No cervical adenopathy.   Skin:     General: Skin is warm and dry.      Capillary Refill: Capillary refill takes less than 2 seconds.      Coloration: Skin is not jaundiced or pale.      Findings: No bruising, erythema, lesion or rash.   Neurological:      General: No focal deficit present.      Mental Status: He is alert and oriented to person, place, and time. Mental status is at baseline.       Cranial Nerves: No cranial nerve deficit.      Sensory: No sensory deficit.      Motor: No weakness.      Coordination: Coordination normal.   Psychiatric:         Mood and Affect: Mood normal.         Behavior: Behavior normal.         Thought Content: Thought content normal.         Judgment: Judgment normal.              CRANIAL NERVES     CN III, IV, VI   Pupils are equal, round, and reactive to light.       Significant Labs: All pertinent labs within the past 24 hours have been reviewed.    Significant Imaging: I have reviewed all pertinent imaging results/findings within the past 24 hours.    LABS:  Recent Results (from the past 24 hours)   Comprehensive metabolic panel    Collection Time: 07/21/25  5:08 PM   Result Value Ref Range    Sodium 136 136 - 145 mmol/L    Potassium 3.9 3.5 - 5.1 mmol/L    Chloride 106 95 - 110 mmol/L    CO2 20 (L) 23 - 29 mmol/L    Glucose 273 (H) 70 - 110 mg/dL    BUN 39 (H) 8 - 23 mg/dL    Creatinine 2.1 (H) 0.5 - 1.4 mg/dL    Calcium 9.9 8.7 - 10.5 mg/dL    Protein Total 6.6 6.0 - 8.4 gm/dL    Albumin 3.2 (L) 3.5 - 5.2 g/dL    Bilirubin Total 1.2 (H) 0.1 - 1.0 mg/dL    ALP 79 40 - 150 unit/L    AST 23 11 - 45 unit/L    ALT 17 10 - 44 unit/L    Anion Gap 10 8 - 16 mmol/L    eGFR 30 (L) >60 mL/min/1.73/m2   Troponin I #1    Collection Time: 07/21/25  5:08 PM   Result Value Ref Range    Troponin-I 0.109 (H) <=0.026 ng/mL   BNP    Collection Time: 07/21/25  5:08 PM   Result Value Ref Range     (H) 0 - 99 pg/mL   CBC with Differential    Collection Time: 07/21/25  5:08 PM   Result Value Ref Range    WBC 7.68 3.90 - 12.70 K/uL    RBC 4.34 (L) 4.60 - 6.20 M/uL    HGB 12.9 (L) 14.0 - 18.0 gm/dL    HCT 39.3 (L) 40.0 - 54.0 %    MCV 91 82 - 98 fL    MCH 29.7 27.0 - 31.0 pg    MCHC 32.8 32.0 - 36.0 g/dL    RDW 13.1 11.5 - 14.5 %    Platelet Count 164 150 - 450 K/uL    MPV 10.4 9.2 - 12.9 fL    Nucleated RBC 0 <=0 /100 WBC    Neut % 71.8 38 - 73 %    Lymph % 15.0 (L) 18 - 48 %    Mono  % 12.0 4 - 15 %    Eos % 0.7 <=8 %    Basophil % 0.1 <=1.9 %    Imm Grans % 0.4 0.0 - 0.5 %    Neut # 5.52 1.8 - 7.7 K/uL    Lymph # 1.15 1 - 4.8 K/uL    Mono # 0.92 0.3 - 1 K/uL    Eos # 0.05 <=0.5 K/uL    Baso # 0.01 <=0.2 K/uL    Imm Grans # 0.03 0.00 - 0.04 K/uL   Troponin I #2    Collection Time: 07/21/25  7:36 PM   Result Value Ref Range    Troponin-I 0.101 (H) <=0.026 ng/mL       RADIOLOGY  X-Ray Chest AP Portable  Result Date: 7/21/2025  EXAM:  XR CHEST AP PORTABLE CLINICAL HISTORY: Chest pain COMPARISON: 02/17/2025 FINDINGS: No confluent airspace opacity or consolidation.  There is no evidence of pleural effusion, pneumothorax, or other acute pulmonary disease.  The cardiomediastinal silhouette is within normal limits.  No acute osseous abnormality is evident.  Sternotomy wires are intact.  Moderate scattered degenerative change and atherosclerotic disease.      No acute cardiopulmonary abnormality. Finalized on: 7/21/2025 6:08 PM By:  Javy Ross MD [facility]# [id number]      2025-07-21 18:11:02.129     [facility]      EKG    MICROBIOLOGY    MDM

## 2025-07-22 NOTE — ASSESSMENT & PLAN NOTE
Patient presents with NSTEMI. Chest pain is currently controlled. ASCENCION score is 6. Patient is currently on NSTEMI Pathway.    EKG reviewed. Troponins reviewed and results noted-   Recent Labs   Lab 07/22/25  0907   TROPONINI 0.117*     Lipid panel reviewed and shows-     Lab Results   Component Value Date    LDLCALC 41.6 (L) 07/21/2025     Lab Results   Component Value Date    TRIG 67 07/21/2025       Medical management includes; Beta Blocker, Anticoagulation, High Intensity Stain, and Nitrate Echo has not been performed. Latest ECHO results are as follows- Results for orders placed during the hospital encounter of 10/25/23    Echo    Interpretation Summary    Left Ventricle: The left ventricle is normal in size. Normal wall thickness. regional wall motion abnormalities present. There is low normal systolic function with a visually estimated ejection fraction of 50 - 55%.    Left Atrium: Left atrium is severely dilated.    Right Ventricle: Normal right ventricular cavity size. Wall thickness is normal. Right ventricle wall motion  is normal. Systolic function is normal.    Right Atrium: Right atrium is dilated.    Mitral Valve: There is mild regurgitation.    Pulmonic Valve: There is mild regurgitation.    Pulmonary Artery: The estimated pulmonary artery systolic pressure is 24 mmHg.    IVC/SVC: Normal venous pressure at 3 mmHg.    Consult for cardiac rehab is ordered. Patient counseled on lifestyle modifications- begin progressive daily aerobic exercise program, follow a low fat, low cholesterol diet, reduce salt in diet and cooking, reduce exposure to stress, improve dietary compliance, use calcium 1 gram daily with Vit D, continue current medications, continue current healthy lifestyle patterns, and return for routine annual checkups. Cardiology is consulted. Plan of care pending with cardiology team. Continue to monitor patient closely and adjust therapy as needed.      7/22/25  Echocardiogram shows reduce EF  with WMA. Plans for [facility] Today. Continue heparin infusion

## 2025-07-22 NOTE — HPI
Mr. Pino is an 85 year old male patient whose current medical conditions include CAD s/p remote CABG in 2011, s/p prior PCI, chronic RBBB, bladder CA, MR COPD, HTN, PAD, DM type II, MR, and ICM who presented to [facility] ED yesterday due to acute onset of substernal chest pressure that onset while driving to the store. Pain was heavy and intense and eventually relieved by two sublingual nitro as well as nitro spray given by EMS. Other associated symptoms included non-productive cough and SOB. Patient denied any associated nausea, vomiting, palpitations, near syncope, or syncope. Initial workup in ED revealed troponin of 0.101, creatinine of 2.1, and BNP of 160. Patient subsequently placed on a heparin drip and admitted for further evaluation and treatment. Cardiology consulted to assist with management. Patient seen and examined today, resting in bed. Feels ok. Currently CP free. No other CV complaints. States he has not ever had to use nitro prior to this occasion. He reports compliance with his medications. Very active for his age, lives alone/performs all of his ADL's. Followed routinely in clinic by Dr. Sadler. Labs reviewed. Troponin elevated but flat, 0.109>0.101>0.105>0.108>0.116. TTE with EF of 45-50%, +WMA. EKG reviewed, no acute ischemic changes appreciated. Prior MPI stress test 11/2023 negative. [facility] planned today.

## 2025-07-22 NOTE — ASSESSMENT & PLAN NOTE
Chronic, controlled.  Latest blood pressure and vitals reviewed-   Temp:  [97.5 °F (36.4 °C)-97.9 °F (36.6 °C)]   Pulse:  []   Resp:  [16-19]   BP: (100-151)/(60-70)   SpO2:  [96 %-99 %] .   Home meds for hypertension were reviewed and noted below.   Hypertension Medications              amLODIPine (NORVASC) 10 MG tablet Take 1 tablet (10 mg total) by mouth once daily.    furosemide (LASIX) 40 MG tablet TAKE 1 PILL IN AM, AND 1/2 PILL IN PM    isosorbide mononitrate (IMDUR) 60 MG 24 hr tablet Take 1 tablet (60 mg total) by mouth once daily.    losartan (COZAAR) 50 MG tablet Take 1 tablet (50 mg total) by mouth once daily.    metoprolol succinate (TOPROL-XL) 25 MG 24 hr tablet Take 1 tablet (25 mg total) by mouth every evening.    metoprolol tartrate (LOPRESSOR) 50 MG tablet Take 50 mg by mouth 2 (two) times daily.    nitroGLYCERIN (NITROSTAT) 0.4 MG SL tablet Place 1 tablet (0.4 mg total) under the tongue every 5 (five) minutes as needed for Chest pain (for chest pain/discomfort). Call 911 if chest pain persists after second dose.     While in the hospital, will manage blood pressure as follows; Continue home antihypertensive regimen, holding losartan and lasix in setting of LJ on CKD.    Will utilize p.r.n. blood pressure medication only if patient's blood pressure greater than  180/110 and he develops symptoms such as worsening chest pain or shortness of breath.

## 2025-07-22 NOTE — ASSESSMENT & PLAN NOTE
Chronic.  Currently asymptomatic.  Plan:  -continue home medications  -continued treatment of NSTEMI as noted above

## 2025-07-22 NOTE — ASSESSMENT & PLAN NOTE
Chronic, controlled.  Latest blood pressure and vitals reviewed-   Temp:  [97.5 °F (36.4 °C)-98.6 °F (37 °C)]   Pulse:  []   Resp:  [16-20]   BP: ()/(50-70)   SpO2:  [93 %-99 %] .   Home meds for hypertension were reviewed and noted below.   Hypertension Medications              amLODIPine (NORVASC) 10 MG tablet Take 1 tablet (10 mg total) by mouth once daily.    furosemide (LASIX) 40 MG tablet TAKE 1 PILL IN AM, AND 1/2 PILL IN PM    isosorbide mononitrate (IMDUR) 60 MG 24 hr tablet Take 1 tablet (60 mg total) by mouth once daily.    losartan (COZAAR) 50 MG tablet Take 1 tablet (50 mg total) by mouth once daily.    metoprolol succinate (TOPROL-XL) 25 MG 24 hr tablet Take 1 tablet (25 mg total) by mouth every evening.    metoprolol tartrate (LOPRESSOR) 50 MG tablet Take 50 mg by mouth 2 (two) times daily.    nitroGLYCERIN (NITROSTAT) 0.4 MG SL tablet Place 1 tablet (0.4 mg total) under the tongue every 5 (five) minutes as needed for Chest pain (for chest pain/discomfort). Call 911 if chest pain persists after second dose.     While in the hospital, will manage blood pressure as follows; Continue home antihypertensive regimen, holding losartan and lasix in setting of LJ on CKD.    Will utilize p.r.n. blood pressure medication only if patient's blood pressure greater than  180/110 and he develops symptoms such as worsening chest pain or shortness of breath.

## 2025-07-22 NOTE — SUBJECTIVE & OBJECTIVE
Interval History: Plans for [facility] today.    Review of Systems   Constitutional:  Negative for chills, diaphoresis, fatigue and fever.   HENT:  Negative for drooling, ear pain, rhinorrhea and sore throat.    Eyes: Negative.    Respiratory:  Negative for cough, shortness of breath and wheezing.    Cardiovascular:  Positive for chest pain. Negative for palpitations and leg swelling.   Gastrointestinal:  Negative for abdominal pain, constipation, diarrhea and nausea.   Endocrine: Negative.    Genitourinary:  Negative for dysuria, hematuria and urgency.   Musculoskeletal: Negative.    Skin:  Negative for color change and wound.   Allergic/Immunologic: Negative.    Neurological:  Negative for dizziness, syncope and speech difficulty.   Hematological: Negative.    Psychiatric/Behavioral: Negative.       Objective:     Vital Signs (Most Recent):  Temp: 98 °F (36.7 °C) (07/22/25 1545)  Pulse: 108 (07/22/25 1545)  Resp: 18 (07/22/25 1545)  BP: (!) 98/50 (07/22/25 1545)  SpO2: 96 % (07/22/25 1545) Vital Signs (24h Range):  Temp:  [97.5 °F (36.4 °C)-98.6 °F (37 °C)] 98 °F (36.7 °C)  Pulse:  [] 108  Resp:  [16-20] 18  SpO2:  [93 %-99 %] 96 %  BP: ()/(50-70) 98/50     Weight: 83 kg (182 lb 15.7 oz)  Body mass index is 26.26 kg/m².    Intake/Output Summary (Last 24 hours) at 7/22/2025 7080  Last data filed at 7/22/2025 1000  Gross per 24 hour   Intake --   Output 500 ml   Net -500 ml         Physical Exam          Significant Labs: All pertinent labs within the past 24 hours have been reviewed.  CBC:   Recent Labs   Lab 07/21/25 1708 07/22/25  0410   WBC 7.68 6.13   HGB 12.9* 13.4*   HCT 39.3* 40.2    167     CMP:   Recent Labs   Lab 07/21/25  1708 07/22/25  0907    140   K 3.9 4.4    107   CO2 20* 21*   * 130*   BUN 39* 34*   CREATININE 2.1* 1.6*   CALCIUM 9.9 10.6*   PROT 6.6  --    ALBUMIN 3.2*  --    BILITOT 1.2*  --    ALKPHOS 79  --    AST 23  --    ALT 17  --    ANIONGAP 10 12        Significant Imaging:        Left Ventricle: The left ventricle is normal in size. Moderately increased wall thickness. There is concentric hypertrophy. Mild global hypokinesis and regional wall motion abnormalities present. Septal motion is abnormal. There is mildly reduced systolic function with a visually estimated ejection fraction of 40 - 50%. Grade I diastolic dysfunction.    Right Ventricle: The right ventricle is normal in sizeWall thickness is normal. . Right ventricle wall motion has global hypokinesis. Systolic function is moderately reduced.    Left Atrium: The left atrium is mildly dilated.    Aortic Valve: There is moderate aortic valve sclerosis. Mildly calcified cusps. There is mild annular calcification present.    Mitral Valve: There is mild regurgitation.    Tricuspid Valve: There is mild regurgitation.    Pulmonary Artery: The estimated pulmonary artery systolic pressure is 24 mmHg.    IVC/SVC: Normal venous pressure at 3 mmHg.

## 2025-07-22 NOTE — ASSESSMENT & PLAN NOTE
Patient is chronically on statin.will continue for now. Last Lipid Panel:   Lab Results   Component Value Date    CHOL 93 (L) 07/21/2025    HDL 38 (L) 07/21/2025    LDLCALC 41.6 (L) 07/21/2025    TRIG 67 07/21/2025    CHOLHDL 40.9 07/21/2025     Plan:  -Continue home medication  -low fat/low calorie diet

## 2025-07-22 NOTE — PLAN OF CARE
[facility] - Telemetry (Hospital)  Initial Discharge Assessment       Primary Care Provider: MUKUL Garg MD    Admission Diagnosis: Non-ST elevation myocardial infarction (NSTEMI) [I21.4]  NSTEMI (non-ST elevation myocardial infarction) [I21.4]  NSTEMI (non-ST elevated myocardial infarction) [I21.4]  Chest pain [R07.9]  Chronic kidney disease, unspecified CKD stage [N18.9]    Admission Date: 7/21/2025  Expected Discharge Date: 7/25/2025    Transition of Care Barriers: None    Payor: HU[employer] MANAGED MEDICARE / Plan: HU[employer] MEDICARE HMO / Product Type: Capitation /     Extended Emergency Contact Information  Primary Emergency Contact: Ai Vick   [place]  Mobile Phone: [phone]  Relation: Daughter  Secondary Emergency Contact: Elmer Pino   [place]  Mobile Phone: [phone]  Relation: Son   needed? No    Discharge Plan A: Home with family, Home Health         [id number] - [place] - [address]  [address]  [place] [address]  Phone: [phone] Fax: [phone]    [facility], [facility] - [place] - [address]  [address]  [email or web address] [address]  Phone: [phone] Fax: [phone]    [facility] - [facility] [address]  [address] [facility] [address]  Phone: [phone] Fax: [phone]      Initial Assessment (most recent)       Adult Discharge Assessment - 07/22/25 1142          Discharge Assessment    Assessment Type Discharge Planning Assessment     Confirmed/corrected address, phone number and insurance Yes     Confirmed Demographics Correct on Facesheet     Source of Information family;health record     Communicated RAMSES with patient/caregiver Date not available/Unable to determine     People in Home alone     Facility Arrived From: Home     Do you expect to return to your current living situation? Yes     Do you have help  at home or someone to help you manage your care at home? Yes     Who are your caregiver(s) and their phone number(s)? Family     Prior to hospitilization cognitive status: Alert/Oriented     Current cognitive status: Alert/Oriented     Walking or Climbing Stairs Difficulty yes     Walking or Climbing Stairs ambulation difficulty, requires equipment     Mobility Management Cane and walker     Dressing/Bathing Difficulty no     Equipment Currently Used at Home walker, rolling;cane, straight     Readmission within 30 days? No     Patient currently being followed by outpatient case management? No     Do you currently have service(s) that help you manage your care at home? No     Do you take prescription medications? Yes     Do you have prescription coverage? Yes     Do you have any problems affording any of your prescribed medications? TBD     Is the patient taking medications as prescribed? yes     Who is going to help you get home at discharge? Daughter     How do you get to doctors appointments? car, drives self     Are you on dialysis? No     Do you take coumadin? No     Discharge Plan A Home with family;Home Health     DME Needed Upon Discharge  other (see comments)   TBD    Discharge Plan discussed with: Adult children     Transition of Care Barriers None                   Anticipated DC disposition: home    Prior level of function: independent    PCP: Dr. Nabeel Garg     Comments: CM spoke with patient's daughter to explain role and discuss discharge planning. Patient lives at home alone and is independent with ADLs. Occasionally uses cane or walker for ambulation. Patient is able to drive himself to and from doctor appts. Daughter will provide transport home. Per daughter, patient lives about 1 mile down the road from her. CM confirmed demographics, emergency contacts, PCP and insurance. Needs will depend on hospital progress; CM following for needs.    Discharge Plan A and Plan B have been determined by  review of patient's clinical status, future medical and therapeutic needs, and coverage/benefits for post-acute care in coordination with multidisciplinary team members.

## 2025-07-22 NOTE — ASSESSMENT & PLAN NOTE
Baseline creatinine is 1.4-1.7. Most recent creatinine and eGFR are listed below.  Recent Labs     07/21/25  1708 07/22/25  0907   CREATININE 2.1* 1.6*   EGFRNORACEVR 30* 42*     Plan  -LJ is currently undergoing medical management  -Avoid nephrotoxins and renally dose meds for GFR listed above  -Monitor urine output, serial BMP, and adjust therapy as needed    7/22/25  Improved with gentle hydration

## 2025-07-22 NOTE — CONSULTS
[facility] - Tele[facility] ([facility])  Cardiology  Consult Note    Patient Name: Nithin Pino  MRN: [id number]  Admission Date: 7/21/2025  Hospital Length of Stay: 1 days  Code Status: Full Code   Attending Provider: Kenroy Bello MD   Consulting Provider: Breanne Martinez PA-C  Primary Care Physician: MUKUL Garg MD  Principal Problem:Chest pain    Patient information was obtained from patient, relative(s), past medical records, and ER records.     Inpatient consult to Cardiology  Consult performed by: Breanne Martinez PA-C  Consult ordered by: Rex Smith MD        Subjective:     Chief Complaint:  CP/NSTEMI    HPI:   Mr. Pino is an 85 year old male patient whose current medical conditions include CAD s/p remote CABG in 2011, s/p prior PCI, chronic RBBB, bladder CA, MR COPD, HTN, PAD, DM type II, MR, and ICM who presented to [facility] ED yesterday due to acute onset of substernal chest pressure that onset while driving to the store. Pain was heavy and intense and eventually relieved by two sublingual nitro as well as nitro spray given by EMS. Other associated symptoms included non-productive cough and SOB. Patient denied any associated nausea, vomiting, palpitations, near syncope, or syncope. Initial workup in ED revealed troponin of 0.101, creatinine of 2.1, and BNP of 160. Patient subsequently placed on a heparin drip and admitted for further evaluation and treatment. Cardiology consulted to assist with management. Patient seen and examined today, resting in bed. Feels ok. Currently CP free. No other CV complaints. States he has not ever had to use nitro prior to this occasion. He reports compliance with his medications. Very active for his age, lives alone/performs all of his ADL's. Followed routinely in clinic by Dr. Sadler. Labs reviewed. Troponin elevated but flat, 0.109>0.101>0.105>0.108>0.116. TTE with EF of 45-50%, +WMA. EKG reviewed, no acute ischemic changes appreciated. Prior MPI stress  test 11/2023 negative. [facility] planned today.       Past Medical History:   Diagnosis Date    Abnormal brain MRI 01/21/2018    Chronic microvascular ischemia changes per MRI July 9, 2007 in [facility]    Abnormal ECG 10/31/2013    Abnormal stress test 01/28/2016    Alcohol dependence     States alcohol abuse ended in 1994    AP (angina pectoris) 01/28/2016    Asthma     Bladder cancer     Carotid artery occlusion     Cataract     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    2 - Normal left ventricular systolic function (EF 60-65%).    3 - Normal left ventricular diastolic function.    4 - Mild left atrial enlargement.    5 - Normal right ventricular systolic function .    6 - Trivial to mild aortic regurgitation.    7 - Trivial to mild mitral regurgitation.  10 - Trivial to mild pulmonic regurgitation.     Hematuria 06/25/2020    History of alcohol abuse 01/22/2018    Patient denies drinking to excess since 1994.    History of atherectomy 01/21/2018 2/2016 [facility]    History of chronic kidney disease 01/22/2018    History of CKD 3    History of PTCA 10/31/2013    History of PTCA 03/09/2016    Hyperlipidemia     Hypertension     Insomnia 06/17/2013    Iron deficiency anemia 10/19/2023    Ischemic cardiomyopathy 10/31/2013    Long term (current) use of antithrombotics/antiplatelets 01/21/2018    Malignant neoplasm of overlapping sites of bladder 06/08/2023    Myocardial infarction     Old MI (myocardial infarction) 1994    Peripheral vascular disease     Polyneuropathy     RBBB  10/31/2013    S/P CABG (coronary artery bypass graft) 10/31/2013    Shortness of breath 10/31/2013    Simple chronic bronchitis 6/17/2013    Tobacco dependence     resolved    Trouble in sleeping     Type 2 diabetes with peripheral circulatory disorder, controlled     Wears glasses        Past Surgical History:   Procedure Laterality Date    APPENDECTOMY      CARDIAC CATHETERIZATION      2016    CARDIAC SURGERY  1994    balloon angioplasty    CATARACT EXTRACTION W/  INTRAOCULAR LENS IMPLANT Right 02/01/2017    CORONARY ARTERY BYPASS GRAFT  05/2011    per patient x4    CYSTOSCOPY W/ RETROGRADES Bilateral 5/15/2025    Procedure: CYSTOSCOPY, WITH RETROGRADE PYELOGRAM;  Surgeon: Hortencia Michael MD;  Location: [facility] OR;  Service: Urology;  Laterality: Bilateral;    HERNIA REPAIR      OPEN REDUCTION AND INTERNAL FIXATION (ORIF) OF INJURY OF HIP      PCIOL Bilateral OD 02/01/17/OS 02/15/17    DR. ALONZO    RETROGRADE PYELOGRAPHY Bilateral 8/29/2024    Procedure: PYELOGRAM, RETROGRADE;  Surgeon: Hortencia Michael MD;  Location: [facility] OR;  Service: Urology;  Laterality: Bilateral;    TURBT (TRANSURETHRAL RESECTION OF BLADDER TUMOR) N/A 6/8/2023    Procedure: TURBT (TRANSURETHRAL RESECTION OF BLADDER TUMOR);  Surgeon: Hortencia Michael MD;  Location: [facility] OR;  Service: Urology;  Laterality: N/A;    TURBT (TRANSURETHRAL RESECTION OF BLADDER TUMOR) N/A 2/8/2024    Procedure: TURBT (TRANSURETHRAL RESECTION OF BLADDER TUMOR);  Surgeon: Hortencia Michael MD;  Location: [facility] OR;  Service: Urology;  Laterality: N/A;    TURBT (TRANSURETHRAL RESECTION OF BLADDER TUMOR) N/A 8/29/2024    Procedure: TURBT (TRANSURETHRAL RESECTION OF BLADDER TUMOR);  Surgeon: Hortencia Michael MD;  Location: [facility] OR;  Service: Urology;  Laterality: N/A;    TURBT (TRANSURETHRAL RESECTION OF BLADDER TUMOR) N/A 5/15/2025    Procedure: TURBT (TRANSURETHRAL RESECTION OF BLADDER TUMOR);  Surgeon: Hortencia Michael MD;  Location: [facility] OR;  Service:  Urology;  Laterality: N/A;       Review of patient's allergies indicates:   Allergen Reactions    Pseudoephedrine-guaifenesin Shortness Of Breath    Panmist dm  [pseudoephedrine-dm-guaifenesin]      Other reaction(s): Unknown       No current facility-administered medications on file prior to encounter.     Current Outpatient Medications on File Prior to Encounter   Medication Sig    albuterol (PROVENTIL/VENTOLIN HFA) 90 mcg/actuation inhaler Inhale 2 puffs into the lungs every 6 (six) hours as needed for Wheezing. Rescue    amLODIPine (NORVASC) 10 MG tablet Take 1 tablet (10 mg total) by mouth once daily.    aspirin (ECOTRIN) 81 MG EC tablet Take 1 tablet (81 mg total) by mouth once daily.    atorvastatin (LIPITOR) 40 MG tablet Take 1 tablet (40 mg total) by mouth every evening.    b complex vitamins tablet Take 1 tablet by mouth once daily.    cyanocobalamin (VITAMIN B-12) 1000 MCG tablet Take 100 mcg by mouth once daily.    dulaglutide (TRULICITY) 4.5 mg/0.5 mL pen injector Inject 4.5 mg into the skin every 7 days. SUNDAY    finasteride (PROSCAR) 5 mg tablet TAKE 1 TABLET ONE TIME DAILY    furosemide (LASIX) 40 MG tablet TAKE 1 PILL IN AM, AND 1/2 PILL IN PM    glucose 4 GM chewable tablet Take 4 tablets (16 g total) by mouth as needed for Low blood sugar.    insulin (LANTUS SOLOSTAR U-100 INSULIN) glargine 100 units/mL SubQ pen Inject into the skin. 15 units QHS and 18 units QAM    insulin aspart U-100 (NOVOLOG) 100 unit/mL injection Inject 3 Units into the skin every 4 (four) hours as needed (for high blood sugar or carbohydrate intake).    insulin glargine-yfgn 100 unit/mL (3 mL) InPn Inject into the skin.    isosorbide mononitrate (IMDUR) 60 MG 24 hr tablet Take 1 tablet (60 mg total) by mouth once daily.    losartan (COZAAR) 50 MG tablet Take 1 tablet (50 mg total) by mouth once daily.    metoprolol succinate (TOPROL-XL) 25 MG 24 hr tablet Take 1 tablet (25 mg total) by mouth every evening.    metoprolol  tartrate (LOPRESSOR) 50 MG tablet Take 50 mg by mouth 2 (two) times daily.    multivit-minerals/folic acid (DIABETIC MULTIVITAMIN ORAL) Take by mouth.    nitroGLYCERIN (NITROSTAT) 0.4 MG SL tablet Place 1 tablet (0.4 mg total) under the tongue every 5 (five) minutes as needed for Chest pain (for chest pain/discomfort). Call 911 if chest pain persists after second dose.    solifenacin (VESICARE) 5 MG tablet Take 5 mg by mouth once daily.    tamsulosin (FLOMAX) 0.4 mg Cap Take 1 capsule (0.4 mg total) by mouth once daily.    vitamin D (VITAMIN D3) 1000 units Tab Take 1 tablet (1,000 Units total) by mouth once daily.     Family History       Problem Relation (Age of Onset)    Diabetes Brother, Sister          Tobacco Use    Smoking status: Former     Current packs/day: 0.00     Average packs/day: 1.5 packs/day for 40.0 years (60.0 ttl pk-yrs)     Types: Cigarettes     Start date: 1954     Quit date: 1994     Years since quittin.5    Smokeless tobacco: Former     Quit date: 2011    Tobacco comments:     approx date   Substance and Sexual Activity    Alcohol use: Not Currently     Comment: occasionally; 1-2 cans of beer a month    Drug use: No    Sexual activity: Never     Birth control/protection: None     Review of Systems   Constitutional: Positive for malaise/fatigue.   HENT: Negative.     Cardiovascular:  Positive for chest pain and dyspnea on exertion.   Respiratory:  Positive for cough (non-productive).    Hematologic/Lymphatic: Negative.    Musculoskeletal:  Positive for arthritis and joint pain.   Gastrointestinal: Negative.    Genitourinary: Negative.    Neurological:  Positive for weakness.   Psychiatric/Behavioral: Negative.     Allergic/Immunologic: Negative.      Objective:     Vital Signs (Most Recent):  Temp: 98.6 °F (37 °C) (25)  Pulse: 108 (25)  Resp: 20 (25)  BP: (!) 146/70 (25)  SpO2: (!) 93 % (25) Vital Signs (24h Range):  Temp:   "[97.5 °F (36.4 °C)-98.6 °F (37 °C)] 98.6 °F (37 °C)  Pulse:  [] 108  Resp:  [16-20] 20  SpO2:  [93 %-99 %] 93 %  BP: (100-151)/(60-70) 146/70     Weight: 83 kg (182 lb 15.7 oz)  Body mass index is 26.26 kg/m².    SpO2: (!) 93 %         Intake/Output Summary (Last 24 hours) at 7/22/2025 0906  Last data filed at 7/22/2025 0511  Gross per 24 hour   Intake --   Output 100 ml   Net -100 ml       Lines/Drains/Airways       Peripheral Intravenous Line  Duration             Peripheral IV 07/21/25 1704 20 G Anterior;Right Forearm <1 day                     Physical Exam  Vitals and nursing note reviewed.   Constitutional:       Appearance: Normal appearance.   HENT:      Head: Normocephalic and atraumatic.   Eyes:      Pupils: Pupils are equal, round, and reactive to light.   Cardiovascular:      Rate and Rhythm: Normal rate and regular rhythm.      Heart sounds: S1 normal and S2 normal. No murmur heard.  Pulmonary:      Effort: Pulmonary effort is normal.      Breath sounds: Normal breath sounds.   Abdominal:      Palpations: Abdomen is soft.   Musculoskeletal:      Right lower leg: No edema.      Left lower leg: No edema.   Skin:     General: Skin is warm and dry.   Neurological:      General: No focal deficit present.      Mental Status: He is oriented to person, place, and time.   Psychiatric:         Mood and Affect: Mood normal.         Behavior: Behavior normal.         Thought Content: Thought content normal.          Significant Labs: CMP   Recent Labs   Lab 07/21/25  1708      K 3.9      CO2 20*   *   BUN 39*   CREATININE 2.1*   CALCIUM 9.9   PROT 6.6   ALBUMIN 3.2*   BILITOT 1.2*   ALKPHOS 79   AST 23   ALT 17   ANIONGAP 10   , CBC   Recent Labs   Lab 07/21/25  1708 07/22/25  0410   WBC 7.68 6.13   HGB 12.9* 13.4*   HCT 39.3* 40.2    167   , Troponin No results for input(s): "TROPONINIHS" in the last 48 hours., and All pertinent lab results from the last 24 hours have been " reviewed.    Significant Imaging: Echocardiogram: Transthoracic echo (TTE) complete (Cupid Only): Repeat TTE pending  Results for orders placed or performed during the hospital encounter of 10/25/23   Echo   Result Value Ref Range    BSA 1.89 m2    LVOT stroke volume 82.96 cm3    LVIDd 4.88 3.5 - 6.0 cm    LV Systolic Volume 67.15 mL    LV Systolic Volume Index 35.5 mL/m2    LVIDs 3.93 2.1 - 4.0 cm    LV Diastolic Volume 111.55 mL    LV Diastolic Volume Index 59.02 mL/m2    IVS 1.43 (A) 0.6 - 1.1 cm    LVOT diameter 2.57 cm    LVOT area 5.2 cm2    FS 19 (A) 28 - 44 %    Left Ventricle Relative Wall Thickness 0.53 cm    PW 1.29 (A) 0.6 - 1.1 cm    LV mass 269.14 g    LV Mass Index 142 g/m2    MV Peak E Bran 0.64 m/s    TDI LATERAL 0.09 m/s    TDI SEPTAL 0.06 m/s    E/E' ratio 8.53 m/s    MV Peak A Bran 0.88 m/s    TR Max Bran 2.28 m/s    E/A ratio 0.73     IVRT 77.07 msec    E wave deceleration time 206.92 msec    LV SEPTAL E/E' RATIO 10.67 m/s    LV LATERAL E/E' RATIO 7.11 m/s    PV Peak S Bran 0.35 m/s    PV Peak D Bran 0.33 m/s    Pulm vein S/D ratio 1.06     LVOT peak bran 0.66 m/s    Left Ventricular Outflow Tract Mean Velocity 0.46 cm/s    Left Ventricular Outflow Tract Mean Gradient 0.95 mmHg    LA size 5.04 cm    Left Atrium Minor Axis 5.98 cm    Left Atrium Major Axis 5.97 cm    LA Vol (MOD) 72.16 cm3    MARILOU (MOD) 38.2 mL/m2    RVDD 3.97 cm    RV S' 9.61 cm/s    RVOT peak VTI 9.7 cm    TAPSE 2.02 cm    RA Major Axis 5.75 cm    RA Width 4.15 cm    AV regurgitation pressure 1/2 time [id number] ms    AR Max Bran 2.87 m/s    AV mean gradient 3 mmHg    AV peak gradient 5 mmHg    Ao peak bran 1.13 m/s    Ao VTI 27.60 cm    LVOT peak VTI 16.00 cm    AV valve area 3.01 cm²    AV Velocity Ratio 0.58     AV index (prosthetic) 0.58     MANISH by Velocity Ratio 3.03 cm²    MV stenosis pressure 1/2 time 60.01 ms    MV valve area p 1/2 method 3.67 cm2    Triscuspid Valve Regurgitation Peak Gradient 21 mmHg    PV mean  gradient 1 mmHg    PV PEAK VELOCITY 0.83 m/s    PV peak gradient 3 mmHg    Pulmonary Valve Mean Velocity 0.54 m/s    RVOT peak fredrick 0.46 m/s    Ao root annulus 4.05 cm    Sinus 4.04 cm    STJ 3.13 cm    Ascending aorta 3.51 cm    IVC diameter 1.02 cm    Mean e' 0.08 m/s    ZLVIDS 1.48     ZLVIDD -0.81     MARILOU 59.6 mL/m2    LA Vol 112.63 cm3    LA WIDTH 4.4 cm    TASV 10.0 cm/s    TV resting pulmonary artery pressure 24 mmHg    RV TB RVSP 5 mmHg    Est. RA pres 3 mmHg    Narrative      Left Ventricle: The left ventricle is normal in size. Normal wall   thickness. regional wall motion abnormalities present. There is low normal   systolic function with a visually estimated ejection fraction of 50 - 55%.    Left Atrium: Left atrium is severely dilated.    Right Ventricle: Normal right ventricular cavity size. Wall thickness   is normal. Right ventricle wall motion  is normal. Systolic function is   normal.    Right Atrium: Right atrium is dilated.    Mitral Valve: There is mild regurgitation.    Pulmonic Valve: There is mild regurgitation.    Pulmonary Artery: The estimated pulmonary artery systolic pressure is   24 mmHg.    IVC/SVC: Normal venous pressure at 3 mmHg.      and EKG: Reviewed  Assessment and Plan:   Patient with history of CAD who presents with CP/NSTEMI. Stable during exam. Continue OMT. C today by Dr. Joe.    * Chest pain  -See plan under NSTEMI    Acute kidney injury superimposed on chronic kidney disease  -Per primary team  -Creatinine down to 1.6    NSTEMI (non-ST elevated myocardial infarction)  -Patient with history of CAD s/p remote CABG and PCI who presents with substernal CP relieved by nitro  -Troponin elevated but flat, 0.109>0.101>0.105>0.108>0.116  -Stable during exam CP free  -Continue ASA, statin, CCB, BB, Imdur, heparin gtt  -TTE with EF of 45-50%, +WMA  -Dr. Kingston discussed risks/benefits of [facility], patient agreeable with proceeding    Type 2 diabetes mellitus with hyperglycemia, with  long-term current use of insulin  -Per primary team    Chronic combined systolic and diastolic congestive heart failure  -Clinically compensated  -TTE with EF of 45-50%, + WMA  -Continue OMT          Hyperlipidemia associated with type 2 diabetes mellitus  -Statin    RBBB  -Chronic    Coronary artery disease of bypass graft of native heart with stable angina pectoris  -see plan under NSTEMI    Hypertension associated with diabetes  -Titrate meds        VTE Risk Mitigation (From admission, onward)           Ordered     heparin 25,000 units in dextrose 5% (100 units/ml) IV bolus from bag LOW INTENSITY nomogram - [facility]  As needed (PRN)        Question:  Heparin Infusion Adjustment (DO NOT MODIFY ANSWER)  Answer:  \\[email or web address]\epic\Images\Pharmacy\HeparinInfusions\heparin LOW INTENSITY nomogram for [facility] [id number]    07/21/25 2023     heparin 25,000 units in dextrose 5% (100 units/ml) IV bolus from bag LOW INTENSITY nomogram - [facility]  As needed (PRN)        Question:  Heparin Infusion Adjustment (DO NOT MODIFY ANSWER)  Answer:  \\[email or web address]\epic\Images\Pharmacy\HeparinInfusions\heparin LOW INTENSITY nomogram for [facility] [id number]    07/21/25 2023     heparin 25,000 units in dextrose 5% 250 mL (100 units/mL) infusion LOW INTENSITY nomogram - [facility]  Continuous        Question:  Begin at (units/kg/hr)  Answer:  12    07/21/25 2023     Reason for No Pharmacological VTE Prophylaxis  Once        Question:  Reasons:  Answer:  Physician Provided (leave comment)  Comment:  currently on heparin drip    07/21/25 2030     IP VTE HIGH RISK PATIENT  Once         07/21/25 2030     Place sequential compression device  Until discontinued         07/21/25 2030                    Thank you for your consult. I will follow-up with patient. Please contact us if you have any additional questions.    Breanne Martinez PA-C  Cardiology   [facility] - Telemetry ([facility])

## 2025-07-22 NOTE — ASSESSMENT & PLAN NOTE
Patient with known CAD s/p stent placement and CABG, which is controlled Will continue home medications and monitor for S/Sx of angina/ACS. Continue to monitor on telemetry.  Patient currently on NSTEMI pathway and awaiting further evaluation/recommendations from Cardiology.

## 2025-07-22 NOTE — BRIEF OP NOTE
INPATIENT Operative Note         SUMMARY     Surgery Date: 7/22/2025     Surgeons and Role:     * Sim Joe MD - Primary    ASSISTANT:none    Pre-op Diagnosis:  *cad elevated troponin      Post-op Diagnosis:  same as preop    Procedure(s) (LRB):  Left heart cath (Left)  graft    COMPLICATION:none    Anesthesia: RN IV Sedation    Findings/Key Components:  Occluded lad   Patent lmca stent and ramus   Occluded rca  Patent svg to diagonal  Davis to lad patent with apical lad 90%    Estimated Blood Loss: < 50 ML.         SPECIMEN: NONE    Devices/Prostetics: None    PLAN: routine post cath care

## 2025-07-22 NOTE — ASSESSMENT & PLAN NOTE
Anemia is likely due to Iron deficiency. Most recent hemoglobin and hematocrit are listed below.  Recent Labs     07/21/25  1708 07/22/25  0410   HGB 12.9* 13.4*   HCT 39.3* 40.2     Plan  -Monitor serial CBC: Daily  -Transfuse PRBC if patient becomes hemodynamically unstable, symptomatic or H/H drops below 7/21.  -Patient has not received any PRBC transfusions to date  -Patient's anemia is currently stable

## 2025-07-22 NOTE — H&P (VIEW-ONLY)
[facility] ([facility])  Cardiology  Consult Note    Patient Name: Nithin Pino  MRN: [id number]  Admission Date: 7/21/2025  Hospital Length of Stay: 1 days  Code Status: Full Code   Attending Provider: Kenroy Bello MD   Consulting Provider: Breanne Martinez PA-C  Primary Care Physician: MUKUL Garg MD  Principal Problem:Chest pain    Patient information was obtained from patient, relative(s), past medical records, and ER records.     Inpatient consult to Cardiology  Consult performed by: Breanne Martinez PA-C  Consult ordered by: Rex Smith MD        Subjective:     Chief Complaint:  CP/NSTEMI    HPI:   Mr. Pino is an 85 year old male patient whose current medical conditions include CAD s/p remote CABG in 2011, s/p prior PCI, chronic RBBB, bladder CA, MR COPD, HTN, PAD, DM type II, MR, and ICM who presented to [facility] ED yesterday due to acute onset of substernal chest pressure that onset while driving to the store. Pain was heavy and intense and eventually relieved by two sublingual nitro as well as nitro spray given by EMS. Other associated symptoms included non-productive cough and SOB. Patient denied any associated nausea, vomiting, palpitations, near syncope, or syncope. Initial workup in ED revealed troponin of 0.101, creatinine of 2.1, and BNP of 160. Patient subsequently placed on a heparin drip and admitted for further evaluation and treatment. Cardiology consulted to assist with management. Patient seen and examined today, resting in bed. Feels ok. Currently CP free. No other CV complaints. States he has not ever had to use nitro prior to this occasion. He reports compliance with his medications. Very active for his age, lives alone/performs all of his ADL's. Followed routinely in clinic by Dr. Sadler. Labs reviewed. Troponin elevated but flat, 0.109>0.101>0.105>0.108>0.116. TTE with EF of 45-50%, +WMA. EKG reviewed, no acute ischemic changes appreciated. Prior MPI stress  test 11/2023 negative. [facility] planned today.       Past Medical History:   Diagnosis Date    Abnormal brain MRI 01/21/2018    Chronic microvascular ischemia changes per MRI July 9, 2007 in [facility]    Abnormal ECG 10/31/2013    Abnormal stress test 01/28/2016    Alcohol dependence     States alcohol abuse ended in 1994    AP (angina pectoris) 01/28/2016    Asthma     Bladder cancer     Carotid artery occlusion     Cataract     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    2 - Normal left ventricular systolic function (EF 60-65%).    3 - Normal left ventricular diastolic function.    4 - Mild left atrial enlargement.    5 - Normal right ventricular systolic function .    6 - Trivial to mild aortic regurgitation.    7 - Trivial to mild mitral regurgitation.  10 - Trivial to mild pulmonic regurgitation.     Hematuria 06/25/2020    History of alcohol abuse 01/22/2018    Patient denies drinking to excess since 1994.    History of atherectomy 01/21/2018 2/2016 [facility]    History of chronic kidney disease 01/22/2018    History of CKD 3    History of PTCA 10/31/2013    History of PTCA 03/09/2016    Hyperlipidemia     Hypertension     Insomnia 06/17/2013    Iron deficiency anemia 10/19/2023    Ischemic cardiomyopathy 10/31/2013    Long term (current) use of antithrombotics/antiplatelets 01/21/2018    Malignant neoplasm of overlapping sites of bladder 06/08/2023    Myocardial infarction     Old MI (myocardial infarction) 1994    Peripheral vascular disease     Polyneuropathy     RBBB  10/31/2013    S/P CABG (coronary artery bypass graft) 10/31/2013    Shortness of breath 10/31/2013    Simple chronic bronchitis 6/17/2013    Tobacco dependence     resolved    Trouble in sleeping     Type 2 diabetes with peripheral circulatory disorder, controlled     Wears glasses        Past Surgical History:   Procedure Laterality Date    APPENDECTOMY      CARDIAC CATHETERIZATION      2016    CARDIAC SURGERY  1994    balloon angioplasty    CATARACT EXTRACTION W/  INTRAOCULAR LENS IMPLANT Right 02/01/2017    CORONARY ARTERY BYPASS GRAFT  05/2011    per patient x4    CYSTOSCOPY W/ RETROGRADES Bilateral 5/15/2025    Procedure: CYSTOSCOPY, WITH RETROGRADE PYELOGRAM;  Surgeon: Hortencia Michael MD;  Location: [facility] OR;  Service: Urology;  Laterality: Bilateral;    HERNIA REPAIR      OPEN REDUCTION AND INTERNAL FIXATION (ORIF) OF INJURY OF HIP      PCIOL Bilateral OD 02/01/17/OS 02/15/17    DR. ALONOZ    RETROGRADE PYELOGRAPHY Bilateral 8/29/2024    Procedure: PYELOGRAM, RETROGRADE;  Surgeon: Hortencia Michael MD;  Location: [facility] OR;  Service: Urology;  Laterality: Bilateral;    TURBT (TRANSURETHRAL RESECTION OF BLADDER TUMOR) N/A 6/8/2023    Procedure: TURBT (TRANSURETHRAL RESECTION OF BLADDER TUMOR);  Surgeon: Hortencia Michael MD;  Location: [facility] OR;  Service: Urology;  Laterality: N/A;    TURBT (TRANSURETHRAL RESECTION OF BLADDER TUMOR) N/A 2/8/2024    Procedure: TURBT (TRANSURETHRAL RESECTION OF BLADDER TUMOR);  Surgeon: Hortencia Michael MD;  Location: [facility] OR;  Service: Urology;  Laterality: N/A;    TURBT (TRANSURETHRAL RESECTION OF BLADDER TUMOR) N/A 8/29/2024    Procedure: TURBT (TRANSURETHRAL RESECTION OF BLADDER TUMOR);  Surgeon: Hortencia Michael MD;  Location: [facility] OR;  Service: Urology;  Laterality: N/A;    TURBT (TRANSURETHRAL RESECTION OF BLADDER TUMOR) N/A 5/15/2025    Procedure: TURBT (TRANSURETHRAL RESECTION OF BLADDER TUMOR);  Surgeon: Hortencia Michael MD;  Location: [facility] OR;  Service:  Urology;  Laterality: N/A;       Review of patient's allergies indicates:   Allergen Reactions    Pseudoephedrine-guaifenesin Shortness Of Breath    Panmist dm  [pseudoephedrine-dm-guaifenesin]      Other reaction(s): Unknown       No current facility-administered medications on file prior to encounter.     Current Outpatient Medications on File Prior to Encounter   Medication Sig    albuterol (PROVENTIL/VENTOLIN HFA) 90 mcg/actuation inhaler Inhale 2 puffs into the lungs every 6 (six) hours as needed for Wheezing. Rescue    amLODIPine (NORVASC) 10 MG tablet Take 1 tablet (10 mg total) by mouth once daily.    aspirin (ECOTRIN) 81 MG EC tablet Take 1 tablet (81 mg total) by mouth once daily.    atorvastatin (LIPITOR) 40 MG tablet Take 1 tablet (40 mg total) by mouth every evening.    b complex vitamins tablet Take 1 tablet by mouth once daily.    cyanocobalamin (VITAMIN B-12) 1000 MCG tablet Take 100 mcg by mouth once daily.    dulaglutide (TRULICITY) 4.5 mg/0.5 mL pen injector Inject 4.5 mg into the skin every 7 days. SUNDAY    finasteride (PROSCAR) 5 mg tablet TAKE 1 TABLET ONE TIME DAILY    furosemide (LASIX) 40 MG tablet TAKE 1 PILL IN AM, AND 1/2 PILL IN PM    glucose 4 GM chewable tablet Take 4 tablets (16 g total) by mouth as needed for Low blood sugar.    insulin (LANTUS SOLOSTAR U-100 INSULIN) glargine 100 units/mL SubQ pen Inject into the skin. 15 units QHS and 18 units QAM    insulin aspart U-100 (NOVOLOG) 100 unit/mL injection Inject 3 Units into the skin every 4 (four) hours as needed (for high blood sugar or carbohydrate intake).    insulin glargine-yfgn 100 unit/mL (3 mL) InPn Inject into the skin.    isosorbide mononitrate (IMDUR) 60 MG 24 hr tablet Take 1 tablet (60 mg total) by mouth once daily.    losartan (COZAAR) 50 MG tablet Take 1 tablet (50 mg total) by mouth once daily.    metoprolol succinate (TOPROL-XL) 25 MG 24 hr tablet Take 1 tablet (25 mg total) by mouth every evening.    metoprolol  tartrate (LOPRESSOR) 50 MG tablet Take 50 mg by mouth 2 (two) times daily.    multivit-minerals/folic acid (DIABETIC MULTIVITAMIN ORAL) Take by mouth.    nitroGLYCERIN (NITROSTAT) 0.4 MG SL tablet Place 1 tablet (0.4 mg total) under the tongue every 5 (five) minutes as needed for Chest pain (for chest pain/discomfort). Call 911 if chest pain persists after second dose.    solifenacin (VESICARE) 5 MG tablet Take 5 mg by mouth once daily.    tamsulosin (FLOMAX) 0.4 mg Cap Take 1 capsule (0.4 mg total) by mouth once daily.    vitamin D (VITAMIN D3) 1000 units Tab Take 1 tablet (1,000 Units total) by mouth once daily.     Family History       Problem Relation (Age of Onset)    Diabetes Brother, Sister          Tobacco Use    Smoking status: Former     Current packs/day: 0.00     Average packs/day: 1.5 packs/day for 40.0 years (60.0 ttl pk-yrs)     Types: Cigarettes     Start date: 1954     Quit date: 1994     Years since quittin.5    Smokeless tobacco: Former     Quit date: 2011    Tobacco comments:     approx date   Substance and Sexual Activity    Alcohol use: Not Currently     Comment: occasionally; 1-2 cans of beer a month    Drug use: No    Sexual activity: Never     Birth control/protection: None     Review of Systems   Constitutional: Positive for malaise/fatigue.   HENT: Negative.     Cardiovascular:  Positive for chest pain and dyspnea on exertion.   Respiratory:  Positive for cough (non-productive).    Hematologic/Lymphatic: Negative.    Musculoskeletal:  Positive for arthritis and joint pain.   Gastrointestinal: Negative.    Genitourinary: Negative.    Neurological:  Positive for weakness.   Psychiatric/Behavioral: Negative.     Allergic/Immunologic: Negative.      Objective:     Vital Signs (Most Recent):  Temp: 98.6 °F (37 °C) (25)  Pulse: 108 (25)  Resp: 20 (25)  BP: (!) 146/70 (25)  SpO2: (!) 93 % (25) Vital Signs (24h Range):  Temp:   "[97.5 °F (36.4 °C)-98.6 °F (37 °C)] 98.6 °F (37 °C)  Pulse:  [] 108  Resp:  [16-20] 20  SpO2:  [93 %-99 %] 93 %  BP: (100-151)/(60-70) 146/70     Weight: 83 kg (182 lb 15.7 oz)  Body mass index is 26.26 kg/m².    SpO2: (!) 93 %         Intake/Output Summary (Last 24 hours) at 7/22/2025 0906  Last data filed at 7/22/2025 0511  Gross per 24 hour   Intake --   Output 100 ml   Net -100 ml       Lines/Drains/Airways       Peripheral Intravenous Line  Duration             Peripheral IV 07/21/25 1704 20 G Anterior;Right Forearm <1 day                     Physical Exam  Vitals and nursing note reviewed.   Constitutional:       Appearance: Normal appearance.   HENT:      Head: Normocephalic and atraumatic.   Eyes:      Pupils: Pupils are equal, round, and reactive to light.   Cardiovascular:      Rate and Rhythm: Normal rate and regular rhythm.      Heart sounds: S1 normal and S2 normal. No murmur heard.  Pulmonary:      Effort: Pulmonary effort is normal.      Breath sounds: Normal breath sounds.   Abdominal:      Palpations: Abdomen is soft.   Musculoskeletal:      Right lower leg: No edema.      Left lower leg: No edema.   Skin:     General: Skin is warm and dry.   Neurological:      General: No focal deficit present.      Mental Status: He is oriented to person, place, and time.   Psychiatric:         Mood and Affect: Mood normal.         Behavior: Behavior normal.         Thought Content: Thought content normal.          Significant Labs: CMP   Recent Labs   Lab 07/21/25  1708      K 3.9      CO2 20*   *   BUN 39*   CREATININE 2.1*   CALCIUM 9.9   PROT 6.6   ALBUMIN 3.2*   BILITOT 1.2*   ALKPHOS 79   AST 23   ALT 17   ANIONGAP 10   , CBC   Recent Labs   Lab 07/21/25  1708 07/22/25  0410   WBC 7.68 6.13   HGB 12.9* 13.4*   HCT 39.3* 40.2    167   , Troponin No results for input(s): "TROPONINIHS" in the last 48 hours., and All pertinent lab results from the last 24 hours have been " reviewed.    Significant Imaging: Echocardiogram: Transthoracic echo (TTE) complete (Cupid Only): Repeat TTE pending  Results for orders placed or performed during the hospital encounter of 10/25/23   Echo   Result Value Ref Range    BSA 1.89 m2    LVOT stroke volume 82.96 cm3    LVIDd 4.88 3.5 - 6.0 cm    LV Systolic Volume 67.15 mL    LV Systolic Volume Index 35.5 mL/m2    LVIDs 3.93 2.1 - 4.0 cm    LV Diastolic Volume 111.55 mL    LV Diastolic Volume Index 59.02 mL/m2    IVS 1.43 (A) 0.6 - 1.1 cm    LVOT diameter 2.57 cm    LVOT area 5.2 cm2    FS 19 (A) 28 - 44 %    Left Ventricle Relative Wall Thickness 0.53 cm    PW 1.29 (A) 0.6 - 1.1 cm    LV mass 269.14 g    LV Mass Index 142 g/m2    MV Peak E Bran 0.64 m/s    TDI LATERAL 0.09 m/s    TDI SEPTAL 0.06 m/s    E/E' ratio 8.53 m/s    MV Peak A Bran 0.88 m/s    TR Max Bran 2.28 m/s    E/A ratio 0.73     IVRT 77.07 msec    E wave deceleration time 206.92 msec    LV SEPTAL E/E' RATIO 10.67 m/s    LV LATERAL E/E' RATIO 7.11 m/s    PV Peak S Bran 0.35 m/s    PV Peak D Bran 0.33 m/s    Pulm vein S/D ratio 1.06     LVOT peak bran 0.66 m/s    Left Ventricular Outflow Tract Mean Velocity 0.46 cm/s    Left Ventricular Outflow Tract Mean Gradient 0.95 mmHg    LA size 5.04 cm    Left Atrium Minor Axis 5.98 cm    Left Atrium Major Axis 5.97 cm    LA Vol (MOD) 72.16 cm3    MARILOU (MOD) 38.2 mL/m2    RVDD 3.97 cm    RV S' 9.61 cm/s    RVOT peak VTI 9.7 cm    TAPSE 2.02 cm    RA Major Axis 5.75 cm    RA Width 4.15 cm    AV regurgitation pressure 1/2 time 800.081015255645423 ms    AR Max Bran 2.87 m/s    AV mean gradient 3 mmHg    AV peak gradient 5 mmHg    Ao peak bran 1.13 m/s    Ao VTI 27.60 cm    LVOT peak VTI 16.00 cm    AV valve area 3.01 cm²    AV Velocity Ratio 0.58     AV index (prosthetic) 0.58     MANISH by Velocity Ratio 3.03 cm²    MV stenosis pressure 1/2 time 60.01 ms    MV valve area p 1/2 method 3.67 cm2    Triscuspid Valve Regurgitation Peak Gradient 21 mmHg    PV mean  gradient 1 mmHg    PV PEAK VELOCITY 0.83 m/s    PV peak gradient 3 mmHg    Pulmonary Valve Mean Velocity 0.54 m/s    RVOT peak fredrick 0.46 m/s    Ao root annulus 4.05 cm    Sinus 4.04 cm    STJ 3.13 cm    Ascending aorta 3.51 cm    IVC diameter 1.02 cm    Mean e' 0.08 m/s    ZLVIDS 1.48     ZLVIDD -0.81     MARILOU 59.6 mL/m2    LA Vol 112.63 cm3    LA WIDTH 4.4 cm    TASV 10.0 cm/s    TV resting pulmonary artery pressure 24 mmHg    RV TB RVSP 5 mmHg    Est. RA pres 3 mmHg    Narrative      Left Ventricle: The left ventricle is normal in size. Normal wall   thickness. regional wall motion abnormalities present. There is low normal   systolic function with a visually estimated ejection fraction of 50 - 55%.    Left Atrium: Left atrium is severely dilated.    Right Ventricle: Normal right ventricular cavity size. Wall thickness   is normal. Right ventricle wall motion  is normal. Systolic function is   normal.    Right Atrium: Right atrium is dilated.    Mitral Valve: There is mild regurgitation.    Pulmonic Valve: There is mild regurgitation.    Pulmonary Artery: The estimated pulmonary artery systolic pressure is   24 mmHg.    IVC/SVC: Normal venous pressure at 3 mmHg.      and EKG: Reviewed  Assessment and Plan:   Patient with history of CAD who presents with CP/NSTEMI. Stable during exam. Continue OMT. C today by Dr. Joe.    * Chest pain  -See plan under NSTEMI    Acute kidney injury superimposed on chronic kidney disease  -Per primary team  -Creatinine down to 1.6    NSTEMI (non-ST elevated myocardial infarction)  -Patient with history of CAD s/p remote CABG and PCI who presents with substernal CP relieved by nitro  -Troponin elevated but flat, 0.109>0.101>0.105>0.108>0.116  -Stable during exam CP free  -Continue ASA, statin, CCB, BB, Imdur, heparin gtt  -TTE with EF of 45-50%, +WMA  -Dr. Kingston discussed risks/benefits of [facility], patient agreeable with proceeding    Type 2 diabetes mellitus with hyperglycemia, with  long-term current use of insulin  -Per primary team    Chronic combined systolic and diastolic congestive heart failure  -Clinically compensated  -TTE with EF of 45-50%, + WMA  -Continue OMT          Hyperlipidemia associated with type 2 diabetes mellitus  -Statin    RBBB  -Chronic    Coronary artery disease of bypass graft of native heart with stable angina pectoris  -see plan under NSTEMI    Hypertension associated with diabetes  -Titrate meds        VTE Risk Mitigation (From admission, onward)           Ordered     heparin 25,000 units in dextrose 5% (100 units/ml) IV bolus from bag LOW INTENSITY nomogram - [facility]  As needed (PRN)        Question:  Heparin Infusion Adjustment (DO NOT MODIFY ANSWER)  Answer:  \\[email or web address]\epic\Images\Pharmacy\HeparinInfusions\heparin LOW INTENSITY nomogram for [facility] [id number]    07/21/25 2023     heparin 25,000 units in dextrose 5% (100 units/ml) IV bolus from bag LOW INTENSITY nomogram - [facility]  As needed (PRN)        Question:  Heparin Infusion Adjustment (DO NOT MODIFY ANSWER)  Answer:  \\[email or web address]\epic\Images\Pharmacy\HeparinInfusions\heparin LOW INTENSITY nomogram for [facility] [id number].pdf    07/21/25 2023     heparin 25,000 units in dextrose 5% 250 mL (100 units/mL) infusion LOW INTENSITY nomogram - [facility]  Continuous        Question:  Begin at (units/kg/hr)  Answer:  12    07/21/25 2023     Reason for No Pharmacological VTE Prophylaxis  Once        Question:  Reasons:  Answer:  Physician Provided (leave comment)  Comment:  currently on heparin drip    07/21/25 2030     IP VTE HIGH RISK PATIENT  Once         07/21/25 2030     Place sequential compression device  Until discontinued         07/21/25 2030                    Thank you for your consult. I will follow-up with patient. Please contact us if you have any additional questions.    Breanne Martinez PA-C  Cardiology   [facility] Tele[facility] ([facility])

## 2025-07-22 NOTE — ASSESSMENT & PLAN NOTE
Patient's FSGs are uncontrolled due to hyperglycemia on current medication regimen.  Last A1c reviewed-   Lab Results   Component Value Date    HGBA1C 7.9 (H) 07/22/2025     Most recent fingerstick glucose reviewed-   Recent Labs   Lab 07/22/25  0944 07/22/25  1214 07/22/25  1624   POCTGLUCOSE 123* 92 88     Current correctional scale  Low  Titrate as needed anti-hyperglycemic dose as follows-   Antihyperglycemics (From admission, onward)      Start     Stop Route Frequency Ordered    07/22/25 0900  insulin glargine U-100 (Lantus) pen 10 Units         -- SubQ 2 times daily 07/22/25 0433    07/22/25 0533  insulin aspart U-100 pen 0-5 Units         -- SubQ Before meals & nightly PRN 07/22/25 0433     Plan:  -SSI  -A1c  -Accu-checks  -Hold oral hypoglycemics while patient is in the hospital  -Continue home long-acting insulin at 20% decrease, titrate up as needed  -Hypoglycemic protocol

## 2025-07-22 NOTE — HOSPITAL COURSE
Nithin Pino is a 85 year old male who was admitted for NSTEMI. Echocardiogram showed  EF of 40-50% with mild global hypokinesis and regional wall motion abnormalities. He was started on heparin infusion and seen by Cardiology who plans for LHC today.     7/23/25  S/p LHC today showing stable findings without need for intervention. C/o dyspnea this AM with wheezing. CXR unchanged. Give dose of lasix and duoneb today and monitor overnight.    7/24/25  Still some up upper airway wheezing but stable on room air. Will treat for acute bronchitis with Levaquin and Prednisone taper over 7 days. He was asked to used albuterol inhaler if needed. He was asked to follow up with PCP in one week. If he experiences any dyspnea, fever,  or recurrent chest pain, please return to ED. Patient seen and examined on date of discharge and deemed suitable.

## 2025-07-22 NOTE — NURSING
Dr. Bello and Dr. Kingston informed patient c/o chest pain. Order received for repeat EKG. V/S stable see flowsheet. Pain relieved after one dose of Nitro.

## 2025-07-22 NOTE — ASSESSMENT & PLAN NOTE
"Patient's FSGs are uncontrolled due to hyperglycemia on current medication regimen.  Last A1c reviewed-   Lab Results   Component Value Date    HGBA1C 8.4 (H) 04/07/2025     Most recent fingerstick glucose reviewed- No results for input(s): "POCTGLUCOSE" in the last 24 hours.  Current correctional scale  Low  Titrate as needed anti-hyperglycemic dose as follows-   Antihyperglycemics (From admission, onward)      Start     Stop Route Frequency Ordered    07/22/25 0900  insulin glargine U-100 (Lantus) pen 10 Units         -- SubQ 2 times daily 07/22/25 0433    07/22/25 0533  insulin aspart U-100 pen 0-5 Units         -- SubQ Before meals & nightly PRN 07/22/25 0433     Plan:  -SSI  -A1c  -Accu-checks  -Hold oral hypoglycemics while patient is in the hospital  -Continue home long-acting insulin at 20% decrease, titrate up as needed  -Hypoglycemic protocol      "

## 2025-07-22 NOTE — ASSESSMENT & PLAN NOTE
Patient has Combined Systolic and Diastolic heart failure that is Chronic. On presentation their CHF was well compensated. Most recent BNP and echo results are listed below.  Recent Labs     07/21/25  1708   *     Latest ECHO  Results for orders placed during the hospital encounter of 10/25/23    Echo    Interpretation Summary    Left Ventricle: The left ventricle is normal in size. Normal wall thickness. regional wall motion abnormalities present. There is low normal systolic function with a visually estimated ejection fraction of 50 - 55%.    Left Atrium: Left atrium is severely dilated.    Right Ventricle: Normal right ventricular cavity size. Wall thickness is normal. Right ventricle wall motion  is normal. Systolic function is normal.    Right Atrium: Right atrium is dilated.    Mitral Valve: There is mild regurgitation.    Pulmonic Valve: There is mild regurgitation.    Pulmonary Artery: The estimated pulmonary artery systolic pressure is 24 mmHg.    IVC/SVC: Normal venous pressure at 3 mmHg.    Current Heart Failure Medications  nitroGLYCERIN injection, As needed (PRN),     Plan  -Monitor strict I&Os and daily weights.    -Place on telemetry  -Low sodium diet  -Place on fluid restriction of 2 L.   -Cardiology has been consulted  -The patient's volume status is at their baseline  -Continue home medications

## 2025-07-22 NOTE — ASSESSMENT & PLAN NOTE
Patient has Combined Systolic and Diastolic heart failure that is Chronic. On presentation their CHF was well compensated. Most recent BNP and echo results are listed below.  Recent Labs     07/21/25  1708   *     Latest ECHO  Results for orders placed during the hospital encounter of 10/25/23    Echo    Interpretation Summary    Left Ventricle: The left ventricle is normal in size. Normal wall thickness. regional wall motion abnormalities present. There is low normal systolic function with a visually estimated ejection fraction of 50 - 55%.    Left Atrium: Left atrium is severely dilated.    Right Ventricle: Normal right ventricular cavity size. Wall thickness is normal. Right ventricle wall motion  is normal. Systolic function is normal.    Right Atrium: Right atrium is dilated.    Mitral Valve: There is mild regurgitation.    Pulmonic Valve: There is mild regurgitation.    Pulmonary Artery: The estimated pulmonary artery systolic pressure is 24 mmHg.    IVC/SVC: Normal venous pressure at 3 mmHg.    Current Heart Failure Medications  metoprolol succinate (TOPROL-XL) 24 hr tablet 25 mg, Nightly, Oral    Plan  -Monitor strict I&Os and daily weights.    -Place on telemetry  -Low sodium diet  -Place on fluid restriction of 2 L.   -Cardiology has been consulted  -The patient's volume status is at their baseline  -Continue home medications

## 2025-07-23 LAB
ABSOLUTE EOSINOPHIL (OHS): 0.05 K/UL
ABSOLUTE MONOCYTE (OHS): 0.65 K/UL (ref 0.3–1)
ABSOLUTE NEUTROPHIL COUNT (OHS): 5.68 K/UL (ref 1.8–7.7)
ANION GAP (OHS): 10 MMOL/L (ref 8–16)
BASOPHILS # BLD AUTO: 0.02 K/UL
BASOPHILS NFR BLD AUTO: 0.2 %
BUN SERPL-MCNC: 31 MG/DL (ref 8–23)
CALCIUM SERPL-MCNC: 9.3 MG/DL (ref 8.7–10.5)
CHLORIDE SERPL-SCNC: 113 MMOL/L (ref 95–110)
CO2 SERPL-SCNC: 19 MMOL/L (ref 23–29)
CREAT SERPL-MCNC: 1.4 MG/DL (ref 0.5–1.4)
ERYTHROCYTE [DISTWIDTH] IN BLOOD BY AUTOMATED COUNT: 13.1 % (ref 11.5–14.5)
GFR SERPLBLD CREATININE-BSD FMLA CKD-EPI: 49 ML/MIN/1.73/M2
GLUCOSE SERPL-MCNC: 49 MG/DL (ref 70–110)
HCT VFR BLD AUTO: 36.7 % (ref 40–54)
HGB BLD-MCNC: 11.7 GM/DL (ref 14–18)
IMM GRANULOCYTES # BLD AUTO: 0.03 K/UL (ref 0–0.04)
IMM GRANULOCYTES NFR BLD AUTO: 0.4 % (ref 0–0.5)
LYMPHOCYTES # BLD AUTO: 1.94 K/UL (ref 1–4.8)
MCH RBC QN AUTO: 29.9 PG (ref 27–31)
MCHC RBC AUTO-ENTMCNC: 31.9 G/DL (ref 32–36)
MCV RBC AUTO: 94 FL (ref 82–98)
NUCLEATED RBC (/100WBC) (OHS): 0 /100 WBC
OHS QRS DURATION: 140 MS
OHS QRS DURATION: 144 MS
OHS QTC CALCULATION: 474 MS
OHS QTC CALCULATION: 500 MS
PLATELET # BLD AUTO: 185 K/UL (ref 150–450)
PMV BLD AUTO: 10.7 FL (ref 9.2–12.9)
POCT GLUCOSE: 117 MG/DL (ref 70–110)
POCT GLUCOSE: 212 MG/DL (ref 70–110)
POCT GLUCOSE: 50 MG/DL (ref 70–110)
POTASSIUM SERPL-SCNC: 3.4 MMOL/L (ref 3.5–5.1)
RBC # BLD AUTO: 3.91 M/UL (ref 4.6–6.2)
RELATIVE EOSINOPHIL (OHS): 0.6 %
RELATIVE LYMPHOCYTE (OHS): 23.2 % (ref 18–48)
RELATIVE MONOCYTE (OHS): 7.8 % (ref 4–15)
RELATIVE NEUTROPHIL (OHS): 67.8 % (ref 38–73)
SODIUM SERPL-SCNC: 142 MMOL/L (ref 136–145)
WBC # BLD AUTO: 8.37 K/UL (ref 3.9–12.7)

## 2025-07-23 PROCEDURE — 99900035 HC TECH TIME PER 15 MIN (STAT): Mod: HCNC

## 2025-07-23 PROCEDURE — 63600175 PHARM REV CODE 636 W HCPCS: Mod: HCNC | Performed by: EMERGENCY MEDICINE

## 2025-07-23 PROCEDURE — 25000003 PHARM REV CODE 250: Mod: HCNC | Performed by: EMERGENCY MEDICINE

## 2025-07-23 PROCEDURE — 63600175 PHARM REV CODE 636 W HCPCS: Mod: HCNC | Performed by: HOSPITALIST

## 2025-07-23 PROCEDURE — 25000242 PHARM REV CODE 250 ALT 637 W/ HCPCS: Mod: HCNC | Performed by: NURSE PRACTITIONER

## 2025-07-23 PROCEDURE — 99233 SBSQ HOSP IP/OBS HIGH 50: CPT | Mod: HCNC,25,, | Performed by: INTERNAL MEDICINE

## 2025-07-23 PROCEDURE — 80048 BASIC METABOLIC PNL TOTAL CA: CPT | Mod: HCNC | Performed by: INTERNAL MEDICINE

## 2025-07-23 PROCEDURE — 25000003 PHARM REV CODE 250: Mod: HCNC | Performed by: NURSE PRACTITIONER

## 2025-07-23 PROCEDURE — 36415 COLL VENOUS BLD VENIPUNCTURE: CPT | Mod: HCNC | Performed by: INTERNAL MEDICINE

## 2025-07-23 PROCEDURE — 94640 AIRWAY INHALATION TREATMENT: CPT | Mod: HCNC

## 2025-07-23 PROCEDURE — 25000003 PHARM REV CODE 250: Mod: HCNC | Performed by: INTERNAL MEDICINE

## 2025-07-23 PROCEDURE — 85025 COMPLETE CBC W/AUTO DIFF WBC: CPT | Mod: HCNC | Performed by: INTERNAL MEDICINE

## 2025-07-23 PROCEDURE — 93005 ELECTROCARDIOGRAM TRACING: CPT | Mod: HCNC

## 2025-07-23 PROCEDURE — 27000221 HC OXYGEN, UP TO 24 HOURS: Mod: HCNC

## 2025-07-23 PROCEDURE — 63600175 PHARM REV CODE 636 W HCPCS: Mod: HCNC | Performed by: INTERNAL MEDICINE

## 2025-07-23 PROCEDURE — 25000003 PHARM REV CODE 250: Mod: HCNC | Performed by: HOSPITALIST

## 2025-07-23 PROCEDURE — 21400001 HC TELEMETRY ROOM: Mod: HCNC

## 2025-07-23 PROCEDURE — 93010 ELECTROCARDIOGRAM REPORT: CPT | Mod: HCNC,,, | Performed by: INTERNAL MEDICINE

## 2025-07-23 RX ORDER — INSULIN GLARGINE 100 [IU]/ML
5 INJECTION, SOLUTION SUBCUTANEOUS 2 TIMES DAILY
Status: DISCONTINUED | OUTPATIENT
Start: 2025-07-23 | End: 2025-07-24 | Stop reason: HOSPADM

## 2025-07-23 RX ORDER — IPRATROPIUM BROMIDE AND ALBUTEROL SULFATE 2.5; .5 MG/3ML; MG/3ML
3 SOLUTION RESPIRATORY (INHALATION) ONCE
Status: COMPLETED | OUTPATIENT
Start: 2025-07-23 | End: 2025-07-23

## 2025-07-23 RX ORDER — FUROSEMIDE 10 MG/ML
40 INJECTION INTRAMUSCULAR; INTRAVENOUS ONCE
Status: COMPLETED | OUTPATIENT
Start: 2025-07-23 | End: 2025-07-23

## 2025-07-23 RX ADMIN — METOPROLOL TARTRATE 50 MG: 50 TABLET, FILM COATED ORAL at 09:07

## 2025-07-23 RX ADMIN — ASPIRIN 81 MG: 81 TABLET, COATED ORAL at 09:07

## 2025-07-23 RX ADMIN — ATORVASTATIN CALCIUM 40 MG: 40 TABLET, FILM COATED ORAL at 10:07

## 2025-07-23 RX ADMIN — INSULIN GLARGINE 5 UNITS: 100 INJECTION, SOLUTION SUBCUTANEOUS at 09:07

## 2025-07-23 RX ADMIN — POTASSIUM BICARBONATE 25 MEQ: 978 TABLET, EFFERVESCENT ORAL at 09:07

## 2025-07-23 RX ADMIN — FINASTERIDE 5 MG: 5 TABLET, FILM COATED ORAL at 09:07

## 2025-07-23 RX ADMIN — METOPROLOL TARTRATE 50 MG: 50 TABLET, FILM COATED ORAL at 10:07

## 2025-07-23 RX ADMIN — IPRATROPIUM BROMIDE AND ALBUTEROL SULFATE 3 ML: 2.5; .5 SOLUTION RESPIRATORY (INHALATION) at 03:07

## 2025-07-23 RX ADMIN — INSULIN GLARGINE 5 UNITS: 100 INJECTION, SOLUTION SUBCUTANEOUS at 10:07

## 2025-07-23 RX ADMIN — INSULIN ASPART 2 UNITS: 100 INJECTION, SOLUTION INTRAVENOUS; SUBCUTANEOUS at 01:07

## 2025-07-23 RX ADMIN — Medication 16 G: at 05:07

## 2025-07-23 RX ADMIN — GUAIFENESIN 600 MG: 600 TABLET, EXTENDED RELEASE ORAL at 09:07

## 2025-07-23 RX ADMIN — GUAIFENESIN 600 MG: 600 TABLET, EXTENDED RELEASE ORAL at 10:07

## 2025-07-23 RX ADMIN — TAMSULOSIN HYDROCHLORIDE 0.4 MG: 0.4 CAPSULE ORAL at 09:07

## 2025-07-23 RX ADMIN — FUROSEMIDE 40 MG: 10 INJECTION, SOLUTION INTRAVENOUS at 11:07

## 2025-07-23 RX ADMIN — ISOSORBIDE MONONITRATE 60 MG: 60 TABLET, EXTENDED RELEASE ORAL at 09:07

## 2025-07-23 NOTE — ASSESSMENT & PLAN NOTE
Baseline creatinine is 1.4-1.7. Most recent creatinine and eGFR are listed below.  Recent Labs     07/21/25  1708 07/22/25  0907 07/23/25  0443   CREATININE 2.1* 1.6* 1.4   EGFRNORACEVR 30* 42* 49*     Plan  -LJ is currently undergoing medical management  -Avoid nephrotoxins and renally dose meds for GFR listed above  -Monitor urine output, serial BMP, and adjust therapy as needed    7/22/25  Improved with gentle hydration    7/23/25  Kidney function stable.

## 2025-07-23 NOTE — ASSESSMENT & PLAN NOTE
Anemia is likely due to Iron deficiency. Most recent hemoglobin and hematocrit are listed below.  Recent Labs     07/21/25  1708 07/22/25  0410 07/23/25  0443   HGB 12.9* 13.4* 11.7*   HCT 39.3* 40.2 36.7*     Plan  -Monitor serial CBC: Daily  -Transfuse PRBC if patient becomes hemodynamically unstable, symptomatic or H/H drops below 7/21.  -Patient has not received any PRBC transfusions to date  -Patient's anemia is currently stable

## 2025-07-23 NOTE — ASSESSMENT & PLAN NOTE
Patient has Combined Systolic and Diastolic heart failure that is Chronic. On presentation their CHF was well compensated. Most recent BNP and echo results are listed below.  Recent Labs     07/21/25  1708   *     Latest ECHO  Results for orders placed during the hospital encounter of 10/25/23    Echo    Interpretation Summary    Left Ventricle: The left ventricle is normal in size. Normal wall thickness. regional wall motion abnormalities present. There is low normal systolic function with a visually estimated ejection fraction of 50 - 55%.    Left Atrium: Left atrium is severely dilated.    Right Ventricle: Normal right ventricular cavity size. Wall thickness is normal. Right ventricle wall motion  is normal. Systolic function is normal.    Right Atrium: Right atrium is dilated.    Mitral Valve: There is mild regurgitation.    Pulmonic Valve: There is mild regurgitation.    Pulmonary Artery: The estimated pulmonary artery systolic pressure is 24 mmHg.    IVC/SVC: Normal venous pressure at 3 mmHg.    Current Heart Failure Medications       Plan  -Monitor strict I&Os and daily weights.    -Place on telemetry  -Low sodium diet  -Place on fluid restriction of 2 L.   -Cardiology has been consulted  -The patient's volume status is at their baseline  -Continue home medications    7/23/25  Give dose of Lasix today

## 2025-07-23 NOTE — HOSPITAL COURSE
7/23/25-Patient seen and examined today, s/p LHC yesterday which showed stable findings, med mgmt recommended. Feels ok. No recurrent CP. Feels more SOB with intermittent wheezing. IV fluids d/c'd, IV Lasix given. Labs reviewed. Creatinine 1.4. K low, needs repletion.    7/24/25-Patient seen and examined today resting in bed. Feels ok. Reports cough with green phlegm. SOB improved. No CP. Labs reviewed/stable. CXR yesterday with NAF.

## 2025-07-23 NOTE — ASSESSMENT & PLAN NOTE
-Patient with history of CAD s/p remote CABG and PCI who presents with substernal CP relieved by nitro  -Troponin elevated but flat, 0.109>0.101>0.105>0.108>0.116  -Stable during exam CP free  -Continue ASA, statin, CCB, BB, Imdur, heparin gtt  -TTE with EF of 45-50%, +WMA  -Dr. Kingston discussed risks/benefits of LHC, patient agreeable with proceeding    7/23/25  -s/p LHC which showed stable findings, med mgmt recommended  -Continue ASA, statin, CCB, BB, imdur  -More SOB this AM, IV fluids d/c'd and IV Lasix given  -CXR pending

## 2025-07-23 NOTE — ASSESSMENT & PLAN NOTE
Patient's FSGs are uncontrolled due to hyperglycemia on current medication regimen.  Last A1c reviewed-   Lab Results   Component Value Date    HGBA1C 7.9 (H) 07/22/2025     Most recent fingerstick glucose reviewed-   Recent Labs   Lab 07/22/25  2130 07/23/25  0511 07/23/25  0549 07/23/25  1320   POCTGLUCOSE 70 50* 117* 212*     Current correctional scale  Low  Titrate as needed anti-hyperglycemic dose as follows-   Antihyperglycemics (From admission, onward)      Start     Stop Route Frequency Ordered    07/22/25 0900  insulin glargine U-100 (Lantus) pen 10 Units         -- SubQ 2 times daily 07/22/25 0433    07/22/25 0533  insulin aspart U-100 pen 0-5 Units         -- SubQ Before meals & nightly PRN 07/22/25 0433     Plan:  -SSI  -A1c  -Accu-checks  -Hold oral hypoglycemics while patient is in the hospital  -Continue home long-acting insulin at 20% decrease, titrate up as needed  -Hypoglycemic protocol

## 2025-07-23 NOTE — PLAN OF CARE
Left TR Band removed per protocol; pressure dressing placed.   Discussed post discharge care of left radial site with pt.   Transferred back to room 228, via hospital bed, in no apparent distress.   Report given to ANAHI Franco; no further questions at this time.

## 2025-07-23 NOTE — PROGRESS NOTES
[place] - Telemetry ([facility])  Cardiology  Progress Note    Patient Name: Nithin Pino  MRN: [id number]  Admission Date: 7/21/2025  Hospital Length of Stay: 2 days  Code Status: Full Code   Attending Physician: Kenroy Bello MD   Primary Care Physician: MUKUL Garg MD  Expected Discharge Date: 7/23/2025  Principal Problem:NSTEMI (non-ST elevated myocardial infarction)    Subjective:   HPI:  Mr. Pino is an 85 year old male patient whose current medical conditions include CAD s/p remote CABG in 2011, s/p prior PCI, chronic RBBB, bladder CA, MR COPD, HTN, PAD, DM type II, MR, and ICM who presented to [facility] ED yesterday due to acute onset of substernal chest pressure that onset while driving to the store. Pain was heavy and intense and eventually relieved by two sublingual nitro as well as nitro spray given by EMS. Other associated symptoms included non-productive cough and SOB. Patient denied any associated nausea, vomiting, palpitations, near syncope, or syncope. Initial workup in ED revealed troponin of 0.101, creatinine of 2.1, and BNP of 160. Patient subsequently placed on a heparin drip and admitted for further evaluation and treatment. Cardiology consulted to assist with management. Patient seen and examined today, resting in bed. Feels ok. Currently CP free. No other CV complaints. States he has not ever had to use nitro prior to this occasion. He reports compliance with his medications. Very active for his age, lives alone/performs all of his ADL's. Followed routinely in clinic by Dr. Sadler. Labs reviewed. Troponin elevated but flat, 0.109>0.101>0.105>0.108>0.116. TTE with EF of 45-50%, +WMA. EKG reviewed, no acute ischemic changes appreciated. Prior MPI stress test 11/2023 negative. [facility] planned today.     Hospital Course:   7/23/25-Patient seen and examined today, s/p C yesterday which showed stable findings, med mgmt recommended. Feels ok. No recurrent CP. Feels more SOB with intermittent  wheezing. IV fluids d/c'd, IV Lasix given. Labs reviewed. Creatinine 1.4. K low, needs repletion.        Review of Systems   Constitutional: Positive for malaise/fatigue.   HENT: Negative.     Eyes: Negative.    Cardiovascular:  Positive for dyspnea on exertion.   Respiratory:  Positive for shortness of breath and wheezing.    Endocrine: Negative.    Hematologic/Lymphatic: Negative.    Skin: Negative.    Musculoskeletal: Negative.    Gastrointestinal: Negative.    Genitourinary: Negative.    Neurological:  Positive for weakness.   Psychiatric/Behavioral: Negative.     Allergic/Immunologic: Negative.      Objective:     Vital Signs (Most Recent):  Temp: 97.6 °F (36.4 °C) (07/23/25 1247)  Pulse: 71 (07/23/25 1247)  Resp: 18 (07/23/25 1247)  BP: 117/64 (07/23/25 1247)  SpO2: 98 % (07/23/25 1247) Vital Signs (24h Range):  Temp:  [94.5 °F (34.7 °C)-98.1 °F (36.7 °C)] 97.6 °F (36.4 °C)  Pulse:  [] 71  Resp:  [15-19] 18  SpO2:  [92 %-100 %] 98 %  BP: ()/(50-64) 117/64     Weight: 84.1 kg (185 lb 6.5 oz)  Body mass index is 26.6 kg/m².     SpO2: 98 %         Intake/Output Summary (Last 24 hours) at 7/23/2025 1410  Last data filed at 7/22/2025 2200  Gross per 24 hour   Intake --   Output 300 ml   Net -300 ml       Lines/Drains/Airways       Peripheral Intravenous Line  Duration             Peripheral IV 07/21/25 1704 20 G Anterior;Right Forearm 1 day                       Physical Exam  Vitals and nursing note reviewed.   Constitutional:       Appearance: Normal appearance.      Comments: On supplemental O2   HENT:      Head: Normocephalic and atraumatic.   Eyes:      Pupils: Pupils are equal, round, and reactive to light.   Cardiovascular:      Rate and Rhythm: Normal rate and regular rhythm.      Heart sounds: S1 normal and S2 normal. No murmur heard.  Pulmonary:      Effort: Pulmonary effort is normal.      Breath sounds: Wheezing and rales present.   Musculoskeletal:      Right lower leg: No edema.      Left  "lower leg: No edema.   Skin:     General: Skin is warm and dry.      Comments: L radial access site C/D/I; intact pulse, no hematoma   Neurological:      Mental Status: He is oriented to person, place, and time.   Psychiatric:         Mood and Affect: Mood normal.         Behavior: Behavior normal.         Thought Content: Thought content normal.            Significant Labs: CMP   Recent Labs   Lab 07/21/25  1708 07/22/25  0907 07/23/25  0443    140 142   K 3.9 4.4 3.4*    107 113*   CO2 20* 21* 19*   * 130* 49*   BUN 39* 34* 31*   CREATININE 2.1* 1.6* 1.4   CALCIUM 9.9 10.6* 9.3   PROT 6.6  --   --    ALBUMIN 3.2*  --   --    BILITOT 1.2*  --   --    ALKPHOS 79  --   --    AST 23  --   --    ALT 17  --   --    ANIONGAP 10 12 10   , CBC   Recent Labs   Lab 07/21/25  1708 07/22/25  0410 07/23/25  0443   WBC 7.68 6.13 8.37   HGB 12.9* 13.4* 11.7*   HCT 39.3* 40.2 36.7*    167 185   , Troponin No results for input(s): "TROPONINIHS" in the last 48 hours., and All pertinent lab results from the last 24 hours have been reviewed.    Significant Imaging: Echocardiogram: Transthoracic echo (TTE) complete (Cupid Only):   Results for orders placed or performed during the hospital encounter of 07/21/25   Echo   Result Value Ref Range    BSA 2.02 m2    [facility] CV CPX PATIENT HEIGHT IN 70     LVOT stroke volume 46.1 cm3    LVIDd 5.2 3.5 - 6.0 cm    LV Systolic Volume 72 mL    LV Systolic Volume Index 35.8 mL/m2    LVIDs 4.1 (A) 2.1 - 4.0 cm    LV Diastolic Volume 129 mL    LV Diastolic Volume Index 64.18 mL/m2    Left Ventricular End Systolic Volume by Teichholz Method 71.93 mL    Left Ventricular End Diastolic Volume by Teichholz Method 128.60 mL    IVS 1.4 (A) 0.6 - 1.1 cm    LVOT diameter 2.3 cm    LVOT area 4.2 cm2    FS 21.2 (A) 28 - 44 %    Left Ventricle Relative Wall Thickness 0.46 cm    PW 1.2 (A) 0.6 - 1.1 cm    LV mass 278.4 g    LV Mass Index 138.5 g/m2    MV Peak E Bran 0.44 m/s    TDI LATERAL " 0.10 m/s    TDI SEPTAL 0.10 m/s    E/E' ratio 4 m/s    MV Peak A Bran 0.74 m/s    TR Max Bran 2.3 m/s    E/A ratio 0.59     IVRT 66 msec    E wave deceleration time 162 msec    LV SEPTAL E/E' RATIO 4.4 m/s    LV LATERAL E/E' RATIO 4.4 m/s    LVOT peak bran 0.8 m/s    Left Ventricular Outflow Tract Mean Velocity 0.63 cm/s    Left Ventricular Outflow Tract Mean Gradient 1.70 mmHg    RVOT peak VTI 10.8 cm    TAPSE 1.3 cm    LA size 4.0 cm    Left Atrium Minor Axis 5.3 cm    Left Atrium Major Axis 4.8 cm    RA Major Axis 4.22 cm    AV mean gradient 4 mmHg    AV peak gradient 7 mmHg    Ao peak bran 1.3 m/s    Ao VTI 16.5 cm    LVOT peak VTI 11.1 cm    AV valve area 2.8 cm²    AV Velocity Ratio 0.62     AV index (prosthetic) 0.67     MANISH by Velocity Ratio 2.6 cm²    Mr max bran 3.37 m/s    MV stenosis pressure 1/2 time 46.99 ms    MV valve area p 1/2 method 4.68 cm2    Triscuspid Valve Regurgitation Peak Gradient 20 mmHg    PV mean gradient 2 mmHg    RVOT peak bran 0.77 m/s    Ao root annulus 3.7 cm    STJ 3.7 cm    Ascending aorta 3.6 cm    ASI 1.8 cm/m2    Aortic Height Index 2.0 cm/m    Mean e' 0.10 m/s    ZLVIDS 1.00     ZLVIDD -1.25     MARILOU 32 mL/m2    LA Vol 63 cm3    LA WIDTH 3.7 cm    RA Width 2.5 cm    TV resting pulmonary artery pressure 24 mmHg    RV TB RVSP 5 mmHg    Est. RA pres 3 mmHg    Narrative      Left Ventricle: The left ventricle is normal in size. Moderately   increased wall thickness. There is concentric hypertrophy. Mild global   hypokinesis and regional wall motion abnormalities present. Septal motion   is abnormal. There is mildly reduced systolic function with a visually   estimated ejection fraction of 40 - 50%. Grade I diastolic dysfunction.    Right Ventricle: The right ventricle is normal in sizeWall thickness is   normal. . Right ventricle wall motion has global hypokinesis. Systolic   function is moderately reduced.    Left Atrium: The left atrium is mildly dilated.    Aortic Valve: There is  moderate aortic valve sclerosis. Mildly   calcified cusps. There is mild annular calcification present.    Mitral Valve: There is mild regurgitation.    Tricuspid Valve: There is mild regurgitation.    Pulmonary Artery: The estimated pulmonary artery systolic pressure is   24 mmHg.    IVC/SVC: Normal venous pressure at 3 mmHg.      and EKG: Reviewed  Assessment and Plan:   Patient who presents with CP/NSTEMI s/p LHC which showed stable findings. Continue OMT. More SOB this AM. IV Lasix given. CXR pending.    * NSTEMI (non-ST elevated myocardial infarction)  -Patient with history of CAD s/p remote CABG and PCI who presents with substernal CP relieved by nitro  -Troponin elevated but flat, 0.109>0.101>0.105>0.108>0.116  -Stable during exam CP free  -Continue ASA, statin, CCB, BB, Imdur, heparin gtt  -TTE with EF of 45-50%, +WMA  -Dr. Kingston discussed risks/benefits of LHC, patient agreeable with proceeding    7/23/25  -s/p LHC which showed stable findings, med mgmt recommended  -Continue ASA, statin, CCB, BB, imdur  -More SOB this AM, IV fluids d/c'd and IV Lasix given  -CXR pending    Acute kidney injury superimposed on chronic kidney disease  -Per primary team  -Creatinine down to 1.6    7/23/25  -Creatinine stable at 1.4    Chest pain  -See plan under NSTEMI    Type 2 diabetes mellitus with hyperglycemia, with long-term current use of insulin  -Per primary team    Chronic combined systolic and diastolic congestive heart failure  -Clinically compensated  -TTE with EF of 45-50%, + WMA  -Continue OMT          Hyperlipidemia associated with type 2 diabetes mellitus  -Statin    SOB (shortness of breath)  -IV Lasix given  -CXR pending    RBBB  -Chronic    Coronary artery disease of bypass graft of native heart with stable angina pectoris  -see plan under NSTEMI    Hypertension associated with diabetes  -Titrate meds        VTE Risk Mitigation (From admission, onward)           Ordered     Reason for No Pharmacological VTE  Prophylaxis  Once        Question:  Reasons:  Answer:  Physician Provided (leave comment)  Comment:  currently on heparin drip    07/21/25 2030     IP VTE HIGH RISK PATIENT  Once         07/21/25 2030     Place sequential compression device  Until discontinued         07/21/25 2030                    Breanne Martinez PA-C  Cardiology  [facility] - Telemetry ([facility])

## 2025-07-23 NOTE — SUBJECTIVE & OBJECTIVE
Interval History: see Hospital Course.    Review of Systems   Constitutional:  Negative for chills, diaphoresis, fatigue and fever.   HENT:  Negative for drooling, ear pain, rhinorrhea and sore throat.    Eyes: Negative.    Respiratory:  Negative for cough (productive cough), shortness of breath and wheezing.    Cardiovascular:  Positive for chest pain. Negative for palpitations and leg swelling.   Gastrointestinal:  Negative for abdominal pain, constipation, diarrhea and nausea.   Endocrine: Negative.    Genitourinary:  Negative for dysuria, hematuria and urgency.   Musculoskeletal: Negative.    Skin:  Negative for color change and wound.   Allergic/Immunologic: Negative.    Neurological:  Negative for dizziness, syncope and speech difficulty.   Hematological: Negative.    Psychiatric/Behavioral: Negative.       Objective:     Vital Signs (Most Recent):  Temp: 97.6 °F (36.4 °C) (07/23/25 1247)  Pulse: 71 (07/23/25 1247)  Resp: 18 (07/23/25 1247)  BP: 117/64 (07/23/25 1247)  SpO2: 98 % (07/23/25 1247) Vital Signs (24h Range):  Temp:  [94.5 °F (34.7 °C)-98.1 °F (36.7 °C)] 97.6 °F (36.4 °C)  Pulse:  [] 71  Resp:  [15-19] 18  SpO2:  [92 %-100 %] 98 %  BP: ()/(50-64) 117/64     Weight: 84.1 kg (185 lb 6.5 oz)  Body mass index is 26.6 kg/m².    Intake/Output Summary (Last 24 hours) at 7/23/2025 1451  Last data filed at 7/22/2025 2200  Gross per 24 hour   Intake --   Output 300 ml   Net -300 ml         Physical Exam  Vitals reviewed.   Constitutional:       General: He is not in acute distress.     Appearance: Normal appearance. He is normal weight. He is not ill-appearing, toxic-appearing or diaphoretic.   HENT:      Head: Normocephalic and atraumatic.      Right Ear: External ear normal.      Left Ear: External ear normal.      Nose: Nose normal. No congestion or rhinorrhea.      Mouth/Throat:      Mouth: Mucous membranes are moist.      Pharynx: Oropharynx is clear. No oropharyngeal exudate or posterior  oropharyngeal erythema.   Eyes:      General: No scleral icterus.     Extraocular Movements: Extraocular movements intact.      Conjunctiva/sclera: Conjunctivae normal.      Pupils: Pupils are equal, round, and reactive to light.   Neck:      Vascular: No carotid bruit.   Cardiovascular:      Rate and Rhythm: Normal rate and regular rhythm.      Pulses: Normal pulses.      Heart sounds: Normal heart sounds. No murmur heard.     No friction rub. No gallop.      Comments: Well healed sternotomy scar noted  Pulmonary:      Effort: Pulmonary effort is normal. No respiratory distress.      Breath sounds: No stridor. Wheezing present. No rhonchi or rales.   Chest:      Chest wall: No tenderness.   Abdominal:      General: Abdomen is flat. Bowel sounds are normal. There is no distension.      Palpations: Abdomen is soft. There is no mass.      Tenderness: There is no abdominal tenderness. There is no right CVA tenderness, left CVA tenderness, guarding or rebound.      Hernia: No hernia is present.   Musculoskeletal:         General: No swelling, tenderness, deformity or signs of injury. Normal range of motion.      Cervical back: Normal range of motion and neck supple. No rigidity or tenderness.      Right lower leg: No edema.      Left lower leg: No edema.   Lymphadenopathy:      Cervical: No cervical adenopathy.   Skin:     General: Skin is warm and dry.      Capillary Refill: Capillary refill takes less than 2 seconds.      Coloration: Skin is not jaundiced or pale.      Findings: No bruising, erythema, lesion or rash.   Neurological:      General: No focal deficit present.      Mental Status: He is alert and oriented to person, place, and time. Mental status is at baseline.      Cranial Nerves: No cranial nerve deficit.      Sensory: No sensory deficit.      Motor: No weakness.      Coordination: Coordination normal.   Psychiatric:         Mood and Affect: Mood normal.         Behavior: Behavior normal.         Thought  Content: Thought content normal.         Judgment: Judgment normal.           Significant Labs: All pertinent labs within the past 24 hours have been reviewed.  CBC:   Recent Labs   Lab 07/21/25  1708 07/22/25  0410 07/23/25  0443   WBC 7.68 6.13 8.37   HGB 12.9* 13.4* 11.7*   HCT 39.3* 40.2 36.7*    167 185     CMP:   Recent Labs   Lab 07/21/25  1708 07/22/25  0907 07/23/25  0443    140 142   K 3.9 4.4 3.4*    107 113*   CO2 20* 21* 19*   * 130* 49*   BUN 39* 34* 31*   CREATININE 2.1* 1.6* 1.4   CALCIUM 9.9 10.6* 9.3   PROT 6.6  --   --    ALBUMIN 3.2*  --   --    BILITOT 1.2*  --   --    ALKPHOS 79  --   --    AST 23  --   --    ALT 17  --   --    ANIONGAP 10 12 10       Significant Imaging:   XR CHEST AP PORTABLE     CLINICAL HISTORY:  sob;     COMPARISON:  July 21, 2025     FINDINGS:  EKG leads overlie the chest.  The lungs remain clear.  Negative for new pulmonary opacities.  The hilar and mediastinal contours and osseous structures are unchanged.     Impression:     1.  Overall, no significant interval change has occurred.        Electronically signed by:Ty Braun MD  Date:                                            07/23/2025  Time:                                           14:20

## 2025-07-23 NOTE — PROGRESS NOTES
[facility] Medicine  Progress Note    Patient Name: Nithin Pino  MRN: [id number]  Patient Class: IP- Inpatient   Admission Date: 7/21/2025  Length of Stay: 2 days  Attending Physician: Kenroy Bello MD  Primary Care Provider: MUKUL Garg MD    Subjective     Principal Problem:NSTEMI (non-ST elevated myocardial infarction)      HPI:  Nithin Pino is a 85 y.o. male with a PMH  has a past medical history of Abnormal brain MRI (01/21/2018), Abnormal ECG (10/31/2013), Abnormal stress test (01/28/2016), Alcohol dependence, AP (angina pectoris) (01/28/2016), Asthma, Bladder cancer, Carotid artery occlusion, Cataract, Chronic combined systolic and diastolic congestive heart failure (05/24/2021), Chronic diastolic heart failure (10/31/2013), Chronic ischemic heart disease (10/31/2013), CKD (chronic kidney disease) stage 3, GFR 30-59 ml/min (11/04/2015), Coronary artery disease, DM (diabetes mellitus) (2013), DM (diabetes mellitus) (2011), Heart valve regurgitation (11/04/2015), Hematuria (06/25/2020), History of alcohol abuse (01/22/2018), History of atherectomy (01/21/2018), History of chronic kidney disease (01/22/2018), History of PTCA (10/31/2013), History of PTCA (03/09/2016), Hyperlipidemia, Hypertension, Insomnia (06/17/2013), Iron deficiency anemia (10/19/2023), Ischemic cardiomyopathy (10/31/2013), Long term (current) use of antithrombotics/antiplatelets (01/21/2018), Malignant neoplasm of overlapping sites of bladder (06/08/2023), Myocardial infarction, Old MI (myocardial infarction) (1994), Peripheral vascular disease, Polyneuropathy, RBBB (10/31/2013), S/P CABG (coronary artery bypass graft) (10/31/2013), Shortness of breath (10/31/2013), Simple chronic bronchitis (6/17/2013), Tobacco dependence, Trouble in sleeping, Type 2 diabetes with peripheral circulatory disorder, controlled, and Wears glasses. who presented to the ED for further evaluation of acute onset chest pain  which began earlier today while driving to the store.  Patient has significant cardiac history including CAD s/p stent placement and CABG currently followed by Dr. Sadler from cardiology outpatient.  Patient described endorsing substernal chest pressure which began acutely while driving to the store around 2:00 p.m. Pain was somewhat relieved by taking two sublingual nitros and completely resolved upon administration of nitro spray upon EMS arrival.  He reported no known alleviating or aggravating factors noted in his currently chest pain-free at time of bedside assessment.  Patient also reports endorsing nonproductive cough with daughter reported to ED staff that he has been experiencing shortness a breath and congestion after spring his home with ortho insect killer back on 07/18/25 and has been feeling ill since.  Patient denied endorsing any lightheadedness, dizziness, headache, visual changes, fever, chills, sweats, nausea, vomiting, abdominal pain, dysuria, hematuria, melena, hematochezia, diarrhea, or onset neurological deficits.  Prior to onset of symptoms, patient reported being in his usual state of health with no other concerns or complaints.  Initial workup in the ED revealed patient to be afebrile without leukocytosis, hemodynamically stable, creatinine/GFR 2.1/30, blood glucose 273, , troponin 0.101.  Chest x-ray negative for acute findings.  EKG reveals sinus rhythm with occasional PVCs, T-wave abnormalities, in age indeterminate infarcts.  Troponin initially elevated at 0.109 with repeat 0.101 and 0.108.  Cardiology consulted by ED staff and recommended patient be initiated on heparin and NSTEMI pathway inpatient will be evaluated in the morning regarding further ischemic workup.  Patient admitted to Hospital Medicine inpatient for continued medical management.    PCP: MUKUL Garg      Overview/Hospital Course:  Nithin Pino is a 85 year old male who was admitted for NSTEMI.  Echocardiogram showed  EF of 40-50% with mild global hypokinesis and regional wall motion abnormalities. He was started on heparin infusion and seen by Cardiology who plans for C today.     7/23/25  S/p LHC today showing stable findings without need for intervention. C/o dyspnea this AM with wheezing. CXR unchanged. Give dose of lasix and duoneb today and monitor overnight.    Interval History: see Hospital Course.    Review of Systems   Constitutional:  Negative for chills, diaphoresis, fatigue and fever.   HENT:  Negative for drooling, ear pain, rhinorrhea and sore throat.    Eyes: Negative.    Respiratory:  Negative for cough (productive cough), shortness of breath and wheezing.    Cardiovascular:  Positive for chest pain. Negative for palpitations and leg swelling.   Gastrointestinal:  Negative for abdominal pain, constipation, diarrhea and nausea.   Endocrine: Negative.    Genitourinary:  Negative for dysuria, hematuria and urgency.   Musculoskeletal: Negative.    Skin:  Negative for color change and wound.   Allergic/Immunologic: Negative.    Neurological:  Negative for dizziness, syncope and speech difficulty.   Hematological: Negative.    Psychiatric/Behavioral: Negative.       Objective:     Vital Signs (Most Recent):  Temp: 97.6 °F (36.4 °C) (07/23/25 1247)  Pulse: 71 (07/23/25 1247)  Resp: 18 (07/23/25 1247)  BP: 117/64 (07/23/25 1247)  SpO2: 98 % (07/23/25 1247) Vital Signs (24h Range):  Temp:  [94.5 °F (34.7 °C)-98.1 °F (36.7 °C)] 97.6 °F (36.4 °C)  Pulse:  [] 71  Resp:  [15-19] 18  SpO2:  [92 %-100 %] 98 %  BP: ()/(50-64) 117/64     Weight: 84.1 kg (185 lb 6.5 oz)  Body mass index is 26.6 kg/m².    Intake/Output Summary (Last 24 hours) at 7/23/2025 1451  Last data filed at 7/22/2025 2200  Gross per 24 hour   Intake --   Output 300 ml   Net -300 ml         Physical Exam  Vitals reviewed.   Constitutional:       General: He is not in acute distress.     Appearance: Normal appearance. He is  normal weight. He is not ill-appearing, toxic-appearing or diaphoretic.   HENT:      Head: Normocephalic and atraumatic.      Right Ear: External ear normal.      Left Ear: External ear normal.      Nose: Nose normal. No congestion or rhinorrhea.      Mouth/Throat:      Mouth: Mucous membranes are moist.      Pharynx: Oropharynx is clear. No oropharyngeal exudate or posterior oropharyngeal erythema.   Eyes:      General: No scleral icterus.     Extraocular Movements: Extraocular movements intact.      Conjunctiva/sclera: Conjunctivae normal.      Pupils: Pupils are equal, round, and reactive to light.   Neck:      Vascular: No carotid bruit.   Cardiovascular:      Rate and Rhythm: Normal rate and regular rhythm.      Pulses: Normal pulses.      Heart sounds: Normal heart sounds. No murmur heard.     No friction rub. No gallop.      Comments: Well healed sternotomy scar noted  Pulmonary:      Effort: Pulmonary effort is normal. No respiratory distress.      Breath sounds: No stridor. Wheezing present. No rhonchi or rales.   Chest:      Chest wall: No tenderness.   Abdominal:      General: Abdomen is flat. Bowel sounds are normal. There is no distension.      Palpations: Abdomen is soft. There is no mass.      Tenderness: There is no abdominal tenderness. There is no right CVA tenderness, left CVA tenderness, guarding or rebound.      Hernia: No hernia is present.   Musculoskeletal:         General: No swelling, tenderness, deformity or signs of injury. Normal range of motion.      Cervical back: Normal range of motion and neck supple. No rigidity or tenderness.      Right lower leg: No edema.      Left lower leg: No edema.   Lymphadenopathy:      Cervical: No cervical adenopathy.   Skin:     General: Skin is warm and dry.      Capillary Refill: Capillary refill takes less than 2 seconds.      Coloration: Skin is not jaundiced or pale.      Findings: No bruising, erythema, lesion or rash.   Neurological:      General:  No focal deficit present.      Mental Status: He is alert and oriented to person, place, and time. Mental status is at baseline.      Cranial Nerves: No cranial nerve deficit.      Sensory: No sensory deficit.      Motor: No weakness.      Coordination: Coordination normal.   Psychiatric:         Mood and Affect: Mood normal.         Behavior: Behavior normal.         Thought Content: Thought content normal.         Judgment: Judgment normal.           Significant Labs: All pertinent labs within the past 24 hours have been reviewed.  CBC:   Recent Labs   Lab 07/21/25  1708 07/22/25  0410 07/23/25  0443   WBC 7.68 6.13 8.37   HGB 12.9* 13.4* 11.7*   HCT 39.3* 40.2 36.7*    167 185     CMP:   Recent Labs   Lab 07/21/25 1708 07/22/25  0907 07/23/25  0443    140 142   K 3.9 4.4 3.4*    107 113*   CO2 20* 21* 19*   * 130* 49*   BUN 39* 34* 31*   CREATININE 2.1* 1.6* 1.4   CALCIUM 9.9 10.6* 9.3   PROT 6.6  --   --    ALBUMIN 3.2*  --   --    BILITOT 1.2*  --   --    ALKPHOS 79  --   --    AST 23  --   --    ALT 17  --   --    ANIONGAP 10 12 10       Significant Imaging:   XR CHEST AP PORTABLE     CLINICAL HISTORY:  sob;     COMPARISON:  July 21, 2025     FINDINGS:  EKG leads overlie the chest.  The lungs remain clear.  Negative for new pulmonary opacities.  The hilar and mediastinal contours and osseous structures are unchanged.     Impression:     1.  Overall, no significant interval change has occurred.        Electronically signed by:Ty Braun MD  Date:                                            07/23/2025  Time:                                           14:20      Assessment & Plan  Chest pain  See plan above    NSTEMI (non-ST elevated myocardial infarction)  Patient presents with NSTEMI. Chest pain is currently controlled. ASCENCION score is 6. Patient is currently on NSTEMI Pathway.    EKG reviewed. Troponins reviewed and results noted-   Recent Labs   Lab 07/22/25 0907   TROPONINI 0.117*      Lipid panel reviewed and shows-     Lab Results   Component Value Date    LDLCALC 41.6 (L) 07/21/2025     Lab Results   Component Value Date    TRIG 67 07/21/2025       Medical management includes; Beta Blocker, Anticoagulation, High Intensity Stain, and Nitrate Echo has not been performed. Latest ECHO results are as follows- Results for orders placed during the hospital encounter of 10/25/23    Echo    Interpretation Summary    Left Ventricle: The left ventricle is normal in size. Normal wall thickness. regional wall motion abnormalities present. There is low normal systolic function with a visually estimated ejection fraction of 50 - 55%.    Left Atrium: Left atrium is severely dilated.    Right Ventricle: Normal right ventricular cavity size. Wall thickness is normal. Right ventricle wall motion  is normal. Systolic function is normal.    Right Atrium: Right atrium is dilated.    Mitral Valve: There is mild regurgitation.    Pulmonic Valve: There is mild regurgitation.    Pulmonary Artery: The estimated pulmonary artery systolic pressure is 24 mmHg.    IVC/SVC: Normal venous pressure at 3 mmHg.    Consult for cardiac rehab is ordered. Patient counseled on lifestyle modifications- begin progressive daily aerobic exercise program, follow a low fat, low cholesterol diet, reduce salt in diet and cooking, reduce exposure to stress, improve dietary compliance, use calcium 1 gram daily with Vit D, continue current medications, continue current healthy lifestyle patterns, and return for routine annual checkups. Cardiology is consulted. Plan of care pending with cardiology team. Continue to monitor patient closely and adjust therapy as needed.      7/22/25  Echocardiogram shows reduce EF with WMA. Plans for LHC Today. Continue heparin infusion    7/23/25  S/p LHC today showing stable findings without need for intervention. C/o dyspnea this AM with wheezing. CXR unchanged. Give dose of lasix and duoneb today and  monitor overnight.  Coronary artery disease of bypass graft of native heart with stable angina pectoris  Patient with known CAD s/p stent placement and CABG, which is controlled Will continue home medications and monitor for S/Sx of angina/ACS. Continue to monitor on telemetry.  Patient currently on NSTEMI pathway and awaiting further evaluation/recommendations from Cardiology.     7/22/25  Plans as above  Claudication in peripheral vascular disease  Chronic.  Currently asymptomatic.  Plan:  -continue home medications  -continued treatment of NSTEMI as noted above    Acute kidney injury superimposed on chronic kidney disease  Baseline creatinine is 1.4-1.7. Most recent creatinine and eGFR are listed below.  Recent Labs     07/21/25  1708 07/22/25  0907 07/23/25  0443   CREATININE 2.1* 1.6* 1.4   EGFRNORACEVR 30* 42* 49*     Plan  -LJ is currently undergoing medical management  -Avoid nephrotoxins and renally dose meds for GFR listed above  -Monitor urine output, serial BMP, and adjust therapy as needed    7/22/25  Improved with gentle hydration    7/23/25  Kidney function stable.  Type 2 diabetes mellitus with hyperglycemia, with long-term current use of insulin  Patient's FSGs are uncontrolled due to hyperglycemia on current medication regimen.  Last A1c reviewed-   Lab Results   Component Value Date    HGBA1C 7.9 (H) 07/22/2025     Most recent fingerstick glucose reviewed-   Recent Labs   Lab 07/22/25  2130 07/23/25  0511 07/23/25  0549 07/23/25  1320   POCTGLUCOSE 70 50* 117* 212*     Current correctional scale  Low  Titrate as needed anti-hyperglycemic dose as follows-   Antihyperglycemics (From admission, onward)      Start     Stop Route Frequency Ordered    07/22/25 0900  insulin glargine U-100 (Lantus) pen 10 Units         -- SubQ 2 times daily 07/22/25 0433    07/22/25 0533  insulin aspart U-100 pen 0-5 Units         -- SubQ Before meals & nightly PRN 07/22/25 0433      Plan:  -SSI  -A1c  -Accu-checks  -Hold oral hypoglycemics while patient is in the hospital  -Continue home long-acting insulin at 20% decrease, titrate up as needed  -Hypoglycemic protocol      Hypertension associated with diabetes  Chronic, controlled.  Latest blood pressure and vitals reviewed-   Temp:  [94.5 °F (34.7 °C)-98.1 °F (36.7 °C)]   Pulse:  []   Resp:  [15-19]   BP: ()/(50-64)   SpO2:  [92 %-100 %] .   Home meds for hypertension were reviewed and noted below.   Hypertension Medications              amLODIPine (NORVASC) 10 MG tablet Take 1 tablet (10 mg total) by mouth once daily.    furosemide (LASIX) 40 MG tablet TAKE 1 PILL IN AM, AND 1/2 PILL IN PM    isosorbide mononitrate (IMDUR) 60 MG 24 hr tablet Take 1 tablet (60 mg total) by mouth once daily.    losartan (COZAAR) 50 MG tablet Take 1 tablet (50 mg total) by mouth once daily.    metoprolol succinate (TOPROL-XL) 25 MG 24 hr tablet Take 1 tablet (25 mg total) by mouth every evening.    metoprolol tartrate (LOPRESSOR) 50 MG tablet Take 50 mg by mouth 2 (two) times daily.    nitroGLYCERIN (NITROSTAT) 0.4 MG SL tablet Place 1 tablet (0.4 mg total) under the tongue every 5 (five) minutes as needed for Chest pain (for chest pain/discomfort). Call 911 if chest pain persists after second dose.     While in the hospital, will manage blood pressure as follows; Continue home antihypertensive regimen, holding losartan and lasix in setting of LJ on CKD.    Will utilize p.r.n. blood pressure medication only if patient's blood pressure greater than  180/110 and he develops symptoms such as worsening chest pain or shortness of breath.    Chronic combined systolic and diastolic congestive heart failure  Patient has Combined Systolic and Diastolic heart failure that is Chronic. On presentation their CHF was well compensated. Most recent BNP and echo results are listed below.  Recent Labs     07/21/25  1708   *     Latest ECHO  Results for  orders placed during the hospital encounter of 10/25/23    Echo    Interpretation Summary    Left Ventricle: The left ventricle is normal in size. Normal wall thickness. regional wall motion abnormalities present. There is low normal systolic function with a visually estimated ejection fraction of 50 - 55%.    Left Atrium: Left atrium is severely dilated.    Right Ventricle: Normal right ventricular cavity size. Wall thickness is normal. Right ventricle wall motion  is normal. Systolic function is normal.    Right Atrium: Right atrium is dilated.    Mitral Valve: There is mild regurgitation.    Pulmonic Valve: There is mild regurgitation.    Pulmonary Artery: The estimated pulmonary artery systolic pressure is 24 mmHg.    IVC/SVC: Normal venous pressure at 3 mmHg.    Current Heart Failure Medications       Plan  -Monitor strict I&Os and daily weights.    -Place on telemetry  -Low sodium diet  -Place on fluid restriction of 2 L.   -Cardiology has been consulted  -The patient's volume status is at their baseline  -Continue home medications    7/23/25  Give dose of Lasix today  Iron deficiency anemia  Anemia is likely due to Iron deficiency. Most recent hemoglobin and hematocrit are listed below.  Recent Labs     07/21/25  1708 07/22/25  0410 07/23/25  0443   HGB 12.9* 13.4* 11.7*   HCT 39.3* 40.2 36.7*     Plan  -Monitor serial CBC: Daily  -Transfuse PRBC if patient becomes hemodynamically unstable, symptomatic or H/H drops below 7/21.  -Patient has not received any PRBC transfusions to date  -Patient's anemia is currently stable    Hyperlipidemia associated with type 2 diabetes mellitus  Patient is chronically on statin.will continue for now. Last Lipid Panel:   Lab Results   Component Value Date    CHOL 93 (L) 07/21/2025    HDL 38 (L) 07/21/2025    LDLCALC 41.6 (L) 07/21/2025    TRIG 67 07/21/2025    CHOLHDL 40.9 07/21/2025     Plan:  -Continue home medication  -low fat/low calorie diet    Benign prostatic  hyperplasia  Chronic.  Plan:  -continue home medication    RBBB      SOB (shortness of breath)      VTE Risk Mitigation (From admission, onward)           Ordered     Reason for No Pharmacological VTE Prophylaxis  Once        Question:  Reasons:  Answer:  Physician Provided (leave comment)  Comment:  currently on heparin drip    07/21/25 2030     IP VTE HIGH RISK PATIENT  Once         07/21/25 2030     Place sequential compression device  Until discontinued         07/21/25 2030                    Discharge Planning   RAMSES: 7/23/2025     Code Status: Full Code   Medical Readiness for Discharge Date: 7/23/2025  Discharge Plan A: Home with family, Home Health        Lm Conde NP  Department of Hospital Medicine   [facility] - Telemetry ([facility])

## 2025-07-23 NOTE — CONSULTS
"RD remote for coverage. RD consulted for diet education.        Attached the following nutritional education to patient's discharge papers:         "Heart Healthy Consistent Carbohydrate Nutrition Therapy"        On site RD will provide additional handout(s), discuss nutritional education and answer any questions/concerns the patient may have at the RD follow up.         *Please re-consult as needed         Thank you,        Gina Guzman RDN, LDN   "

## 2025-07-23 NOTE — PLAN OF CARE
A228/A228 RUDOLPHAshley Pino is a 85 y.o.male admitted on 7/21/2025 for NSTEMI (non-ST elevated myocardial infarction)   Code Status: Full Code MRN: [id number]   Review of patient's allergies indicates:   Allergen Reactions    Pseudoephedrine-guaifenesin Shortness Of Breath    Panmist dm  [pseudoephedrine-dm-guaifenesin]      Other reaction(s): Unknown     Past Medical History:   Diagnosis Date    Abnormal brain MRI 01/21/2018    Chronic microvascular ischemia changes per MRI July 9, 2007 in [facility]    Abnormal ECG 10/31/2013    Abnormal stress test 01/28/2016    Alcohol dependence     States alcohol abuse ended in 1994    AP (angina pectoris) 01/28/2016    Asthma     Bladder cancer     Carotid artery occlusion     Cataract     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    2 - Normal left ventricular systolic function (EF 60-65%).    3 - Normal left ventricular diastolic function.    4 - Mild left atrial enlargement.    5 - Normal right ventricular systolic function .    6 - Trivial to mild aortic regurgitation.    7 - Trivial to mild mitral regurgitation.  10 - Trivial to mild pulmonic regurgitation.     Hematuria 06/25/2020    History of alcohol abuse 01/22/2018    Patient denies drinking to excess since 1994.    History of atherectomy 01/21/2018 2/2016 [facility]    History of chronic kidney disease 01/22/2018    History of CKD 3    History of PTCA 10/31/2013    History of PTCA 03/09/2016    Hyperlipidemia     Hypertension     Insomnia 06/17/2013    Iron deficiency anemia  10/19/2023    Ischemic cardiomyopathy 10/31/2013    Long term (current) use of antithrombotics/antiplatelets 01/21/2018    Malignant neoplasm of overlapping sites of bladder 06/08/2023    Myocardial infarction     Old MI (myocardial infarction) 1994    Peripheral vascular disease     Polyneuropathy     RBBB 10/31/2013    S/P CABG (coronary artery bypass graft) 10/31/2013    Shortness of breath 10/31/2013    Simple chronic bronchitis 6/17/2013    Tobacco dependence     resolved    Trouble in sleeping     Type 2 diabetes with peripheral circulatory disorder, controlled     Wears glasses       PRN meds    acetaminophen, 650 mg, Q8H PRN  acetaminophen, 650 mg, Q4H PRN  acetaminophen, 650 mg, Q4H PRN  dextrose 50%, 12.5 g, PRN  dextrose 50%, 25 g, PRN  glucagon (human recombinant), 1 mg, PRN  glucose, 16 g, PRN  glucose, 24 g, PRN  HYDROcodone-acetaminophen, 1 tablet, Q6H PRN  insulin aspart U-100, 0-5 Units, QID (AC + HS) PRN  morphine, 2 mg, Q4H PRN  nitroGLYCERIN, 0.4 mg, Q5 Min PRN  ondansetron, 8 mg, Q8H PRN  ondansetron, 8 mg, Q8H PRN  polyethylene glycol, 17 g, Daily PRN  promethazine, 25 mg, Q6H PRN      Chart check completed. Will continue plan of care.      Orientation: oriented x 4  [place] Coma Scale Score: 15     Lead Monitored: Lead II Rhythm: normal sinus rhythm Frequency/Ectopy: frequent, PVCs  Cardiac/Telemetry Box Number: [id number]  VTE Core Measure: Pharmacological prophylaxis initiated/maintained Last Bowel Movement: 07/22/25  Diet Consistent Carbohydrate 2000 Calories (up to 75 gm per meal); Heart Healthy     Mehul Score: 21  Fall Risk Score: 9  Accucheck [x]   Freq?      Lines/Drains/Airways       Peripheral Intravenous Line  Duration             Peripheral IV 07/21/25 1704 20 G Anterior;Right Forearm 2 days

## 2025-07-23 NOTE — ASSESSMENT & PLAN NOTE
Patient presents with NSTEMI. Chest pain is currently controlled. ASCENCION score is 6. Patient is currently on NSTEMI Pathway.    EKG reviewed. Troponins reviewed and results noted-   Recent Labs   Lab 07/22/25  0907   TROPONINI 0.117*     Lipid panel reviewed and shows-     Lab Results   Component Value Date    LDLCALC 41.6 (L) 07/21/2025     Lab Results   Component Value Date    TRIG 67 07/21/2025       Medical management includes; Beta Blocker, Anticoagulation, High Intensity Stain, and Nitrate Echo has not been performed. Latest ECHO results are as follows- Results for orders placed during the hospital encounter of 10/25/23    Echo    Interpretation Summary    Left Ventricle: The left ventricle is normal in size. Normal wall thickness. regional wall motion abnormalities present. There is low normal systolic function with a visually estimated ejection fraction of 50 - 55%.    Left Atrium: Left atrium is severely dilated.    Right Ventricle: Normal right ventricular cavity size. Wall thickness is normal. Right ventricle wall motion  is normal. Systolic function is normal.    Right Atrium: Right atrium is dilated.    Mitral Valve: There is mild regurgitation.    Pulmonic Valve: There is mild regurgitation.    Pulmonary Artery: The estimated pulmonary artery systolic pressure is 24 mmHg.    IVC/SVC: Normal venous pressure at 3 mmHg.    Consult for cardiac rehab is ordered. Patient counseled on lifestyle modifications- begin progressive daily aerobic exercise program, follow a low fat, low cholesterol diet, reduce salt in diet and cooking, reduce exposure to stress, improve dietary compliance, use calcium 1 gram daily with Vit D, continue current medications, continue current healthy lifestyle patterns, and return for routine annual checkups. Cardiology is consulted. Plan of care pending with cardiology team. Continue to monitor patient closely and adjust therapy as needed.      7/22/25  Echocardiogram shows reduce EF  with WMA. Plans for LHC Today. Continue heparin infusion    7/23/25  S/p LHC today showing stable findings without need for intervention. C/o dyspnea this AM with wheezing. CXR unchanged. Give dose of lasix and duoneb today and monitor overnight.

## 2025-07-23 NOTE — CONSULTS
Patient name & MRN  Nithin Pino [id number]    Labs:    Lab Results   Component Value Date    HGBA1C 7.9 (H) 07/22/2025       Recent Labs     07/22/25  0944 07/22/25  1214 07/22/25  1624 07/22/25  2130 07/23/25  0511 07/23/25  0549   POCTGLUCOSE 123* 92 88 70 50* 117*       eGFR   Date Value Ref Range Status   07/23/2025 49 (L) >60 mL/min/1.73/m2 Final     Comment:     Estimated GFR calculated using the CKD-EPI creatinine (2021) equation.   02/17/2025 39.0 (A) >60 mL/min/1.73 m^2 Final     Hypoglycemia noted on reports this morning.    Physician Notification:    Physician Notified? : Yes    Suggested Recommendations Based on [facility] Standard of Practice Insulin Dosing Guidelines: Pt could benefit from having Lantus increased by 50%. New dose would be 5U BID.

## 2025-07-23 NOTE — PLAN OF CARE
POC reviewed w/ patient. Pt verbalizes understanding of plan  Chart/Orders reviewed  All safety precautions in place; No falls this shift  Pt resting in bed  Pain controlled  Continuous rounding c bed in lowest position, side rails up, call light in reach  Will continue to monitor until the end of shift  Problem: Adult Inpatient Plan of Care  Goal: Plan of Care Review  Flowsheets (Taken 7/23/2025 0240)  Plan of Care Reviewed With: patient  Goal: Patient-Specific Goal (Individualized)  Outcome: Progressing  Flowsheets (Taken 7/23/2025 0240)  Individualized Care Needs: none  Anxieties, Fears or Concerns: none  Patient/Family-Specific Goals (Include Timeframe): none  Goal: Absence of Hospital-Acquired Illness or Injury  Outcome: Progressing  Goal: Optimal Comfort and Wellbeing  Outcome: Progressing  Goal: Readiness for Transition of Care  Outcome: Progressing

## 2025-07-23 NOTE — ASSESSMENT & PLAN NOTE
Chronic, controlled.  Latest blood pressure and vitals reviewed-   Temp:  [94.5 °F (34.7 °C)-98.1 °F (36.7 °C)]   Pulse:  []   Resp:  [15-19]   BP: ()/(50-64)   SpO2:  [92 %-100 %] .   Home meds for hypertension were reviewed and noted below.   Hypertension Medications              amLODIPine (NORVASC) 10 MG tablet Take 1 tablet (10 mg total) by mouth once daily.    furosemide (LASIX) 40 MG tablet TAKE 1 PILL IN AM, AND 1/2 PILL IN PM    isosorbide mononitrate (IMDUR) 60 MG 24 hr tablet Take 1 tablet (60 mg total) by mouth once daily.    losartan (COZAAR) 50 MG tablet Take 1 tablet (50 mg total) by mouth once daily.    metoprolol succinate (TOPROL-XL) 25 MG 24 hr tablet Take 1 tablet (25 mg total) by mouth every evening.    metoprolol tartrate (LOPRESSOR) 50 MG tablet Take 50 mg by mouth 2 (two) times daily.    nitroGLYCERIN (NITROSTAT) 0.4 MG SL tablet Place 1 tablet (0.4 mg total) under the tongue every 5 (five) minutes as needed for Chest pain (for chest pain/discomfort). Call 911 if chest pain persists after second dose.     While in the hospital, will manage blood pressure as follows; Continue home antihypertensive regimen, holding losartan and lasix in setting of LJ on CKD.    Will utilize p.r.n. blood pressure medication only if patient's blood pressure greater than  180/110 and he develops symptoms such as worsening chest pain or shortness of breath.

## 2025-07-23 NOTE — DISCHARGE INSTRUCTIONS
"Post-op Heart Catheterization    1. DIET: It is advisable for you to follow a diet that limits the intake of salt, sugar, saturated fats and cholesterol.     2. DRIVING: Due to sedation you received during your procedure, DO NOT drive or operate machinery for 24 hours. Avoid making critical decisions or signing legal documents until tomorrow.    3. ACTIVITY: AVOID activities that require bending of the affected arm/wrist for 3 days and submerging the site in water for 3 days.   REMOVE the dressing the day after the procedure, and shower.    Wash with soap and water in hand; do not use wash cloth.  Apply a bandaid to site after shower if desired.    No ointments, lotions, powders, colognes, ... for 5 days.  WEAR wrist immobilizer until Thursday morning.  No pushing, pulling, or lifting more than 10 pounds for 3 days  No riding motorcycles, riding lawn mowers, using push mowers or weed eaters... for 3 days.  You may RESUME your normal activities or prescribed exercise program as instructed by your physician after 3 days.                                                                                                       4. WOUND CARE: It is not unusual to have a small amount of bruising to appear at or near the puncture site. It is also common to have a tender "knot" develop beneath the skin at the puncture site of the wrist/arm. This is usually scar tissue and is not a cause for concern or alarm. This tender knot may take several weeks to fully resolve. The bruise will usually spread over several days. However, if the lump gets bigger, call your doctor immediately.    5. DISCOMFORT: For general discomfort at the puncture site, you may take 1 or 2 Acetaminophen (Tylenol) tablets every 4 - 6 hours as needed. (Do not take more than 4000 mg a day)    6. SIGNS AND SYMPTOMS TO REPORT:  Call your physician immediately if any of the following occur:                                            1. Loss of feeling, warmth or " "color to the affected arm/wrist                                                                                                            2. Mild bleeding from the site                 3. Pain that is sudden, sharp or persistent in the affected arm/wrist                 4. Swelling or a change in "lump" size, increased redness or drainage at the puncture site                                                                               5. High fever (101 degrees or higher)    7. GO TO  THE EMERGENCY ROOM OR CALL 911 IF YOU HAVE: Chest pains or discomforts not relieved with 3 nitroglycerin doses (sublingual tablets or spray), numbness or severe pain of limb, if your limb becomes cold or discolored or if you develop uncontrolled bleeding from the puncture site (quickly apply firm, direct pressure above the site).      Our goal at [facility] is to always give you outstanding care and exceptional service. You may receive a survey from [employer] by mail, text or e-mail in the next 24-48 hours asking about the care you received with us. The survey should only take 5-10 minutes to complete and is very important to us.     Your feedback provides us with a way to recognize our staff who work tirelessly to provide the best care! Also, your responses help us learn how to improve when your experience was below our aspiration of excellence. We are always looking for ways to improve your stay. We WILL use your feedback to continue making improvements to help us provide the highest quality care. We keep your personal information and feedback confidential. We appreciate your time completing this survey and can't wait to hear from you!!!    We look forward to your continued care with us! Thanks so much for choosing [facility] for your healthcare needs!   "

## 2025-07-23 NOTE — SUBJECTIVE & OBJECTIVE
Review of Systems   Constitutional: Positive for malaise/fatigue.   HENT: Negative.     Eyes: Negative.    Cardiovascular:  Positive for dyspnea on exertion.   Respiratory:  Positive for shortness of breath and wheezing.    Endocrine: Negative.    Hematologic/Lymphatic: Negative.    Skin: Negative.    Musculoskeletal: Negative.    Gastrointestinal: Negative.    Genitourinary: Negative.    Neurological:  Positive for weakness.   Psychiatric/Behavioral: Negative.     Allergic/Immunologic: Negative.      Objective:     Vital Signs (Most Recent):  Temp: 97.6 °F (36.4 °C) (07/23/25 1247)  Pulse: 71 (07/23/25 1247)  Resp: 18 (07/23/25 1247)  BP: 117/64 (07/23/25 1247)  SpO2: 98 % (07/23/25 1247) Vital Signs (24h Range):  Temp:  [94.5 °F (34.7 °C)-98.1 °F (36.7 °C)] 97.6 °F (36.4 °C)  Pulse:  [] 71  Resp:  [15-19] 18  SpO2:  [92 %-100 %] 98 %  BP: ()/(50-64) 117/64     Weight: 84.1 kg (185 lb 6.5 oz)  Body mass index is 26.6 kg/m².     SpO2: 98 %         Intake/Output Summary (Last 24 hours) at 7/23/2025 1410  Last data filed at 7/22/2025 2200  Gross per 24 hour   Intake --   Output 300 ml   Net -300 ml       Lines/Drains/Airways       Peripheral Intravenous Line  Duration             Peripheral IV 07/21/25 1704 20 G Anterior;Right Forearm 1 day                       Physical Exam  Vitals and nursing note reviewed.   Constitutional:       Appearance: Normal appearance.      Comments: On supplemental O2   HENT:      Head: Normocephalic and atraumatic.   Eyes:      Pupils: Pupils are equal, round, and reactive to light.   Cardiovascular:      Rate and Rhythm: Normal rate and regular rhythm.      Heart sounds: S1 normal and S2 normal. No murmur heard.  Pulmonary:      Effort: Pulmonary effort is normal.      Breath sounds: Wheezing and rales present.   Musculoskeletal:      Right lower leg: No edema.      Left lower leg: No edema.   Skin:     General: Skin is warm and dry.      Comments: L radial access site  "C/D/I; intact pulse, no hematoma   Neurological:      Mental Status: He is oriented to person, place, and time.   Psychiatric:         Mood and Affect: Mood normal.         Behavior: Behavior normal.         Thought Content: Thought content normal.            Significant Labs: CMP   Recent Labs   Lab 07/21/25  1708 07/22/25  0907 07/23/25  0443    140 142   K 3.9 4.4 3.4*    107 113*   CO2 20* 21* 19*   * 130* 49*   BUN 39* 34* 31*   CREATININE 2.1* 1.6* 1.4   CALCIUM 9.9 10.6* 9.3   PROT 6.6  --   --    ALBUMIN 3.2*  --   --    BILITOT 1.2*  --   --    ALKPHOS 79  --   --    AST 23  --   --    ALT 17  --   --    ANIONGAP 10 12 10   , CBC   Recent Labs   Lab 07/21/25  1708 07/22/25  0410 07/23/25  0443   WBC 7.68 6.13 8.37   HGB 12.9* 13.4* 11.7*   HCT 39.3* 40.2 36.7*    167 185   , Troponin No results for input(s): "TROPONINIHS" in the last 48 hours., and All pertinent lab results from the last 24 hours have been reviewed.    Significant Imaging: Echocardiogram: Transthoracic echo (TTE) complete (Cupid Only):   Results for orders placed or performed during the hospital encounter of 07/21/25   Echo   Result Value Ref Range    BSA 2.02 m2    [facility] CV CPX PATIENT HEIGHT IN 70     LVOT stroke volume 46.1 cm3    LVIDd 5.2 3.5 - 6.0 cm    LV Systolic Volume 72 mL    LV Systolic Volume Index 35.8 mL/m2    LVIDs 4.1 (A) 2.1 - 4.0 cm    LV Diastolic Volume 129 mL    LV Diastolic Volume Index 64.18 mL/m2    Left Ventricular End Systolic Volume by Teichholz Method 71.93 mL    Left Ventricular End Diastolic Volume by Teichholz Method 128.60 mL    IVS 1.4 (A) 0.6 - 1.1 cm    LVOT diameter 2.3 cm    LVOT area 4.2 cm2    FS 21.2 (A) 28 - 44 %    Left Ventricle Relative Wall Thickness 0.46 cm    PW 1.2 (A) 0.6 - 1.1 cm    LV mass 278.4 g    LV Mass Index 138.5 g/m2    MV Peak E Bran 0.44 m/s    TDI LATERAL 0.10 m/s    TDI SEPTAL 0.10 m/s    E/E' ratio 4 m/s    MV Peak A Bran 0.74 m/s    TR Max Bran 2.3 m/s    " E/A ratio 0.59     IVRT 66 msec    E wave deceleration time 162 msec    LV SEPTAL E/E' RATIO 4.4 m/s    LV LATERAL E/E' RATIO 4.4 m/s    LVOT peak fredrick 0.8 m/s    Left Ventricular Outflow Tract Mean Velocity 0.63 cm/s    Left Ventricular Outflow Tract Mean Gradient 1.70 mmHg    RVOT peak VTI 10.8 cm    TAPSE 1.3 cm    LA size 4.0 cm    Left Atrium Minor Axis 5.3 cm    Left Atrium Major Axis 4.8 cm    RA Major Axis 4.22 cm    AV mean gradient 4 mmHg    AV peak gradient 7 mmHg    Ao peak fredrick 1.3 m/s    Ao VTI 16.5 cm    LVOT peak VTI 11.1 cm    AV valve area 2.8 cm²    AV Velocity Ratio 0.62     AV index (prosthetic) 0.67     MANISH by Velocity Ratio 2.6 cm²    Mr max fredrick 3.37 m/s    MV stenosis pressure 1/2 time 46.99 ms    MV valve area p 1/2 method 4.68 cm2    Triscuspid Valve Regurgitation Peak Gradient 20 mmHg    PV mean gradient 2 mmHg    RVOT peak fredrick 0.77 m/s    Ao root annulus 3.7 cm    STJ 3.7 cm    Ascending aorta 3.6 cm    ASI 1.8 cm/m2    Aortic Height Index 2.0 cm/m    Mean e' 0.10 m/s    ZLVIDS 1.00     ZLVIDD -1.25     MARILOU 32 mL/m2    LA Vol 63 cm3    LA WIDTH 3.7 cm    RA Width 2.5 cm    TV resting pulmonary artery pressure 24 mmHg    RV TB RVSP 5 mmHg    Est. RA pres 3 mmHg    Narrative      Left Ventricle: The left ventricle is normal in size. Moderately   increased wall thickness. There is concentric hypertrophy. Mild global   hypokinesis and regional wall motion abnormalities present. Septal motion   is abnormal. There is mildly reduced systolic function with a visually   estimated ejection fraction of 40 - 50%. Grade I diastolic dysfunction.    Right Ventricle: The right ventricle is normal in sizeWall thickness is   normal. . Right ventricle wall motion has global hypokinesis. Systolic   function is moderately reduced.    Left Atrium: The left atrium is mildly dilated.    Aortic Valve: There is moderate aortic valve sclerosis. Mildly   calcified cusps. There is mild annular calcification present.     Mitral Valve: There is mild regurgitation.    Tricuspid Valve: There is mild regurgitation.    Pulmonary Artery: The estimated pulmonary artery systolic pressure is   24 mmHg.    IVC/SVC: Normal venous pressure at 3 mmHg.      and EKG: Reviewed

## 2025-07-24 VITALS
HEART RATE: 71 BPM | RESPIRATION RATE: 18 BRPM | TEMPERATURE: 98 F | WEIGHT: 181.69 LBS | SYSTOLIC BLOOD PRESSURE: 134 MMHG | OXYGEN SATURATION: 98 % | DIASTOLIC BLOOD PRESSURE: 65 MMHG | HEIGHT: 70 IN | BODY MASS INDEX: 26.01 KG/M2

## 2025-07-24 LAB
ABSOLUTE EOSINOPHIL (OHS): 0.18 K/UL
ABSOLUTE MONOCYTE (OHS): 0.51 K/UL (ref 0.3–1)
ABSOLUTE NEUTROPHIL COUNT (OHS): 4.22 K/UL (ref 1.8–7.7)
ANION GAP (OHS): 8 MMOL/L (ref 8–16)
BASOPHILS # BLD AUTO: 0.02 K/UL
BASOPHILS NFR BLD AUTO: 0.3 %
BUN SERPL-MCNC: 29 MG/DL (ref 8–23)
CALCIUM SERPL-MCNC: 9.7 MG/DL (ref 8.7–10.5)
CHLORIDE SERPL-SCNC: 110 MMOL/L (ref 95–110)
CO2 SERPL-SCNC: 20 MMOL/L (ref 23–29)
CREAT SERPL-MCNC: 1.4 MG/DL (ref 0.5–1.4)
ERYTHROCYTE [DISTWIDTH] IN BLOOD BY AUTOMATED COUNT: 12.8 % (ref 11.5–14.5)
GFR SERPLBLD CREATININE-BSD FMLA CKD-EPI: 49 ML/MIN/1.73/M2
GLUCOSE SERPL-MCNC: 191 MG/DL (ref 70–110)
HCT VFR BLD AUTO: 40.4 % (ref 40–54)
HGB BLD-MCNC: 12.4 GM/DL (ref 14–18)
IMM GRANULOCYTES # BLD AUTO: 0.04 K/UL (ref 0–0.04)
IMM GRANULOCYTES NFR BLD AUTO: 0.6 % (ref 0–0.5)
LYMPHOCYTES # BLD AUTO: 1.93 K/UL (ref 1–4.8)
MCH RBC QN AUTO: 29 PG (ref 27–31)
MCHC RBC AUTO-ENTMCNC: 30.7 G/DL (ref 32–36)
MCV RBC AUTO: 95 FL (ref 82–98)
NUCLEATED RBC (/100WBC) (OHS): 0 /100 WBC
OHS QRS DURATION: 146 MS
OHS QRS DURATION: 146 MS
OHS QTC CALCULATION: 481 MS
OHS QTC CALCULATION: 491 MS
PLATELET # BLD AUTO: 182 K/UL (ref 150–450)
PMV BLD AUTO: 10.5 FL (ref 9.2–12.9)
POCT GLUCOSE: 298 MG/DL (ref 70–110)
POCT GLUCOSE: 312 MG/DL (ref 70–110)
POTASSIUM SERPL-SCNC: 3.8 MMOL/L (ref 3.5–5.1)
RBC # BLD AUTO: 4.27 M/UL (ref 4.6–6.2)
RELATIVE EOSINOPHIL (OHS): 2.6 %
RELATIVE LYMPHOCYTE (OHS): 28 % (ref 18–48)
RELATIVE MONOCYTE (OHS): 7.4 % (ref 4–15)
RELATIVE NEUTROPHIL (OHS): 61.1 % (ref 38–73)
SODIUM SERPL-SCNC: 138 MMOL/L (ref 136–145)
WBC # BLD AUTO: 6.9 K/UL (ref 3.9–12.7)

## 2025-07-24 PROCEDURE — 25000003 PHARM REV CODE 250: Mod: HCNC | Performed by: NURSE PRACTITIONER

## 2025-07-24 PROCEDURE — 63600175 PHARM REV CODE 636 W HCPCS: Mod: HCNC | Performed by: NURSE PRACTITIONER

## 2025-07-24 PROCEDURE — 36415 COLL VENOUS BLD VENIPUNCTURE: CPT | Mod: HCNC | Performed by: INTERNAL MEDICINE

## 2025-07-24 PROCEDURE — 63600175 PHARM REV CODE 636 W HCPCS: Mod: HCNC | Performed by: EMERGENCY MEDICINE

## 2025-07-24 PROCEDURE — 25000003 PHARM REV CODE 250: Mod: HCNC | Performed by: HOSPITALIST

## 2025-07-24 PROCEDURE — 25000003 PHARM REV CODE 250: Mod: HCNC | Performed by: INTERNAL MEDICINE

## 2025-07-24 PROCEDURE — 93010 ELECTROCARDIOGRAM REPORT: CPT | Mod: HCNC,,, | Performed by: INTERNAL MEDICINE

## 2025-07-24 PROCEDURE — 99233 SBSQ HOSP IP/OBS HIGH 50: CPT | Mod: HCNC,,, | Performed by: INTERNAL MEDICINE

## 2025-07-24 PROCEDURE — 85025 COMPLETE CBC W/AUTO DIFF WBC: CPT | Mod: HCNC | Performed by: NURSE PRACTITIONER

## 2025-07-24 PROCEDURE — 80048 BASIC METABOLIC PNL TOTAL CA: CPT | Mod: HCNC | Performed by: INTERNAL MEDICINE

## 2025-07-24 PROCEDURE — 93005 ELECTROCARDIOGRAM TRACING: CPT | Mod: HCNC

## 2025-07-24 RX ORDER — PREDNISONE 10 MG/1
10 TABLET ORAL DAILY
Qty: 5 TABLET | Refills: 0 | Status: SHIPPED | OUTPATIENT
Start: 2025-07-24 | End: 2025-07-29

## 2025-07-24 RX ORDER — LEVOFLOXACIN 500 MG/1
500 TABLET, FILM COATED ORAL DAILY
Qty: 7 TABLET | Refills: 0 | Status: SHIPPED | OUTPATIENT
Start: 2025-07-24 | End: 2025-07-31

## 2025-07-24 RX ORDER — PREDNISONE 20 MG/1
20 TABLET ORAL ONCE
Status: COMPLETED | OUTPATIENT
Start: 2025-07-24 | End: 2025-07-24

## 2025-07-24 RX ADMIN — METOPROLOL TARTRATE 50 MG: 50 TABLET, FILM COATED ORAL at 10:07

## 2025-07-24 RX ADMIN — ASPIRIN 81 MG: 81 TABLET, COATED ORAL at 10:07

## 2025-07-24 RX ADMIN — INSULIN ASPART 4 UNITS: 100 INJECTION, SOLUTION INTRAVENOUS; SUBCUTANEOUS at 11:07

## 2025-07-24 RX ADMIN — GUAIFENESIN 600 MG: 600 TABLET, EXTENDED RELEASE ORAL at 10:07

## 2025-07-24 RX ADMIN — TAMSULOSIN HYDROCHLORIDE 0.4 MG: 0.4 CAPSULE ORAL at 10:07

## 2025-07-24 RX ADMIN — ISOSORBIDE MONONITRATE 60 MG: 60 TABLET, EXTENDED RELEASE ORAL at 10:07

## 2025-07-24 RX ADMIN — FINASTERIDE 5 MG: 5 TABLET, FILM COATED ORAL at 10:07

## 2025-07-24 RX ADMIN — INSULIN GLARGINE 5 UNITS: 100 INJECTION, SOLUTION SUBCUTANEOUS at 09:07

## 2025-07-24 RX ADMIN — INSULIN ASPART 2 UNITS: 100 INJECTION, SOLUTION INTRAVENOUS; SUBCUTANEOUS at 05:07

## 2025-07-24 RX ADMIN — PREDNISONE 20 MG: 20 TABLET ORAL at 11:07

## 2025-07-24 NOTE — ASSESSMENT & PLAN NOTE
Chronic, controlled.  Latest blood pressure and vitals reviewed-   Temp:  [96.7 °F (35.9 °C)-98.2 °F (36.8 °C)]   Pulse:  [60-76]   Resp:  [16-18]   BP: (113-142)/(63-67)   SpO2:  [95 %-98 %] .   Home meds for hypertension were reviewed and noted below.   Hypertension Medications              amLODIPine (NORVASC) 10 MG tablet Take 1 tablet (10 mg total) by mouth once daily.    furosemide (LASIX) 40 MG tablet TAKE 1 PILL IN AM, AND 1/2 PILL IN PM    isosorbide mononitrate (IMDUR) 60 MG 24 hr tablet Take 1 tablet (60 mg total) by mouth once daily.    losartan (COZAAR) 50 MG tablet Take 1 tablet (50 mg total) by mouth once daily.    metoprolol succinate (TOPROL-XL) 25 MG 24 hr tablet Take 1 tablet (25 mg total) by mouth every evening.    metoprolol tartrate (LOPRESSOR) 50 MG tablet Take 50 mg by mouth 2 (two) times daily.    nitroGLYCERIN (NITROSTAT) 0.4 MG SL tablet Place 1 tablet (0.4 mg total) under the tongue every 5 (five) minutes as needed for Chest pain (for chest pain/discomfort). Call 911 if chest pain persists after second dose.     While in the hospital, will manage blood pressure as follows; Continue home antihypertensive regimen, holding losartan and lasix in setting of LJ on CKD.    Will utilize p.r.n. blood pressure medication only if patient's blood pressure greater than  180/110 and he develops symptoms such as worsening chest pain or shortness of breath.

## 2025-07-24 NOTE — PLAN OF CARE
Problem: Adult Inpatient Plan of Care  Goal: Plan of Care Review  Outcome: Adequate for Care Transition  Goal: Patient-Specific Goal (Individualized)  Outcome: Adequate for Care Transition  Goal: Optimal Comfort and Wellbeing  Outcome: Adequate for Care Transition  Goal: Readiness for Transition of Care  Outcome: Adequate for Care Transition     Problem: Acute Coronary Syndrome  Goal: Optimal Adaptation to Illness  Outcome: Adequate for Care Transition  Goal: Absence of Cardiac-Related Pain  Outcome: Adequate for Care Transition  Goal: Normalized Cardiac Rhythm  Outcome: Adequate for Care Transition  Goal: Effective Cardiac Pump Function  Outcome: Adequate for Care Transition  Goal: Adequate Tissue Perfusion  Outcome: Adequate for Care Transition

## 2025-07-24 NOTE — DISCHARGE SUMMARY
[facility] Medicine  Discharge Summary      Patient Name: Nithin Pino  MRN: [id number]  [facility]: [id number]  Patient Class: IP- Inpatient  Admission Date: 7/21/2025  Hospital Length of Stay: 3 days  Discharge Date and Time: 07/24/2025 1:35 PM  Attending Physician: Kenroy Bello MD   Discharging Provider: Lm Conde NP  Primary Care Provider: MUKUL Garg MD    Primary Care Team: Networked reference to record PCT     HPI:   Nithin Pino is a 85 y.o. male with a PMH  has a past medical history of Abnormal brain MRI (01/21/2018), Abnormal ECG (10/31/2013), Abnormal stress test (01/28/2016), Alcohol dependence, AP (angina pectoris) (01/28/2016), Asthma, Bladder cancer, Carotid artery occlusion, Cataract, Chronic combined systolic and diastolic congestive heart failure (05/24/2021), Chronic diastolic heart failure (10/31/2013), Chronic ischemic heart disease (10/31/2013), CKD (chronic kidney disease) stage 3, GFR 30-59 ml/min (11/04/2015), Coronary artery disease, DM (diabetes mellitus) (2013), DM (diabetes mellitus) (2011), Heart valve regurgitation (11/04/2015), Hematuria (06/25/2020), History of alcohol abuse (01/22/2018), History of atherectomy (01/21/2018), History of chronic kidney disease (01/22/2018), History of PTCA (10/31/2013), History of PTCA (03/09/2016), Hyperlipidemia, Hypertension, Insomnia (06/17/2013), Iron deficiency anemia (10/19/2023), Ischemic cardiomyopathy (10/31/2013), Long term (current) use of antithrombotics/antiplatelets (01/21/2018), Malignant neoplasm of overlapping sites of bladder (06/08/2023), Myocardial infarction, Old MI (myocardial infarction) (1994), Peripheral vascular disease, Polyneuropathy, RBBB (10/31/2013), S/P CABG (coronary artery bypass graft) (10/31/2013), Shortness of breath (10/31/2013), Simple chronic bronchitis (6/17/2013), Tobacco dependence, Trouble in sleeping, Type 2 diabetes with peripheral circulatory disorder,  controlled, and Wears glasses. who presented to the ED for further evaluation of acute onset chest pain which began earlier today while driving to the store.  Patient has significant cardiac history including CAD s/p stent placement and CABG currently followed by Dr. Sadler from cardiology outpatient.  Patient described endorsing substernal chest pressure which began acutely while driving to the store around 2:00 p.m. Pain was somewhat relieved by taking two sublingual nitros and completely resolved upon administration of nitro spray upon EMS arrival.  He reported no known alleviating or aggravating factors noted in his currently chest pain-free at time of bedside assessment.  Patient also reports endorsing nonproductive cough with daughter reported to ED staff that he has been experiencing shortness a breath and congestion after spring his home with ortho insect killer back on 07/18/25 and has been feeling ill since.  Patient denied endorsing any lightheadedness, dizziness, headache, visual changes, fever, chills, sweats, nausea, vomiting, abdominal pain, dysuria, hematuria, melena, hematochezia, diarrhea, or onset neurological deficits.  Prior to onset of symptoms, patient reported being in his usual state of health with no other concerns or complaints.  Initial workup in the ED revealed patient to be afebrile without leukocytosis, hemodynamically stable, creatinine/GFR 2.1/30, blood glucose 273, , troponin 0.101.  Chest x-ray negative for acute findings.  EKG reveals sinus rhythm with occasional PVCs, T-wave abnormalities, in age indeterminate infarcts.  Troponin initially elevated at 0.109 with repeat 0.101 and 0.108.  Cardiology consulted by ED staff and recommended patient be initiated on heparin and NSTEMI pathway inpatient will be evaluated in the morning regarding further ischemic workup.  Patient admitted to Hospital Medicine inpatient for continued medical management.    PCP: MUKUL Garg  Nabeel      Procedure(s) (LRB):  Left heart cath (Left)      Hospital Course:   Nithin Pino is a 85 year old male who was admitted for NSTEMI. Echocardiogram showed  EF of 40-50% with mild global hypokinesis and regional wall motion abnormalities. He was started on heparin infusion and seen by Cardiology who plans for LHC today.     7/23/25  S/p LHC today showing stable findings without need for intervention. C/o dyspnea this AM with wheezing. CXR unchanged. Give dose of lasix and duoneb today and monitor overnight.    7/24/25  Still some up upper airway wheezing but stable on room air. Will treat for acute bronchitis with Levaquin and Prednisone taper over 7 days. He was asked to used albuterol inhaler if needed. He was asked to follow up with PCP in one week. If he experiences any dyspnea, fever,  or recurrent chest pain, please return to ED. Patient seen and examined on date of discharge and deemed suitable.      Goals of Care Treatment Preferences:  Code Status: Full Code         Consults:   Consults (From admission, onward)          Status Ordering Provider     Inpatient consult to Diabetes educator  Once        Provider:  (Not yet assigned)    Completed KATERINA LATHAM     Inpatient consult to Registered Dietitian/Nutritionist  Once        Provider:  (Not yet assigned)    Acknowledged JANICE DUPREE     Inpatient consult to Social Work/Case Management  Once        Provider:  (Not yet assigned)    Completed NATALIO SOLER     Inpatient consult to Cardiology  Once        Provider:  Florentino Kingston MD    Completed NATALIO SOLER            Assessment & Plan  Chest pain  See plan above    NSTEMI (non-ST elevated myocardial infarction)  Patient presents with NSTEMI. Chest pain is currently controlled. ASCENCION score is 6. Patient is currently on NSTEMI Pathway.    EKG reviewed. Troponins reviewed and results noted-   Recent Labs   Lab 07/22/25  0907   TROPONINI 0.117*     Lipid panel reviewed and shows-     Lab  Results   Component Value Date    LDLCALC 41.6 (L) 07/21/2025     Lab Results   Component Value Date    TRIG 67 07/21/2025       Medical management includes; Beta Blocker, Anticoagulation, High Intensity Stain, and Nitrate Echo has not been performed. Latest ECHO results are as follows- Results for orders placed during the hospital encounter of 10/25/23    Echo    Interpretation Summary    Left Ventricle: The left ventricle is normal in size. Normal wall thickness. regional wall motion abnormalities present. There is low normal systolic function with a visually estimated ejection fraction of 50 - 55%.    Left Atrium: Left atrium is severely dilated.    Right Ventricle: Normal right ventricular cavity size. Wall thickness is normal. Right ventricle wall motion  is normal. Systolic function is normal.    Right Atrium: Right atrium is dilated.    Mitral Valve: There is mild regurgitation.    Pulmonic Valve: There is mild regurgitation.    Pulmonary Artery: The estimated pulmonary artery systolic pressure is 24 mmHg.    IVC/SVC: Normal venous pressure at 3 mmHg.    Consult for cardiac rehab is ordered. Patient counseled on lifestyle modifications- begin progressive daily aerobic exercise program, follow a low fat, low cholesterol diet, reduce salt in diet and cooking, reduce exposure to stress, improve dietary compliance, use calcium 1 gram daily with Vit D, continue current medications, continue current healthy lifestyle patterns, and return for routine annual checkups. Cardiology is consulted. Plan of care pending with cardiology team. Continue to monitor patient closely and adjust therapy as needed.      7/22/25  Echocardiogram shows reduce EF with WMA. Plans for LHC Today. Continue heparin infusion    7/23/25  S/p LHC today showing stable findings without need for intervention. C/o dyspnea this AM with wheezing. CXR unchanged. Give dose of lasix and duoneb today and monitor overnight.  Coronary artery disease of  bypass graft of native heart with stable angina pectoris  Patient with known CAD s/p stent placement and CABG, which is controlled Will continue home medications and monitor for S/Sx of angina/ACS. Continue to monitor on telemetry.  Patient currently on NSTEMI pathway and awaiting further evaluation/recommendations from Cardiology.     7/22/25  Plans as above  Claudication in peripheral vascular disease  Chronic.  Currently asymptomatic.  Plan:  -continue home medications  -continued treatment of NSTEMI as noted above    Acute kidney injury superimposed on chronic kidney disease  Baseline creatinine is 1.4-1.7. Most recent creatinine and eGFR are listed below.  Recent Labs     07/22/25  0907 07/23/25  0443 07/24/25  0513   CREATININE 1.6* 1.4 1.4   EGFRNORACEVR 42* 49* 49*     Plan  -LJ is currently undergoing medical management  -Avoid nephrotoxins and renally dose meds for GFR listed above  -Monitor urine output, serial BMP, and adjust therapy as needed    7/22/25  Improved with gentle hydration    7/23/25  Kidney function stable.  Type 2 diabetes mellitus with hyperglycemia, with long-term current use of insulin  Patient's FSGs are uncontrolled due to hyperglycemia on current medication regimen.  Last A1c reviewed-   Lab Results   Component Value Date    HGBA1C 7.9 (H) 07/22/2025     Most recent fingerstick glucose reviewed-   Recent Labs   Lab 07/24/25  0933 07/24/25  1118   POCTGLUCOSE 298* 312*     Current correctional scale  Low  Titrate as needed anti-hyperglycemic dose as follows-   Antihyperglycemics (From admission, onward)      Start     Stop Route Frequency Ordered    07/22/25 0900  insulin glargine U-100 (Lantus) pen 10 Units         -- SubQ 2 times daily 07/22/25 0433    07/22/25 0533  insulin aspart U-100 pen 0-5 Units         -- SubQ Before meals & nightly PRN 07/22/25 0433     Plan:  -SSI  -A1c  -Accu-checks  -Hold oral hypoglycemics while patient is in the hospital  -Continue home long-acting  insulin at 20% decrease, titrate up as needed  -Hypoglycemic protocol      Hypertension associated with diabetes  Chronic, controlled.  Latest blood pressure and vitals reviewed-   Temp:  [96.7 °F (35.9 °C)-98.2 °F (36.8 °C)]   Pulse:  [60-76]   Resp:  [16-18]   BP: (113-142)/(63-67)   SpO2:  [95 %-98 %] .   Home meds for hypertension were reviewed and noted below.   Hypertension Medications              amLODIPine (NORVASC) 10 MG tablet Take 1 tablet (10 mg total) by mouth once daily.    furosemide (LASIX) 40 MG tablet TAKE 1 PILL IN AM, AND 1/2 PILL IN PM    isosorbide mononitrate (IMDUR) 60 MG 24 hr tablet Take 1 tablet (60 mg total) by mouth once daily.    losartan (COZAAR) 50 MG tablet Take 1 tablet (50 mg total) by mouth once daily.    metoprolol succinate (TOPROL-XL) 25 MG 24 hr tablet Take 1 tablet (25 mg total) by mouth every evening.    metoprolol tartrate (LOPRESSOR) 50 MG tablet Take 50 mg by mouth 2 (two) times daily.    nitroGLYCERIN (NITROSTAT) 0.4 MG SL tablet Place 1 tablet (0.4 mg total) under the tongue every 5 (five) minutes as needed for Chest pain (for chest pain/discomfort). Call 911 if chest pain persists after second dose.     While in the hospital, will manage blood pressure as follows; Continue home antihypertensive regimen, holding losartan and lasix in setting of LJ on CKD.    Will utilize p.r.n. blood pressure medication only if patient's blood pressure greater than  180/110 and he develops symptoms such as worsening chest pain or shortness of breath.    Chronic combined systolic and diastolic congestive heart failure  Patient has Combined Systolic and Diastolic heart failure that is Chronic. On presentation their CHF was well compensated. Most recent BNP and echo results are listed below.  Recent Labs     07/21/25  1708   *     Latest ECHO  Results for orders placed during the hospital encounter of 10/25/23    Echo    Interpretation Summary    Left Ventricle: The left ventricle  is normal in size. Normal wall thickness. regional wall motion abnormalities present. There is low normal systolic function with a visually estimated ejection fraction of 50 - 55%.    Left Atrium: Left atrium is severely dilated.    Right Ventricle: Normal right ventricular cavity size. Wall thickness is normal. Right ventricle wall motion  is normal. Systolic function is normal.    Right Atrium: Right atrium is dilated.    Mitral Valve: There is mild regurgitation.    Pulmonic Valve: There is mild regurgitation.    Pulmonary Artery: The estimated pulmonary artery systolic pressure is 24 mmHg.    IVC/SVC: Normal venous pressure at 3 mmHg.    Current Heart Failure Medications       Plan  -Monitor strict I&Os and daily weights.    -Place on telemetry  -Low sodium diet  -Place on fluid restriction of 2 L.   -Cardiology has been consulted  -The patient's volume status is at their baseline  -Continue home medications    7/23/25  Give dose of Lasix today  Iron deficiency anemia  Anemia is likely due to Iron deficiency. Most recent hemoglobin and hematocrit are listed below.  Recent Labs     07/22/25  0410 07/23/25  0443 07/24/25  0513   HGB 13.4* 11.7* 12.4*   HCT 40.2 36.7* 40.4     Plan  -Monitor serial CBC: Daily  -Transfuse PRBC if patient becomes hemodynamically unstable, symptomatic or H/H drops below 7/21.  -Patient has not received any PRBC transfusions to date  -Patient's anemia is currently stable    Hyperlipidemia associated with type 2 diabetes mellitus  Patient is chronically on statin.will continue for now. Last Lipid Panel:   Lab Results   Component Value Date    CHOL 93 (L) 07/21/2025    HDL 38 (L) 07/21/2025    LDLCALC 41.6 (L) 07/21/2025    TRIG 67 07/21/2025    CHOLHDL 40.9 07/21/2025     Plan:  -Continue home medication  -low fat/low calorie diet    Benign prostatic hyperplasia  Chronic.  Plan:  -continue home medication    RBBB      SOB (shortness of breath)      Final Active Diagnoses:    Diagnosis  Date Noted POA    PRINCIPAL PROBLEM:  NSTEMI (non-ST elevated myocardial infarction) [I21.4] 07/21/2025 Yes    Acute kidney injury superimposed on chronic kidney disease [N17.9, N18.9] 07/22/2025 Yes    Chest pain [R07.9] 07/21/2025 Yes    Type 2 diabetes mellitus with hyperglycemia, with long-term current use of insulin [E11.65, Z79.4] 02/13/2024 Not Applicable     Chronic    Iron deficiency anemia [D50.9] 10/19/2023 Yes     Chronic    Chronic combined systolic and diastolic congestive heart failure [I50.42] 05/24/2021 Yes     Chronic    Hyperlipidemia associated with type 2 diabetes mellitus [E11.69, E78.5] 11/04/2015 Yes     Chronic    Claudication in peripheral vascular disease [I73.9] 05/08/2014 Yes     Chronic    RBBB [I45.10] 10/31/2013 Yes     Chronic    SOB (shortness of breath) [R06.02] 10/31/2013 Yes    Benign prostatic hyperplasia [N40.0] 06/17/2013 Yes     Chronic    Coronary artery disease of bypass graft of native heart with stable angina pectoris [I25.708] 06/17/2013 Yes     Chronic    Hypertension associated with diabetes [E11.59, I15.2] 06/17/2013 Yes     Chronic      Problems Resolved During this Admission:       Discharged Condition: stable    Disposition: Home or Self Care    Follow Up:   Follow-up Information       MUKUL Garg MD Follow up.    Specialty: Family Medicine  Contact information:  [id number]  [place] [address] [phone]                           Patient Instructions:      Ambulatory referral/consult to Outpatient Case Management   Referral Priority: Routine Referral Type: Consultation   Referral Reason: Specialty Services Required   Number of Visits Requested: 1       Significant Diagnostic Studies: Labs: CMP   Recent Labs   Lab 07/23/25  0443 07/24/25  0513    138   K 3.4* 3.8   * 110   CO2 19* 20*   GLU 49* 191*   BUN 31* 29*   CREATININE 1.4 1.4   CALCIUM 9.3 9.7   ANIONGAP 10 8    and CBC   Recent Labs   Lab 07/23/25  0443 07/24/25  0513   WBC  8.37 6.90   HGB 11.7* 12.4*   HCT 36.7* 40.4    182       Pending Diagnostic Studies:       Procedure Component Value Units Date/Time    Echo [[id number]]     Order Status: Sent Lab Status: No result            Medications:  Reconciled Home Medications:      Medication List        START taking these medications      levoFLOXacin 500 MG tablet  Commonly known as: LEVAQUIN  Take 1 tablet (500 mg total) by mouth once daily. for 7 days     predniSONE 10 MG tablet  Commonly known as: DELTASONE  Take 1 tablet (10 mg total) by mouth once daily. for 5 days            CONTINUE taking these medications      albuterol 90 mcg/actuation inhaler  Commonly known as: PROVENTIL/VENTOLIN HFA  Inhale 2 puffs into the lungs every 6 (six) hours as needed for Wheezing. Rescue     amLODIPine 10 MG tablet  Commonly known as: NORVASC  Take 1 tablet (10 mg total) by mouth once daily.     aspirin 81 MG EC tablet  Commonly known as: ECOTRIN  Take 1 tablet (81 mg total) by mouth once daily.     atorvastatin 40 MG tablet  Commonly known as: LIPITOR  Take 1 tablet (40 mg total) by mouth every evening.     b complex vitamins tablet  Take 1 tablet by mouth once daily.     DIABETIC MULTIVITAMIN ORAL  Take by mouth.     dulaglutide 4.5 mg/0.5 mL pen injector  Commonly known as: TRULICITY  Inject 4.5 mg into the skin every 7 days. SUNDAY     finasteride 5 mg tablet  Commonly known as: PROSCAR  TAKE 1 TABLET ONE TIME DAILY     furosemide 40 MG tablet  Commonly known as: LASIX  TAKE 1 PILL IN AM, AND 1/2 PILL IN PM     glucose 4 GM chewable tablet  Take 4 tablets (16 g total) by mouth as needed for Low blood sugar.     insulin aspart U-100 100 unit/mL injection  Commonly known as: NovoLOG  Inject 3 Units into the skin every 4 (four) hours as needed (for high blood sugar or carbohydrate intake).     insulin glargine-yfgn 100 unit/mL (3 mL) Inpn  Inject into the skin.     isosorbide mononitrate 60 MG 24 hr tablet  Commonly known as: IMDUR  Take 1  tablet (60 mg total) by mouth once daily.     LANTUS SOLOSTAR U-100 INSULIN 100 unit/mL (3 mL) Inpn pen  Generic drug: insulin glargine U-100 (Lantus)  Inject into the skin. 15 units QHS and 18 units QAM     losartan 50 MG tablet  Commonly known as: COZAAR  Take 1 tablet (50 mg total) by mouth once daily.     metoprolol tartrate 50 MG tablet  Commonly known as: LOPRESSOR  Take 50 mg by mouth 2 (two) times daily.     nitroGLYCERIN 0.4 MG SL tablet  Commonly known as: NITROSTAT  Place 1 tablet (0.4 mg total) under the tongue every 5 (five) minutes as needed for Chest pain (for chest pain/discomfort). Call 911 if chest pain persists after second dose.     solifenacin 5 MG tablet  Commonly known as: VESICARE  Take 5 mg by mouth once daily.     tamsulosin 0.4 mg Cap  Commonly known as: FLOMAX  Take 1 capsule (0.4 mg total) by mouth once daily.     VITAMIN B-12 1000 MCG tablet  Generic drug: cyanocobalamin  Take 100 mcg by mouth once daily.     vitamin D 1000 units Tab  Commonly known as: VITAMIN D3  Take 1 tablet (1,000 Units total) by mouth once daily.            STOP taking these medications      metoprolol succinate 25 MG 24 hr tablet  Commonly known as: TOPROL-XL              Indwelling Lines/Drains at time of discharge:   Lines/Drains/Airways       None                       Time spent on the discharge of patient: >35 minutes         Lm Conde NP  Department of Hospital Medicine  [place] - Telemetry ([facility])

## 2025-07-24 NOTE — ASSESSMENT & PLAN NOTE
Patient with known CAD s/p stent placement and CABG, which is controlled Will continue home medications and monitor for S/Sx of angina/ACS. Continue to monitor on telemetry.  Patient currently on NSTEMI pathway and awaiting further evaluation/recommendations from Cardiology.     7/22/25  Plans as above   Muscle Hinge Flap Text: The defect edges were debeveled with a #15 scalpel blade.  Given the size, depth and location of the defect and the proximity to free margins a muscle hinge flap was deemed most appropriate.  Using a sterile surgical marker, an appropriate hinge flap was drawn incorporating the defect. The area thus outlined was incised with a #15 scalpel blade.  The skin margins were undermined to an appropriate distance in all directions utilizing iris scissors.

## 2025-07-24 NOTE — SUBJECTIVE & OBJECTIVE
Review of Systems   Constitutional: Negative.   HENT: Negative.     Eyes: Negative.    Cardiovascular: Negative.    Respiratory:  Positive for cough and sputum production (green).    Endocrine: Negative.    Hematologic/Lymphatic: Negative.    Skin: Negative.    Musculoskeletal: Negative.    Gastrointestinal: Negative.    Genitourinary: Negative.    Neurological: Negative.    Psychiatric/Behavioral: Negative.     Allergic/Immunologic: Negative.      Objective:     Vital Signs (Most Recent):  Temp: 98.2 °F (36.8 °C) (07/24/25 1122)  Pulse: 71 (07/24/25 1122)  Resp: 18 (07/24/25 0523)  BP: 134/65 (07/24/25 1122)  SpO2: 98 % (07/24/25 1122) Vital Signs (24h Range):  Temp:  [96.7 °F (35.9 °C)-98.2 °F (36.8 °C)] 98.2 °F (36.8 °C)  Pulse:  [60-71] 71  Resp:  [16-18] 18  SpO2:  [95 %-98 %] 98 %  BP: (113-142)/(63-67) 134/65     Weight: 82.4 kg (181 lb 10.5 oz)  Body mass index is 26.07 kg/m².     SpO2: 98 %         Intake/Output Summary (Last 24 hours) at 7/24/2025 1403  Last data filed at 7/24/2025 1012  Gross per 24 hour   Intake 360 ml   Output 500 ml   Net -140 ml       Lines/Drains/Airways       None                      Physical Exam  Vitals and nursing note reviewed.   Constitutional:       Appearance: Normal appearance.   HENT:      Head: Normocephalic and atraumatic.   Eyes:      Pupils: Pupils are equal, round, and reactive to light.   Cardiovascular:      Rate and Rhythm: Normal rate and regular rhythm.      Heart sounds: S1 normal and S2 normal. No murmur heard.  Pulmonary:      Effort: Pulmonary effort is normal.      Comments: Some coarse upper airway sounds  Musculoskeletal:      Right lower leg: No edema.      Left lower leg: No edema.   Skin:     General: Skin is warm and dry.      Comments: L radial access site C/D/I; no bleeding erythema or drainage   Neurological:      General: No focal deficit present.      Mental Status: He is oriented to person, place, and time.   Psychiatric:         Mood and  "Affect: Mood normal.         Behavior: Behavior normal.         Thought Content: Thought content normal.            Significant Labs: CMP   Recent Labs   Lab 07/23/25  0443 07/24/25  0513    138   K 3.4* 3.8   * 110   CO2 19* 20*   GLU 49* 191*   BUN 31* 29*   CREATININE 1.4 1.4   CALCIUM 9.3 9.7   ANIONGAP 10 8   , CBC   Recent Labs   Lab 07/23/25  0443 07/24/25  0513   WBC 8.37 6.90   HGB 11.7* 12.4*   HCT 36.7* 40.4    182   , Troponin No results for input(s): "TROPONINIHS" in the last 48 hours., and All pertinent lab results from the last 24 hours have been reviewed.    Significant Imaging: Echocardiogram: Transthoracic echo (TTE) complete (Cupid Only):   Results for orders placed or performed during the hospital encounter of 07/21/25   Echo   Result Value Ref Range    BSA 2.02 m2    [facility] CV CPX PATIENT HEIGHT IN 70     LVOT stroke volume 46.1 cm3    LVIDd 5.2 3.5 - 6.0 cm    LV Systolic Volume 72 mL    LV Systolic Volume Index 35.8 mL/m2    LVIDs 4.1 (A) 2.1 - 4.0 cm    LV Diastolic Volume 129 mL    LV Diastolic Volume Index 64.18 mL/m2    Left Ventricular End Systolic Volume by Teichholz Method 71.93 mL    Left Ventricular End Diastolic Volume by Teichholz Method 128.60 mL    IVS 1.4 (A) 0.6 - 1.1 cm    LVOT diameter 2.3 cm    LVOT area 4.2 cm2    FS 21.2 (A) 28 - 44 %    Left Ventricle Relative Wall Thickness 0.46 cm    PW 1.2 (A) 0.6 - 1.1 cm    LV mass 278.4 g    LV Mass Index 138.5 g/m2    MV Peak E Bran 0.44 m/s    TDI LATERAL 0.10 m/s    TDI SEPTAL 0.10 m/s    E/E' ratio 4 m/s    MV Peak A Bran 0.74 m/s    TR Max Bran 2.3 m/s    E/A ratio 0.59     IVRT 66 msec    E wave deceleration time 162 msec    LV SEPTAL E/E' RATIO 4.4 m/s    LV LATERAL E/E' RATIO 4.4 m/s    LVOT peak bran 0.8 m/s    Left Ventricular Outflow Tract Mean Velocity 0.63 cm/s    Left Ventricular Outflow Tract Mean Gradient 1.70 mmHg    RVOT peak VTI 10.8 cm    TAPSE 1.3 cm    LA size 4.0 cm    Left Atrium Minor Axis 5.3 cm "    Left Atrium Major Axis 4.8 cm    RA Major Axis 4.22 cm    AV mean gradient 4 mmHg    AV peak gradient 7 mmHg    Ao peak fredrick 1.3 m/s    Ao VTI 16.5 cm    LVOT peak VTI 11.1 cm    AV valve area 2.8 cm²    AV Velocity Ratio 0.62     AV index (prosthetic) 0.67     MANISH by Velocity Ratio 2.6 cm²    Mr max fredrick 3.37 m/s    MV stenosis pressure 1/2 time 46.99 ms    MV valve area p 1/2 method 4.68 cm2    Triscuspid Valve Regurgitation Peak Gradient 20 mmHg    PV mean gradient 2 mmHg    RVOT peak fredrick 0.77 m/s    Ao root annulus 3.7 cm    STJ 3.7 cm    Ascending aorta 3.6 cm    ASI 1.8 cm/m2    Aortic Height Index 2.0 cm/m    Mean e' 0.10 m/s    ZLVIDS 1.00     ZLVIDD -1.25     MARILOU 32 mL/m2    LA Vol 63 cm3    LA WIDTH 3.7 cm    RA Width 2.5 cm    TV resting pulmonary artery pressure 24 mmHg    RV TB RVSP 5 mmHg    Est. RA pres 3 mmHg    Narrative      Left Ventricle: The left ventricle is normal in size. Moderately   increased wall thickness. There is concentric hypertrophy. Mild global   hypokinesis and regional wall motion abnormalities present. Septal motion   is abnormal. There is mildly reduced systolic function with a visually   estimated ejection fraction of 40 - 50%. Grade I diastolic dysfunction.    Right Ventricle: The right ventricle is normal in sizeWall thickness is   normal. . Right ventricle wall motion has global hypokinesis. Systolic   function is moderately reduced.    Left Atrium: The left atrium is mildly dilated.    Aortic Valve: There is moderate aortic valve sclerosis. Mildly   calcified cusps. There is mild annular calcification present.    Mitral Valve: There is mild regurgitation.    Tricuspid Valve: There is mild regurgitation.    Pulmonary Artery: The estimated pulmonary artery systolic pressure is   24 mmHg.    IVC/SVC: Normal venous pressure at 3 mmHg.      and EKG: Reviewed

## 2025-07-24 NOTE — ASSESSMENT & PLAN NOTE
Patient's FSGs are uncontrolled due to hyperglycemia on current medication regimen.  Last A1c reviewed-   Lab Results   Component Value Date    HGBA1C 7.9 (H) 07/22/2025     Most recent fingerstick glucose reviewed-   Recent Labs   Lab 07/24/25  0933 07/24/25  1118   POCTGLUCOSE 298* 312*     Current correctional scale  Low  Titrate as needed anti-hyperglycemic dose as follows-   Antihyperglycemics (From admission, onward)      Start     Stop Route Frequency Ordered    07/22/25 0900  insulin glargine U-100 (Lantus) pen 10 Units         -- SubQ 2 times daily 07/22/25 0433    07/22/25 0533  insulin aspart U-100 pen 0-5 Units         -- SubQ Before meals & nightly PRN 07/22/25 0433     Plan:  -SSI  -A1c  -Accu-checks  -Hold oral hypoglycemics while patient is in the hospital  -Continue home long-acting insulin at 20% decrease, titrate up as needed  -Hypoglycemic protocol

## 2025-07-24 NOTE — ASSESSMENT & PLAN NOTE
Anemia is likely due to Iron deficiency. Most recent hemoglobin and hematocrit are listed below.  Recent Labs     07/22/25  0410 07/23/25  0443 07/24/25  0513   HGB 13.4* 11.7* 12.4*   HCT 40.2 36.7* 40.4     Plan  -Monitor serial CBC: Daily  -Transfuse PRBC if patient becomes hemodynamically unstable, symptomatic or H/H drops below 7/21.  -Patient has not received any PRBC transfusions to date  -Patient's anemia is currently stable

## 2025-07-24 NOTE — PLAN OF CARE
[facility] - Telemetry (Hospital)  Discharge Final Note    Primary Care Provider: MUKUL Garg MD    Expected Discharge Date: 7/24/2025    Final Discharge Note (most recent)       Final Note - 07/24/25 1208          Final Note    Assessment Type Final Discharge Note     Anticipated Discharge Disposition Home or Self Care     Hospital Resources/Appts/Education Provided Appointments scheduled and added to AVS;Provided patient/caregiver with written discharge plan information        Post-Acute Status    Discharge Delays None known at this time                     Important Message from Medicare  Important Message from Medicare regarding Discharge Appeal Rights: Given to patient/caregiver, Explained to patient/caregiver, Signed/date by patient/caregiver     Date IMM was signed: 07/23/25  Time IMM was signed: 0915    Contact Info       MUKUL Garg MD   Specialty: Family Medicine   Relationship: PCP - General    [id number] [place] [id number]   Phone: [phone]       Next Steps: Follow up          DC Dispo: home    PCP: hospital follow up appt scheduled with Dr. Garg for 7/29    DME: none ordered    Home Health: none ordered    CM reviewed chart; no discharge needs noted.

## 2025-07-24 NOTE — PLAN OF CARE
A228/A228 RUDOLPHAshley Pino is a 85 y.o.male admitted on 7/21/2025 for NSTEMI (non-ST elevated myocardial infarction)   Code Status: Full Code MRN: [id number]   Review of patient's allergies indicates:   Allergen Reactions    Pseudoephedrine-guaifenesin Shortness Of Breath    Panmist dm  [pseudoephedrine-dm-guaifenesin]      Other reaction(s): Unknown     Past Medical History:   Diagnosis Date    Abnormal brain MRI 01/21/2018    Chronic microvascular ischemia changes per MRI July 9, 2007 in [facility]    Abnormal ECG 10/31/2013    Abnormal stress test 01/28/2016    Alcohol dependence     States alcohol abuse ended in 1994    AP (angina pectoris) 01/28/2016    Asthma     Bladder cancer     Carotid artery occlusion     Cataract     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    2 - Normal left ventricular systolic function (EF 60-65%).    3 - Normal left ventricular diastolic function.    4 - Mild left atrial enlargement.    5 - Normal right ventricular systolic function .    6 - Trivial to mild aortic regurgitation.    7 - Trivial to mild mitral regurgitation.  10 - Trivial to mild pulmonic regurgitation.     Hematuria 06/25/2020    History of alcohol abuse 01/22/2018    Patient denies drinking to excess since 1994.    History of atherectomy 01/21/2018 2/2016 [facility]    History of chronic kidney disease 01/22/2018    History of CKD 3    History of PTCA 10/31/2013    History of PTCA 03/09/2016    Hyperlipidemia     Hypertension     Insomnia 06/17/2013    Iron deficiency anemia  10/19/2023    Ischemic cardiomyopathy 10/31/2013    Long term (current) use of antithrombotics/antiplatelets 01/21/2018    Malignant neoplasm of overlapping sites of bladder 06/08/2023    Myocardial infarction     Old MI (myocardial infarction) 1994    Peripheral vascular disease     Polyneuropathy     RBBB 10/31/2013    S/P CABG (coronary artery bypass graft) 10/31/2013    Shortness of breath 10/31/2013    Simple chronic bronchitis 6/17/2013    Tobacco dependence     resolved    Trouble in sleeping     Type 2 diabetes with peripheral circulatory disorder, controlled     Wears glasses       PRN meds    acetaminophen, 650 mg, Q8H PRN  acetaminophen, 650 mg, Q4H PRN  acetaminophen, 650 mg, Q4H PRN  dextrose 50%, 12.5 g, PRN  dextrose 50%, 25 g, PRN  glucagon (human recombinant), 1 mg, PRN  glucose, 16 g, PRN  glucose, 24 g, PRN  HYDROcodone-acetaminophen, 1 tablet, Q6H PRN  insulin aspart U-100, 0-5 Units, QID (AC + HS) PRN  morphine, 2 mg, Q4H PRN  nitroGLYCERIN, 0.4 mg, Q5 Min PRN  ondansetron, 8 mg, Q8H PRN  ondansetron, 8 mg, Q8H PRN  polyethylene glycol, 17 g, Daily PRN  promethazine, 25 mg, Q6H PRN      AVS Discharge instructions received and reviewed with pt and family at bedside w. VN.   Pt voiced understanding and all questions answered to satisfaction.  Stressed importance to making and keeping all follow up appointments.  Medications at bedside and reviewed with pt.  Tele monitor removed and brought to monitor tech.  IV d/c'd with tip intact, pressure dressing applied.  Pt will call when ready to be transported to front  hospital via w/c to be discharged home.      Orientation: oriented x 4  E[place] Coma Scale Score: 15     Lead Monitored: Lead II Rhythm: normal sinus rhythm Frequency/Ectopy: frequent, PVCs  Cardiac/Telemetry Box Number: [id number]  VTE Core Measure: Pharmacological prophylaxis initiated/maintained Last Bowel Movement: 07/22/25  Diet Consistent Carbohydrate 2000 Calories (up to 75 gm per meal);  Heart Healthy     Mehul Score: 21  Fall Risk Score: 9  Accucheck [x]   Freq?      Lines/Drains/Airways       None

## 2025-07-24 NOTE — PLAN OF CARE
Problem: Adult Inpatient Plan of Care  Goal: Plan of Care Review  Outcome: Progressing  Goal: Patient-Specific Goal (Individualized)  Outcome: Progressing  Goal: Absence of Hospital-Acquired Illness or Injury  Outcome: Progressing  Goal: Optimal Comfort and Wellbeing  Outcome: Progressing  Goal: Readiness for Transition of Care  Outcome: Progressing     Problem: Acute Coronary Syndrome  Goal: Optimal Adaptation to Illness  Outcome: Progressing  Goal: Absence of Cardiac-Related Pain  Outcome: Progressing  Goal: Normalized Cardiac Rhythm  Outcome: Progressing  Goal: Effective Cardiac Pump Function  Outcome: Progressing  Goal: Adequate Tissue Perfusion  Outcome: Progressing     Problem: Cardiac Catheterization (Diagnostic/Interventional)  Goal: Absence of Bleeding  Outcome: Progressing  Goal: Absence of Contrast-Induced Injury  Outcome: Progressing  Goal: Stable Heart Rate and Rhythm  Outcome: Progressing  Goal: Absence of Embolism Signs and Symptoms  Outcome: Progressing  Goal: Anesthesia/Sedation Recovery  Outcome: Progressing  Goal: Optimal Pain Control and Function  Outcome: Progressing  Goal: Absence of Vascular Access Complication  Outcome: Progressing     Problem: Diabetes Comorbidity  Goal: Blood Glucose Level Within Targeted Range  Outcome: Progressing     Problem: Wound  Goal: Optimal Coping  Outcome: Progressing  Goal: Optimal Functional Ability  Outcome: Progressing  Goal: Absence of Infection Signs and Symptoms  Outcome: Progressing  Goal: Improved Oral Intake  Outcome: Progressing  Goal: Optimal Pain Control and Function  Outcome: Progressing  Goal: Skin Health and Integrity  Outcome: Progressing  Goal: Optimal Wound Healing  Outcome: Progressing     Problem: Fall Injury Risk  Goal: Absence of Fall and Fall-Related Injury  Outcome: Progressing     Problem: Acute Kidney Injury/Impairment  Goal: Fluid and Electrolyte Balance  Outcome: Progressing  Goal: Improved Oral Intake  Outcome: Progressing  Goal:  Effective Renal Function  Outcome: Progressing     Problem: Hospitalized Older Adult  Goal: Optimal Cognitive Function  Outcome: Progressing  Goal: Effective Bowel Elimination  Outcome: Progressing  Goal: Optimal Coping  Outcome: Progressing  Goal: Fluid and Electrolyte Balance  Outcome: Progressing  Goal: Optimal Functional Ability  Outcome: Progressing  Goal: Improved Oral Intake  Outcome: Progressing  Goal: Adequate Sleep/Rest  Outcome: Progressing  Goal: Effective Urinary Elimination  Outcome: Progressing

## 2025-07-24 NOTE — NURSING
IV removed and pressure gauze and band applied. Heart monitor removed and returned to monitor room. Patient has AVS. Virtual Nurse notified that patient is ready for teaching.

## 2025-07-24 NOTE — PROGRESS NOTES
[facility] ([facility])  Cardiology  Progress Note    Patient Name: Nithin Pino  MRN: [id number]  Admission Date: 7/21/2025  Hospital Length of Stay: 3 days  Code Status: Full Code   Attending Physician: Kenroy Bello MD   Primary Care Physician: MUKUL Garg MD  Expected Discharge Date: 7/24/2025  Principal Problem:NSTEMI (non-ST elevated myocardial infarction)    Subjective:   HPI:  Mr. Pino is an 85 year old male patient whose current medical conditions include CAD s/p remote CABG in 2011, s/p prior PCI, chronic RBBB, bladder CA, MR COPD, HTN, PAD, DM type II, MR, and ICM who presented to [facility] ED yesterday due to acute onset of substernal chest pressure that onset while driving to the store. Pain was heavy and intense and eventually relieved by two sublingual nitro as well as nitro spray given by EMS. Other associated symptoms included non-productive cough and SOB. Patient denied any associated nausea, vomiting, palpitations, near syncope, or syncope. Initial workup in ED revealed troponin of 0.101, creatinine of 2.1, and BNP of 160. Patient subsequently placed on a heparin drip and admitted for further evaluation and treatment. Cardiology consulted to assist with management. Patient seen and examined today, resting in bed. Feels ok. Currently CP free. No other CV complaints. States he has not ever had to use nitro prior to this occasion. He reports compliance with his medications. Very active for his age, lives alone/performs all of his ADL's. Followed routinely in clinic by Dr. Sadler. Labs reviewed. Troponin elevated but flat, 0.109>0.101>0.105>0.108>0.116. TTE with EF of 45-50%, +WMA. EKG reviewed, no acute ischemic changes appreciated. Prior MPI stress test 11/2023 negative. [facility] planned today.         Hospital Course:   7/23/25-Patient seen and examined today, s/p C yesterday which showed stable findings, med mgmt recommended. Feels ok. No recurrent CP. Feels more SOB with intermittent  wheezing. IV fluids d/c'd, IV Lasix given. Labs reviewed. Creatinine 1.4. K low, needs repletion.    7/24/25-Patient seen and examined today resting in bed. Feels ok. Reports cough with green phlegm. SOB improved. No CP. Labs reviewed/stable. CXR yesterday with NAF.        Review of Systems   Constitutional: Negative.   HENT: Negative.     Eyes: Negative.    Cardiovascular: Negative.    Respiratory:  Positive for cough and sputum production (green).    Endocrine: Negative.    Hematologic/Lymphatic: Negative.    Skin: Negative.    Musculoskeletal: Negative.    Gastrointestinal: Negative.    Genitourinary: Negative.    Neurological: Negative.    Psychiatric/Behavioral: Negative.     Allergic/Immunologic: Negative.      Objective:     Vital Signs (Most Recent):  Temp: 98.2 °F (36.8 °C) (07/24/25 1122)  Pulse: 71 (07/24/25 1122)  Resp: 18 (07/24/25 0523)  BP: 134/65 (07/24/25 1122)  SpO2: 98 % (07/24/25 1122) Vital Signs (24h Range):  Temp:  [96.7 °F (35.9 °C)-98.2 °F (36.8 °C)] 98.2 °F (36.8 °C)  Pulse:  [60-71] 71  Resp:  [16-18] 18  SpO2:  [95 %-98 %] 98 %  BP: (113-142)/(63-67) 134/65     Weight: 82.4 kg (181 lb 10.5 oz)  Body mass index is 26.07 kg/m².     SpO2: 98 %         Intake/Output Summary (Last 24 hours) at 7/24/2025 1403  Last data filed at 7/24/2025 1012  Gross per 24 hour   Intake 360 ml   Output 500 ml   Net -140 ml       Lines/Drains/Airways       None                      Physical Exam  Vitals and nursing note reviewed.   Constitutional:       Appearance: Normal appearance.   HENT:      Head: Normocephalic and atraumatic.   Eyes:      Pupils: Pupils are equal, round, and reactive to light.   Cardiovascular:      Rate and Rhythm: Normal rate and regular rhythm.      Heart sounds: S1 normal and S2 normal. No murmur heard.  Pulmonary:      Effort: Pulmonary effort is normal.      Comments: Some coarse upper airway sounds  Musculoskeletal:      Right lower leg: No edema.      Left lower leg: No edema.  "  Skin:     General: Skin is warm and dry.      Comments: L radial access site C/D/I; no bleeding erythema or drainage   Neurological:      General: No focal deficit present.      Mental Status: He is oriented to person, place, and time.   Psychiatric:         Mood and Affect: Mood normal.         Behavior: Behavior normal.         Thought Content: Thought content normal.            Significant Labs: CMP   Recent Labs   Lab 07/23/25  0443 07/24/25  0513    138   K 3.4* 3.8   * 110   CO2 19* 20*   GLU 49* 191*   BUN 31* 29*   CREATININE 1.4 1.4   CALCIUM 9.3 9.7   ANIONGAP 10 8   , CBC   Recent Labs   Lab 07/23/25  0443 07/24/25  0513   WBC 8.37 6.90   HGB 11.7* 12.4*   HCT 36.7* 40.4    182   , Troponin No results for input(s): "TROPONINIHS" in the last 48 hours., and All pertinent lab results from the last 24 hours have been reviewed.    Significant Imaging: Echocardiogram: Transthoracic echo (TTE) complete (Cupid Only):   Results for orders placed or performed during the hospital encounter of 07/21/25   Echo   Result Value Ref Range    BSA 2.02 m2    [facility] CV CPX PATIENT HEIGHT IN 70     LVOT stroke volume 46.1 cm3    LVIDd 5.2 3.5 - 6.0 cm    LV Systolic Volume 72 mL    LV Systolic Volume Index 35.8 mL/m2    LVIDs 4.1 (A) 2.1 - 4.0 cm    LV Diastolic Volume 129 mL    LV Diastolic Volume Index 64.18 mL/m2    Left Ventricular End Systolic Volume by Teichholz Method 71.93 mL    Left Ventricular End Diastolic Volume by Teichholz Method 128.60 mL    IVS 1.4 (A) 0.6 - 1.1 cm    LVOT diameter 2.3 cm    LVOT area 4.2 cm2    FS 21.2 (A) 28 - 44 %    Left Ventricle Relative Wall Thickness 0.46 cm    PW 1.2 (A) 0.6 - 1.1 cm    LV mass 278.4 g    LV Mass Index 138.5 g/m2    MV Peak E Bran 0.44 m/s    TDI LATERAL 0.10 m/s    TDI SEPTAL 0.10 m/s    E/E' ratio 4 m/s    MV Peak A Bran 0.74 m/s    TR Max Bran 2.3 m/s    E/A ratio 0.59     IVRT 66 msec    E wave deceleration time 162 msec    LV SEPTAL E/E' RATIO 4.4 " m/s    LV LATERAL E/E' RATIO 4.4 m/s    LVOT peak fredrick 0.8 m/s    Left Ventricular Outflow Tract Mean Velocity 0.63 cm/s    Left Ventricular Outflow Tract Mean Gradient 1.70 mmHg    RVOT peak VTI 10.8 cm    TAPSE 1.3 cm    LA size 4.0 cm    Left Atrium Minor Axis 5.3 cm    Left Atrium Major Axis 4.8 cm    RA Major Axis 4.22 cm    AV mean gradient 4 mmHg    AV peak gradient 7 mmHg    Ao peak fredrick 1.3 m/s    Ao VTI 16.5 cm    LVOT peak VTI 11.1 cm    AV valve area 2.8 cm²    AV Velocity Ratio 0.62     AV index (prosthetic) 0.67     MANISH by Velocity Ratio 2.6 cm²    Mr max fredrick 3.37 m/s    MV stenosis pressure 1/2 time 46.99 ms    MV valve area p 1/2 method 4.68 cm2    Triscuspid Valve Regurgitation Peak Gradient 20 mmHg    PV mean gradient 2 mmHg    RVOT peak fredrick 0.77 m/s    Ao root annulus 3.7 cm    STJ 3.7 cm    Ascending aorta 3.6 cm    ASI 1.8 cm/m2    Aortic Height Index 2.0 cm/m    Mean e' 0.10 m/s    ZLVIDS 1.00     ZLVIDD -1.25     MARILOU 32 mL/m2    LA Vol 63 cm3    LA WIDTH 3.7 cm    RA Width 2.5 cm    TV resting pulmonary artery pressure 24 mmHg    RV TB RVSP 5 mmHg    Est. RA pres 3 mmHg    Narrative      Left Ventricle: The left ventricle is normal in size. Moderately   increased wall thickness. There is concentric hypertrophy. Mild global   hypokinesis and regional wall motion abnormalities present. Septal motion   is abnormal. There is mildly reduced systolic function with a visually   estimated ejection fraction of 40 - 50%. Grade I diastolic dysfunction.    Right Ventricle: The right ventricle is normal in sizeWall thickness is   normal. . Right ventricle wall motion has global hypokinesis. Systolic   function is moderately reduced.    Left Atrium: The left atrium is mildly dilated.    Aortic Valve: There is moderate aortic valve sclerosis. Mildly   calcified cusps. There is mild annular calcification present.    Mitral Valve: There is mild regurgitation.    Tricuspid Valve: There is mild regurgitation.     Pulmonary Artery: The estimated pulmonary artery systolic pressure is   24 mmHg.    IVC/SVC: Normal venous pressure at 3 mmHg.      and EKG: Reviewed  Assessment and Plan:   Patient who presents with CP/NSTEMI s/p LHC that showed stable findings. SOB improved. Stable CV wise. Can f/u in clinic.    * NSTEMI (non-ST elevated myocardial infarction)  -Patient with history of CAD s/p remote CABG and PCI who presents with substernal CP relieved by nitro  -Troponin elevated but flat, 0.109>0.101>0.105>0.108>0.116  -Stable during exam CP free  -Continue ASA, statin, CCB, BB, Imdur, heparin gtt  -TTE with EF of 45-50%, +WMA  -Dr. Kingston discussed risks/benefits of LHC, patient agreeable with proceeding    7/23/25  -s/p LHC which showed stable findings, med mgmt recommended  -Continue ASA, statin, CCB, BB, imdur  -More SOB this AM, IV fluids d/c'd and IV Lasix given  -CXR pending    7/24/25  -CP free  -Continue OMT    Acute kidney injury superimposed on chronic kidney disease  -Per primary team  -Creatinine down to 1.6    7/23/25  -Creatinine stable at 1.4    Chest pain  -See plan under NSTEMI    Type 2 diabetes mellitus with hyperglycemia, with long-term current use of insulin  -Per primary team    Chronic combined systolic and diastolic congestive heart failure  -Clinically compensated  -TTE with EF of 45-50%, + WMA  -Continue OMT          Hyperlipidemia associated with type 2 diabetes mellitus  -Statin    SOB (shortness of breath)  -IV Lasix given  -CXR pending    7/24/25  -CXR clear  -Green phlegm reported, upper airway coarse---mgmt per hosp med    RBBB  -Chronic    Coronary artery disease of bypass graft of native heart with stable angina pectoris  -see plan under NSTEMI    Hypertension associated with diabetes  -Titrate meds        VTE Risk Mitigation (From admission, onward)           Ordered     Reason for No Pharmacological VTE Prophylaxis  Once        Question:  Reasons:  Answer:  Physician Provided (leave comment)   Comment:  currently on heparin drip    07/21/25 2030     IP VTE HIGH RISK PATIENT  Once         07/21/25 2030     Place sequential compression device  Until discontinued         07/21/25 2030                    Breanne Martinez PA-C  Cardiology  [facility] - Telemetry ([facility])

## 2025-07-24 NOTE — ASSESSMENT & PLAN NOTE
-Patient with history of CAD s/p remote CABG and PCI who presents with substernal CP relieved by nitro  -Troponin elevated but flat, 0.109>0.101>0.105>0.108>0.116  -Stable during exam CP free  -Continue ASA, statin, CCB, BB, Imdur, heparin gtt  -TTE with EF of 45-50%, +WMA  -Dr. Kingston discussed risks/benefits of LHC, patient agreeable with proceeding    7/23/25  -s/p LHC which showed stable findings, med mgmt recommended  -Continue ASA, statin, CCB, BB, imdur  -More SOB this AM, IV fluids d/c'd and IV Lasix given  -CXR pending    7/24/25  -CP free  -Continue OMT

## 2025-07-24 NOTE — ASSESSMENT & PLAN NOTE
-IV Lasix given  -CXR pending    7/24/25  -CXR clear  -Green phlegm reported, upper airway coarse---mgmt per hosp med

## 2025-07-24 NOTE — ASSESSMENT & PLAN NOTE
Baseline creatinine is 1.4-1.7. Most recent creatinine and eGFR are listed below.  Recent Labs     07/22/25  0907 07/23/25  0443 07/24/25  0513   CREATININE 1.6* 1.4 1.4   EGFRNORACEVR 42* 49* 49*     Plan  -LJ is currently undergoing medical management  -Avoid nephrotoxins and renally dose meds for GFR listed above  -Monitor urine output, serial BMP, and adjust therapy as needed    7/22/25  Improved with gentle hydration    7/23/25  Kidney function stable.

## 2025-07-29 ENCOUNTER — OFFICE VISIT (OUTPATIENT)
Dept: INTERNAL MEDICINE | Facility: CLINIC | Age: 85
End: 2025-07-29
Payer: MEDICARE

## 2025-07-29 VITALS
BODY MASS INDEX: 25.34 KG/M2 | HEART RATE: 109 BPM | WEIGHT: 177 LBS | HEIGHT: 70 IN | SYSTOLIC BLOOD PRESSURE: 122 MMHG | OXYGEN SATURATION: 100 % | RESPIRATION RATE: 17 BRPM | DIASTOLIC BLOOD PRESSURE: 68 MMHG

## 2025-07-29 DIAGNOSIS — E11.51 TYPE 2 DIABETES MELLITUS WITH DIABETIC PERIPHERAL ANGIOPATHY WITHOUT GANGRENE, WITH LONG-TERM CURRENT USE OF INSULIN: Primary | Chronic | ICD-10-CM

## 2025-07-29 DIAGNOSIS — I25.708 CORONARY ARTERY DISEASE OF BYPASS GRAFT OF NATIVE HEART WITH STABLE ANGINA PECTORIS: Chronic | ICD-10-CM

## 2025-07-29 DIAGNOSIS — I50.42 CHRONIC COMBINED SYSTOLIC AND DIASTOLIC CONGESTIVE HEART FAILURE: Chronic | ICD-10-CM

## 2025-07-29 DIAGNOSIS — E11.22 TYPE 2 DIABETES MELLITUS WITH STAGE 3B CHRONIC KIDNEY DISEASE, WITH LONG-TERM CURRENT USE OF INSULIN: Chronic | ICD-10-CM

## 2025-07-29 DIAGNOSIS — I25.5 ISCHEMIC CARDIOMYOPATHY: Chronic | ICD-10-CM

## 2025-07-29 DIAGNOSIS — N18.32 TYPE 2 DIABETES MELLITUS WITH STAGE 3B CHRONIC KIDNEY DISEASE, WITH LONG-TERM CURRENT USE OF INSULIN: Chronic | ICD-10-CM

## 2025-07-29 DIAGNOSIS — Z79.4 TYPE 2 DIABETES MELLITUS WITH STAGE 3B CHRONIC KIDNEY DISEASE, WITH LONG-TERM CURRENT USE OF INSULIN: Chronic | ICD-10-CM

## 2025-07-29 DIAGNOSIS — I25.2 HISTORY OF NON-ST ELEVATION MYOCARDIAL INFARCTION (NSTEMI): ICD-10-CM

## 2025-07-29 DIAGNOSIS — I25.118 ATHEROSCLEROSIS OF NATIVE CORONARY ARTERY OF NATIVE HEART WITH STABLE ANGINA PECTORIS: Chronic | ICD-10-CM

## 2025-07-29 DIAGNOSIS — Z79.4 TYPE 2 DIABETES MELLITUS WITH DIABETIC PERIPHERAL ANGIOPATHY WITHOUT GANGRENE, WITH LONG-TERM CURRENT USE OF INSULIN: Primary | Chronic | ICD-10-CM

## 2025-07-29 PROBLEM — E11.29 TYPE 2 DIABETES MELLITUS WITH KIDNEY COMPLICATION, WITH LONG-TERM CURRENT USE OF INSULIN: Chronic | Status: ACTIVE | Noted: 2024-02-13

## 2025-07-29 PROCEDURE — 99999 PR PBB SHADOW E&M-EST. PATIENT-LVL V: CPT | Mod: PBBFAC,HCNC,, | Performed by: FAMILY MEDICINE

## 2025-07-29 RX ORDER — AZELASTINE 1 MG/ML
SPRAY, METERED NASAL
COMMUNITY
Start: 2025-07-11

## 2025-07-29 RX ORDER — FLUTICASONE PROPIONATE 50 MCG
SPRAY, SUSPENSION (ML) NASAL
COMMUNITY
Start: 2025-07-11

## 2025-07-29 NOTE — PROGRESS NOTES
OFFICE VISIT 7/29/25 11:00 AM CDT    CHIEF COMPLAINT: Follow-up    SUMMARY: Type 2 diabetes was uncontrolled post-hospitalization with reported glucose 352 mg/dL and elevated A1c, insulin and dulaglutide continued; heart failure and ischemic cardiomyopathy were stable post-NSTEMI, managed with metoprolol, furosemide, losartan, and amlodipine; coronary artery disease and atherosclerosis were stable, managed with atorvastatin, aspirin, isosorbide mononitrate, and nitroglycerin; follow-up with Aaliyah Young PA-C and cardiology scheduled.    Type 2 diabetes mellitus with diabetic peripheral angiopathy without gangrene, with long-term current use of insulin: He reported difficulty managing his blood sugar since his recent hospitalization, with current home glucose readings of 352 mg/dL, compared to previous levels of 150-160 mg/dL. His A1c was elevated at 8.4% on 8/5/25 and 7.9% on 7/22/25. Lower extremity arterial ultrasound from 3/2024 revealed moderately decreased left RENZO and severely decreased right RENZO, with multiple arterial occlusions. Glucose was 191 mg/dL on 7/24/25 and 49 mg/dL on 7/23/25. Insulin (glargine and aspart) and dulaglutide were continued for glycemic control, with glucose tablets as needed for hypoglycemia. [facility], with an upcoming appointment. I provided a summary of his recent medical events and test results for his [facility] providers.    Type 2 diabetes mellitus with stage 3b chronic kidney disease, with long-term current use of insulin: His CKD stage 3b was evaluated with BUN 29-31 mg/dL, creatinine 1.4 mg/dL, and eGFR 49 mL/min/1.73 m2 (7/23-24/25). Albumin was low at 3.2 g/dL (7/21/25). PTH was elevated at 94.5 pg/mL (8/5/25), and vitamin D was 34 ng/mL. Diabetes was managed with insulin glargine, insulin aspart, and dulaglutide. Losartan and furosemide were continued. I advised ongoing monitoring and provided summary documentation for [facility]  follow-up.    Atherosclerosis of native coronary artery of native heart with stable angina pectoris: His atherosclerotic disease was monitored with recent cardiac catheterization (7/22/25) showing 100% ostial to proximal LAD stenosis, 90% distal LAD stenosis, and three-vessel disease. He reported no new chest pain since discharge, and nitroglycerin was continued as needed. Atorvastatin, aspirin, isosorbide mononitrate, metoprolol, and amlodipine were continued for vascular protection.    Chronic combined systolic and diastolic congestive heart failure: Echocardiogram (7/21/25) showed mild global hypokinesis, concentric LV hypertrophy, EF 40-50%, and grade I diastolic dysfunction. Right ventricular function was moderately reduced. He had recent dyspnea and wheezing during hospitalization, treated with furosemide and duonebs. On 7/29/25, BP was 122/68, pulse 109, weight 80.3 kg, and SpO2 100%. Furosemide, metoprolol, losartan, and amlodipine were continued for heart failure management.    Coronary artery disease of bypass graft of native heart with stable angina pectoris: Cardiac catheterization (7/22/25) demonstrated 100% occlusion of the proximal graft and proximal RCA, with no intervention performed. He was stable post-discharge, and medical therapy with atorvastatin, aspirin, isosorbide mononitrate, metoprolol, and amlodipine was continued. Nitroglycerin was available for chest pain.    Ischemic cardiomyopathy: Echocardiogram (7/21/25) and catheterization (7/22/25) confirmed ischemic cardiomyopathy with EF 40-50%. He remained stable after his NSTEMI, with ongoing management including metoprolol, losartan, furosemide, and atorvastatin.    History of non-ST elevation myocardial infarction (NSTEMI): He was admitted 7/21/25 with acute substernal chest pain relieved by nitroglycerin, elevated troponin (peak 0.117), and abnormal EKG. Echocardiogram and cardiac catheterization confirmed three-vessel CAD and ischemic  cardiomyopathy. He developed dyspnea and wheezing during hospitalization, treated with furosemide and duonebs, and was diagnosed with acute bronchitis. He completed a 7-day course of levofloxacin and prednisone. He was discharged 7/24/25 in stable condition. His current regimen included metoprolol, isosorbide mononitrate, aspirin, atorvastatin, amlodipine, losartan, and furosemide.    Follow-up is scheduled with Aaliyah Young PA-C on 8/12/25 and with cardiology on 8/12/25. I advised him to share his printed summary and recent test results with his [facility] provider at his upcoming appointment.      1. Type 2 diabetes mellitus with diabetic peripheral angiopathy without gangrene, with long-term current use of insulin  Overview:  3/2024 LE Arterial US    Moderately decreased left RENZO and severely decreased right RENZO    Right ostial SFA is occluded, with distal reconstitution, right PT is occluded, AT flow is blunted    Left ostial SFA is occluded, with reconstitution distally , left PT and AT are occluded.  The AT reconstitutes at the level of the DP      2. Type 2 diabetes mellitus with stage 3b chronic kidney disease, with long-term current use of insulin    3. Atherosclerosis of native coronary artery of native heart with stable angina pectoris    4. Chronic combined systolic and diastolic congestive heart failure  Overview:  Echo 7/21/25    Left Ventricle: The left ventricle is normal in size. Moderately increased wall thickness. There is concentric hypertrophy. Mild global hypokinesis and regional wall motion abnormalities present. Septal motion is abnormal. There is mildly reduced systolic function with a visually estimated ejection fraction of 40 - 50%. Grade I diastolic dysfunction.    Right Ventricle: The right ventricle is normal in sizeWall thickness is normal. . Right ventricle wall motion has global hypokinesis. Systolic function is moderately reduced.    Left Atrium: The left atrium is mildly dilated.     Aortic Valve: There is moderate aortic valve sclerosis. Mildly calcified cusps. There is mild annular calcification present.    Mitral Valve: There is mild regurgitation.    Tricuspid Valve: There is mild regurgitation.    Pulmonary Artery: The estimated pulmonary artery systolic pressure is 24 mmHg.    IVC/SVC: Normal venous pressure at 3 mmHg.       5. Coronary artery disease of bypass graft of native heart with stable angina pectoris  Overview:  Cardiac Catheterization 7/22/25    The Ost LAD to Prox LAD lesion was 100% stenosed.    The Dist LAD lesion was 90% stenosed.    The Prox Graft lesion was 100% stenosed.    The Prox RCA lesion was 100% stenosed.    The ejection fraction was calculated to be 40%.    The pre-procedure left ventricular end diastolic pressure was 15.    The post-procedure left ventricular end diastolic pressure was 16.    The estimated blood loss was none.    There was three vessel coronary artery disease.    There was no aortic valve stenosis.      6. Ischemic cardiomyopathy    7. History of non-ST elevation myocardial infarction (NSTEMI)  Assessment & Plan:  Admitted 7/21/2025, presented with acute substernal chest pain relieved by nitroglycerin. Initial evaluation showed elevated troponin (peak 0.117), abnormal EKG, and stable vitals. Diagnosed with NSTEMI, he was admitted on the NSTEMI pathway with heparin infusion. Echocardiogram on 7/22/2025 revealed EF 40-50%, mild global hypokinesis, regional wall motion abnormalities, concentric LV hypertrophy, grade I diastolic dysfunction, and moderately reduced RV systolic function. Cardiac catheterization on 7/22/2025 demonstrated three-vessel CAD with 100% occlusion of the ostial to proximal LAD, 90% distal LAD stenosis, 100% proximal graft occlusion, and 100% proximal RCA occlusion; LVEDP was 15-16 mmHg and EF estimated at 40%; no intervention was performed. Hospital course was notable for dyspnea and wheezing on 7/23, treated with furosemide  "and krishna, later diagnosed as acute bronchitis and started on a 7-day course of levofloxacin and prednisone. LJ on CKD improved with gentle hydration. Diabetes was managed with insulin adjustments; hypertension and CHF remained stable. Discharged 7/24/2025 in stable condition.           Unless specified otherwise, chronic conditions are represented as and appear to be compensated/controlled and stable.  Today's visit involved the intricate management of episodic problem(s) and the ongoing care for the patient's serious or complex condition(s) listed above, reflecting the inherent complexity of providing longitudinal, comprehensive evaluation and management as the central hub for the patient's primary care services.    Except as noted herein, ROS is otherwise negative.    Vitals:    07/29/25 1147   BP: 122/68   BP Location: Left arm   Patient Position: Sitting   Pulse: 109   Resp: 17   SpO2: 100%   Weight: 80.3 kg (177 lb 0.5 oz)   Height: 5' 10" (1.778 m)   Physical Exam  Vitals reviewed.   Constitutional:       General: He is not in acute distress.     Appearance: Normal appearance. He is not ill-appearing or diaphoretic.   Cardiovascular:      Rate and Rhythm: Normal rate and regular rhythm.   Pulmonary:      Effort: Pulmonary effort is normal.      Breath sounds: Normal breath sounds.   Skin:     General: Skin is warm and dry.   Neurological:      Mental Status: He is alert and oriented to person, place, and time. Mental status is at baseline.   Psychiatric:         Mood and Affect: Mood normal.         Behavior: Behavior normal.         Judgment: Judgment normal.       I spent a total of 32 minutes today evaluating and managing this patient for this encounter.  This includes face to face time and non-face to face time preparing to see the patient (eg, review of tests), obtaining and/or reviewing separately obtained history, documenting clinical information in the electronic or other health record, " independently interpreting results and communicating results to the patient/family/caregiver, or care coordinator.  This time was exclusive of any separately billable procedures for this patient and exclusive of time spent treating any other patient.    Documentation entered by me for this encounter may have been done in part using ambient-listening and speech-recognition technologies. I have reviewed the content for accuracy, though errors in syntax, spelling, or similar-sounding words may be present and should be interpreted in context. Please contact the author for any clarification.     Follow up for OV with KENRICK Saravia.

## 2025-08-05 ENCOUNTER — OFFICE VISIT (OUTPATIENT)
Dept: INTERNAL MEDICINE | Facility: CLINIC | Age: 85
End: 2025-08-05
Payer: MEDICARE

## 2025-08-05 VITALS
TEMPERATURE: 98 F | SYSTOLIC BLOOD PRESSURE: 130 MMHG | DIASTOLIC BLOOD PRESSURE: 64 MMHG | OXYGEN SATURATION: 94 % | HEIGHT: 70 IN | BODY MASS INDEX: 26.39 KG/M2 | WEIGHT: 184.31 LBS | HEART RATE: 64 BPM

## 2025-08-05 DIAGNOSIS — N18.32 TYPE 2 DIABETES MELLITUS WITH STAGE 3B CHRONIC KIDNEY DISEASE, WITH LONG-TERM CURRENT USE OF INSULIN: Chronic | ICD-10-CM

## 2025-08-05 DIAGNOSIS — Z79.4 TYPE 2 DIABETES MELLITUS WITH STAGE 3B CHRONIC KIDNEY DISEASE, WITH LONG-TERM CURRENT USE OF INSULIN: Chronic | ICD-10-CM

## 2025-08-05 DIAGNOSIS — N18.32 STAGE 3B CHRONIC KIDNEY DISEASE: Chronic | ICD-10-CM

## 2025-08-05 DIAGNOSIS — I15.2 HYPERTENSION ASSOCIATED WITH DIABETES: Primary | Chronic | ICD-10-CM

## 2025-08-05 DIAGNOSIS — E11.59 HYPERTENSION ASSOCIATED WITH DIABETES: Primary | Chronic | ICD-10-CM

## 2025-08-05 DIAGNOSIS — N25.81 HYPERPARATHYROIDISM, SECONDARY RENAL: Chronic | ICD-10-CM

## 2025-08-05 DIAGNOSIS — E11.22 TYPE 2 DIABETES MELLITUS WITH STAGE 3B CHRONIC KIDNEY DISEASE, WITH LONG-TERM CURRENT USE OF INSULIN: Chronic | ICD-10-CM

## 2025-08-05 LAB
25(OH)D3+25(OH)D2 SERPL-MCNC: 34 NG/ML (ref 30–96)
EAG (OHS): 194 MG/DL (ref 68–131)
HBA1C MFR BLD: 8.4 % (ref 4–5.6)

## 2025-08-05 PROCEDURE — 1101F PT FALLS ASSESS-DOCD LE1/YR: CPT | Mod: CPTII,HCNC,S$GLB, | Performed by: PHYSICIAN ASSISTANT

## 2025-08-05 PROCEDURE — 83036 HEMOGLOBIN GLYCOSYLATED A1C: CPT | Mod: HCNC | Performed by: PHYSICIAN ASSISTANT

## 2025-08-05 PROCEDURE — 3288F FALL RISK ASSESSMENT DOCD: CPT | Mod: CPTII,HCNC,S$GLB, | Performed by: PHYSICIAN ASSISTANT

## 2025-08-05 PROCEDURE — 1126F AMNT PAIN NOTED NONE PRSNT: CPT | Mod: CPTII,HCNC,S$GLB, | Performed by: PHYSICIAN ASSISTANT

## 2025-08-05 PROCEDURE — 83970 ASSAY OF PARATHORMONE: CPT | Mod: HCNC | Performed by: PHYSICIAN ASSISTANT

## 2025-08-05 PROCEDURE — 99214 OFFICE O/P EST MOD 30 MIN: CPT | Mod: HCNC,S$GLB,, | Performed by: PHYSICIAN ASSISTANT

## 2025-08-05 PROCEDURE — 99999 PR PBB SHADOW E&M-EST. PATIENT-LVL V: CPT | Mod: PBBFAC,HCNC,, | Performed by: PHYSICIAN ASSISTANT

## 2025-08-05 PROCEDURE — 1159F MED LIST DOCD IN RCRD: CPT | Mod: CPTII,HCNC,S$GLB, | Performed by: PHYSICIAN ASSISTANT

## 2025-08-05 PROCEDURE — 1111F DSCHRG MED/CURRENT MED MERGE: CPT | Mod: CPTII,HCNC,S$GLB, | Performed by: PHYSICIAN ASSISTANT

## 2025-08-05 PROCEDURE — 82306 VITAMIN D 25 HYDROXY: CPT | Mod: HCNC | Performed by: PHYSICIAN ASSISTANT

## 2025-08-05 NOTE — LETTER
2025        NITHIN PINO   [id number]  [facility] [place] [address]        Dear Nithin,    Please share this information with your [facility].    Sincerely,     MICKEY Garg MD, FAAFP         Summary of recent hospitalization  and current problem and medications  for Nithin Pino,  1940      1. Reason for Recent Hospitalization      - 2025 admission for acute substernal chest pain; diagnosed with non-ST-elevation myocardial infarction (NSTEMI).     Managed per NSTEMI protocol; not revascularization candidate.      2. Key Cardiac Findings    a. Serial troponin-I: 0.109 ? 0.101 ? 0.108 ?g/L (peak 0.117).    b. ED ECG: sinus rhythm, occasional PVCs, T-wave abnormalities, age-indeterminate infarct.    c. Transthoracic echo 25: LVEF 40-50 %, concentric LVH, mild global hypokinesis and regional wall-motion abnormalities, grade I diastolic dysfunction. Right ventricular global hypokinesis; PASP 24 mmHg; mild MR/TR.    d. Left-heart catheterization 25: severe three-vessel CAD (100 % proximal LAD, 90 % distal LAD, 100 % SVG, 100 % proximal RCA); LVEF about 40 %; LVEDP 15 ? 16 mmHg; no PCI performed.      3. Major Active Diagnoses     Recent NSTEMI (2025)     Three-vessel coronary artery disease status post CABG and PCI with stable angina     Chronic combined systolic and diastolic heart failure, EF 40-50 % (NYHA II)     Ischemic cardiomyopathy     Hypertension associated with diabetes     Type 2 diabetes mellitus on insulin, A1c 7.9 %     Stage 3b chronic kidney disease with recent resolved acute kidney injury     Hyperlipidemia     Peripheral vascular disease with claudication     Right bundle branch block     Benign prostatic hyperplasia and overactive bladder     Iron-deficiency anemia, stable     Chronic bronchitis with recent acute exacerbation     Aortic valve sclerosis with mild insufficiency     Additional chronic conditions: carotid stenosis, thoracic aortic  atherosclerosis, bladder cancer in surveillance, bilateral sensorineural hearing loss, diabetic neuropathy, onychomycosis, polypharmacy      4. Hospital Course     Started heparin, beta-blocker, high-intensity statin, nitrates, aspirin; cardiology opted for medical management after catheterization.     Episode of dyspnea and wheezing treated with one dose IV furosemide and nebulized duoneb; diagnosed acute bronchitis and began levofloxacin and prednisone taper.     Acute kidney injury improved with hydration; losartan and furosemide were held during stay.     Hyperglycemia managed with basal/bolus insulin regimen without hypoglycemia.     Discharged chest-pain-free, hemodynamically stable, on room air.      5. Medication Plan at Discharge      aspirin 81 mg daily    atorvastatin 40 mg nightly    amlodipine 10 mg daily    metoprolol tartrate 50 mg twice daily    isosorbide mononitrate 60 mg extended-release each morning    nitroglycerin 0.4 mg sublingual as needed for chest pain    albuterol HFA inhaler 2 puffs every 4 h as needed    cyanocobalamin 1000 mcg daily    B-complex vitamin daily    vitamin D3 1000 units daily    multivitamin-minerals with folic acid daily    finasteride 5 mg daily    tamsulosin 0.4 mg nightly    solifenacin 5 mg daily    azelastine nasal spray 1 spray each nostril twice daily    fluticasone nasal spray 1 spray each nostril daily    dulaglutide 4.5 mg subcutaneous weekly    insulin glargine 2 injections daily (20 % dose reduction)    insulin aspart sliding scale before meals and bedtime    glucose chewable tablets as needed for hypoglycemia      Adjusted or new medications    furosemide 40 mg morning and 20 mg evening; restart only if creatinine <=1.7 mg/dL    losartan 50 mg daily; restart under same renal condition    levofloxacin 750 mg by mouth daily for 5 days (days 0-4 after discharge)    prednisone taper 40 mg daily days 0-2, 20 mg daily days 3-4, 10 mg daily days 5-6, then stop       Antiplatelet strategy    Continue aspirin 81 mg; resume clopidogrel if previously prescribed (cardiology to confirm).      6. Follow-Up and Monitoring      Cardiology clinic within 1-2 weeks; initiate cardiac rehabilitation.    Primary care visit in 7 days to review renal labs, weight, medication titration, bronchitis status.    Daily weights, limit fluids to 2 L and sodium to <2 g per day; call for weight gain >2 lb overnight or >5 lb in a week.    Continue surveillance for bladder cancer, carotid stenosis, and other chronic issues; maintain hearing aid and walker support.    Ensure vaccinations up to date    7. Return Precautions      Call 911 for chest pain not relieved after two nitroglycerin tablets 5 minutes apart.    Seek immediate care for new or worsening shortness of breath, wheezing, orthopnea, fever >=100.4 °F, syncope, uncontrolled blood pressure (>180/110 mmHg), marked reduction in urine output, or sudden weight gain.      8. Social and Functional Notes      Patient uses roller walker for mobility and bilateral hearing aids; daughter assists with care. Would benefit from education on medications, symptom monitoring, and emergency actions.

## 2025-08-05 NOTE — PROGRESS NOTES
Subjective:      Patient ID: Nithin Pino is a 85 y.o. male.    Chief Complaint: Follow-up      HPI  Patient here today for follow-up.  Saw Dr. Garg last week after recent hospitalization for NSTEMI.    Plan was for him to come with his daughter-Ai today-in order to coordinate ongoing care for Mr. Pino.  [facility] is [facility] of [facility] and [facility]  Goal today was to meet with his daughter and discuss ongoing care.  Which issues Mr. Pino would like to be seen here [facility] versus [facility]    Recent MI -patient reports he is feeling better since his recent hospitalization.  He has appointment with Dr. Sadler his cardiologist next week    Review of Systems   Constitutional:  Negative for activity change, appetite change, chills, diaphoresis, fatigue and unexpected weight change.   Eyes:  Negative for visual disturbance.   Respiratory:  Negative for cough, chest tightness, shortness of breath and wheezing.    Cardiovascular:  Negative for chest pain, palpitations and leg swelling.   Gastrointestinal:  Negative for abdominal pain, constipation, nausea and vomiting.   Neurological:  Negative for dizziness, vertigo, tremors, syncope, weakness, light-headedness, numbness and headaches.   Psychiatric/Behavioral:  Negative for agitation, behavioral problems, confusion, decreased concentration, dysphoric mood, hallucinations, self-injury, sleep disturbance and suicidal ideas. The patient is not nervous/anxious and is not hyperactive.        Medication List with Changes/Refills   Current Medications    ALBUTEROL (PROVENTIL/VENTOLIN HFA) 90 MCG/ACTUATION INHALER    Inhale 2 puffs into the lungs every 6 (six) hours as needed for Wheezing. Rescue    AMLODIPINE (NORVASC) 10 MG TABLET    Take 1 tablet (10 mg total) by mouth once daily.    ASPIRIN (ECOTRIN) 81 MG EC TABLET    Take 1 tablet (81 mg total) by mouth once daily.    ATORVASTATIN (LIPITOR) 40 MG TABLET    Take 1 tablet (40 mg total) by mouth every  evening.    AZELASTINE (ASTELIN) 137 MCG (0.1 %) NASAL SPRAY    by Nasal route.    B COMPLEX VITAMINS TABLET    Take 1 tablet by mouth once daily.    CYANOCOBALAMIN (VITAMIN B-12) 1000 MCG TABLET    Take 100 mcg by mouth once daily.    DULAGLUTIDE (TRULICITY) 4.5 MG/0.5 ML PEN INJECTOR    Inject 4.5 mg into the skin every 7 days. SUNDAY    FINASTERIDE (PROSCAR) 5 MG TABLET    TAKE 1 TABLET ONE TIME DAILY    FLUTICASONE PROPIONATE (FLONASE) 50 MCG/ACTUATION NASAL SPRAY    by Nasal route.    FUROSEMIDE (LASIX) 40 MG TABLET    TAKE 1 PILL IN AM, AND 1/2 PILL IN PM    GLUCOSE 4 GM CHEWABLE TABLET    Take 4 tablets (16 g total) by mouth as needed for Low blood sugar.    INSULIN (LANTUS SOLOSTAR U-100 INSULIN) GLARGINE 100 UNITS/ML SUBQ PEN    Inject into the skin. 15 units QHS and 18 units QAM    INSULIN ASPART U-100 (NOVOLOG) 100 UNIT/ML INJECTION    Inject 3 Units into the skin every 4 (four) hours as needed (for high blood sugar or carbohydrate intake).    INSULIN GLARGINE-YFGN 100 UNIT/ML (3 ML) INPN    Inject into the skin.    ISOSORBIDE MONONITRATE (IMDUR) 60 MG 24 HR TABLET    Take 1 tablet (60 mg total) by mouth once daily.    LOSARTAN (COZAAR) 50 MG TABLET    Take 1 tablet (50 mg total) by mouth once daily.    METOPROLOL TARTRATE (LOPRESSOR) 50 MG TABLET    Take 50 mg by mouth 2 (two) times daily.    MULTIVIT-MINERALS/FOLIC ACID (DIABETIC MULTIVITAMIN ORAL)    Take by mouth.    NITROGLYCERIN (NITROSTAT) 0.4 MG SL TABLET    Place 1 tablet (0.4 mg total) under the tongue every 5 (five) minutes as needed for Chest pain (for chest pain/discomfort). Call 911 if chest pain persists after second dose.    SOLIFENACIN (VESICARE) 5 MG TABLET    Take 5 mg by mouth once daily.    TAMSULOSIN (FLOMAX) 0.4 MG CAP    Take 1 capsule (0.4 mg total) by mouth once daily.    VITAMIN D (VITAMIN D3) 1000 UNITS TAB    Take 1 tablet (1,000 Units total) by mouth once daily.   Discontinued Medications    LEVOFLOXACIN (LEVAQUIN) 500 MG  "TABLET    Take 1 tablet (500 mg total) by mouth once daily. for 7 days        Objective:     Vitals:    08/05/25 1011   BP: 130/64   Pulse: 64   Temp: 97.8 °F (36.6 °C)   TempSrc: Tympanic   SpO2: (!) 94%   Weight: 83.6 kg (184 lb 4.9 oz)   Height: 5' 10" (1.778 m)        Physical Exam  Constitutional:       Appearance: Normal appearance. He is normal weight.   HENT:      Head: Normocephalic and atraumatic.      Nose: Nose normal.   Eyes:      Conjunctiva/sclera: Conjunctivae normal.      Comments: Eyes tracking normal on exam    Cardiovascular:      Rate and Rhythm: Normal rate and regular rhythm.      Pulses: Normal pulses.      Heart sounds: Normal heart sounds. No murmur heard.     No friction rub. No gallop.   Pulmonary:      Effort: Pulmonary effort is normal. No respiratory distress.      Breath sounds: Normal breath sounds. No stridor. No wheezing, rhonchi or rales.   Musculoskeletal:      Right lower leg: No edema.      Left lower leg: No edema.      Comments: Using walker to ambulate   Skin:     General: Skin is warm and dry.      Capillary Refill: Capillary refill takes less than 2 seconds.   Neurological:      Mental Status: He is alert and oriented to person, place, and time.   Psychiatric:         Mood and Affect: Mood normal.         Behavior: Behavior normal.         Thought Content: Thought content normal.         Judgment: Judgment normal.            Assessment & Plan:     1. Hypertension associated with diabetes  -     Comprehensive Metabolic Panel; Future; Expected date: 08/05/2025  -     PTH, Intact  -     Vitamin D    2. Stage 3b chronic kidney disease  -     Comprehensive Metabolic Panel; Future; Expected date: 08/05/2025  -     PTH, Intact  -     Vitamin D    3. Hyperparathyroidism, secondary renal  -     Comprehensive Metabolic Panel; Future; Expected date: 08/05/2025  -     PTH, Intact  -     Vitamin D  -     Comprehensive Metabolic Panel; Future; Expected date: 08/05/2025    4. Type 2 " diabetes mellitus with stage 3b chronic kidney disease, with long-term current use of insulin  -     Comprehensive Metabolic Panel; Future; Expected date: 08/05/2025  -     PTH, Intact  -     Vitamin D  -     Hemoglobin A1C         Order to do labs today, discussed ongoing care needs with patient.  He is seeing cardiology next week.  He is going to see me that day as well.  Plan is for his daughter to come with him next week to an appointment      -Patient instructed that our office calls back on all labs and imaging within 1 week of receiving the results. Patient instructed to reach out to our office if they have not heard from us so that we can request the results from the lab/imaging center           Aaliyah Young PA-C

## 2025-08-06 LAB — PTH-INTACT SERPL-MCNC: 94.5 PG/ML (ref 9–77)

## 2025-08-07 ENCOUNTER — OUTPATIENT CASE MANAGEMENT (OUTPATIENT)
Dept: ADMINISTRATIVE | Facility: OTHER | Age: 85
End: 2025-08-07
Payer: MEDICARE

## 2025-08-08 ENCOUNTER — OUTPATIENT CASE MANAGEMENT (OUTPATIENT)
Dept: ADMINISTRATIVE | Facility: OTHER | Age: 85
End: 2025-08-08
Payer: MEDICARE

## 2025-08-08 NOTE — PROGRESS NOTES
Outpatient Care Management  Patient Does Not Consent    Patient: Nithin Pino  MRN:  [id number]  Date of Service:  8/8/2025  Completed by:  Bonnie Vasquez RN    Chief Complaint   Patient presents with    OP[facility] Enrollment Call    Case Closure     Declined.       Patient Summary           Consent Received:  Decline  Decline Reason:  Not interested

## 2025-08-08 NOTE — PROGRESS NOTES
Outpatient Care Management  Patient Does Not Consent    Patient: Nithin Pino  MRN:  [id number]  Date of Service:  8/8/2025  Completed by:  Bonnie Vasquez RN    Chief Complaint   Patient presents with    OPCM Enrollment Call    Case Closure     Declined.       Patient Summary

## 2025-08-09 PROBLEM — I25.2 HISTORY OF NON-ST ELEVATION MYOCARDIAL INFARCTION (NSTEMI): Status: ACTIVE | Noted: 2025-07-21

## 2025-08-10 NOTE — ASSESSMENT & PLAN NOTE
Admitted 7/21/2025, presented with acute substernal chest pain relieved by nitroglycerin. Initial evaluation showed elevated troponin (peak 0.117), abnormal EKG, and stable vitals. Diagnosed with NSTEMI, he was admitted on the NSTEMI pathway with heparin infusion. Echocardiogram on 7/22/2025 revealed EF 40-50%, mild global hypokinesis, regional wall motion abnormalities, concentric LV hypertrophy, grade I diastolic dysfunction, and moderately reduced RV systolic function. Cardiac catheterization on 7/22/2025 demonstrated three-vessel CAD with 100% occlusion of the ostial to proximal LAD, 90% distal LAD stenosis, 100% proximal graft occlusion, and 100% proximal RCA occlusion; LVEDP was 15-16 mmHg and EF estimated at 40%; no intervention was performed. Hospital course was notable for dyspnea and wheezing on 7/23, treated with furosemide and duonebs, later diagnosed as acute bronchitis and started on a 7-day course of levofloxacin and prednisone. LJ on CKD improved with gentle hydration. Diabetes was managed with insulin adjustments; hypertension and CHF remained stable. Discharged 7/24/2025 in stable condition.

## 2025-08-12 ENCOUNTER — OFFICE VISIT (OUTPATIENT)
Dept: INTERNAL MEDICINE | Facility: CLINIC | Age: 85
End: 2025-08-12
Payer: MEDICARE

## 2025-08-12 ENCOUNTER — OFFICE VISIT (OUTPATIENT)
Dept: CARDIOLOGY | Facility: CLINIC | Age: 85
End: 2025-08-12
Payer: MEDICARE

## 2025-08-12 VITALS
TEMPERATURE: 98 F | SYSTOLIC BLOOD PRESSURE: 130 MMHG | WEIGHT: 183.88 LBS | BODY MASS INDEX: 26.33 KG/M2 | HEIGHT: 70 IN | HEART RATE: 89 BPM | DIASTOLIC BLOOD PRESSURE: 72 MMHG | OXYGEN SATURATION: 97 %

## 2025-08-12 VITALS
BODY MASS INDEX: 26.19 KG/M2 | HEART RATE: 86 BPM | SYSTOLIC BLOOD PRESSURE: 132 MMHG | OXYGEN SATURATION: 98 % | WEIGHT: 182.56 LBS | DIASTOLIC BLOOD PRESSURE: 68 MMHG

## 2025-08-12 DIAGNOSIS — E11.22 TYPE 2 DIABETES MELLITUS WITH STAGE 3B CHRONIC KIDNEY DISEASE, WITH LONG-TERM CURRENT USE OF INSULIN: Chronic | ICD-10-CM

## 2025-08-12 DIAGNOSIS — N18.32 STAGE 3B CHRONIC KIDNEY DISEASE: ICD-10-CM

## 2025-08-12 DIAGNOSIS — I15.2 HYPERTENSION ASSOCIATED WITH DIABETES: Chronic | ICD-10-CM

## 2025-08-12 DIAGNOSIS — E11.51 TYPE 2 DIABETES MELLITUS WITH DIABETIC PERIPHERAL ANGIOPATHY WITHOUT GANGRENE, WITH LONG-TERM CURRENT USE OF INSULIN: Chronic | ICD-10-CM

## 2025-08-12 DIAGNOSIS — N40.1 BENIGN PROSTATIC HYPERPLASIA WITH WEAK URINARY STREAM: Chronic | ICD-10-CM

## 2025-08-12 DIAGNOSIS — N18.32 TYPE 2 DIABETES MELLITUS WITH STAGE 3B CHRONIC KIDNEY DISEASE, WITH LONG-TERM CURRENT USE OF INSULIN: Chronic | ICD-10-CM

## 2025-08-12 DIAGNOSIS — E11.69 HYPERLIPIDEMIA ASSOCIATED WITH TYPE 2 DIABETES MELLITUS: Chronic | ICD-10-CM

## 2025-08-12 DIAGNOSIS — I35.1 NONRHEUMATIC AORTIC VALVE INSUFFICIENCY: ICD-10-CM

## 2025-08-12 DIAGNOSIS — I25.2 HISTORY OF NON-ST ELEVATION MYOCARDIAL INFARCTION (NSTEMI): ICD-10-CM

## 2025-08-12 DIAGNOSIS — E11.59 HYPERTENSION ASSOCIATED WITH DIABETES: Primary | Chronic | ICD-10-CM

## 2025-08-12 DIAGNOSIS — I25.118 ATHEROSCLEROSIS OF NATIVE CORONARY ARTERY OF NATIVE HEART WITH STABLE ANGINA PECTORIS: Primary | Chronic | ICD-10-CM

## 2025-08-12 DIAGNOSIS — I25.5 ISCHEMIC CARDIOMYOPATHY: Chronic | ICD-10-CM

## 2025-08-12 DIAGNOSIS — I15.2 HYPERTENSION ASSOCIATED WITH DIABETES: Primary | Chronic | ICD-10-CM

## 2025-08-12 DIAGNOSIS — I25.708 CORONARY ARTERY DISEASE OF BYPASS GRAFT OF NATIVE HEART WITH STABLE ANGINA PECTORIS: Chronic | ICD-10-CM

## 2025-08-12 DIAGNOSIS — Z79.4 TYPE 2 DIABETES MELLITUS WITH DIABETIC PERIPHERAL ANGIOPATHY WITHOUT GANGRENE, WITH LONG-TERM CURRENT USE OF INSULIN: Chronic | ICD-10-CM

## 2025-08-12 DIAGNOSIS — E11.59 HYPERTENSION ASSOCIATED WITH DIABETES: Chronic | ICD-10-CM

## 2025-08-12 DIAGNOSIS — R94.31 ABNORMAL ECG: Chronic | ICD-10-CM

## 2025-08-12 DIAGNOSIS — I65.23 ASYMPTOMATIC STENOSIS OF BOTH CAROTID ARTERIES WITHOUT INFARCTION: Chronic | ICD-10-CM

## 2025-08-12 DIAGNOSIS — I73.9 CLAUDICATION IN PERIPHERAL VASCULAR DISEASE: Chronic | ICD-10-CM

## 2025-08-12 DIAGNOSIS — Z79.4 TYPE 2 DIABETES MELLITUS WITH STAGE 3B CHRONIC KIDNEY DISEASE, WITH LONG-TERM CURRENT USE OF INSULIN: Chronic | ICD-10-CM

## 2025-08-12 DIAGNOSIS — I50.42 CHRONIC COMBINED SYSTOLIC AND DIASTOLIC CONGESTIVE HEART FAILURE: Chronic | ICD-10-CM

## 2025-08-12 DIAGNOSIS — R39.12 BENIGN PROSTATIC HYPERPLASIA WITH WEAK URINARY STREAM: Chronic | ICD-10-CM

## 2025-08-12 DIAGNOSIS — Z95.1 S/P CABG (CORONARY ARTERY BYPASS GRAFT): ICD-10-CM

## 2025-08-12 DIAGNOSIS — N25.81 HYPERPARATHYROIDISM, SECONDARY RENAL: Chronic | ICD-10-CM

## 2025-08-12 DIAGNOSIS — E78.5 HYPERLIPIDEMIA ASSOCIATED WITH TYPE 2 DIABETES MELLITUS: Chronic | ICD-10-CM

## 2025-08-12 DIAGNOSIS — I45.10 RBBB: Chronic | ICD-10-CM

## 2025-08-12 PROCEDURE — 1111F DSCHRG MED/CURRENT MED MERGE: CPT | Mod: CPTII,HCNC,S$GLB, | Performed by: PHYSICIAN ASSISTANT

## 2025-08-12 PROCEDURE — 1159F MED LIST DOCD IN RCRD: CPT | Mod: CPTII,HCNC,S$GLB, | Performed by: PHYSICIAN ASSISTANT

## 2025-08-12 PROCEDURE — 1101F PT FALLS ASSESS-DOCD LE1/YR: CPT | Mod: CPTII,HCNC,S$GLB, | Performed by: PHYSICIAN ASSISTANT

## 2025-08-12 PROCEDURE — 3288F FALL RISK ASSESSMENT DOCD: CPT | Mod: CPTII,HCNC,S$GLB, | Performed by: PHYSICIAN ASSISTANT

## 2025-08-12 PROCEDURE — 99999 PR PBB SHADOW E&M-EST. PATIENT-LVL III: CPT | Mod: PBBFAC,HCNC,, | Performed by: INTERNAL MEDICINE

## 2025-08-12 PROCEDURE — 1126F AMNT PAIN NOTED NONE PRSNT: CPT | Mod: CPTII,HCNC,S$GLB, | Performed by: PHYSICIAN ASSISTANT

## 2025-08-12 PROCEDURE — 99214 OFFICE O/P EST MOD 30 MIN: CPT | Mod: HCNC,S$GLB,, | Performed by: PHYSICIAN ASSISTANT

## 2025-08-12 PROCEDURE — 99999 PR PBB SHADOW E&M-EST. PATIENT-LVL V: CPT | Mod: PBBFAC,HCNC,, | Performed by: PHYSICIAN ASSISTANT

## 2025-08-12 RX ORDER — SOLIFENACIN SUCCINATE 5 MG/1
5 TABLET, FILM COATED ORAL DAILY
Qty: 90 TABLET | Refills: 1 | Status: SHIPPED | OUTPATIENT
Start: 2025-08-12

## 2025-08-14 ENCOUNTER — LAB VISIT (OUTPATIENT)
Dept: LAB | Facility: HOSPITAL | Age: 85
End: 2025-08-14
Attending: FAMILY MEDICINE
Payer: MEDICARE

## 2025-08-14 DIAGNOSIS — I15.2 HYPERTENSION ASSOCIATED WITH DIABETES: Chronic | ICD-10-CM

## 2025-08-14 DIAGNOSIS — E78.5 HYPERLIPIDEMIA ASSOCIATED WITH TYPE 2 DIABETES MELLITUS: Chronic | ICD-10-CM

## 2025-08-14 DIAGNOSIS — E11.69 HYPERLIPIDEMIA ASSOCIATED WITH TYPE 2 DIABETES MELLITUS: Chronic | ICD-10-CM

## 2025-08-14 DIAGNOSIS — Z79.4 TYPE 2 DIABETES MELLITUS WITH HYPERGLYCEMIA, WITH LONG-TERM CURRENT USE OF INSULIN: Chronic | ICD-10-CM

## 2025-08-14 DIAGNOSIS — N18.32 STAGE 3B CHRONIC KIDNEY DISEASE: ICD-10-CM

## 2025-08-14 DIAGNOSIS — Z79.4 TYPE 2 DIABETES MELLITUS WITH DIABETIC PERIPHERAL ANGIOPATHY WITHOUT GANGRENE, WITH LONG-TERM CURRENT USE OF INSULIN: Chronic | ICD-10-CM

## 2025-08-14 DIAGNOSIS — E11.51 TYPE 2 DIABETES MELLITUS WITH DIABETIC PERIPHERAL ANGIOPATHY WITHOUT GANGRENE, WITH LONG-TERM CURRENT USE OF INSULIN: Chronic | ICD-10-CM

## 2025-08-14 DIAGNOSIS — E11.65 TYPE 2 DIABETES MELLITUS WITH HYPERGLYCEMIA, WITH LONG-TERM CURRENT USE OF INSULIN: Chronic | ICD-10-CM

## 2025-08-14 DIAGNOSIS — E11.59 HYPERTENSION ASSOCIATED WITH DIABETES: Chronic | ICD-10-CM

## 2025-08-14 LAB
ALBUMIN SERPL BCP-MCNC: 3.6 G/DL (ref 3.5–5.2)
ALP SERPL-CCNC: 94 UNIT/L (ref 40–150)
ALT SERPL W/O P-5'-P-CCNC: 15 UNIT/L (ref 0–55)
ANION GAP (OHS): 12 MMOL/L (ref 8–16)
AST SERPL-CCNC: 23 UNIT/L (ref 0–50)
BILIRUB SERPL-MCNC: 0.7 MG/DL (ref 0.1–1)
BUN SERPL-MCNC: 23 MG/DL (ref 8–23)
CALCIUM SERPL-MCNC: 10 MG/DL (ref 8.7–10.5)
CHLORIDE SERPL-SCNC: 103 MMOL/L (ref 95–110)
CO2 SERPL-SCNC: 25 MMOL/L (ref 23–29)
CREAT SERPL-MCNC: 1.5 MG/DL (ref 0.5–1.4)
EAG (OHS): 192 MG/DL (ref 68–131)
GFR SERPLBLD CREATININE-BSD FMLA CKD-EPI: 45 ML/MIN/1.73/M2
GLUCOSE SERPL-MCNC: 98 MG/DL (ref 70–110)
HBA1C MFR BLD: 8.3 % (ref 4–5.6)
POTASSIUM SERPL-SCNC: 4.6 MMOL/L (ref 3.5–5.1)
PROT SERPL-MCNC: 7 GM/DL (ref 6–8.4)
SODIUM SERPL-SCNC: 140 MMOL/L (ref 136–145)

## 2025-08-14 PROCEDURE — 83036 HEMOGLOBIN GLYCOSYLATED A1C: CPT | Mod: HCNC

## 2025-08-14 PROCEDURE — 36415 COLL VENOUS BLD VENIPUNCTURE: CPT | Mod: HCNC

## 2025-08-14 PROCEDURE — 82040 ASSAY OF SERUM ALBUMIN: CPT | Mod: HCNC

## 2025-09-02 ENCOUNTER — PROCEDURE VISIT (OUTPATIENT)
Dept: UROLOGY | Facility: CLINIC | Age: 85
End: 2025-09-02
Payer: MEDICARE

## 2025-09-02 VITALS
BODY MASS INDEX: 25.94 KG/M2 | SYSTOLIC BLOOD PRESSURE: 122 MMHG | RESPIRATION RATE: 18 BRPM | DIASTOLIC BLOOD PRESSURE: 69 MMHG | HEIGHT: 70 IN | WEIGHT: 181.19 LBS | HEART RATE: 79 BPM

## 2025-09-02 DIAGNOSIS — C67.8 MALIGNANT NEOPLASM OF OVERLAPPING SITES OF BLADDER: Primary | ICD-10-CM

## 2025-09-02 LAB
BILIRUBIN, UA POC OHS: NEGATIVE
BLOOD, UA POC OHS: ABNORMAL
CLARITY, UA POC OHS: CLEAR
COLOR, UA POC OHS: YELLOW
GLUCOSE, UA POC OHS: NEGATIVE
KETONES, UA POC OHS: NEGATIVE
LEUKOCYTES, UA POC OHS: NEGATIVE
NITRITE, UA POC OHS: NEGATIVE
PH, UA POC OHS: 6
PROTEIN, UA POC OHS: NEGATIVE
SPECIFIC GRAVITY, UA POC OHS: 1.01
UROBILINOGEN, UA POC OHS: 1

## 2025-09-02 PROCEDURE — 52000 CYSTOURETHROSCOPY: CPT | Mod: HCNC,S$GLB,, | Performed by: UROLOGY

## 2025-09-02 PROCEDURE — 81003 URINALYSIS AUTO W/O SCOPE: CPT | Mod: QW,HCNC,S$GLB, | Performed by: UROLOGY

## 2025-09-02 PROCEDURE — 99499 UNLISTED E&M SERVICE: CPT | Mod: HCNC,S$GLB,, | Performed by: UROLOGY

## 2025-09-02 RX ORDER — CIPROFLOXACIN 500 MG/1
500 TABLET, FILM COATED ORAL
Status: COMPLETED | OUTPATIENT
Start: 2025-09-02 | End: 2025-09-02

## 2025-09-02 RX ORDER — LIDOCAINE HYDROCHLORIDE 20 MG/ML
JELLY TOPICAL
Status: COMPLETED | OUTPATIENT
Start: 2025-09-02 | End: 2025-09-02

## 2025-09-02 RX ADMIN — CIPROFLOXACIN 500 MG: 500 TABLET, FILM COATED ORAL at 09:09

## 2025-09-02 RX ADMIN — LIDOCAINE HYDROCHLORIDE 11 ML: 20 JELLY TOPICAL at 09:09

## (undated) DEVICE — SYR ONLY LUER LOCK 20CC

## (undated) DEVICE — SKIN MARKER DEVON 160

## (undated) DEVICE — BOWL STERILE LARGE 32OZ

## (undated) DEVICE — TOWEL OR DISP STRL BLUE 4/PK

## (undated) DEVICE — SYR 10CC LUER LOCK

## (undated) DEVICE — IRRIGATION SET Y-TYPE TUR/BLAD

## (undated) DEVICE — ELECTRODE LOOP CUTTING BIPOLAR

## (undated) DEVICE — CONTAINER SPECIMEN OR STER 4OZ

## (undated) DEVICE — CATH JL3.5 5FR

## (undated) DEVICE — CATH POLLACK OPEN-END FLEXI-TI

## (undated) DEVICE — PACK HEART CATH BR

## (undated) DEVICE — GOWN POLY REINF BRTH SLV XL

## (undated) DEVICE — CATH MPA2 INFINITI SH 5X100CM

## (undated) DEVICE — GOWN POLY REINF X-LONG XL

## (undated) DEVICE — MANIFOLD 4 PORT

## (undated) DEVICE — SYR 50ML CATH TIP

## (undated) DEVICE — SYR 30CC LUER LOCK

## (undated) DEVICE — OMNIPAQUE 300MG 150ML VIAL

## (undated) DEVICE — TRAY SKIN SCRUB WET 4 COMPART

## (undated) DEVICE — SYR LUER LOCK STERILE 10ML

## (undated) DEVICE — KIT SYR REUSABLE

## (undated) DEVICE — CONTAINER SPECIMEN STRL 4OZ

## (undated) DEVICE — UROVIEW 2600/2800

## (undated) DEVICE — GLOVE SIGNATURE ESSNTL LTX 6

## (undated) DEVICE — SPONGE COTTON TRAY 4X4IN

## (undated) DEVICE — TRAY SKIN SCRUB WET PREMIUM

## (undated) DEVICE — ANGIOTOUCH KIT

## (undated) DEVICE — CANISTER SUCTION JUMBO 12L

## (undated) DEVICE — SOL IRRI STRL WATER 1000ML

## (undated) DEVICE — GLOVE SURGICAL LATEX SZ 8

## (undated) DEVICE — ELECTRODE REM PLYHSV RETURN 9

## (undated) DEVICE — SOL NACL IRR 3000ML

## (undated) DEVICE — NDL BLNT STD 1.5IN 18G

## (undated) DEVICE — SOL IRR NACL .9% 3000ML

## (undated) DEVICE — NDL BLUNT W/O FILTER 18GX1.5IN

## (undated) DEVICE — CATH AL1 5FR

## (undated) DEVICE — CATH IMA INFINITI 5X100CM

## (undated) DEVICE — DRAPE T CYSTOSCOPY STERILE

## (undated) DEVICE — CATH JL4 5FR

## (undated) DEVICE — KIT GLIDESHEATH SLEND 6FR 10CM

## (undated) DEVICE — GUIDEWIRE WHOLEY HI TORQ 175CM

## (undated) DEVICE — BAND TR COMP DEVICE REG 24CM

## (undated) DEVICE — SUPPORT ULNA NERVE PROTECTOR

## (undated) DEVICE — CANISTER SUCTION MEDI-VAC 12L

## (undated) DEVICE — CATH PIG145 INFINITI 5X110CM

## (undated) DEVICE — GLOVE SURGICAL LATEX SZ 6

## (undated) DEVICE — CATH JR4 5FR

## (undated) DEVICE — SET IRR URLGY 2LINE UNIV SPIKE

## (undated) DEVICE — BAG DRAIN URINARY CONT IRRG

## (undated) DEVICE — MARKER SKIN RULER STERILE

## (undated) DEVICE — CYSTOSCOPE ASCOPE 4 REV DEFL

## (undated) DEVICE — PAD DEFIB CADENCE ADULT R2

## (undated) DEVICE — SET IRRIGATION WARMING NORMAL